# Patient Record
Sex: MALE | Race: ASIAN | NOT HISPANIC OR LATINO | Employment: OTHER | URBAN - METROPOLITAN AREA
[De-identification: names, ages, dates, MRNs, and addresses within clinical notes are randomized per-mention and may not be internally consistent; named-entity substitution may affect disease eponyms.]

---

## 2017-04-24 ENCOUNTER — HOSPITAL ENCOUNTER (EMERGENCY)
Facility: MEDICAL CENTER | Age: 82
End: 2017-04-24
Attending: EMERGENCY MEDICINE
Payer: MEDICARE

## 2017-04-24 ENCOUNTER — APPOINTMENT (OUTPATIENT)
Dept: RADIOLOGY | Facility: MEDICAL CENTER | Age: 82
End: 2017-04-24
Attending: EMERGENCY MEDICINE
Payer: MEDICARE

## 2017-04-24 VITALS
TEMPERATURE: 96.8 F | HEART RATE: 74 BPM | OXYGEN SATURATION: 91 % | WEIGHT: 155.65 LBS | DIASTOLIC BLOOD PRESSURE: 57 MMHG | RESPIRATION RATE: 19 BRPM | HEIGHT: 68 IN | SYSTOLIC BLOOD PRESSURE: 171 MMHG | BODY MASS INDEX: 23.59 KG/M2

## 2017-04-24 DIAGNOSIS — J40 BRONCHITIS: ICD-10-CM

## 2017-04-24 LAB
ALBUMIN SERPL BCP-MCNC: 4.3 G/DL (ref 3.2–4.9)
ALBUMIN/GLOB SERPL: 1.1 G/DL
ALP SERPL-CCNC: 67 U/L (ref 30–99)
ALT SERPL-CCNC: 10 U/L (ref 2–50)
ANION GAP SERPL CALC-SCNC: 10 MMOL/L (ref 0–11.9)
APPEARANCE UR: CLEAR
AST SERPL-CCNC: 20 U/L (ref 12–45)
BASOPHILS # BLD AUTO: 0.7 % (ref 0–1.8)
BASOPHILS # BLD: 0.07 K/UL (ref 0–0.12)
BILIRUB SERPL-MCNC: 1 MG/DL (ref 0.1–1.5)
BILIRUB UR QL STRIP.AUTO: NEGATIVE
BNP SERPL-MCNC: 39 PG/ML (ref 0–100)
BUN SERPL-MCNC: 10 MG/DL (ref 8–22)
CALCIUM SERPL-MCNC: 9.3 MG/DL (ref 8.5–10.5)
CHLORIDE SERPL-SCNC: 99 MMOL/L (ref 96–112)
CO2 SERPL-SCNC: 25 MMOL/L (ref 20–33)
COLOR UR: YELLOW
CREAT SERPL-MCNC: 0.76 MG/DL (ref 0.5–1.4)
EOSINOPHIL # BLD AUTO: 0.48 K/UL (ref 0–0.51)
EOSINOPHIL NFR BLD: 4.8 % (ref 0–6.9)
ERYTHROCYTE [DISTWIDTH] IN BLOOD BY AUTOMATED COUNT: 40.2 FL (ref 35.9–50)
GFR SERPL CREATININE-BSD FRML MDRD: >60 ML/MIN/1.73 M 2
GLOBULIN SER CALC-MCNC: 3.9 G/DL (ref 1.9–3.5)
GLUCOSE SERPL-MCNC: 180 MG/DL (ref 65–99)
GLUCOSE UR STRIP.AUTO-MCNC: 500 MG/DL
HCT VFR BLD AUTO: 42.8 % (ref 42–52)
HGB BLD-MCNC: 14.7 G/DL (ref 14–18)
IMM GRANULOCYTES # BLD AUTO: 0.05 K/UL (ref 0–0.11)
IMM GRANULOCYTES NFR BLD AUTO: 0.5 % (ref 0–0.9)
KETONES UR STRIP.AUTO-MCNC: 60 MG/DL
LACTATE BLD-SCNC: 1.7 MMOL/L (ref 0.5–2)
LACTATE BLD-SCNC: 2 MMOL/L (ref 0.5–2)
LEUKOCYTE ESTERASE UR QL STRIP.AUTO: NEGATIVE
LYMPHOCYTES # BLD AUTO: 1.74 K/UL (ref 1–4.8)
LYMPHOCYTES NFR BLD: 17.5 % (ref 22–41)
MCH RBC QN AUTO: 30.6 PG (ref 27–33)
MCHC RBC AUTO-ENTMCNC: 34.3 G/DL (ref 33.7–35.3)
MCV RBC AUTO: 89.2 FL (ref 81.4–97.8)
MICRO URNS: ABNORMAL
MONOCYTES # BLD AUTO: 1.38 K/UL (ref 0–0.85)
MONOCYTES NFR BLD AUTO: 13.9 % (ref 0–13.4)
NEUTROPHILS # BLD AUTO: 6.21 K/UL (ref 1.82–7.42)
NEUTROPHILS NFR BLD: 62.6 % (ref 44–72)
NITRITE UR QL STRIP.AUTO: NEGATIVE
NRBC # BLD AUTO: 0 K/UL
NRBC BLD AUTO-RTO: 0 /100 WBC
PH UR STRIP.AUTO: 6 [PH]
PLATELET # BLD AUTO: 245 K/UL (ref 164–446)
PMV BLD AUTO: 9.5 FL (ref 9–12.9)
POTASSIUM SERPL-SCNC: 3.7 MMOL/L (ref 3.6–5.5)
PROT SERPL-MCNC: 8.2 G/DL (ref 6–8.2)
PROT UR QL STRIP: NEGATIVE MG/DL
RBC # BLD AUTO: 4.8 M/UL (ref 4.7–6.1)
RBC UR QL AUTO: NEGATIVE
SODIUM SERPL-SCNC: 134 MMOL/L (ref 135–145)
SP GR UR STRIP.AUTO: 1.02
TROPONIN I SERPL-MCNC: <0.01 NG/ML (ref 0–0.04)
WBC # BLD AUTO: 9.9 K/UL (ref 4.8–10.8)

## 2017-04-24 PROCEDURE — 84484 ASSAY OF TROPONIN QUANT: CPT

## 2017-04-24 PROCEDURE — 71010 DX-CHEST-PORTABLE (1 VIEW): CPT

## 2017-04-24 PROCEDURE — 83880 ASSAY OF NATRIURETIC PEPTIDE: CPT

## 2017-04-24 PROCEDURE — 36415 COLL VENOUS BLD VENIPUNCTURE: CPT

## 2017-04-24 PROCEDURE — 87040 BLOOD CULTURE FOR BACTERIA: CPT

## 2017-04-24 PROCEDURE — 700102 HCHG RX REV CODE 250 W/ 637 OVERRIDE(OP): Performed by: EMERGENCY MEDICINE

## 2017-04-24 PROCEDURE — 93005 ELECTROCARDIOGRAM TRACING: CPT | Performed by: EMERGENCY MEDICINE

## 2017-04-24 PROCEDURE — 94640 AIRWAY INHALATION TREATMENT: CPT

## 2017-04-24 PROCEDURE — 99285 EMERGENCY DEPT VISIT HI MDM: CPT

## 2017-04-24 PROCEDURE — 81003 URINALYSIS AUTO W/O SCOPE: CPT

## 2017-04-24 PROCEDURE — A9270 NON-COVERED ITEM OR SERVICE: HCPCS | Performed by: EMERGENCY MEDICINE

## 2017-04-24 PROCEDURE — 700101 HCHG RX REV CODE 250: Performed by: EMERGENCY MEDICINE

## 2017-04-24 PROCEDURE — 83605 ASSAY OF LACTIC ACID: CPT

## 2017-04-24 PROCEDURE — 85025 COMPLETE CBC W/AUTO DIFF WBC: CPT

## 2017-04-24 PROCEDURE — 80053 COMPREHEN METABOLIC PANEL: CPT

## 2017-04-24 PROCEDURE — 87086 URINE CULTURE/COLONY COUNT: CPT

## 2017-04-24 RX ORDER — AZITHROMYCIN 250 MG/1
500 TABLET, FILM COATED ORAL ONCE
Status: COMPLETED | OUTPATIENT
Start: 2017-04-24 | End: 2017-04-24

## 2017-04-24 RX ORDER — AZITHROMYCIN 250 MG/1
TABLET, FILM COATED ORAL
Qty: 6 TAB | Refills: 0 | Status: SHIPPED | OUTPATIENT
Start: 2017-04-24 | End: 2017-10-21

## 2017-04-24 RX ORDER — ALBUTEROL SULFATE 90 UG/1
2 AEROSOL, METERED RESPIRATORY (INHALATION) EVERY 6 HOURS PRN
Qty: 8.5 G | Refills: 0 | Status: SHIPPED | OUTPATIENT
Start: 2017-04-24 | End: 2017-12-15 | Stop reason: SDUPTHER

## 2017-04-24 RX ADMIN — AZITHROMYCIN 500 MG: 250 TABLET, FILM COATED ORAL at 15:26

## 2017-04-24 RX ADMIN — ALBUTEROL SULFATE 2.5 MG: 2.5 SOLUTION RESPIRATORY (INHALATION) at 15:14

## 2017-04-24 ASSESSMENT — LIFESTYLE VARIABLES: EVER_SMOKED: YES

## 2017-04-24 NOTE — ED NOTES
PIV removed.  Patient and family given discharge instructions and follow up information, prescriptions x 2 electronically sent to pharmacy, verbalized understanding, ambulatory out of ED w/steady gait, VSS on RA, NAD

## 2017-04-24 NOTE — DISCHARGE INSTRUCTIONS

## 2017-04-24 NOTE — ED NOTES
The Medication Reconciliation process has been completed by interviewing the patient    Allergies have been reviewed  Antibiotic use in 30 days - NONE    Home Pharmacy:  [facility] [address]

## 2017-04-24 NOTE — ED PROVIDER NOTES
"ED Provider Note    CHIEF COMPLAINT  Chief Complaint   Patient presents with   • Cough       HPI  Ovi Bejarano is a 81 y.o. male who presents with coughing, coughing for the last week, coughing up some yellow sputum, slight sore throat. No fever no chills no nausea no vomiting no diarrhea. Has felt some generalized weakness in the last 4 days. No abdominal pain no chest pain no headache or neck pain. Cough is productive of yellow sputum.    REVIEW OF SYSTEMS  See HPI for further details. Denies other G.I., G.U.. endrocine, cardiovascular, respriatory or neurological problems. All other systems are negative.     PAST MEDICAL HISTORY  No past medical history on file.    FAMILY HISTORY  No family history on file.    SOCIAL HISTORY he is a nonsmoker  Social History     Social History   • Marital Status:      Spouse Name: N/A   • Number of Children: N/A   • Years of Education: N/A     Social History Main Topics   • Smoking status: Not on file   • Smokeless tobacco: Not on file   • Alcohol Use: Not on file   • Drug Use: Not on file   • Sexual Activity: Not on file     Other Topics Concern   • Not on file     Social History Narrative   • No narrative on file       SURGICAL HISTORY  No past surgical history on file.    CURRENT MEDICATIONS  Home Medications     **Home medications have not yet been reviewed for this encounter**          ALLERGIES  No Known Allergies    PHYSICAL EXAM  VITAL SIGNS: /57 mmHg  Pulse 90  Temp(Src) 36 °C (96.8 °F) (Temporal)  Resp 21  Ht 1.727 m (5' 8\")  Wt 70.6 kg (155 lb 10.3 oz)  BMI 23.67 kg/m2  SpO2 92%  Constitutional: Well developed, Well nourished, No acute distress, Non-toxic appearance.   HENT: Normocephalic, Atraumatic, Bilateral external ears normal, Oropharynx moist, No oral exudates, Nose normal.   Eyes: PERRL, EOMI, Conjunctiva normal, No discharge.   Neck: Normal range of motion, No tenderness, Supple, No stridor.   Lymphatic: No lymphadenopathy noted. "   Cardiovascular: Normal heart rate, Normal rhythm, No murmurs, No rubs, No gallops.   Thorax & Lungs: Normal breath sounds, No respiratory distress, No wheezing, No chest tenderness.   Abdomen:  No tenderness, no guarding no rigidity and the abdomen is soft.  No masses, No pulsatile masses.  Skin: Warm, Dry, No erythema, No rash.   Back: No tenderness, No CVA tenderness.   Extremities: Intact distal pulses, No edema, No tenderness, No cyanosis, No clubbing.   Musculoskeletal: Good range of motion in all major joints. No tenderness to palpation or major deformities noted.   Neurologic: Alert & oriented x 3, Normal motor function, Normal sensory function, No focal deficits noted.   Psychiatric: Affect normal, Judgment normal, Mood normal.       RADIOLOGY/PROCEDURES  DX-CHEST-PORTABLE (1 VIEW)   Final Result         1.  No acute cardiac or pulmonary abnormality is identified.      2.  Emphysema is present and probable scarring versus discoid atelectasis mainly in the left lung base.            COURSE & MEDICAL DECISION MAKING  Pertinent Labs & Imaging studies reviewed. (See chart for details) lactic acid 2.0 white count 9.9 hematocrit 43 platelet count is normal there is no shift, electrolytes normal renal function normal liver functions normal    This patient will be further evaluated by Dr. Lambert  FINAL IMPRESSION  1.   2.   3.     Disposition    Electronically signed by: Justo Rogers, 4/24/2017 12:54 PM

## 2017-04-24 NOTE — ED PROVIDER NOTES
ED Provider Note    CHIEF COMPLAINT  Chief Complaint   Patient presents with   • Cough       HPI  Ovi Bejarano is a 81 y.o. male who presents here for evaluation of cough and sore throat. The patient states that his symptoms began this morning. He describes a dull sore throat, located in the posterior pharynx, worse with swallowing, and without any alleviation. The patient states that he's had a associated cough which is productive of yellow sputum through the night last night, and the day today. The patient's daughter states that the patient has been kept up at night secondary to the cough, and that both the daughter and the patient's wife who live with him, have both been diagnosed with pneumonia and bronchitis in the past week. Secondary to this constellation of symptoms, the patient decided to present here for evaluation. Overall, the patient states that he feels fairly well, but at the urging of the family members, and his persistent cough through the evening, prompted the patient present here.    REVIEW OF SYSTEMS   Patient denies fever, vision change, sore throat, chest pain, shortness of breath, nausea, dysuria, focal muscle pain, rashes, or neurologic deficits     PAST MEDICAL HISTORY       SOCIAL HISTORY  Social History     Social History Main Topics   • Smoking status: Not on file   • Smokeless tobacco: Not on file   • Alcohol Use: Not on file   • Drug Use: Not on file   • Sexual Activity: Not on file       SURGICAL HISTORY  patient denies any surgical history    CURRENT MEDICATIONS  Home Medications     Reviewed by Jerrica Scales (Pharmacy Tech) on 04/24/17 at 1309  Med List Status: Complete    Medication Last Dose Status    aspirin EC (ECOTRIN) 81 MG Tablet Delayed Response 4/23/2017 Active    cetirizine (ZYRTEC) 10 MG Tab 4/23/2017 Active    fluocinonide (LIDEX) 0.05 % Cream 4/23/2017 Active    hydrOXYzine (ATARAX) 25 MG Tab <week Active                ALLERGIES  No Known  "Allergies    PHYSICAL EXAM  VITAL SIGNS: /57 mmHg  Pulse 71  Temp(Src) 36 °C (96.8 °F) (Temporal)  Resp 22  Ht 1.727 m (5' 8\")  Wt 70.6 kg (155 lb 10.3 oz)  BMI 23.67 kg/m2  SpO2 91%   Pulse ox interpretation: I interpret this pulse ox as low.  Constitutional: Alert in no apparent distress.  HENT: Head normocephalic, atraumatic, Bilateral external ears normal, Nose normal.  Eyes: Pupils are equal, extraocular movements intact, Conjunctiva normal, Non-icteric.   Neck: Normal range of motion, Supple, No stridor.   Lymphatic: No lymphadenopathy noted.   Cardiovascular: Regular rate and rhythm, no rubs murmurs or gallops   Thorax & Lungs: Lungs clear to auscultation bilaterally, No acute respiratory distress, No wheezing, No increased work of breathing.   Abdomen: Non-distended   Skin: Warm, Dry, No erythema, No rash.   Back: No bony tenderness, No CVA tenderness.   Extremities: Intact distal pulses, No edema, No tenderness, No cyanosis   Musculoskeletal: Good range of motion in all major joints. No tenderness to palpation or major deformities noted.   Neurologic: Alert , Normal motor function, Normal sensory function, No focal deficits noted.   Psychiatric: Affect normal, Judgment normal, Mood normal.       DIAGNOSTIC STUDIES / PROCEDURES    LABS  Labs Reviewed   CBC WITH DIFFERENTIAL - Abnormal; Notable for the following:     Lymphocytes 17.50 (*)     Monocytes 13.90 (*)     Monos (Absolute) 1.38 (*)     All other components within normal limits   COMP METABOLIC PANEL - Abnormal; Notable for the following:     Sodium 134 (*)     Glucose 180 (*)     Globulin 3.9 (*)     All other components within normal limits   LACTIC ACID   LACTIC ACID   BLOOD CULTURE    Narrative:     Per Hospital Policy: Only change Specimen Src: to \"Line\" if  specified by physician order.   BLOOD CULTURE    Narrative:     Per Hospital Policy: Only change Specimen Src: to \"Line\" if  specified by physician order.   ESTIMATED GFR "   TROPONIN   URINALYSIS   URINE CULTURE(NEW)   B TYPE NATRIURETIC       RADIOLOGY  DX-CHEST-PORTABLE (1 VIEW)   Final Result         1.  No acute cardiac or pulmonary abnormality is identified.      2.  Emphysema is present and probable scarring versus discoid atelectasis mainly in the left lung base.          COURSE & MEDICAL DECISION MAKING  Pertinent Labs & Imaging studies reviewed. (See chart for details)    Patient presented here for evaluation of cough and sore throat over the past 24 hours. Here the patient appears well and physical examination, and has no pulmonary abnormalities. The patient had initial considerations including acute bronchitis, pneumonia, and viral syndrome. The patient has sick contacts including all the members of his family with whom he lives, and otherwise states that he feels well here today. He does have some borderline hypoxia with oxygen saturations ranging from 88-92%. The patient however is not symptomatic from this, does not get lightheaded with ambulation, and otherwise states that he feels well. At this time and think it likely secondary to a small amount of atelectasis secondary to an early bronchitis or viral syndrome. I had a long discussion with the patient, his wife, and daughter at bedside regarding the possibility of discharge versus admission. They felt fairly strongly that the patient would like to go home, and wanted to an attempt at outpatient treatment with the antibiotics. The patient was given a 1st dose of azithromycin here, and the patient and the patient's daughter were informed that should the patient begin having increased shortness of breath, increased fatigue, or any other new or worsening symptoms the patient return to the emergency Department immediately. They express understanding of the need to return for these indications should they arise, and will do so. The patient's daughter lives with him, and states that she'll keep an eye on him in the interim.  The patient's chest x-ray shows no evidence of significant bronchitis or pneumonia, though it does show changes consistent with emphysema, which are likely the cause of the patient's underlying borderline hypoxia. Given this plan to discharge with the aforementioned plan.    The patient will return for worsening symptoms and is stable at the time of discharge. The patient verbalizes understanding.    The patient is referred to a primary physician for blood pressure management, diabetic screening, and for all other preventative health concerns should they be present.     FINAL IMPRESSION  1. Acute bronchitis  2. Emphysema  3.          Electronically signed by: Wesley Lambert, 4/24/2017 3:17 PM    This record was made with a voice recognition software. I have tried to correct any grammar, spelling or context errors to the best of my ability, but errors may still remain. Interpretation of this chart should be taken in this context.

## 2017-04-24 NOTE — ED AVS SNAPSHOT
4/24/2017    Ovi Bejarano  [address]  [facility] [address]    Dear Ovi:    [facility] wants to ensure your discharge home is safe and you or your loved ones have had all of your questions answered regarding your care after you leave the hospital.    Below is a list of resources and contact information should you have any questions regarding your hospital stay, follow-up instructions, or active medical symptoms.    Questions or Concerns Regarding… Contact   Medical Questions Related to Your Discharge  (7 days a week, 8am-5pm) Contact a Nurse Care Coordinator   [phone]   Medical Questions Not Related to Your Discharge  (24 hours a day / 7 days a week)  Contact the Nurse Health Line   [phone]    Medications or Discharge Instructions Refer to your discharge packet   or contact your [facility] Primary Care Provider   [phone]   Follow-up Appointment(s) Schedule your appointment via [employer]   or contact Scheduling [phone]   Billing Review your statement via [employer]  or contact Billing [phone]   Medical Records Review your records via [employer]   or contact Medical Records [phone]     You may receive a telephone call within two days of discharge. This call is to make certain you understand your discharge instructions and have the opportunity to have any questions answered. You can also easily access your medical information, test results and upcoming appointments via the [employer] free online health management tool. You can learn more and sign up at [employer]. For assistance setting up your [employer] account, please call [phone].    Once again, we want to ensure your discharge home is safe and that you have a clear understanding of any next steps in your care. If you have any questions or concerns, please do not hesitate to contact us, we are here for you. Thank you for choosing [facility] for your healthcare needs.    Sincerely,    Your [facility] Healthcare Team

## 2017-04-24 NOTE — ED AVS SNAPSHOT
Home Care Instructions                                                                                                                Ovi Bejarano   MRN: [id number]    Department:  [facility], Emergency Dept   Date of Visit:  4/24/2017            [facility], Emergency Dept    [address] [place] [id number]    Phone:  [phone]      You were seen by     1. Justo Rogers M.D.    2. Wesley Lambert M.D.      Your Diagnosis Was     Bronchitis     J40       These are the medications you received during your hospitalization from 04/24/2017 1140 to 04/24/2017 1644     Date/Time Order Dose Route Action    04/24/2017 1514 albuterol (PROVENTIL) 2.5mg/0.5ml nebulizer solution 2.5 mg 2.5 mg Nebulization Given    04/24/2017 1526 azithromycin (ZITHROMAX) tablet 500 mg 500 mg Oral Given      Follow-up Information     1. Follow up with [facility], Emergency Dept.    Specialty:  Emergency Medicine    Why:  If symptoms worsen    Contact information    [address] [place] [id number] [phone]      Medication Information     Review all of your home medications and newly ordered medications with your primary doctor and/or pharmacist as soon as possible. Follow medication instructions as directed by your doctor and/or pharmacist.     Please keep your complete medication list with you and share with your physician. Update the information when medications are discontinued, doses are changed, or new medications (including over-the-counter products) are added; and carry medication information at all times in the event of emergency situations.               Medication List      START taking these medications        Instructions    Morning Afternoon Evening Bedtime    albuterol 108 (90 BASE) MCG/ACT Aers inhalation aerosol        Inhale 2 Puffs by mouth every 6 hours as needed for Shortness of Breath.   Dose:  2 Puff                        azithromycin 250 MG Tabs   Last time this was given:  500 mg on 4/24/2017  3:26 PM   Commonly known as:  ZITHROMAX        Take two tabs by mouth on day one, then one tab by mouth daily on days 2-5.                          ASK your doctor about these medications        Instructions    Morning Afternoon Evening Bedtime    aspirin EC 81 MG Tbec   Commonly known as:  ECOTRIN        Take 81 mg by mouth every day.   Dose:  81 mg                        cetirizine 10 MG Tabs   Commonly known as:  ZYRTEC        Take 10 mg by mouth every day.   Dose:  10 mg                        fluocinonide 0.05 % Crea   Commonly known as:  LIDEX        Apply  to affected area(s) 2 times a day.                        hydrOXYzine 25 MG Tabs   Commonly known as:  ATARAX        Take 1 Tab by mouth 3 times a day as needed for Itching.   Dose:  25 mg                             Where to Get Your Medications      These medications were sent to [id number]  [place], [place] - [address] [facility]  [address], [place] [address]     Phone:  [phone]    - albuterol 108 (90 BASE) MCG/ACT Aers inhalation aerosol  - azithromycin 250 MG Tabs            Procedures and tests performed during your visit     Procedure/Test Number of Times Performed    BLOOD CULTURE 2    BTYPE NATRIURETIC PEPTIDE 1    CARDIAC MONITORING 1    CBC WITH DIFFERENTIAL 1    COMP METABOLIC PANEL 1    DX-CHEST-PORTABLE (1 VIEW) 1    EKG (ER) 1    ESTIMATED GFR 1    IV Saline Lock 1    Lactic acid (lactate) 2    OXYGEN THERAPY PER PROTOCOL 1    TROPONIN 1    URINALYSIS 1    URINE CULTURE(NEW) 1        Discharge Instructions       Bronchitis  Bronchitis is the body's way of reacting to injury and/or infection (inflammation) of the bronchi. Bronchi are the air tubes that extend from the windpipe into the lungs. If the inflammation becomes severe, it may cause shortness of breath.  CAUSES   Inflammation may be caused by:  · A virus.  · Germs  (bacteria).  · Dust.  · Allergens.  · Pollutants and many other irritants.  The cells lining the bronchial tree are covered with tiny hairs (cilia). These constantly beat upward, away from the lungs, toward the mouth. This keeps the lungs free of pollutants. When these cells become too irritated and are unable to do their job, mucus begins to develop. This causes the characteristic cough of bronchitis. The cough clears the lungs when the cilia are unable to do their job. Without either of these protective mechanisms, the mucus would settle in the lungs. Then you would develop pneumonia.  Smoking is a common cause of bronchitis and can contribute to pneumonia. Stopping this habit is the single most important thing you can do to help yourself.  TREATMENT   · Your caregiver may prescribe an antibiotic if the cough is caused by bacteria. Also, medicines that open up your airways make it easier to breathe. Your caregiver may also recommend or prescribe an expectorant. It will loosen the mucus to be coughed up. Only take over-the-counter or prescription medicines for pain, discomfort, or fever as directed by your caregiver.  · Removing whatever causes the problem (smoking, for example) is critical to preventing the problem from getting worse.  · Cough suppressants may be prescribed for relief of cough symptoms.  · Inhaled medicines may be prescribed to help with symptoms now and to help prevent problems from returning.  · For those with recurrent (chronic) bronchitis, there may be a need for steroid medicines.  SEEK IMMEDIATE MEDICAL CARE IF:   · During treatment, you develop more pus-like mucus (purulent sputum).  · You have a fever.  · Your baby is older than 3 months with a rectal temperature of 102° F (38.9° C) or higher.  · Your baby is 3 months old or younger with a rectal temperature of 100.4° F (38° C) or higher.  · You become progressively more ill.  · You have increased difficulty breathing, wheezing, or  shortness of breath.  It is necessary to seek immediate medical care if you are elderly or sick from any other disease.  MAKE SURE YOU:   · Understand these instructions.  · Will watch your condition.  · Will get help right away if you are not doing well or get worse.  Document Released: 12/18/2006 Document Revised: 03/11/2013 Document Reviewed: 10/27/2009  [employer]® Patient Information ©2014 [employer].            Patient Information     Patient Information    Following emergency treatment: all patient requiring follow-up care must return either to a private physician or a clinic if your condition worsens before you are able to obtain further medical attention, please return to the emergency room.     Billing Information    At [facility], we work to make the billing process streamlined for our patients.  Our Representatives are here to answer any questions you may have regarding your hospital bill.  If you have insurance coverage and have supplied your insurance information to us, we will submit a claim to your insurer on your behalf.  Should you have any questions regarding your bill, we can be reached online or by phone as follows:  Online: You are able pay your bills online or live chat with our representatives about any billing questions you may have. We are here to help Monday - Friday from 8:00am to 7:30pm and 9:00am - 12:00pm on Saturdays.  Please visit [email or web address]  for more information.   Phone:  [phone] or [phone]    Please note that your emergency physician, surgeon, pathologist, radiologist, anesthesiologist, and other specialists are not employed by [facility] and will therefore bill separately for their services.  Please contact them directly for any questions concerning their bills at the numbers below:     Emergency Physician Services:  [phone]  [facility]:  [phone]  [facility]:  [phone]  [facility]:  [phone]    1. Your final bill may vary from the amount quoted upon discharge if all procedures are not complete at that time, or if your doctor has additional procedures of which we are not aware. You will receive an additional bill if you return to the Emergency Department at [facility] for suture removal regardless of the facility of which the sutures were placed.     2. Please arrange for settlement of this account at the emergency registration.    3. All self-pay accounts are due in full at the time of treatment.  If you are unable to meet this obligation then payment is expected within 4-5 days.     4. If you have had radiology studies (CT, X-ray, Ultrasound, MRI), you have received a preliminary result during your emergency department visit. Please contact the radiology department [phone] to receive a copy of your final result. Please discuss the Final result with your primary physician or with the follow up physician provided.     Crisis Hotline:  [place] Crisis Hotline:  9-541-ALWJFXZ or 1-494.613.3379  [place] Crisis Hotline:    1-679.747.1184 or 800-837-4362         ED Discharge Follow Up Questions    1. In order to provide you with very good care, we would like to follow up with a phone call in the next few days.  May we have your permission to contact you?     YES /  NO    2. What is the best phone number to call you? (       )_____-__________    3. What is the best time to call you?      Morning  /  Afternoon  /  Evening                   Patient Signature:  ____________________________________________________________    Date:  ____________________________________________________________

## 2017-04-24 NOTE — ED AVS SNAPSHOT
GoCoin Access Code: [email or web address]  Expires: 5/24/2017  4:44 PM    Your email address is not on file at [facility].  Email Addresses are required for you to sign up for GoCoin, please contact [phone] to verify your personal information and to provide your email address prior to attempting to register for GoCoin.    Ovi Mendez Bejarano  [address]  [place] [address]    GoCoin  A secure, online tool to manage your health information     [facility]’s GoCoin® is a secure, online tool that connects you to your personalized health information from the privacy of your home -- day or night - making it very easy for you to manage your healthcare. Once the activation process is completed, you can even access your medical information using the GoCoin hoda, which is available for free in the Apple Hoda store or Google Play store.     To learn more about GoCoin, visit [email or web address]    There are two levels of access available (as shown below):   My Chart Features  [facility] Primary Care Doctor [facility]  Specialists [facility]  Urgent  Care Non-[facility] Primary Care Doctor   Email your healthcare team securely and privately 24/7 X X X    Manage appointments: schedule your next appointment; view details of past/upcoming appointments X      Request prescription refills. X      View recent personal medical records, including lab and immunizations X X X X   View health record, including health history, allergies, medications X X X X   Read reports about your outpatient visits, procedures, consult and ER notes X X X X   See your discharge summary, which is a recap of your hospital and/or ER visit that includes your diagnosis, lab results, and care plan X X  X     How to register for Applicot:  Once your e-mail address has been verified, follow the following steps to sign up for Applicot.     1. Go to  [email or web address]  2. Click on the Sign Up Now box, which takes you to the New Member Sign Up  page. You will need to provide the following information:  a. Enter your NanoViricides Access Code exactly as it appears at the top of this page. (You will not need to use this code after you’ve completed the sign-up process. If you do not sign up before the expiration date, you must request a new code.)   b. Enter your date of birth.   c. Enter your home email address.   d. Click Submit, and follow the next screen’s instructions.  3. Create a NanoViricides ID. This will be your NanoViricides login ID and cannot be changed, so think of one that is secure and easy to remember.  4. Create a NanoViricides password. You can change your password at any time.  5. Enter your Password Reset Question and Answer. This can be used at a later time if you forget your password.   6. Enter your e-mail address. This allows you to receive e-mail notifications when new information is available in NanoViricides.  7. Click Sign Up. You can now view your health information.    For assistance activating your NanoViricides account, call [phone]

## 2017-04-24 NOTE — ED NOTES
Pt to triage c/o productive cough x  4 days with weakness. Pt found to be in the 80's on RA upon arrival. Pt able to speak in full sentences. Sepsis score of 4.   Pt advised to return to triage nurse for any changes or concerns.

## 2017-04-25 ENCOUNTER — PATIENT OUTREACH (OUTPATIENT)
Dept: HEALTH INFORMATION MANAGEMENT | Facility: OTHER | Age: 82
End: 2017-04-25

## 2017-04-25 LAB — EKG IMPRESSION: NORMAL

## 2017-04-25 NOTE — PROGRESS NOTES
04/25/2017 1052 - Discharge Outreach attempt -   04/26/2017 1530 - Discharge Outreach attempt - No answer, No VMB

## 2017-04-26 LAB
BACTERIA UR CULT: NORMAL
SIGNIFICANT IND 70042: NORMAL
SITE SITE: NORMAL
SOURCE SOURCE: NORMAL

## 2017-04-29 LAB
BACTERIA BLD CULT: NORMAL
BACTERIA BLD CULT: NORMAL
SIGNIFICANT IND 70042: NORMAL
SIGNIFICANT IND 70042: NORMAL
SITE SITE: NORMAL
SITE SITE: NORMAL
SOURCE SOURCE: NORMAL
SOURCE SOURCE: NORMAL

## 2017-10-21 ENCOUNTER — APPOINTMENT (OUTPATIENT)
Dept: RADIOLOGY | Facility: MEDICAL CENTER | Age: 82
DRG: 189 | End: 2017-10-21
Attending: EMERGENCY MEDICINE
Payer: MEDICARE

## 2017-10-21 ENCOUNTER — RESOLUTE PROFESSIONAL BILLING HOSPITAL PROF FEE (OUTPATIENT)
Dept: HOSPITALIST | Facility: MEDICAL CENTER | Age: 82
End: 2017-10-21
Payer: MEDICARE

## 2017-10-21 ENCOUNTER — HOSPITAL ENCOUNTER (INPATIENT)
Facility: MEDICAL CENTER | Age: 82
LOS: 3 days | DRG: 189 | End: 2017-10-24
Attending: EMERGENCY MEDICINE | Admitting: HOSPITALIST
Payer: MEDICARE

## 2017-10-21 DIAGNOSIS — J43.9 PULMONARY EMPHYSEMA, UNSPECIFIED EMPHYSEMA TYPE (HCC): ICD-10-CM

## 2017-10-21 DIAGNOSIS — J44.89 COPD WITH CHRONIC BRONCHITIS AND EMPHYSEMA (HCC): ICD-10-CM

## 2017-10-21 DIAGNOSIS — J43.9 COPD WITH CHRONIC BRONCHITIS AND EMPHYSEMA (HCC): ICD-10-CM

## 2017-10-21 DIAGNOSIS — J44.1 CHRONIC OBSTRUCTIVE PULMONARY DISEASE WITH ACUTE EXACERBATION (HCC): ICD-10-CM

## 2017-10-21 PROBLEM — J96.01 ACUTE RESPIRATORY FAILURE WITH HYPOXIA (HCC): Status: ACTIVE | Noted: 2017-10-21

## 2017-10-21 PROBLEM — E11.9 TYPE 2 DIABETES MELLITUS (HCC): Status: ACTIVE | Noted: 2017-10-21

## 2017-10-21 LAB
ANION GAP SERPL CALC-SCNC: 11 MMOL/L (ref 0–11.9)
BASOPHILS # BLD AUTO: 0.5 % (ref 0–1.8)
BASOPHILS # BLD: 0.04 K/UL (ref 0–0.12)
BNP SERPL-MCNC: 22 PG/ML (ref 0–100)
BUN SERPL-MCNC: 11 MG/DL (ref 8–22)
CALCIUM SERPL-MCNC: 9.4 MG/DL (ref 8.5–10.5)
CHLORIDE SERPL-SCNC: 102 MMOL/L (ref 96–112)
CO2 SERPL-SCNC: 23 MMOL/L (ref 20–33)
CREAT SERPL-MCNC: 0.69 MG/DL (ref 0.5–1.4)
EOSINOPHIL # BLD AUTO: 0.93 K/UL (ref 0–0.51)
EOSINOPHIL NFR BLD: 12.2 % (ref 0–6.9)
ERYTHROCYTE [DISTWIDTH] IN BLOOD BY AUTOMATED COUNT: 41.8 FL (ref 35.9–50)
EST. AVERAGE GLUCOSE BLD GHB EST-MCNC: 174 MG/DL
GFR SERPL CREATININE-BSD FRML MDRD: >60 ML/MIN/1.73 M 2
GLUCOSE BLD-MCNC: 183 MG/DL (ref 65–99)
GLUCOSE BLD-MCNC: 274 MG/DL (ref 65–99)
GLUCOSE SERPL-MCNC: 149 MG/DL (ref 65–99)
HBA1C MFR BLD: 7.7 % (ref 0–5.6)
HCT VFR BLD AUTO: 41.5 % (ref 42–52)
HGB BLD-MCNC: 14.3 G/DL (ref 14–18)
IMM GRANULOCYTES # BLD AUTO: 0.03 K/UL (ref 0–0.11)
IMM GRANULOCYTES NFR BLD AUTO: 0.4 % (ref 0–0.9)
LYMPHOCYTES # BLD AUTO: 2.07 K/UL (ref 1–4.8)
LYMPHOCYTES NFR BLD: 27.2 % (ref 22–41)
MCH RBC QN AUTO: 30.8 PG (ref 27–33)
MCHC RBC AUTO-ENTMCNC: 34.5 G/DL (ref 33.7–35.3)
MCV RBC AUTO: 89.4 FL (ref 81.4–97.8)
MONOCYTES # BLD AUTO: 0.69 K/UL (ref 0–0.85)
MONOCYTES NFR BLD AUTO: 9.1 % (ref 0–13.4)
NEUTROPHILS # BLD AUTO: 3.84 K/UL (ref 1.82–7.42)
NEUTROPHILS NFR BLD: 50.6 % (ref 44–72)
NRBC # BLD AUTO: 0.04 K/UL
NRBC BLD AUTO-RTO: 0.5 /100 WBC
PLATELET # BLD AUTO: 230 K/UL (ref 164–446)
PMV BLD AUTO: 9.3 FL (ref 9–12.9)
POTASSIUM SERPL-SCNC: 4 MMOL/L (ref 3.6–5.5)
RBC # BLD AUTO: 4.64 M/UL (ref 4.7–6.1)
SODIUM SERPL-SCNC: 136 MMOL/L (ref 135–145)
TROPONIN I SERPL-MCNC: <0.01 NG/ML (ref 0–0.04)
WBC # BLD AUTO: 7.6 K/UL (ref 4.8–10.8)

## 2017-10-21 PROCEDURE — 770006 HCHG ROOM/CARE - MED/SURG/GYN SEMI*

## 2017-10-21 PROCEDURE — 99285 EMERGENCY DEPT VISIT HI MDM: CPT

## 2017-10-21 PROCEDURE — 71020 DX-CHEST-2 VIEWS: CPT

## 2017-10-21 PROCEDURE — 700111 HCHG RX REV CODE 636 W/ 250 OVERRIDE (IP): Performed by: EMERGENCY MEDICINE

## 2017-10-21 PROCEDURE — 80048 BASIC METABOLIC PNL TOTAL CA: CPT

## 2017-10-21 PROCEDURE — 99223 1ST HOSP IP/OBS HIGH 75: CPT | Mod: AI | Performed by: HOSPITALIST

## 2017-10-21 PROCEDURE — 82962 GLUCOSE BLOOD TEST: CPT

## 2017-10-21 PROCEDURE — 36415 COLL VENOUS BLD VENIPUNCTURE: CPT

## 2017-10-21 PROCEDURE — A9270 NON-COVERED ITEM OR SERVICE: HCPCS | Performed by: HOSPITALIST

## 2017-10-21 PROCEDURE — 700111 HCHG RX REV CODE 636 W/ 250 OVERRIDE (IP): Performed by: HOSPITALIST

## 2017-10-21 PROCEDURE — 700101 HCHG RX REV CODE 250: Performed by: EMERGENCY MEDICINE

## 2017-10-21 PROCEDURE — 84484 ASSAY OF TROPONIN QUANT: CPT

## 2017-10-21 PROCEDURE — 700102 HCHG RX REV CODE 250 W/ 637 OVERRIDE(OP): Performed by: HOSPITALIST

## 2017-10-21 PROCEDURE — 83036 HEMOGLOBIN GLYCOSYLATED A1C: CPT

## 2017-10-21 PROCEDURE — 96374 THER/PROPH/DIAG INJ IV PUSH: CPT

## 2017-10-21 PROCEDURE — 83880 ASSAY OF NATRIURETIC PEPTIDE: CPT

## 2017-10-21 PROCEDURE — 94640 AIRWAY INHALATION TREATMENT: CPT

## 2017-10-21 PROCEDURE — 85025 COMPLETE CBC W/AUTO DIFF WBC: CPT

## 2017-10-21 RX ORDER — AMOXICILLIN 250 MG
2 CAPSULE ORAL 2 TIMES DAILY
Status: DISCONTINUED | OUTPATIENT
Start: 2017-10-21 | End: 2017-10-24 | Stop reason: HOSPADM

## 2017-10-21 RX ORDER — GUAIFENESIN/DEXTROMETHORPHAN 100-10MG/5
10 SYRUP ORAL EVERY 6 HOURS PRN
Status: DISCONTINUED | OUTPATIENT
Start: 2017-10-21 | End: 2017-10-24 | Stop reason: HOSPADM

## 2017-10-21 RX ORDER — ALBUTEROL SULFATE 90 UG/1
2 AEROSOL, METERED RESPIRATORY (INHALATION) EVERY 6 HOURS PRN
Qty: 8.5 G | Refills: 0 | Status: SHIPPED | OUTPATIENT
Start: 2017-10-21 | End: 2017-12-15

## 2017-10-21 RX ORDER — TIOTROPIUM BROMIDE 18 UG/1
1 CAPSULE ORAL; RESPIRATORY (INHALATION)
Status: DISCONTINUED | OUTPATIENT
Start: 2017-10-21 | End: 2017-10-24 | Stop reason: HOSPADM

## 2017-10-21 RX ORDER — METHYLPREDNISOLONE SODIUM SUCCINATE 40 MG/ML
40 INJECTION, POWDER, LYOPHILIZED, FOR SOLUTION INTRAMUSCULAR; INTRAVENOUS EVERY 6 HOURS
Status: DISCONTINUED | OUTPATIENT
Start: 2017-10-21 | End: 2017-10-22

## 2017-10-21 RX ORDER — CETIRIZINE HYDROCHLORIDE 10 MG/1
10 TABLET ORAL DAILY
Status: DISCONTINUED | OUTPATIENT
Start: 2017-10-21 | End: 2017-10-24 | Stop reason: HOSPADM

## 2017-10-21 RX ORDER — LISINOPRIL 10 MG/1
10 TABLET ORAL
Status: DISCONTINUED | OUTPATIENT
Start: 2017-10-21 | End: 2017-10-24 | Stop reason: HOSPADM

## 2017-10-21 RX ORDER — DOXYCYCLINE 100 MG/1
100 TABLET ORAL EVERY 12 HOURS
Status: DISCONTINUED | OUTPATIENT
Start: 2017-10-21 | End: 2017-10-24 | Stop reason: HOSPADM

## 2017-10-21 RX ORDER — PREDNISONE 20 MG/1
40 TABLET ORAL DAILY
Qty: 12 TAB | Refills: 0 | Status: SHIPPED | OUTPATIENT
Start: 2017-10-21 | End: 2017-10-23

## 2017-10-21 RX ORDER — METHYLPREDNISOLONE SODIUM SUCCINATE 125 MG/2ML
125 INJECTION, POWDER, LYOPHILIZED, FOR SOLUTION INTRAMUSCULAR; INTRAVENOUS ONCE
Status: COMPLETED | OUTPATIENT
Start: 2017-10-21 | End: 2017-10-21

## 2017-10-21 RX ORDER — ONDANSETRON 4 MG/1
4 TABLET, ORALLY DISINTEGRATING ORAL EVERY 4 HOURS PRN
Status: DISCONTINUED | OUTPATIENT
Start: 2017-10-21 | End: 2017-10-24 | Stop reason: HOSPADM

## 2017-10-21 RX ORDER — METFORMIN HYDROCHLORIDE 500 MG/1
1500 TABLET, EXTENDED RELEASE ORAL DAILY
COMMUNITY
End: 2017-12-15

## 2017-10-21 RX ORDER — BISACODYL 10 MG
10 SUPPOSITORY, RECTAL RECTAL
Status: DISCONTINUED | OUTPATIENT
Start: 2017-10-21 | End: 2017-10-24 | Stop reason: HOSPADM

## 2017-10-21 RX ORDER — ONDANSETRON 2 MG/ML
4 INJECTION INTRAMUSCULAR; INTRAVENOUS EVERY 4 HOURS PRN
Status: DISCONTINUED | OUTPATIENT
Start: 2017-10-21 | End: 2017-10-24 | Stop reason: HOSPADM

## 2017-10-21 RX ORDER — POLYETHYLENE GLYCOL 3350 17 G/17G
1 POWDER, FOR SOLUTION ORAL
Status: DISCONTINUED | OUTPATIENT
Start: 2017-10-21 | End: 2017-10-24 | Stop reason: HOSPADM

## 2017-10-21 RX ADMIN — IPRATROPIUM BROMIDE 0.5 MG: 0.5 SOLUTION RESPIRATORY (INHALATION) at 14:44

## 2017-10-21 RX ADMIN — METHYLPREDNISOLONE SODIUM SUCCINATE 40 MG: 40 INJECTION, POWDER, FOR SOLUTION INTRAMUSCULAR; INTRAVENOUS at 19:42

## 2017-10-21 RX ADMIN — INSULIN LISPRO 5 UNITS: 100 INJECTION, SOLUTION INTRAVENOUS; SUBCUTANEOUS at 21:17

## 2017-10-21 RX ADMIN — DOXYCYCLINE 100 MG: 100 TABLET ORAL at 19:43

## 2017-10-21 RX ADMIN — METHYLPREDNISOLONE SODIUM SUCCINATE 125 MG: 125 INJECTION, POWDER, FOR SOLUTION INTRAMUSCULAR; INTRAVENOUS at 14:42

## 2017-10-21 RX ADMIN — STANDARDIZED SENNA CONCENTRATE AND DOCUSATE SODIUM 2 TABLET: 8.6; 5 TABLET, FILM COATED ORAL at 19:42

## 2017-10-21 RX ADMIN — ALBUTEROL SULFATE 2.5 MG: 2.5 SOLUTION RESPIRATORY (INHALATION) at 14:44

## 2017-10-21 ASSESSMENT — COPD QUESTIONNAIRES
HAVE YOU SMOKED AT LEAST 100 CIGARETTES IN YOUR ENTIRE LIFE: YES
DURING THE PAST 4 WEEKS HOW MUCH DID YOU FEEL SHORT OF BREATH: NONE/LITTLE OF THE TIME
DO YOU EVER COUGH UP ANY MUCUS OR PHLEGM?: NO/ONLY WITH OCCASIONAL COLDS OR INFECTIONS
COPD SCREENING SCORE: 4

## 2017-10-21 ASSESSMENT — PATIENT HEALTH QUESTIONNAIRE - PHQ9
SUM OF ALL RESPONSES TO PHQ9 QUESTIONS 1 AND 2: 0
SUM OF ALL RESPONSES TO PHQ QUESTIONS 1-9: 0
2. FEELING DOWN, DEPRESSED, IRRITABLE, OR HOPELESS: NOT AT ALL
1. LITTLE INTEREST OR PLEASURE IN DOING THINGS: NOT AT ALL

## 2017-10-21 ASSESSMENT — PAIN SCALES - GENERAL
PAINLEVEL_OUTOF10: 0
PAINLEVEL_OUTOF10: 0

## 2017-10-21 ASSESSMENT — LIFESTYLE VARIABLES
EVER_SMOKED: YES
EVER_SMOKED: YES
DO YOU DRINK ALCOHOL: NO
ALCOHOL_USE: NO

## 2017-10-21 NOTE — ED NOTES
Pt arrived to ER check-in stating he has been short of breath for 2 days. Pulse ox was 95% while seated and focused on breathing. 88% while talking    2lpm given via nasal canula at check-in

## 2017-10-21 NOTE — ED PROVIDER NOTES
ED Provider Note    Scribed for Manfred Ponce M.D. by Bartolome Chaudhari. 10/21/2017  1:41 PM    Primary care provider: Pcp Unknown  Means of arrival: walk in  History obtained from: patient  History limited by: clinical condition    CHIEF COMPLAINT  Chief Complaint   Patient presents with   • Shortness of Breath   • Wheezing       HPI  Ovi Bejarano is a 81 y.o. male who presents to the Emergency Department complaining of gradually worsening shortness of breath for the last 3 days. Patient reports associated occasional productive cough. He has a history of cigarette use, but has not smoked for 10 years. Patient has never been diagnosed with COPD. He denies chest pain, leg swelling, fever.     No further details of history of present illness can be obtained within the constraints of urgency of the patient's clinical condition    REVIEW OF SYSTEMS  Pertinent positives include shortness of breath, productive cough. Pertinent negatives include no chest pain, leg swelling, fever.     No further details of ROS can be obtained within the constraints of urgency of the patient's clinical condition    C.    PAST MEDICAL HISTORY   has a past medical history of Hypercholesteremia; Hyperglycemia; and Rash.    SURGICAL HISTORY  patient denies any surgical history    SOCIAL HISTORY  Social History   Substance Use Topics   • Smoking status: Former Smoker     Quit date: 10/21/2007   • Smokeless tobacco: Never Used   • Alcohol use Yes      Comment: rare      History   Drug Use No       FAMILY HISTORY  History reviewed. No pertinent family history.    CURRENT MEDICATIONS  Home Medications     Reviewed by Gina Andino R.N. (Registered Nurse) on 10/21/17 at 1322  Med List Status: Complete   Medication Last Dose Status   albuterol 108 (90 BASE) MCG/ACT Aero Soln inhalation aerosol  Active   aspirin EC (ECOTRIN) 81 MG Tablet Delayed Response 4/23/2017 Active   cetirizine (ZYRTEC) 10 MG Tab 4/23/2017 Active   metformin  "(GLUCOPHAGE) 500 MG Tab  Active                ALLERGIES  No Known Allergies    PHYSICAL EXAM  VITAL SIGNS: /119   Pulse 76   Temp 36.6 °C (97.9 °F)   Resp 18   Ht 1.651 m (5' 5\")   Wt 69.7 kg (153 lb 10.6 oz)   SpO2 95%   BMI 25.57 kg/m²     Vital signs reviewed.  Constitutional:  Elderly male. Conversing, smiling.  Head: Normocephalic.   Mouth/Throat: Oropharynx is clear and moist.   Neck: Normal range of motion.   Cardiovascular: Normal rate, regular rhythm and normal heart sounds.    Pulmonary/Chest: Lungs sounds decreased. Expiratory wheezing bilaterally.   Abdominal: Soft. There is no tenderness. There is no rebound and no guarding.   Musculoskeletal: Exhibits no edema.   Neurological: Patient is alert and oriented to person, place, and time.   Skin: Skin is warm and dry.   Psychiatric: Patient has a normal mood and affect. Behavior is normal.     LABS   Results for orders placed or performed during the hospital encounter of 10/21/17   CBC w/ Differential   Result Value Ref Range    WBC 7.6 4.8 - 10.8 K/uL    RBC 4.64 (L) 4.70 - 6.10 M/uL    Hemoglobin 14.3 14.0 - 18.0 g/dL    Hematocrit 41.5 (L) 42.0 - 52.0 %    MCV 89.4 81.4 - 97.8 fL    MCH 30.8 27.0 - 33.0 pg    MCHC 34.5 33.7 - 35.3 g/dL    RDW 41.8 35.9 - 50.0 fL    Platelet Count 230 164 - 446 K/uL    MPV 9.3 9.0 - 12.9 fL    Neutrophils-Polys 50.60 44.00 - 72.00 %    Lymphocytes 27.20 22.00 - 41.00 %    Monocytes 9.10 0.00 - 13.40 %    Eosinophils 12.20 (H) 0.00 - 6.90 %    Basophils 0.50 0.00 - 1.80 %    Immature Granulocytes 0.40 0.00 - 0.90 %    Nucleated RBC 0.50 /100 WBC    Neutrophils (Absolute) 3.84 1.82 - 7.42 K/uL    Lymphs (Absolute) 2.07 1.00 - 4.80 K/uL    Monos (Absolute) 0.69 0.00 - 0.85 K/uL    Eos (Absolute) 0.93 (H) 0.00 - 0.51 K/uL    Baso (Absolute) 0.04 0.00 - 0.12 K/uL    Immature Granulocytes (abs) 0.03 0.00 - 0.11 K/uL    NRBC (Absolute) 0.04 K/uL   All labs reviewed by me.    RADIOLOGY  DX-CHEST-2 VIEWS   Final " Result      1.  No consolidations identified.      2.  Emphysema and scattered areas of fibrosis are again noted.      The radiologist's interpretation of all radiological studies have been reviewed by me.    COURSE & MEDICAL DECISION MAKING  Pertinent Labs & Imaging studies reviewed. (See chart for details) The patient's [facility] and past medical  records were reviewed    1:41 PM - Patient seen and examined at bedside. Patient will be treated with Solumedrol 125 mg, Proventil 2.5 mg, Atrovent 0.5 mg. Ordered DX chest, CBC with differential, BMP, BNP, Troponin to evaluate his symptoms. The differential diagnoses include but are not limited to: pneumonia, pneumothorax, COPD. Chest x-ray is unremarkable    3:26 PM- Recheck: Patient re-evaluated at beside. Patient reports feeling improved with interventions. Patient's lab and radiology results discussed. Discussed patient's condition and treatment plan. We ambulated the patient in the department. He very quickly desaturated with minimal exertion after only walking 60 feet. Advised to follow up with his primary. Instructed to return to Emergency Department immediately if any new or worsening symptoms.    The patient will return for new or worsening symptoms and is stable at the time of discharge.    The patient is referred to a primary physician for blood pressure management, diabetic screening, and for all other preventative health concerns.    DISPOSITION:  Patient wanted to be discharged home however his post-ambulation sats were in the mid to low 80s. Patient will be admitted.    FOLLOW UP:  No follow-up provider specified.    OUTPATIENT MEDICATIONS:  New Prescriptions    No medications on file         FINAL IMPRESSION  1. Chronic obstructive pulmonary disease with acute exacerbation (CMS-HCC)          I, Bartolome Chaudhari (Mahendra), am scribing for, and in the presence of, Manfred Ponce M.D..    Electronically signed by: Bartolome Chaudhari (Mahendra),  10/21/2017    IManfred M.D. personally performed the services described in this documentation, as scribed by Bartolome Chaudhari in my presence, and it is both accurate and complete.    The note accurately reflects work and decisions made by me.  Manfred Ponce  10/21/2017  3:26 PM

## 2017-10-21 NOTE — ED NOTES
Patient arrived to ED with family for complaints of SOB and wheezing x3 days. Denies CP or pressure. Denies fever or chills. +Productive cough. He does have hx of smoking but denies COPD. He did have SPO2 readings <90% after ambulation and was placed on 2 lpm via NC in ED triage.

## 2017-10-22 LAB
ANION GAP SERPL CALC-SCNC: 10 MMOL/L (ref 0–11.9)
BASOPHILS # BLD AUTO: 0.3 % (ref 0–1.8)
BASOPHILS # BLD: 0.02 K/UL (ref 0–0.12)
BUN SERPL-MCNC: 15 MG/DL (ref 8–22)
CALCIUM SERPL-MCNC: 9.5 MG/DL (ref 8.5–10.5)
CHLORIDE SERPL-SCNC: 103 MMOL/L (ref 96–112)
CO2 SERPL-SCNC: 25 MMOL/L (ref 20–33)
CREAT SERPL-MCNC: 0.7 MG/DL (ref 0.5–1.4)
EOSINOPHIL # BLD AUTO: 0.01 K/UL (ref 0–0.51)
EOSINOPHIL NFR BLD: 0.1 % (ref 0–6.9)
ERYTHROCYTE [DISTWIDTH] IN BLOOD BY AUTOMATED COUNT: 41.6 FL (ref 35.9–50)
GFR SERPL CREATININE-BSD FRML MDRD: >60 ML/MIN/1.73 M 2
GLUCOSE BLD-MCNC: 173 MG/DL (ref 65–99)
GLUCOSE BLD-MCNC: 199 MG/DL (ref 65–99)
GLUCOSE BLD-MCNC: 202 MG/DL (ref 65–99)
GLUCOSE BLD-MCNC: 207 MG/DL (ref 65–99)
GLUCOSE SERPL-MCNC: 184 MG/DL (ref 65–99)
HCT VFR BLD AUTO: 40.4 % (ref 42–52)
HGB BLD-MCNC: 14 G/DL (ref 14–18)
IMM GRANULOCYTES # BLD AUTO: 0.05 K/UL (ref 0–0.11)
IMM GRANULOCYTES NFR BLD AUTO: 0.7 % (ref 0–0.9)
LYMPHOCYTES # BLD AUTO: 1.19 K/UL (ref 1–4.8)
LYMPHOCYTES NFR BLD: 16.2 % (ref 22–41)
MCH RBC QN AUTO: 31 PG (ref 27–33)
MCHC RBC AUTO-ENTMCNC: 34.7 G/DL (ref 33.7–35.3)
MCV RBC AUTO: 89.4 FL (ref 81.4–97.8)
MONOCYTES # BLD AUTO: 0.06 K/UL (ref 0–0.85)
MONOCYTES NFR BLD AUTO: 0.8 % (ref 0–13.4)
NEUTROPHILS # BLD AUTO: 6.02 K/UL (ref 1.82–7.42)
NEUTROPHILS NFR BLD: 81.9 % (ref 44–72)
NRBC # BLD AUTO: 0 K/UL
NRBC BLD AUTO-RTO: 0 /100 WBC
PLATELET # BLD AUTO: 236 K/UL (ref 164–446)
PMV BLD AUTO: 9.6 FL (ref 9–12.9)
POTASSIUM SERPL-SCNC: 3.9 MMOL/L (ref 3.6–5.5)
RBC # BLD AUTO: 4.52 M/UL (ref 4.7–6.1)
SODIUM SERPL-SCNC: 138 MMOL/L (ref 135–145)
WBC # BLD AUTO: 7.4 K/UL (ref 4.8–10.8)

## 2017-10-22 PROCEDURE — A9270 NON-COVERED ITEM OR SERVICE: HCPCS | Performed by: INTERNAL MEDICINE

## 2017-10-22 PROCEDURE — 700111 HCHG RX REV CODE 636 W/ 250 OVERRIDE (IP): Performed by: INTERNAL MEDICINE

## 2017-10-22 PROCEDURE — 700102 HCHG RX REV CODE 250 W/ 637 OVERRIDE(OP): Performed by: INTERNAL MEDICINE

## 2017-10-22 PROCEDURE — 80048 BASIC METABOLIC PNL TOTAL CA: CPT

## 2017-10-22 PROCEDURE — 700102 HCHG RX REV CODE 250 W/ 637 OVERRIDE(OP): Performed by: HOSPITALIST

## 2017-10-22 PROCEDURE — 99233 SBSQ HOSP IP/OBS HIGH 50: CPT | Performed by: INTERNAL MEDICINE

## 2017-10-22 PROCEDURE — 770006 HCHG ROOM/CARE - MED/SURG/GYN SEMI*

## 2017-10-22 PROCEDURE — 85025 COMPLETE CBC W/AUTO DIFF WBC: CPT

## 2017-10-22 PROCEDURE — 700111 HCHG RX REV CODE 636 W/ 250 OVERRIDE (IP): Performed by: HOSPITALIST

## 2017-10-22 PROCEDURE — A9270 NON-COVERED ITEM OR SERVICE: HCPCS | Performed by: HOSPITALIST

## 2017-10-22 PROCEDURE — 82962 GLUCOSE BLOOD TEST: CPT | Mod: 91

## 2017-10-22 PROCEDURE — 36415 COLL VENOUS BLD VENIPUNCTURE: CPT

## 2017-10-22 RX ORDER — AMLODIPINE BESYLATE 10 MG/1
10 TABLET ORAL
Status: COMPLETED | OUTPATIENT
Start: 2017-10-22 | End: 2017-10-22

## 2017-10-22 RX ORDER — PREDNISONE 20 MG/1
20 TABLET ORAL DAILY
Status: DISCONTINUED | OUTPATIENT
Start: 2017-10-22 | End: 2017-10-24 | Stop reason: HOSPADM

## 2017-10-22 RX ADMIN — INSULIN LISPRO 3 UNITS: 100 INJECTION, SOLUTION INTRAVENOUS; SUBCUTANEOUS at 11:49

## 2017-10-22 RX ADMIN — AMLODIPINE BESYLATE 10 MG: 10 TABLET ORAL at 08:28

## 2017-10-22 RX ADMIN — LISINOPRIL 10 MG: 10 TABLET ORAL at 07:40

## 2017-10-22 RX ADMIN — METHYLPREDNISOLONE SODIUM SUCCINATE 40 MG: 40 INJECTION, POWDER, FOR SOLUTION INTRAMUSCULAR; INTRAVENOUS at 06:18

## 2017-10-22 RX ADMIN — DOXYCYCLINE 100 MG: 100 TABLET ORAL at 20:26

## 2017-10-22 RX ADMIN — CETIRIZINE HYDROCHLORIDE 10 MG: 10 TABLET, FILM COATED ORAL at 07:40

## 2017-10-22 RX ADMIN — INSULIN LISPRO 2 UNITS: 100 INJECTION, SOLUTION INTRAVENOUS; SUBCUTANEOUS at 06:22

## 2017-10-22 RX ADMIN — INSULIN LISPRO 2 UNITS: 100 INJECTION, SOLUTION INTRAVENOUS; SUBCUTANEOUS at 17:58

## 2017-10-22 RX ADMIN — ENOXAPARIN SODIUM 40 MG: 100 INJECTION SUBCUTANEOUS at 07:40

## 2017-10-22 RX ADMIN — ASPIRIN 81 MG: 81 TABLET, COATED ORAL at 07:40

## 2017-10-22 RX ADMIN — METHYLPREDNISOLONE SODIUM SUCCINATE 40 MG: 40 INJECTION, POWDER, FOR SOLUTION INTRAMUSCULAR; INTRAVENOUS at 00:30

## 2017-10-22 RX ADMIN — DOXYCYCLINE 100 MG: 100 TABLET ORAL at 07:40

## 2017-10-22 RX ADMIN — PREDNISONE 20 MG: 20 TABLET ORAL at 11:49

## 2017-10-22 RX ADMIN — INSULIN LISPRO 3 UNITS: 100 INJECTION, SOLUTION INTRAVENOUS; SUBCUTANEOUS at 20:29

## 2017-10-22 ASSESSMENT — ENCOUNTER SYMPTOMS
ABDOMINAL PAIN: 0
CHILLS: 0
COUGH: 1
FEVER: 0
STRIDOR: 0
SEIZURES: 0
NECK PAIN: 0
DIZZINESS: 0
BACK PAIN: 0
MYALGIAS: 0
INSOMNIA: 0
ORTHOPNEA: 0
HEADACHES: 0
NAUSEA: 0
WEIGHT LOSS: 0
BLURRED VISION: 0
PALPITATIONS: 0
FOCAL WEAKNESS: 0
SPUTUM PRODUCTION: 1
VOMITING: 0
DIARRHEA: 0
HEARTBURN: 0
EYE REDNESS: 0
NERVOUS/ANXIOUS: 0
DEPRESSION: 0
EYE DISCHARGE: 0
SHORTNESS OF BREATH: 1
EYE PAIN: 0

## 2017-10-22 ASSESSMENT — PAIN SCALES - GENERAL
PAINLEVEL_OUTOF10: 0
PAINLEVEL_OUTOF10: 0

## 2017-10-22 NOTE — CARE PLAN
Problem: Venous Thromboembolism (VTW)/Deep Vein Thrombosis (DVT) Prevention:  Goal: Patient will participate in Venous Thrombosis (VTE)/Deep Vein Thrombosis (DVT)Prevention Measures  Outcome: PROGRESSING AS EXPECTED  Lovenox ordered     Problem: Respiratory:  Goal: Respiratory status will improve  Outcome: PROGRESSING AS EXPECTED  Pt on 2L oxygen via nasal cannula

## 2017-10-22 NOTE — PROGRESS NOTES
[facility] Hospitalist Progress Note    Date of Service: 10/22/2017    Chief Complaint  81 y.o. male with PMH of COPD, NIDDM, HTN admitted 10/21/2017 with copd exacerbation    Interval Problem Update  Breathing is improving. Denies chest pain.    Consultants/Specialty  none    Disposition  Remain on the floor.        Review of Systems   Constitutional: Negative for chills, fever and weight loss.   HENT: Negative for congestion and nosebleeds.    Eyes: Negative for blurred vision, pain, discharge and redness.   Respiratory: Positive for cough, sputum production and shortness of breath. Negative for stridor.    Cardiovascular: Negative for chest pain, palpitations and orthopnea.   Gastrointestinal: Negative for abdominal pain, diarrhea, heartburn, nausea and vomiting.   Genitourinary: Negative for dysuria, frequency and urgency.   Musculoskeletal: Negative for back pain, myalgias and neck pain.   Skin: Negative for itching and rash.   Neurological: Negative for dizziness, focal weakness, seizures and headaches.   Psychiatric/Behavioral: Negative for depression. The patient is not nervous/anxious and does not have insomnia.       Physical Exam  Laboratory/Imaging   Hemodynamics  Temp (24hrs), Av.6 °C (97.8 °F), Min:36.4 °C (97.6 °F), Max:36.6 °C (97.9 °F)   Temperature: 36.6 °C (97.8 °F)  Pulse  Av.2  Min: 63  Max: 80 Heart Rate (Monitored): 69  Blood Pressure : (!) 182/82, NIBP: (!) 169/76      Respiratory      Respiration: 16, Pulse Oximetry: 96 %, O2 Daily Delivery Respiratory : Silicone Nasal Cannula     Given By:: Mouthpiece  RUL Breath Sounds: Clear;Diminished, RML Breath Sounds: Clear;Diminished, RLL Breath Sounds: Clear;Diminished, MARQUIS Breath Sounds: Clear;Diminished, LLL Breath Sounds: Clear;Diminished    Fluids  No intake or output data in the 24 hours ending 10/22/17 0746    Nutrition  Orders Placed This Encounter   Procedures   • Diet Order     Standing Status:   Standing     Number of Occurrences:   1      Order Specific Question:   Diet:     Answer:   Regular [1]     Order Specific Question:   Diet:     Answer:   Diabetic [3]     Physical Exam   Constitutional: He is oriented to person, place, and time. No distress.   HENT:   Head: Normocephalic and atraumatic.   Mouth/Throat: Oropharynx is clear and moist.   Eyes: Conjunctivae and EOM are normal. Pupils are equal, round, and reactive to light.   Neck: Normal range of motion. Neck supple. No tracheal deviation present. No thyromegaly present.   Cardiovascular: Normal rate and regular rhythm.    No murmur heard.  Pulmonary/Chest: Effort normal. No respiratory distress. He has wheezes.   Abdominal: Soft. Bowel sounds are normal. He exhibits no distension. There is no tenderness.   Musculoskeletal: He exhibits no edema or tenderness.   Neurological: He is alert and oriented to person, place, and time. No cranial nerve deficit.   Skin: Skin is warm and dry. He is not diaphoretic. No erythema.   Psychiatric: He has a normal mood and affect. His behavior is normal. Thought content normal.       Recent Labs      10/21/17   1404  10/22/17   0241   WBC  7.6  7.4   RBC  4.64*  4.52*   HEMOGLOBIN  14.3  14.0   HEMATOCRIT  41.5*  40.4*   MCV  89.4  89.4   MCH  30.8  31.0   MCHC  34.5  34.7   RDW  41.8  41.6   PLATELETCT  230  236   MPV  9.3  9.6     Recent Labs      10/21/17   1404  10/22/17   0241   SODIUM  136  138   POTASSIUM  4.0  3.9   CHLORIDE  102  103   CO2  23  25   GLUCOSE  149*  184*   BUN  11  15   CREATININE  0.69  0.70   CALCIUM  9.4  9.5         Recent Labs      10/21/17   1404   BNPBTYPENAT  22              Assessment/Plan     * Acute respiratory failure with hypoxia (CMS-HCC)- (present on admission)   Assessment & Plan    On 3 L of nasal cannula  Plan as above  Titrate oxygen as needed        COPD exacerbation (CMS-HCC)- (present on admission)   Assessment & Plan    Precipitating treatment  Steroid  RT protocol  On doxycycline        Type 2 diabetes mellitus  (CMS-[facility])- (present on admission)   Assessment & Plan    On insulin management  Hypoglycemic protocol            Reviewed items::  Labs reviewed, Medications reviewed and Radiology images reviewed  DVT prophylaxis pharmacological::  Enoxaparin (Lovenox)

## 2017-10-22 NOTE — PROGRESS NOTES
Dr. Choi paged regarding patient's high blood pressure. New med order given. This RN will administer and recheck BP.

## 2017-10-22 NOTE — H&P
DATE OF ADMISSION:  10/21/2017.    PRIMARY CARE PROVIDER:  The patient does not have a local primary care   physician.    HISTORY OF PRESENT ILLNESS:  An 81-year-old male.  He has recently relocated   to the [facility] from [place].  The patient has a history of COPD, but is   not on home oxygen, presents to the emergency room with 3 days of progressive   shortness of breath, this has been associated with a cough and some upper   respiratory congestion.  Sputum is clear to yellow.  He has had no fevers, no   chills, no night sweats.  The patient used his albuterol inhaler 6 times this   morning without any improvement.  He has been treated here in the emergency   room and remains hypoxic despite RT intervention.    REVIEW OF SYSTEMS:  No chest pain, no palpitations, no orthopnea.  No lower   extremity swelling.  No headache, no numbness or tingling in extremities.  No   nausea or vomiting.  No dysuria.  All other systems reviewed and are negative.    PAST MEDICAL HISTORY:  1.  COPD, not on home oxygen.  2.  Noninsulin dependent diabetes.  3.  Hypertension.    SOCIAL HISTORY:  He quit smoking 10 years ago, does not drink alcohol.    FAMILY HISTORY:  The patient does not know.    ALLERGIES:  No known drug allergies.    MEDICATIONS:  1.  Albuterol 2 puffs every 6 hours as needed for shortness of breath.  2.  Aspirin 81 mg daily.  3.  Metformin ER 1500 mg by mouth daily.    PHYSICAL EXAMINATION:  VITAL SIGNS:  Temperature 36.6; blood pressure 147/118, pulse 64, respirations   17, and saturating 97% on 2 L by nasal cannula.  GENERAL:  The patient is well developed, well-nourished, no apparent distress.  HEENT:  Pupils are equal, round and reactive.  Extraocular movements are   intact.  Anicteric sclerae.  NECK:  Neck is supple.  No lymphadenopathy, no thyromegaly.  CARDIOVASCULAR:  Regular rate and rhythm.  No murmurs, rubs or gallops.  PMI   is nondisplaced.  RESPIRATORY:  No crackles.  No active wheezing.  Prolonged  expiratory phase   bilaterally, no respiratory distress.  ABDOMINAL:  Soft, nontender, nondistended.  No rebound or guarding.  EXTREMITIES:  No clubbing, no cyanosis, no edema.  NEUROLOGIC:  Cranial nerves II through XII were intact.  He has 5/5 strength   in upper and lower extremities bilaterally.    LABORATORY DATA:  White blood cell count 7.6, hemoglobin 14.3, platelets 230.    Sodium is 136, potassium 4, BUN 11, and creatinine 0.69.    IMAGING:  Chest x-ray, no consolidations identified; emphysema and scattered   areas of fibrosis are again noted.    ASSESSMENT AND PLAN:  An 81-year-old male with history of chronic obstructive   pulmonary disease, presents with acute respiratory failure secondary to   chronic obstructive pulmonary disease exacerbation.  1.  Acute respiratory failure.  This is secondary to acute chronic obstructive   pulmonary disease exacerbation.  He will be treated, provide O2 support,   evaluate for need for home O2.  2.  Acute chronic obstructive pulmonary disease exacerbation.  Chronic   obstructive pulmonary disease order set has been initiated.  The patient will   be treated with IV Solu-Medrol and doxycycline.  RT protocol.  3.  History of diabetes.  The patient does not know much about the status of   his diabetes.  We will check a hemoglobin A1c.  He will need outpatient   followup.  I am concerned that his diabetes will be exacerbated by   Solu-Medrol.  Sliding scale has been ordered.  4.  Prophylaxis:  Lovenox and a bowel regimen.  5.  Full code.    Case discussed with emergency room physician, Dr. Manfred Ponce.  I have   reviewed the above studies, including the chest x-ray showing no evidence of   infiltrate myself.  I expect the patient to remain in the hospital for greater   than 2 midnights.       ____________________________________     MD CARSON GOODMAN / LIDA    DD:  10/21/2017 16:33:24  DT:  10/21/2017 19:07:14    D#:  [id number]  Job#:  [id number]

## 2017-10-22 NOTE — CARE PLAN
Problem: Safety  Goal: Will remain free from injury  Outcome: PROGRESSING AS EXPECTED  Bed position low, treaded footwear, fall risk education, call light within reach    Problem: Infection  Goal: Will remain free from infection  Outcome: PROGRESSING AS EXPECTED  Hand hygiene and infection prevention education

## 2017-10-22 NOTE — RESPIRATORY CARE
COPD EDUCATION by COPD CLINICAL EDUCATOR  10/22/2017 at 6:46 AM by Nayeli Cedillo     Patient reviewed by COPD education team. Patient does not qualify for COPD program.

## 2017-10-23 LAB
GLUCOSE BLD-MCNC: 133 MG/DL (ref 65–99)
GLUCOSE BLD-MCNC: 159 MG/DL (ref 65–99)
GLUCOSE BLD-MCNC: 162 MG/DL (ref 65–99)
GLUCOSE BLD-MCNC: 172 MG/DL (ref 65–99)

## 2017-10-23 PROCEDURE — 770006 HCHG ROOM/CARE - MED/SURG/GYN SEMI*

## 2017-10-23 PROCEDURE — 82962 GLUCOSE BLOOD TEST: CPT | Mod: 91

## 2017-10-23 PROCEDURE — 700102 HCHG RX REV CODE 250 W/ 637 OVERRIDE(OP): Performed by: HOSPITALIST

## 2017-10-23 PROCEDURE — A9270 NON-COVERED ITEM OR SERVICE: HCPCS | Performed by: HOSPITALIST

## 2017-10-23 PROCEDURE — 700111 HCHG RX REV CODE 636 W/ 250 OVERRIDE (IP): Performed by: INTERNAL MEDICINE

## 2017-10-23 PROCEDURE — 99232 SBSQ HOSP IP/OBS MODERATE 35: CPT | Performed by: INTERNAL MEDICINE

## 2017-10-23 PROCEDURE — 90471 IMMUNIZATION ADMIN: CPT

## 2017-10-23 PROCEDURE — 700111 HCHG RX REV CODE 636 W/ 250 OVERRIDE (IP): Performed by: HOSPITALIST

## 2017-10-23 PROCEDURE — 90662 IIV NO PRSV INCREASED AG IM: CPT | Performed by: HOSPITALIST

## 2017-10-23 RX ORDER — DOXYCYCLINE 100 MG/1
100 TABLET ORAL EVERY 12 HOURS
Qty: 6 TAB | Refills: 0 | Status: SHIPPED | OUTPATIENT
Start: 2017-10-23 | End: 2017-10-26

## 2017-10-23 RX ORDER — TIOTROPIUM BROMIDE 18 UG/1
18 CAPSULE ORAL; RESPIRATORY (INHALATION) DAILY
Qty: 30 CAP | Refills: 0 | Status: SHIPPED | OUTPATIENT
Start: 2017-10-23 | End: 2017-12-15 | Stop reason: SDUPTHER

## 2017-10-23 RX ADMIN — INSULIN LISPRO 2 UNITS: 100 INJECTION, SOLUTION INTRAVENOUS; SUBCUTANEOUS at 17:38

## 2017-10-23 RX ADMIN — DOXYCYCLINE 100 MG: 100 TABLET ORAL at 08:59

## 2017-10-23 RX ADMIN — STANDARDIZED SENNA CONCENTRATE AND DOCUSATE SODIUM 2 TABLET: 8.6; 5 TABLET, FILM COATED ORAL at 08:59

## 2017-10-23 RX ADMIN — INFLUENZA A VIRUSA/MICHIGAN/45/2015 X-275 (H1N1) ANTIGEN (FORMALDEHYDE INACTIVATED), INFLUENZA A VIRUS A/HONG KONG/4801/2014 X-263B (H3N2) ANTIGEN (FORMALDEHYDE INACTIVATED), AND INFLUENZA B VIRUS B/BRISBANE/60/2008 ANTIGEN (FORMALDEHYDE INACTIVATED) 0.5 ML: 60; 60; 60 INJECTION, SUSPENSION INTRAMUSCULAR at 08:57

## 2017-10-23 RX ADMIN — TIOTROPIUM BROMIDE 1 CAPSULE: 18 CAPSULE ORAL; RESPIRATORY (INHALATION) at 09:03

## 2017-10-23 RX ADMIN — ASPIRIN 81 MG: 81 TABLET, COATED ORAL at 08:59

## 2017-10-23 RX ADMIN — DOXYCYCLINE 100 MG: 100 TABLET ORAL at 21:18

## 2017-10-23 RX ADMIN — STANDARDIZED SENNA CONCENTRATE AND DOCUSATE SODIUM 2 TABLET: 8.6; 5 TABLET, FILM COATED ORAL at 21:18

## 2017-10-23 RX ADMIN — ENOXAPARIN SODIUM 40 MG: 100 INJECTION SUBCUTANEOUS at 08:59

## 2017-10-23 RX ADMIN — LISINOPRIL 10 MG: 10 TABLET ORAL at 08:59

## 2017-10-23 RX ADMIN — PREDNISONE 20 MG: 20 TABLET ORAL at 08:59

## 2017-10-23 RX ADMIN — INSULIN LISPRO 2 UNITS: 100 INJECTION, SOLUTION INTRAVENOUS; SUBCUTANEOUS at 13:21

## 2017-10-23 RX ADMIN — CETIRIZINE HYDROCHLORIDE 10 MG: 10 TABLET, FILM COATED ORAL at 08:59

## 2017-10-23 RX ADMIN — INSULIN LISPRO 2 UNITS: 100 INJECTION, SOLUTION INTRAVENOUS; SUBCUTANEOUS at 21:19

## 2017-10-23 ASSESSMENT — ENCOUNTER SYMPTOMS
SPUTUM PRODUCTION: 1
FEVER: 0
HEADACHES: 0
FOCAL WEAKNESS: 0
CHILLS: 0
NAUSEA: 0
EYE PAIN: 0
DIARRHEA: 0
EYE REDNESS: 0
PALPITATIONS: 0
COUGH: 1
NERVOUS/ANXIOUS: 0
SEIZURES: 0
ABDOMINAL PAIN: 0
BACK PAIN: 0
BLURRED VISION: 0
VOMITING: 0
STRIDOR: 0
EYE DISCHARGE: 0
DEPRESSION: 0
DIZZINESS: 0
MYALGIAS: 0
NECK PAIN: 0
INSOMNIA: 0
ORTHOPNEA: 0
HEARTBURN: 0
SHORTNESS OF BREATH: 1
WEIGHT LOSS: 0

## 2017-10-23 ASSESSMENT — PAIN SCALES - GENERAL: PAINLEVEL_OUTOF10: 0

## 2017-10-23 NOTE — CARE PLAN
Problem: Metabolic:  Goal: Ability to maintain appropriate glucose levels will improve    Intervention: Manage blood glucose measurement  FSBS checks ACHS, insulin will be given per MAR and sliding scale. CTM s/s of complication      Problem: Respiratory:  Goal: Ability to maintain adequate ventilation will improve  Outcome: PROGRESSING AS EXPECTED  Lungs assessment, medication given per MAR, O2 NC provided, Pt resting, no signs of SOB, NAD

## 2017-10-23 NOTE — PROGRESS NOTES
[facility] Hospitalist Progress Note    Date of Service: 10/23/2017    Chief Complaint  81 y.o. male with PMH of COPD, NIDDM, HTN admitted 10/21/2017 with copd exacerbation    Interval Problem Update  Breathing is improving. Denies chest pain.    Consultants/Specialty  none    Disposition  Remain on the floor.        Review of Systems   Constitutional: Negative for chills, fever and weight loss.   HENT: Negative for congestion and nosebleeds.    Eyes: Negative for blurred vision, pain, discharge and redness.   Respiratory: Positive for cough, sputum production and shortness of breath. Negative for stridor.    Cardiovascular: Negative for chest pain, palpitations and orthopnea.   Gastrointestinal: Negative for abdominal pain, diarrhea, heartburn, nausea and vomiting.   Genitourinary: Negative for dysuria, frequency and urgency.   Musculoskeletal: Negative for back pain, myalgias and neck pain.   Skin: Negative for itching and rash.   Neurological: Negative for dizziness, focal weakness, seizures and headaches.   Psychiatric/Behavioral: Negative for depression. The patient is not nervous/anxious and does not have insomnia.       Physical Exam  Laboratory/Imaging   Hemodynamics  Temp (24hrs), Av.8 °C (98.2 °F), Min:36.4 °C (97.6 °F), Max:37 °C (98.6 °F)   Temperature: 37 °C (98.6 °F)  Pulse  Av.1  Min: 63  Max: 82    Blood Pressure : 148/71 (RN notified)      Respiratory      Respiration: 18, Pulse Oximetry: 98 %        RUL Breath Sounds: Clear;Diminished, RML Breath Sounds: Clear;Diminished, RLL Breath Sounds: Clear;Diminished, MARQUIS Breath Sounds: Clear;Diminished, LLL Breath Sounds: Clear;Diminished    Fluids  No intake or output data in the 24 hours ending 10/23/17 0742    Nutrition  Orders Placed This Encounter   Procedures   • Diet Order     Standing Status:   Standing     Number of Occurrences:   1     Order Specific Question:   Diet:     Answer:   Regular [1]     Order Specific Question:   Diet:     Answer:    Diabetic [3]     Physical Exam   Constitutional: He is oriented to person, place, and time. No distress.   HENT:   Head: Normocephalic and atraumatic.   Mouth/Throat: Oropharynx is clear and moist.   Eyes: Conjunctivae and EOM are normal. Pupils are equal, round, and reactive to light.   Neck: Normal range of motion. Neck supple. No tracheal deviation present. No thyromegaly present.   Cardiovascular: Normal rate and regular rhythm.    No murmur heard.  Pulmonary/Chest: Effort normal. No respiratory distress. He has wheezes.   Abdominal: Soft. Bowel sounds are normal. He exhibits no distension. There is no tenderness.   Musculoskeletal: He exhibits no edema or tenderness.   Neurological: He is alert and oriented to person, place, and time. No cranial nerve deficit.   Skin: Skin is warm and dry. He is not diaphoretic. No erythema.   Psychiatric: He has a normal mood and affect. His behavior is normal. Thought content normal.       Recent Labs      10/21/17   1404  10/22/17   0241   WBC  7.6  7.4   RBC  4.64*  4.52*   HEMOGLOBIN  14.3  14.0   HEMATOCRIT  41.5*  40.4*   MCV  89.4  89.4   MCH  30.8  31.0   MCHC  34.5  34.7   RDW  41.8  41.6   PLATELETCT  230  236   MPV  9.3  9.6     Recent Labs      10/21/17   1404  10/22/17   0241   SODIUM  136  138   POTASSIUM  4.0  3.9   CHLORIDE  102  103   CO2  23  25   GLUCOSE  149*  184*   BUN  11  15   CREATININE  0.69  0.70   CALCIUM  9.4  9.5         Recent Labs      10/21/17   1404   BNPBTYPENAT  22              Assessment/Plan     * Acute respiratory failure with hypoxia (CMS-[facility])- (present on admission)   Assessment & Plan    On 3 L of nasal cannula  Plan as above  Titrate oxygen as needed        COPD exacerbation (CMS-HCC)- (present on admission)   Assessment & Plan    Precipitating treatment  Steroid  RT protocol  On doxycycline        Type 2 diabetes mellitus (CMS-HCC)- (present on admission)   Assessment & Plan    On insulin management  Hypoglycemic protocol             Reviewed items::  Labs reviewed, Medications reviewed and Radiology images reviewed  DVT prophylaxis pharmacological::  Enoxaparin (Lovenox)

## 2017-10-23 NOTE — DISCHARGE SUMMARY
CHIEF COMPLAINT ON ADMISSION  Chief Complaint   Patient presents with   • Shortness of Breath   • Wheezing       CODE STATUS  Full Code    HPI & HOSPITAL COURSE  This is a 81 y.o. male here with SOB and wheezing. He was treated with IV abx for CAP and breathing treatment. He also had hypoxic respiratory failure required additional o2. He has been feeling better each day and he responded to medical management amazingly. Home o2 evaluation was done and he required 1-2 L and it was arranged for him. He will be discharged home today and follow up with PCP in 1 week. He was instructed to come back to ER if his symptoms get worse.      Therefore, he is discharged in fair and stable condition with close outpatient follow-up.    SPECIFIC OUTPATIENT FOLLOW-UP      DISCHARGE PROBLEM LIST  Principal Problem:    Acute respiratory failure with hypoxia (CMS-HCC) POA: Yes  Active Problems:    COPD exacerbation (CMS-[facility]) POA: Yes    Type 2 diabetes mellitus (CMS-[facility]) POA: Yes  Resolved Problems:    * No resolved hospital problems. *      FOLLOW UP  No future appointments.  Pcp Unknown    In 1 week        MEDICATIONS ON DISCHARGE   Ovi Bejarano   Home Medication Instructions ANA:[id number]    Printed on:10/23/17 1004   Medication Information                      albuterol 108 (90 BASE) MCG/ACT Aero Soln inhalation aerosol  Inhale 2 Puffs by mouth every 6 hours as needed for Shortness of Breath.             albuterol 108 (90 Base) MCG/ACT Aero Soln inhalation aerosol  Inhale 2 Puffs by mouth every 6 hours as needed for Shortness of Breath.             aspirin EC (ECOTRIN) 81 MG Tablet Delayed Response  Take 81 mg by mouth every day.             doxycycline monohydrate (ADOXA) 100 MG tablet  Take 1 Tab by mouth every 12 hours for 3 days.             metformin ER (GLUCOPHAGE XR) 500 MG TABLET SR 24 HR  Take 1,500 mg by mouth every day.             tiotropium (SPIRIVA) 18 MCG Cap  Inhale 1 Cap by mouth every day.                  DIET  Orders Placed This Encounter   Procedures   • Diet Order     Standing Status:   Standing     Number of Occurrences:   1     Order Specific Question:   Diet:     Answer:   Regular [1]     Order Specific Question:   Diet:     Answer:   Diabetic [3]       ACTIVITY  As tolerated.  Weight bearing as tolerated      CONSULTATIONS      PROCEDURES      LABORATORY  Lab Results   Component Value Date/Time    SODIUM 138 10/22/2017 02:41 AM    POTASSIUM 3.9 10/22/2017 02:41 AM    CHLORIDE 103 10/22/2017 02:41 AM    CO2 25 10/22/2017 02:41 AM    GLUCOSE 184 (H) 10/22/2017 02:41 AM    BUN 15 10/22/2017 02:41 AM    CREATININE 0.70 10/22/2017 02:41 AM        Lab Results   Component Value Date/Time    WBC 7.4 10/22/2017 02:41 AM    HEMOGLOBIN 14.0 10/22/2017 02:41 AM    HEMATOCRIT 40.4 (L) 10/22/2017 02:41 AM    PLATELETCT 236 10/22/2017 02:41 AM        Total time of the discharge process exceeds 38 minutes

## 2017-10-23 NOTE — FACE TO FACE
Face to Face Note  -  Durable Medical Equipment    Bret Choi M.D. - NPI: [id number]  I certify that this patient is under my care and that they had a durable medical equipment(DME)face to face encounter by myself that meets the physician DME face-to-face encounter requirements with this patient on:    Date of encounter:   Patient:                    MRN:                       YOB: 2017  Ovi Bejarano  [id number]  1935     The encounter with the patient was in whole, or in part, for the following medical condition, which is the primary reason for durable medical equipment:  COPD    I certify that, based on my findings, the following durable medical equipment is medically necessary:  Oxygen.    HOME O2 Saturation Measurements:(Values must be present for Home Oxygen orders)  Room air sat at rest: 85  Room air sat with amb: 83  With liters of O2: 1.5, O2 sat at rest with O2: 93  With Liters of O2: 2, O2 sat with amb with O2 : 94  Is the patient mobile?: Yes    My Clinical findings support the need for the above equipment due to:  Hypoxia    Supporting Symptoms: hypoxia

## 2017-10-23 NOTE — DISCHARGE PLANNING
Updated order and face to face have been faxed to [facility].  Per Jerrod they order must read not acute.

## 2017-10-23 NOTE — FACE TO FACE
Face to Face Note  -  Durable Medical Equipment    Bret Choi M.D. - NPI: [id number]  I certify that this patient is under my care and that they had a durable medical equipment(DME)face to face encounter by myself that meets the physician DME face-to-face encounter requirements with this patient on:    Date of encounter:   Patient:                    MRN:                       YOB: 2017  Ovi Bejarano  [id number]  1935     The encounter with the patient was in whole, or in part, for the following medical condition, which is the primary reason for durable medical equipment:  COPD    I certify that, based on my findings, the following durable medical equipment is medically necessary:  Oxygen.    HOME O2 Saturation Measurements:(Values must be present for Home Oxygen orders)         ,     ,         My Clinical findings support the need for the above equipment due to:  Hypoxia    Supporting Symptoms: hypoxia

## 2017-10-23 NOTE — DISCHARGE PLANNING
Received order from [facility].  Referral sent to [facility] at 4600 on 10-23-17.

## 2017-10-23 NOTE — DISCHARGE INSTRUCTIONS
Discharge Instructions    Discharged to home by car with relative. Discharged via wheelchair, hospital escort: Yes.  Special equipment needed: Not Applicable    Be sure to schedule a follow-up appointment with your primary care doctor or any specialists as instructed.     Discharge Plan:   Influenza Vaccine Indication: Patient Refuses  Influenza Vaccine Given - only chart on this line when given: Influenza Vaccine Given (See MAR)    I understand that a diet low in cholesterol, fat, and sodium is recommended for good health. Unless I have been given specific instructions below for another diet, I accept this instruction as my diet prescription.   Other diet: regular    Special Instructions: None    · Is patient discharged on Warfarin / Coumadin?   No     · Is patient Post Blood Transfusion?  No    Depression / Suicide Risk    As you are discharged from this [facility] facility, it is important to learn how to keep safe from harming yourself.    Recognize the warning signs:  · Abrupt changes in personality, positive or negative- including increase in energy   · Giving away possessions  · Change in eating patterns- significant weight changes-  positive or negative  · Change in sleeping patterns- unable to sleep or sleeping all the time   · Unwillingness or inability to communicate  · Depression  · Unusual sadness, discouragement and loneliness  · Talk of wanting to die  · Neglect of personal appearance   · Rebelliousness- reckless behavior  · Withdrawal from people/activities they love  · Confusion- inability to concentrate     If you or a loved one observes any of these behaviors or has concerns about self-harm, here's what you can do:  · Talk about it- your feelings and reasons for harming yourself  · Remove any means that you might use to hurt yourself (examples: pills, rope, extension cords, firearm)  · Get professional help from the community (Mental Health, Substance Abuse, psychological counseling)  · Do not  be alone:Call your Safe Contact- someone whom you trust who will be there for you.  · Call your local CRISIS HOTLINE 493-1612 or 756-209-7923  · Call your local Children's Mobile Crisis Response Team [place] (602) 351-2677 or www.[employer]  · Call the toll free National Suicide Prevention Hotlines   · National Suicide Prevention Lifeline 760-406-UWDU (1740)  · ClickHome Hope Line Network 800-SUICIDE (527-6238)    Chronic Obstructive Pulmonary Disease Exacerbation  Chronic obstructive pulmonary disease (COPD) is a common lung problem. In COPD, the flow of air from the lungs is limited. COPD exacerbations are times that breathing gets worse and you need extra treatment. Without treatment they can be life threatening. If they happen often, your lungs can become more damaged. If your COPD gets worse, your doctor may treat you with:  · Medicines.  · Oxygen.  · Different ways to clear your airway, such as using a mask.  HOME CARE  · Do not smoke.  · Avoid tobacco smoke and other things that bother your lungs.  · If given, take your antibiotic medicine as told. Finish the medicine even if you start to feel better.  · Only take medicines as told by your doctor.  · Drink enough fluids to keep your pee (urine) clear or pale yellow (unless your doctor has told you not to).  · Use a cool mist machine (vaporizer).  · If you use oxygen or a machine that turns liquid medicine into a mist (nebulizer), continue to use them as told.  · Keep up with shots (vaccinations) as told by your doctor.  · Exercise regularly.  · Eat healthy foods.  · Keep all doctor visits as told.  GET HELP RIGHT AWAY IF:  · You are very short of breath and it gets worse.  · You have trouble talking.  · You have bad chest pain.  · You have blood in your spit (sputum).  · You have a fever.  · You keep throwing up (vomiting).  · You feel weak, or you pass out (faint).  · You feel confused.  · You keep getting worse.  MAKE SURE YOU:  · Understand  these instructions.  · Will watch your condition.  · Will get help right away if you are not doing well or get worse.     This information is not intended to replace advice given to you by your health care provider. Make sure you discuss any questions you have with your health care provider.     Document Released: 12/06/2012 Document Revised: 01/08/2016 Document Reviewed: 08/22/2014  [employer] Interactive Patient Education ©2016 [employer]

## 2017-10-23 NOTE — DISCHARGE PLANNING
Medical Social Work    This  received a call from [facility] who states that there are no oxygen stats on face to face and [facility] is requesting that be updated.  Paged Dr. Choi for new face to face.

## 2017-10-23 NOTE — PROGRESS NOTES
Assumed care of pt at shift change, discussed POC. Pt A&Ox4. IV in place. Pt pleasant and cooperative. Refused bed alarm despite fall risk education, up-self, treaded socks on, call light within reach, bed in low position.

## 2017-10-23 NOTE — DISCHARGE PLANNING
Received call from Jerrod at [facility].  They are requesting patients home address.  Per NENA Sweet patient home address is [address] [place] [address]. [address] [facility].  [facility] has been advised and will have equipment delivered in about 30 minutes.

## 2017-10-23 NOTE — PROGRESS NOTES
Pt ambulated without oxygen, saturation of 83% while ambulating on room air pt requires 2 liters 02 to remain at 90% or above

## 2017-10-23 NOTE — CARE PLAN
Problem: Safety  Goal: Will remain free from injury  Outcome: PROGRESSING AS EXPECTED  Pt safely ambulates, wears non skid socks

## 2017-10-23 NOTE — CARE PLAN
Problem: Communication  Goal: The ability to communicate needs accurately and effectively will improve  Outcome: PROGRESSING AS EXPECTED  Pt communicates with staff his needs effectively

## 2017-10-24 VITALS
TEMPERATURE: 98 F | DIASTOLIC BLOOD PRESSURE: 60 MMHG | BODY MASS INDEX: 23.29 KG/M2 | OXYGEN SATURATION: 93 % | WEIGHT: 153.66 LBS | HEART RATE: 73 BPM | RESPIRATION RATE: 16 BRPM | SYSTOLIC BLOOD PRESSURE: 125 MMHG | HEIGHT: 68 IN

## 2017-10-24 LAB — GLUCOSE BLD-MCNC: 109 MG/DL (ref 65–99)

## 2017-10-24 PROCEDURE — 99238 HOSP IP/OBS DSCHRG MGMT 30/<: CPT | Performed by: INTERNAL MEDICINE

## 2017-10-24 PROCEDURE — A9270 NON-COVERED ITEM OR SERVICE: HCPCS | Performed by: HOSPITALIST

## 2017-10-24 PROCEDURE — 94760 N-INVAS EAR/PLS OXIMETRY 1: CPT

## 2017-10-24 PROCEDURE — 700111 HCHG RX REV CODE 636 W/ 250 OVERRIDE (IP): Performed by: INTERNAL MEDICINE

## 2017-10-24 PROCEDURE — 700102 HCHG RX REV CODE 250 W/ 637 OVERRIDE(OP): Performed by: HOSPITALIST

## 2017-10-24 PROCEDURE — 700111 HCHG RX REV CODE 636 W/ 250 OVERRIDE (IP): Performed by: HOSPITALIST

## 2017-10-24 PROCEDURE — 82962 GLUCOSE BLOOD TEST: CPT

## 2017-10-24 RX ADMIN — ENOXAPARIN SODIUM 40 MG: 100 INJECTION SUBCUTANEOUS at 08:53

## 2017-10-24 RX ADMIN — ASPIRIN 81 MG: 81 TABLET, COATED ORAL at 08:53

## 2017-10-24 RX ADMIN — STANDARDIZED SENNA CONCENTRATE AND DOCUSATE SODIUM 2 TABLET: 8.6; 5 TABLET, FILM COATED ORAL at 08:53

## 2017-10-24 RX ADMIN — TIOTROPIUM BROMIDE 1 CAPSULE: 18 CAPSULE ORAL; RESPIRATORY (INHALATION) at 12:05

## 2017-10-24 RX ADMIN — DOXYCYCLINE 100 MG: 100 TABLET ORAL at 08:52

## 2017-10-24 RX ADMIN — CETIRIZINE HYDROCHLORIDE 10 MG: 10 TABLET, FILM COATED ORAL at 08:53

## 2017-10-24 RX ADMIN — LISINOPRIL 10 MG: 10 TABLET ORAL at 08:52

## 2017-10-24 RX ADMIN — PREDNISONE 20 MG: 20 TABLET ORAL at 08:52

## 2017-10-24 ASSESSMENT — PAIN SCALES - GENERAL: PAINLEVEL_OUTOF10: 0

## 2017-10-24 NOTE — DISCHARGE SUMMARY
Addendum:    Please refer to discharge summary completed by Dr. Choi. Patient was admitted for acute hypoxemic restaurant failure secondary to community acquired pneumonia treated with IV antibiotics. Patient's oxygen requirements improved during his hospital stay. He was discharged home with supplemental oxygen. Patient is monitored until his supplemental oxygen was delivered. He remained stable and is discharged home with his wife.

## 2017-10-24 NOTE — PROGRESS NOTES
A/o x4, no c/o pain  02 via nc  Up independently  Planning d/c to home this am with home o2   Call light within reach and pt using wendy

## 2017-10-24 NOTE — PROGRESS NOTES
Patient AAOx4, no complaints of pain.  Tolerated all oral medications.  Patient found to be on room air saturating at 90%.  O2 placed.  Awaiting home O2 setup for discharge.  Up self, no bed alarm, charge RN aware.  Bed locked in lowest position, treaded socks in place, POC discussed, all needs met at this time.

## 2017-10-24 NOTE — CARE PLAN
Problem: Safety  Goal: Will remain free from falls  Outcome: PROGRESSING AS EXPECTED  Bed locked in lowest position, treaded socks in place, call light within reach.  Patient up self, no bed alarm.  Instructed to call for assistance.    Problem: Respiratory:  Goal: Respiratory status will improve  Outcome: PROGRESSING AS EXPECTED  On 1.5L O2, no SOB or respiratory distress.

## 2017-10-24 NOTE — DISCHARGE PLANNING
Called and spoke with [facility] to follow up on oxygen referral.  Oxygen tank in route to [facility].  Approximate time of delivery will be [id number].  Abiola).

## 2017-10-24 NOTE — PROGRESS NOTES
"Pt dressed and ready to d/c when I came on shift   Home o2 company brought pt a tank and told him they would deliver concentrator to his home tonight and hed have to carry it in (they told pt it was 35 lbs) or wait until tmrw and someone would help him   Charge nurse called the company and they said they would help him get it set up, etc and that the pt would just have to call them when he got home   Pt then came out and said his wife reminded him that they did not have a phone  Pt was not sure if he could use the one at the hotel   At this point pt is crying and wife is frustrated/upset as \"this has been going on since 11 am\"  Page returned from night time  who said pt should just stay at this point so we know he will be safe tonight (with o2), as tank that the company brought for pt to use was already empty  Charge rn called [facility] and informed of situation   Home 02 company was supposed to call me but never did   Pt and wife are comfortable with staying here overnight     "

## 2017-10-24 NOTE — DISCHARGE PLANNING
Followed up with [facility]. Per staff, they have already delivered concentrator to patient's home and will deliver a portable tank for patient's transport home. Kee(CORINA) notified via voicemail.

## 2017-10-25 ENCOUNTER — PATIENT OUTREACH (OUTPATIENT)
Dept: HEALTH INFORMATION MANAGEMENT | Facility: OTHER | Age: 82
End: 2017-10-25

## 2017-10-25 NOTE — PROGRESS NOTES
10/25/17 1:30pm  Discharge outreach call.  Patient didn't have a telephone listed in [facility].  Roommate telephone number not in service.  CM unable to reach patient or leave .

## 2017-10-25 NOTE — PROGRESS NOTES
Discharging Patient home per physician order.  Discharged with wife.  Demonstrated understanding of discharge instructions, follow up appointments, home medications and prescriptions.  Ambulating without assistance, voiding without difficulty, pain well controlled, tolerating oral medications, oxygen saturation greater than 90% , tolerating diet.   Verbalized understanding of discharge instructions.  All questions answered.  Belongings with patient at time of discharge.    Rep from [facility] verbalized they will deliver and set up home oxygen by 5pm.

## 2017-11-14 ENCOUNTER — APPOINTMENT (OUTPATIENT)
Dept: RADIOLOGY | Facility: MEDICAL CENTER | Age: 82
DRG: 190 | End: 2017-11-14
Attending: FAMILY MEDICINE
Payer: MEDICARE

## 2017-11-14 ENCOUNTER — HOSPITAL ENCOUNTER (INPATIENT)
Facility: MEDICAL CENTER | Age: 82
LOS: 1 days | DRG: 190 | End: 2017-11-16
Attending: EMERGENCY MEDICINE | Admitting: HOSPITALIST
Payer: MEDICARE

## 2017-11-14 DIAGNOSIS — J44.1 ACUTE EXACERBATION OF CHRONIC OBSTRUCTIVE PULMONARY DISEASE (COPD) (HCC): ICD-10-CM

## 2017-11-14 LAB — EKG IMPRESSION: NORMAL

## 2017-11-14 PROCEDURE — 93005 ELECTROCARDIOGRAM TRACING: CPT | Performed by: EMERGENCY MEDICINE

## 2017-11-14 PROCEDURE — 99285 EMERGENCY DEPT VISIT HI MDM: CPT

## 2017-11-14 PROCEDURE — 700102 HCHG RX REV CODE 250 W/ 637 OVERRIDE(OP): Performed by: EMERGENCY MEDICINE

## 2017-11-14 PROCEDURE — 700111 HCHG RX REV CODE 636 W/ 250 OVERRIDE (IP): Performed by: FAMILY MEDICINE

## 2017-11-14 PROCEDURE — 700101 HCHG RX REV CODE 250: Performed by: FAMILY MEDICINE

## 2017-11-14 PROCEDURE — 36415 COLL VENOUS BLD VENIPUNCTURE: CPT

## 2017-11-14 PROCEDURE — 93005 ELECTROCARDIOGRAM TRACING: CPT

## 2017-11-14 PROCEDURE — 304562 HCHG STAT O2 MASK/CANNULA

## 2017-11-14 PROCEDURE — A9270 NON-COVERED ITEM OR SERVICE: HCPCS | Performed by: EMERGENCY MEDICINE

## 2017-11-14 PROCEDURE — 94640 AIRWAY INHALATION TREATMENT: CPT

## 2017-11-14 RX ORDER — AZITHROMYCIN 250 MG/1
250 TABLET, FILM COATED ORAL ONCE
Status: COMPLETED | OUTPATIENT
Start: 2017-11-14 | End: 2017-11-14

## 2017-11-14 RX ORDER — IPRATROPIUM BROMIDE AND ALBUTEROL SULFATE 2.5; .5 MG/3ML; MG/3ML
3 SOLUTION RESPIRATORY (INHALATION)
Status: DISCONTINUED | OUTPATIENT
Start: 2017-11-14 | End: 2017-11-15

## 2017-11-14 RX ORDER — AZITHROMYCIN 250 MG/1
250 TABLET, FILM COATED ORAL DAILY
Status: DISCONTINUED | OUTPATIENT
Start: 2017-11-15 | End: 2017-11-14

## 2017-11-14 RX ORDER — PREDNISONE 20 MG/1
40 TABLET ORAL DAILY
Status: DISCONTINUED | OUTPATIENT
Start: 2017-11-14 | End: 2017-11-16 | Stop reason: HOSPADM

## 2017-11-14 RX ADMIN — AZITHROMYCIN 250 MG: 250 TABLET, FILM COATED ORAL at 23:18

## 2017-11-14 RX ADMIN — ALBUTEROL SULFATE 2.5 MG: 2.5 SOLUTION RESPIRATORY (INHALATION) at 23:32

## 2017-11-14 RX ADMIN — PREDNISONE 40 MG: 20 TABLET ORAL at 23:18

## 2017-11-14 RX ADMIN — IPRATROPIUM BROMIDE AND ALBUTEROL SULFATE 3 ML: .5; 3 SOLUTION RESPIRATORY (INHALATION) at 23:32

## 2017-11-14 ASSESSMENT — LIFESTYLE VARIABLES
DO YOU DRINK ALCOHOL: NO
EVER_SMOKED: YES

## 2017-11-14 ASSESSMENT — COPD QUESTIONNAIRES
COPD SCREENING SCORE: 6
DO YOU EVER COUGH UP ANY MUCUS OR PHLEGM?: YES, A FEW DAYS A WEEK OR MONTH
HAVE YOU SMOKED AT LEAST 100 CIGARETTES IN YOUR ENTIRE LIFE: YES
DURING THE PAST 4 WEEKS HOW MUCH DID YOU FEEL SHORT OF BREATH: SOME OF THE TIME

## 2017-11-14 ASSESSMENT — PAIN SCALES - GENERAL: PAINLEVEL_OUTOF10: 0

## 2017-11-15 ENCOUNTER — RESOLUTE PROFESSIONAL BILLING HOSPITAL PROF FEE (OUTPATIENT)
Dept: HOSPITALIST | Facility: MEDICAL CENTER | Age: 82
End: 2017-11-15
Payer: MEDICARE

## 2017-11-15 ENCOUNTER — APPOINTMENT (OUTPATIENT)
Dept: RADIOLOGY | Facility: MEDICAL CENTER | Age: 82
DRG: 190 | End: 2017-11-15
Attending: HOSPITALIST
Payer: MEDICARE

## 2017-11-15 PROBLEM — N28.89 RIGHT KIDNEY MASS: Status: ACTIVE | Noted: 2017-11-15

## 2017-11-15 LAB
BNP SERPL-MCNC: 62 PG/ML (ref 0–100)
EKG IMPRESSION: NORMAL
GLUCOSE BLD-MCNC: 156 MG/DL (ref 65–99)
GLUCOSE BLD-MCNC: 173 MG/DL (ref 65–99)
GLUCOSE BLD-MCNC: 179 MG/DL (ref 65–99)
GLUCOSE BLD-MCNC: 201 MG/DL (ref 65–99)
PROCALCITONIN SERPL-MCNC: <0.05 NG/ML

## 2017-11-15 PROCEDURE — G8996 SWALLOW CURRENT STATUS: HCPCS | Mod: CH

## 2017-11-15 PROCEDURE — 82962 GLUCOSE BLOOD TEST: CPT | Mod: 91

## 2017-11-15 PROCEDURE — 71010 DX-CHEST-PORTABLE (1 VIEW): CPT

## 2017-11-15 PROCEDURE — G8997 SWALLOW GOAL STATUS: HCPCS | Mod: CH

## 2017-11-15 PROCEDURE — 87040 BLOOD CULTURE FOR BACTERIA: CPT

## 2017-11-15 PROCEDURE — 700105 HCHG RX REV CODE 258: Performed by: HOSPITALIST

## 2017-11-15 PROCEDURE — 96374 THER/PROPH/DIAG INJ IV PUSH: CPT

## 2017-11-15 PROCEDURE — 94640 AIRWAY INHALATION TREATMENT: CPT

## 2017-11-15 PROCEDURE — 71250 CT THORAX DX C-: CPT

## 2017-11-15 PROCEDURE — 770006 HCHG ROOM/CARE - MED/SURG/GYN SEMI*

## 2017-11-15 PROCEDURE — 700111 HCHG RX REV CODE 636 W/ 250 OVERRIDE (IP): Performed by: HOSPITALIST

## 2017-11-15 PROCEDURE — 71020 DX-CHEST-2 VIEWS: CPT

## 2017-11-15 PROCEDURE — 96375 TX/PRO/DX INJ NEW DRUG ADDON: CPT

## 2017-11-15 PROCEDURE — 700111 HCHG RX REV CODE 636 W/ 250 OVERRIDE (IP)

## 2017-11-15 PROCEDURE — 93010 ELECTROCARDIOGRAM REPORT: CPT | Performed by: INTERNAL MEDICINE

## 2017-11-15 PROCEDURE — 700111 HCHG RX REV CODE 636 W/ 250 OVERRIDE (IP): Performed by: FAMILY MEDICINE

## 2017-11-15 PROCEDURE — 700102 HCHG RX REV CODE 250 W/ 637 OVERRIDE(OP): Performed by: HOSPITALIST

## 2017-11-15 PROCEDURE — 700101 HCHG RX REV CODE 250: Performed by: HOSPITALIST

## 2017-11-15 PROCEDURE — 93005 ELECTROCARDIOGRAM TRACING: CPT | Performed by: HOSPITALIST

## 2017-11-15 PROCEDURE — 94760 N-INVAS EAR/PLS OXIMETRY 1: CPT

## 2017-11-15 PROCEDURE — 36415 COLL VENOUS BLD VENIPUNCTURE: CPT

## 2017-11-15 PROCEDURE — A9270 NON-COVERED ITEM OR SERVICE: HCPCS | Performed by: HOSPITALIST

## 2017-11-15 PROCEDURE — G8998 SWALLOW D/C STATUS: HCPCS | Mod: CH

## 2017-11-15 PROCEDURE — 99223 1ST HOSP IP/OBS HIGH 75: CPT | Performed by: HOSPITALIST

## 2017-11-15 PROCEDURE — 92610 EVALUATE SWALLOWING FUNCTION: CPT

## 2017-11-15 PROCEDURE — 83880 ASSAY OF NATRIURETIC PEPTIDE: CPT

## 2017-11-15 PROCEDURE — 84145 PROCALCITONIN (PCT): CPT

## 2017-11-15 RX ORDER — ONDANSETRON 4 MG/1
4 TABLET, ORALLY DISINTEGRATING ORAL EVERY 4 HOURS PRN
Status: DISCONTINUED | OUTPATIENT
Start: 2017-11-15 | End: 2017-11-16 | Stop reason: HOSPADM

## 2017-11-15 RX ORDER — BUDESONIDE AND FORMOTEROL FUMARATE DIHYDRATE 160; 4.5 UG/1; UG/1
2 AEROSOL RESPIRATORY (INHALATION)
Status: DISCONTINUED | OUTPATIENT
Start: 2017-11-15 | End: 2017-11-16 | Stop reason: HOSPADM

## 2017-11-15 RX ORDER — ALBUTEROL SULFATE 90 UG/1
2 AEROSOL, METERED RESPIRATORY (INHALATION) EVERY 6 HOURS PRN
Status: DISCONTINUED | OUTPATIENT
Start: 2017-11-15 | End: 2017-11-16 | Stop reason: HOSPADM

## 2017-11-15 RX ORDER — AZITHROMYCIN 500 MG/1
500 INJECTION, POWDER, LYOPHILIZED, FOR SOLUTION INTRAVENOUS ONCE
Status: COMPLETED | OUTPATIENT
Start: 2017-11-15 | End: 2017-11-15

## 2017-11-15 RX ORDER — ONDANSETRON 2 MG/ML
4 INJECTION INTRAMUSCULAR; INTRAVENOUS EVERY 4 HOURS PRN
Status: DISCONTINUED | OUTPATIENT
Start: 2017-11-15 | End: 2017-11-16 | Stop reason: HOSPADM

## 2017-11-15 RX ORDER — HEPARIN SODIUM 5000 [USP'U]/ML
5000 INJECTION, SOLUTION INTRAVENOUS; SUBCUTANEOUS EVERY 8 HOURS
Status: DISCONTINUED | OUTPATIENT
Start: 2017-11-15 | End: 2017-11-16 | Stop reason: HOSPADM

## 2017-11-15 RX ORDER — TIOTROPIUM BROMIDE 18 UG/1
1 CAPSULE ORAL; RESPIRATORY (INHALATION) DAILY
Status: DISCONTINUED | OUTPATIENT
Start: 2017-11-15 | End: 2017-11-16 | Stop reason: HOSPADM

## 2017-11-15 RX ORDER — AZITHROMYCIN 500 MG/1
INJECTION, POWDER, LYOPHILIZED, FOR SOLUTION INTRAVENOUS
Status: COMPLETED
Start: 2017-11-15 | End: 2017-11-15

## 2017-11-15 RX ORDER — ACETAMINOPHEN 325 MG/1
650 TABLET ORAL EVERY 6 HOURS PRN
Status: DISCONTINUED | OUTPATIENT
Start: 2017-11-15 | End: 2017-11-16 | Stop reason: HOSPADM

## 2017-11-15 RX ORDER — DEXTROSE MONOHYDRATE 25 G/50ML
25 INJECTION, SOLUTION INTRAVENOUS
Status: DISCONTINUED | OUTPATIENT
Start: 2017-11-15 | End: 2017-11-16 | Stop reason: HOSPADM

## 2017-11-15 RX ORDER — AZITHROMYCIN 250 MG/1
250 TABLET, FILM COATED ORAL DAILY
Status: DISCONTINUED | OUTPATIENT
Start: 2017-11-15 | End: 2017-11-16 | Stop reason: HOSPADM

## 2017-11-15 RX ORDER — IPRATROPIUM BROMIDE AND ALBUTEROL SULFATE 2.5; .5 MG/3ML; MG/3ML
3 SOLUTION RESPIRATORY (INHALATION)
Status: DISCONTINUED | OUTPATIENT
Start: 2017-11-15 | End: 2017-11-16

## 2017-11-15 RX ORDER — METFORMIN HYDROCHLORIDE 750 MG/1
1500 TABLET, EXTENDED RELEASE ORAL DAILY
Status: DISCONTINUED | OUTPATIENT
Start: 2017-11-15 | End: 2017-11-16 | Stop reason: HOSPADM

## 2017-11-15 RX ORDER — POLYETHYLENE GLYCOL 3350 17 G/17G
1 POWDER, FOR SOLUTION ORAL
Status: DISCONTINUED | OUTPATIENT
Start: 2017-11-15 | End: 2017-11-16 | Stop reason: HOSPADM

## 2017-11-15 RX ORDER — BISACODYL 10 MG
10 SUPPOSITORY, RECTAL RECTAL
Status: DISCONTINUED | OUTPATIENT
Start: 2017-11-15 | End: 2017-11-16 | Stop reason: HOSPADM

## 2017-11-15 RX ORDER — AMOXICILLIN 250 MG
2 CAPSULE ORAL 2 TIMES DAILY
Status: DISCONTINUED | OUTPATIENT
Start: 2017-11-15 | End: 2017-11-16 | Stop reason: HOSPADM

## 2017-11-15 RX ADMIN — AZITHROMYCIN FOR INJECTION INJECTION, POWDER, LYOPHILIZED, FOR SOLUTION 500 MG: 500 INJECTION INTRAVENOUS at 01:45

## 2017-11-15 RX ADMIN — AMPICILLIN SODIUM AND SULBACTAM SODIUM 3 G: 2; 1 INJECTION, POWDER, FOR SOLUTION INTRAMUSCULAR; INTRAVENOUS at 19:31

## 2017-11-15 RX ADMIN — INSULIN HUMAN 1 UNITS: 100 INJECTION, SOLUTION PARENTERAL at 12:27

## 2017-11-15 RX ADMIN — INSULIN HUMAN 1 UNITS: 100 INJECTION, SOLUTION PARENTERAL at 21:01

## 2017-11-15 RX ADMIN — ASPIRIN 81 MG: 81 TABLET, COATED ORAL at 09:38

## 2017-11-15 RX ADMIN — STANDARDIZED SENNA CONCENTRATE AND DOCUSATE SODIUM 2 TABLET: 8.6; 5 TABLET, FILM COATED ORAL at 21:01

## 2017-11-15 RX ADMIN — AZITHROMYCIN 500 MG: 500 INJECTION, POWDER, LYOPHILIZED, FOR SOLUTION INTRAVENOUS at 01:45

## 2017-11-15 RX ADMIN — IPRATROPIUM BROMIDE AND ALBUTEROL SULFATE 3 ML: .5; 3 SOLUTION RESPIRATORY (INHALATION) at 20:16

## 2017-11-15 RX ADMIN — BUDESONIDE AND FORMOTEROL FUMARATE DIHYDRATE 2 PUFF: 160; 4.5 AEROSOL RESPIRATORY (INHALATION) at 10:33

## 2017-11-15 RX ADMIN — IPRATROPIUM BROMIDE AND ALBUTEROL SULFATE 3 ML: .5; 3 SOLUTION RESPIRATORY (INHALATION) at 10:35

## 2017-11-15 RX ADMIN — STANDARDIZED SENNA CONCENTRATE AND DOCUSATE SODIUM 2 TABLET: 8.6; 5 TABLET, FILM COATED ORAL at 09:38

## 2017-11-15 RX ADMIN — AMPICILLIN SODIUM AND SULBACTAM SODIUM 3 G: 2; 1 INJECTION, POWDER, FOR SOLUTION INTRAMUSCULAR; INTRAVENOUS at 05:53

## 2017-11-15 RX ADMIN — BUDESONIDE AND FORMOTEROL FUMARATE DIHYDRATE 2 PUFF: 160; 4.5 AEROSOL RESPIRATORY (INHALATION) at 20:16

## 2017-11-15 RX ADMIN — HEPARIN SODIUM 5000 UNITS: 5000 INJECTION, SOLUTION INTRAVENOUS; SUBCUTANEOUS at 13:54

## 2017-11-15 RX ADMIN — METFORMIN HYDROCHLORIDE 1500 MG: 750 TABLET, EXTENDED RELEASE ORAL at 09:42

## 2017-11-15 RX ADMIN — HEPARIN SODIUM 5000 UNITS: 5000 INJECTION, SOLUTION INTRAVENOUS; SUBCUTANEOUS at 21:01

## 2017-11-15 RX ADMIN — HEPARIN SODIUM 5000 UNITS: 5000 INJECTION, SOLUTION INTRAVENOUS; SUBCUTANEOUS at 05:53

## 2017-11-15 RX ADMIN — IPRATROPIUM BROMIDE AND ALBUTEROL SULFATE 3 ML: .5; 3 SOLUTION RESPIRATORY (INHALATION) at 14:29

## 2017-11-15 RX ADMIN — TIOTROPIUM BROMIDE 1 CAPSULE: 18 CAPSULE ORAL; RESPIRATORY (INHALATION) at 09:43

## 2017-11-15 RX ADMIN — INSULIN HUMAN 2 UNITS: 100 INJECTION, SOLUTION PARENTERAL at 09:52

## 2017-11-15 RX ADMIN — AMPICILLIN SODIUM AND SULBACTAM SODIUM 3 G: 2; 1 INJECTION, POWDER, FOR SOLUTION INTRAMUSCULAR; INTRAVENOUS at 13:54

## 2017-11-15 RX ADMIN — INSULIN HUMAN 1 UNITS: 100 INJECTION, SOLUTION PARENTERAL at 18:31

## 2017-11-15 RX ADMIN — CEFTRIAXONE 2 G: 2 INJECTION, POWDER, FOR SOLUTION INTRAMUSCULAR; INTRAVENOUS at 21:49

## 2017-11-15 RX ADMIN — AZITHROMYCIN 250 MG: 250 TABLET, FILM COATED ORAL at 09:38

## 2017-11-15 RX ADMIN — CEFTRIAXONE 2 G: 2 INJECTION, POWDER, FOR SOLUTION INTRAMUSCULAR; INTRAVENOUS at 00:30

## 2017-11-15 ASSESSMENT — ENCOUNTER SYMPTOMS
BACK PAIN: 0
WEAKNESS: 0
VOMITING: 0
FEVER: 0
WHEEZING: 0
CLAUDICATION: 0
HEADACHES: 0
MYALGIAS: 0
PALPITATIONS: 0
ABDOMINAL PAIN: 0
BLURRED VISION: 0
FOCAL WEAKNESS: 0
HEARTBURN: 0
LOSS OF CONSCIOUSNESS: 0
EYE DISCHARGE: 0
DIARRHEA: 0
CONSTIPATION: 0
HEMOPTYSIS: 0
CHILLS: 0
SPEECH CHANGE: 0
SHORTNESS OF BREATH: 1
COUGH: 1
DIZZINESS: 0
DIAPHORESIS: 0
SORE THROAT: 0
DEPRESSION: 0
SENSORY CHANGE: 0
NECK PAIN: 0
SPUTUM PRODUCTION: 0
NAUSEA: 0
EYE PAIN: 0
BRUISES/BLEEDS EASILY: 0
COUGH: 0
SHORTNESS OF BREATH: 0

## 2017-11-15 ASSESSMENT — LIFESTYLE VARIABLES
SUBSTANCE_ABUSE: 0
EVER_SMOKED: YES
ALCOHOL_USE: NO

## 2017-11-15 ASSESSMENT — PATIENT HEALTH QUESTIONNAIRE - PHQ9
4. FEELING TIRED OR HAVING LITTLE ENERGY: NOT AT ALL
SUM OF ALL RESPONSES TO PHQ9 QUESTIONS 1 AND 2: 1
5. POOR APPETITE OR OVEREATING: NOT AT ALL
SUM OF ALL RESPONSES TO PHQ QUESTIONS 1-9: 1
9. THOUGHTS THAT YOU WOULD BE BETTER OFF DEAD, OR OF HURTING YOURSELF: NOT AT ALL
7. TROUBLE CONCENTRATING ON THINGS, SUCH AS READING THE NEWSPAPER OR WATCHING TELEVISION: NOT AT ALL
3. TROUBLE FALLING OR STAYING ASLEEP OR SLEEPING TOO MUCH: NOT AT ALL
1. LITTLE INTEREST OR PLEASURE IN DOING THINGS: SEVERAL DAYS
6. FEELING BAD ABOUT YOURSELF - OR THAT YOU ARE A FAILURE OR HAVE LET YOURSELF OR YOUR FAMILY DOWN: NOT AL ALL
8. MOVING OR SPEAKING SO SLOWLY THAT OTHER PEOPLE COULD HAVE NOTICED. OR THE OPPOSITE, BEING SO FIGETY OR RESTLESS THAT YOU HAVE BEEN MOVING AROUND A LOT MORE THAN USUAL: NOT AT ALL

## 2017-11-15 ASSESSMENT — PAIN SCALES - GENERAL
PAINLEVEL_OUTOF10: 0
PAINLEVEL_OUTOF10: 0

## 2017-11-15 NOTE — ED NOTES
"Chief Complaint   Patient presents with   • Shortness of Breath     x30 min. Seen for same 10/21 and admitted for COPD exacerbation and hypoxia.    • Wheezing     expiratory wheezes at all bases     Pt amb to triage on home PRN home O2. EKG completed.  Hx of COPD. Denies fevers. Pt tachypnic, work of breathing moderate and increased (even at rest).   Charge RN notified.  Charge RN notified. [facility]. Educated on triage process and to inform staff of any changes.   BP and WOB noted. Other VSS.    BP (!) 202/82 Comment: RIGHT /93  Pulse 98   Temp 36.4 °C (97.6 °F) (Temporal)   Resp 20   Ht 1.727 m (5' 8\")   Wt 70.1 kg (154 lb 8.7 oz)   SpO2 93%   BMI 23.50 kg/m²       "

## 2017-11-15 NOTE — ED NOTES
Called Report to Jamir HUI, Pt is aware of POC, all pt's belongings are on the bed. Pt is ready for transport.

## 2017-11-15 NOTE — ASSESSMENT & PLAN NOTE
Feels better with breathing back to baseline; cont current rx and dc home tomorrow if stable.  Chest ct showed COPD changes, R kidney mass, no active infiltrates.

## 2017-11-15 NOTE — ED PROVIDER NOTES
CHIEF COMPLAINT  Chief Complaint   Patient presents with   • Shortness of Breath     x30 min. Seen for same 10/21 and admitted for COPD exacerbation and hypoxia.    • Wheezing     expiratory wheezes at all bases       HPI (1,4)  Ovi Bejarano is a 82 y.o. male who presents with increasing SOB starting about 3 hours ago. He has home oxygen which he uses when he needs it; he put it on but this did not help. He has an inhaler, not sure what the name of it is, which he also used today with no improvement. He becomes very short of breath with any exertion. He is able to speak in 3-4 word sentences and has moderate WOB with audible expiratory wheeze. He was recently discharged from the hospital on 10/24 after a three day stay for COPD exacerbation and CAP, for which he was treated with doxycycline.  He was also noted to have type 2 diabetes and was put on metformin, but he is not currently taking that medication. He states the only medication he is taking is his inhaler.    He denies fever/chills, nausea, vomiting, constipation, diarrhea, chest pain, or palpitations.       REVIEW OF SYSTEMS(2/10)  Pertinent positives include: SOB, cough with increasing sputum, white.  Pertinent negatives include: see above.   All other systems are negative.     PAST MEDICAL HISTORY(PFS1,2)  Past Medical History:   Diagnosis Date   • Diabetes (CMS-[facility])     type 2   • Hypercholesteremia    • Hyperglycemia    • Rash        FAMILY HISTORY  Family History   Problem Relation Age of Onset   • Diabetes Father        SOCIAL HISTORY  Social History   Substance Use Topics   • Smoking status: Former Smoker     Quit date: 10/21/2007   • Smokeless tobacco: Never Used   • Alcohol use Yes      Comment: rare     History   Drug Use No     Quit smoking >10 years ago.    SURGICAL HISTORY  No past surgical history on file.    CURRENT MEDICATIONS  Home Medications    **Home medications have not yet been reviewed for this encounter**    "      ALLERGIES  No Known Allergies    PHYSICAL EXAM (2,8)  VITAL SIGNS: BP (!) 202/82 Comment: RIGHT /93  Pulse 98   Temp 36.4 °C (97.6 °F) (Temporal)   Resp 20   Ht 1.727 m (5' 8\")   Wt 70.1 kg (154 lb 8.7 oz)   SpO2 93%   BMI 23.50 kg/m²  Reviewed and notable for systolic hypertension and tachypnea.  Constitutional:Elderly  male in moderate respiratory distress.  HENT:NC/AT.  Eyes:Anicteric, EOMI.  Cardiovascular: RRR no m/r/g.  Respiratory: Prolonged expiratory time and wheeze throughout with moderate increased WOB and use of accessory muscles.  Gastrointestinal: Abdomen soft/flat/NT.  Skin:Intact    Genitourinary:  N/a.  Neurologic: A+O x3, non focal.  Psychiatric: Normal mood and affect.    DIFFERENTIAL DIAGNOSIS:  COPD exacerbation, pneumonia    EKG  NSR  Normal intervals   No ST changes  Normal axis    RADIOLOGY/PROCEDURES  No orders to display       LABORATORY: Reviewed as below.  Results for orders placed or performed during the hospital encounter of 17   EKG (ER)   Result Value Ref Range    Report       [facility] Emergency Dept.    Test Date:  2017  Pt Name:    DIEUDONNE MONTELONGO                Department: ER  MRN:        [id number]                      Room:  Gender:     M                            Technician: [id number]  :        [id number]                   Requested By:ER TRIAGE PROTOCOL  Order #:    [id number]                    Reading MD:    Measurements  Intervals                                Axis  Rate:       91                           P:          83  WA:         152                          QRS:        69  QRSD:       94                           T:          70  QT:         364  QTc:        448    Interpretive Statements  SINUS RHYTHM  CONSIDER LEFT ATRIAL ABNORMALITY  RSR' IN V1 OR V2, PROBABLY NORMAL VARIANT  Compared to ECG 2017 14:33:48  RSR' in V1 or V2 now present         INTERVENTIONS:  Medications   Respiratory Care per Protocol (not " administered)   albuterol (PROVENTIL) 2.5mg/0.5ml nebulizer solution 2.5 mg (2.5 mg Nebulization Given 11/14/17 2332)   ipratropium-albuterol (DUONEB) nebulizer solution 3 mL (3 mL Nebulization Given 11/14/17 2332)   predniSONE (DELTASONE) tablet 40 mg (40 mg Oral Given 11/14/17 2318)   azithromycin (ZITHROMAX) tablet 250 mg (250 mg Oral Given 11/14/17 2318)   .  Response:fair.    COURSE & MEDICAL DECISION MAKING  1022--pt seen and examined. History and physical exam consistent with COPD exacerbation, possible recurrent CAP. Will treat with albuterol, steroids, atrovent, O2, rocephin and azithromycin.  1219--CXR with LLL infiltrate somewhat improved from three weeks ago. Will admit to hospital for COPD exacerbation, continue antibiotics. .    PLAN:  Admit to hospital    CONDITION:Stable.    FINAL IMPRESSION  COPD exacerbation  Pneumonia, LLL  Respiratory insufficiency  Hypoxia    Electronically signed by: Randee Garrett, 11/14/2017 10:23 PM  I personally examined the patient and completed a history and physical exam. I agree with the resident's note. I suspect this is from a COPD exacerbation. The patient may have an underlying pneumonia and therefore he will receive Rocephin and azithromycin. The patient will be admitted to the hospital is currently resting in stable condition.

## 2017-11-15 NOTE — ED NOTES
Pt sleeping comfortably in bed. Monitors in place, VSS. No s/s of distress noted. Call bell within reach, will continue to monitor.

## 2017-11-15 NOTE — THERAPY
"Speech Language Therapy Clinical Swallow Evaluation completed.  Functional Status: The patient was seen for clinical swallow evaluation this date. The patient was awake, alert and oriented x4. The patient reported only \"occasional\" coughing during meals. The patient was able to follow oral motor directives with no gross deficits appreciated. The patient was given PO trials of ice chips, thins, purees, and solids (soft, mixed and dry). The patient consumed PO trials with no overt s/s of aspiration or difficulty. The patient was provided education regarding decreasing rate of eating as well as s/s of aspiration. At this time, patient appears at a level to continue on MD ordered diet. No further SLP services are indicated in acute care unless change in status occurs. Thank you.     Recommendations - Diet: Regular                          Strategies: Head of Bed at 90 Degrees                          Medication Administration: Whole with Liquid Wash    Plan of Care: Patient with no further skilled SLP needs in the acute care setting at this time  Post-Acute Therapy: Currently anticipate no further skilled therapy needs once patient is discharged from the inpatient setting.    See \"Rehab Therapy-Acute\" Patient Summary Report for complete documentation.   "

## 2017-11-15 NOTE — CARE PLAN
Problem: Infection  Goal: Will remain free from infection  Outcome: PROGRESSING AS EXPECTED  Hourly rounding in place. Pt with call light within reach. Pt demonstrates ability to use call light.    Problem: Respiratory:  Goal: Respiratory status will improve  Outcome: PROGRESSING AS EXPECTED  Pt educated to cough and deep breath. Pt verbalizes understanding.

## 2017-11-15 NOTE — ED NOTES
Pt sleeping comfortably in bed, no s/s of distress noted. Monitors in place, VSS, will continue to monitor.

## 2017-11-15 NOTE — ED NOTES
After 02 and resting, Pt now speaking in full sentences and laughing. Pt given blankets. Pt reports no further needs at this time, will continue to monitor.

## 2017-11-15 NOTE — CARE PLAN
Problem: Bowel/Gastric:  Goal: Will not experience complications related to bowel motility    Intervention: Assess baseline bowel pattern  Admission assessment yielded a hypoactive baseline bowel pattern in all quadrants

## 2017-11-15 NOTE — H&P
Hospital Medicine History and Physical    Date of Service  11/15/2017    Chief Complaint  Chief Complaint   Patient presents with   • Shortness of Breath     x30 min. Seen for same 10/21 and admitted for COPD exacerbation and hypoxia.    • Wheezing     expiratory wheezes at all bases       History of Presenting Illness  82 y.o. male who presented 11/14/2017 with SOB x 3 hours.  He was recently admitted for COPD exacerbation and LLL pneumonia on 10/21/17 with dc on 10/24/17.  He was sent home with O2 at 1-2 LPM NC PRN.   His LLL infiltrate, treated with doxycycline.  He lives with his wife.  Quit smoking 10 years ago.  Primary Care Physician  Pcp Unknown    Consultants  none    Code Status  Full code    Review of Systems  Review of Systems   Constitutional: Negative for chills, diaphoresis, fever and malaise/fatigue.   HENT: Negative for congestion and sore throat.    Eyes: Negative for pain and discharge.   Respiratory: Positive for shortness of breath. Negative for cough, hemoptysis, sputum production and wheezing.    Cardiovascular: Negative for chest pain, palpitations, claudication and leg swelling.   Gastrointestinal: Negative for abdominal pain, constipation, diarrhea, melena, nausea and vomiting.   Genitourinary: Negative for dysuria, frequency and urgency.   Musculoskeletal: Negative for back pain, joint pain, myalgias and neck pain.   Skin: Negative for itching and rash.   Neurological: Negative for dizziness, sensory change, speech change, focal weakness, loss of consciousness, weakness and headaches.   Endo/Heme/Allergies: Does not bruise/bleed easily.   Psychiatric/Behavioral: Negative for depression, substance abuse and suicidal ideas.        Past Medical History  Past Medical History:   Diagnosis Date   • Diabetes (CMS-[facility])     type 2   • Hypercholesteremia    • Hyperglycemia    • Rash        Surgical History  No past surgical history on file.    Medications  No current facility-administered  medications on file prior to encounter.      Current Outpatient Prescriptions on File Prior to Encounter   Medication Sig Dispense Refill   • tiotropium (SPIRIVA) 18 MCG Cap Inhale 1 Cap by mouth every day. 30 Cap 0   • albuterol 108 (90 Base) MCG/ACT Aero Soln inhalation aerosol Inhale 2 Puffs by mouth every 6 hours as needed for Shortness of Breath. 8.5 g 0   • metformin ER (GLUCOPHAGE XR) 500 MG TABLET SR 24 HR Take 1,500 mg by mouth every day.     • aspirin EC (ECOTRIN) 81 MG Tablet Delayed Response Take 81 mg by mouth every day.     • albuterol 108 (90 BASE) MCG/ACT Aero Soln inhalation aerosol Inhale 2 Puffs by mouth every 6 hours as needed for Shortness of Breath. 8.5 g 0       Family History  Family History   Problem Relation Age of Onset   • Diabetes Father        Social History  Social History   Substance Use Topics   • Smoking status: Former Smoker     Quit date: 10/21/2007   • Smokeless tobacco: Never Used   • Alcohol use Yes      Comment: rare   quit smoking 10 years, lives with wife.    Allergies  No Known Allergies     Physical Exam  Laboratory   Hemodynamics  Temp (24hrs), Av.2 °C (98.9 °F), Min:36.4 °C (97.6 °F), Max:37.9 °C (100.2 °F)   Temperature: 37.9 °C (100.2 °F)  Pulse  Av.9  Min: 69  Max: 98 Heart Rate (Monitored): 70  Blood Pressure : (!) 202/82 (RIGHT /93), NIBP: 156/70      Respiratory      Respiration: 20, Pulse Oximetry: 96 %, O2 Daily Delivery Respiratory : Nasal Cannula     Given By:: Mouthpiece, Work Of Breathing / Effort: Accessory Muscle Use;Increased Work of Breathing;Shallow  RUL Breath Sounds: Diminished, RML Breath Sounds: Clear, RLL Breath Sounds: Clear, MARQUIS Breath Sounds: Clear, LLL Breath Sounds: Clear    Physical Exam   Constitutional: He is oriented to person, place, and time. He appears well-developed and well-nourished. No distress.   HENT:   Head: Normocephalic and atraumatic.   Mouth/Throat: Oropharynx is clear and moist. No oropharyngeal exudate.    Eyes: Conjunctivae and EOM are normal. Pupils are equal, round, and reactive to light. Right eye exhibits no discharge. Left eye exhibits no discharge. No scleral icterus.   Neck: Normal range of motion. Neck supple. No JVD present. No tracheal deviation present. No thyromegaly present.   Cardiovascular: Normal rate, regular rhythm and normal heart sounds.  Exam reveals no gallop and no friction rub.    No murmur heard.  Pulmonary/Chest: Effort normal and breath sounds normal. No respiratory distress. He has no wheezes. He has no rales. He exhibits no tenderness.   No appreciable wheeze on exam, talking full sentences.   Abdominal: Soft. Bowel sounds are normal. He exhibits no distension and no mass. There is no tenderness. There is no rebound and no guarding.   Musculoskeletal: Normal range of motion. He exhibits no edema or tenderness.   Lymphadenopathy:     He has no cervical adenopathy.   Neurological: He is alert and oriented to person, place, and time. No cranial nerve deficit. He exhibits normal muscle tone.   Skin: Skin is warm and dry. No rash noted. He is not diaphoretic. No erythema.   Psychiatric: He has a normal mood and affect. His behavior is normal. Judgment and thought content normal.   Nursing note and vitals reviewed.              No results for input(s): ALTSGPT, ASTSGOT, ALKPHOSPHAT, TBILIRUBIN, DBILIRUBIN, GAMMAGT, AMYLASE, LIPASE, ALB, PREALBUMIN, GLUCOSE in the last 72 hours.              Lab Results   Component Value Date    TROPONINI <0.01 10/21/2017     Urinalysis:    Lab Results  Component Value Date/Time   SPECGRAVITY 1.019 04/24/2017 1542   GLUCOSEUR 500 (A) 04/24/2017 1542   KETONES 60 (A) 04/24/2017 1542   NITRITE Negative 04/24/2017 1542        Imaging  CXR persistent hazy LLL infiltrate seen   Assessment/Plan     I anticipate this patient will require at least two midnights for appropriate medical management, necessitating inpatient admission.    Acute respiratory failure with  hypoxia (CMS-HCC)- (present on admission)   Assessment & Plan    Persistent hazy LLL infiltrate on CXR, same as last admission  Ordered CT chest w/o  unasyn IV  zithrom po  Sputum culture, blood cultures x2  SLP eval  Added symbicort and spiriva        COPD exacerbation (CMS-HCC)- (present on admission)   Assessment & Plan    RT protocol, albuterol, ipatroprium neb prn wheeze  Continue prednisone 40mg po daily started by ERP x 5 days total.  Has home O2 at 1-2 LPM NC prn from last admission  COPD protocol initiated.        Type 2 diabetes mellitus (CMS-HCC)- (present on admission)   Assessment & Plan    Diet controlled,  Diabetic diet, SSI, accuchecks.            VTE prophylaxis: heparin.

## 2017-11-15 NOTE — DISCHARGE PLANNING
"CORINA met with pt at bedside. Pt's \"girlfriend\" was present at time of assessment. Pt has O2 services currently with [facility] and there is a tank in the room. Pt also reports occasionally using a FWW.   Pt reported that his inhalers are about $100 and this provides a financial strain. Pt's income is about $1,000/ month. CORINA explained that pt may qualify for Medicaid to help pay for prescriptions and provide coverage for what Medicare doesn't pay. Pt was initially interested and then suddenly stated \"No, not at this time\". Pt refused to be screened for Medicaid.   CORINA provided resource of [facility] and educated that if pt becomes established with a PCP there, pt can use their low-cost pharmacy. SW will request PCP appt however pt stated he may cancel the appt.  CORINA delivered IMM. CORINA explained purpose of appeal through Medicare. Pt adamantly refused to sign IMM document. Copies were left at bedside.     Care Transition Team Assessment    Information Source  Orientation : Oriented x 4  Information Given By: Patient  Informant's Name: Ovi  Who is responsible for making decisions for patient? : Patient    Readmission Evaluation  Is this a readmission?: Yes - unplanned readmission  Why do you think you were readmitted?: COPD  Was an appointment arranged for you prior to discharge?: No  Were there new prescriptions you were supposed to fill after you were discharged?: Yes, prescriptions filled  Did you understand your discharge instructions?: Yes  Did you have enough support after your last discharge?: Yes    Elopement Risk  Legal Hold: No  Ambulatory or Self Mobile in Wheelchair: No-Not an Elopement Risk  Elopement Risk: Not at Risk for Elopement    Interdisciplinary Discharge Planning  Does Admitting Nurse Feel This Could be a Complex Discharge?: No  Lives with - Patient's Self Care Capacity: Spouse  Patient or legal guardian wants to designate a caregiver (see row info): No  Support Systems: Spouse / Significant " Other  Housing / Facility: 1 Story Apartment / Condo  Do You Take your Prescribed Medications Regularly: Yes  Able to Return to Previous ADL's: Yes  Mobility Issues: No  Prior Services: Home-Independent  Patient Expects to be Discharged to::  (home-independent)  Assistance Needed: Yes  Durable Medical Equipment: Home Oxygen (home O2 prn)    Discharge Preparedness  What is your plan after discharge?: Home with help, Uncertain - pending medical team collaboration  What are your discharge supports?: Other (comment) (Significant Other)  Prior Functional Level: Ambulatory, Drives Self, Uses Walker  Difficulity with ADLs: Walking  Difficulity with IADLs: None    Functional Assesment  Prior Functional Level: Ambulatory, Drives Self, Uses Walker    Finances  Financial Barriers to Discharge: No  Average Monthly Income: 1000 $  Source of Income: Social Security  Prescription Coverage: Yes    Vision / Hearing Impairment  Vision Impairment : No  Hearing Impairment : No    Values / Beliefs / Concerns  Values / Beliefs Concerns : No         Domestic Abuse  Have you ever been the victim of abuse or violence?: No  Physical Abuse or Sexual Abuse: No  Verbal Abuse or Emotional Abuse: No  Possible Abuse Reported to:: Not Applicable    Psychological Assessment  Newly Diagnosed Illness: No    Discharge Risks or Barriers  Discharge risks or barriers?: Uninsured / underinsured, No PCP  Patient risk factors: Vulnerable adult, Readmission, Lack of outside supports    Anticipated Discharge Information  Anticipated discharge disposition: Home

## 2017-11-15 NOTE — ED NOTES
Per Charge Rn, room not available. Pt returned to ER. Monitors in place, VSS, will continue to monitor.

## 2017-11-15 NOTE — ED NOTES
Pt ambulated to room without assistance. Pt on 1.5lpm of 02 via NC from home. Pt appeard SOB when he arrived in the room only able to speak in 3-4 word sentences.

## 2017-11-15 NOTE — RESPIRATORY CARE
COPD EDUCATION by COPD CLINICAL EDUCATOR  11/15/2017  at  2:05 PM by Candi Garcia     Patient interviewed by COPD education team.  Patient unable to participate in full program.  Short intervention has been conducted.  A comprehensive packet including information about COPD, treatments, and smoking cessation given.

## 2017-11-15 NOTE — PROGRESS NOTES
Alicia Anderson Fall Risk Assessment:     Last Known Fall: No falls  Mobility: No limitations  Medications: No meds  Mental Status/LOC/Awareness: Awake, alert, and oriented to date, place, and person  Toileting Needs: No needs  Volume/Electrolyte Status: No problems  Communication/Sensory: No deficits  Behavior: Appropriate behavior  Alicia Anderson Fall Risk Total: 3  Fall Risk Level: NO RISK    Universal Fall Precautions:       Fall Risk Level Interventions:          Patient Specific Interventions:     Medication: review medications with patient and family and assess for medications that can be discontinued or dosage decreased  Mental Status/LOC/Awareness: reinforce falls education, check on patient hourly and reinforce the use of call light  Toileting: monitor intake and output/use of appropriate interventions and instruct male patients prone to dizziness to void while sitting  Volume/Electrolyte Status: ensure patient remains hydrated and advance diet as tolerated  Communication/Sensory: update plan of care on whiteboard  Behavioral: engage patient in daily activities and administer medication as ordered  Mobility: schedule physical activity throughout the day, dangle prior to standing and ensure bed is locked and in lowest position

## 2017-11-16 ENCOUNTER — APPOINTMENT (OUTPATIENT)
Dept: RADIOLOGY | Facility: MEDICAL CENTER | Age: 82
DRG: 190 | End: 2017-11-16
Attending: INTERNAL MEDICINE
Payer: MEDICARE

## 2017-11-16 VITALS
WEIGHT: 154.54 LBS | BODY MASS INDEX: 23.42 KG/M2 | RESPIRATION RATE: 18 BRPM | SYSTOLIC BLOOD PRESSURE: 164 MMHG | HEART RATE: 70 BPM | DIASTOLIC BLOOD PRESSURE: 68 MMHG | OXYGEN SATURATION: 98 % | TEMPERATURE: 97.9 F | HEIGHT: 68 IN

## 2017-11-16 PROBLEM — I10 HTN (HYPERTENSION): Status: ACTIVE | Noted: 2017-11-16

## 2017-11-16 LAB
ANION GAP SERPL CALC-SCNC: 8 MMOL/L (ref 0–11.9)
BASOPHILS # BLD AUTO: 0.2 % (ref 0–1.8)
BASOPHILS # BLD AUTO: 0.4 % (ref 0–1.8)
BASOPHILS # BLD: 0.02 K/UL (ref 0–0.12)
BASOPHILS # BLD: 0.03 K/UL (ref 0–0.12)
BUN SERPL-MCNC: 16 MG/DL (ref 8–22)
CALCIUM SERPL-MCNC: 8.5 MG/DL (ref 8.5–10.5)
CHLORIDE SERPL-SCNC: 104 MMOL/L (ref 96–112)
CO2 SERPL-SCNC: 27 MMOL/L (ref 20–33)
CREAT SERPL-MCNC: 0.87 MG/DL (ref 0.5–1.4)
EOSINOPHIL # BLD AUTO: 0.39 K/UL (ref 0–0.51)
EOSINOPHIL # BLD AUTO: 0.72 K/UL (ref 0–0.51)
EOSINOPHIL NFR BLD: 10.5 % (ref 0–6.9)
EOSINOPHIL NFR BLD: 4.7 % (ref 0–6.9)
ERYTHROCYTE [DISTWIDTH] IN BLOOD BY AUTOMATED COUNT: 42.8 FL (ref 35.9–50)
ERYTHROCYTE [DISTWIDTH] IN BLOOD BY AUTOMATED COUNT: 42.9 FL (ref 35.9–50)
EST. AVERAGE GLUCOSE BLD GHB EST-MCNC: 200 MG/DL
GFR SERPL CREATININE-BSD FRML MDRD: >60 ML/MIN/1.73 M 2
GLUCOSE BLD-MCNC: 133 MG/DL (ref 65–99)
GLUCOSE BLD-MCNC: 179 MG/DL (ref 65–99)
GLUCOSE SERPL-MCNC: 144 MG/DL (ref 65–99)
HBA1C MFR BLD: 8.6 % (ref 0–5.6)
HCT VFR BLD AUTO: 33.7 % (ref 42–52)
HCT VFR BLD AUTO: 36.8 % (ref 42–52)
HGB BLD-MCNC: 11.3 G/DL (ref 14–18)
HGB BLD-MCNC: 12.4 G/DL (ref 14–18)
IMM GRANULOCYTES # BLD AUTO: 0.03 K/UL (ref 0–0.11)
IMM GRANULOCYTES # BLD AUTO: 0.04 K/UL (ref 0–0.11)
IMM GRANULOCYTES NFR BLD AUTO: 0.4 % (ref 0–0.9)
IMM GRANULOCYTES NFR BLD AUTO: 0.5 % (ref 0–0.9)
LYMPHOCYTES # BLD AUTO: 1.43 K/UL (ref 1–4.8)
LYMPHOCYTES # BLD AUTO: 2.17 K/UL (ref 1–4.8)
LYMPHOCYTES NFR BLD: 20.8 % (ref 22–41)
LYMPHOCYTES NFR BLD: 26.4 % (ref 22–41)
MCH RBC QN AUTO: 31 PG (ref 27–33)
MCH RBC QN AUTO: 31 PG (ref 27–33)
MCHC RBC AUTO-ENTMCNC: 33.5 G/DL (ref 33.7–35.3)
MCHC RBC AUTO-ENTMCNC: 33.7 G/DL (ref 33.7–35.3)
MCV RBC AUTO: 92 FL (ref 81.4–97.8)
MCV RBC AUTO: 92.3 FL (ref 81.4–97.8)
MONOCYTES # BLD AUTO: 0.49 K/UL (ref 0–0.85)
MONOCYTES # BLD AUTO: 0.78 K/UL (ref 0–0.85)
MONOCYTES NFR BLD AUTO: 7.1 % (ref 0–13.4)
MONOCYTES NFR BLD AUTO: 9.5 % (ref 0–13.4)
NEUTROPHILS # BLD AUTO: 4.18 K/UL (ref 1.82–7.42)
NEUTROPHILS # BLD AUTO: 4.83 K/UL (ref 1.82–7.42)
NEUTROPHILS NFR BLD: 58.7 % (ref 44–72)
NEUTROPHILS NFR BLD: 60.8 % (ref 44–72)
NRBC # BLD AUTO: 0 K/UL
NRBC # BLD AUTO: 0 K/UL
NRBC BLD AUTO-RTO: 0 /100 WBC
NRBC BLD AUTO-RTO: 0 /100 WBC
PLATELET # BLD AUTO: 195 K/UL (ref 164–446)
PLATELET # BLD AUTO: 198 K/UL (ref 164–446)
PMV BLD AUTO: 9.4 FL (ref 9–12.9)
PMV BLD AUTO: 9.9 FL (ref 9–12.9)
POTASSIUM SERPL-SCNC: 3.2 MMOL/L (ref 3.6–5.5)
RBC # BLD AUTO: 3.65 M/UL (ref 4.7–6.1)
RBC # BLD AUTO: 4 M/UL (ref 4.7–6.1)
SODIUM SERPL-SCNC: 139 MMOL/L (ref 135–145)
WBC # BLD AUTO: 6.9 K/UL (ref 4.8–10.8)
WBC # BLD AUTO: 8.2 K/UL (ref 4.8–10.8)

## 2017-11-16 PROCEDURE — 94760 N-INVAS EAR/PLS OXIMETRY 1: CPT

## 2017-11-16 PROCEDURE — 94640 AIRWAY INHALATION TREATMENT: CPT

## 2017-11-16 PROCEDURE — 83036 HEMOGLOBIN GLYCOSYLATED A1C: CPT

## 2017-11-16 PROCEDURE — 99239 HOSP IP/OBS DSCHRG MGMT >30: CPT | Performed by: INTERNAL MEDICINE

## 2017-11-16 PROCEDURE — 700111 HCHG RX REV CODE 636 W/ 250 OVERRIDE (IP): Performed by: FAMILY MEDICINE

## 2017-11-16 PROCEDURE — 80048 BASIC METABOLIC PNL TOTAL CA: CPT

## 2017-11-16 PROCEDURE — A9270 NON-COVERED ITEM OR SERVICE: HCPCS | Performed by: INTERNAL MEDICINE

## 2017-11-16 PROCEDURE — 700102 HCHG RX REV CODE 250 W/ 637 OVERRIDE(OP): Performed by: INTERNAL MEDICINE

## 2017-11-16 PROCEDURE — 700105 HCHG RX REV CODE 258: Performed by: HOSPITALIST

## 2017-11-16 PROCEDURE — A9270 NON-COVERED ITEM OR SERVICE: HCPCS | Performed by: HOSPITALIST

## 2017-11-16 PROCEDURE — 700102 HCHG RX REV CODE 250 W/ 637 OVERRIDE(OP): Performed by: HOSPITALIST

## 2017-11-16 PROCEDURE — 36415 COLL VENOUS BLD VENIPUNCTURE: CPT

## 2017-11-16 PROCEDURE — 76775 US EXAM ABDO BACK WALL LIM: CPT

## 2017-11-16 PROCEDURE — 85025 COMPLETE CBC W/AUTO DIFF WBC: CPT

## 2017-11-16 PROCEDURE — 82962 GLUCOSE BLOOD TEST: CPT

## 2017-11-16 PROCEDURE — 700101 HCHG RX REV CODE 250: Performed by: HOSPITALIST

## 2017-11-16 PROCEDURE — 700111 HCHG RX REV CODE 636 W/ 250 OVERRIDE (IP): Performed by: HOSPITALIST

## 2017-11-16 RX ORDER — LISINOPRIL 10 MG/1
10 TABLET ORAL DAILY
Qty: 30 TAB | Refills: 0 | Status: SHIPPED | OUTPATIENT
Start: 2017-11-17 | End: 2017-12-15 | Stop reason: SDUPTHER

## 2017-11-16 RX ORDER — PREDNISONE 20 MG/1
TABLET ORAL
Qty: 8 TAB | Refills: 0 | Status: SHIPPED | OUTPATIENT
Start: 2017-11-17 | End: 2017-12-15

## 2017-11-16 RX ORDER — LISINOPRIL 10 MG/1
10 TABLET ORAL
Status: DISCONTINUED | OUTPATIENT
Start: 2017-11-16 | End: 2017-11-16 | Stop reason: HOSPADM

## 2017-11-16 RX ORDER — BUDESONIDE AND FORMOTEROL FUMARATE DIHYDRATE 160; 4.5 UG/1; UG/1
2 AEROSOL RESPIRATORY (INHALATION) 2 TIMES DAILY
Qty: 1 INHALER | Refills: 0 | Status: SHIPPED | OUTPATIENT
Start: 2017-11-16 | End: 2017-12-15 | Stop reason: SDUPTHER

## 2017-11-16 RX ORDER — POTASSIUM CHLORIDE 20 MEQ/1
40 TABLET, EXTENDED RELEASE ORAL ONCE
Status: COMPLETED | OUTPATIENT
Start: 2017-11-16 | End: 2017-11-16

## 2017-11-16 RX ORDER — IPRATROPIUM BROMIDE AND ALBUTEROL SULFATE 2.5; .5 MG/3ML; MG/3ML
3 SOLUTION RESPIRATORY (INHALATION)
Status: DISCONTINUED | OUTPATIENT
Start: 2017-11-16 | End: 2017-11-16 | Stop reason: HOSPADM

## 2017-11-16 RX ORDER — AZITHROMYCIN 250 MG/1
TABLET, FILM COATED ORAL
Qty: 3 TAB | Refills: 0 | Status: SHIPPED | OUTPATIENT
Start: 2017-11-17 | End: 2017-12-15

## 2017-11-16 RX ADMIN — AMPICILLIN SODIUM AND SULBACTAM SODIUM 3 G: 2; 1 INJECTION, POWDER, FOR SOLUTION INTRAMUSCULAR; INTRAVENOUS at 06:31

## 2017-11-16 RX ADMIN — AZITHROMYCIN 250 MG: 250 TABLET, FILM COATED ORAL at 08:39

## 2017-11-16 RX ADMIN — ASPIRIN 81 MG: 81 TABLET, COATED ORAL at 08:42

## 2017-11-16 RX ADMIN — POTASSIUM CHLORIDE 40 MEQ: 1500 TABLET, EXTENDED RELEASE ORAL at 08:39

## 2017-11-16 RX ADMIN — AMPICILLIN SODIUM AND SULBACTAM SODIUM 3 G: 2; 1 INJECTION, POWDER, FOR SOLUTION INTRAMUSCULAR; INTRAVENOUS at 00:42

## 2017-11-16 RX ADMIN — BUDESONIDE AND FORMOTEROL FUMARATE DIHYDRATE 2 PUFF: 160; 4.5 AEROSOL RESPIRATORY (INHALATION) at 07:47

## 2017-11-16 RX ADMIN — IPRATROPIUM BROMIDE AND ALBUTEROL SULFATE 3 ML: .5; 3 SOLUTION RESPIRATORY (INHALATION) at 07:45

## 2017-11-16 RX ADMIN — INSULIN HUMAN 1 UNITS: 100 INJECTION, SOLUTION PARENTERAL at 12:39

## 2017-11-16 RX ADMIN — IPRATROPIUM BROMIDE AND ALBUTEROL SULFATE 3 ML: .5; 3 SOLUTION RESPIRATORY (INHALATION) at 11:53

## 2017-11-16 RX ADMIN — AMPICILLIN SODIUM AND SULBACTAM SODIUM 3 G: 2; 1 INJECTION, POWDER, FOR SOLUTION INTRAMUSCULAR; INTRAVENOUS at 12:46

## 2017-11-16 RX ADMIN — METFORMIN HYDROCHLORIDE 1500 MG: 750 TABLET, EXTENDED RELEASE ORAL at 09:00

## 2017-11-16 RX ADMIN — LISINOPRIL 10 MG: 10 TABLET ORAL at 09:02

## 2017-11-16 RX ADMIN — HEPARIN SODIUM 5000 UNITS: 5000 INJECTION, SOLUTION INTRAVENOUS; SUBCUTANEOUS at 05:48

## 2017-11-16 RX ADMIN — PREDNISONE 40 MG: 20 TABLET ORAL at 08:39

## 2017-11-16 RX ADMIN — TIOTROPIUM BROMIDE 1 CAPSULE: 18 CAPSULE ORAL; RESPIRATORY (INHALATION) at 07:47

## 2017-11-16 RX ADMIN — STANDARDIZED SENNA CONCENTRATE AND DOCUSATE SODIUM 2 TABLET: 8.6; 5 TABLET, FILM COATED ORAL at 08:38

## 2017-11-16 ASSESSMENT — PAIN SCALES - GENERAL
PAINLEVEL_OUTOF10: 0

## 2017-11-16 ASSESSMENT — LIFESTYLE VARIABLES: DO YOU DRINK ALCOHOL: NO

## 2017-11-16 NOTE — PROGRESS NOTES
Alicia Anderson Fall Risk Assessment:     Last Known Fall: No falls  Mobility: No limitations  Medications: No meds  Mental Status/LOC/Awareness: Awake, alert, and oriented to date, place, and person  Toileting Needs: No needs  Volume/Electrolyte Status: Use of IV fluids/tube feeds  Communication/Sensory: No deficits  Behavior: Appropriate behavior  Alicia Anderson Fall Risk Total: 5  Fall Risk Level: NO RISK    Universal Fall Precautions:  call light/belongings in reach, bed in low position and locked, wheelchairs and assistive devices out of sight, siderails up x 2, use non-slip footwear, adequate lighting, clutter free and spill free environment, educate on level of risk, educate to call for assistance    Fall Risk Level Interventions:          Patient Specific Interventions:     Medication: review medications with patient and family, assess for medications that can be discontinued or dosage decreased and limit combination of prn medications  Mental Status/LOC/Awareness: encourage family to stay with patient, check on patient hourly and utilize bed/chair fall alarm  Toileting: provide frquent toileting  Volume/Electrolyte Status: monitor blood sugars and maintain appropriate blood sugar levels if diabetic and monitor abnormal lab values  Communication/Sensory: update plan of care on whiteboard  Behavioral: encourage patient to voice feelings, engage patient in daily activities and administer medication as ordered  Mobility: utilize bed/chair fall alarm, ensure bed is locked and in lowest position and provide appropriate assistive device

## 2017-11-16 NOTE — PROGRESS NOTES
Assumed care of patient @ 0700, report received at bedside,  assessment done, labs and orders noted.  Pt A & Ox4, PIV saline locked and patent.  Pt is resting comfortably in bed, no signs or symptoms of distress. Lungs sound diminished.  Pt takes very shallow breaths.   Pt reports pain is a 0/10.  Pt has been updated on the plan of care.   Family is at bedside. Bed is in lowest position, fall risk socks in place, call light within reach. Pt verbalized all needs are met at this time.

## 2017-11-16 NOTE — CARE PLAN
Problem: Communication  Goal: The ability to communicate needs accurately and effectively will improve  Outcome: PROGRESSING AS EXPECTED  Pt capable of verbalizing all needs and utilizes call light system approprietly.    Problem: Safety  Goal: Will remain free from falls  Outcome: PROGRESSING AS EXPECTED  Pt not a fall risk, pt wearing treaded socks, bed locked and in lowest position, bed alarm not indicated.  Pt call light and phone within reach.

## 2017-11-16 NOTE — DISCHARGE SUMMARY
CHIEF COMPLAINT ON ADMISSION  Chief Complaint   Patient presents with   • Shortness of Breath     x30 min. Seen for same 10/21 and admitted for COPD exacerbation and hypoxia.    • Wheezing     expiratory wheezes at all bases       CODE STATUS  Full Code    HPI & HOSPITAL COURSE  This is a 82 y.o. male who presented 11/14/2017 with SOB x 3 hours.  He was recently admitted for COPD exacerbation and LLL pneumonia on 10/21/17 with dc on 10/24/17.  He was sent home with O2 at 1-2 LPM NC PRN.   His CXR showed LLL infiltrate, treated with doxycycline.  He lives with his wife.  Quit smoking 10 years ago   Patient had chest CT which showed:  1.  No focal pulmonary consolidation.    2.  Emphysematous changes of the chest.    3.  6.8 cm in diameter soft tissue mass in the right kidney partially visualized. This may represent renal cell carcinoma. Correlation with renal ultrasound or CT abdomen is recommended    Renal US showed:  6.6 x 5.6 x 5 cm subtle hypoechoic mass in the midpole of the right kidney. This could represent renal cell carcinoma or transitional cell carcinoma.    Patient was treated with abx's, oral steroid, BD, O2 for his copd exacerbation and quickly improved.  He had no more sob/wheezing at time of discharge. The R kidney mass is an incidental finding and patient denies known kidney problems nor symptoms such as pain/hematuria.  Patient also was found to have HTN and started on Lisinopril.  He stated that he does not have a PCP currently.  Referral to a local PCP will be made by our hospitalist RN.  Patient remains asymptomatic from the kidney mass finding and will be referred to urologist as outpatient for further work up/treatment.  Findings have been discussed with patient at length.    Therefore, he is discharged in fair and stable condition with close outpatient follow-up.    SPECIFIC OUTPATIENT FOLLOW-UP  Pcp/urology I 1 week    DISCHARGE PROBLEM LIST  Principal Problem:    COPD exacerbation (CMS-[facility])  POA: Yes  Active Problems:    Acute respiratory failure with hypoxia (CMS-HCC) POA: Yes    Type 2 diabetes mellitus (CMS-HCC) POA: Yes    Right kidney mass POA: Yes    HTN (hypertension) POA: Yes  Resolved Problems:    * No resolved hospital problems. *      FOLLOW UP  Future Appointments  Date Time Provider Department Center   11/20/2017 8:20 AM CARE MANAGER [facility]   11/21/2017 10:00 AM MASON Mccartney [facility]   12/1/2017 8:30 AM Daphnie Grijalva M.D. [facility] None     No follow-up provider specified.    MEDICATIONS ON DISCHARGE   Ovi Bejarano Mendez   Home Medication Instructions ANA:[id number]    Printed on:11/16/17 6558   Medication Information                      albuterol 108 (90 BASE) MCG/ACT Aero Soln inhalation aerosol  Inhale 2 Puffs by mouth every 6 hours as needed for Shortness of Breath.             albuterol 108 (90 Base) MCG/ACT Aero Soln inhalation aerosol  Inhale 2 Puffs by mouth every 6 hours as needed for Shortness of Breath.             aspirin EC (ECOTRIN) 81 MG Tablet Delayed Response  Take 81 mg by mouth every day.             azithromycin (ZITHROMAX) 250 MG Tab  Take 1 tab orally daily for 3 days             budesonide-formoterol (SYMBICORT) 160-4.5 MCG/ACT Aerosol  Inhale 2 Puffs by mouth 2 Times a Day.             lisinopril (PRINIVIL) 10 MG Tab  Take 1 Tab by mouth every day.             metformin ER (GLUCOPHAGE XR) 500 MG TABLET SR 24 HR  Take 1,500 mg by mouth every day.             predniSONE (DELTASONE) 20 MG Tab  Take 2 tabs daily for 4 more days, then stop.             tiotropium (SPIRIVA) 18 MCG Cap  Inhale 1 Cap by mouth every day.                 DIET  Orders Placed This Encounter   Procedures   • Diet Order     Standing Status:   Standing     Number of Occurrences:   1     Order Specific Question:   Diet:     Answer:   Diabetic [3]       ACTIVITY  As tolerated.  Weight bearing as tolerated      CONSULTATIONS  none    PROCEDURES  CT chest / renal US    LABORATORY  Lab  Results   Component Value Date/Time    SODIUM 139 11/16/2017 02:48 AM    POTASSIUM 3.2 (L) 11/16/2017 02:48 AM    CHLORIDE 104 11/16/2017 02:48 AM    CO2 27 11/16/2017 02:48 AM    GLUCOSE 144 (H) 11/16/2017 02:48 AM    BUN 16 11/16/2017 02:48 AM    CREATININE 0.87 11/16/2017 02:48 AM        Lab Results   Component Value Date/Time    WBC 6.9 11/16/2017 10:50 AM    HEMOGLOBIN 12.4 (L) 11/16/2017 10:50 AM    HEMATOCRIT 36.8 (L) 11/16/2017 10:50 AM    PLATELETCT 198 11/16/2017 10:50 AM        Total time of the discharge process exceeds 40 minutes

## 2017-11-16 NOTE — PROGRESS NOTES
Assumed care of pt, received report from day shift RN, pt assessed.  Pt has no complaints of pain.  Pt is A&O x4.  Pt not a fall risk, wearing treaded socks, bed locked and in lowest position, bed alarm not indicated.  Pt instructed to call for assistance as needed, pt verbalized understanding.  Call light, phone, and personal belongings within reach.

## 2017-11-16 NOTE — PROGRESS NOTES
[facility] Hospitalist Progress Note    Date of Service: 11/15/2017    Chief Complaint  82 y.o. male admitted 2017 with acute sob/wheezing for 3 hours    Interval Problem Update  Feels much better with breathing back to baseline now; occasional cough but cannot bring up sputum;  Denies known kidney problem/urinary sx's    Consultants/Specialty  none    Disposition  home        Review of Systems   Constitutional: Negative for fever.   HENT: Negative for hearing loss.    Eyes: Negative for blurred vision.   Respiratory: Positive for cough. Negative for shortness of breath.    Cardiovascular: Negative for chest pain.   Gastrointestinal: Negative for heartburn, nausea and vomiting.   Genitourinary: Negative for dysuria.   Musculoskeletal: Negative for myalgias.   Skin: Negative for rash.   Neurological: Negative for dizziness.   Psychiatric/Behavioral: Negative for depression.      Physical Exam  Laboratory/Imaging   Hemodynamics  Temp (24hrs), Av.1 °C (98.7 °F), Min:36.4 °C (97.6 °F), Max:37.9 °C (100.2 °F)   Temperature: 36.9 °C (98.4 °F)  Pulse  Av.8  Min: 69  Max: 98 Heart Rate (Monitored): 70  Blood Pressure : 145/50, NIBP: 156/70      Respiratory      Respiration: 18, Pulse Oximetry: 94 %, O2 Daily Delivery Respiratory : Nasal Cannula     Given By:: Mouthpiece, #MDI/DPI Given: MDI/DPI x 1, Work Of Breathing / Effort: Shallow  RUL Breath Sounds: Diminished, RML Breath Sounds: Diminished, RLL Breath Sounds: Diminished, MARQUIS Breath Sounds: Diminished, LLL Breath Sounds: Diminished    Fluids    Intake/Output Summary (Last 24 hours) at 11/15/17 1625  Last data filed at 11/15/17 0922   Gross per 24 hour   Intake              300 ml   Output                0 ml   Net              300 ml       Nutrition  Orders Placed This Encounter   Procedures   • Diet Order     Standing Status:   Standing     Number of Occurrences:   1     Order Specific Question:   Diet:     Answer:   Diabetic [3]     Physical Exam    Constitutional: He is oriented to person, place, and time. No distress.   HENT:   Head: Normocephalic.   Eyes: EOM are normal.   Neck: Neck supple.   Cardiovascular: Normal rate and regular rhythm.    Pulmonary/Chest: Effort normal and breath sounds normal. No respiratory distress.   Abdominal: Soft. Bowel sounds are normal. He exhibits no distension. There is no tenderness.   Musculoskeletal: He exhibits no edema.   Neurological: He is alert and oriented to person, place, and time.   Skin: Skin is warm.   Psychiatric: His behavior is normal.                   Recent Labs      11/15/17   0529   BNPBTYPENAT  62              Assessment/Plan     * COPD exacerbation (CMS-HCC)- (present on admission)   Assessment & Plan    Feels better with breathing back to baseline; cont current rx and dc home tomorrow if stable.  Chest ct showed COPD changes, R kidney mass, no active infiltrates.        Acute respiratory failure with hypoxia (CMS-HCC)- (present on admission)   Assessment & Plan    Improved with copd rx.  See above        Right kidney mass   Assessment & Plan    Incidental finding on chest CT; will check renal US; patient is asymptomatic and denies abdom pain/urinary problems.        Type 2 diabetes mellitus (CMS-HCC)- (present on admission)   Assessment & Plan    Cont oral med.  Stable  Diabetic diet, SSI, accuchecks.          Quality-Core Measures

## 2017-11-16 NOTE — PROGRESS NOTES
Alicia Anderson Fall Risk Assessment:     Last Known Fall: No falls  Mobility: No limitations  Medications: No meds  Mental Status/LOC/Awareness: Awake, alert, and oriented to date, place, and person  Toileting Needs: No needs  Volume/Electrolyte Status: Use of IV fluids/tube feeds  Communication/Sensory: No deficits  Behavior: Appropriate behavior  Alicia Anderson Fall Risk Total: 5  Fall Risk Level: NO RISK    Universal Fall Precautions:  call light/belongings in reach, bed in low position and locked, wheelchairs and assistive devices out of sight, siderails up x 2, use non-slip footwear, adequate lighting, clutter free and spill free environment, educate on level of risk, educate to call for assistance    Fall Risk Level Interventions:          Patient Specific Interventions:     Medication: review medications with patient and family and assess for medications that can be discontinued or dosage decreased  Mental Status/LOC/Awareness: reinforce the use of call light  Toileting: not applicable  Volume/Electrolyte Status: ensure patient remains hydrated and monitor abnormal lab values  Communication/Sensory: update plan of care on whiteboard  Behavioral: encourage patient to voice feelings, administer medication as ordered and instruct/reinforce fall program rationale  Mobility: ensure bed is locked and in lowest position

## 2017-11-16 NOTE — ASSESSMENT & PLAN NOTE
Incidental finding on chest CT; will check renal US; patient is asymptomatic and denies abdom pain/urinary problems.

## 2017-11-16 NOTE — DISCHARGE INSTRUCTIONS
Discharge Instructions    Discharged to home by car with relative. Discharged via walking, hospital escort: Yes.  Special equipment needed: Not Applicable    Be sure to schedule a follow-up appointment with your primary care doctor or any specialists as instructed.     Discharge Plan:   Diet Plan: Discussed  Confirmed Follow up Appointment: Appointment Scheduled  Confirmed Symptoms Management: Discussed  Medication Reconciliation Updated: Yes  Influenza Vaccine Indication: Not indicated: Previously immunized this influenza season and > 8 years of age    I understand that a diet low in cholesterol, fat, and sodium is recommended for good health. Unless I have been given specific instructions below for another diet, I accept this instruction as my diet prescription.   Other diet: DIABETIC    Special Instructions: None    · Is patient discharged on Warfarin / Coumadin?   No     · Is patient Post Blood Transfusion?  No    Depression / Suicide Risk    As you are discharged from this [facility] facility, it is important to learn how to keep safe from harming yourself.    Recognize the warning signs:  · Abrupt changes in personality, positive or negative- including increase in energy   · Giving away possessions  · Change in eating patterns- significant weight changes-  positive or negative  · Change in sleeping patterns- unable to sleep or sleeping all the time   · Unwillingness or inability to communicate  · Depression  · Unusual sadness, discouragement and loneliness  · Talk of wanting to die  · Neglect of personal appearance   · Rebelliousness- reckless behavior  · Withdrawal from people/activities they love  · Confusion- inability to concentrate     If you or a loved one observes any of these behaviors or has concerns about self-harm, here's what you can do:  · Talk about it- your feelings and reasons for harming yourself  · Remove any means that you might use to hurt yourself (examples: pills, rope, extension  cords, firearm)  · Get professional help from the community (Mental Health, Substance Abuse, psychological counseling)  · Do not be alone:Call your Safe Contact- someone whom you trust who will be there for you.  · Call your local CRISIS HOTLINE 943-8198 or 120-706-0827  · Call your local Children's Mobile Crisis Response Team [place] (773) 610-4375 or www.ChangeYourFlight  · Call the toll free National Suicide Prevention Hotlines   · National Suicide Prevention Lifeline 473-147-UCBD (3089)  · National Hope Line Network 800-SUICIDE (854-4124)

## 2017-11-17 NOTE — PROGRESS NOTES
Pt has been given discharge paperwork and prescriptions.  Pt verbalized understanding of paperwork and of additions/changes to medication regimen.  Pt PIV d/c'd, tip intact.  Pt leaving via private vehicle with wife to home  .

## 2017-11-17 NOTE — DOCUMENTATION QUERY
DOCUMENTATION QUERY    PROVIDERS: Please select “Cosign w/ note”to reply to query    To better represent the severity of illness of your patient, please review the following information and exercise your independent professional judgment in responding to this query.     Pneumonia, LLL infiltrate is noted in the ED provider note.  Persistent hazy LLL infiltrate on CXR is documented in the H & P.   Azithromycin, Unasyn, & Rocephin have been given. Based upon the clinical findings, risk factors, and treatment, can a diagnosis be provided to support this finding and treatment?    • The patient has a diagnosis of __________  • Findings of no clinical significance  • Unable to determine        The medical record reflects the following:   Clinical Findings  persistent LLL infiltrate noted on x-ray   Treatment  x-rays, rocephin & azithromycin, supplemental 02, nebulizers, blood    cultures   Risk Factors  age, COPD, hx of recent PNA infection    Location within medical record  History and Physical, Progress Notes, Lab Results  and Radiology     Results     Thank you,   Caryn Covington RN, BSN  Clinical   [phone]

## 2017-11-20 ENCOUNTER — PATIENT OUTREACH (OUTPATIENT)
Dept: HEALTH INFORMATION MANAGEMENT | Facility: OTHER | Age: 82
End: 2017-11-20

## 2017-11-20 NOTE — PROGRESS NOTES
Discharge clinic outreach attempt for follow-up.  Patient's telephone number listed in Epic is out of service.   Patient is scheduled for discharge clinic appointment on 11/21/17. CM unable to follow-up with patient at this time.     11/20/17 3:00pm:  CM second attempt at outreach.  Telephone number is disconnected. CM unable to leave .

## 2017-12-15 ENCOUNTER — OFFICE VISIT (OUTPATIENT)
Dept: INTERNAL MEDICINE | Facility: MEDICAL CENTER | Age: 82
End: 2017-12-15
Payer: MEDICARE

## 2017-12-15 ENCOUNTER — HOSPITAL ENCOUNTER (OUTPATIENT)
Dept: LAB | Facility: MEDICAL CENTER | Age: 82
End: 2017-12-15
Attending: INTERNAL MEDICINE
Payer: MEDICARE

## 2017-12-15 VITALS
SYSTOLIC BLOOD PRESSURE: 130 MMHG | TEMPERATURE: 98.2 F | BODY MASS INDEX: 22.58 KG/M2 | HEART RATE: 80 BPM | DIASTOLIC BLOOD PRESSURE: 70 MMHG | WEIGHT: 149 LBS | OXYGEN SATURATION: 95 % | HEIGHT: 68 IN

## 2017-12-15 DIAGNOSIS — I10 HYPERTENSION, UNSPECIFIED TYPE: ICD-10-CM

## 2017-12-15 DIAGNOSIS — J44.9 CHRONIC OBSTRUCTIVE PULMONARY DISEASE, UNSPECIFIED COPD TYPE (HCC): ICD-10-CM

## 2017-12-15 DIAGNOSIS — Z29.9 PREVENTIVE MEASURE: ICD-10-CM

## 2017-12-15 DIAGNOSIS — E87.6 HYPOKALEMIA: ICD-10-CM

## 2017-12-15 DIAGNOSIS — F33.1 MODERATE EPISODE OF RECURRENT MAJOR DEPRESSIVE DISORDER (HCC): ICD-10-CM

## 2017-12-15 LAB
ANION GAP SERPL CALC-SCNC: 10 MMOL/L (ref 0–11.9)
BASOPHILS # BLD AUTO: 1.1 % (ref 0–1.8)
BASOPHILS # BLD: 0.07 K/UL (ref 0–0.12)
BUN SERPL-MCNC: 12 MG/DL (ref 8–22)
CALCIUM SERPL-MCNC: 9.5 MG/DL (ref 8.5–10.5)
CHLORIDE SERPL-SCNC: 99 MMOL/L (ref 96–112)
CO2 SERPL-SCNC: 29 MMOL/L (ref 20–33)
CREAT SERPL-MCNC: 0.89 MG/DL (ref 0.5–1.4)
CREAT UR-MCNC: 256.5 MG/DL
EOSINOPHIL # BLD AUTO: 0.56 K/UL (ref 0–0.51)
EOSINOPHIL NFR BLD: 8.6 % (ref 0–6.9)
ERYTHROCYTE [DISTWIDTH] IN BLOOD BY AUTOMATED COUNT: 43.5 FL (ref 35.9–50)
EST. AVERAGE GLUCOSE BLD GHB EST-MCNC: 186 MG/DL
GFR SERPL CREATININE-BSD FRML MDRD: >60 ML/MIN/1.73 M 2
GLUCOSE SERPL-MCNC: 139 MG/DL (ref 65–99)
HBA1C MFR BLD: 8.1 % (ref 0–5.6)
HCT VFR BLD AUTO: 44.8 % (ref 42–52)
HGB BLD-MCNC: 14.9 G/DL (ref 14–18)
IMM GRANULOCYTES # BLD AUTO: 0.02 K/UL (ref 0–0.11)
IMM GRANULOCYTES NFR BLD AUTO: 0.3 % (ref 0–0.9)
LYMPHOCYTES # BLD AUTO: 2.14 K/UL (ref 1–4.8)
LYMPHOCYTES NFR BLD: 33 % (ref 22–41)
MCH RBC QN AUTO: 30.8 PG (ref 27–33)
MCHC RBC AUTO-ENTMCNC: 33.3 G/DL (ref 33.7–35.3)
MCV RBC AUTO: 92.8 FL (ref 81.4–97.8)
MICROALBUMIN UR-MCNC: 2.6 MG/DL
MICROALBUMIN/CREAT UR: 10 MG/G (ref 0–30)
MONOCYTES # BLD AUTO: 0.69 K/UL (ref 0–0.85)
MONOCYTES NFR BLD AUTO: 10.6 % (ref 0–13.4)
NEUTROPHILS # BLD AUTO: 3.01 K/UL (ref 1.82–7.42)
NEUTROPHILS NFR BLD: 46.4 % (ref 44–72)
NRBC # BLD AUTO: 0 K/UL
NRBC BLD AUTO-RTO: 0 /100 WBC
PLATELET # BLD AUTO: 253 K/UL (ref 164–446)
PMV BLD AUTO: 10.2 FL (ref 9–12.9)
POTASSIUM SERPL-SCNC: 3.6 MMOL/L (ref 3.6–5.5)
RBC # BLD AUTO: 4.83 M/UL (ref 4.7–6.1)
SODIUM SERPL-SCNC: 138 MMOL/L (ref 135–145)
WBC # BLD AUTO: 6.5 K/UL (ref 4.8–10.8)

## 2017-12-15 PROCEDURE — 82043 UR ALBUMIN QUANTITATIVE: CPT

## 2017-12-15 PROCEDURE — 36415 COLL VENOUS BLD VENIPUNCTURE: CPT

## 2017-12-15 PROCEDURE — 83036 HEMOGLOBIN GLYCOSYLATED A1C: CPT | Mod: GA

## 2017-12-15 PROCEDURE — 80048 BASIC METABOLIC PNL TOTAL CA: CPT

## 2017-12-15 PROCEDURE — 82570 ASSAY OF URINE CREATININE: CPT

## 2017-12-15 PROCEDURE — 85025 COMPLETE CBC W/AUTO DIFF WBC: CPT

## 2017-12-15 PROCEDURE — 99204 OFFICE O/P NEW MOD 45 MIN: CPT | Mod: GC | Performed by: INTERNAL MEDICINE

## 2017-12-15 RX ORDER — ALBUTEROL SULFATE 90 UG/1
2 AEROSOL, METERED RESPIRATORY (INHALATION) EVERY 6 HOURS PRN
Qty: 8.5 G | Refills: 6 | Status: SHIPPED | OUTPATIENT
Start: 2017-12-15 | End: 2020-07-09

## 2017-12-15 RX ORDER — BUDESONIDE AND FORMOTEROL FUMARATE DIHYDRATE 160; 4.5 UG/1; UG/1
2 AEROSOL RESPIRATORY (INHALATION) 2 TIMES DAILY
Qty: 3 INHALER | Refills: 3 | Status: SHIPPED | OUTPATIENT
Start: 2017-12-15 | End: 2018-01-30 | Stop reason: SDUPTHER

## 2017-12-15 RX ORDER — LISINOPRIL 10 MG/1
10 TABLET ORAL DAILY
Qty: 90 TAB | Refills: 2 | Status: SHIPPED | OUTPATIENT
Start: 2017-12-15 | End: 2018-03-08 | Stop reason: SDUPTHER

## 2017-12-15 RX ORDER — TIOTROPIUM BROMIDE 18 UG/1
18 CAPSULE ORAL; RESPIRATORY (INHALATION) DAILY
Qty: 90 CAP | Refills: 3 | Status: SHIPPED | OUTPATIENT
Start: 2017-12-15 | End: 2018-01-30 | Stop reason: SDUPTHER

## 2017-12-15 ASSESSMENT — PATIENT HEALTH QUESTIONNAIRE - PHQ9
SUM OF ALL RESPONSES TO PHQ QUESTIONS 1-9: 19
5. POOR APPETITE OR OVEREATING: 0 - NOT AT ALL
CLINICAL INTERPRETATION OF PHQ2 SCORE: 3

## 2017-12-15 NOTE — PATIENT INSTRUCTIONS
Chronic Obstructive Pulmonary Disease  Chronic obstructive pulmonary disease (COPD) is a common lung problem. In COPD, the flow of air from the lungs is limited. The way your lungs work will probably never return to normal, but there are things you can do to improve your lungs and make yourself feel better. Your doctor may treat your condition with:  · Medicines.  · Oxygen.  · Lung surgery.  · Changes to your diet.  · Rehabilitation. This may involve a team of specialists.  HOME CARE  · Take all medicines as told by your doctor.  · Avoid medicines or cough syrups that dry up your airway (such as antihistamines) and do not allow you to get rid of thick spit. You do not need to avoid them if told differently by your doctor.  · If you smoke, stop. Smoking makes the problem worse.  · Avoid being around things that make your breathing worse (like smoke, chemicals, and fumes).  · Use oxygen therapy and therapy to help improve your lungs (pulmonary rehabilitation) if told by your doctor. If you need home oxygen therapy, ask your doctor if you should buy a tool to measure your oxygen level (oximeter).  · Avoid people who have a sickness you can catch (contagious).  · Avoid going outside when it is very hot, cold, or humid.  · Eat healthy foods. Eat smaller meals more often. Rest before meals.  · Stay active, but remember to also rest.  · Make sure to get all the shots (vaccines) your doctor recommends. Ask your doctor if you need a pneumonia shot.  · Learn and use tips on how to relax.  · Learn and use tips on how to control your breathing as told by your doctor. Try:  ¨ Breathing in (inhaling) through your nose for 1 second. Then, pucker your lips and breath out (exhale) through your lips for 2 seconds.  ¨ Putting one hand on your belly (abdomen). Breathe in slowly through your nose for 1 second. Your hand on your belly should move out. Pucker your lips and breathe out slowly through your lips. Your hand on your belly  should move in as you breathe out.  · Learn and use controlled coughing to clear thick spit from your lungs. The steps are:  1. Lean your head a little forward.  2. Breathe in deeply.  3. Try to hold your breath for 3 seconds.  4. Keep your mouth slightly open while coughing 2 times.  5. Spit any thick spit out into a tissue.  6. Rest and do the steps again 1 or 2 times as needed.  GET HELP IF:  · You cough up more thick spit than usual.  · There is a change in the color or thickness of the spit.  · It is harder to breathe than usual.  · Your breathing is faster than usual.  GET HELP RIGHT AWAY IF:  · You have shortness of breath while resting.  · You have shortness of breath that stops you from:  ¨ Being able to talk.  ¨ Doing normal activities.  · You chest hurts for longer than 5 minutes.  · Your skin color is more blue than usual.  · Your pulse oximeter shows that you have low oxygen for longer than 5 minutes.  MAKE SURE YOU:  · Understand these instructions.  · Will watch your condition.  · Will get help right away if you are not doing well or get worse.     This information is not intended to replace advice given to you by your health care provider. Make sure you discuss any questions you have with your health care provider.     Document Released: 06/05/2009 Document Revised: 01/08/2016 Document Reviewed: 08/14/2014  [employer] Interactive Patient Education ©2016 [employer]  Diabetes and Exercise  Exercising regularly is important. It is not just about losing weight. It has many health benefits, such as:  · Improving your overall fitness, flexibility, and endurance.  · Increasing your bone density.  · Helping with weight control.  · Decreasing your body fat.  · Increasing your muscle strength.  · Reducing stress and tension.  · Improving your overall health.  People with diabetes who exercise gain additional benefits because exercise:  · Reduces appetite.  · Improves the body's use of blood sugar  (glucose).  · Helps lower or control blood glucose.  · Decreases blood pressure.  · Helps control blood lipids (such as cholesterol and triglycerides).  · Improves the body's use of the hormone insulin by:  ¨ Increasing the body's insulin sensitivity.  ¨ Reducing the body's insulin needs.  · Decreases the risk for heart disease because exercising:  ¨ Lowers cholesterol and triglycerides levels.  ¨ Increases the levels of good cholesterol (such as high-density lipoproteins [HDL]) in the body.  ¨ Lowers blood glucose levels.  YOUR ACTIVITY PLAN   Choose an activity that you enjoy, and set realistic goals. To exercise safely, you should begin practicing any new physical activity slowly, and gradually increase the intensity of the exercise over time. Your health care provider or diabetes educator can help create an activity plan that works for you. General recommendations include:  · Encouraging children to engage in at least 60 minutes of physical activity each day.  · Stretching and performing strength training exercises, such as yoga or weight lifting, at least 2 times per week.  · Performing a total of at least 150 minutes of moderate-intensity exercise each week, such as brisk walking or water aerobics.  · Exercising at least 3 days per week, making sure you allow no more than 2 consecutive days to pass without exercising.  · Avoiding long periods of inactivity (90 minutes or more). When you have to spend an extended period of time sitting down, take frequent breaks to walk or stretch.  RECOMMENDATIONS FOR EXERCISING WITH TYPE 1 OR TYPE 2 DIABETES   · Check your blood glucose before exercising. If blood glucose levels are greater than 240 mg/dL, check for urine ketones. Do not exercise if ketones are present.  · Avoid injecting insulin into areas of the body that are going to be exercised. For example, avoid injecting insulin into:  ¨ The arms when playing tennis.  ¨ The legs when jogging.  · Keep a record  of:  ¨ Food intake before and after you exercise.  ¨ Expected peak times of insulin action.  ¨ Blood glucose levels before and after you exercise.  ¨ The type and amount of exercise you have done.  · Review your records with your health care provider. Your health care provider will help you to develop guidelines for adjusting food intake and insulin amounts before and after exercising.  · If you take insulin or oral hypoglycemic agents, watch for signs and symptoms of hypoglycemia. They include:  ¨ Dizziness.  ¨ Shaking.  ¨ Sweating.  ¨ Chills.  ¨ Confusion.  · Drink plenty of water while you exercise to prevent dehydration or heat stroke. Body water is lost during exercise and must be replaced.  · Talk to your health care provider before starting an exercise program to make sure it is safe for you. Remember, almost any type of activity is better than none.     This information is not intended to replace advice given to you by your health care provider. Make sure you discuss any questions you have with your health care provider.     Document Released: 03/09/2005 Document Revised: 05/03/2016 Document Reviewed: 05/27/2014  [employer] Interactive Patient Education ©2016 [employer]

## 2017-12-15 NOTE — LETTER
December 15, 2017         Ovi Bejarano   [address] [id number]  [place] [address]        Dear Ovi:      Below are the results from your recent visit:    Resulted Orders   CBC WITH DIFFERENTIAL   Result Value Ref Range    WBC 6.5 4.8 - 10.8 K/uL    RBC 4.83 4.70 - 6.10 M/uL    Hemoglobin 14.9 14.0 - 18.0 g/dL    Hematocrit 44.8 42.0 - 52.0 %    MCV 92.8 81.4 - 97.8 fL    MCH 30.8 27.0 - 33.0 pg    MCHC 33.3 (L) 33.7 - 35.3 g/dL    RDW 43.5 35.9 - 50.0 fL    Platelet Count 253 164 - 446 K/uL    MPV 10.2 9.0 - 12.9 fL    Neutrophils-Polys 46.40 44.00 - 72.00 %    Lymphocytes 33.00 22.00 - 41.00 %    Monocytes 10.60 0.00 - 13.40 %    Eosinophils 8.60 (H) 0.00 - 6.90 %    Basophils 1.10 0.00 - 1.80 %    Immature Granulocytes 0.30 0.00 - 0.90 %    Nucleated RBC 0.00 /100 WBC    Neutrophils (Absolute) 3.01 1.82 - 7.42 K/uL      Comment:      Includes immature neutrophils, if present.    Lymphs (Absolute) 2.14 1.00 - 4.80 K/uL    Monos (Absolute) 0.69 0.00 - 0.85 K/uL    Eos (Absolute) 0.56 (H) 0.00 - 0.51 K/uL    Baso (Absolute) 0.07 0.00 - 0.12 K/uL    Immature Granulocytes (abs) 0.02 0.00 - 0.11 K/uL    NRBC (Absolute) 0.00 K/uL     The test results show that blood counts are stable.    If you have any questions or concerns, please don't hesitate to call.        Sincerely,      Shonda Mcclure M.D.    Electronically Signed

## 2017-12-15 NOTE — PROGRESS NOTES
New Patient to Establish    Reason to establish: evaluation of chronic conditions    CC: follow up post hospitalization    HPI: 82 yr old male patient with PMHx of COPD comes in post recent hospitalization for evaluation of his chronic conditions.    1. COPD : Patient was diagnosed with COPD long back and was on inhalers when he was in [place]. Patient was a chronic smoker , smoked 0.5 pack every day for 50 yrs and quit smoking 10 yrs back. Patient does not follow up with doctors often. Patient moved to [place] about 2 yrs ago and states his symptoms of baseline SOB became worse and recently was hospitalized for exacerbation and also had pneumonia. Was discharged on azithromycin, prednisone and on symbicort, albuterol and tiotropium. Patient states today his SOB is improved since hospital discharge but still has dry cough. Currently on oxygen but does not know how many liters he is using.    2. Diabetes : Patient states he was never diagnosed with diabetes in the past. His HbA1c last one is 8.6 in november and previous one is 7.7 in October 2017. Patient was discharged on metformin. Denies polyuria,polydypsia, numbness, tingling and chest pain.    3. HTN : patient denies symptoms of headache, chest pain,palpitations. Patient is currently on lisinopril 10 mg PO 1 tab once daily.    4. Depression : Patient's daughter says that patient has had depression recurrent epsides for long time now and never followed up with physician or therapy in the past. Not on any medication. His PHQ9 score is 19 today. Denies suicidal ideation.      Patient Active Problem List    Diagnosis Date Noted   • COPD exacerbation (CMS-HCC) 10/21/2017     Priority: High   • Acute respiratory failure with hypoxia (CMS-[facility]) 10/21/2017     Priority: High   • HTN (hypertension) 11/16/2017   • Right kidney mass 11/15/2017   • Type 2 diabetes mellitus (CMS-HCC) 10/21/2017       Past Medical History:   Diagnosis Date   • Diabetes (CMS-HCC)     type 2   •  Hypercholesteremia    • Hyperglycemia    • Rash        Current Outpatient Prescriptions   Medication Sig Dispense Refill   • metformin (GLUCOPHAGE) 1000 MG tablet Take 1 Tab by mouth 2 times a day, with meals. 180 Tab 3   • lisinopril (PRINIVIL) 10 MG Tab Take 1 Tab by mouth every day. 90 Tab 2   • budesonide-formoterol (SYMBICORT) 160-4.5 MCG/ACT Aerosol Inhale 2 Puffs by mouth 2 Times a Day. 3 Inhaler 3   • albuterol 108 (90 Base) MCG/ACT Aero Soln inhalation aerosol Inhale 2 Puffs by mouth every 6 hours as needed for Shortness of Breath. 8.5 g 6   • tiotropium (SPIRIVA) 18 MCG Cap Inhale 1 Cap by mouth every day. 90 Cap 3     No current facility-administered medications for this visit.        Allergies as of 12/15/2017   • (No Known Allergies)       Social History     Social History   • Marital status:      Spouse name: N/A   • Number of children: N/A   • Years of education: N/A     Occupational History   • Not on file.     Social History Main Topics   • Smoking status: Former Smoker     Quit date: 10/21/2007   • Smokeless tobacco: Never Used   • Alcohol use Yes      Comment: rare   • Drug use: No   • Sexual activity: Not on file     Other Topics Concern   • Not on file     Social History Narrative   • No narrative on file       Family History   Problem Relation Age of Onset   • Stroke Sister        History reviewed. No pertinent surgical history.    ROS: As per HPI. Additional pertinent symptoms as noted below.    Constitutional: Denies fever/chills/weight changes.   Eyes: Denies changes/pain in vision  ENT: Denies sore throat/congestion/ear ache.   Cardiovascular: Denies chest pain /palpitations/edema.   Respiratory: Denies SOB/PND/orthopnea. Positive for dry cough  Abdomen: Denies difficulty swallowing/ diarrhea/constipation/abdominal pain/nausea/vomiting  Genitourinary: Normal urinary habits.   Musculo-skeletal: normal ambulation.Denies muscle or joint pains.  Skin: Denies rash/lesions.  Neurological:  "Denies weakness/tingling/numbness/headache  Psychological: good mood and cooperative. Positive for depression       /70   Pulse 80   Temp 36.8 °C (98.2 °F)   Ht 1.727 m (5' 8\")   Wt 67.6 kg (149 lb)   SpO2 95%   BMI 22.66 kg/m²     Physical Exam  General:  Alert and oriented, No apparent distress.    Eyes: Pupils equal and reactive. No scleral icterus.    Throat: Clear no erythema or exudates noted.    Neck: Supple. No lymphadenopathy noted. Thyroid not enlarged.    Lungs: bilaterally decreased breath sounds at the bases. No wheezing or rales or rhonchi    Cardiovascular: Regular rate and rhythm. No murmurs, rubs or gallops.    Abdomen:  Benign. No rebound or guarding noted.    Extremities: No clubbing, cyanosis, edema.    Skin: Clear. No rash or suspicious skin lesions noted.    Neurological: Oriented to time, place, and person .Cranial nerves intact. No motor/sensory deficits.Reflexes were normal and symmetrical in both upper and lower extremities     Musculoskeletal : NROM of all extremities. No tenderness or deformity noted.     Note: I have reviewed all pertinent labs and diagnostic tests associated with this visit with specific comments listed under the assessment and plan below    Assessment and Plan    1. Chronic obstructive pulmonary disease, unspecified COPD type (CMS-HCC)  - patient is currently on symbicort,albuterol and tiotropium inhalers and advised to continue to use  - patient complains of dry cough which is improving compared to productive cough he had in the past when he had pneumonia.  - will continue to follow up and will see if his symptoms does not improve and continues to have dry cough -could be lisinopril induced and will consider discontinuing lisinopril in the future in his upcoming appointments.  - patient is currently on oxygen and he does not know how many liters of O2 he is using  - ordered DME for portable oxygen -to use 2 liters when on exertion and nocturnal  - patient " never had PFT's done and never followed up with pulmonologist in the past  - ordered referral to pulmonologist  - ordered labs for next visit CBC    2. Uncontrolled type 2 diabetes mellitus without complication, without long-term current use of insulin (CMS-HCC)  - patient denies any related symptoms  - his last HbA1c is 8.6 on 11/16/17 and was never on medications in the past  - patient was discharged on metformin when he was in hospital  - refilled metformin 1000mg PO 1 tab BID.  - ordered HbA1c and micro albumin creatinine ratio for his next appointment  - referral to ophthalmology and diabetic education ordered today    3. Hypertension, unspecified type  - patient denies any related symptoms today  - patient has dry cough which could be COPD/respiratory infection given his recent pneumonia which is clearing up  - currently on lisinopril 10 mg PO 1 tab once daily.  - today BP is 130/70  - will consider to stop lisinopril if cough does not improve by next appointment    4. Moderate episode of recurrent major depressive disorder (CMS-HCC)  - patient had long standing history and never followed up with psychiatrist or therapist in the past and not any medications  - today PHQ 9 score is 19 and referral to psychology ordered  - will continue to follow up    5. Hypokalemia  - patient had potassium level of 3.2 at the time of discharge on 11/16/17  - ordered BMP for today and will follow up     6. Preventive measure  - patient never had colonoscopy done in the past  - patient never had ultrasound done in the past at age 65 given his chronic smoking history and he quit 10 yrs back  - patient never had DEXA done in the past  And will consider ordering vitamin D level in the next visit  - patient never had falls  - patient is independent regarding ADL's and IADL's  - mammogram and pap smear not applicable.  - patient had flu vaccination this year in September  - patient is due for Tdap and pneumococcal vaccination which  we will consider ordering in the next visit      Risk Assessment (discuss potential complications a function of chronic problems): spent 35 min's discussing plans of management and educating patient regarding his current condition and related complications    Complexity (discuss number of co-morbidities): discussed diabetes,HTN,COPD    Signed by: Shonda Mcclure M.D.

## 2018-01-24 ENCOUNTER — HOSPITAL ENCOUNTER (EMERGENCY)
Facility: MEDICAL CENTER | Age: 83
End: 2018-01-24
Attending: EMERGENCY MEDICINE
Payer: MEDICARE

## 2018-01-24 VITALS
TEMPERATURE: 97.8 F | SYSTOLIC BLOOD PRESSURE: 132 MMHG | DIASTOLIC BLOOD PRESSURE: 67 MMHG | OXYGEN SATURATION: 93 % | BODY MASS INDEX: 21.72 KG/M2 | RESPIRATION RATE: 17 BRPM | HEIGHT: 68 IN | HEART RATE: 91 BPM | WEIGHT: 143.3 LBS

## 2018-01-24 DIAGNOSIS — B02.9 HERPES ZOSTER WITHOUT COMPLICATION: ICD-10-CM

## 2018-01-24 PROCEDURE — 99284 EMERGENCY DEPT VISIT MOD MDM: CPT

## 2018-01-24 PROCEDURE — 700101 HCHG RX REV CODE 250: Performed by: EMERGENCY MEDICINE

## 2018-01-24 RX ORDER — PROPARACAINE HYDROCHLORIDE 5 MG/ML
1 SOLUTION/ DROPS OPHTHALMIC ONCE
Status: COMPLETED | OUTPATIENT
Start: 2018-01-24 | End: 2018-01-24

## 2018-01-24 RX ORDER — VALACYCLOVIR HYDROCHLORIDE 1 G/1
1000 TABLET, FILM COATED ORAL 2 TIMES DAILY
Qty: 20 TAB | Refills: 0 | Status: SHIPPED | OUTPATIENT
Start: 2018-01-24 | End: 2018-03-08

## 2018-01-24 RX ADMIN — PROPARACAINE HYDROCHLORIDE 1 DROP: 5 SOLUTION/ DROPS OPHTHALMIC at 12:35

## 2018-01-24 RX ADMIN — FLUORESCEIN SODIUM 1 MG: 1 STRIP OPHTHALMIC at 12:35

## 2018-01-24 NOTE — ED NOTES
Chief Complaint   Patient presents with   • Other     left facial swelling and redness with blisters x3days   • Difficulty Swallowing     pt able to eat soft    • Itching     scabs arms and legs for years /itching     Pt ambulated to triage,speaking full sentences,nad.  Left facial redness with blisters . Noted arms and legs old scabs/wounds , he said he had it for years.   No rash noted on his back.

## 2018-01-24 NOTE — DISCHARGE INSTRUCTIONS
Shingles  Take valacyclovir starting today.  Take tylenol for pain.  Followup with opthomology for eye pain, redness, blurred vision or tearing.  You are contagious while you have blisters.  After this you should ask your doctor for the shingles vaccine.    Shingles, which is also known as herpes zoster, is an infection that causes a painful skin rash and fluid-filled blisters. Shingles is not related to genital herpes, which is a sexually transmitted infection.     Shingles only develops in people who:  · Have had chickenpox.  · Have received the chickenpox vaccine. (This is rare.)  CAUSES  Shingles is caused by varicella-zoster virus (VZV). This is the same virus that causes chickenpox. After exposure to VZV, the virus stays in the body in an inactive (dormant) state. Shingles develops if the virus reactivates. This can happen many years after the initial exposure to VZV. It is not known what causes this virus to reactivate.  RISK FACTORS  People who have had chickenpox or received the chickenpox vaccine are at risk for shingles. Infection is more common in people who:  · Are older than age 50.  · Have a weakened defense (immune) system, such as those with HIV, AIDS, or cancer.  · Are taking medicines that weaken the immune system, such as transplant medicines.  · Are under great stress.  SYMPTOMS  Early symptoms of this condition include itching, tingling, and pain in an area on your skin. Pain may be described as burning, stabbing, or throbbing.  A few days or weeks after symptoms start, a painful red rash appears, usually on one side of the body in a bandlike or beltlike pattern. The rash eventually turns into fluid-filled blisters that break open, scab over, and dry up in about 2-3 weeks.  At any time during the infection, you may also develop:  · A fever.  · Chills.  · A headache.  · An upset stomach.  DIAGNOSIS  This condition is diagnosed with a skin exam. Sometimes, skin or fluid samples are taken from the  blisters before a diagnosis is made. These samples are examined under a microscope or sent to a lab for testing.  TREATMENT  There is no specific cure for this condition. Your health care provider will probably prescribe medicines to help you manage pain, recover more quickly, and avoid long-term problems. Medicines may include:  · Antiviral drugs.  · Anti-inflammatory drugs.  · Pain medicines.  If the area involved is on your face, you may be referred to a specialist, such as an eye doctor (ophthalmologist) or an ear, nose, and throat (ENT) doctor to help you avoid eye problems, chronic pain, or disability.  HOME CARE INSTRUCTIONS  Medicines  · Take medicines only as directed by your health care provider.  · Apply an anti-itch or numbing cream to the affected area as directed by your health care provider.  Blister and Rash Care  · Take a cool bath or apply cool compresses to the area of the rash or blisters as directed by your health care provider. This may help with pain and itching.  · Keep your rash covered with a loose bandage (dressing). Wear loose-fitting clothing to help ease the pain of material rubbing against the rash.  · Keep your rash and blisters clean with mild soap and cool water or as directed by your health care provider.  · Check your rash every day for signs of infection. These include redness, swelling, and pain that lasts or increases.  · Do not pick your blisters.  · Do not scratch your rash.  General Instructions  · Rest as directed by your health care provider.  · Keep all follow-up visits as directed by your health care provider. This is important.  · Until your blisters scab over, your infection can cause chickenpox in people who have never had it or been vaccinated against it. To prevent this from happening, avoid contact with other people, especially:  ¨ Babies.  ¨ Pregnant women.  ¨ Children who have eczema.  ¨ Elderly people who have transplants.  ¨ People who have chronic illnesses,  such as leukemia or AIDS.  SEEK MEDICAL CARE IF:  · Your pain is not relieved with prescribed medicines.  · Your pain does not get better after the rash heals.  · Your rash looks infected. Signs of infection include redness, swelling, and pain that lasts or increases.  SEEK IMMEDIATE MEDICAL CARE IF:  · The rash is on your face or nose.  · You have facial pain, pain around your eye area, or loss of feeling on one side of your face.  · You have ear pain or you have ringing in your ear.  · You have loss of taste.  · Your condition gets worse.     This information is not intended to replace advice given to you by your health care provider. Make sure you discuss any questions you have with your health care provider.     Document Released: 12/18/2006 Document Revised: 01/08/2016 Document Reviewed: 10/29/2015  [employer] Interactive Patient Education ©2016 [employer]

## 2018-01-24 NOTE — ED PROVIDER NOTES
ED Provider Note    Scribed for David Clements M.D. by Bartolome Chaudhari. 1/24/2018  12:26 PM    Primary care provider: Shonda Mcclure M.D.  Means of arrival: walk in  History obtained from: patient  History limited by: none    CHIEF COMPLAINT  Chief Complaint   Patient presents with   • Other     left facial swelling and redness with blisters x3days   • Difficulty Swallowing     pt able to eat soft    • Itching     scabs arms and legs for years /itching       HPI  Ovi Bejarano is a 82 y.o. male who presents to the Emergency Department complaining of facial rash starting 3 days ago. Patient reports associated sore throat, dysphagia, oral pain. He states that his symptoms came on suddenly and worsened over the last 3 days prompting him to come to the ED. He is unsure if he has a history of shingles. Patient denies eye pain, eye discharge, blurred vision or eye redness, and shortness of breath.  Facial rash is painful not pruritic.    REVIEW OF SYSTEMS  Pertinent positives include: rash, sore throat, itching, dysphagia, oral pain.  Pertinent negatives include: eye pain, shortness of breath.  E    PAST MEDICAL HISTORY  Past Medical History:   Diagnosis Date   • Diabetes (CMS-[facility])     type 2   • Hypercholesteremia    • Hyperglycemia    • Rash        FAMILY HISTORY  Family History   Problem Relation Age of Onset   • Stroke Sister        SOCIAL HISTORY  Social History   Substance Use Topics   • Smoking status: Former Smoker     Quit date: 10/21/2007   • Smokeless tobacco: Never Used   • Alcohol use Yes      Comment: rare     History   Drug Use No         CURRENT MEDICATIONS  No current facility-administered medications for this encounter.     Current Outpatient Prescriptions:   •  metformin (GLUCOPHAGE) 1000 MG tablet, Take 1 Tab by mouth 2 times a day, with meals., Disp: 180 Tab, Rfl: 3  •  lisinopril (PRINIVIL) 10 MG Tab, Take 1 Tab by mouth every day., Disp: 90 Tab, Rfl: 2  •  budesonide-formoterol (SYMBICORT)  "160-4.5 MCG/ACT Aerosol, Inhale 2 Puffs by mouth 2 Times a Day., Disp: 3 Inhaler, Rfl: 3  •  albuterol 108 (90 Base) MCG/ACT Aero Soln inhalation aerosol, Inhale 2 Puffs by mouth every 6 hours as needed for Shortness of Breath., Disp: 8.5 g, Rfl: 6  •  tiotropium (SPIRIVA) 18 MCG Cap, Inhale 1 Cap by mouth every day., Disp: 90 Cap, Rfl: 3    ALLERGIES  No Known Allergies    PHYSICAL EXAM  VITAL SIGNS: /67   Pulse 91   Temp 36.6 °C (97.8 °F)   Resp 17   Ht 1.727 m (5' 8\")   Wt 65 kg (143 lb 4.8 oz)   SpO2 93%   BMI 21.79 kg/m²   Reviewed and borderline blood pressure elevation  Constitutional: Well developed, Well nourished, No distress.  HENT: Normocephalic, atraumatic, bilateral external ears normal, oropharynx moist, No exudates or erythema. No lymph nodes.   Eyes: PERRLA, conjunctiva pink, no scleral icterus. Upon fluorsceine exam no dendritic lesions or corneal lesions  Cardiovascular: Regular rate and rhythm. No murmurs, rubs or gallops.   Respiratory: Lungs clear to auscultation bilaterally. No wheezes, rales, or rhonchi.   Abdominal:  Abdomen soft, non-tender, non distended. No rebound, or guarding.    Skin: Dark patches and papules on arms and leg that appear to be chronic. Erythema to left cheek and tip of nose just left of midline.   Genitourinary: No costovertebral angle tenderness.   Musculoskeletal: No edema.   Neurologic: Alert & oriented x 3, cranial nerves 2-12 intact by passive exam.  No focal deficit noted.  Psychiatric: Affect normal, Judgment normal, Mood normal.     DIFFERENTIAL DIAGNOSIS:  Herpes zoster, eczema, ocular herpes, Jazmín Adames.      ED COURSE:    12:26 PM - Patient seen and examined at bedside. I will perform eye exam.     12:44 PM - Performed eye exam with handheld lamp. See above for details. Patient will be discharged with care and dosing instructions. He will also be provided with ophthalmologist information to follow up if his symptoms spread to his eye. Patient " verbalizes understanding and agreement to this plan of care and agrees to discharge.     MEDICAL DECISION MAKING:  Well-appearing patient presents with facial shingles in the maxillary distribution without evidence of ocular involvement. He also has underlying eczema.  Patient will follow-up promptly with ophthalmology or the ER for eye involvement.    PLAN:  New Prescriptions    VALACYCLOVIR (VALTREX) 1 GM TAB    Take 1 Tab by mouth 2 times a day.     Administer Valtrex as instructed, Tylenol as needed for pain control.   Varicella handout given  Return for eye pain, blurry vision, eye redness.  Shingles vaccine after well    Shonda Mcclure M.D.  [address]  [address]  [place] [id number] [phone]      for shingles vaccine, fever    Reji Adkins M.D.  [address] [facility] [address] [phone]    Schedule an appointment as soon as possible for a visit  if you develop any eye symptoms      CONDITION: Good.     FINAL IMPRESSION  1. Herpes zoster without complication          IBartolome (Scribe), am scribing for, and in the presence of, David Clements M.D..    Electronically signed by: Bartolome Chaudhari (Scribe), 1/24/2018    IDaivd M.D. personally performed the services described in this documentation, as scribed by Bartolome Chaudhari in my presence, and it is both accurate and complete.    The note accurately reflects work and decisions made by me.  David Clements  1/24/2018  3:41 PM

## 2018-01-25 ENCOUNTER — PATIENT OUTREACH (OUTPATIENT)
Dept: HEALTH INFORMATION MANAGEMENT | Facility: OTHER | Age: 83
End: 2018-01-25

## 2018-01-25 NOTE — LETTER
Ovi Bejarano  [address] [address]  [place] [address]    January 25, 2018      Dear Ovi Bejarano,    [facility] wants to ensure your discharge home is safe and you or your loved ones have had all of your questions answered regarding your care after you leave the hospital.    Our discharge team was unsuccessful in our attempts to contact you telephonically and we wanted to be sure that you had a list of resources and contact information should you have any questions regarding your hospital stay, follow-up instructions, or active medical symptoms.    Questions or Concerns Regarding… Contact   Medical Questions Related to Your Discharge  (7 days a week, 8am-5pm) Contact a Nurse Care Coordinator   [phone]   Medical Questions Not Related to Your Discharge  (24 hours a day / 7 days a week)  Contact the Nurse Health Line   [phone]    Medications or Discharge Instructions Refer to your discharge packet   or contact your -[phone]   Follow-up Appointment(s) Schedule your appointment via [employer]   or contact Scheduling [phone]   Billing Review your statement via [employer]  or contact Billing [phone]   Medical Records Review your records via [employer]   or contact Medical Records [phone]     You can also easily access your medical information, test results and upcoming appointments via the [employer] free online health management tool. You can learn more and sign up at [email or web address]. For assistance setting up your [employer] account, please call [phone].    Once again, we want to ensure your discharge home is safe and that you have a clear understanding of any next steps in your care. If you have any questions or concerns, please do not hesitate to contact us, we are here for you. Thank you for choosing [facility] for your healthcare needs.    Sincerely,      Your [facility] Healthcare Team

## 2018-01-25 NOTE — PROGRESS NOTES
Placed discharge outreach phone call to patient s/p ER discharge 1/24/18.  Received recording stating that pt's phone number has been disconnected or is no longer in service.  Discharge outreach letter mailed to patient.

## 2018-01-30 ENCOUNTER — OFFICE VISIT (OUTPATIENT)
Dept: INTERNAL MEDICINE | Facility: MEDICAL CENTER | Age: 83
End: 2018-01-30
Payer: MEDICARE

## 2018-01-30 VITALS
DIASTOLIC BLOOD PRESSURE: 78 MMHG | TEMPERATURE: 97.7 F | BODY MASS INDEX: 21.22 KG/M2 | WEIGHT: 140 LBS | HEIGHT: 68 IN | OXYGEN SATURATION: 92 % | HEART RATE: 99 BPM | SYSTOLIC BLOOD PRESSURE: 126 MMHG

## 2018-01-30 DIAGNOSIS — I10 HYPERTENSION, UNSPECIFIED TYPE: ICD-10-CM

## 2018-01-30 DIAGNOSIS — B02.8 HERPES ZOSTER WITH COMPLICATION: ICD-10-CM

## 2018-01-30 DIAGNOSIS — E11.8 TYPE 2 DIABETES MELLITUS WITH COMPLICATION, WITHOUT LONG-TERM CURRENT USE OF INSULIN (HCC): ICD-10-CM

## 2018-01-30 DIAGNOSIS — H57.89 REDNESS OF EYE, LEFT: ICD-10-CM

## 2018-01-30 DIAGNOSIS — N28.89 RIGHT KIDNEY MASS: ICD-10-CM

## 2018-01-30 PROCEDURE — 99214 OFFICE O/P EST MOD 30 MIN: CPT | Mod: GC | Performed by: INTERNAL MEDICINE

## 2018-01-30 RX ORDER — BUDESONIDE AND FORMOTEROL FUMARATE DIHYDRATE 160; 4.5 UG/1; UG/1
2 AEROSOL RESPIRATORY (INHALATION) 2 TIMES DAILY
Qty: 3 INHALER | Refills: 3 | Status: SHIPPED | OUTPATIENT
Start: 2018-01-30 | End: 2020-07-09

## 2018-01-30 RX ORDER — TIOTROPIUM BROMIDE 18 UG/1
18 CAPSULE ORAL; RESPIRATORY (INHALATION) DAILY
Qty: 90 CAP | Refills: 3 | Status: SHIPPED | OUTPATIENT
Start: 2018-01-30 | End: 2020-07-09

## 2018-01-30 ASSESSMENT — PATIENT HEALTH QUESTIONNAIRE - PHQ9
5. POOR APPETITE OR OVEREATING: 1 - SEVERAL DAYS
CLINICAL INTERPRETATION OF PHQ2 SCORE: 6
SUM OF ALL RESPONSES TO PHQ QUESTIONS 1-9: 18

## 2018-01-30 NOTE — PROGRESS NOTES
Established Patient    Ovi presents today with the following:    CC: Post ER follow-up    HPI:       82-year-old male with past medical history of COPD, hypertension(currently not taking his lisinopril), right kidney mass, type 2 diabetes mellitus (currently not taking his metformin) came in after an ER visit. Patient is established with Dr. Sigala.    She was seen in the ER on 01/24 for a facial rash which started 3 days prior to ER visit. He had associated symptoms of sore throat, and dysphagia and oral pain and went to ER because of worsening symptoms. He did not have prior known history of shingles. She did not have eye pain or discharge eye redness or shortness of breath in the ER. He was noticed to have facial rash which was painful but not pruritic. She was discharged on valacyclovir 1 g 3 times daily and Tylenol and a virus and a handout was given and was recommended to follow-up with PCP. Today he denies dysphagia and his oral intake is adequate. He complains of some redness of the left lower eyelid. He denies fever, shortness of breath, diplopia, eye pain, eye discharge, itching, photophobia or any other complaints. He denies any ear pain or hearing problems. He has persistent left facial rash but it's improved since ER visit. He has ongoing, intermittent, bilateral upper and lower extremity itching which is chronic for him    Patient Active Problem List    Diagnosis Date Noted   • COPD exacerbation (CMS-HCC) 10/21/2017     Priority: High   • Acute respiratory failure with hypoxia (CMS-HCC) 10/21/2017     Priority: High   • Herpes zoster with complication 01/30/2018   • Redness of eye, left 01/30/2018   • HTN (hypertension) 11/16/2017   • Right kidney mass 11/15/2017   • Type 2 diabetes mellitus (CMS-HCC) 10/21/2017       Current Outpatient Prescriptions   Medication Sig Dispense Refill   • fexofenadine (ALLEGRA) 30 MG tablet Take 1 Tab by mouth 2 times a day. 30 Tab 0   • budesonide-formoterol  "(SYMBICORT) 160-4.5 MCG/ACT Aerosol Inhale 2 Puffs by mouth 2 Times a Day. 3 Inhaler 3   • tiotropium (SPIRIVA) 18 MCG Cap Inhale 1 Cap by mouth every day. 90 Cap 3   • valacyclovir (VALTREX) 1 GM Tab Take 1 Tab by mouth 2 times a day. 20 Tab 0   • albuterol 108 (90 Base) MCG/ACT Aero Soln inhalation aerosol Inhale 2 Puffs by mouth every 6 hours as needed for Shortness of Breath. 8.5 g 6   • metformin (GLUCOPHAGE) 1000 MG tablet Take 1 Tab by mouth 2 times a day, with meals. 180 Tab 3   • lisinopril (PRINIVIL) 10 MG Tab Take 1 Tab by mouth every day. 90 Tab 2     No current facility-administered medications for this visit.        ROS: As per HPI. Additional pertinent symptoms as noted below.    Gen.: No acute distress, no weight changes  HENT; no lymphadenopathy, no sinus pain or nasal discharge  GI: No nausea,  vomiting or diarrhea or abdominal pain  Pulmonary: No cough chest pain or hemoptysis  Cardiovascular: No chest pain, orthopnea, PND, palpitation  Neuro: No numbness tingling or focal weakness  Musculoskeletal: No joint swelling pain or back pain  Skin: Skin rash  Renal: No dysuria, hematuria, flank and suprapubic pain  Psychiatric: No suicidal or homicidal ideation, no delusions normal mood, normal energy, no problems with concentration; no evidence of depression at time of my evaluation      /78   Pulse 99   Temp 36.5 °C (97.7 °F)   Ht 1.727 m (5' 8\")   Wt 63.5 kg (140 lb)   SpO2 92%   BMI 21.29 kg/m²     Physical Exam   Constitutional:  oriented to person, place, and time. No distress.   Eyes: Pupils are equal, round, and reactive to light. No scleral icterus.  Mild conjunctival erythema on the left lower eyelid of the left eye: No visible abrasion, erythema, pus, discharge, watering noticed  No photophobia, no diplopia, no change in vision  Extraocular movement intact  Right eye examination within normal limits  Neck/nose: Erythematous vesicular rash left side of nose, no visible pus, no " lymphadenopathy, involving left of midline   Neck supple. No thyromegaly present.   Cardiovascular: Normal rate, regular rhythm and normal heart sounds.  Exam reveals no gallop and no friction rub.  No murmur heard.  Pulmonary/Chest: Breath sounds normal. Chest wall is not dull to percussion.   Musculoskeletal:   no edema.   Lymphadenopathy: no cervical adenopathy  Neurological: alert and oriented to person, place, and time.   Skin: Dark patches and papules on arms and legs that appear to be chronic;: Itchy  Erythema to the left cheek and tip of nose just left of midline.    Psychiatric: No suicidal or homicidal ideation, no delusions normal mood, normal energy, no problems with concentration; no evidence of depression at time of my evaluation    Note: I have reviewed all pertinent labs and diagnostic tests associated with this visit with specific comments listed under the assessment and plan below    Assessment and Plan    Redness of the left eye lower lid conjunctiva  Herpes zoster with complication: Rule out herpes zoster ophthalmicus  Hypertension, unspecified type  Right kidney mass  Type 2 diabetes mellitus with complication, without long-term current use of insulin (CMS-HCC)    -Patient was instructed to continue valacyclovir daily: He had a total of 10 days supply  -Patient was told the importance of URGENT eye examination to rule out herpes keratitis and then treatment with topical steroids to help with the inflammation  -He was educated about the severel complications of zoster ophthalmicus including retinal necrosis and blindness: He understood that he needs urgent ophthalmology examination and after exclusion of herpes keratitis, he will require dexamethasone eyedrops:  -Urgent referral for ophthalmologist was ordered and appointment was made for ophthalmologist today at 2:30 PM at [address] [place],:   -Patient was provided with the ophthalmologist office phone #[phone] as well as driving  instructions were provided in print for guidance  -Provided refill on Symbicort and Spiriva  -Provided fexofenadine for eczema and itching of all extremities    Follow-up in one week with PCP      Followup: Follow-up in one week    Signed by: Jignesh Cherry M.D.

## 2018-01-30 NOTE — PATIENT INSTRUCTIONS
F/u in 1 week with PCP  Follow-up with ophthalmologist today as provided instruction at   [address]  Phone number for ophthalmologist office [phone]

## 2018-03-08 ENCOUNTER — OFFICE VISIT (OUTPATIENT)
Dept: INTERNAL MEDICINE | Facility: MEDICAL CENTER | Age: 83
End: 2018-03-08
Payer: MEDICARE

## 2018-03-08 ENCOUNTER — HOSPITAL ENCOUNTER (OUTPATIENT)
Dept: LAB | Facility: MEDICAL CENTER | Age: 83
End: 2018-03-08
Attending: STUDENT IN AN ORGANIZED HEALTH CARE EDUCATION/TRAINING PROGRAM
Payer: MEDICARE

## 2018-03-08 VITALS
BODY MASS INDEX: 20.61 KG/M2 | OXYGEN SATURATION: 93 % | SYSTOLIC BLOOD PRESSURE: 121 MMHG | WEIGHT: 136 LBS | HEIGHT: 68 IN | DIASTOLIC BLOOD PRESSURE: 72 MMHG | HEART RATE: 85 BPM | TEMPERATURE: 97.4 F

## 2018-03-08 DIAGNOSIS — L40.9 PSORIASIS: ICD-10-CM

## 2018-03-08 DIAGNOSIS — E11.8 TYPE 2 DIABETES MELLITUS WITH COMPLICATION, WITHOUT LONG-TERM CURRENT USE OF INSULIN (HCC): ICD-10-CM

## 2018-03-08 DIAGNOSIS — I10 HYPERTENSION, UNSPECIFIED TYPE: ICD-10-CM

## 2018-03-08 DIAGNOSIS — B99.9 INFECTION: ICD-10-CM

## 2018-03-08 DIAGNOSIS — L30.8 OTHER ECZEMA: ICD-10-CM

## 2018-03-08 PROBLEM — J96.01 ACUTE RESPIRATORY FAILURE WITH HYPOXIA (HCC): Status: RESOLVED | Noted: 2017-10-21 | Resolved: 2018-03-08

## 2018-03-08 PROBLEM — H57.89 REDNESS OF EYE, LEFT: Status: RESOLVED | Noted: 2018-01-30 | Resolved: 2018-03-08

## 2018-03-08 PROBLEM — J44.9 COPD (CHRONIC OBSTRUCTIVE PULMONARY DISEASE) (HCC): Status: ACTIVE | Noted: 2017-10-21

## 2018-03-08 LAB
ALBUMIN SERPL BCP-MCNC: 4.1 G/DL (ref 3.2–4.9)
ALBUMIN/GLOB SERPL: 1.3 G/DL
ALP SERPL-CCNC: 48 U/L (ref 30–99)
ALT SERPL-CCNC: 8 U/L (ref 2–50)
ANION GAP SERPL CALC-SCNC: 9 MMOL/L (ref 0–11.9)
AST SERPL-CCNC: 23 U/L (ref 12–45)
BASOPHILS # BLD AUTO: 0.5 % (ref 0–1.8)
BASOPHILS # BLD: 0.04 K/UL (ref 0–0.12)
BILIRUB SERPL-MCNC: 1 MG/DL (ref 0.1–1.5)
BUN SERPL-MCNC: 9 MG/DL (ref 8–22)
CALCIUM SERPL-MCNC: 9.3 MG/DL (ref 8.5–10.5)
CHLORIDE SERPL-SCNC: 102 MMOL/L (ref 96–112)
CO2 SERPL-SCNC: 28 MMOL/L (ref 20–33)
CREAT SERPL-MCNC: 0.96 MG/DL (ref 0.5–1.4)
EOSINOPHIL # BLD AUTO: 0.93 K/UL (ref 0–0.51)
EOSINOPHIL NFR BLD: 11.7 % (ref 0–6.9)
ERYTHROCYTE [DISTWIDTH] IN BLOOD BY AUTOMATED COUNT: 47.1 FL (ref 35.9–50)
EST. AVERAGE GLUCOSE BLD GHB EST-MCNC: 151 MG/DL
GLOBULIN SER CALC-MCNC: 3.2 G/DL (ref 1.9–3.5)
GLUCOSE SERPL-MCNC: 140 MG/DL (ref 65–99)
HBA1C MFR BLD: 6.9 % (ref 0–5.6)
HCT VFR BLD AUTO: 44.7 % (ref 42–52)
HGB BLD-MCNC: 14.5 G/DL (ref 14–18)
IMM GRANULOCYTES # BLD AUTO: 0.03 K/UL (ref 0–0.11)
IMM GRANULOCYTES NFR BLD AUTO: 0.4 % (ref 0–0.9)
LYMPHOCYTES # BLD AUTO: 1.71 K/UL (ref 1–4.8)
LYMPHOCYTES NFR BLD: 21.6 % (ref 22–41)
MCH RBC QN AUTO: 30.8 PG (ref 27–33)
MCHC RBC AUTO-ENTMCNC: 32.4 G/DL (ref 33.7–35.3)
MCV RBC AUTO: 94.9 FL (ref 81.4–97.8)
MONOCYTES # BLD AUTO: 0.78 K/UL (ref 0–0.85)
MONOCYTES NFR BLD AUTO: 9.8 % (ref 0–13.4)
NEUTROPHILS # BLD AUTO: 4.44 K/UL (ref 1.82–7.42)
NEUTROPHILS NFR BLD: 56 % (ref 44–72)
NRBC # BLD AUTO: 0 K/UL
NRBC BLD-RTO: 0 /100 WBC
PLATELET # BLD AUTO: 256 K/UL (ref 164–446)
PMV BLD AUTO: 9.9 FL (ref 9–12.9)
POTASSIUM SERPL-SCNC: 3.9 MMOL/L (ref 3.6–5.5)
PROT SERPL-MCNC: 7.3 G/DL (ref 6–8.2)
RBC # BLD AUTO: 4.71 M/UL (ref 4.7–6.1)
SODIUM SERPL-SCNC: 139 MMOL/L (ref 135–145)
WBC # BLD AUTO: 7.9 K/UL (ref 4.8–10.8)

## 2018-03-08 PROCEDURE — 99214 OFFICE O/P EST MOD 30 MIN: CPT | Mod: GC | Performed by: INTERNAL MEDICINE

## 2018-03-08 PROCEDURE — 83036 HEMOGLOBIN GLYCOSYLATED A1C: CPT | Mod: GA

## 2018-03-08 PROCEDURE — 36415 COLL VENOUS BLD VENIPUNCTURE: CPT

## 2018-03-08 PROCEDURE — 85025 COMPLETE CBC W/AUTO DIFF WBC: CPT

## 2018-03-08 PROCEDURE — 80053 COMPREHEN METABOLIC PANEL: CPT

## 2018-03-08 RX ORDER — LORATADINE 10 MG/1
10 TABLET ORAL DAILY
Qty: 30 TAB | Refills: 3 | Status: SHIPPED | OUTPATIENT
Start: 2018-03-08 | End: 2020-07-09

## 2018-03-08 RX ORDER — HYDROXYZINE HYDROCHLORIDE 25 MG/1
25 TABLET, FILM COATED ORAL 3 TIMES DAILY PRN
Qty: 30 TAB | Refills: 0 | Status: SHIPPED | OUTPATIENT
Start: 2018-03-08 | End: 2018-03-27 | Stop reason: SDUPTHER

## 2018-03-08 RX ORDER — SULFAMETHOXAZOLE AND TRIMETHOPRIM 800; 160 MG/1; MG/1
1 TABLET ORAL DAILY
Qty: 7 TAB | Refills: 0 | Status: SHIPPED | OUTPATIENT
Start: 2018-03-08 | End: 2018-03-15

## 2018-03-08 RX ORDER — METHYLPREDNISOLONE 4 MG/1
TABLET ORAL
Qty: 21 TAB | Refills: 0 | Status: SHIPPED | OUTPATIENT
Start: 2018-03-08 | End: 2020-07-09

## 2018-03-08 RX ORDER — LISINOPRIL 10 MG/1
10 TABLET ORAL DAILY
Qty: 30 TAB | Refills: 3 | Status: SHIPPED | OUTPATIENT
Start: 2018-03-08 | End: 2020-07-09

## 2018-03-08 ASSESSMENT — PATIENT HEALTH QUESTIONNAIRE - PHQ9
SUM OF ALL RESPONSES TO PHQ QUESTIONS 1-9: 18
CLINICAL INTERPRETATION OF PHQ2 SCORE: 5
5. POOR APPETITE OR OVEREATING: 0 - NOT AT ALL

## 2018-03-08 NOTE — PROGRESS NOTES
Established Patient    Ovi presents today with the following:    CC: Skin rash. Follow-up for shingles. Diabetes.    HPI: Patient is an 82-year-old male is here to follow-up for a shingles outbreak that he had in the month of January. She also presents with acute complaint of very extensive skin rash that isn't getting better.    Skin rash/shingles    In January, patient had an outbreak of shingles on his face. He was treated with valacyclovir with resolution of symptoms. Patient also followed up with ophthalmology at that time to make sure that he does not have herpes keratitis and he reports that he saw the ophthalmologist and was compliant with treatment there. Currently the patient has no symptoms, denies loss of vision, denies changes in vision, denies pain.    The patient now presents with an extensive itchy nodular skin rash on his chest, back, arms, thighs as well as a circular patchy rash with scabs on his bilateral lower extremities up to the shins. Patient reports that he's had this rash on and off his entire life and that he was told in the past that he had eczema. Patient tried treating the rash with clobetasol cream, Vaseline, and another cream he doesn't recall the name of without success. Patient denies systemic symptoms and has not had any fever, headaches, fatigue, or malaise. Patient stated the rash is excessively itchy and wakes him up from sleep at night.    Diabetes  Patient with poorly controlled diabetes. Last glycohemoglobin 8.1. Patient reports that he is currently not taking any medications because they're too expensive. States that at the pharmacy one medication was $1000, and another was $400 and he can't afford that. Patient is currently asymptomatic and denies polyuria, polydipsia, weight loss or weight gain, no urinary symptoms.    Patient Active Problem List    Diagnosis Date Noted   • COPD exacerbation (CMS-[facility]) 10/21/2017     Priority: High   • Acute respiratory failure  "with hypoxia (CMS-HCC) 10/21/2017     Priority: High   • Herpes zoster with complication 01/30/2018   • Redness of eye, left 01/30/2018   • HTN (hypertension) 11/16/2017   • Right kidney mass 11/15/2017   • Type 2 diabetes mellitus (CMS-HCC) 10/21/2017       Current Outpatient Prescriptions   Medication Sig Dispense Refill   • metformin (GLUCOPHAGE) 1000 MG tablet Take 1 Tab by mouth 2 times a day, with meals. 60 Tab 3   • lisinopril (PRINIVIL) 10 MG Tab Take 1 Tab by mouth every day. 30 Tab 3   • loratadine (CLARITIN) 10 MG Tab Take 1 Tab by mouth every day. 30 Tab 3   • sulfamethoxazole-trimethoprim (BACTRIM DS) 800-160 MG tablet Take 1 Tab by mouth every day for 7 days. 7 Tab 0   • MethylPREDNISolone (MEDROL DOSEPAK) 4 MG Tablet Therapy Pack As directed on the packaging label. 21 Tab 0   • hydrOXYzine HCl (ATARAX) 25 MG Tab Take 1 Tab by mouth 3 times a day as needed for Itching. 30 Tab 0   • budesonide-formoterol (SYMBICORT) 160-4.5 MCG/ACT Aerosol Inhale 2 Puffs by mouth 2 Times a Day. 3 Inhaler 3   • albuterol 108 (90 Base) MCG/ACT Aero Soln inhalation aerosol Inhale 2 Puffs by mouth every 6 hours as needed for Shortness of Breath. 8.5 g 6   • tiotropium (SPIRIVA) 18 MCG Cap Inhale 1 Cap by mouth every day. 90 Cap 3     No current facility-administered medications for this visit.        ROS: As per HPI. Additional pertinent symptoms as noted below.    All others negative    /72   Pulse 85   Temp 36.3 °C (97.4 °F)   Ht 1.727 m (5' 8\")   Wt 61.7 kg (136 lb)   SpO2 93%   BMI 20.68 kg/m²     Physical Exam   Constitutional:  oriented to person, place, and time. No distress.   Eyes: Pupils are equal, round, and reactive to light. No scleral icterus.  Neck: Neck supple. No thyromegaly present.   Cardiovascular: Normal rate, regular rhythm and normal heart sounds.  Exam reveals no gallop and no friction rub.  No murmur heard.  Pulmonary/Chest: Breath sounds normal. Chest wall is not dull to percussion.   "   Musculoskeletal:   no edema.   Lymphadenopathy: no cervical adenopathy  Neurological: alert and oriented to person, place, and time.   Skin: extensive erythematous nodular skin rash on back, chest, arms, and thighs. Lower extremities with linear erythematous rash surrounded by a circular crusted plaques that are about 0.5 inches to 2 inches in diameter. Left lower extremity warm to touch, mildly tender around the medial malleolus, visibly more swollen than the right foot.    Note: I have reviewed all pertinent labs and diagnostic tests associated with this visit with specific comments listed under the assessment and plan below    Assessment and Plan    1. Other eczema  4. Infection  Patient with an extensive skin rash all over his body-very itchy. Reports that he's been using clobetasol cream without relief in symptoms. Left leg has a more extensive rash than the right, warm to touch, erythematous which may mean a possible infection.  - Counseled patient on proper care of his skin and the importance of keeping his skin moisturized.  - Start Claritin 10 mg daily for itching.  - Start Atarax 25 mg when necessary for itching.  - Start Medrol Dosepak to decrease inflammation in the skin  - Start Bactrim DS daily for 7 days for possible skin infection.  - CBC  - Urgent referral placed to dermatology to Dr. Panda    2. Type 2 diabetes mellitus with complication, without long-term current use of insulin (CMS-HCC)  Patient with poorly controlled diabetes, currently not taking any medications. Last glycohemoglobin 8.1. Explained to patient the importance of taking his medication and the sequela of poorly controlled diabetes. Patient expressed understanding and stated that he will take his medications. Sent the patient's prescription to [facility] and explained that if his insurance doesn't cover them, they are on the [facility] $4 list and he will pay for dollars per month to get a month's supply of each.  - Resume it for him in  1000 mg twice a day  - Resume lisinopril 10 mg daily  - CMP, glycohemoglobin for further evaluation    3. Hypertension, unspecified type  Patient with prior diagnoses of hypertension, but despite not being on any medication his blood pressure today is good. Patient is on lisinopril and tolerating it well and should continue to take a short stay for protective effect.  - Continue lisinopril 10 mg daily          Followup: Return in about 1 month (around 4/8/2018).      Signed by: Nani Woo M.D.

## 2018-03-08 NOTE — PATIENT INSTRUCTIONS
For diabetes:    Take METFORMIN 2 times per day with food.    Take LISINOPRIL once daily with food.    For Rash:    Take CLARITIN ONCE in the morning to help with itching.    For ITCHING take HYDROXIZINE as need, but no more than 3 TIMES PER DAY.    Take MEDROL dose pack as prescribed.    Take BACTRIM

## 2018-03-26 DIAGNOSIS — L30.8 OTHER ECZEMA: ICD-10-CM

## 2018-03-26 RX ORDER — HYDROXYZINE HYDROCHLORIDE 25 MG/1
25 TABLET, FILM COATED ORAL 3 TIMES DAILY PRN
Qty: 30 TAB | Refills: 0 | OUTPATIENT
Start: 2018-03-26

## 2018-03-26 RX ORDER — METHYLPREDNISOLONE 4 MG/1
TABLET ORAL
Qty: 21 TAB | Refills: 0 | OUTPATIENT
Start: 2018-03-26

## 2018-03-26 NOTE — TELEPHONE ENCOUNTER
Last seen: 3/8/2018 by Dr. Woo   Next appt: 3/30/2018 with Dr. Mcclure    Was the patient seen in the last year in this department? Yes   Does patient have an active prescription for medications requested? No   Received Request Via: Patient    Patient is completely put of the medicine and he would like a refill.  He is stating he is still itchy and he really needs it.  Patient walked into the office this morning.  He does not want to wait he would like his refill today.  He told our  that he will die with out them.

## 2018-03-27 ENCOUNTER — OFFICE VISIT (OUTPATIENT)
Dept: INTERNAL MEDICINE | Facility: MEDICAL CENTER | Age: 83
End: 2018-03-27
Payer: MEDICARE

## 2018-03-27 VITALS
WEIGHT: 132 LBS | RESPIRATION RATE: 16 BRPM | TEMPERATURE: 97.6 F | SYSTOLIC BLOOD PRESSURE: 120 MMHG | BODY MASS INDEX: 20 KG/M2 | HEART RATE: 80 BPM | OXYGEN SATURATION: 90 % | HEIGHT: 68 IN | DIASTOLIC BLOOD PRESSURE: 60 MMHG

## 2018-03-27 DIAGNOSIS — N28.89 RIGHT KIDNEY MASS: ICD-10-CM

## 2018-03-27 DIAGNOSIS — L40.9 PSORIASIS: ICD-10-CM

## 2018-03-27 PROCEDURE — 99214 OFFICE O/P EST MOD 30 MIN: CPT | Mod: GC | Performed by: INTERNAL MEDICINE

## 2018-03-27 RX ORDER — TAZAROTENE 0.5 MG/G
1 GEL TOPICAL NIGHTLY PRN
Qty: 30 G | Refills: 1 | Status: SHIPPED | OUTPATIENT
Start: 2018-03-27 | End: 2018-03-27 | Stop reason: SDUPTHER

## 2018-03-27 RX ORDER — TAZAROTENE 0.5 MG/G
1 GEL TOPICAL NIGHTLY PRN
Qty: 30 G | Refills: 1 | Status: SHIPPED | OUTPATIENT
Start: 2018-04-03 | End: 2020-07-09

## 2018-03-27 RX ORDER — CLOBETASOL PROPIONATE 0.5 MG/G
1 OINTMENT TOPICAL 2 TIMES DAILY
Qty: 60 G | Refills: 1 | Status: SHIPPED | OUTPATIENT
Start: 2018-03-27 | End: 2020-07-09

## 2018-03-27 RX ORDER — HYDROXYZINE HYDROCHLORIDE 25 MG/1
25 TABLET, FILM COATED ORAL 3 TIMES DAILY PRN
Qty: 30 TAB | Refills: 3 | Status: SHIPPED | OUTPATIENT
Start: 2018-03-27 | End: 2020-07-09

## 2018-03-27 ASSESSMENT — PATIENT HEALTH QUESTIONNAIRE - PHQ9: CLINICAL INTERPRETATION OF PHQ2 SCORE: 0

## 2018-03-27 NOTE — PROGRESS NOTES
Established Patient    Ovi presents today with the following:    CC: follow up skin itching        HPI:   Patient is an 82-year-old male w/ hx of DM2, HTN, Shingles, COPD,  is here to follow-up psoriasis and skin itching.  Patient was seen and given hydroxyzine and medro dose pack for itching and also referred to dermatology. He reports he has the appointment coming up in July 2018, and has ran out of the medro dose pack, and for the past few days there has been intense itching.   There is excoriation, but no hx suggestive of skin infection.  Patient states he cannot sleep well because of the itching.    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 Denies hematuria at the moment or abdominal pain.  Admits to some weight loss    ROS:  Negative except above    Patient Active Problem List    Diagnosis Date Noted   • COPD (chronic obstructive pulmonary disease) (CMS-HCC) 10/21/2017     Priority: High   • Kidney mass 03/27/2018   • Psoriasis 03/08/2018   • Herpes zoster with complication 01/30/2018   • HTN (hypertension) 11/16/2017   • Right kidney mass 11/15/2017   • Type 2 diabetes mellitus (CMS-HCC) 10/21/2017       Current Outpatient Prescriptions   Medication Sig Dispense Refill   • hydrOXYzine HCl (ATARAX) 25 MG Tab Take 1 Tab by mouth 3 times a day as needed for Itching. 30 Tab 3   • clobetasol (TEMOVATE) 0.05 % Ointment Apply 1 Each to affected area(s) 2 times a day. 60 g 1   • [START ON 4/3/2018] tazorotene (TAZORAC) 0.05 % gel Apply 1 Each to affected area(s) at bedtime as needed. 30 g 1   • metformin (GLUCOPHAGE) 1000 MG tablet Take 1 Tab by mouth 2 times a day, with meals. 60 Tab 3   • lisinopril (PRINIVIL) 10 MG Tab Take 1 Tab by mouth every day. 30 Tab 3   • loratadine (CLARITIN) 10 MG Tab Take 1 Tab by mouth every day. 30 Tab 3   • budesonide-formoterol (SYMBICORT)  "160-4.5 MCG/ACT Aerosol Inhale 2 Puffs by mouth 2 Times a Day. 3 Inhaler 3   • tiotropium (SPIRIVA) 18 MCG Cap Inhale 1 Cap by mouth every day. 90 Cap 3   • albuterol 108 (90 Base) MCG/ACT Aero Soln inhalation aerosol Inhale 2 Puffs by mouth every 6 hours as needed for Shortness of Breath. 8.5 g 6   • MethylPREDNISolone (MEDROL DOSEPAK) 4 MG Tablet Therapy Pack As directed on the packaging label. 21 Tab 0     No current facility-administered medications for this visit.          /60   Pulse 80   Temp 36.4 °C (97.6 °F)   Resp 16   Ht 1.727 m (5' 8\")   Wt 59.9 kg (132 lb)   SpO2 90%   BMI 20.07 kg/m²     Physical Exam   General:Elderly male,   not in distress,  Seen scraching himself during exams,   HEENT: Normocephalic, atraumatic, no jaundice or scleral icterus, no pallor, No cervical lymphadenopathy, no thyromegaly,  No tonsillar exudate, no throat erythyema,  PERLL, EOMI,   Respiratory:lungs clear to auscultation b/l, breath sounds vesicular, air entry adequate b/l,no wheezing, rales or crackles  Cardiac:Regular, rate and rhythm, , S1/S2 present, no M/R/G  GI: abdomen non distended, soft, non tender, no organomegaly, bowel sounds normoactive  Neuro: AAOX4,   Msk/Extremities: radial, dorsalis pedis pulses 2+b/l, no joint swelling or redness, ROM intact, no lower  extremity edema  Skin: generalized scaly plagues, papular rash, erythematous, on lower extremity, chest and back. Evidence of dry skin, rash not distributed  in flexural areas.   No evidence of infection      Note: I have reviewed all pertinent labs and diagnostic tests associated with this visit with specific comments listed under the assessment and plan below    Assessment and Plan  82-year-old male w/ hx of DM2, HTN, Shingles, COPD,  is here to follow-up psoriasis and skin itching.    1. Psoriasis  Generalized itching, scaly plaques in lower extremities, arms and back, some lesions papular and erythematous  Recently ran out of oral steroid " and this might have flared up the itching  -will hold of oral steroids, as this can flare symptoms  -will start patient on clobetasol ointment 0.05% bid  -will add tazarotene 0.05% daily, to start 1 week after starting steroids. Has normal liver function test  -Awaiting dermatology appointment in July 2018  -refilled hydroxyzine       2. Right kidney mass, r/o renal cell carcinoma  Patient had US renal in Nov 2017  Shows 6.6 x 5.6 x 5 cm subtle hypoechoic mass in the midpole of the right kidney.   Denies hematuria. Admits to weight loss  -referral has been given to see Urologist, and patient instructed to call in few days if he does not hear from [facility].      Followup: No Follow-up on file. Routine PCP schedule      Signed by: David Castaneda M.D.

## 2018-03-27 NOTE — PATIENT INSTRUCTIONS
Clobetasol topical-start application today    Tazorac -start application in 1 week    Will refer you to Urology for kidney mass

## 2018-07-27 ENCOUNTER — PATIENT OUTREACH (OUTPATIENT)
Dept: HEALTH INFORMATION MANAGEMENT | Facility: OTHER | Age: 83
End: 2018-07-27

## 2018-07-27 NOTE — PROGRESS NOTES
Outcome: Left Message    Please transfer to Patient Outreach Team at [phone] when patient returns call.    WebIZ Checked & Epic Updated:  yes    [facility] Verified: no    Attempt # 1

## 2018-08-30 NOTE — PROGRESS NOTES
Outcome: answered and hung up    Please transfer to Patient Outreach Team at [phone] when patient returns call.    Attempt # 2

## 2018-09-13 NOTE — PROGRESS NOTES
Outcome: no answer no VM phone kept ringing  Please transfer to Patient Outreach Team at [phone] when patient returns call.    Attempt # 3

## 2018-09-20 NOTE — PROGRESS NOTES
Outcome: no answer no VM    Please transfer to Patient Outreach Team at [phone] when patient returns call.    Attempt # 4

## 2018-09-25 NOTE — PROGRESS NOTES
Outcome: no answer no VM    Please transfer to Patient Outreach Team at [phone] when patient returns call.    Attempt # Final

## 2020-03-20 NOTE — ED NOTES
Abx given per MAR.  Patient unable to provide urine specimen at this time.     Laura Jean MD, FACG,NATTY, AGA

## 2020-06-03 ENCOUNTER — HOSPITAL ENCOUNTER (EMERGENCY)
Facility: MEDICAL CENTER | Age: 85
End: 2020-06-03
Attending: EMERGENCY MEDICINE
Payer: MEDICARE

## 2020-06-03 VITALS
HEART RATE: 75 BPM | OXYGEN SATURATION: 91 % | BODY MASS INDEX: 21.52 KG/M2 | WEIGHT: 141.98 LBS | DIASTOLIC BLOOD PRESSURE: 84 MMHG | RESPIRATION RATE: 18 BRPM | HEIGHT: 68 IN | SYSTOLIC BLOOD PRESSURE: 135 MMHG | TEMPERATURE: 98.4 F
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
## 2020-06-03 PROCEDURE — 99284 EMERGENCY DEPT VISIT MOD MDM: CPT | Mod: EDC
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
## 2020-07-09 ENCOUNTER — APPOINTMENT (OUTPATIENT)
Dept: RADIOLOGY | Facility: MEDICAL CENTER | Age: 85
DRG: 177 | End: 2020-07-09
Attending: EMERGENCY MEDICINE
Payer: MEDICARE

## 2020-07-09 ENCOUNTER — HOSPITAL ENCOUNTER (INPATIENT)
Facility: MEDICAL CENTER | Age: 85
LOS: 173 days | DRG: 177 | End: 2020-12-29
Attending: EMERGENCY MEDICINE | Admitting: HOSPITALIST
Payer: MEDICARE

## 2020-07-09 DIAGNOSIS — N17.9 ACUTE KIDNEY INJURY (HCC): ICD-10-CM

## 2020-07-09 DIAGNOSIS — N28.89 RIGHT KIDNEY MASS: ICD-10-CM

## 2020-07-09 DIAGNOSIS — J06.9 UPPER RESPIRATORY TRACT INFECTION, UNSPECIFIED TYPE: ICD-10-CM

## 2020-07-09 DIAGNOSIS — R33.9 URINARY RETENTION: ICD-10-CM

## 2020-07-09 DIAGNOSIS — U07.1 COVID-19 VIRUS INFECTION: ICD-10-CM

## 2020-07-09 DIAGNOSIS — E86.0 DEHYDRATION: ICD-10-CM

## 2020-07-09 DIAGNOSIS — R11.2 NAUSEA AND VOMITING, INTRACTABILITY OF VOMITING NOT SPECIFIED, UNSPECIFIED VOMITING TYPE: ICD-10-CM

## 2020-07-09 DIAGNOSIS — R31.0 GROSS HEMATURIA: ICD-10-CM

## 2020-07-09 DIAGNOSIS — N28.89 KIDNEY MASS: ICD-10-CM

## 2020-07-09 LAB
ALBUMIN SERPL BCP-MCNC: 3.7 G/DL (ref 3.2–4.9)
ALBUMIN/GLOB SERPL: 1.1 G/DL
ALP SERPL-CCNC: 148 U/L (ref 30–99)
ALT SERPL-CCNC: 36 U/L (ref 2–50)
ANION GAP SERPL CALC-SCNC: 24 MMOL/L (ref 7–16)
AST SERPL-CCNC: 473 U/L (ref 12–45)
BASOPHILS # BLD AUTO: 0.1 % (ref 0–1.8)
BASOPHILS # BLD: 0.01 K/UL (ref 0–0.12)
BILIRUB SERPL-MCNC: 0.5 MG/DL (ref 0.1–1.5)
BUN SERPL-MCNC: 43 MG/DL (ref 8–22)
CALCIUM SERPL-MCNC: 8.5 MG/DL (ref 8.5–10.5)
CHLORIDE SERPL-SCNC: 92 MMOL/L (ref 96–112)
CO2 SERPL-SCNC: 18 MMOL/L (ref 20–33)
COVID ORDER STATUS COVID19: NORMAL
CREAT SERPL-MCNC: 2.65 MG/DL (ref 0.5–1.4)
EKG IMPRESSION: NORMAL
EOSINOPHIL # BLD AUTO: 0 K/UL (ref 0–0.51)
EOSINOPHIL NFR BLD: 0 % (ref 0–6.9)
ERYTHROCYTE [DISTWIDTH] IN BLOOD BY AUTOMATED COUNT: 40.2 FL (ref 35.9–50)
FERRITIN SERPL-MCNC: 2586 NG/ML (ref 22–322)
FIBRINOGEN PPP-MCNC: 378 MG/DL (ref 215–460)
GLOBULIN SER CALC-MCNC: 3.5 G/DL (ref 1.9–3.5)
GLUCOSE SERPL-MCNC: 199 MG/DL (ref 65–99)
HCT VFR BLD AUTO: 47 % (ref 42–52)
HGB BLD-MCNC: 15.9 G/DL (ref 14–18)
IMM GRANULOCYTES # BLD AUTO: 0.07 K/UL (ref 0–0.11)
IMM GRANULOCYTES NFR BLD AUTO: 0.9 % (ref 0–0.9)
INR PPP: 1.02 (ref 0.87–1.13)
LACTATE BLD-SCNC: 2.6 MMOL/L (ref 0.5–2)
LACTATE BLD-SCNC: 2.9 MMOL/L (ref 0.5–2)
LACTATE BLD-SCNC: 6.1 MMOL/L (ref 0.5–2)
LDH SERPL L TO P-CCNC: 2290 U/L (ref 107–266)
LYMPHOCYTES # BLD AUTO: 0.62 K/UL (ref 1–4.8)
LYMPHOCYTES NFR BLD: 8.3 % (ref 22–41)
MCH RBC QN AUTO: 30.7 PG (ref 27–33)
MCHC RBC AUTO-ENTMCNC: 33.8 G/DL (ref 33.7–35.3)
MCV RBC AUTO: 90.7 FL (ref 81.4–97.8)
MONOCYTES # BLD AUTO: 0.81 K/UL (ref 0–0.85)
MONOCYTES NFR BLD AUTO: 10.9 % (ref 0–13.4)
NEUTROPHILS # BLD AUTO: 5.94 K/UL (ref 1.82–7.42)
NEUTROPHILS NFR BLD: 79.8 % (ref 44–72)
NRBC # BLD AUTO: 0 K/UL
NRBC BLD-RTO: 0 /100 WBC
PLATELET # BLD AUTO: 177 K/UL (ref 164–446)
PMV BLD AUTO: 9.9 FL (ref 9–12.9)
POTASSIUM SERPL-SCNC: 3.8 MMOL/L (ref 3.6–5.5)
PROCALCITONIN SERPL-MCNC: 0.16 NG/ML
PROCALCITONIN SERPL-MCNC: 1.07 NG/ML
PROT SERPL-MCNC: 7.2 G/DL (ref 6–8.2)
PROTHROMBIN TIME: 13.7 SEC (ref 12–14.6)
RBC # BLD AUTO: 5.18 M/UL (ref 4.7–6.1)
SARS-COV-2 RNA RESP QL NAA+PROBE: DETECTED
SODIUM SERPL-SCNC: 134 MMOL/L (ref 135–145)
SPECIMEN SOURCE: ABNORMAL
TROPONIN T SERPL-MCNC: 36 NG/L (ref 6–19)
WBC # BLD AUTO: 7.5 K/UL (ref 4.8–10.8)

## 2020-07-09 PROCEDURE — 87086 URINE CULTURE/COLONY COUNT: CPT

## 2020-07-09 PROCEDURE — 85384 FIBRINOGEN ACTIVITY: CPT

## 2020-07-09 PROCEDURE — 83615 LACTATE (LD) (LDH) ENZYME: CPT

## 2020-07-09 PROCEDURE — 99291 CRITICAL CARE FIRST HOUR: CPT

## 2020-07-09 PROCEDURE — 700105 HCHG RX REV CODE 258: Performed by: EMERGENCY MEDICINE

## 2020-07-09 PROCEDURE — U0004 COV-19 TEST NON-CDC HGH THRU: HCPCS

## 2020-07-09 PROCEDURE — 84484 ASSAY OF TROPONIN QUANT: CPT

## 2020-07-09 PROCEDURE — C9803 HOPD COVID-19 SPEC COLLECT: HCPCS | Performed by: EMERGENCY MEDICINE

## 2020-07-09 PROCEDURE — 96367 TX/PROPH/DG ADDL SEQ IV INF: CPT

## 2020-07-09 PROCEDURE — 770022 HCHG ROOM/CARE - ICU (200)

## 2020-07-09 PROCEDURE — 87493 C DIFF AMPLIFIED PROBE: CPT

## 2020-07-09 PROCEDURE — 85610 PROTHROMBIN TIME: CPT

## 2020-07-09 PROCEDURE — 84145 PROCALCITONIN (PCT): CPT

## 2020-07-09 PROCEDURE — 96365 THER/PROPH/DIAG IV INF INIT: CPT

## 2020-07-09 PROCEDURE — 82728 ASSAY OF FERRITIN: CPT

## 2020-07-09 PROCEDURE — 700105 HCHG RX REV CODE 258: Performed by: INTERNAL MEDICINE

## 2020-07-09 PROCEDURE — 87040 BLOOD CULTURE FOR BACTERIA: CPT

## 2020-07-09 PROCEDURE — 83605 ASSAY OF LACTIC ACID: CPT | Mod: 91

## 2020-07-09 PROCEDURE — 700111 HCHG RX REV CODE 636 W/ 250 OVERRIDE (IP): Performed by: EMERGENCY MEDICINE

## 2020-07-09 PROCEDURE — 80053 COMPREHEN METABOLIC PANEL: CPT

## 2020-07-09 PROCEDURE — 93005 ELECTROCARDIOGRAM TRACING: CPT | Performed by: EMERGENCY MEDICINE

## 2020-07-09 PROCEDURE — 99291 CRITICAL CARE FIRST HOUR: CPT | Mod: CS | Performed by: INTERNAL MEDICINE

## 2020-07-09 PROCEDURE — 71045 X-RAY EXAM CHEST 1 VIEW: CPT

## 2020-07-09 PROCEDURE — 85025 COMPLETE CBC W/AUTO DIFF WBC: CPT

## 2020-07-09 RX ORDER — AZITHROMYCIN 500 MG/1
500 INJECTION, POWDER, LYOPHILIZED, FOR SOLUTION INTRAVENOUS ONCE
Status: COMPLETED | OUTPATIENT
Start: 2020-07-09 | End: 2020-07-09

## 2020-07-09 RX ORDER — SODIUM CHLORIDE, SODIUM LACTATE, POTASSIUM CHLORIDE, AND CALCIUM CHLORIDE .6; .31; .03; .02 G/100ML; G/100ML; G/100ML; G/100ML
30 INJECTION, SOLUTION INTRAVENOUS
Status: COMPLETED | OUTPATIENT
Start: 2020-07-09 | End: 2020-07-09

## 2020-07-09 RX ORDER — DEXTROSE MONOHYDRATE 25 G/50ML
50 INJECTION, SOLUTION INTRAVENOUS
Status: DISCONTINUED | OUTPATIENT
Start: 2020-07-09 | End: 2020-07-24

## 2020-07-09 RX ORDER — ACETAMINOPHEN 325 MG/1
650 TABLET ORAL EVERY 6 HOURS PRN
Status: DISCONTINUED | OUTPATIENT
Start: 2020-07-09 | End: 2020-12-29 | Stop reason: HOSPADM

## 2020-07-09 RX ORDER — ONDANSETRON 2 MG/ML
4 INJECTION INTRAMUSCULAR; INTRAVENOUS EVERY 6 HOURS PRN
Status: DISCONTINUED | OUTPATIENT
Start: 2020-07-09 | End: 2020-11-09

## 2020-07-09 RX ORDER — SODIUM CHLORIDE, SODIUM LACTATE, POTASSIUM CHLORIDE, CALCIUM CHLORIDE 600; 310; 30; 20 MG/100ML; MG/100ML; MG/100ML; MG/100ML
1000 INJECTION, SOLUTION INTRAVENOUS CONTINUOUS
Status: DISCONTINUED | OUTPATIENT
Start: 2020-07-09 | End: 2020-07-10

## 2020-07-09 RX ORDER — ONDANSETRON 4 MG/1
4 TABLET, ORALLY DISINTEGRATING ORAL EVERY 6 HOURS PRN
Status: DISCONTINUED | OUTPATIENT
Start: 2020-07-09 | End: 2020-11-12

## 2020-07-09 RX ORDER — DEXAMETHASONE SODIUM PHOSPHATE 4 MG/ML
6 INJECTION, SOLUTION INTRA-ARTICULAR; INTRALESIONAL; INTRAMUSCULAR; INTRAVENOUS; SOFT TISSUE DAILY
Status: DISCONTINUED | OUTPATIENT
Start: 2020-07-10 | End: 2020-07-12

## 2020-07-09 RX ADMIN — SODIUM CHLORIDE, POTASSIUM CHLORIDE, SODIUM LACTATE AND CALCIUM CHLORIDE 1000 ML: 600; 310; 30; 20 INJECTION, SOLUTION INTRAVENOUS at 22:30

## 2020-07-09 RX ADMIN — SODIUM CHLORIDE, POTASSIUM CHLORIDE, SODIUM LACTATE AND CALCIUM CHLORIDE 1836 ML: 600; 310; 30; 20 INJECTION, SOLUTION INTRAVENOUS at 16:22

## 2020-07-09 RX ADMIN — CEFTRIAXONE SODIUM 2 G: 2 INJECTION, POWDER, FOR SOLUTION INTRAMUSCULAR; INTRAVENOUS at 16:22

## 2020-07-09 RX ADMIN — AZITHROMYCIN MONOHYDRATE 500 MG: 500 INJECTION, POWDER, LYOPHILIZED, FOR SOLUTION INTRAVENOUS at 16:55

## 2020-07-09 ASSESSMENT — LIFESTYLE VARIABLES
TOTAL SCORE: 0
EVER HAD A DRINK FIRST THING IN THE MORNING TO STEADY YOUR NERVES TO GET RID OF A HANGOVER: NO
TOTAL SCORE: 0
DO YOU DRINK ALCOHOL: NO
HAVE PEOPLE ANNOYED YOU BY CRITICIZING YOUR DRINKING: NO
CONSUMPTION TOTAL: INCOMPLETE
TOTAL SCORE: 0
EVER FELT BAD OR GUILTY ABOUT YOUR DRINKING: NO
HAVE YOU EVER FELT YOU SHOULD CUT DOWN ON YOUR DRINKING: NO

## 2020-07-09 NOTE — ED PROVIDER NOTES
"ED Provider Note    ER PROVIDER NOTE        CHIEF COMPLAINT  Chief Complaint   Patient presents with   • Cough   • Tired   • Loss of Appetite   • N/V   • Diarrhea       HPI  Ovi Bejarano is a 84 y.o. male who presents to the emergency department complaining of generalized weakness cough, vomiting and diarrhea.  Patient reports he has had some intermittent vomiting and diarrhea over the last 2 weeks, no blood in his stool or emesis.  And he denies any abdominal pain.  The last several days he has had increased weakness to the point today where his legs seem to give out while he was at the store.  He denies any focal weakness or numbness.  He denies any fever or chills.  He has had some productive cough, although denies any shortness of breath or chest pain.  He states he is not currently taking any of his medications.  He states he has been more weak appearing than usual and drinking \"a lot of water\"    REVIEW OF SYSTEMS  Pertinent positives include weakness, diarrhea. Pertinent negatives include no recent antibiotic use. See HPI for details. All other systems reviewed and are negative.    PAST MEDICAL HISTORY   has a past medical history of Diabetes (HCC), Hypercholesteremia, Hyperglycemia, and Rash.    SURGICAL HISTORY  patient denies any surgical history    FAMILY HISTORY  Family History   Problem Relation Age of Onset   • Stroke Sister        SOCIAL HISTORY  Social History     Socioeconomic History   • Marital status:      Spouse name: Not on file   • Number of children: Not on file   • Years of education: Not on file   • Highest education level: Not on file   Occupational History   • Not on file   Social Needs   • Financial resource strain: Not on file   • Food insecurity     Worry: Not on file     Inability: Not on file   • Transportation needs     Medical: Not on file     Non-medical: Not on file   Tobacco Use   • Smoking status: Former Smoker     Last attempt to quit: 10/21/2007     Years " "since quittin.7   • Smokeless tobacco: Never Used   Substance and Sexual Activity   • Alcohol use: Yes     Comment: rare   • Drug use: No   • Sexual activity: Not on file   Lifestyle   • Physical activity     Days per week: Not on file     Minutes per session: Not on file   • Stress: Not on file   Relationships   • Social connections     Talks on phone: Not on file     Gets together: Not on file     Attends Tenriism service: Not on file     Active member of club or organization: Not on file     Attends meetings of clubs or organizations: Not on file     Relationship status: Not on file   • Intimate partner violence     Fear of current or ex partner: Not on file     Emotionally abused: Not on file     Physically abused: Not on file     Forced sexual activity: Not on file   Other Topics Concern   • Not on file   Social History Narrative   • Not on file      Social History     Substance and Sexual Activity   Drug Use No       CURRENT MEDICATIONS  Home Medications     Reviewed by Jerrica Mancera (Pharmacy Tech) on 20 at 1816  Med List Status: Unable to Obtain   Medication Last Dose Status        Patient Bg Taking any Medications                       ALLERGIES  No Known Allergies    PHYSICAL EXAM  VITAL SIGNS: /51   Pulse 85   Temp 37 °C (98.6 °F) (Temporal)   Resp (!) 27   Ht 1.727 m (5' 8\")   Wt 61.2 kg (135 lb)   SpO2 96%   BMI 20.53 kg/m²   Pulse ox interpretation: I interpret this pulse ox as normal.    Constitutional: Alert thin, ill-appearing  HENT: No signs of trauma, Bilateral external ears normal, Nose normal.   Eyes: Pupils are equal and reactive, Conjunctiva normal, Non-icteric.   Neck: Normal range of motion, No tenderness, Supple, No stridor.   Lymphatic: No lymphadenopathy noted.   Cardiovascular: Regular rate and rhythm, no murmurs.   Thorax & Lungs: Rhonchorous bilaterally no respiratory distress, No wheezing, No chest tenderness.   Abdomen: Bowel sounds normal, Soft, " No tenderness, No masses, No pulsatile masses. No peritoneal signs.  Skin: Warm, Dry, No erythema, No rash.   Back: No bony tenderness, No CVA tenderness.   Extremities: Dried stool on legs, intact distal pulses, No edema, No tenderness, No cyanosis,.  Musculoskeletal: Good range of motion in all major joints. No tenderness to palpation or major deformities noted.   Neurologic: Alert , Normal motor function, Normal sensory function, No focal deficits noted.   Psychiatric: Affect normal, Judgment normal, Mood normal.     DIAGNOSTIC STUDIES / PROCEDURES    Results for orders placed or performed during the hospital encounter of 07/09/20   Lactic acid (lactate)   Result Value Ref Range    Lactic Acid 6.1 (HH) 0.5 - 2.0 mmol/L   CBC WITH DIFFERENTIAL   Result Value Ref Range    WBC 7.5 4.8 - 10.8 K/uL    RBC 5.18 4.70 - 6.10 M/uL    Hemoglobin 15.9 14.0 - 18.0 g/dL    Hematocrit 47.0 42.0 - 52.0 %    MCV 90.7 81.4 - 97.8 fL    MCH 30.7 27.0 - 33.0 pg    MCHC 33.8 33.7 - 35.3 g/dL    RDW 40.2 35.9 - 50.0 fL    Platelet Count 177 164 - 446 K/uL    MPV 9.9 9.0 - 12.9 fL    Neutrophils-Polys 79.80 (H) 44.00 - 72.00 %    Lymphocytes 8.30 (L) 22.00 - 41.00 %    Monocytes 10.90 0.00 - 13.40 %    Eosinophils 0.00 0.00 - 6.90 %    Basophils 0.10 0.00 - 1.80 %    Immature Granulocytes 0.90 0.00 - 0.90 %    Nucleated RBC 0.00 /100 WBC    Neutrophils (Absolute) 5.94 1.82 - 7.42 K/uL    Lymphs (Absolute) 0.62 (L) 1.00 - 4.80 K/uL    Monos (Absolute) 0.81 0.00 - 0.85 K/uL    Eos (Absolute) 0.00 0.00 - 0.51 K/uL    Baso (Absolute) 0.01 0.00 - 0.12 K/uL    Immature Granulocytes (abs) 0.07 0.00 - 0.11 K/uL    NRBC (Absolute) 0.00 K/uL   COMP METABOLIC PANEL   Result Value Ref Range    Sodium 134 (L) 135 - 145 mmol/L    Potassium 3.8 3.6 - 5.5 mmol/L    Chloride 92 (L) 96 - 112 mmol/L    Co2 18 (L) 20 - 33 mmol/L    Anion Gap 24.0 (H) 7.0 - 16.0    Glucose 199 (H) 65 - 99 mg/dL    Bun 43 (H) 8 - 22 mg/dL    Creatinine 2.65 (H) 0.50 - 1.40  mg/dL    Calcium 8.5 8.5 - 10.5 mg/dL    AST(SGOT) 473 (H) 12 - 45 U/L    ALT(SGPT) 36 2 - 50 U/L    Alkaline Phosphatase 148 (H) 30 - 99 U/L    Total Bilirubin 0.5 0.1 - 1.5 mg/dL    Albumin 3.7 3.2 - 4.9 g/dL    Total Protein 7.2 6.0 - 8.2 g/dL    Globulin 3.5 1.9 - 3.5 g/dL    A-G Ratio 1.1 g/dL   COVID/SARS CoV-2 PCR    Specimen: Nasopharyngeal; Respirate   Result Value Ref Range    COVID Order Status Received    FERRITIN   Result Value Ref Range    Ferritin 2586.0 (H) 22.0 - 322.0 ng/mL   PROCALCITONIN   Result Value Ref Range    Procalcitonin 0.16 <0.25 ng/mL   LDH   Result Value Ref Range    LDH Total 2290 (H) 107 - 266 U/L   ESTIMATED GFR   Result Value Ref Range    GFR If  28 (A) >60 mL/min/1.73 m 2    GFR If Non African American 23 (A) >60 mL/min/1.73 m 2   SARS-CoV-2, PCR (In-House)   Result Value Ref Range    SARS-CoV-2 Source NP Swab     SARS-CoV-2 by PCR DETECTED (AA)    EKG   Result Value Ref Range    Report       [facility] Emergency Dept.    Test Date:  2020  Pt Name:    DIEUDONNE MONTELONGO                Department: ER  MRN:        [id number]                      Room:       [facility]  Gender:     Male                         Technician: [id number]  :        [id number]                   Requested By:JOE CROUCH  Order #:    [id number]                    Reading MD: JOE CROUCH MD    Measurements  Intervals                                Axis  Rate:       82                           P:          78  HI:         128                          QRS:        76  QRSD:       98                           T:          70  QT:         380  QTc:        444    Interpretive Statements  SINUS RHYTHM  RIGHT ATRIAL ABNORMALITY  RSR' IN V1 OR V2, PROBABLY NORMAL VARIANT  Compared to ECG 11/15/2017 07:14:30  RSR' in V1 or V2 now present  Electronically Signed On 2020 17:24:50 PDT by JOE CROUCH MD         RADIOLOGY  DX-CHEST-PORTABLE (1 VIEW)   Final Result      1.  The lungs  are hyperinflated suggesting emphysema/COPD.   2.  There is minimal predominantly lower lobe interstitial opacity which is most likely due to chronic scarring.        The radiologist's interpretation of all radiological studies have been reviewed by me.    COURSE & MEDICAL DECISION MAKING  Nursing notes, VS, PMSFHx reviewed in chart.    3:53 PM Patient seen and examined at bedside. Patient will be treated with IV fluid and antibiotics. Ordered for this bundle, COVID to evaluate his symptoms.     4:33 PM patient reevaluated, he is comfortable at this time, no complaints systolics 105    5:23 PM reevaluated again, still comfortable, systolics 107    5:50 PM  Patient's COVID test has returned    ICU paged for admission  Discussed case with Dr. Tavarez  This patient was cared for during the COVID-19 pandemic.  History and physical exam may be limited/truncated by the inherent challenges of PPE and the need to decrease staff exposure to novel coronavirus.  Some aspects of disease management may be different to protect staff and help slow the spread of disease. I verified that, if possible, the patient was wearing a mask and I was wearing appropriate PPE every time I encountered the patient.     Current [facility] COVID19 protocol followed'        The total critical care time spent on this patient was 40 minutes, resuscitating patient, speaking with admitting physician, and interpreting test results. This 40 minutes is exclusive of separately billable procedures.        HYDRATION: Based on the patient's presentation of Acute Diarrhea, Acute Vomiting and Sepsis the patient was given IV fluids. IV Hydration was used because oral hydration was not as rapid as required. Upon recheck following hydration, the patient was improved.    Decision Making:  This is a 84 y.o. male with generalized weakness, cough, vomiting and diarrhea.  Patient is found to be COVID positive, and quite volume depleted as well.  With his respiratory  symptoms, and significant lactate he is started on antibiotics, Rocephin and azithromycin.  Additionally started on initial fluid resuscitation as even though he is COVID positive he appears profoundly volume depleted.  He has no abdominal tenderness or findings to suggest surgical intra-abdominal pathology.  Currently has respiratory requirement of 2 L of oxygen and in no significant respiratory distress    Is admitted in critical condition    FINAL IMPRESSION  1. COVID-19 virus infection    2. Nausea and vomiting, intractability of vomiting not specified, unspecified vomiting type    3. Upper respiratory tract infection, unspecified type    4. Dehydration    5. Acute kidney injury (HCC)          The note accurately reflects work and decisions made by me.  Manfred Parikh M.D.  7/9/2020  6:36 PM

## 2020-07-09 NOTE — DISCHARGE PLANNING
SW asked to assist Pt wife in getting home. She arrived to the ED with the Pt via EMS.   CORINA gave Pt wife a cab voucher-[id number]

## 2020-07-09 NOTE — ED NOTES
Lab called with critical result of lactic acid 6.1 at 1558. Critical lab result read back to Lab.   Dr. Parikh notified of critical lab result at 1558.  Critical lab result read back by Dr. Parikh.

## 2020-07-09 NOTE — ED TRIAGE NOTES
Chief Complaint   Patient presents with   • Cough   • Tired   • Loss of Appetite   • N/V   • Diarrhea   Pt bib [facility].  Pt had a mild GLF/lowered self to the ground in the [facility] parking lot.  Pt reports symptoms have been going on for approximately 1 month.  Pt has not been eating and has only been drinking water for the past few days.  Pt noted to have dried vomitus around mouth and dried diarrhea on leg.  Pt is supposed to be taking diabetic medications but has not been on them for some time.

## 2020-07-10 PROBLEM — R19.7 DIARRHEA OF PRESUMED INFECTIOUS ORIGIN: Status: ACTIVE | Noted: 2020-07-10

## 2020-07-10 PROBLEM — U07.1 COVID-19 VIRUS INFECTION: Status: ACTIVE | Noted: 2020-07-10

## 2020-07-10 PROBLEM — R33.9 URINARY RETENTION: Status: ACTIVE | Noted: 2020-07-10

## 2020-07-10 PROBLEM — N17.9 ACUTE KIDNEY INJURY (HCC): Status: ACTIVE | Noted: 2020-07-10

## 2020-07-10 PROBLEM — E87.6 HYPOKALEMIA: Status: ACTIVE | Noted: 2020-07-10

## 2020-07-10 PROBLEM — E86.0 DEHYDRATION: Status: ACTIVE | Noted: 2020-07-10

## 2020-07-10 LAB
ANION GAP SERPL CALC-SCNC: 15 MMOL/L (ref 7–16)
BASOPHILS # BLD AUTO: 0 % (ref 0–1.8)
BASOPHILS # BLD: 0 K/UL (ref 0–0.12)
BUN SERPL-MCNC: 32 MG/DL (ref 8–22)
C DIFF DNA SPEC QL NAA+PROBE: NEGATIVE
C DIFF TOX GENS STL QL NAA+PROBE: NEGATIVE
CALCIUM SERPL-MCNC: 7.9 MG/DL (ref 8.5–10.5)
CHLORIDE SERPL-SCNC: 99 MMOL/L (ref 96–112)
CO2 SERPL-SCNC: 21 MMOL/L (ref 20–33)
CREAT SERPL-MCNC: 1.41 MG/DL (ref 0.5–1.4)
D DIMER PPP IA.FEU-MCNC: 2.62 UG/ML (FEU) (ref 0–0.5)
EOSINOPHIL # BLD AUTO: 0 K/UL (ref 0–0.51)
EOSINOPHIL NFR BLD: 0 % (ref 0–6.9)
ERYTHROCYTE [DISTWIDTH] IN BLOOD BY AUTOMATED COUNT: 41.1 FL (ref 35.9–50)
EST. AVERAGE GLUCOSE BLD GHB EST-MCNC: 148 MG/DL
GLUCOSE BLD-MCNC: 114 MG/DL (ref 65–99)
GLUCOSE BLD-MCNC: 152 MG/DL (ref 65–99)
GLUCOSE SERPL-MCNC: 93 MG/DL (ref 65–99)
HBA1C MFR BLD: 6.8 % (ref 0–5.6)
HCT VFR BLD AUTO: 49 % (ref 42–52)
HGB BLD-MCNC: 16.2 G/DL (ref 14–18)
HYPOCHROMIA BLD QL SMEAR: ABNORMAL
LACTATE BLD-SCNC: 2 MMOL/L (ref 0.5–2)
LYMPHOCYTES # BLD AUTO: 1.07 K/UL (ref 1–4.8)
LYMPHOCYTES NFR BLD: 16.7 % (ref 22–41)
MAGNESIUM SERPL-MCNC: 2.1 MG/DL (ref 1.5–2.5)
MANUAL DIFF BLD: NORMAL
MCH RBC QN AUTO: 30.4 PG (ref 27–33)
MCHC RBC AUTO-ENTMCNC: 33.1 G/DL (ref 33.7–35.3)
MCV RBC AUTO: 91.9 FL (ref 81.4–97.8)
MONOCYTES # BLD AUTO: 0.34 K/UL (ref 0–0.85)
MONOCYTES NFR BLD AUTO: 5.3 % (ref 0–13.4)
MORPHOLOGY BLD-IMP: NORMAL
NEUTROPHILS # BLD AUTO: 5 K/UL (ref 1.82–7.42)
NEUTROPHILS NFR BLD: 73.7 % (ref 44–72)
NEUTS BAND NFR BLD MANUAL: 4.4 % (ref 0–10)
NRBC # BLD AUTO: 0 K/UL
NRBC BLD-RTO: 0 /100 WBC
OVALOCYTES BLD QL SMEAR: NORMAL
PHOSPHATE SERPL-MCNC: 2.9 MG/DL (ref 2.5–4.5)
PLATELET # BLD AUTO: 155 K/UL (ref 164–446)
PLATELET BLD QL SMEAR: NORMAL
PMV BLD AUTO: 9.6 FL (ref 9–12.9)
POIKILOCYTOSIS BLD QL SMEAR: NORMAL
POTASSIUM SERPL-SCNC: 3.2 MMOL/L (ref 3.6–5.5)
RBC # BLD AUTO: 5.33 M/UL (ref 4.7–6.1)
RBC BLD AUTO: PRESENT
SODIUM SERPL-SCNC: 135 MMOL/L (ref 135–145)
WBC # BLD AUTO: 6.4 K/UL (ref 4.8–10.8)

## 2020-07-10 PROCEDURE — 99233 SBSQ HOSP IP/OBS HIGH 50: CPT | Mod: CS | Performed by: INTERNAL MEDICINE

## 2020-07-10 PROCEDURE — 83605 ASSAY OF LACTIC ACID: CPT

## 2020-07-10 PROCEDURE — 87209 SMEAR COMPLEX STAIN: CPT

## 2020-07-10 PROCEDURE — A9270 NON-COVERED ITEM OR SERVICE: HCPCS | Performed by: INTERNAL MEDICINE

## 2020-07-10 PROCEDURE — 99232 SBSQ HOSP IP/OBS MODERATE 35: CPT | Mod: CS | Performed by: HOSPITALIST

## 2020-07-10 PROCEDURE — 700105 HCHG RX REV CODE 258: Performed by: INTERNAL MEDICINE

## 2020-07-10 PROCEDURE — A9270 NON-COVERED ITEM OR SERVICE: HCPCS | Performed by: HOSPITALIST

## 2020-07-10 PROCEDURE — 700102 HCHG RX REV CODE 250 W/ 637 OVERRIDE(OP): Performed by: HOSPITALIST

## 2020-07-10 PROCEDURE — 87177 OVA AND PARASITES SMEARS: CPT

## 2020-07-10 PROCEDURE — 51798 US URINE CAPACITY MEASURE: CPT

## 2020-07-10 PROCEDURE — 700102 HCHG RX REV CODE 250 W/ 637 OVERRIDE(OP): Performed by: INTERNAL MEDICINE

## 2020-07-10 PROCEDURE — 700111 HCHG RX REV CODE 636 W/ 250 OVERRIDE (IP): Performed by: HOSPITALIST

## 2020-07-10 PROCEDURE — 770022 HCHG ROOM/CARE - ICU (200)

## 2020-07-10 PROCEDURE — 85007 BL SMEAR W/DIFF WBC COUNT: CPT

## 2020-07-10 PROCEDURE — 85027 COMPLETE CBC AUTOMATED: CPT

## 2020-07-10 PROCEDURE — 700111 HCHG RX REV CODE 636 W/ 250 OVERRIDE (IP): Performed by: INTERNAL MEDICINE

## 2020-07-10 PROCEDURE — 82962 GLUCOSE BLOOD TEST: CPT | Mod: 91

## 2020-07-10 PROCEDURE — 83036 HEMOGLOBIN GLYCOSYLATED A1C: CPT

## 2020-07-10 PROCEDURE — 83735 ASSAY OF MAGNESIUM: CPT

## 2020-07-10 PROCEDURE — 84100 ASSAY OF PHOSPHORUS: CPT

## 2020-07-10 PROCEDURE — 80048 BASIC METABOLIC PNL TOTAL CA: CPT

## 2020-07-10 PROCEDURE — 85379 FIBRIN DEGRADATION QUANT: CPT

## 2020-07-10 RX ORDER — FUROSEMIDE 10 MG/ML
20 INJECTION INTRAMUSCULAR; INTRAVENOUS ONCE
Status: DISCONTINUED | OUTPATIENT
Start: 2020-07-10 | End: 2020-07-10

## 2020-07-10 RX ORDER — TAMSULOSIN HYDROCHLORIDE 0.4 MG/1
0.8 CAPSULE ORAL
Status: DISCONTINUED | OUTPATIENT
Start: 2020-07-10 | End: 2020-12-29 | Stop reason: HOSPADM

## 2020-07-10 RX ORDER — POTASSIUM CHLORIDE 20 MEQ/1
40 TABLET, EXTENDED RELEASE ORAL ONCE
Status: COMPLETED | OUTPATIENT
Start: 2020-07-10 | End: 2020-07-10

## 2020-07-10 RX ORDER — HYDRALAZINE HYDROCHLORIDE 20 MG/ML
10-20 INJECTION INTRAMUSCULAR; INTRAVENOUS EVERY 4 HOURS PRN
Status: DISCONTINUED | OUTPATIENT
Start: 2020-07-10 | End: 2020-09-09

## 2020-07-10 RX ORDER — TAMSULOSIN HYDROCHLORIDE 0.4 MG/1
0.4 CAPSULE ORAL
Status: DISCONTINUED | OUTPATIENT
Start: 2020-07-10 | End: 2020-07-10

## 2020-07-10 RX ORDER — AMLODIPINE BESYLATE 5 MG/1
10 TABLET ORAL
Status: DISCONTINUED | OUTPATIENT
Start: 2020-07-10 | End: 2020-07-30

## 2020-07-10 RX ORDER — ZINC SULFATE 50(220)MG
220 CAPSULE ORAL DAILY
Status: DISCONTINUED | OUTPATIENT
Start: 2020-07-10 | End: 2020-08-02

## 2020-07-10 RX ORDER — LABETALOL HYDROCHLORIDE 5 MG/ML
10 INJECTION, SOLUTION INTRAVENOUS EVERY 4 HOURS PRN
Status: DISCONTINUED | OUTPATIENT
Start: 2020-07-10 | End: 2020-11-09

## 2020-07-10 RX ADMIN — INSULIN HUMAN 2 UNITS: 100 INJECTION, SOLUTION PARENTERAL at 18:11

## 2020-07-10 RX ADMIN — AMLODIPINE BESYLATE 10 MG: 10 TABLET ORAL at 13:36

## 2020-07-10 RX ADMIN — TAMSULOSIN HYDROCHLORIDE 0.8 MG: 0.4 CAPSULE ORAL at 13:36

## 2020-07-10 RX ADMIN — ENOXAPARIN SODIUM 60 MG: 60 INJECTION SUBCUTANEOUS at 18:09

## 2020-07-10 RX ADMIN — POTASSIUM CHLORIDE 40 MEQ: 1500 TABLET, EXTENDED RELEASE ORAL at 08:48

## 2020-07-10 RX ADMIN — SODIUM CHLORIDE, POTASSIUM CHLORIDE, SODIUM LACTATE AND CALCIUM CHLORIDE 1000 ML: 600; 310; 30; 20 INJECTION, SOLUTION INTRAVENOUS at 11:05

## 2020-07-10 RX ADMIN — HYDRALAZINE HYDROCHLORIDE 10 MG: 20 INJECTION INTRAMUSCULAR; INTRAVENOUS at 13:31

## 2020-07-10 RX ADMIN — CEFTRIAXONE SODIUM 1000 MG: 1 INJECTION, POWDER, FOR SOLUTION INTRAMUSCULAR; INTRAVENOUS at 05:25

## 2020-07-10 RX ADMIN — ENOXAPARIN SODIUM 30 MG: 40 INJECTION SUBCUTANEOUS at 06:22

## 2020-07-10 RX ADMIN — DEXAMETHASONE SODIUM PHOSPHATE 6 MG: 4 INJECTION, SOLUTION INTRA-ARTICULAR; INTRALESIONAL; INTRAMUSCULAR; INTRAVENOUS; SOFT TISSUE at 05:18

## 2020-07-10 RX ADMIN — ZINC SULFATE 220 MG (50 MG) CAPSULE 220 MG: CAPSULE at 18:09

## 2020-07-10 ASSESSMENT — ENCOUNTER SYMPTOMS
NAUSEA: 0
BRUISES/BLEEDS EASILY: 0
SORE THROAT: 0
DIZZINESS: 0
DOUBLE VISION: 0
MYALGIAS: 0
PHOTOPHOBIA: 0
NEUROLOGICAL NEGATIVE: 1
GASTROINTESTINAL NEGATIVE: 1
SHORTNESS OF BREATH: 0
PSYCHIATRIC NEGATIVE: 1
WEAKNESS: 0
SPEECH CHANGE: 0
ABDOMINAL PAIN: 0
NAUSEA: 1
DEPRESSION: 0
SPUTUM PRODUCTION: 1
COUGH: 0
RESPIRATORY NEGATIVE: 1
NECK PAIN: 0
CARDIOVASCULAR NEGATIVE: 1
VOMITING: 1
DIZZINESS: 1
ORTHOPNEA: 0
SINUS PAIN: 0
FOCAL WEAKNESS: 0
FEVER: 0
VOMITING: 0
HEMOPTYSIS: 0
EYES NEGATIVE: 1
SHORTNESS OF BREATH: 1
CHILLS: 0
DIAPHORESIS: 0
DIARRHEA: 1
WEIGHT LOSS: 0
WHEEZING: 0
SENSORY CHANGE: 0
STRIDOR: 0
MUSCULOSKELETAL NEGATIVE: 1
POLYDIPSIA: 0
NERVOUS/ANXIOUS: 0
BLOOD IN STOOL: 0
TINGLING: 0
HEADACHES: 0
HEARTBURN: 0
COUGH: 1
SPUTUM PRODUCTION: 0
CONSTITUTIONAL NEGATIVE: 1
BACK PAIN: 0
PALPITATIONS: 0
BLURRED VISION: 0

## 2020-07-10 ASSESSMENT — LIFESTYLE VARIABLES
ALCOHOL_USE: NO
TOTAL SCORE: 0
AVERAGE NUMBER OF DAYS PER WEEK YOU HAVE A DRINK CONTAINING ALCOHOL: 0
TOTAL SCORE: 0
CONSUMPTION TOTAL: NEGATIVE
HAVE YOU EVER FELT YOU SHOULD CUT DOWN ON YOUR DRINKING: NO
EVER HAD A DRINK FIRST THING IN THE MORNING TO STEADY YOUR NERVES TO GET RID OF A HANGOVER: NO
TOTAL SCORE: 0
ON A TYPICAL DAY WHEN YOU DRINK ALCOHOL HOW MANY DRINKS DO YOU HAVE: 0
HOW MANY TIMES IN THE PAST YEAR HAVE YOU HAD 5 OR MORE DRINKS IN A DAY: 0
SUBSTANCE_ABUSE: 0
HAVE PEOPLE ANNOYED YOU BY CRITICIZING YOUR DRINKING: NO
EVER FELT BAD OR GUILTY ABOUT YOUR DRINKING: NO

## 2020-07-10 ASSESSMENT — COGNITIVE AND FUNCTIONAL STATUS - GENERAL
PERSONAL GROOMING: A LITTLE
WALKING IN HOSPITAL ROOM: A LITTLE
EATING MEALS: A LITTLE
DRESSING REGULAR LOWER BODY CLOTHING: A LITTLE
SUGGESTED CMS G CODE MODIFIER MOBILITY: CK
TOILETING: A LITTLE
STANDING UP FROM CHAIR USING ARMS: A LITTLE
CLIMB 3 TO 5 STEPS WITH RAILING: A LITTLE
DRESSING REGULAR UPPER BODY CLOTHING: A LITTLE
SUGGESTED CMS G CODE MODIFIER DAILY ACTIVITY: CK
TURNING FROM BACK TO SIDE WHILE IN FLAT BAD: A LITTLE
DAILY ACTIVITIY SCORE: 18
MOVING TO AND FROM BED TO CHAIR: A LITTLE
HELP NEEDED FOR BATHING: A LITTLE
MOBILITY SCORE: 18
MOVING FROM LYING ON BACK TO SITTING ON SIDE OF FLAT BED: A LITTLE

## 2020-07-10 ASSESSMENT — PATIENT HEALTH QUESTIONNAIRE - PHQ9
1. LITTLE INTEREST OR PLEASURE IN DOING THINGS: NOT AT ALL
SUM OF ALL RESPONSES TO PHQ9 QUESTIONS 1 AND 2: 0
2. FEELING DOWN, DEPRESSED, IRRITABLE, OR HOPELESS: NOT AT ALL

## 2020-07-10 ASSESSMENT — FIBROSIS 4 INDEX: FIB4 SCORE: 37.41

## 2020-07-10 NOTE — PROGRESS NOTES
Critical Care Progress Note    Date of admission  7/9/2020    Chief Complaint  84 y.o. male who presented 7/9/2020 with the above complaints.  He has a history of diabetes mellitus, hypercholesterolemia and tells me he lives at home with a female roommate in [place].  He has been sheltering in place for the most part but today he went out with his roommate to do some errands.  He apparently had a mild fall or lowered himself to the ground in the [facility] parking lot with incontinent of stool/diarrhea.  According to his roommate he has been complaining of generalized weakness, intermittent vomiting and diarrhea over the past 2 weeks.  He has not had blood in his stool or emesis.  He is not having any abdominal pain fevers or chills but has had increasing weakness.  He has had some mild cough but denies any loss of taste or smell.  He has had a poor appetite and has not been eating much but just drinking water.  He is incontinent of loose liquidy stool in the ED.  COVID screening came back as positive today and he has been continued on isolation.  He was found to have acute kidney injury and elevated lactic acid level of 6.1.  LDH is elevated at 2290 and ferritin is elevated to 586, d-dimer is pending.  He is being admitted to ICU with dehydration lactic acidosis and COVID positive status.  He is received crystalloid volume resuscitation and he has not been hypotensive.  Chest x-ray shows hyperinflated lungs suggesting COPD there is some mild bilateral lower lobe interstitial prominence.  He is a former smoker with cessation about 10 years ago.    Hospital Course          Interval Problem Update  Reviewed last 24 hour events:  Lactic acidosis improved to normal  He is ok to go to medicine  On 2 L nc  Ct not significant for bacterial pna  He does have copd/emphysema  No leukocytosis  Mg 2.1  Phos 2.9  k 3.2, supplement  D dimer pending    Review of Systems  Review of Systems   Constitutional: Positive for  malaise/fatigue. Negative for chills, diaphoresis, fever and weight loss.   HENT: Negative for congestion and sinus pain.    Eyes: Negative for blurred vision, double vision and photophobia.   Respiratory: Positive for shortness of breath. Negative for cough, hemoptysis, sputum production, wheezing and stridor.    Cardiovascular: Negative for chest pain, palpitations and leg swelling.   Gastrointestinal: Positive for diarrhea. Negative for blood in stool, heartburn, melena and vomiting.   Genitourinary: Negative for dysuria and urgency.   Musculoskeletal: Negative for back pain, myalgias and neck pain.   Skin: Negative for itching and rash.   Neurological: Negative for dizziness, tingling, sensory change, speech change, focal weakness and headaches.   Endo/Heme/Allergies: Negative for polydipsia. Does not bruise/bleed easily.   Psychiatric/Behavioral: Negative for depression. The patient is not nervous/anxious.         Vital Signs for last 24 hours   Temp:  [36.6 °C (97.9 °F)-37 °C (98.6 °F)] 36.8 °C (98.2 °F)  Pulse:  [60-96] 68  Resp:  [16-44] 19  BP: (105-167)/(46-70) 167/59  SpO2:  [86 %-99 %] 97 %    Hemodynamic parameters for last 24 hours       Respiratory Information for the last 24 hours       Physical Exam   Physical Exam  Vitals signs and nursing note reviewed.   Constitutional:       General: He is not in acute distress.     Appearance: He is ill-appearing. He is not toxic-appearing or diaphoretic.   HENT:      Head: Normocephalic and atraumatic.      Right Ear: Tympanic membrane and external ear normal.      Left Ear: Tympanic membrane and external ear normal.      Nose: No congestion or rhinorrhea.      Mouth/Throat:      Mouth: Mucous membranes are moist.      Pharynx: Oropharynx is clear. No oropharyngeal exudate or posterior oropharyngeal erythema.   Eyes:      General: No scleral icterus.        Right eye: No discharge.         Left eye: No discharge.      Extraocular Movements: Extraocular  movements intact.      Conjunctiva/sclera: Conjunctivae normal.      Pupils: Pupils are equal, round, and reactive to light.   Neck:      Musculoskeletal: Normal range of motion. No neck rigidity or muscular tenderness.   Cardiovascular:      Rate and Rhythm: Normal rate and regular rhythm.      Pulses: Normal pulses.      Heart sounds: Normal heart sounds. No murmur.   Pulmonary:      Effort: Respiratory distress present.      Breath sounds: No stridor. No wheezing, rhonchi or rales.   Chest:      Chest wall: No tenderness.   Abdominal:      General: There is no distension.      Palpations: There is no mass.      Tenderness: There is abdominal tenderness. There is no guarding.   Musculoskeletal:         General: No swelling, tenderness or deformity.      Right lower leg: No edema.      Left lower leg: No edema.   Lymphadenopathy:      Cervical: No cervical adenopathy.   Skin:     Coloration: Skin is not jaundiced or pale.      Findings: No bruising, erythema, lesion or rash.   Neurological:      General: No focal deficit present.      Mental Status: He is alert and oriented to person, place, and time. Mental status is at baseline.      Cranial Nerves: No cranial nerve deficit.      Sensory: No sensory deficit.      Motor: No weakness.      Coordination: Coordination normal.      Gait: Gait normal.   Psychiatric:         Mood and Affect: Mood normal.         Behavior: Behavior normal.         Medications  Current Facility-Administered Medications   Medication Dose Route Frequency Provider Last Rate Last Dose   • ondansetron (ZOFRAN) syringe/vial injection 4 mg  4 mg Intravenous Q6HRS PRN Darin Valencia M.D.       • ondansetron (ZOFRAN ODT) dispertab 4 mg  4 mg Oral Q6HRS PRN Darin Valencia M.D.       • Pharmacy Consult Request  1 Each Other PHARMACY TO DOSE Darin Valencia M.D.       • lactated ringers infusion  1,000 mL Intravenous Continuous Darin Valencia M.D. 100 mL/hr at 07/09/20 2230 1,000 mL at 07/09/20  2230   • enoxaparin (LOVENOX) inj 30 mg  30 mg Subcutaneous DAILY Darin Valencia M.D.   30 mg at 07/10/20 0622   • acetaminophen (TYLENOL) tablet 650 mg  650 mg Oral Q6HRS PRN Darin Valencia M.D.       • cefTRIAXone (ROCEPHIN) 1,000 mg in  mL IVPB  1,000 mg Intravenous Q24HRS Darin Valencia M.D.   Stopped at 07/10/20 0555   • insulin regular (HUMULIN R) injection 2-9 Units  2-9 Units Subcutaneous BID AC Darin Valencia M.D.   Stopped at 07/10/20 0700    And   • glucose 4 g chewable tablet 16 g  16 g Oral Q15 MIN PRN Darin Valencia M.D.        And   • dextrose 50% (D50W) injection 50 mL  50 mL Intravenous Q15 MIN PRN Darin Valencia M.D.       • dexamethasone (DECADRON) injection 6 mg  6 mg Intravenous DAILY Darin Valencia M.D.   6 mg at 07/10/20 0518       Fluids    Intake/Output Summary (Last 24 hours) at 7/10/2020 0701  Last data filed at 7/10/2020 0300  Gross per 24 hour   Intake 2550 ml   Output --   Net 2550 ml       Laboratory          Recent Labs     07/09/20  1510 07/10/20  0335   SODIUM 134* 135   POTASSIUM 3.8 3.2*   CHLORIDE 92* 99   CO2 18* 21   BUN 43* 32*   CREATININE 2.65* 1.41*   MAGNESIUM  --  2.1   PHOSPHORUS  --  2.9   CALCIUM 8.5 7.9*     Recent Labs     07/09/20  1510 07/10/20  0335   ALTSGPT 36  --    ASTSGOT 473*  --    ALKPHOSPHAT 148*  --    TBILIRUBIN 0.5  --    GLUCOSE 199* 93     Recent Labs     07/09/20  1510 07/10/20  0335   WBC 7.5 6.4   NEUTSPOLYS 79.80*  --    LYMPHOCYTES 8.30*  --    MONOCYTES 10.90  --    EOSINOPHILS 0.00  --    BASOPHILS 0.10  --    ASTSGOT 473*  --    ALTSGPT 36  --    ALKPHOSPHAT 148*  --    TBILIRUBIN 0.5  --      Recent Labs     07/09/20  1510 07/09/20  1938 07/10/20  0335   RBC 5.18  --  5.33   HEMOGLOBIN 15.9  --  16.2   HEMATOCRIT 47.0  --  49.0   PLATELETCT 177  --  155*   PROTHROMBTM  --  13.7  --    INR  --  1.02  --    FERRITIN 2586.0*  --   --        Imaging  X-Ray:  I have personally reviewed the images and compared with prior images. and  My impression is: no significant consolidation, changes for copd  EKG:  I have personally reviewed the images and compared with prior images. and My impression is: sinus, qtc wnl, no ischemic changes  CT:    Reviewed    Assessment/Plan  * Dehydration  Assessment & Plan  Secondary to nausea vomiting diarrhea  Crystalloid volume resuscitation, monitor and correct electrolytes  Follow-up lactic acid normalized  Continue ivf as necessary  Not currently overloaded    Diarrhea of presumed infectious origin  Assessment & Plan  Denies recent antibiotic therapy  Regardless will rule out C. difficile, if not resolved will send stool for ova and parasites and further work-up  Continue volume resuscitation  Abdominal exam relatively benign at this point.  If he does not clinically improve as expected with hydration will follow-up CT abdomen/pelvis  Secondary to covid  Continue to resuscitate with IVF as necessary    COVID-19 virus infection  Assessment & Plan  Patient presented with dehydration, lactic acidosis, nausea vomiting and diarrhea  He did have some dyspnea and productive cough but is afebrile  Chest x-ray has some mild basilar interstitial infiltrates  He will be maintained on appropriate isolation  We will treat for active COVID-19 infection with possible pneumonia given oxygen requirement and dyspnea  Dexamethasone 6 mg daily started  We will follow inflammatory markers  Lactic acid improved  Pending blood cx and d dimer  Ok to be transferred to medical floor, pulmonary to follow    Urinary retention  Assessment & Plan  Should have méndez, refusing, alert and oriented  Several attempts but unable to call urology as patient does not want méndez, adamant  Understands risk of bladder injury along with unable to diurese with lasix if not adequately urinating  Diuresis would more adequately treat his covid for euvolemia, negative balance, patient expressed understanding of risk  Abdomen tender above  bladder    Hypokalemia  Assessment & Plan  Supplement, keep k > 4  Keep mg > 2  Daily bmp and mg    Acute kidney injury (HCC)  Assessment & Plan  Secondary to dehydration, follow with fluid resuscitation  Hypoxia contributory  Likely some component of covid induced renal injury    Type 2 diabetes mellitus (HCC)- (present on admission)  Assessment & Plan  Glycemic control  Follow-up glycohemoglobin  Keep 120-180       VTE:  Lovenox  Ulcer: Not Indicated  Lines: None    I have performed a physical exam and reviewed and updated ROS and Plan today (7/10/2020). In review of yesterday's note (7/9/2020), there are no changes except as documented above.     Discussed patient condition and risk of morbidity and/or mortality with Hospitalist, RN, RT, Pharmacy, Code status disscussed, Charge nurse / hot rounds and Patient

## 2020-07-10 NOTE — CARE PLAN
Problem: Nutritional:  Goal: Achieve adequate nutritional intake  Description: Patient will consume 50% or > of meals/supplements vs initiation of nutrition support.  Outcome: NOT MET

## 2020-07-10 NOTE — ASSESSMENT & PLAN NOTE
Patient presented with dehydration, lactic acidosis, nausea vomiting and diarrhea  He did have some dyspnea and productive cough but is afebrile  Chest x-ray has some mild basilar interstitial infiltrates  He will be maintained on appropriate isolation  We will treat for active COVID-19 infection with possible pneumonia given oxygen requirement and dyspnea  Dexamethasone 6 mg daily started  We will follow inflammatory markers  Lactic acid improved  Pending blood cx and d dimer  Ok to be transferred to medical floor, pulmonary to follow

## 2020-07-10 NOTE — PROGRESS NOTES
Transfer from ICU    83 yo man with DM found COVID positive with POLY and diarrhea. Stable overnight for floor.    Admit to [facility]

## 2020-07-10 NOTE — ASSESSMENT & PLAN NOTE
-Hemoglobin A1c 6.8%.   -Continue diabetic diet.  -Accu-Cheks only as needed for signs and symptoms of hypoglycemia or during acute illness.  -No need for insulin at this time

## 2020-07-10 NOTE — H&P
Pulmonary History & Physical Note    Date of Service  7/9/2020    Primary Care Physician  Shonda Mcclure M.D.    Code Status  Prior    Chief Complaint  Chief Complaint   Patient presents with   • Cough   • Tired   • Loss of Appetite   • N/V   • Diarrhea       History of Presenting Illness  84 y.o. male who presented 7/9/2020 with the above complaints.  He has a history of diabetes mellitus, hypercholesterolemia and tells me he lives at home with a female roommate in [place].  He has been sheltering in place for the most part but today he went out with his roommate to do some errands.  He apparently had a mild fall or lowered himself to the ground in the [facility] parking lot with incontinent of stool/diarrhea.  According to his roommate he has been complaining of generalized weakness, intermittent vomiting and diarrhea over the past 2 weeks.  He has not had blood in his stool or emesis.  He is not having any abdominal pain fevers or chills but has had increasing weakness.  He has had some mild cough but denies any loss of taste or smell.  He has had a poor appetite and has not been eating much but just drinking water.  He is incontinent of loose liquidy stool in the ED.  COVID screening came back as positive today and he has been continued on isolation.  He was found to have acute kidney injury and elevated lactic acid level of 6.1.  LDH is elevated at 2290 and ferritin is elevated to 586, d-dimer is pending.  He is being admitted to ICU with dehydration lactic acidosis and COVID positive status.  He is received crystalloid volume resuscitation and he has not been hypotensive.  Chest x-ray shows hyperinflated lungs suggesting COPD there is some mild bilateral lower lobe interstitial prominence.  He is a former smoker with cessation about 10 years ago.    Review of Systems  Review of Systems   Constitutional: Positive for malaise/fatigue. Negative for chills and fever.   HENT: Negative for sore throat.    Eyes:  Negative for blurred vision.   Respiratory: Positive for cough, sputum production and shortness of breath. Negative for hemoptysis, wheezing and stridor.    Cardiovascular: Negative for chest pain, palpitations and orthopnea.   Gastrointestinal: Positive for diarrhea, nausea and vomiting. Negative for abdominal pain, blood in stool and melena.   Genitourinary: Negative for dysuria, frequency and urgency.   Musculoskeletal: Negative for myalgias.   Skin: Negative for rash.   Neurological: Positive for dizziness.       Past Medical History   has a past medical history of Diabetes (HCC), Hypercholesteremia, Hyperglycemia, and Rash.    Surgical History   has no past surgical history on file.     Family History  family history includes Stroke in his sister.     Social History   reports that he quit smoking about 12 years ago. He has never used smokeless tobacco. He reports current alcohol use. He reports that he does not use drugs.    Allergies  No Known Allergies    Medications  None       Physical Exam  Temp:  [37 °C (98.6 °F)] 37 °C (98.6 °F)  Pulse:  [85-96] 85  Resp:  [16-27] 27  BP: (105-120)/(51-56) 105/51  SpO2:  [86 %-97 %] 96 %    Physical Exam  Vitals signs and nursing note reviewed.   Constitutional:       Appearance: He is ill-appearing.      Comments: Elderly thin male, ill-appearing   HENT:      Head: Normocephalic and atraumatic.      Mouth/Throat:      Mouth: Mucous membranes are dry.   Eyes:      Pupils: Pupils are equal, round, and reactive to light.   Neck:      Musculoskeletal: Neck supple.   Cardiovascular:      Rate and Rhythm: Normal rate and regular rhythm.      Pulses: Normal pulses.   Pulmonary:      Effort: Pulmonary effort is normal. No respiratory distress.      Breath sounds: Normal breath sounds. No wheezing.   Abdominal:      General: There is no distension.      Palpations: Abdomen is soft.      Tenderness: There is no abdominal tenderness. There is no guarding.   Musculoskeletal:          General: No swelling or deformity.   Skin:     General: Skin is warm and dry.      Capillary Refill: Capillary refill takes less than 2 seconds.      Comments: Chronic hyperpigmentation of bilateral lower extremities   Neurological:      General: No focal deficit present.      Mental Status: He is oriented to person, place, and time.      Cranial Nerves: No cranial nerve deficit.         Laboratory:  Recent Labs     07/09/20  1510   WBC 7.5   RBC 5.18   HEMOGLOBIN 15.9   HEMATOCRIT 47.0   MCV 90.7   MCH 30.7   MCHC 33.8   RDW 40.2   PLATELETCT 177   MPV 9.9     Recent Labs     07/09/20  1510   SODIUM 134*   POTASSIUM 3.8   CHLORIDE 92*   CO2 18*   GLUCOSE 199*   BUN 43*   CREATININE 2.65*   CALCIUM 8.5     Recent Labs     07/09/20  1510   ALTSGPT 36   ASTSGOT 473*   ALKPHOSPHAT 148*   TBILIRUBIN 0.5   GLUCOSE 199*         No results for input(s): NTPROBNP in the last 72 hours.      No results for input(s): TROPONINT in the last 72 hours.    Imaging:  DX-CHEST-PORTABLE (1 VIEW)   Final Result      1.  The lungs are hyperinflated suggesting emphysema/COPD.   2.  There is minimal predominantly lower lobe interstitial opacity which is most likely due to chronic scarring.            Assessment/Plan:      * Dehydration  Assessment & Plan  Secondary to nausea vomiting diarrhea  Crystalloid volume resuscitation, monitor and correct electrolytes  Follow-up lactic acid level    Diarrhea of presumed infectious origin  Assessment & Plan  Denies recent antibiotic therapy  Regardless will rule out C. difficile, if not resolved will send stool for ova and parasites and further work-up  Continue volume resuscitation  Abdominal exam relatively benign at this point.  If he does not clinically improve as expected with hydration will follow-up CT abdomen/pelvis    COVID-19 virus infection  Assessment & Plan  Patient presented with dehydration, lactic acidosis, nausea vomiting and diarrhea  He did have some dyspnea and productive cough  but is afebrile  Chest x-ray has some mild basilar interstitial infiltrates  He will be maintained on appropriate isolation  We will treat for active COVID-19 infection with possible pneumonia given oxygen requirement and dyspnea  Dexamethasone 6 mg daily started  We will follow inflammatory markers  As he was dehydrated significantly he is receiving crystalloid volume resuscitation.  Will monitor respiratory status closely in ICU.    Acute kidney injury (HCC)  Assessment & Plan  Secondary to dehydration, follow with fluid resuscitation    Type 2 diabetes mellitus (HCC)- (present on admission)  Assessment & Plan  Glycemic control  Follow-up glycohemoglobin    The patient remains critically ill.  Critical care time = 45 minutes in directly providing and coordinating critical care and extensive data review.  No time overlap and excludes procedures.

## 2020-07-10 NOTE — DIETARY
Nutrition Services: Update   Day 1 of admit.  Ovi Bejarano is a 84 y.o. male with admitting DX of COVID-19 virus infection, dehydration, and sepsis.    Per H&P, pt presented with generalized weakness and intermittent vomiting and diarrhea over the past 2 weeks.  Noted not to be eating much and just drinking water.  Borderline low BMI noted - per chart review, pt weighed 64.4 kg via stand up scale on 6/3/2020 ED visit.  This is an 8.1% wt loss over the past month, which is severe.  Pt is currently on a diabetic diet, where PO intake this AM was 0%.  Will add oral nutrition supplements (Boost Glucose Control) and closely monitor - discussed potential need/implications for nutrition support with MD and addition of Thiamine to reduce the risk of refeeding.     -+Nasal cannula.  -Labs: Potassium: 3.2, BUN: 32, Creat.: 1.41, GFR: 48, HgbA1C: 6.8%.  LDH from 7/9: 2290.    -Meds: Rocephin, Decadron, Lasix, SSI, Flomax.  -LBM: 7/10 - small, loose.    Malnutrition risk: Pt with severe malnutrition in the context of acute illness related malnutrition related to ongoing n/v and diarrhea affecting appetite/intake, ?suspect r/t COVID19, as evidenced by poor PO intake for >5 days (ongoing for 2 weeks) of <50% of estimated needs and a severe 8.1% wt loss over the past month.      Recommendations/Plan:  1. Boost Glucose Control TID on meal trays.    2. Encourage intake of meals/supplements.  Continue to document % consumed under ADLs.   3. If PO intake remains poor over the next 24 - 48 hours, consider nutrition support if within POC.   4. Monitor for refeeding: Order BMP w/ Mg and Phos x 7 days. Replete K, Phos and Mg prn. Supplement 100 mg Thiamine x 7 days to reduce risk of refeeding.  5. Monitor weight.    RD follows

## 2020-07-10 NOTE — ED NOTES
Lab called with critical result of COVID-19.  Critical lab result read back to lab.  Dr. Parikh notified of critical lab result.  Critical lab result read back by Dr. Parikh.

## 2020-07-10 NOTE — ED NOTES
Unable to obtain med rec at this time.   Patient unreachable by phone.  Pharmacy on record stated no current or active medication on file.

## 2020-07-10 NOTE — ASSESSMENT & PLAN NOTE
Secondary to dehydration, follow with fluid resuscitation  Hypoxia contributory  Likely some component of covid induced renal injury

## 2020-07-10 NOTE — ASSESSMENT & PLAN NOTE
Secondary to nausea vomiting diarrhea  Crystalloid volume resuscitation, monitor and correct electrolytes  Follow-up lactic acid normalized  Continue ivf as necessary  Not currently overloaded

## 2020-07-10 NOTE — ASSESSMENT & PLAN NOTE
Denies recent antibiotic therapy  Regardless will rule out C. difficile, if not resolved will send stool for ova and parasites and further work-up  Continue volume resuscitation  Abdominal exam relatively benign at this point.  If he does not clinically improve as expected with hydration will follow-up CT abdomen/pelvis  Secondary to covid  Continue to resuscitate with IVF as necessary

## 2020-07-10 NOTE — PROGRESS NOTES
[facility] Medicine Daily Progress Note    Date of Service  7/10/2020    Chief Complaint  84 y.o. male admitted 7/9/2020 with cough n/v    Hospital Course    Patient is an 84 y.o. male with history of diabetes mellitus, hypercholesterolemia and bph for which he has reportedly declined treatment in the past.  He is also a previous smoker. He presented to the er due to progressive weakness following 1-2 weeks of intermittent vomiting and diarrhea.  He denies fevers, chills, melena or hematochezia.  He also reported a mild cough.  He was found to be positive for COVID with acute renal failure, dehydration, and hypotension and was admitted to the ICU.      Interval Problem Update  I was informed that he would be transferring out of the icu this afternoon. His respiratory rate however had been increasing up to 34 and he was significantly hypertensive, so he will remain there until more stable  He also has urinary retention which is chronic and he is declining a méndez  He denies further diarrhea. He denies abdominal pain. He has a mild cough, denies sob  No cp, no dizziness  He is a poor historian  Ros otherwise negative    Consultants/Specialty  Critical care    Code Status  Full    Disposition  tbd    Review of Systems  Review of Systems   Constitutional: Negative.  Negative for chills, diaphoresis, fever, malaise/fatigue and weight loss.   HENT: Negative.  Negative for sore throat.    Eyes: Negative.  Negative for blurred vision.   Respiratory: Negative.  Negative for cough and shortness of breath.    Cardiovascular: Negative.  Negative for chest pain, palpitations and leg swelling.   Gastrointestinal: Negative.  Negative for abdominal pain, nausea and vomiting.   Genitourinary: Negative.  Negative for dysuria.   Musculoskeletal: Negative.  Negative for myalgias.   Skin: Negative.  Negative for itching and rash.   Neurological: Negative.  Negative for dizziness, focal weakness, weakness and headaches.    Endo/Heme/Allergies: Negative.  Does not bruise/bleed easily.   Psychiatric/Behavioral: Negative.  Negative for depression, substance abuse and suicidal ideas.   All other systems reviewed and are negative.       Physical Exam  Temp:  [36.3 °C (97.4 °F)-36.9 °C (98.4 °F)] 36.3 °C (97.4 °F)  Pulse:  [58-93] 74  Resp:  [18-44] 25  BP: (105-205)/(46-82) 182/74  SpO2:  [86 %-99 %] 98 %    Physical Exam  Vitals signs and nursing note reviewed. Exam conducted with a chaperone present.   Constitutional:       General: He is not in acute distress.     Appearance: Normal appearance. He is not diaphoretic.   HENT:      Head: Normocephalic.      Nose: Nose normal.      Mouth/Throat:      Mouth: Mucous membranes are moist.   Eyes:      Pupils: Pupils are equal, round, and reactive to light.   Cardiovascular:      Rate and Rhythm: Normal rate and regular rhythm.      Pulses: Normal pulses.      Heart sounds: Normal heart sounds.   Pulmonary:      Effort: Pulmonary effort is normal.      Comments: Decreased bs  Abdominal:      General: Abdomen is flat. Bowel sounds are normal.      Palpations: Abdomen is soft.   Musculoskeletal: Normal range of motion.         General: No swelling or deformity.   Skin:     General: Skin is warm and dry.      Capillary Refill: Capillary refill takes less than 2 seconds.   Neurological:      General: No focal deficit present.      Mental Status: He is alert and oriented to person, place, and time.      Cranial Nerves: No cranial nerve deficit.   Psychiatric:         Mood and Affect: Mood normal.         Behavior: Behavior normal.         Fluids    Intake/Output Summary (Last 24 hours) at 7/10/2020 1605  Last data filed at 7/10/2020 1400  Gross per 24 hour   Intake 3478.33 ml   Output 25 ml   Net 3453.33 ml       Laboratory  Recent Labs     07/09/20  1510 07/10/20  0335   WBC 7.5 6.4   RBC 5.18 5.33   HEMOGLOBIN 15.9 16.2   HEMATOCRIT 47.0 49.0   MCV 90.7 91.9   MCH 30.7 30.4   MCHC 33.8 33.1*    RDW 40.2 41.1   PLATELETCT 177 155*   MPV 9.9 9.6     Recent Labs     07/09/20  1510 07/10/20  0335   SODIUM 134* 135   POTASSIUM 3.8 3.2*   CHLORIDE 92* 99   CO2 18* 21   GLUCOSE 199* 93   BUN 43* 32*   CREATININE 2.65* 1.41*   CALCIUM 8.5 7.9*     Recent Labs     07/09/20  1938   INR 1.02               Imaging  DX-CHEST-PORTABLE (1 VIEW)   Final Result      1.  The lungs are hyperinflated suggesting emphysema/COPD.   2.  There is minimal predominantly lower lobe interstitial opacity which is most likely due to chronic scarring.           Assessment/Plan  * Dehydration  Assessment & Plan  resolving    Diarrhea of presumed infectious origin  Assessment & Plan  Resolving  C dif negative  Likely related to covid    COVID-19 virus infection  Assessment & Plan  D dimer elevated  Continue lovenox  Will check Vit d   Continue dexamethasone  Continue supportive care    COPD (chronic obstructive pulmonary disease) (HCC)- (present on admission)  Assessment & Plan  No s/o exacerbation at this time  Dexamethasone for covid  following    Acute kidney injury (HCC)  Assessment & Plan  Improving with hydration  Bph could also be contributing  Flomax started  Pt refusing cath  Following  H/o kidney mass    Type 2 diabetes mellitus (HCC)- (present on admission)  Assessment & Plan  Continue ssi  Hypoglycemic protocol       VTE prophylaxis:   lovenox

## 2020-07-11 LAB
ALBUMIN SERPL BCP-MCNC: 3.1 G/DL (ref 3.2–4.9)
ALP SERPL-CCNC: 291 U/L (ref 30–99)
ALT SERPL-CCNC: 49 U/L (ref 2–50)
ANION GAP SERPL CALC-SCNC: 17 MMOL/L (ref 7–16)
ANION GAP SERPL CALC-SCNC: 22 MMOL/L (ref 7–16)
APTT PPP: 43.6 SEC (ref 24.7–36)
AST SERPL-CCNC: 537 U/L (ref 12–45)
BACTERIA UR CULT: NORMAL
BASOPHILS # BLD AUTO: 0 % (ref 0–1.8)
BASOPHILS # BLD AUTO: 0 % (ref 0–1.8)
BASOPHILS # BLD: 0 K/UL (ref 0–0.12)
BASOPHILS # BLD: 0 K/UL (ref 0–0.12)
BILIRUB CONJ SERPL-MCNC: <0.2 MG/DL (ref 0.1–0.5)
BILIRUB INDIRECT SERPL-MCNC: ABNORMAL MG/DL (ref 0–1)
BILIRUB SERPL-MCNC: 0.3 MG/DL (ref 0.1–1.5)
BUN SERPL-MCNC: 33 MG/DL (ref 8–22)
BUN SERPL-MCNC: 33 MG/DL (ref 8–22)
BURR CELLS BLD QL SMEAR: NORMAL
CALCIUM SERPL-MCNC: 8.2 MG/DL (ref 8.5–10.5)
CALCIUM SERPL-MCNC: 8.2 MG/DL (ref 8.5–10.5)
CHLORIDE SERPL-SCNC: 96 MMOL/L (ref 96–112)
CHLORIDE SERPL-SCNC: 98 MMOL/L (ref 96–112)
CK SERPL-CCNC: 473 U/L (ref 0–154)
CO2 SERPL-SCNC: 17 MMOL/L (ref 20–33)
CO2 SERPL-SCNC: 20 MMOL/L (ref 20–33)
CREAT SERPL-MCNC: 1.12 MG/DL (ref 0.5–1.4)
CREAT SERPL-MCNC: 1.17 MG/DL (ref 0.5–1.4)
CRP SERPL HS-MCNC: 3.56 MG/DL (ref 0–0.75)
D DIMER PPP IA.FEU-MCNC: 1.81 UG/ML (FEU) (ref 0–0.5)
EOSINOPHIL # BLD AUTO: 0 K/UL (ref 0–0.51)
EOSINOPHIL # BLD AUTO: 0 K/UL (ref 0–0.51)
EOSINOPHIL NFR BLD: 0 % (ref 0–6.9)
EOSINOPHIL NFR BLD: 0 % (ref 0–6.9)
ERYTHROCYTE [DISTWIDTH] IN BLOOD BY AUTOMATED COUNT: 40.2 FL (ref 35.9–50)
ERYTHROCYTE [DISTWIDTH] IN BLOOD BY AUTOMATED COUNT: 40.9 FL (ref 35.9–50)
FERRITIN SERPL-MCNC: 3015 NG/ML (ref 22–322)
FIBRINOGEN PPP-MCNC: 448 MG/DL (ref 215–460)
GLUCOSE BLD-MCNC: 151 MG/DL (ref 65–99)
GLUCOSE BLD-MCNC: 180 MG/DL (ref 65–99)
GLUCOSE SERPL-MCNC: 124 MG/DL (ref 65–99)
GLUCOSE SERPL-MCNC: 154 MG/DL (ref 65–99)
HCT VFR BLD AUTO: 43.6 % (ref 42–52)
HCT VFR BLD AUTO: 43.8 % (ref 42–52)
HGB BLD-MCNC: 14.5 G/DL (ref 14–18)
HGB BLD-MCNC: 15 G/DL (ref 14–18)
INR PPP: 0.99 (ref 0.87–1.13)
LDH SERPL L TO P-CCNC: >2500 U/L (ref 107–266)
LYMPHOCYTES # BLD AUTO: 0.13 K/UL (ref 1–4.8)
LYMPHOCYTES # BLD AUTO: 0.42 K/UL (ref 1–4.8)
LYMPHOCYTES NFR BLD: 1.7 % (ref 22–41)
LYMPHOCYTES NFR BLD: 5.2 % (ref 22–41)
MAGNESIUM SERPL-MCNC: 2.2 MG/DL (ref 1.5–2.5)
MANUAL DIFF BLD: NORMAL
MANUAL DIFF BLD: NORMAL
MCH RBC QN AUTO: 30.3 PG (ref 27–33)
MCH RBC QN AUTO: 31.1 PG (ref 27–33)
MCHC RBC AUTO-ENTMCNC: 33.1 G/DL (ref 33.7–35.3)
MCHC RBC AUTO-ENTMCNC: 34.4 G/DL (ref 33.7–35.3)
MCV RBC AUTO: 90.5 FL (ref 81.4–97.8)
MCV RBC AUTO: 91.4 FL (ref 81.4–97.8)
MONOCYTES # BLD AUTO: 0.41 K/UL (ref 0–0.85)
MONOCYTES # BLD AUTO: 0.49 K/UL (ref 0–0.85)
MONOCYTES NFR BLD AUTO: 5.2 % (ref 0–13.4)
MONOCYTES NFR BLD AUTO: 6.1 % (ref 0–13.4)
MORPHOLOGY BLD-IMP: NORMAL
MORPHOLOGY BLD-IMP: NORMAL
NEUTROPHILS # BLD AUTO: 7.18 K/UL (ref 1.82–7.42)
NEUTROPHILS # BLD AUTO: 7.35 K/UL (ref 1.82–7.42)
NEUTROPHILS NFR BLD: 87 % (ref 44–72)
NEUTROPHILS NFR BLD: 88.7 % (ref 44–72)
NEUTS BAND NFR BLD MANUAL: 1.7 % (ref 0–10)
NEUTS BAND NFR BLD MANUAL: 4.3 % (ref 0–10)
NRBC # BLD AUTO: 0 K/UL
NRBC # BLD AUTO: 0 K/UL
NRBC BLD-RTO: 0 /100 WBC
NRBC BLD-RTO: 0 /100 WBC
NT-PROBNP SERPL IA-MCNC: 1046 PG/ML (ref 0–125)
PLATELET # BLD AUTO: 165 K/UL (ref 164–446)
PLATELET # BLD AUTO: 175 K/UL (ref 164–446)
PLATELET BLD QL SMEAR: NORMAL
PLATELET BLD QL SMEAR: NORMAL
PMV BLD AUTO: 10.1 FL (ref 9–12.9)
PMV BLD AUTO: 9.9 FL (ref 9–12.9)
POIKILOCYTOSIS BLD QL SMEAR: NORMAL
POTASSIUM SERPL-SCNC: 3.9 MMOL/L (ref 3.6–5.5)
POTASSIUM SERPL-SCNC: 3.9 MMOL/L (ref 3.6–5.5)
PROT SERPL-MCNC: 6.5 G/DL (ref 6–8.2)
PROTHROMBIN TIME: 13.4 SEC (ref 12–14.6)
RBC # BLD AUTO: 4.79 M/UL (ref 4.7–6.1)
RBC # BLD AUTO: 4.82 M/UL (ref 4.7–6.1)
RBC BLD AUTO: NORMAL
RBC BLD AUTO: PRESENT
SIGNIFICANT IND 70042: NORMAL
SITE SITE: NORMAL
SODIUM SERPL-SCNC: 133 MMOL/L (ref 135–145)
SODIUM SERPL-SCNC: 137 MMOL/L (ref 135–145)
SOURCE SOURCE: NORMAL
TROPONIN T SERPL-MCNC: 19 NG/L (ref 6–19)
WBC # BLD AUTO: 7.9 K/UL (ref 4.8–10.8)
WBC # BLD AUTO: 8.1 K/UL (ref 4.8–10.8)

## 2020-07-11 PROCEDURE — 700105 HCHG RX REV CODE 258: Performed by: INTERNAL MEDICINE

## 2020-07-11 PROCEDURE — 85610 PROTHROMBIN TIME: CPT

## 2020-07-11 PROCEDURE — 700111 HCHG RX REV CODE 636 W/ 250 OVERRIDE (IP): Performed by: HOSPITALIST

## 2020-07-11 PROCEDURE — 85027 COMPLETE CBC AUTOMATED: CPT

## 2020-07-11 PROCEDURE — 700102 HCHG RX REV CODE 250 W/ 637 OVERRIDE(OP): Performed by: INTERNAL MEDICINE

## 2020-07-11 PROCEDURE — 82550 ASSAY OF CK (CPK): CPT

## 2020-07-11 PROCEDURE — 87040 BLOOD CULTURE FOR BACTERIA: CPT

## 2020-07-11 PROCEDURE — 83615 LACTATE (LD) (LDH) ENZYME: CPT

## 2020-07-11 PROCEDURE — 770001 HCHG ROOM/CARE - MED/SURG/GYN PRIV*

## 2020-07-11 PROCEDURE — 80076 HEPATIC FUNCTION PANEL: CPT

## 2020-07-11 PROCEDURE — 85730 THROMBOPLASTIN TIME PARTIAL: CPT

## 2020-07-11 PROCEDURE — 85384 FIBRINOGEN ACTIVITY: CPT

## 2020-07-11 PROCEDURE — 99232 SBSQ HOSP IP/OBS MODERATE 35: CPT | Mod: CS | Performed by: HOSPITALIST

## 2020-07-11 PROCEDURE — 86140 C-REACTIVE PROTEIN: CPT

## 2020-07-11 PROCEDURE — 82728 ASSAY OF FERRITIN: CPT

## 2020-07-11 PROCEDURE — A9270 NON-COVERED ITEM OR SERVICE: HCPCS | Performed by: INTERNAL MEDICINE

## 2020-07-11 PROCEDURE — 36415 COLL VENOUS BLD VENIPUNCTURE: CPT

## 2020-07-11 PROCEDURE — 700102 HCHG RX REV CODE 250 W/ 637 OVERRIDE(OP): Performed by: HOSPITALIST

## 2020-07-11 PROCEDURE — 80048 BASIC METABOLIC PNL TOTAL CA: CPT

## 2020-07-11 PROCEDURE — 83880 ASSAY OF NATRIURETIC PEPTIDE: CPT

## 2020-07-11 PROCEDURE — 83520 IMMUNOASSAY QUANT NOS NONAB: CPT

## 2020-07-11 PROCEDURE — 83735 ASSAY OF MAGNESIUM: CPT

## 2020-07-11 PROCEDURE — 82962 GLUCOSE BLOOD TEST: CPT

## 2020-07-11 PROCEDURE — A9270 NON-COVERED ITEM OR SERVICE: HCPCS | Performed by: HOSPITALIST

## 2020-07-11 PROCEDURE — 700111 HCHG RX REV CODE 636 W/ 250 OVERRIDE (IP): Performed by: INTERNAL MEDICINE

## 2020-07-11 PROCEDURE — 85007 BL SMEAR W/DIFF WBC COUNT: CPT

## 2020-07-11 PROCEDURE — 84484 ASSAY OF TROPONIN QUANT: CPT

## 2020-07-11 PROCEDURE — 85379 FIBRIN DEGRADATION QUANT: CPT

## 2020-07-11 RX ADMIN — ENOXAPARIN SODIUM 60 MG: 60 INJECTION SUBCUTANEOUS at 17:48

## 2020-07-11 RX ADMIN — ZINC SULFATE 220 MG (50 MG) CAPSULE 220 MG: CAPSULE at 06:05

## 2020-07-11 RX ADMIN — ENOXAPARIN SODIUM 60 MG: 60 INJECTION SUBCUTANEOUS at 06:06

## 2020-07-11 RX ADMIN — AMLODIPINE BESYLATE 10 MG: 10 TABLET ORAL at 06:05

## 2020-07-11 RX ADMIN — DEXAMETHASONE SODIUM PHOSPHATE 6 MG: 4 INJECTION, SOLUTION INTRA-ARTICULAR; INTRALESIONAL; INTRAMUSCULAR; INTRAVENOUS; SOFT TISSUE at 06:05

## 2020-07-11 RX ADMIN — TAMSULOSIN HYDROCHLORIDE 0.8 MG: 0.4 CAPSULE ORAL at 08:03

## 2020-07-11 RX ADMIN — INSULIN HUMAN 2 UNITS: 100 INJECTION, SOLUTION PARENTERAL at 17:51

## 2020-07-11 RX ADMIN — INSULIN HUMAN 2 UNITS: 100 INJECTION, SOLUTION PARENTERAL at 06:27

## 2020-07-11 RX ADMIN — CEFTRIAXONE SODIUM 1000 MG: 1 INJECTION, POWDER, FOR SOLUTION INTRAMUSCULAR; INTRAVENOUS at 06:06

## 2020-07-11 ASSESSMENT — ENCOUNTER SYMPTOMS
ABDOMINAL PAIN: 0
PALPITATIONS: 0
DIAPHORESIS: 0
CARDIOVASCULAR NEGATIVE: 1
MUSCULOSKELETAL NEGATIVE: 1
SORE THROAT: 0
MYALGIAS: 0
FOCAL WEAKNESS: 0
VOMITING: 0
FEVER: 0
DEPRESSION: 0
BRUISES/BLEEDS EASILY: 0
WEAKNESS: 0
CHILLS: 0
CONSTITUTIONAL NEGATIVE: 1
COUGH: 0
BLURRED VISION: 0
DIZZINESS: 0
RESPIRATORY NEGATIVE: 1
EYES NEGATIVE: 1
PSYCHIATRIC NEGATIVE: 1
GASTROINTESTINAL NEGATIVE: 1
HEADACHES: 0
NAUSEA: 0
NEUROLOGICAL NEGATIVE: 1
WEIGHT LOSS: 0
SHORTNESS OF BREATH: 0

## 2020-07-11 ASSESSMENT — LIFESTYLE VARIABLES: SUBSTANCE_ABUSE: 0

## 2020-07-11 NOTE — ASSESSMENT & PLAN NOTE
-Continue flomax and proscar  -Méndez catheter in place for urinary retention.  Failed previous attempts at removal.  -Follow up outpatient.  Will likely have to discharge with méndez.

## 2020-07-11 NOTE — PROGRESS NOTES
"[facility] Medicine Daily Progress Note    Date of Service  7/11/2020    Chief Complaint  84 y.o. male admitted 7/9/2020 with cough n/v    Hospital Course    Patient is an 84 y.o. male with history of diabetes mellitus, hypercholesterolemia and bph for which he has reportedly declined treatment in the past.  He is also a previous smoker. He presented to the er due to progressive weakness following 1-2 weeks of intermittent vomiting and diarrhea.  He denies fevers, chills, melena or hematochezia.  He also reported a mild cough.  He was found to be positive for COVID with acute renal failure, dehydration, and hypotension and was admitted to the ICU.      Interval Problem Update  PATTI rodriguez is very concerned about his roommate, his nurse has already contacted  to facilitate a wellfare check  Patient says \"I feel fine, I'm fine, I don't need to be in the hospital, I don't need oxygen\"  He continues to de sat without oxygen, currently requiring 5 L to maintain sat greater than 90.  He denies sob  No cough  Denies abdominal pain  Continues to refuse méndez or urology consult  He is voiding small amounts frequently  He is axox3 but a difficult historian  He states he has 2 children but has not seen them in 30 years \"I don't know where they are, last I knew they were in [place]\"  Ros otherwise negative  BP improved    Consultants/Specialty  Critical care    Code Status  Full    Disposition  tbd    Review of Systems  Review of Systems   Constitutional: Negative.  Negative for chills, diaphoresis, fever, malaise/fatigue and weight loss.   HENT: Negative.  Negative for sore throat.    Eyes: Negative.  Negative for blurred vision.   Respiratory: Negative.  Negative for cough and shortness of breath.    Cardiovascular: Negative.  Negative for chest pain, palpitations and leg swelling.   Gastrointestinal: Negative.  Negative for abdominal pain, nausea and vomiting.   Genitourinary: Negative.  Negative for dysuria.   Musculoskeletal: " Negative.  Negative for myalgias.   Skin: Negative.  Negative for itching and rash.   Neurological: Negative.  Negative for dizziness, focal weakness, weakness and headaches.   Endo/Heme/Allergies: Negative.  Does not bruise/bleed easily.   Psychiatric/Behavioral: Negative.  Negative for depression, substance abuse and suicidal ideas.   All other systems reviewed and are negative.       Physical Exam  Temp:  [36.2 °C (97.2 °F)-36.6 °C (97.9 °F)] 36.2 °C (97.2 °F)  Pulse:  [68-94] 88  Resp:  [17-40] 39  BP: ()/(42-88) 143/61  SpO2:  [85 %-99 %] 97 %    Physical Exam  Vitals signs and nursing note reviewed. Exam conducted with a chaperone present.   Constitutional:       General: He is not in acute distress.     Appearance: Normal appearance. He is not diaphoretic.   HENT:      Head: Normocephalic.      Nose: Nose normal.      Mouth/Throat:      Mouth: Mucous membranes are moist.   Eyes:      Pupils: Pupils are equal, round, and reactive to light.   Cardiovascular:      Rate and Rhythm: Normal rate and regular rhythm.      Pulses: Normal pulses.      Heart sounds: Normal heart sounds.   Pulmonary:      Effort: Pulmonary effort is normal.      Comments: Decreased bs  Abdominal:      General: Abdomen is flat. Bowel sounds are normal.      Palpations: Abdomen is soft.   Musculoskeletal: Normal range of motion.         General: No swelling or deformity.   Skin:     General: Skin is warm and dry.      Capillary Refill: Capillary refill takes less than 2 seconds.   Neurological:      General: No focal deficit present.      Mental Status: He is alert and oriented to person, place, and time.      Cranial Nerves: No cranial nerve deficit.   Psychiatric:         Mood and Affect: Mood normal.         Behavior: Behavior normal.         Fluids    Intake/Output Summary (Last 24 hours) at 7/11/2020 1343  Last data filed at 7/11/2020 0600  Gross per 24 hour   Intake 948.33 ml   Output 250 ml   Net 698.33 ml        Laboratory  Recent Labs     07/10/20  0335 07/11/20  0240 07/11/20  0620   WBC 6.4 8.1 7.9   RBC 5.33 4.79 4.82   HEMOGLOBIN 16.2 14.5 15.0   HEMATOCRIT 49.0 43.8 43.6   MCV 91.9 91.4 90.5   MCH 30.4 30.3 31.1   MCHC 33.1* 33.1* 34.4   RDW 41.1 40.9 40.2   PLATELETCT 155* 175 165   MPV 9.6 10.1 9.9     Recent Labs     07/10/20  0335 07/11/20  0240 07/11/20  0620   SODIUM 135 137 133*   POTASSIUM 3.2* 3.9 3.9   CHLORIDE 99 98 96   CO2 21 17* 20   GLUCOSE 93 124* 154*   BUN 32* 33* 33*   CREATININE 1.41* 1.17 1.12   CALCIUM 7.9* 8.2* 8.2*     Recent Labs     07/09/20  1938 07/11/20  0620   APTT  --  43.6*   INR 1.02 0.99               Imaging  DX-CHEST-PORTABLE (1 VIEW)   Final Result      1.  The lungs are hyperinflated suggesting emphysema/COPD.   2.  There is minimal predominantly lower lobe interstitial opacity which is most likely due to chronic scarring.           Assessment/Plan  * Dehydration  Assessment & Plan  resolving    Diarrhea of presumed infectious origin  Assessment & Plan  Resolving  C dif negative  Likely related to covid    COVID-19 virus infection  Assessment & Plan  D dimer elevated  Continue lovenox  Vitamin D levels pending  Continue dexamethasone  Continue supportive care    COPD (chronic obstructive pulmonary disease) (HCC)- (present on admission)  Assessment & Plan  No s/o exacerbation at this time  Continue dexamethasone for covid  following    Urinary retention  Assessment & Plan  Reportedly chronic  Refusing méndez or urology  Accepting flomax which will be continued    Acute kidney injury (HCC)  Assessment & Plan  Improving with hydration  Bph could also be contributing  Flomax started  Pt continues to refuse cath  Following  H/o kidney mass    Type 2 diabetes mellitus (HCC)- (present on admission)  Assessment & Plan  Continue ssi  Hypoglycemic protocol       VTE prophylaxis:   lovenox

## 2020-07-11 NOTE — ASSESSMENT & PLAN NOTE
Should have méndez, refusing, alert and oriented  Several attempts but unable to call urology as patient does not want méndez, adamant  Understands risk of bladder injury along with unable to diurese with lasix if not adequately urinating  Diuresis would more adequately treat his covid for euvolemia, negative balance, patient expressed understanding of risk  Abdomen tender above bladder

## 2020-07-11 NOTE — DISCHARGE PLANNING
:    Received a call from Daniele stating that patient is asking for someone to do a welfare check on his roommate: Nani Shaw.  Notified that the phone number listed for Nani ([phone]) is not working.  Also attempted to call but number is not in service.  Patient lives at the [facility], room [address].  SW called the [facility] ([phone]) and spoke with the  who stated she would check and asked that I call back in 10 minutes.  CORINA called back 10 minutes later and she stated that she went to the room, knocked-no answer, opened the door and that nobody was in the room.  CORINA updated Daniele who will let the patient know.

## 2020-07-11 NOTE — CARE PLAN
Problem: Safety  Goal: Will remain free from injury  Outcome: PROGRESSING AS EXPECTED  Goal: Will remain free from falls  Outcome: PROGRESSING AS EXPECTED     Problem: Skin Integrity  Goal: Risk for impaired skin integrity will decrease  Outcome: PROGRESSING AS EXPECTED     Problem: Communication  Goal: The ability to communicate needs accurately and effectively will improve  Outcome: PROGRESSING SLOWER THAN EXPECTED     Problem: Bowel/Gastric:  Goal: Normal bowel function is maintained or improved  Outcome: PROGRESSING SLOWER THAN EXPECTED  Goal: Will not experience complications related to bowel motility  Outcome: PROGRESSING SLOWER THAN EXPECTED     Problem: Knowledge Deficit  Goal: Knowledge of disease process/condition, treatment plan, diagnostic tests, and medications will improve  Outcome: PROGRESSING SLOWER THAN EXPECTED  Goal: Knowledge of the prescribed therapeutic regimen will improve  Outcome: PROGRESSING SLOWER THAN EXPECTED     Problem: Respiratory:  Goal: Respiratory status will improve  Outcome: PROGRESSING SLOWER THAN EXPECTED

## 2020-07-12 LAB
ANION GAP SERPL CALC-SCNC: 18 MMOL/L (ref 7–16)
BASOPHILS # BLD AUTO: 0.2 % (ref 0–1.8)
BASOPHILS # BLD: 0.02 K/UL (ref 0–0.12)
BUN SERPL-MCNC: 32 MG/DL (ref 8–22)
CALCIUM SERPL-MCNC: 8.4 MG/DL (ref 8.5–10.5)
CHLORIDE SERPL-SCNC: 99 MMOL/L (ref 96–112)
CO2 SERPL-SCNC: 19 MMOL/L (ref 20–33)
COVID ORDER STATUS COVID19: NORMAL
CREAT SERPL-MCNC: 1.13 MG/DL (ref 0.5–1.4)
EOSINOPHIL # BLD AUTO: 0 K/UL (ref 0–0.51)
EOSINOPHIL NFR BLD: 0 % (ref 0–6.9)
ERYTHROCYTE [DISTWIDTH] IN BLOOD BY AUTOMATED COUNT: 39.8 FL (ref 35.9–50)
GLUCOSE BLD-MCNC: 164 MG/DL (ref 65–99)
GLUCOSE BLD-MCNC: 167 MG/DL (ref 65–99)
GLUCOSE SERPL-MCNC: 162 MG/DL (ref 65–99)
HCT VFR BLD AUTO: 43 % (ref 42–52)
HGB BLD-MCNC: 14.6 G/DL (ref 14–18)
IMM GRANULOCYTES # BLD AUTO: 0.07 K/UL (ref 0–0.11)
IMM GRANULOCYTES NFR BLD AUTO: 0.7 % (ref 0–0.9)
LYMPHOCYTES # BLD AUTO: 0.55 K/UL (ref 1–4.8)
LYMPHOCYTES NFR BLD: 5.7 % (ref 22–41)
MCH RBC QN AUTO: 30.6 PG (ref 27–33)
MCHC RBC AUTO-ENTMCNC: 34 G/DL (ref 33.7–35.3)
MCV RBC AUTO: 90.1 FL (ref 81.4–97.8)
MONOCYTES # BLD AUTO: 0.89 K/UL (ref 0–0.85)
MONOCYTES NFR BLD AUTO: 9.3 % (ref 0–13.4)
NEUTROPHILS # BLD AUTO: 8.04 K/UL (ref 1.82–7.42)
NEUTROPHILS NFR BLD: 84.1 % (ref 44–72)
NRBC # BLD AUTO: 0 K/UL
NRBC BLD-RTO: 0 /100 WBC
PLATELET # BLD AUTO: 161 K/UL (ref 164–446)
PMV BLD AUTO: 9.8 FL (ref 9–12.9)
POTASSIUM SERPL-SCNC: 3.6 MMOL/L (ref 3.6–5.5)
RBC # BLD AUTO: 4.77 M/UL (ref 4.7–6.1)
SODIUM SERPL-SCNC: 136 MMOL/L (ref 135–145)
WBC # BLD AUTO: 9.6 K/UL (ref 4.8–10.8)

## 2020-07-12 PROCEDURE — 700111 HCHG RX REV CODE 636 W/ 250 OVERRIDE (IP): Performed by: INTERNAL MEDICINE

## 2020-07-12 PROCEDURE — U0003 INFECTIOUS AGENT DETECTION BY NUCLEIC ACID (DNA OR RNA); SEVERE ACUTE RESPIRATORY SYNDROME CORONAVIRUS 2 (SARS-COV-2) (CORONAVIRUS DISEASE [COVID-19]), AMPLIFIED PROBE TECHNIQUE, MAKING USE OF HIGH THROUGHPUT TECHNOLOGIES AS DESCRIBED BY CMS-2020-01-R: HCPCS

## 2020-07-12 PROCEDURE — 700105 HCHG RX REV CODE 258

## 2020-07-12 PROCEDURE — 87040 BLOOD CULTURE FOR BACTERIA: CPT

## 2020-07-12 PROCEDURE — 700111 HCHG RX REV CODE 636 W/ 250 OVERRIDE (IP): Performed by: HOSPITALIST

## 2020-07-12 PROCEDURE — 85025 COMPLETE CBC W/AUTO DIFF WBC: CPT

## 2020-07-12 PROCEDURE — 36415 COLL VENOUS BLD VENIPUNCTURE: CPT

## 2020-07-12 PROCEDURE — 99232 SBSQ HOSP IP/OBS MODERATE 35: CPT | Mod: CS | Performed by: HOSPITALIST

## 2020-07-12 PROCEDURE — C9803 HOPD COVID-19 SPEC COLLECT: HCPCS | Performed by: HOSPITALIST

## 2020-07-12 PROCEDURE — 80048 BASIC METABOLIC PNL TOTAL CA: CPT

## 2020-07-12 PROCEDURE — A9270 NON-COVERED ITEM OR SERVICE: HCPCS | Performed by: HOSPITALIST

## 2020-07-12 PROCEDURE — 82962 GLUCOSE BLOOD TEST: CPT

## 2020-07-12 PROCEDURE — 700102 HCHG RX REV CODE 250 W/ 637 OVERRIDE(OP): Performed by: INTERNAL MEDICINE

## 2020-07-12 PROCEDURE — 700102 HCHG RX REV CODE 250 W/ 637 OVERRIDE(OP): Performed by: HOSPITALIST

## 2020-07-12 PROCEDURE — A9270 NON-COVERED ITEM OR SERVICE: HCPCS | Performed by: INTERNAL MEDICINE

## 2020-07-12 PROCEDURE — 700105 HCHG RX REV CODE 258: Performed by: INTERNAL MEDICINE

## 2020-07-12 PROCEDURE — 770021 HCHG ROOM/CARE - ISO PRIVATE

## 2020-07-12 RX ORDER — SODIUM CHLORIDE 9 MG/ML
INJECTION, SOLUTION INTRAVENOUS
Status: COMPLETED
Start: 2020-07-12 | End: 2020-07-12

## 2020-07-12 RX ORDER — DEXAMETHASONE SODIUM PHOSPHATE 4 MG/ML
6 INJECTION, SOLUTION INTRA-ARTICULAR; INTRALESIONAL; INTRAMUSCULAR; INTRAVENOUS; SOFT TISSUE DAILY
Status: DISCONTINUED | OUTPATIENT
Start: 2020-07-13 | End: 2020-07-16

## 2020-07-12 RX ADMIN — AMLODIPINE BESYLATE 10 MG: 10 TABLET ORAL at 07:48

## 2020-07-12 RX ADMIN — TAMSULOSIN HYDROCHLORIDE 0.8 MG: 0.4 CAPSULE ORAL at 07:48

## 2020-07-12 RX ADMIN — DEXAMETHASONE SODIUM PHOSPHATE 6 MG: 4 INJECTION, SOLUTION INTRA-ARTICULAR; INTRALESIONAL; INTRAMUSCULAR; INTRAVENOUS; SOFT TISSUE at 05:29

## 2020-07-12 RX ADMIN — CEFTRIAXONE SODIUM 1000 MG: 1 INJECTION, POWDER, FOR SOLUTION INTRAMUSCULAR; INTRAVENOUS at 05:33

## 2020-07-12 RX ADMIN — ENOXAPARIN SODIUM 60 MG: 60 INJECTION SUBCUTANEOUS at 17:09

## 2020-07-12 RX ADMIN — INSULIN HUMAN 2 UNITS: 100 INJECTION, SOLUTION PARENTERAL at 07:48

## 2020-07-12 RX ADMIN — INSULIN HUMAN 2 UNITS: 100 INJECTION, SOLUTION PARENTERAL at 17:10

## 2020-07-12 RX ADMIN — SODIUM CHLORIDE: 9 INJECTION, SOLUTION INTRAVENOUS at 05:45

## 2020-07-12 ASSESSMENT — ENCOUNTER SYMPTOMS
SHORTNESS OF BREATH: 0
CONSTITUTIONAL NEGATIVE: 1
HEADACHES: 0
NEUROLOGICAL NEGATIVE: 1
DIAPHORESIS: 0
PSYCHIATRIC NEGATIVE: 1
GASTROINTESTINAL NEGATIVE: 1
MYALGIAS: 0
PALPITATIONS: 0
SORE THROAT: 0
DIZZINESS: 0
COUGH: 0
RESPIRATORY NEGATIVE: 1
MUSCULOSKELETAL NEGATIVE: 1
FEVER: 0
BRUISES/BLEEDS EASILY: 0
NAUSEA: 0
FOCAL WEAKNESS: 0
CHILLS: 0
BLURRED VISION: 0
ABDOMINAL PAIN: 0
WEAKNESS: 0
EYES NEGATIVE: 1
DEPRESSION: 0
WEIGHT LOSS: 0
VOMITING: 0
CARDIOVASCULAR NEGATIVE: 1

## 2020-07-12 ASSESSMENT — LIFESTYLE VARIABLES: SUBSTANCE_ABUSE: 0

## 2020-07-12 NOTE — PROGRESS NOTES
[facility] Medicine Daily Progress Note    Date of Service  7/12/2020    Chief Complaint  84 y.o. male admitted 7/9/2020 with cough n/v    Hospital Course    Patient is an 84 y.o. male with history of diabetes mellitus, hypercholesterolemia and bph for which he has reportedly declined treatment in the past.  He is also a previous smoker. He presented to the er due to progressive weakness following 1-2 weeks of intermittent vomiting and diarrhea.  He denies fevers, chills, melena or hematochezia.  He also reported a mild cough.  He was found to be positive for COVID with acute renal failure, dehydration, and hypotension and was admitted to the ICU.      Interval Problem Update  Oxygen requirements decreasing  He continues to insist that he is fine, denies all symptoms  Continues to decline bladder scans, urology, méndez  He is voiding  He remains axox3 but a poor historian  Ros otherwise negative  Discussed with nursing    Consultants/Specialty  Critical care    Code Status  Full    Disposition  tbd    Review of Systems  Review of Systems   Constitutional: Negative.  Negative for chills, diaphoresis, fever, malaise/fatigue and weight loss.   HENT: Negative.  Negative for sore throat.    Eyes: Negative.  Negative for blurred vision.   Respiratory: Negative.  Negative for cough and shortness of breath.    Cardiovascular: Negative.  Negative for chest pain, palpitations and leg swelling.   Gastrointestinal: Negative.  Negative for abdominal pain, nausea and vomiting.   Genitourinary: Negative.  Negative for dysuria.   Musculoskeletal: Negative.  Negative for myalgias.   Skin: Negative.  Negative for itching and rash.   Neurological: Negative.  Negative for dizziness, focal weakness, weakness and headaches.   Endo/Heme/Allergies: Negative.  Does not bruise/bleed easily.   Psychiatric/Behavioral: Negative.  Negative for depression, substance abuse and suicidal ideas.   All other systems reviewed and are negative.        Physical Exam  Temp:  [36.2 °C (97.2 °F)-36.6 °C (97.9 °F)] 36.6 °C (97.9 °F)  Pulse:  [83-98] 98  Resp:  [16-40] 16  BP: (108-125)/(16-58) 108/16  SpO2:  [90 %-97 %] 90 %    Physical Exam  Vitals signs and nursing note reviewed. Exam conducted with a chaperone present.   Constitutional:       General: He is not in acute distress.     Appearance: Normal appearance. He is not diaphoretic.   HENT:      Head: Normocephalic.      Nose: Nose normal.      Mouth/Throat:      Mouth: Mucous membranes are moist.   Eyes:      Pupils: Pupils are equal, round, and reactive to light.   Cardiovascular:      Rate and Rhythm: Normal rate and regular rhythm.      Pulses: Normal pulses.      Heart sounds: Normal heart sounds.   Pulmonary:      Effort: Pulmonary effort is normal.      Comments: Decreased bs  Abdominal:      General: Abdomen is flat. Bowel sounds are normal.      Palpations: Abdomen is soft.   Musculoskeletal: Normal range of motion.         General: No swelling or deformity.   Skin:     General: Skin is warm and dry.      Capillary Refill: Capillary refill takes less than 2 seconds.   Neurological:      General: No focal deficit present.      Mental Status: He is alert and oriented to person, place, and time.      Cranial Nerves: No cranial nerve deficit.   Psychiatric:         Mood and Affect: Mood normal.         Behavior: Behavior normal.         Fluids  No intake or output data in the 24 hours ending 07/12/20 1330    Laboratory  Recent Labs     07/11/20 0240 07/11/20 0620 07/12/20  0402   WBC 8.1 7.9 9.6   RBC 4.79 4.82 4.77   HEMOGLOBIN 14.5 15.0 14.6   HEMATOCRIT 43.8 43.6 43.0   MCV 91.4 90.5 90.1   MCH 30.3 31.1 30.6   MCHC 33.1* 34.4 34.0   RDW 40.9 40.2 39.8   PLATELETCT 175 165 161*   MPV 10.1 9.9 9.8     Recent Labs     07/11/20  0240 07/11/20 0620 07/12/20  0402   SODIUM 137 133* 136   POTASSIUM 3.9 3.9 3.6   CHLORIDE 98 96 99   CO2 17* 20 19*   GLUCOSE 124* 154* 162*   BUN 33* 33* 32*   CREATININE  1.17 1.12 1.13   CALCIUM 8.2* 8.2* 8.4*     Recent Labs     07/09/20  1938 07/11/20  0620   APTT  --  43.6*   INR 1.02 0.99               Imaging  DX-CHEST-PORTABLE (1 VIEW)   Final Result      1.  The lungs are hyperinflated suggesting emphysema/COPD.   2.  There is minimal predominantly lower lobe interstitial opacity which is most likely due to chronic scarring.           Assessment/Plan  * Dehydration  Assessment & Plan  resolving    Diarrhea of presumed infectious origin  Assessment & Plan  Resolving  C dif negative  Likely related to covid    COVID-19 virus infection  Assessment & Plan  D dimer elevated  Continue lovenox  Vitamin D levels still pending  Continue dexamethasone  Continue supportive care    COPD (chronic obstructive pulmonary disease) (HCC)- (present on admission)  Assessment & Plan  No s/o exacerbation at this time  Continue dexamethasone for covid  following    Urinary retention  Assessment & Plan  Reportedly chronic  Refusing méndez or urology  Accepting flomax which will be continued    Acute kidney injury (HCC)  Assessment & Plan  Improving with hydration  Bph could also be contributing  Flomax started  Pt continues to refuse cath  Following  H/o kidney mass    Type 2 diabetes mellitus (HCC)- (present on admission)  Assessment & Plan  Continue ssi  Hypoglycemic protocol       VTE prophylaxis:   lovenox

## 2020-07-12 NOTE — CARE PLAN
Problem: Communication  Goal: The ability to communicate needs accurately and effectively will improve  Outcome: PROGRESSING AS EXPECTED     Problem: Safety  Goal: Will remain free from injury  Outcome: PROGRESSING AS EXPECTED  Goal: Will remain free from falls  Outcome: PROGRESSING AS EXPECTED     Problem: Knowledge Deficit  Goal: Knowledge of disease process/condition, treatment plan, diagnostic tests, and medications will improve  Outcome: PROGRESSING AS EXPECTED     Problem: Respiratory:  Goal: Respiratory status will improve  Outcome: PROGRESSING AS EXPECTED     Problem: Skin Integrity  Goal: Risk for impaired skin integrity will decrease  Outcome: PROGRESSING AS EXPECTED     Problem: Pain Management  Goal: Pain level will decrease to patient's comfort goal  Outcome: PROGRESSING AS EXPECTED

## 2020-07-12 NOTE — DIETARY
Nutrition Services: PO intake follow up. Pt continues with poor PO intake since admit (day #2) with significant poor PO/wt loss PTA. Diet= Diabetic with Boost Glucose Control at all meals. Per ADL, pt eating 0% of six out of seven past meals. Confirmed with RN, pt only taking bites/sips of foods and does not appear to be drinking Boost. Reviewed pt's poor nutrition status with MD and need for nutrition support if appropriate with pt's plan of care. MD reports pt very resistant to most interventions to include oxygen, etc and would not likely accept feeding tube at this time. MD explains plan is to have palliative care assess pt as there is some suspected deficits in his ability to make his own decisions.       Evaluation:  1. Pertinent Labs: am GLu 162, likely related to decadron. FSBS: 151-180 (7/11-7/12). Pt on SSI.  2. No new wts to assess.     Malnutrition risk (per RD note on 7/10): Pt with severe malnutrition in the context of acute illness related malnutrition related to ongoing n/v and diarrhea affecting appetite/intake, ?suspect r/t COVID19, as evidenced by poor PO intake for >5 days (ongoing for 2 weeks) of <50% of estimated needs and a severe 8.1% wt loss over the past month.      Plan/Rec:    1.  If appropriate with pt wishes/plan of care, pt would benefit from nutrition support until able to take adequate intake.  2. Will continue Boost Glucose Control TID on meal trays.    3. Encourage intake of meals/supplements (reviewed with RN).  Continue to document % consumed under ADLs.   4. Monitor for refeeding: Order BMP w/ Mg and Phos x 7 days. Replete K, Phos and Mg prn. Supplement 100 mg Thiamine x 7 days to reduce risk of refeeding. Pt will be at high risk for refeeding if nutrition support begins or if PO intake starts to improve.   5. Monitor weight.  6. Adjust insulin PRN for tight glucose control.      RD following.

## 2020-07-12 NOTE — PROGRESS NOTES
Assumed care of patient at 1900. A/O x4, toileted and declines pain or other needs. Resting bed. Discussed plan of care and call light use.

## 2020-07-12 NOTE — PROGRESS NOTES
After noting pink tint to patient's urine I discussed again with him if he would be open to bladder scanning or a catheter or urology consult etc. He continues to not be open to any interventions at this time.

## 2020-07-13 LAB
25(OH)D3 SERPL-MCNC: 14 NG/ML (ref 30–100)
ALBUMIN SERPL BCP-MCNC: 3 G/DL (ref 3.2–4.9)
ALBUMIN/GLOB SERPL: 0.9 G/DL
ALP SERPL-CCNC: 224 U/L (ref 30–99)
ALT SERPL-CCNC: 36 U/L (ref 2–50)
ANION GAP SERPL CALC-SCNC: 19 MMOL/L (ref 7–16)
APTT PPP: 28.6 SEC (ref 24.7–36)
AST SERPL-CCNC: 137 U/L (ref 12–45)
BASOPHILS # BLD AUTO: 0 % (ref 0–1.8)
BASOPHILS # BLD: 0 K/UL (ref 0–0.12)
BILIRUB CONJ SERPL-MCNC: <0.2 MG/DL (ref 0.1–0.5)
BILIRUB INDIRECT SERPL-MCNC: ABNORMAL MG/DL (ref 0–1)
BILIRUB SERPL-MCNC: 0.2 MG/DL (ref 0.1–1.5)
BUN SERPL-MCNC: 33 MG/DL (ref 8–22)
CALCIUM SERPL-MCNC: 8.3 MG/DL (ref 8.5–10.5)
CHLORIDE SERPL-SCNC: 97 MMOL/L (ref 96–112)
CK SERPL-CCNC: 194 U/L (ref 0–154)
CO2 SERPL-SCNC: 18 MMOL/L (ref 20–33)
CREAT SERPL-MCNC: 1.01 MG/DL (ref 0.5–1.4)
CRP SERPL HS-MCNC: 2.48 MG/DL (ref 0–0.75)
D DIMER PPP IA.FEU-MCNC: 2.03 UG/ML (FEU) (ref 0–0.5)
EOSINOPHIL # BLD AUTO: 0 K/UL (ref 0–0.51)
EOSINOPHIL NFR BLD: 0 % (ref 0–6.9)
ERYTHROCYTE [DISTWIDTH] IN BLOOD BY AUTOMATED COUNT: 43.5 FL (ref 35.9–50)
FERRITIN SERPL-MCNC: 1608 NG/ML (ref 22–322)
FIBRINOGEN PPP-MCNC: 443 MG/DL (ref 215–460)
GLOBULIN SER CALC-MCNC: 3.5 G/DL (ref 1.9–3.5)
GLUCOSE BLD-MCNC: 156 MG/DL (ref 65–99)
GLUCOSE BLD-MCNC: 194 MG/DL (ref 65–99)
GLUCOSE SERPL-MCNC: 160 MG/DL (ref 65–99)
HCT VFR BLD AUTO: 42.2 % (ref 42–52)
HGB BLD-MCNC: 13.7 G/DL (ref 14–18)
INR PPP: 0.98 (ref 0.87–1.13)
LDH SERPL L TO P-CCNC: >2500 U/L (ref 107–266)
LYMPHOCYTES # BLD AUTO: 0.42 K/UL (ref 1–4.8)
LYMPHOCYTES NFR BLD: 4 % (ref 22–41)
MAGNESIUM SERPL-MCNC: 2.1 MG/DL (ref 1.5–2.5)
MANUAL DIFF BLD: NORMAL
MCH RBC QN AUTO: 30.9 PG (ref 27–33)
MCHC RBC AUTO-ENTMCNC: 32.5 G/DL (ref 33.7–35.3)
MCV RBC AUTO: 95.3 FL (ref 81.4–97.8)
METAMYELOCYTES NFR BLD MANUAL: 1 %
MONOCYTES # BLD AUTO: 0.31 K/UL (ref 0–0.85)
MONOCYTES NFR BLD AUTO: 3 % (ref 0–13.4)
MORPHOLOGY BLD-IMP: NORMAL
NEUTROPHILS # BLD AUTO: 9.57 K/UL (ref 1.82–7.42)
NEUTROPHILS NFR BLD: 91 % (ref 44–72)
NEUTS BAND NFR BLD MANUAL: 1 % (ref 0–10)
NRBC # BLD AUTO: 0.03 K/UL
NRBC BLD-RTO: 0.3 /100 WBC
NT-PROBNP SERPL IA-MCNC: 311 PG/ML (ref 0–125)
OVA AND PARASITE, FECAL INTERPRETATION Q0595: NEGATIVE
PLATELET # BLD AUTO: 158 K/UL (ref 164–446)
PLATELET BLD QL SMEAR: NORMAL
PMV BLD AUTO: 10.1 FL (ref 9–12.9)
POTASSIUM SERPL-SCNC: 3.7 MMOL/L (ref 3.6–5.5)
PROT SERPL-MCNC: 6.5 G/DL (ref 6–8.2)
PROTHROMBIN TIME: 13.3 SEC (ref 12–14.6)
RBC # BLD AUTO: 4.43 M/UL (ref 4.7–6.1)
RBC BLD AUTO: NORMAL
SARS-COV-2 RNA RESP QL NAA+PROBE: DETECTED
SODIUM SERPL-SCNC: 134 MMOL/L (ref 135–145)
SPECIMEN SOURCE: ABNORMAL
TROPONIN T SERPL-MCNC: 15 NG/L (ref 6–19)
WBC # BLD AUTO: 10.4 K/UL (ref 4.8–10.8)

## 2020-07-13 PROCEDURE — 85610 PROTHROMBIN TIME: CPT

## 2020-07-13 PROCEDURE — 82962 GLUCOSE BLOOD TEST: CPT | Mod: 91

## 2020-07-13 PROCEDURE — 84484 ASSAY OF TROPONIN QUANT: CPT

## 2020-07-13 PROCEDURE — 82550 ASSAY OF CK (CPK): CPT

## 2020-07-13 PROCEDURE — 82248 BILIRUBIN DIRECT: CPT

## 2020-07-13 PROCEDURE — 85384 FIBRINOGEN ACTIVITY: CPT

## 2020-07-13 PROCEDURE — 85379 FIBRIN DEGRADATION QUANT: CPT

## 2020-07-13 PROCEDURE — 82728 ASSAY OF FERRITIN: CPT

## 2020-07-13 PROCEDURE — 97162 PT EVAL MOD COMPLEX 30 MIN: CPT

## 2020-07-13 PROCEDURE — 770020 HCHG ROOM/CARE - TELE (206)

## 2020-07-13 PROCEDURE — 83615 LACTATE (LD) (LDH) ENZYME: CPT

## 2020-07-13 PROCEDURE — 700111 HCHG RX REV CODE 636 W/ 250 OVERRIDE (IP): Performed by: INTERNAL MEDICINE

## 2020-07-13 PROCEDURE — A9270 NON-COVERED ITEM OR SERVICE: HCPCS | Performed by: HOSPITALIST

## 2020-07-13 PROCEDURE — 99232 SBSQ HOSP IP/OBS MODERATE 35: CPT | Mod: CS | Performed by: HOSPITALIST

## 2020-07-13 PROCEDURE — 83735 ASSAY OF MAGNESIUM: CPT

## 2020-07-13 PROCEDURE — 80053 COMPREHEN METABOLIC PANEL: CPT

## 2020-07-13 PROCEDURE — 700105 HCHG RX REV CODE 258: Performed by: INTERNAL MEDICINE

## 2020-07-13 PROCEDURE — 85730 THROMBOPLASTIN TIME PARTIAL: CPT

## 2020-07-13 PROCEDURE — 700111 HCHG RX REV CODE 636 W/ 250 OVERRIDE (IP): Performed by: HOSPITALIST

## 2020-07-13 PROCEDURE — 83520 IMMUNOASSAY QUANT NOS NONAB: CPT

## 2020-07-13 PROCEDURE — 85007 BL SMEAR W/DIFF WBC COUNT: CPT

## 2020-07-13 PROCEDURE — 86140 C-REACTIVE PROTEIN: CPT

## 2020-07-13 PROCEDURE — 83880 ASSAY OF NATRIURETIC PEPTIDE: CPT

## 2020-07-13 PROCEDURE — 85027 COMPLETE CBC AUTOMATED: CPT

## 2020-07-13 PROCEDURE — 82306 VITAMIN D 25 HYDROXY: CPT

## 2020-07-13 PROCEDURE — 700102 HCHG RX REV CODE 250 W/ 637 OVERRIDE(OP): Performed by: HOSPITALIST

## 2020-07-13 PROCEDURE — 36415 COLL VENOUS BLD VENIPUNCTURE: CPT

## 2020-07-13 RX ORDER — ERGOCALCIFEROL 1.25 MG/1
50000 CAPSULE ORAL
Status: DISCONTINUED | OUTPATIENT
Start: 2020-07-13 | End: 2020-12-29 | Stop reason: HOSPADM

## 2020-07-13 RX ADMIN — DEXAMETHASONE SODIUM PHOSPHATE 6 MG: 4 INJECTION, SOLUTION INTRA-ARTICULAR; INTRALESIONAL; INTRAMUSCULAR; INTRAVENOUS; SOFT TISSUE at 04:20

## 2020-07-13 RX ADMIN — ENOXAPARIN SODIUM 60 MG: 60 INJECTION SUBCUTANEOUS at 17:20

## 2020-07-13 RX ADMIN — CEFTRIAXONE SODIUM 1000 MG: 1 INJECTION, POWDER, FOR SOLUTION INTRAMUSCULAR; INTRAVENOUS at 04:20

## 2020-07-13 RX ADMIN — ERGOCALCIFEROL 50000 UNITS: 1.25 CAPSULE ORAL at 17:20

## 2020-07-13 RX ADMIN — INSULIN HUMAN 2 UNITS: 100 INJECTION, SOLUTION PARENTERAL at 17:20

## 2020-07-13 ASSESSMENT — ENCOUNTER SYMPTOMS
COUGH: 0
CHILLS: 0
FOCAL WEAKNESS: 0
DIZZINESS: 0
NEUROLOGICAL NEGATIVE: 1
SORE THROAT: 0
ABDOMINAL PAIN: 0
BLURRED VISION: 0
CARDIOVASCULAR NEGATIVE: 1
VOMITING: 0
CONSTITUTIONAL NEGATIVE: 1
SHORTNESS OF BREATH: 0
WEAKNESS: 0
MUSCULOSKELETAL NEGATIVE: 1
PALPITATIONS: 0
PSYCHIATRIC NEGATIVE: 1
NAUSEA: 0
RESPIRATORY NEGATIVE: 1
EYES NEGATIVE: 1
GASTROINTESTINAL NEGATIVE: 1
WEIGHT LOSS: 0
FEVER: 0
DIAPHORESIS: 0
DEPRESSION: 0
HEADACHES: 0
BRUISES/BLEEDS EASILY: 0
MYALGIAS: 0

## 2020-07-13 ASSESSMENT — COGNITIVE AND FUNCTIONAL STATUS - GENERAL
CLIMB 3 TO 5 STEPS WITH RAILING: A LITTLE
SUGGESTED CMS G CODE MODIFIER MOBILITY: CK
WALKING IN HOSPITAL ROOM: A LITTLE
MOVING FROM LYING ON BACK TO SITTING ON SIDE OF FLAT BED: UNABLE
TURNING FROM BACK TO SIDE WHILE IN FLAT BAD: A LITTLE
MOVING TO AND FROM BED TO CHAIR: A LOT
MOBILITY SCORE: 15
STANDING UP FROM CHAIR USING ARMS: A LITTLE

## 2020-07-13 ASSESSMENT — LIFESTYLE VARIABLES: SUBSTANCE_ABUSE: 0

## 2020-07-13 ASSESSMENT — GAIT ASSESSMENTS
DEVIATION: SHUFFLED GAIT;OTHER (COMMENT)
GAIT LEVEL OF ASSIST: MINIMAL ASSIST
ASSISTIVE DEVICE: SINGLE POINT CANE
DISTANCE (FEET): 20

## 2020-07-13 NOTE — DISCHARGE PLANNING
RN CM discussed pt in IDT rounds. RN CM sent email to Gladis HARRIS CM , requesting a NOK search for the pt.

## 2020-07-13 NOTE — THERAPY
"Physical Therapy   Initial Evaluation     Patient Name: Ovi Bejarano  Age:  84 y.o., Sex:  male  Medical Record #: [id number]  Today's Date: 7/13/2020     Precautions: Fall Risk    Assessment  Patient is an 84 y.o. male admitted with COVID-19. Pt seen for PT evaluation at this time. Pt is emotional throughout and demonstrates poor safety awareness and impulsivity. Pt repeats \"I don't know why they won't believe me, there is nothing wrong with me.\" Pt also reports \"I can't see anything\" and required guidance to navigate around room; however, reports total independence with mobility, ADLs, and IADLs at home. Likely able to return home; however, appears that pt would need 24/7 supervision. Will continue to work with pt in acute setting.     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 does better with the lights on.    Gait Analysis   Gait Level Of Assist Minimal Assist   Assistive Device Single Point Cane   Distance (Feet) 20   Deviation Shuffled " Gait;Other (Comment)  (short steps, needs guiding d/t vision impairments)   Weight Bearing Status no restrictions   Comments does well when told how many steps fwds/sideways, etc.    Bed Mobility    Supine to Sit NT, received on toilet    Sit to Supine NT, up in chair post   Functional Mobility   Sit to Stand Minimal Assist (from toilet)   Short Term Goals    Short Term Goal # 1 pt will perform supine <> sit without bed features with SPV in 6 visits to be able to get in/out of bed at home   Short Term Goal # 2 pt will perform all functional xfrs with SPV in 6 visits for improved independence   Short Term Goal # 3 pt will ambulate in room with SPC and SPV in 6 visits for independence with return home (distance limited d/t contact/droplet precautions)   Anticipated Discharge Equipment   DC Equipment Unable To Determine At This Time

## 2020-07-13 NOTE — PALLIATIVE CARE
Palliative Care follow-up  PC RN discussed with Dr. Corea via [facility]. MD ordered cog eval to determine ability to make medical decisions. PC RN will follow up after cog eval is complete.       Thank you for allowing Palliative Care to support this patient and family. Contact [phone] for additional assistance, change in patient status, or with any questions/concerns.

## 2020-07-13 NOTE — CARE PLAN
Problem: Communication  Goal: The ability to communicate needs accurately and effectively will improve  Outcome: PROGRESSING AS EXPECTED     Problem: Safety  Goal: Will remain free from injury  Outcome: PROGRESSING AS EXPECTED  Goal: Will remain free from falls  Outcome: PROGRESSING AS EXPECTED     Problem: Bowel/Gastric:  Goal: Normal bowel function is maintained or improved  Outcome: PROGRESSING AS EXPECTED     Problem: Knowledge Deficit  Goal: Knowledge of disease process/condition, treatment plan, diagnostic tests, and medications will improve  Outcome: PROGRESSING AS EXPECTED     Problem: Respiratory:  Goal: Respiratory status will improve  Outcome: PROGRESSING AS EXPECTED     Problem: Skin Integrity  Goal: Risk for impaired skin integrity will decrease  Outcome: PROGRESSING AS EXPECTED

## 2020-07-13 NOTE — PROGRESS NOTES
Assumed care of patient at 1900. A/O x4, denies pain. Discussed plan of care and call light use. Needs addressed. Bed locked and low, alarm on, telesitter in place.

## 2020-07-13 NOTE — PROGRESS NOTES
"[facility] Medicine Daily Progress Note    Date of Service  7/13/2020    Chief Complaint  84 y.o. male admitted 7/9/2020 with cough n/v    Hospital Course    Patient is an 84 y.o. male with history of diabetes mellitus, hypercholesterolemia and bph for which he has reportedly declined treatment in the past.  He is also a previous smoker. He presented to the er due to progressive weakness following 1-2 weeks of intermittent vomiting and diarrhea.  He denies fevers, chills, melena or hematochezia.  He also reported a mild cough.  He was found to be positive for COVID with acute renal failure, dehydration, and hypotension and was admitted to the ICU.      Interval Problem Update  He continues to require oxygen at 4L  He continues to decline bladder scans, urology, méndez   He denies pain, denies sob  He continues to void  He remains axox3 but a poor historian and either in denial of his medical issues or unable to fully comprehend - a cognitive evaluation is pending  I spoke with nutrition yesterday, they are concerned about his poor intake - I discussed this with patient and he strongly refuses supplements and does not want any type of feeding tube - he did tell me \"I want hot chocolate and toast with butter\" so this was ordered  Ros otherwise negative    Consultants/Specialty  Critical care    Code Status  Full    Disposition  tbd    Review of Systems  Review of Systems   Constitutional: Negative.  Negative for chills, diaphoresis, fever, malaise/fatigue and weight loss.   HENT: Negative.  Negative for sore throat.    Eyes: Negative.  Negative for blurred vision.   Respiratory: Negative.  Negative for cough and shortness of breath.    Cardiovascular: Negative.  Negative for chest pain, palpitations and leg swelling.   Gastrointestinal: Negative.  Negative for abdominal pain, nausea and vomiting.   Genitourinary: Negative.  Negative for dysuria.   Musculoskeletal: Negative.  Negative for myalgias.   Skin: Negative.  " Negative for itching and rash.   Neurological: Negative.  Negative for dizziness, focal weakness, weakness and headaches.   Endo/Heme/Allergies: Negative.  Does not bruise/bleed easily.   Psychiatric/Behavioral: Negative.  Negative for depression, substance abuse and suicidal ideas.   All other systems reviewed and are negative.       Physical Exam  Temp:  [35.9 °C (96.6 °F)-36.6 °C (97.9 °F)] 35.9 °C (96.6 °F)  Pulse:  [92-94] 93  Resp:  [16-20] 20  BP: (100-123)/(41-57) 100/43  SpO2:  [90 %-92 %] 91 %    Physical Exam  Vitals signs and nursing note reviewed. Exam conducted with a chaperone present.   Constitutional:       General: He is not in acute distress.     Appearance: Normal appearance. He is not diaphoretic.   HENT:      Head: Normocephalic.      Nose: Nose normal.      Mouth/Throat:      Mouth: Mucous membranes are moist.   Eyes:      Pupils: Pupils are equal, round, and reactive to light.   Cardiovascular:      Rate and Rhythm: Normal rate and regular rhythm.      Pulses: Normal pulses.      Heart sounds: Normal heart sounds.   Pulmonary:      Effort: Pulmonary effort is normal.      Comments: Decreased bs  Abdominal:      General: Abdomen is flat. Bowel sounds are normal.      Palpations: Abdomen is soft.   Musculoskeletal: Normal range of motion.         General: No swelling or deformity.   Skin:     General: Skin is warm and dry.      Capillary Refill: Capillary refill takes less than 2 seconds.   Neurological:      General: No focal deficit present.      Mental Status: He is alert and oriented to person, place, and time.      Cranial Nerves: No cranial nerve deficit.   Psychiatric:         Mood and Affect: Mood normal.         Behavior: Behavior normal.         Fluids    Intake/Output Summary (Last 24 hours) at 7/13/2020 1339  Last data filed at 7/13/2020 0800  Gross per 24 hour   Intake 440 ml   Output --   Net 440 ml       Laboratory  Recent Labs     07/11/20  0620 07/12/20  0402 07/13/20  0644    WBC 7.9 9.6 10.4   RBC 4.82 4.77 4.43*   HEMOGLOBIN 15.0 14.6 13.7*   HEMATOCRIT 43.6 43.0 42.2   MCV 90.5 90.1 95.3   MCH 31.1 30.6 30.9   MCHC 34.4 34.0 32.5*   RDW 40.2 39.8 43.5   PLATELETCT 165 161* 158*   MPV 9.9 9.8 10.1     Recent Labs     07/11/20  0620 07/12/20  0402 07/13/20  0644   SODIUM 133* 136 134*   POTASSIUM 3.9 3.6 3.7   CHLORIDE 96 99 97   CO2 20 19* 18*   GLUCOSE 154* 162* 160*   BUN 33* 32* 33*   CREATININE 1.12 1.13 1.01   CALCIUM 8.2* 8.4* 8.3*     Recent Labs     07/11/20  0620 07/13/20  0644   APTT 43.6* 28.6   INR 0.99 0.98               Imaging  DX-CHEST-PORTABLE (1 VIEW)   Final Result      1.  The lungs are hyperinflated suggesting emphysema/COPD.   2.  There is minimal predominantly lower lobe interstitial opacity which is most likely due to chronic scarring.           Assessment/Plan  * Dehydration  Assessment & Plan  resolved    Diarrhea of presumed infectious origin  Assessment & Plan  Resolved  C dif negative  Likely related to covid    COVID-19 virus infection  Assessment & Plan  D dimer elevated  Continue lovenox  Vitamin D level 14 - high dose replacement weekly started  Continue dexamethasone  Continue supportive care    COPD (chronic obstructive pulmonary disease) (HCC)- (present on admission)  Assessment & Plan  No s/o exacerbation at this time  Continue dexamethasone for covid  Continue to follow    Urinary retention  Assessment & Plan  Reportedly chronic  Refusing méndez or urology  Accepting flomax which will be continued    Acute kidney injury (HCC)  Assessment & Plan  improved  Bph could also be contributing but urology eval declined  Continue flomax  Pt continues to refuse cath  Following  H/o kidney mass    Type 2 diabetes mellitus (HCC)- (present on admission)  Assessment & Plan  Continue ssi  Hypoglycemic protocol       VTE prophylaxis:   lovenox

## 2020-07-13 NOTE — PROGRESS NOTES
Pt very upset about being here still and doesn't understand why - does not believe he has COVID and that he needs the oxygen. Have educated about this multiple times. He is not confused he just doesn't believe the diagnosis. Would love an attempt to get ahold of his daughter today and find out if his partner has been located. He is scared he will die here and never see her again.

## 2020-07-14 LAB
ANION GAP SERPL CALC-SCNC: 14 MMOL/L (ref 7–16)
BACTERIA BLD CULT: NORMAL
BACTERIA BLD CULT: NORMAL
BASOPHILS # BLD AUTO: 0 % (ref 0–1.8)
BASOPHILS # BLD: 0 K/UL (ref 0–0.12)
BUN SERPL-MCNC: 28 MG/DL (ref 8–22)
CALCIUM SERPL-MCNC: 8.1 MG/DL (ref 8.5–10.5)
CHLORIDE SERPL-SCNC: 100 MMOL/L (ref 96–112)
CO2 SERPL-SCNC: 24 MMOL/L (ref 20–33)
CREAT SERPL-MCNC: 0.99 MG/DL (ref 0.5–1.4)
DACRYOCYTES BLD QL SMEAR: NORMAL
EOSINOPHIL # BLD AUTO: 0 K/UL (ref 0–0.51)
EOSINOPHIL NFR BLD: 0 % (ref 0–6.9)
ERYTHROCYTE [DISTWIDTH] IN BLOOD BY AUTOMATED COUNT: 39 FL (ref 35.9–50)
GLUCOSE BLD-MCNC: 187 MG/DL (ref 65–99)
GLUCOSE BLD-MCNC: 216 MG/DL (ref 65–99)
GLUCOSE SERPL-MCNC: 181 MG/DL (ref 65–99)
HCT VFR BLD AUTO: 39.4 % (ref 42–52)
HGB BLD-MCNC: 13.8 G/DL (ref 14–18)
LYMPHOCYTES # BLD AUTO: 0.24 K/UL (ref 1–4.8)
LYMPHOCYTES NFR BLD: 2.6 % (ref 22–41)
MANUAL DIFF BLD: NORMAL
MCH RBC QN AUTO: 30.7 PG (ref 27–33)
MCHC RBC AUTO-ENTMCNC: 35 G/DL (ref 33.7–35.3)
MCV RBC AUTO: 87.8 FL (ref 81.4–97.8)
MONOCYTES # BLD AUTO: 0.64 K/UL (ref 0–0.85)
MONOCYTES NFR BLD AUTO: 7 % (ref 0–13.4)
MORPHOLOGY BLD-IMP: NORMAL
NEUTROPHILS # BLD AUTO: 8.23 K/UL (ref 1.82–7.42)
NEUTROPHILS NFR BLD: 90.4 % (ref 44–72)
NRBC # BLD AUTO: 0 K/UL
NRBC BLD-RTO: 0 /100 WBC
PLATELET # BLD AUTO: 217 K/UL (ref 164–446)
PLATELET BLD QL SMEAR: NORMAL
PMV BLD AUTO: 9.6 FL (ref 9–12.9)
POIKILOCYTOSIS BLD QL SMEAR: NORMAL
POTASSIUM SERPL-SCNC: 3.4 MMOL/L (ref 3.6–5.5)
RBC # BLD AUTO: 4.49 M/UL (ref 4.7–6.1)
RBC BLD AUTO: PRESENT
SIGNIFICANT IND 70042: NORMAL
SIGNIFICANT IND 70042: NORMAL
SITE SITE: NORMAL
SITE SITE: NORMAL
SODIUM SERPL-SCNC: 138 MMOL/L (ref 135–145)
SOURCE SOURCE: NORMAL
SOURCE SOURCE: NORMAL
WBC # BLD AUTO: 9.1 K/UL (ref 4.8–10.8)

## 2020-07-14 PROCEDURE — 85027 COMPLETE CBC AUTOMATED: CPT

## 2020-07-14 PROCEDURE — 770020 HCHG ROOM/CARE - TELE (206)

## 2020-07-14 PROCEDURE — 700102 HCHG RX REV CODE 250 W/ 637 OVERRIDE(OP): Performed by: HOSPITALIST

## 2020-07-14 PROCEDURE — 36415 COLL VENOUS BLD VENIPUNCTURE: CPT

## 2020-07-14 PROCEDURE — A9270 NON-COVERED ITEM OR SERVICE: HCPCS | Performed by: HOSPITALIST

## 2020-07-14 PROCEDURE — 700111 HCHG RX REV CODE 636 W/ 250 OVERRIDE (IP): Mod: JW | Performed by: HOSPITALIST

## 2020-07-14 PROCEDURE — 92523 SPEECH SOUND LANG COMPREHEN: CPT

## 2020-07-14 PROCEDURE — 700102 HCHG RX REV CODE 250 W/ 637 OVERRIDE(OP): Performed by: INTERNAL MEDICINE

## 2020-07-14 PROCEDURE — 82962 GLUCOSE BLOOD TEST: CPT

## 2020-07-14 PROCEDURE — A9270 NON-COVERED ITEM OR SERVICE: HCPCS | Performed by: INTERNAL MEDICINE

## 2020-07-14 PROCEDURE — 99232 SBSQ HOSP IP/OBS MODERATE 35: CPT | Performed by: HOSPITALIST

## 2020-07-14 PROCEDURE — 80048 BASIC METABOLIC PNL TOTAL CA: CPT

## 2020-07-14 PROCEDURE — 85007 BL SMEAR W/DIFF WBC COUNT: CPT

## 2020-07-14 RX ORDER — DIPHENOXYLATE HYDROCHLORIDE AND ATROPINE SULFATE 2.5; .025 MG/1; MG/1
1 TABLET ORAL 4 TIMES DAILY PRN
Status: DISCONTINUED | OUTPATIENT
Start: 2020-07-14 | End: 2020-08-06

## 2020-07-14 RX ADMIN — DEXAMETHASONE SODIUM PHOSPHATE 6 MG: 4 INJECTION, SOLUTION INTRA-ARTICULAR; INTRALESIONAL; INTRAMUSCULAR; INTRAVENOUS; SOFT TISSUE at 06:08

## 2020-07-14 RX ADMIN — ZINC SULFATE 220 MG (50 MG) CAPSULE 220 MG: CAPSULE at 06:08

## 2020-07-14 RX ADMIN — AMLODIPINE BESYLATE 10 MG: 10 TABLET ORAL at 06:08

## 2020-07-14 RX ADMIN — DIPHENOXYLATE HYDROCHLORIDE AND ATROPINE SULFATE 1 TABLET: 2.5; .025 TABLET ORAL at 17:27

## 2020-07-14 RX ADMIN — INSULIN HUMAN 2 UNITS: 100 INJECTION, SOLUTION PARENTERAL at 08:27

## 2020-07-14 RX ADMIN — INSULIN HUMAN 3 UNITS: 100 INJECTION, SOLUTION PARENTERAL at 17:25

## 2020-07-14 RX ADMIN — TAMSULOSIN HYDROCHLORIDE 0.8 MG: 0.4 CAPSULE ORAL at 08:23

## 2020-07-14 RX ADMIN — ENOXAPARIN SODIUM 60 MG: 60 INJECTION SUBCUTANEOUS at 06:07

## 2020-07-14 RX ADMIN — ENOXAPARIN SODIUM 60 MG: 60 INJECTION SUBCUTANEOUS at 17:27

## 2020-07-14 ASSESSMENT — ENCOUNTER SYMPTOMS
HEADACHES: 0
BRUISES/BLEEDS EASILY: 0
COUGH: 0
NAUSEA: 0
DIZZINESS: 0
ABDOMINAL PAIN: 0
DIAPHORESIS: 0
FOCAL WEAKNESS: 0
VOMITING: 0
EYES NEGATIVE: 1
MYALGIAS: 0
FEVER: 0
BLURRED VISION: 0
SHORTNESS OF BREATH: 0
CARDIOVASCULAR NEGATIVE: 1
WEAKNESS: 0
CONSTITUTIONAL NEGATIVE: 1
PALPITATIONS: 0
CHILLS: 0
SORE THROAT: 0
NEUROLOGICAL NEGATIVE: 1
GASTROINTESTINAL NEGATIVE: 1
WEIGHT LOSS: 0
DEPRESSION: 0
PSYCHIATRIC NEGATIVE: 1
RESPIRATORY NEGATIVE: 1
MUSCULOSKELETAL NEGATIVE: 1

## 2020-07-14 ASSESSMENT — LIFESTYLE VARIABLES: SUBSTANCE_ABUSE: 0

## 2020-07-14 NOTE — PROGRESS NOTES
[facility] Medicine Daily Progress Note    Date of Service  7/14/2020    Chief Complaint  84 y.o. male admitted 7/9/2020 with cough n/v    Hospital Course    Patient is an 84 y.o. male with history of diabetes mellitus, hypercholesterolemia and bph for which he has reportedly declined treatment in the past.  He is also a previous smoker. He presented to the er due to progressive weakness following 1-2 weeks of intermittent vomiting and diarrhea.  He denies fevers, chills, melena or hematochezia.  He also reported a mild cough.  He was found to be positive for COVID with acute renal failure, dehydration, and hypotension and was admitted to the ICU.      Interval Problem Update  He continues to require oxygen at 5L  He continues to decline bladder scans, urology, méndez   He denies pain, denies sob  He continues to void  He remains axox3 but a poor historian and either in denial of his medical issues or unable to fully comprehend - a cognitive evaluation is reviewed.      Ros otherwise negative    Consultants/Specialty  Critical care    Code Status  Full    Disposition  tbd    Review of Systems  Review of Systems   Constitutional: Negative.  Negative for chills, diaphoresis, fever, malaise/fatigue and weight loss.   HENT: Negative.  Negative for sore throat.    Eyes: Negative.  Negative for blurred vision.   Respiratory: Negative.  Negative for cough and shortness of breath.    Cardiovascular: Negative.  Negative for chest pain, palpitations and leg swelling.   Gastrointestinal: Negative.  Negative for abdominal pain, nausea and vomiting.   Genitourinary: Negative.  Negative for dysuria.   Musculoskeletal: Negative.  Negative for myalgias.   Skin: Negative.  Negative for itching and rash.   Neurological: Negative.  Negative for dizziness, focal weakness, weakness and headaches.   Endo/Heme/Allergies: Negative.  Does not bruise/bleed easily.   Psychiatric/Behavioral: Negative.  Negative for depression, substance abuse and  suicidal ideas.   All other systems reviewed and are negative.       Physical Exam  Temp:  [36.4 °C (97.6 °F)-36.9 °C (98.5 °F)] 36.7 °C (98 °F)  Pulse:  [77-87] 87  Resp:  [20] 20  BP: (112-134)/(45-54) 112/54  SpO2:  [90 %-95 %] 90 %    Physical Exam  Vitals signs and nursing note reviewed. Exam conducted with a chaperone present.   Constitutional:       General: He is not in acute distress.     Appearance: Normal appearance. He is not diaphoretic.   HENT:      Head: Normocephalic.      Nose: Nose normal.      Mouth/Throat:      Mouth: Mucous membranes are moist.   Eyes:      Pupils: Pupils are equal, round, and reactive to light.   Cardiovascular:      Rate and Rhythm: Normal rate and regular rhythm.      Pulses: Normal pulses.      Heart sounds: Normal heart sounds.   Pulmonary:      Effort: Pulmonary effort is normal.      Comments: Decreased bs  Abdominal:      General: Abdomen is flat. Bowel sounds are normal.      Palpations: Abdomen is soft.   Musculoskeletal: Normal range of motion.         General: No swelling or deformity.   Skin:     General: Skin is warm and dry.      Capillary Refill: Capillary refill takes less than 2 seconds.   Neurological:      General: No focal deficit present.      Mental Status: He is alert and oriented to person, place, and time.      Cranial Nerves: No cranial nerve deficit.   Psychiatric:         Mood and Affect: Mood normal.         Behavior: Behavior normal.         Fluids    Intake/Output Summary (Last 24 hours) at 7/14/2020 1634  Last data filed at 7/14/2020 1300  Gross per 24 hour   Intake 710 ml   Output --   Net 710 ml       Laboratory  Recent Labs     07/12/20  0402 07/13/20  0644 07/14/20  0413   WBC 9.6 10.4 9.1   RBC 4.77 4.43* 4.49*   HEMOGLOBIN 14.6 13.7* 13.8*   HEMATOCRIT 43.0 42.2 39.4*   MCV 90.1 95.3 87.8   MCH 30.6 30.9 30.7   MCHC 34.0 32.5* 35.0   RDW 39.8 43.5 39.0   PLATELETCT 161* 158* 217   MPV 9.8 10.1 9.6     Recent Labs     07/12/20  0402  07/13/20  0644 07/14/20  0413   SODIUM 136 134* 138   POTASSIUM 3.6 3.7 3.4*   CHLORIDE 99 97 100   CO2 19* 18* 24   GLUCOSE 162* 160* 181*   BUN 32* 33* 28*   CREATININE 1.13 1.01 0.99   CALCIUM 8.4* 8.3* 8.1*     Recent Labs     07/13/20  0644   APTT 28.6   INR 0.98               Imaging  DX-CHEST-PORTABLE (1 VIEW)   Final Result      1.  The lungs are hyperinflated suggesting emphysema/COPD.   2.  There is minimal predominantly lower lobe interstitial opacity which is most likely due to chronic scarring.           Assessment/Plan  * Dehydration  Assessment & Plan  resolved    Diarrhea of presumed infectious origin  Assessment & Plan  Resolved  C dif negative  Likely related to covid    COVID-19 virus infection  Assessment & Plan  D dimer elevated  Continue lovenox  Vitamin D level 14 - high dose replacement weekly started  Continue dexamethasone  Continue supportive care    COPD (chronic obstructive pulmonary disease) (HCC)- (present on admission)  Assessment & Plan  No s/o exacerbation at this time  Continue dexamethasone for covid  Continue to follow    Urinary retention  Assessment & Plan  Reportedly chronic  Refusing méndez or urology  Accepting flomax which will be continued    Acute kidney injury (HCC)  Assessment & Plan  improved  Bph could also be contributing but urology eval declined  Continue flomax  Pt continues to refuse cath  Following  H/o kidney mass    Type 2 diabetes mellitus (HCC)- (present on admission)  Assessment & Plan  Continue ssi  Hypoglycemic protocol       VTE prophylaxis:   lovenox

## 2020-07-14 NOTE — THERAPY
"Speech Language Pathology   Initial Assessment     Patient Name: Ovi Bejarano  AGE:  84 y.o., SEX:  male  Medical Record #: [id number]  Today's Date: 7/14/2020     Precautions  Precautions: (P) Fall Risk  Comments: (P) Pt is blind.    Assessment    Patient is 84 y.o. male with a diagnosis of Covid-19. Pt admitted with progressive weakness.  PMH DM,BPH, and hypercholesterol. Pt is also blind. Additional factors influencing patient status/progress: pt's blindness and current denial of medical dx and declining some medical procedures such as bladder scans. Per EMR notes, cognitive eval to assess pt's ability to make medical decisions. Prior to the eval, SLP collaborated with nurs regarding SLP can complete cognitive eval with psych needed to assess for competency. RN spoke with MD and cognitive eval is also requested. Related to pt's respiratory isolation status, standardized assessment was not able to be utilized. Parts of the NCSE and non-standardized cognitive completed. On assessed items, pt demonstrating mild deficits. Pt demonstrating severe difficulty related to insight, which is a concern for his medical team. When asked why he was admitted, pt stating \"I fell.\" SLP provided support and educ regarding current Covid dx with pt responding, \"If I had Covid I would be dead.\" SLP provided educ that pt's can improve and do not die from Covid. Per EMR notes, pt with limited PO intake and has been declining bladder scans. Per P.T. notes, pt demonstrating impulsiveness. CNA, with SLP present, ambulating pt to the BR with pt requiring verbal cues, slow ambulation, and with hand held assist.   Assessed cognitive/linguistic findings: pt orientated to month, year, and place without cues. The date was the \"12.\" Pt accurate with current time of \"12\" when asked with actual time 11:45. Pt able to repeat sequences of 3-4 numbers accurately, but not 5 numbers, for immediate recall. Pt required one category cue to recall 3 " "words following approx 10 minutes. Pt naming simple categories, given 2 members, accurately for simple reasoning. Pt with 2/3 accurate when providing verbal solution to simple situational problem. After approx 20 min, and near the end of the eval, pt requesting x2 to stop and stating \"no more questions. Pt with mild irritability and with  decreased sustained attention to task during the assessment.    Plan    Recommend Speech Therapy 1 time per week until therapy goals are met for the following treatments:  Cognitive-Linguistic Training.    Discharge recommendations:  Assist and superv at d/c as approp.          07/14/20 1149   Verbal Expression   Verbal Expression / Aphasia Eval (WDL) WDL   Auditory Comprehension   Auditory Comprehension (WDL) WDL   Reading Comprehension   Comments Pt is blind   Written Expression   Comments Pt is blind   Cognitive-Linguistic   Cognitive-Linguistic (WDL) X   Level of Consciousness Alert   Orientation Level Other (Comment)  (date the \"12th\")   Sustained Attention Minimal (4)   Short Term Memory Supervision (5)   Immediate Memory Minimal (4)   Simple Reasoning / Problem Solving Minimal (4)   [place] Reasoning Within Functional Limits (6-7)   Insight into Deficits Severe (2)   Skilled Intervention Verbal Cueing   Patient / Family Goals   Patient / Family Goal #1 \"to go home.\"   Goal #1 Outcome Goal not met   Short Term Goals   Short Term Goal # 1 Pt will sustain attention for greater than 30 minutes with min cues.    Goal Outcome # 1 Progressing as expected   Short Term Goal # 2 Pt will state solution to min to moderate level situational problems with 75% accuracy and min cues.    Goal Outcome # 2  Goal not met   Short Term Goal # 3 Pt will state how his current medical and functional status may present challenges and strengths related to his self care with mod cues and 70% accuracy.    Goal Outcome  # 3 Goal not met       "

## 2020-07-14 NOTE — RESPIRATORY CARE
COPD EDUCATION by COPD CLINICAL EDUCATOR  7/14/2020 at 4:52 PM by Sherlyn Sahu, RRT     Patient reviewed by COPD education team. Patient does not have a history or diagnosis of COPD and is a non-smoker, therefore does not qualify for the COPD program.

## 2020-07-14 NOTE — PROGRESS NOTES
Pt alert, oriented to self, place. Forgetful to situation. He is blind but can see some shadows on left eye when theres bright light. Gets impulsive at times. telesitter on. On 4.5L o2 nc. Respirations equal and unlabored. Denies pain.  Ambulatory with one person assist and single point cane. Repositions self in bed.  Good po intake without any s/sx of aspiration noted. WELLS.  Plan of care reviewed including: fall precautions, vitals frequency and isolation. Verbalized understanding and agrees. White board updated. Instructed to use call light prior to getting oob to prevent falls. Able to make needs known.  Call light within reach.

## 2020-07-14 NOTE — CARE PLAN
Problem: Nutritional:  Goal: Achieve adequate nutritional intake  Description: Patient will consume 50% or > of meals/supplements vs initiation of nutrition support.  Outcome: PROGRESSING SLOWER THAN EXPECTED

## 2020-07-14 NOTE — CARE PLAN
Problem: Safety  Goal: Will remain free from injury  Outcome: PROGRESSING AS EXPECTED  Note: Pt is blind. Impulsive at times. Telesitter and fall precautions in place.      Problem: Bowel/Gastric:  Goal: Normal bowel function is maintained or improved  Outcome: PROGRESSING AS EXPECTED  Note: Last BM 7/13. Denies n/v. Abdomen soft.

## 2020-07-14 NOTE — DIETARY
"Nutrition Services: Update   Day 5 of admit.  Ovi Bejarano is a 84 y.o. male with admitting DX of COVID-19 virus infection, Dehydration, Sepsis     Pt is currently on diabetic diet. Pt is receiving Boost Glucose Control TID with meals. Per chart pt PO 0-50% of meals and % 1 snack documented. Wt 7/10: 59.2 kg via bed scale - no updated wt    Per MD note 7/13 \"I discussed this with patient and he strongly refuses supplements and does not want any type of feeding tube\"    Malnutrition risk from RD note 7/10: Pt with severe malnutrition in the context of acute illness related malnutrition related to ongoing n/v and diarrhea affecting appetite/intake, ?suspect r/t COVID19, as evidenced by poor PO intake for >5 days (ongoing for 2 weeks) of <50% of estimated needs and a severe 8.1% wt loss over the past month.      Recommendations/Plan:  1. Consider appetite stimulant if within patient's plan of care.   2. Encourage intake of meals  3. Document intake of all meals as % taken in ADL's to provide interdisciplinary communication across all shifts.   4. Monitor weight.  5. Nutrition rep will continue to see patient for ongoing meal and snack preferences.    RD following    "

## 2020-07-15 LAB
ALBUMIN SERPL BCP-MCNC: 3.3 G/DL (ref 3.2–4.9)
ALP SERPL-CCNC: 187 U/L (ref 30–99)
ALT SERPL-CCNC: 32 U/L (ref 2–50)
ANION GAP SERPL CALC-SCNC: 15 MMOL/L (ref 7–16)
APTT PPP: 30 SEC (ref 24.7–36)
AST SERPL-CCNC: 67 U/L (ref 12–45)
BASOPHILS # BLD AUTO: 0 % (ref 0–1.8)
BASOPHILS # BLD: 0 K/UL (ref 0–0.12)
BILIRUB CONJ SERPL-MCNC: <0.2 MG/DL (ref 0.1–0.5)
BILIRUB INDIRECT SERPL-MCNC: ABNORMAL MG/DL (ref 0–1)
BILIRUB SERPL-MCNC: 0.5 MG/DL (ref 0.1–1.5)
BUN SERPL-MCNC: 33 MG/DL (ref 8–22)
CALCIUM SERPL-MCNC: 8.6 MG/DL (ref 8.5–10.5)
CHLORIDE SERPL-SCNC: 98 MMOL/L (ref 96–112)
CK SERPL-CCNC: 89 U/L (ref 0–154)
CO2 SERPL-SCNC: 27 MMOL/L (ref 20–33)
CREAT SERPL-MCNC: 1.01 MG/DL (ref 0.5–1.4)
CRP SERPL HS-MCNC: 0.99 MG/DL (ref 0–0.75)
D DIMER PPP IA.FEU-MCNC: 1.3 UG/ML (FEU) (ref 0–0.5)
EOSINOPHIL # BLD AUTO: 0 K/UL (ref 0–0.51)
EOSINOPHIL NFR BLD: 0 % (ref 0–6.9)
ERYTHROCYTE [DISTWIDTH] IN BLOOD BY AUTOMATED COUNT: 40.1 FL (ref 35.9–50)
FERRITIN SERPL-MCNC: 1319 NG/ML (ref 22–322)
FIBRINOGEN PPP-MCNC: 436 MG/DL (ref 215–460)
GLUCOSE BLD-MCNC: 150 MG/DL (ref 65–99)
GLUCOSE BLD-MCNC: 188 MG/DL (ref 65–99)
GLUCOSE SERPL-MCNC: 150 MG/DL (ref 65–99)
HCT VFR BLD AUTO: 42.1 % (ref 42–52)
HGB BLD-MCNC: 14.2 G/DL (ref 14–18)
IL6 SERPL-MCNC: 4.8 PG/ML
IL6 SERPL-MCNC: 8.1 PG/ML
INR PPP: 0.97 (ref 0.87–1.13)
LDH SERPL L TO P-CCNC: 1879 U/L (ref 107–266)
LYMPHOCYTES # BLD AUTO: 0.6 K/UL (ref 1–4.8)
LYMPHOCYTES NFR BLD: 7 % (ref 22–41)
MAGNESIUM SERPL-MCNC: 2 MG/DL (ref 1.5–2.5)
MANUAL DIFF BLD: NORMAL
MCH RBC QN AUTO: 30.5 PG (ref 27–33)
MCHC RBC AUTO-ENTMCNC: 33.7 G/DL (ref 33.7–35.3)
MCV RBC AUTO: 90.3 FL (ref 81.4–97.8)
MONOCYTES # BLD AUTO: 0.26 K/UL (ref 0–0.85)
MONOCYTES NFR BLD AUTO: 3 % (ref 0–13.4)
MORPHOLOGY BLD-IMP: NORMAL
NEUTROPHILS # BLD AUTO: 7.74 K/UL (ref 1.82–7.42)
NEUTROPHILS NFR BLD: 90 % (ref 44–72)
NRBC # BLD AUTO: 0 K/UL
NRBC BLD-RTO: 0 /100 WBC
NT-PROBNP SERPL IA-MCNC: 364 PG/ML (ref 0–125)
PLATELET # BLD AUTO: 243 K/UL (ref 164–446)
PLATELET BLD QL SMEAR: NORMAL
PMV BLD AUTO: 9.7 FL (ref 9–12.9)
POTASSIUM SERPL-SCNC: 3.4 MMOL/L (ref 3.6–5.5)
PROT SERPL-MCNC: 6.8 G/DL (ref 6–8.2)
PROTHROMBIN TIME: 13.2 SEC (ref 12–14.6)
RBC # BLD AUTO: 4.66 M/UL (ref 4.7–6.1)
RBC BLD AUTO: NORMAL
SODIUM SERPL-SCNC: 140 MMOL/L (ref 135–145)
TROPONIN T SERPL-MCNC: 17 NG/L (ref 6–19)
WBC # BLD AUTO: 8.6 K/UL (ref 4.8–10.8)

## 2020-07-15 PROCEDURE — A9270 NON-COVERED ITEM OR SERVICE: HCPCS | Performed by: INTERNAL MEDICINE

## 2020-07-15 PROCEDURE — 85730 THROMBOPLASTIN TIME PARTIAL: CPT

## 2020-07-15 PROCEDURE — 80048 BASIC METABOLIC PNL TOTAL CA: CPT

## 2020-07-15 PROCEDURE — 80076 HEPATIC FUNCTION PANEL: CPT

## 2020-07-15 PROCEDURE — 83880 ASSAY OF NATRIURETIC PEPTIDE: CPT

## 2020-07-15 PROCEDURE — 700102 HCHG RX REV CODE 250 W/ 637 OVERRIDE(OP): Performed by: INTERNAL MEDICINE

## 2020-07-15 PROCEDURE — A9270 NON-COVERED ITEM OR SERVICE: HCPCS | Performed by: HOSPITALIST

## 2020-07-15 PROCEDURE — 84484 ASSAY OF TROPONIN QUANT: CPT

## 2020-07-15 PROCEDURE — 770021 HCHG ROOM/CARE - ISO PRIVATE

## 2020-07-15 PROCEDURE — 83735 ASSAY OF MAGNESIUM: CPT

## 2020-07-15 PROCEDURE — 85384 FIBRINOGEN ACTIVITY: CPT

## 2020-07-15 PROCEDURE — 99232 SBSQ HOSP IP/OBS MODERATE 35: CPT | Performed by: HOSPITALIST

## 2020-07-15 PROCEDURE — 82550 ASSAY OF CK (CPK): CPT

## 2020-07-15 PROCEDURE — 85027 COMPLETE CBC AUTOMATED: CPT

## 2020-07-15 PROCEDURE — 82728 ASSAY OF FERRITIN: CPT

## 2020-07-15 PROCEDURE — 700111 HCHG RX REV CODE 636 W/ 250 OVERRIDE (IP): Performed by: HOSPITALIST

## 2020-07-15 PROCEDURE — 83615 LACTATE (LD) (LDH) ENZYME: CPT

## 2020-07-15 PROCEDURE — 85610 PROTHROMBIN TIME: CPT

## 2020-07-15 PROCEDURE — 85379 FIBRIN DEGRADATION QUANT: CPT

## 2020-07-15 PROCEDURE — 83520 IMMUNOASSAY QUANT NOS NONAB: CPT

## 2020-07-15 PROCEDURE — 82962 GLUCOSE BLOOD TEST: CPT | Mod: 91

## 2020-07-15 PROCEDURE — 85007 BL SMEAR W/DIFF WBC COUNT: CPT

## 2020-07-15 PROCEDURE — 700102 HCHG RX REV CODE 250 W/ 637 OVERRIDE(OP): Performed by: HOSPITALIST

## 2020-07-15 PROCEDURE — 36415 COLL VENOUS BLD VENIPUNCTURE: CPT

## 2020-07-15 PROCEDURE — 86140 C-REACTIVE PROTEIN: CPT

## 2020-07-15 RX ADMIN — ENOXAPARIN SODIUM 60 MG: 60 INJECTION SUBCUTANEOUS at 16:58

## 2020-07-15 RX ADMIN — AMLODIPINE BESYLATE 10 MG: 10 TABLET ORAL at 04:44

## 2020-07-15 RX ADMIN — ENOXAPARIN SODIUM 60 MG: 60 INJECTION SUBCUTANEOUS at 04:57

## 2020-07-15 RX ADMIN — TAMSULOSIN HYDROCHLORIDE 0.8 MG: 0.4 CAPSULE ORAL at 10:26

## 2020-07-15 RX ADMIN — ZINC SULFATE 220 MG (50 MG) CAPSULE 220 MG: CAPSULE at 04:45

## 2020-07-15 RX ADMIN — DEXAMETHASONE SODIUM PHOSPHATE 6 MG: 4 INJECTION, SOLUTION INTRA-ARTICULAR; INTRALESIONAL; INTRAMUSCULAR; INTRAVENOUS; SOFT TISSUE at 04:45

## 2020-07-15 RX ADMIN — INSULIN HUMAN 2 UNITS: 100 INJECTION, SOLUTION PARENTERAL at 17:02

## 2020-07-15 ASSESSMENT — ENCOUNTER SYMPTOMS
DIZZINESS: 0
DIAPHORESIS: 0
CONSTITUTIONAL NEGATIVE: 1
BLURRED VISION: 0
FOCAL WEAKNESS: 0
BRUISES/BLEEDS EASILY: 0
MYALGIAS: 0
HEADACHES: 0
SORE THROAT: 0
CARDIOVASCULAR NEGATIVE: 1
RESPIRATORY NEGATIVE: 1
ABDOMINAL PAIN: 0
COUGH: 0
WEAKNESS: 0
VOMITING: 0
WEIGHT LOSS: 0
EYES NEGATIVE: 1
NEUROLOGICAL NEGATIVE: 1
MUSCULOSKELETAL NEGATIVE: 1
PSYCHIATRIC NEGATIVE: 1
CHILLS: 0
PALPITATIONS: 0
DEPRESSION: 0
NAUSEA: 0
FEVER: 0
SHORTNESS OF BREATH: 0
GASTROINTESTINAL NEGATIVE: 1

## 2020-07-15 ASSESSMENT — LIFESTYLE VARIABLES: SUBSTANCE_ABUSE: 0

## 2020-07-15 NOTE — PALLIATIVE CARE
Palliative Care follow-up  PC RN attempted to call pt's listed contacts, Corrie and Nani. Neither number is in service or accepting calls. Discussed with Angeline from case management who will follow up with pt's LAINEYK eliezer.       Plan: PC RN will follow and support in finding representative for patient.     Thank you for allowing Palliative Care to support this patient and family. Contact [phone] for additional assistance, change in patient status, or with any questions/concerns.

## 2020-07-15 NOTE — CARE PLAN
Problem: Bowel/Gastric:  Goal: Normal bowel function is maintained or improved  Outcome: PROGRESSING AS EXPECTED  Note: Last bm today. Denies n/v. Abdomen soft.      Problem: Pain Management  Goal: Pain level will decrease to patient's comfort goal  Outcome: PROGRESSING AS EXPECTED  Note: Denies pain med needs. Resting and calm.

## 2020-07-15 NOTE — CONSULTS
"Reason for PC Consult: Advance Care Planning    Consulted by: Dr. Corea    Assessment:  General:   Patient is an 84 y.o. male with history of diabetes mellitus, hypercholesterolemia and BPH. Previous smoker. He presented to the ER due to progressive weakness following 1-2 weeks of intermittent vomiting and diarrhea. He was found to be positive for COVID with acute renal failure, dehydration, and hypotension and was admitted to the ICU. Pt now on med tele floor.     Dyspnea: No  Last BM: 07/15/20  Pain: No  Depression: Unable to determine  Dementia: Unable to Determine    Spiritual:  Is Pentec[employer]alism or spirituality important for coping with this illness? Unable to determine  Has a  or spiritual provider visit been requested?      Palliative Performance Scale: 60%    Advance Directive: Not on file  DPOA: No  POLST: No    Code Status: Full-unable to discuss at this encounter    Social: Pt reports that he lives with his significant other named Nani. He states that he has 6 children, however he does not know any of their locations or contact information. PC RN asks for his children's names he states Bryn [place] Jr., then stops and says it \"doesn't matter.\" PC RN encourages pt to name his other children but he declines.     Outcome:  Introduced self and role of Palliative Care to Ovi at bedside.  Assessed pt's understanding of current medical status, overall health picture, and options for future care. Pt states, \"I fell.\" PC RN asks for further detail and pt states, \"what my legs gave out, that's it.\" PC RN tries to discuss pt's COVID, pt states that he is hungry and unless PC RN can feed pt he is not interested in talking. PC RN explains that the intention of the visit is to determine pt's goals and help him. Pt wants to go home and becomes frustrated when PC RN attempts to assess pt's home/baseline functional level. Pt asks PC RN to leave several times then asks for PC RN to take him to the " "restroom. PC RN guides pt to restroom and assists pt in washing his hands. He states he is going to \"lay down now and thank you.\" Pt's eyes are closed and he no longer engages at this point in time.    Active listening, reflection, reminiscing, validation & normalization, and empathic support utilized throughout this encounter.  All questions answered.  PC contact information given.       Updated: WM HUI and Dr. Cox    Plan: PC RN will follow up with [facility]K search and contact information to try and gather further detail.       Thank you for allowing Palliative Care to participate in this patient's care. Please feel free to call [phone] with any questions or concerns.  "

## 2020-07-15 NOTE — PALLIATIVE CARE
Palliative Care follow-up  PC RN discussed with Dr. Cox, unable to determine capacity at this time due to pt's unwillingness and/or inability to participate. PC RN will follow up tomorrow and complete assessment.         Thank you for allowing Palliative Care to support this patient and family. Contact [phone] for additional assistance, change in patient status, or with any questions/concerns.

## 2020-07-15 NOTE — PROGRESS NOTES
Dr. Cox notified that patient has been voiding small amounts throughout day and has history of BPH. Patient takes Flomax for BPH. Bladder scan completed and 540 ml in bladder; pt abd semi firm but patient asymptomatic and denies pain. Patient had been refusing straight cath and bladder scan in past. No new orders but continue to monitor. If patient becomes symptomatic then straight cath to be done.

## 2020-07-15 NOTE — PROGRESS NOTES
Up to the bathroom with one person and cane assist. Drinking water without any s/sx of aspiration noted. Denies pain. Telesitter in place due to impulsiveness. Pt is blind and slight [place]. On 4L o2 nc. Bed alarm active.

## 2020-07-15 NOTE — CARE PLAN
Problem: Safety  Goal: Will remain free from falls  Outcome: PROGRESSING AS EXPECTED  Pt is instructed to call when in need of assistance   Bed alarm, treaded socks, and hourly rounding in place    Telesitter in place.    Problem: Pain Management  Goal: Pain level will decrease to patient's comfort goal  Outcome: PROGRESSING AS EXPECTED  Pt denies pain at this time. Will continue to monitor & implement appropriate interventions PRN.

## 2020-07-15 NOTE — CARE PLAN
Problem: Safety  Goal: Will remain free from injury  Outcome: PROGRESSING AS EXPECTED  Intervention: Provide assistance with mobility  Note: Hand held assist and guidance to bathroom since patient has visual deficits. Cane when OOB. Pt sat up in chair a couple times during shift. Tele sitter monitoring and bed alarm on.     Problem: Infection  Goal: Will remain free from infection  Outcome: PROGRESSING AS EXPECTED  Intervention: Assess signs and symptoms of infection  Note: Pt afebrile. Pt on 5 L NC. Droplet and contact precautions maintained.

## 2020-07-16 PROBLEM — E46 MALNUTRITION (HCC): Status: ACTIVE | Noted: 2020-07-16

## 2020-07-16 LAB
ANION GAP SERPL CALC-SCNC: 13 MMOL/L (ref 7–16)
BACTERIA BLD CULT: NORMAL
BASOPHILS # BLD AUTO: 0.2 % (ref 0–1.8)
BASOPHILS # BLD: 0.02 K/UL (ref 0–0.12)
BUN SERPL-MCNC: 30 MG/DL (ref 8–22)
CALCIUM SERPL-MCNC: 8.4 MG/DL (ref 8.5–10.5)
CHLORIDE SERPL-SCNC: 97 MMOL/L (ref 96–112)
CO2 SERPL-SCNC: 28 MMOL/L (ref 20–33)
CREAT SERPL-MCNC: 0.91 MG/DL (ref 0.5–1.4)
EOSINOPHIL # BLD AUTO: 0.01 K/UL (ref 0–0.51)
EOSINOPHIL NFR BLD: 0.1 % (ref 0–6.9)
ERYTHROCYTE [DISTWIDTH] IN BLOOD BY AUTOMATED COUNT: 39.8 FL (ref 35.9–50)
GLUCOSE BLD-MCNC: 146 MG/DL (ref 65–99)
GLUCOSE BLD-MCNC: 178 MG/DL (ref 65–99)
GLUCOSE SERPL-MCNC: 129 MG/DL (ref 65–99)
HCT VFR BLD AUTO: 41 % (ref 42–52)
HGB BLD-MCNC: 14 G/DL (ref 14–18)
IMM GRANULOCYTES # BLD AUTO: 0.12 K/UL (ref 0–0.11)
IMM GRANULOCYTES NFR BLD AUTO: 1.3 % (ref 0–0.9)
LYMPHOCYTES # BLD AUTO: 0.76 K/UL (ref 1–4.8)
LYMPHOCYTES NFR BLD: 8.3 % (ref 22–41)
MCH RBC QN AUTO: 30.8 PG (ref 27–33)
MCHC RBC AUTO-ENTMCNC: 34.1 G/DL (ref 33.7–35.3)
MCV RBC AUTO: 90.1 FL (ref 81.4–97.8)
MONOCYTES # BLD AUTO: 0.76 K/UL (ref 0–0.85)
MONOCYTES NFR BLD AUTO: 8.3 % (ref 0–13.4)
NEUTROPHILS # BLD AUTO: 7.47 K/UL (ref 1.82–7.42)
NEUTROPHILS NFR BLD: 81.8 % (ref 44–72)
NRBC # BLD AUTO: 0 K/UL
NRBC BLD-RTO: 0 /100 WBC
PLATELET # BLD AUTO: 253 K/UL (ref 164–446)
PMV BLD AUTO: 9.9 FL (ref 9–12.9)
POTASSIUM SERPL-SCNC: 3.3 MMOL/L (ref 3.6–5.5)
RBC # BLD AUTO: 4.55 M/UL (ref 4.7–6.1)
SIGNIFICANT IND 70042: NORMAL
SITE SITE: NORMAL
SODIUM SERPL-SCNC: 138 MMOL/L (ref 135–145)
SOURCE SOURCE: NORMAL
WBC # BLD AUTO: 9.1 K/UL (ref 4.8–10.8)

## 2020-07-16 PROCEDURE — A9270 NON-COVERED ITEM OR SERVICE: HCPCS | Performed by: INTERNAL MEDICINE

## 2020-07-16 PROCEDURE — 700102 HCHG RX REV CODE 250 W/ 637 OVERRIDE(OP): Performed by: HOSPITALIST

## 2020-07-16 PROCEDURE — 700111 HCHG RX REV CODE 636 W/ 250 OVERRIDE (IP): Performed by: HOSPITALIST

## 2020-07-16 PROCEDURE — A9270 NON-COVERED ITEM OR SERVICE: HCPCS | Performed by: HOSPITALIST

## 2020-07-16 PROCEDURE — 80048 BASIC METABOLIC PNL TOTAL CA: CPT

## 2020-07-16 PROCEDURE — 36415 COLL VENOUS BLD VENIPUNCTURE: CPT

## 2020-07-16 PROCEDURE — 99232 SBSQ HOSP IP/OBS MODERATE 35: CPT | Performed by: HOSPITALIST

## 2020-07-16 PROCEDURE — 700102 HCHG RX REV CODE 250 W/ 637 OVERRIDE(OP): Performed by: INTERNAL MEDICINE

## 2020-07-16 PROCEDURE — 82962 GLUCOSE BLOOD TEST: CPT | Mod: 91

## 2020-07-16 PROCEDURE — 85025 COMPLETE CBC W/AUTO DIFF WBC: CPT

## 2020-07-16 PROCEDURE — 770021 HCHG ROOM/CARE - ISO PRIVATE

## 2020-07-16 RX ORDER — DEXAMETHASONE 6 MG/1
6 TABLET ORAL DAILY
Status: COMPLETED | OUTPATIENT
Start: 2020-07-17 | End: 2020-07-20

## 2020-07-16 RX ADMIN — INSULIN HUMAN 2 UNITS: 100 INJECTION, SOLUTION PARENTERAL at 17:42

## 2020-07-16 RX ADMIN — DEXAMETHASONE SODIUM PHOSPHATE 6 MG: 4 INJECTION, SOLUTION INTRA-ARTICULAR; INTRALESIONAL; INTRAMUSCULAR; INTRAVENOUS; SOFT TISSUE at 04:17

## 2020-07-16 RX ADMIN — ENOXAPARIN SODIUM 60 MG: 60 INJECTION SUBCUTANEOUS at 17:42

## 2020-07-16 RX ADMIN — ENOXAPARIN SODIUM 60 MG: 60 INJECTION SUBCUTANEOUS at 04:16

## 2020-07-16 RX ADMIN — THERA TABS 1 TABLET: TAB at 17:42

## 2020-07-16 RX ADMIN — ZINC SULFATE 220 MG (50 MG) CAPSULE 220 MG: CAPSULE at 04:14

## 2020-07-16 RX ADMIN — AMLODIPINE BESYLATE 10 MG: 10 TABLET ORAL at 04:14

## 2020-07-16 ASSESSMENT — ENCOUNTER SYMPTOMS
EYES NEGATIVE: 1
RESPIRATORY NEGATIVE: 1
BLURRED VISION: 0
DEPRESSION: 0
MUSCULOSKELETAL NEGATIVE: 1
DIZZINESS: 0
DIAPHORESIS: 0
NAUSEA: 0
WEIGHT LOSS: 0
FOCAL WEAKNESS: 0
PALPITATIONS: 0
HEADACHES: 0
NEUROLOGICAL NEGATIVE: 1
CONSTITUTIONAL NEGATIVE: 1
GASTROINTESTINAL NEGATIVE: 1
ABDOMINAL PAIN: 0
CARDIOVASCULAR NEGATIVE: 1
VOMITING: 0
CHILLS: 0
PSYCHIATRIC NEGATIVE: 1
MYALGIAS: 0
SHORTNESS OF BREATH: 0
SORE THROAT: 0
COUGH: 0
WEAKNESS: 0
FEVER: 0
BRUISES/BLEEDS EASILY: 0

## 2020-07-16 ASSESSMENT — LIFESTYLE VARIABLES: SUBSTANCE_ABUSE: 0

## 2020-07-16 NOTE — THERAPY
Missed Therapy     Patient Name: Ovi Bejarano  Age:  84 y.o., Sex:  male  Medical Record #: [id number]  Today's Date: 7/16/2020    Patient refused. States he has been getting up frequently with nursing, nursing confirms.        07/16/20 1100   Treatment Variance   Reason For Missed Therapy Non-Medical - Patient Refused   Total Time Spent   Total Time Spent (Mins) 5

## 2020-07-16 NOTE — ASSESSMENT & PLAN NOTE
-Body mass index is 20.79 kg/m². Significantly improved from 17 on admit.  -Encourage PO intake.  -Food preferences.  -Continue TID supplements

## 2020-07-16 NOTE — CARE PLAN
Problem: Safety  Goal: Will remain free from injury  Outcome: PROGRESSING AS EXPECTED     Problem: Safety  Goal: Will remain free from falls  Outcome: PROGRESSING AS EXPECTED     Problem: Respiratory:  Goal: Respiratory status will improve  Outcome: PROGRESSING AS EXPECTED

## 2020-07-16 NOTE — CARE PLAN
Problem: Safety  Goal: Will remain free from injury  Outcome: PROGRESSING AS EXPECTED  Goal: Will remain free from falls  Outcome: PROGRESSING AS EXPECTED     Problem: Bowel/Gastric:  Goal: Normal bowel function is maintained or improved  Outcome: PROGRESSING AS EXPECTED     Problem: Discharge Barriers/Planning  Goal: Patient's continuum of care needs will be met  Outcome: PROGRESSING SLOWER THAN EXPECTED

## 2020-07-16 NOTE — PROGRESS NOTES
[facility] Medicine Daily Progress Note    Date of Service  7/16/2020    Chief Complaint  84 y.o. male admitted 7/9/2020 with cough n/v    Hospital Course    Patient is an 84 y.o. male with history of diabetes mellitus, hypercholesterolemia and bph for which he has reportedly declined treatment in the past.  He is also a previous smoker. He presented to the er due to progressive weakness following 1-2 weeks of intermittent vomiting and diarrhea.  He denies fevers, chills, melena or hematochezia.  He also reported a mild cough.  He was found to be positive for COVID with acute renal failure, dehydration, and hypotension and was admitted to the ICU.      Interval Problem Update  No acute overnight events.      Consultants/Specialty  Critical care    Code Status  Full    Disposition  tbd    Review of Systems  Review of Systems   Constitutional: Negative.  Negative for chills, diaphoresis, fever, malaise/fatigue and weight loss.   HENT: Negative.  Negative for sore throat.    Eyes: Negative.  Negative for blurred vision.   Respiratory: Negative.  Negative for cough and shortness of breath.    Cardiovascular: Negative.  Negative for chest pain, palpitations and leg swelling.   Gastrointestinal: Negative.  Negative for abdominal pain, nausea and vomiting.   Genitourinary: Negative.  Negative for dysuria.   Musculoskeletal: Negative.  Negative for myalgias.   Skin: Negative.  Negative for itching and rash.   Neurological: Negative.  Negative for dizziness, focal weakness, weakness and headaches.   Endo/Heme/Allergies: Negative.  Does not bruise/bleed easily.   Psychiatric/Behavioral: Negative.  Negative for depression, substance abuse and suicidal ideas.   All other systems reviewed and are negative.       Physical Exam  Temp:  [36.2 °C (97.1 °F)-36.4 °C (97.5 °F)] 36.2 °C (97.1 °F)  Pulse:  [74-82] 82  Resp:  [16-18] 18  BP: (104-132)/(42-58) 123/58  SpO2:  [90 %-96 %] 90 %    Physical Exam  Vitals signs and nursing note  reviewed. Exam conducted with a chaperone present.   Constitutional:       General: He is not in acute distress.     Appearance: Normal appearance. He is not diaphoretic.   HENT:      Head: Normocephalic.      Nose: Nose normal.      Mouth/Throat:      Mouth: Mucous membranes are moist.   Eyes:      Pupils: Pupils are equal, round, and reactive to light.   Cardiovascular:      Rate and Rhythm: Normal rate and regular rhythm.      Pulses: Normal pulses.      Heart sounds: Normal heart sounds.   Pulmonary:      Effort: Pulmonary effort is normal.      Comments: Decreased bs  Abdominal:      General: Abdomen is flat. Bowel sounds are normal.      Palpations: Abdomen is soft.   Musculoskeletal: Normal range of motion.         General: No swelling or deformity.   Skin:     General: Skin is warm and dry.      Capillary Refill: Capillary refill takes less than 2 seconds.   Neurological:      General: No focal deficit present.      Mental Status: He is alert and oriented to person, place, and time.      Cranial Nerves: No cranial nerve deficit.   Psychiatric:         Mood and Affect: Mood normal.         Behavior: Behavior normal.         Fluids    Intake/Output Summary (Last 24 hours) at 7/16/2020 1612  Last data filed at 7/16/2020 0332  Gross per 24 hour   Intake 170 ml   Output --   Net 170 ml       Laboratory  Recent Labs     07/14/20  0413 07/15/20  0606 07/16/20  0539   WBC 9.1 8.6 9.1   RBC 4.49* 4.66* 4.55*   HEMOGLOBIN 13.8* 14.2 14.0   HEMATOCRIT 39.4* 42.1 41.0*   MCV 87.8 90.3 90.1   MCH 30.7 30.5 30.8   MCHC 35.0 33.7 34.1   RDW 39.0 40.1 39.8   PLATELETCT 217 243 253   MPV 9.6 9.7 9.9     Recent Labs     07/14/20  0413 07/15/20  0606 07/16/20  0539   SODIUM 138 140 138   POTASSIUM 3.4* 3.4* 3.3*   CHLORIDE 100 98 97   CO2 24 27 28   GLUCOSE 181* 150* 129*   BUN 28* 33* 30*   CREATININE 0.99 1.01 0.91   CALCIUM 8.1* 8.6 8.4*     Recent Labs     07/15/20  0606   APTT 30.0   INR 0.97                Imaging  DX-CHEST-PORTABLE (1 VIEW)   Final Result      1.  The lungs are hyperinflated suggesting emphysema/COPD.   2.  There is minimal predominantly lower lobe interstitial opacity which is most likely due to chronic scarring.           Assessment/Plan  * Dehydration  Assessment & Plan  resolved    Diarrhea of presumed infectious origin  Assessment & Plan  Resolved  C dif negative  Likely related to covid    COVID-19 virus infection  Assessment & Plan  D dimer elevated  Continue lovenox  Vitamin D level 14 - high dose replacement weekly started  Continue dexamethasone  Continue supportive care    COPD (chronic obstructive pulmonary disease) (HCC)- (present on admission)  Assessment & Plan  No s/o exacerbation at this time  Continue dexamethasone for covid  Continue to follow    Malnutrition (HCC)  Assessment & Plan  Routine monitoring, periodic labs.  RD eval.  MVI      Urinary retention  Assessment & Plan  Reportedly chronic  Refusing méndez or urology  Accepting flomax which will be continued    Acute kidney injury (HCC)  Assessment & Plan  improved  Bph could also be contributing but urology eval declined  Continue flomax  Pt continues to refuse cath  Following  H/o kidney mass    Type 2 diabetes mellitus (HCC)- (present on admission)  Assessment & Plan  Continue ssi  Hypoglycemic protocol     Acute hypoxic respiratory failure  -Secondary to COVID, treatment per above.    VTE prophylaxis:   lovenox

## 2020-07-16 NOTE — CARE PLAN
Problem: Nutritional:  Goal: Achieve adequate nutritional intake  Description: Patient will consume 50% or > of meals/supplements vs initiation of nutrition support.  Outcome: PROGRESSING AS EXPECTED  Per chart pt PO < 25-75%, however 3 meals yesterday 50-75%. Pt continues to receive Boost Glucose Control TID. RD following.

## 2020-07-16 NOTE — PROGRESS NOTES
[facility] Medicine Daily Progress Note    Date of Service  7/15/2020    Chief Complaint  84 y.o. male admitted 7/9/2020 with cough n/v    Hospital Course    Patient is an 84 y.o. male with history of diabetes mellitus, hypercholesterolemia and bph for which he has reportedly declined treatment in the past.  He is also a previous smoker. He presented to the er due to progressive weakness following 1-2 weeks of intermittent vomiting and diarrhea.  He denies fevers, chills, melena or hematochezia.  He also reported a mild cough.  He was found to be positive for COVID with acute renal failure, dehydration, and hypotension and was admitted to the ICU.      Interval Problem Update  No acute overnight events.      Consultants/Specialty  Critical care    Code Status  Full    Disposition  tbd    Review of Systems  Review of Systems   Constitutional: Negative.  Negative for chills, diaphoresis, fever, malaise/fatigue and weight loss.   HENT: Negative.  Negative for sore throat.    Eyes: Negative.  Negative for blurred vision.   Respiratory: Negative.  Negative for cough and shortness of breath.    Cardiovascular: Negative.  Negative for chest pain, palpitations and leg swelling.   Gastrointestinal: Negative.  Negative for abdominal pain, nausea and vomiting.   Genitourinary: Negative.  Negative for dysuria.   Musculoskeletal: Negative.  Negative for myalgias.   Skin: Negative.  Negative for itching and rash.   Neurological: Negative.  Negative for dizziness, focal weakness, weakness and headaches.   Endo/Heme/Allergies: Negative.  Does not bruise/bleed easily.   Psychiatric/Behavioral: Negative.  Negative for depression, substance abuse and suicidal ideas.   All other systems reviewed and are negative.       Physical Exam  Temp:  [36.3 °C (97.4 °F)-36.8 °C (98.2 °F)] 36.4 °C (97.5 °F)  Pulse:  [75-96] 76  Resp:  [16-18] 17  BP: (116-134)/(42-48) 128/47  SpO2:  [96 %-100 %] 98 %    Physical Exam  Vitals signs and nursing note  reviewed. Exam conducted with a chaperone present.   Constitutional:       General: He is not in acute distress.     Appearance: Normal appearance. He is not diaphoretic.   HENT:      Head: Normocephalic.      Nose: Nose normal.      Mouth/Throat:      Mouth: Mucous membranes are moist.   Eyes:      Pupils: Pupils are equal, round, and reactive to light.   Cardiovascular:      Rate and Rhythm: Normal rate and regular rhythm.      Pulses: Normal pulses.      Heart sounds: Normal heart sounds.   Pulmonary:      Effort: Pulmonary effort is normal.      Comments: Decreased bs  Abdominal:      General: Abdomen is flat. Bowel sounds are normal.      Palpations: Abdomen is soft.   Musculoskeletal: Normal range of motion.         General: No swelling or deformity.   Skin:     General: Skin is warm and dry.      Capillary Refill: Capillary refill takes less than 2 seconds.   Neurological:      General: No focal deficit present.      Mental Status: He is alert and oriented to person, place, and time.      Cranial Nerves: No cranial nerve deficit.   Psychiatric:         Mood and Affect: Mood normal.         Behavior: Behavior normal.         Fluids    Intake/Output Summary (Last 24 hours) at 7/15/2020 1752  Last data filed at 7/15/2020 0400  Gross per 24 hour   Intake 520 ml   Output --   Net 520 ml       Laboratory  Recent Labs     07/13/20  0644 07/14/20  0413 07/15/20  0606   WBC 10.4 9.1 8.6   RBC 4.43* 4.49* 4.66*   HEMOGLOBIN 13.7* 13.8* 14.2   HEMATOCRIT 42.2 39.4* 42.1   MCV 95.3 87.8 90.3   MCH 30.9 30.7 30.5   MCHC 32.5* 35.0 33.7   RDW 43.5 39.0 40.1   PLATELETCT 158* 217 243   MPV 10.1 9.6 9.7     Recent Labs     07/13/20  0644 07/14/20  0413 07/15/20  0606   SODIUM 134* 138 140   POTASSIUM 3.7 3.4* 3.4*   CHLORIDE 97 100 98   CO2 18* 24 27   GLUCOSE 160* 181* 150*   BUN 33* 28* 33*   CREATININE 1.01 0.99 1.01   CALCIUM 8.3* 8.1* 8.6     Recent Labs     07/13/20  0644 07/15/20  0606   APTT 28.6 30.0   INR 0.98 0.97                Imaging  DX-CHEST-PORTABLE (1 VIEW)   Final Result      1.  The lungs are hyperinflated suggesting emphysema/COPD.   2.  There is minimal predominantly lower lobe interstitial opacity which is most likely due to chronic scarring.           Assessment/Plan  * Dehydration  Assessment & Plan  resolved    Diarrhea of presumed infectious origin  Assessment & Plan  Resolved  C dif negative  Likely related to covid    COVID-19 virus infection  Assessment & Plan  D dimer elevated  Continue lovenox  Vitamin D level 14 - high dose replacement weekly started  Continue dexamethasone  Continue supportive care    COPD (chronic obstructive pulmonary disease) (HCC)- (present on admission)  Assessment & Plan  No s/o exacerbation at this time  Continue dexamethasone for covid  Continue to follow    Urinary retention  Assessment & Plan  Reportedly chronic  Refusing méndez or urology  Accepting flomax which will be continued    Acute kidney injury (HCC)  Assessment & Plan  improved  Bph could also be contributing but urology eval declined  Continue flomax  Pt continues to refuse cath  Following  H/o kidney mass    Type 2 diabetes mellitus (HCC)- (present on admission)  Assessment & Plan  Continue ssi  Hypoglycemic protocol       VTE prophylaxis:   lovenox

## 2020-07-16 NOTE — DOCUMENTATION QUERY
"                                                                         [facility]                                                                       Query Response Note      PATIENT:               DIEUDONNE MONTELONGO  ACCT #:                  [id number]  MRN:                     [id number]  :                      1935  ADMIT DATE:       2020 3:11 PM  DISCH DATE:          RESPONDING  PROVIDER #:        [id number]           QUERY TEXT:    Severe malnutrition is documented in the Registered Dietician eval.      Please specify if you:    NOTE:  If an appropriate response is not listed below, please respond with a new note.      The patient's Clinical Indicators include:  7/10 Dietary Note: severe malnutrition in the context of acute illness related to ongoing n/v and diarrhea affecting appetite/ intake, as evidenced by poor PO intake for >50 days (ongoing for 2 weeks) of <50% of estimated needs and a severe 8.1% wt loss over the past month   Risk Factors: decreased PO intake, severe weight loss, n/v, diarrhea  Treatment: dietary eval, Boost TID, encourage/ document PO intake, monitor weight  Options provided:   -- Agree with dietician  assessment of severe malnutrition   -- Disagree with dietician assessment of severe malnutrition   -- Unable to determine      Query created by: Caryn Covington on 2020 12:24 PM    RESPONSE TEXT:    Agree with dietician assessment of severe malnutrition       QUERY TEXT:    \"He continues to require oxygen at 4L\" is documented in the - Progress Notes.  Can a diagnosis be provided to support this finding/ treatment?     NOTE:  If an appropriate response is not listed below, please respond with a new note.    The patient's Clinical Indicators include:  Per - Progress Notes: continues to require oxygen at 4L  Per Vitals Flowsheet:  86% RA, 97% 2L,  95% 4.5L NC  Risk Factors: COVID 19, COPD, history of tobacco use  Treatment: supplemental 02  Options " provided:   -- Acute respiratory failure with hypoxia   -- Acute on chronic respiratory failure with hypoxia   -- Respiratory Distress   -- Hypoxia   -- Findings of no clinical significance   -- Unable to determine      Query created by: Caryn Covington on 7/15/2020 7:12 AM    RESPONSE TEXT:    Acute on chronic respiratory failure with hypoxia          Electronically signed by:  FAINA SALGADO MD 7/16/2020 4:09 PM

## 2020-07-16 NOTE — PROGRESS NOTES
Assumed care of patient at 1900. A/O x4 at this time. Discussed plan of care and call light use. Bed locked and low, soft touch call light within reach. Toileted and other needs addressed.

## 2020-07-17 LAB
ALBUMIN SERPL BCP-MCNC: 3.2 G/DL (ref 3.2–4.9)
ALP SERPL-CCNC: 140 U/L (ref 30–99)
ALT SERPL-CCNC: 39 U/L (ref 2–50)
ANION GAP SERPL CALC-SCNC: 15 MMOL/L (ref 7–16)
APTT PPP: 35.4 SEC (ref 24.7–36)
AST SERPL-CCNC: 56 U/L (ref 12–45)
BACTERIA BLD CULT: NORMAL
BASOPHILS # BLD AUTO: 0.2 % (ref 0–1.8)
BASOPHILS # BLD: 0.02 K/UL (ref 0–0.12)
BILIRUB CONJ SERPL-MCNC: <0.2 MG/DL (ref 0.1–0.5)
BILIRUB INDIRECT SERPL-MCNC: ABNORMAL MG/DL (ref 0–1)
BILIRUB SERPL-MCNC: 0.5 MG/DL (ref 0.1–1.5)
BUN SERPL-MCNC: 28 MG/DL (ref 8–22)
CALCIUM SERPL-MCNC: 8.6 MG/DL (ref 8.5–10.5)
CHLORIDE SERPL-SCNC: 97 MMOL/L (ref 96–112)
CK SERPL-CCNC: 136 U/L (ref 0–154)
CO2 SERPL-SCNC: 26 MMOL/L (ref 20–33)
CREAT SERPL-MCNC: 0.8 MG/DL (ref 0.5–1.4)
CRP SERPL HS-MCNC: 0.42 MG/DL (ref 0–0.75)
D DIMER PPP IA.FEU-MCNC: 1.05 UG/ML (FEU) (ref 0–0.5)
EOSINOPHIL # BLD AUTO: 0.02 K/UL (ref 0–0.51)
EOSINOPHIL NFR BLD: 0.2 % (ref 0–6.9)
ERYTHROCYTE [DISTWIDTH] IN BLOOD BY AUTOMATED COUNT: 38.3 FL (ref 35.9–50)
FERRITIN SERPL-MCNC: 1151 NG/ML (ref 22–322)
FIBRINOGEN PPP-MCNC: 396 MG/DL (ref 215–460)
GLUCOSE BLD-MCNC: 120 MG/DL (ref 65–99)
GLUCOSE BLD-MCNC: 271 MG/DL (ref 65–99)
GLUCOSE SERPL-MCNC: 122 MG/DL (ref 65–99)
HCT VFR BLD AUTO: 40.5 % (ref 42–52)
HGB BLD-MCNC: 14.1 G/DL (ref 14–18)
IL6 SERPL-MCNC: 2.2 PG/ML
IMM GRANULOCYTES # BLD AUTO: 0.11 K/UL (ref 0–0.11)
IMM GRANULOCYTES NFR BLD AUTO: 1.3 % (ref 0–0.9)
INR PPP: 1 (ref 0.87–1.13)
LDH SERPL L TO P-CCNC: 1249 U/L (ref 107–266)
LYMPHOCYTES # BLD AUTO: 1.01 K/UL (ref 1–4.8)
LYMPHOCYTES NFR BLD: 12.1 % (ref 22–41)
MAGNESIUM SERPL-MCNC: 2 MG/DL (ref 1.5–2.5)
MCH RBC QN AUTO: 30.7 PG (ref 27–33)
MCHC RBC AUTO-ENTMCNC: 34.8 G/DL (ref 33.7–35.3)
MCV RBC AUTO: 88.2 FL (ref 81.4–97.8)
MONOCYTES # BLD AUTO: 0.91 K/UL (ref 0–0.85)
MONOCYTES NFR BLD AUTO: 10.9 % (ref 0–13.4)
NEUTROPHILS # BLD AUTO: 6.26 K/UL (ref 1.82–7.42)
NEUTROPHILS NFR BLD: 75.3 % (ref 44–72)
NRBC # BLD AUTO: 0 K/UL
NRBC BLD-RTO: 0 /100 WBC
NT-PROBNP SERPL IA-MCNC: 206 PG/ML (ref 0–125)
PLATELET # BLD AUTO: 231 K/UL (ref 164–446)
PMV BLD AUTO: 9.4 FL (ref 9–12.9)
POTASSIUM SERPL-SCNC: 3.3 MMOL/L (ref 3.6–5.5)
PROT SERPL-MCNC: 6.3 G/DL (ref 6–8.2)
PROTHROMBIN TIME: 13.5 SEC (ref 12–14.6)
RBC # BLD AUTO: 4.59 M/UL (ref 4.7–6.1)
SIGNIFICANT IND 70042: NORMAL
SITE SITE: NORMAL
SODIUM SERPL-SCNC: 138 MMOL/L (ref 135–145)
SOURCE SOURCE: NORMAL
TROPONIN T SERPL-MCNC: 17 NG/L (ref 6–19)
WBC # BLD AUTO: 8.3 K/UL (ref 4.8–10.8)

## 2020-07-17 PROCEDURE — 86140 C-REACTIVE PROTEIN: CPT

## 2020-07-17 PROCEDURE — 36415 COLL VENOUS BLD VENIPUNCTURE: CPT

## 2020-07-17 PROCEDURE — 83615 LACTATE (LD) (LDH) ENZYME: CPT

## 2020-07-17 PROCEDURE — 82962 GLUCOSE BLOOD TEST: CPT

## 2020-07-17 PROCEDURE — 85379 FIBRIN DEGRADATION QUANT: CPT

## 2020-07-17 PROCEDURE — 85730 THROMBOPLASTIN TIME PARTIAL: CPT

## 2020-07-17 PROCEDURE — 80076 HEPATIC FUNCTION PANEL: CPT

## 2020-07-17 PROCEDURE — 700102 HCHG RX REV CODE 250 W/ 637 OVERRIDE(OP): Performed by: INTERNAL MEDICINE

## 2020-07-17 PROCEDURE — 700111 HCHG RX REV CODE 636 W/ 250 OVERRIDE (IP): Performed by: HOSPITALIST

## 2020-07-17 PROCEDURE — 82550 ASSAY OF CK (CPK): CPT

## 2020-07-17 PROCEDURE — 97116 GAIT TRAINING THERAPY: CPT

## 2020-07-17 PROCEDURE — 83735 ASSAY OF MAGNESIUM: CPT

## 2020-07-17 PROCEDURE — 700102 HCHG RX REV CODE 250 W/ 637 OVERRIDE(OP): Performed by: HOSPITALIST

## 2020-07-17 PROCEDURE — 80048 BASIC METABOLIC PNL TOTAL CA: CPT

## 2020-07-17 PROCEDURE — 99231 SBSQ HOSP IP/OBS SF/LOW 25: CPT | Performed by: HOSPITALIST

## 2020-07-17 PROCEDURE — 84484 ASSAY OF TROPONIN QUANT: CPT

## 2020-07-17 PROCEDURE — 83880 ASSAY OF NATRIURETIC PEPTIDE: CPT

## 2020-07-17 PROCEDURE — 770021 HCHG ROOM/CARE - ISO PRIVATE

## 2020-07-17 PROCEDURE — 83520 IMMUNOASSAY QUANT NOS NONAB: CPT

## 2020-07-17 PROCEDURE — 85384 FIBRINOGEN ACTIVITY: CPT

## 2020-07-17 PROCEDURE — 85025 COMPLETE CBC W/AUTO DIFF WBC: CPT

## 2020-07-17 PROCEDURE — 700102 HCHG RX REV CODE 250 W/ 637 OVERRIDE(OP): Performed by: NURSE PRACTITIONER

## 2020-07-17 PROCEDURE — A9270 NON-COVERED ITEM OR SERVICE: HCPCS | Performed by: NURSE PRACTITIONER

## 2020-07-17 PROCEDURE — A9270 NON-COVERED ITEM OR SERVICE: HCPCS | Performed by: HOSPITALIST

## 2020-07-17 PROCEDURE — 85610 PROTHROMBIN TIME: CPT

## 2020-07-17 PROCEDURE — 82728 ASSAY OF FERRITIN: CPT

## 2020-07-17 PROCEDURE — A9270 NON-COVERED ITEM OR SERVICE: HCPCS | Performed by: INTERNAL MEDICINE

## 2020-07-17 RX ORDER — POTASSIUM CHLORIDE 20 MEQ/1
40 TABLET, EXTENDED RELEASE ORAL ONCE
Status: COMPLETED | OUTPATIENT
Start: 2020-07-17 | End: 2020-07-17

## 2020-07-17 RX ADMIN — ZINC SULFATE 220 MG (50 MG) CAPSULE 220 MG: CAPSULE at 06:37

## 2020-07-17 RX ADMIN — THERA TABS 1 TABLET: TAB at 06:37

## 2020-07-17 RX ADMIN — TAMSULOSIN HYDROCHLORIDE 0.8 MG: 0.4 CAPSULE ORAL at 07:54

## 2020-07-17 RX ADMIN — ENOXAPARIN SODIUM 60 MG: 60 INJECTION SUBCUTANEOUS at 05:03

## 2020-07-17 RX ADMIN — ENOXAPARIN SODIUM 60 MG: 60 INJECTION SUBCUTANEOUS at 16:41

## 2020-07-17 RX ADMIN — DIPHENOXYLATE HYDROCHLORIDE AND ATROPINE SULFATE 1 TABLET: 2.5; .025 TABLET ORAL at 14:14

## 2020-07-17 RX ADMIN — INSULIN HUMAN 5 UNITS: 100 INJECTION, SOLUTION PARENTERAL at 16:38

## 2020-07-17 RX ADMIN — AMLODIPINE BESYLATE 10 MG: 10 TABLET ORAL at 06:37

## 2020-07-17 RX ADMIN — DEXAMETHASONE 6 MG: 6 TABLET ORAL at 06:37

## 2020-07-17 RX ADMIN — POTASSIUM CHLORIDE 40 MEQ: 1500 TABLET, EXTENDED RELEASE ORAL at 14:15

## 2020-07-17 ASSESSMENT — ENCOUNTER SYMPTOMS
GASTROINTESTINAL NEGATIVE: 1
MUSCULOSKELETAL NEGATIVE: 1
WEIGHT LOSS: 0
BLURRED VISION: 0
NAUSEA: 0
RESPIRATORY NEGATIVE: 1
CONSTITUTIONAL NEGATIVE: 1
COUGH: 0
VOMITING: 0
SHORTNESS OF BREATH: 0
MYALGIAS: 0
CARDIOVASCULAR NEGATIVE: 1
DEPRESSION: 0
HEADACHES: 0
PALPITATIONS: 0
ABDOMINAL PAIN: 0
NEUROLOGICAL NEGATIVE: 1
PSYCHIATRIC NEGATIVE: 1
SORE THROAT: 0
DIAPHORESIS: 0
FOCAL WEAKNESS: 0
EYES NEGATIVE: 1
DIZZINESS: 0
CHILLS: 0
BRUISES/BLEEDS EASILY: 0
WEAKNESS: 0
FEVER: 0

## 2020-07-17 ASSESSMENT — COGNITIVE AND FUNCTIONAL STATUS - GENERAL
SUGGESTED CMS G CODE MODIFIER MOBILITY: CJ
MOVING FROM LYING ON BACK TO SITTING ON SIDE OF FLAT BED: A LITTLE
EATING MEALS: A LITTLE
PERSONAL GROOMING: A LITTLE
MOBILITY SCORE: 20
SUGGESTED CMS G CODE MODIFIER DAILY ACTIVITY: CK
DAILY ACTIVITIY SCORE: 18
CLIMB 3 TO 5 STEPS WITH RAILING: A LITTLE
WALKING IN HOSPITAL ROOM: A LITTLE
STANDING UP FROM CHAIR USING ARMS: A LITTLE
HELP NEEDED FOR BATHING: A LITTLE
DRESSING REGULAR LOWER BODY CLOTHING: A LITTLE
TOILETING: A LITTLE
DRESSING REGULAR UPPER BODY CLOTHING: A LITTLE

## 2020-07-17 ASSESSMENT — GAIT ASSESSMENTS
DISTANCE (FEET): 30
ASSISTIVE DEVICE: SINGLE POINT CANE
GAIT LEVEL OF ASSIST: MINIMAL ASSIST
DEVIATION: SHUFFLED GAIT;OTHER (COMMENT)

## 2020-07-17 ASSESSMENT — LIFESTYLE VARIABLES: SUBSTANCE_ABUSE: 0

## 2020-07-17 NOTE — PROGRESS NOTES
"Bedside report received.  Assessment complete.  A&O x 4. Patient calls appropriately.  Patient ambulates with one  Assist w/single pt cane. Bed alarm on.   Patient has 0/10 pain. Pain managed with prescribed medications.  Denies N&V. Tolerating ADA diet.   + void, + flatus, + BM.  Patient denies SOB.  SCD's refused.  Patient is calm and cooperative.  Review plan with of care with patient. Call light and personal belongings with in reach. Hourly rounding in place. All needs met at this time.  /42   Pulse 79   Temp 36.7 °C (98 °F) (Temporal)   Resp 16   Ht 1.727 m (5' 8\")   Wt 59.2 kg (130 lb 8.2 oz)   SpO2 96%   BMI 19.84 kg/m²     "

## 2020-07-17 NOTE — CARE PLAN
Problem: Safety  Goal: Will remain free from injury  Outcome: PROGRESSING AS EXPECTED  Goal: Will remain free from falls  Outcome: PROGRESSING AS EXPECTED     Problem: Bowel/Gastric:  Goal: Normal bowel function is maintained or improved  Outcome: PROGRESSING AS EXPECTED  Goal: Will not experience complications related to bowel motility  Outcome: PROGRESSING AS EXPECTED     Problem: Knowledge Deficit  Goal: Knowledge of disease process/condition, treatment plan, diagnostic tests, and medications will improve  Outcome: PROGRESSING AS EXPECTED  Goal: Knowledge of the prescribed therapeutic regimen will improve  Outcome: PROGRESSING AS EXPECTED     Problem: Discharge Barriers/Planning  Goal: Patient's continuum of care needs will be met  Outcome: PROGRESSING AS EXPECTED     Problem: Fluid Volume:  Goal: Will maintain balanced intake and output  Outcome: PROGRESSING AS EXPECTED     Problem: Respiratory:  Goal: Respiratory status will improve  Outcome: PROGRESSING AS EXPECTED     Problem: Skin Integrity  Goal: Risk for impaired skin integrity will decrease  Outcome: PROGRESSING AS EXPECTED     Problem: Pain Management  Goal: Pain level will decrease to patient's comfort goal  Outcome: PROGRESSING AS EXPECTED     Problem: Psychosocial Needs:  Goal: Level of anxiety will decrease  Outcome: PROGRESSING AS EXPECTED

## 2020-07-17 NOTE — PROGRESS NOTES
Per Dr. Chow, okay to switch patient to PO dexamethasone. Messaged pharmacy, will update order on my end if needed.

## 2020-07-17 NOTE — PROGRESS NOTES
Telesitter update with contact info and report.     Assumed care of patient at 1900. A/O x4. Discussed plan of care and call light use. Bed locked and low, bed alarm on and telesitter in place. Soft touch call light with in reach.

## 2020-07-17 NOTE — CARE PLAN
Problem: Safety  Goal: Will remain free from injury  Outcome: PROGRESSING AS EXPECTED  Goal: Will remain free from falls  Outcome: PROGRESSING AS EXPECTED     Problem: Skin Integrity  Goal: Risk for impaired skin integrity will decrease  Outcome: PROGRESSING AS EXPECTED     Problem: Bowel/Gastric:  Goal: Normal bowel function is maintained or improved  Outcome: PROGRESSING SLOWER THAN EXPECTED     Problem: Respiratory:  Goal: Respiratory status will improve  Outcome: PROGRESSING SLOWER THAN EXPECTED  Intervention: Administer and titrate oxygen therapy  Note: Able to titrate down in bed but still requiring 4-6 liters when up to bathroom

## 2020-07-18 LAB
GLUCOSE BLD-MCNC: 141 MG/DL (ref 65–99)
GLUCOSE BLD-MCNC: 256 MG/DL (ref 65–99)

## 2020-07-18 PROCEDURE — 700102 HCHG RX REV CODE 250 W/ 637 OVERRIDE(OP): Performed by: HOSPITALIST

## 2020-07-18 PROCEDURE — A9270 NON-COVERED ITEM OR SERVICE: HCPCS | Performed by: NURSE PRACTITIONER

## 2020-07-18 PROCEDURE — 700111 HCHG RX REV CODE 636 W/ 250 OVERRIDE (IP): Performed by: HOSPITALIST

## 2020-07-18 PROCEDURE — 770021 HCHG ROOM/CARE - ISO PRIVATE

## 2020-07-18 PROCEDURE — A9270 NON-COVERED ITEM OR SERVICE: HCPCS | Performed by: INTERNAL MEDICINE

## 2020-07-18 PROCEDURE — 700102 HCHG RX REV CODE 250 W/ 637 OVERRIDE(OP): Performed by: INTERNAL MEDICINE

## 2020-07-18 PROCEDURE — 700102 HCHG RX REV CODE 250 W/ 637 OVERRIDE(OP): Performed by: NURSE PRACTITIONER

## 2020-07-18 PROCEDURE — A9270 NON-COVERED ITEM OR SERVICE: HCPCS | Performed by: HOSPITALIST

## 2020-07-18 PROCEDURE — 82962 GLUCOSE BLOOD TEST: CPT

## 2020-07-18 PROCEDURE — 99231 SBSQ HOSP IP/OBS SF/LOW 25: CPT | Performed by: HOSPITALIST

## 2020-07-18 RX ADMIN — ENOXAPARIN SODIUM 60 MG: 60 INJECTION SUBCUTANEOUS at 17:01

## 2020-07-18 RX ADMIN — AMLODIPINE BESYLATE 10 MG: 10 TABLET ORAL at 04:27

## 2020-07-18 RX ADMIN — THERA TABS 1 TABLET: TAB at 04:27

## 2020-07-18 RX ADMIN — ENOXAPARIN SODIUM 60 MG: 60 INJECTION SUBCUTANEOUS at 04:31

## 2020-07-18 RX ADMIN — TAMSULOSIN HYDROCHLORIDE 0.8 MG: 0.4 CAPSULE ORAL at 07:39

## 2020-07-18 RX ADMIN — INSULIN HUMAN 5 UNITS: 100 INJECTION, SOLUTION PARENTERAL at 17:03

## 2020-07-18 RX ADMIN — DEXAMETHASONE 6 MG: 6 TABLET ORAL at 04:27

## 2020-07-18 RX ADMIN — ZINC SULFATE 220 MG (50 MG) CAPSULE 220 MG: CAPSULE at 04:27

## 2020-07-18 ASSESSMENT — ENCOUNTER SYMPTOMS
GASTROINTESTINAL NEGATIVE: 1
VOMITING: 0
CONSTITUTIONAL NEGATIVE: 1
MUSCULOSKELETAL NEGATIVE: 1
HEADACHES: 0
FOCAL WEAKNESS: 0
DIZZINESS: 0
CARDIOVASCULAR NEGATIVE: 1
WEAKNESS: 0
ABDOMINAL PAIN: 0
CHILLS: 0
BRUISES/BLEEDS EASILY: 0
WEIGHT LOSS: 0
PALPITATIONS: 0
BLURRED VISION: 0
NAUSEA: 0
NEUROLOGICAL NEGATIVE: 1
RESPIRATORY NEGATIVE: 1
MYALGIAS: 0
DIAPHORESIS: 0
FEVER: 0
PSYCHIATRIC NEGATIVE: 1
DEPRESSION: 0
SORE THROAT: 0
COUGH: 0
EYES NEGATIVE: 1
SHORTNESS OF BREATH: 0

## 2020-07-18 ASSESSMENT — PATIENT HEALTH QUESTIONNAIRE - PHQ9
2. FEELING DOWN, DEPRESSED, IRRITABLE, OR HOPELESS: NOT AT ALL
SUM OF ALL RESPONSES TO PHQ9 QUESTIONS 1 AND 2: 0
1. LITTLE INTEREST OR PLEASURE IN DOING THINGS: NOT AT ALL

## 2020-07-18 ASSESSMENT — FIBROSIS 4 INDEX: FIB4 SCORE: 3.26

## 2020-07-18 ASSESSMENT — LIFESTYLE VARIABLES: SUBSTANCE_ABUSE: 0

## 2020-07-18 NOTE — PROGRESS NOTES
[facility] Medicine Daily Progress Note    Date of Service  7/18/2020    Chief Complaint  84 y.o. male admitted 7/9/2020 with cough n/v    Hospital Course    Patient is an 84 y.o. male with history of diabetes mellitus, hypercholesterolemia and bph for which he has reportedly declined treatment in the past.  He is also a previous smoker. He presented to the er due to progressive weakness following 1-2 weeks of intermittent vomiting and diarrhea.  He denies fevers, chills, melena or hematochezia.  He also reported a mild cough.  He was found to be positive for COVID with acute renal failure, dehydration, and hypotension and was admitted to the ICU.      Interval Problem Update  No acute overnight events.  Oxygen needs down to 3 L nasal cannula.    Consultants/Specialty  Critical care    Code Status  Full    Disposition  tbd    Review of Systems  Review of Systems   Constitutional: Negative.  Negative for chills, diaphoresis, fever, malaise/fatigue and weight loss.   HENT: Negative.  Negative for sore throat.    Eyes: Negative.  Negative for blurred vision.   Respiratory: Negative.  Negative for cough and shortness of breath.    Cardiovascular: Negative.  Negative for chest pain, palpitations and leg swelling.   Gastrointestinal: Negative.  Negative for abdominal pain, nausea and vomiting.   Genitourinary: Negative.  Negative for dysuria.   Musculoskeletal: Negative.  Negative for myalgias.   Skin: Negative.  Negative for itching and rash.   Neurological: Negative.  Negative for dizziness, focal weakness, weakness and headaches.   Endo/Heme/Allergies: Negative.  Does not bruise/bleed easily.   Psychiatric/Behavioral: Negative.  Negative for depression, substance abuse and suicidal ideas.   All other systems reviewed and are negative.       Physical Exam  Temp:  [36.2 °C (97.1 °F)-36.7 °C (98.1 °F)] 36.7 °C (98.1 °F)  Pulse:  [70-98] 96  Resp:  [16-18] 16  BP: (112-121)/(48-61) 121/48  SpO2:  [90 %-100 %] 90  %    Physical Exam  Vitals signs and nursing note reviewed. Exam conducted with a chaperone present.   Constitutional:       General: He is not in acute distress.     Appearance: Normal appearance. He is not diaphoretic.   HENT:      Head: Normocephalic.      Nose: Nose normal.      Mouth/Throat:      Mouth: Mucous membranes are moist.   Eyes:      Pupils: Pupils are equal, round, and reactive to light.   Cardiovascular:      Rate and Rhythm: Normal rate and regular rhythm.      Pulses: Normal pulses.      Heart sounds: Normal heart sounds.   Pulmonary:      Effort: Pulmonary effort is normal.      Comments: Decreased bs  Abdominal:      General: Abdomen is flat. Bowel sounds are normal.      Palpations: Abdomen is soft.   Musculoskeletal: Normal range of motion.         General: No swelling or deformity.   Skin:     General: Skin is warm and dry.      Capillary Refill: Capillary refill takes less than 2 seconds.   Neurological:      General: No focal deficit present.      Mental Status: He is alert and oriented to person, place, and time.      Cranial Nerves: No cranial nerve deficit.   Psychiatric:         Mood and Affect: Mood normal.         Behavior: Behavior normal.         Fluids    Intake/Output Summary (Last 24 hours) at 7/18/2020 1648  Last data filed at 7/18/2020 1300  Gross per 24 hour   Intake 960 ml   Output --   Net 960 ml       Laboratory  Recent Labs     07/16/20  0539 07/17/20  0454   WBC 9.1 8.3   RBC 4.55* 4.59*   HEMOGLOBIN 14.0 14.1   HEMATOCRIT 41.0* 40.5*   MCV 90.1 88.2   MCH 30.8 30.7   MCHC 34.1 34.8   RDW 39.8 38.3   PLATELETCT 253 231   MPV 9.9 9.4     Recent Labs     07/16/20  0539 07/17/20  0454   SODIUM 138 138   POTASSIUM 3.3* 3.3*   CHLORIDE 97 97   CO2 28 26   GLUCOSE 129* 122*   BUN 30* 28*   CREATININE 0.91 0.80   CALCIUM 8.4* 8.6     Recent Labs     07/17/20  0454   APTT 35.4   INR 1.00               Imaging  DX-CHEST-PORTABLE (1 VIEW)   Final Result      1.  The lungs are  hyperinflated suggesting emphysema/COPD.   2.  There is minimal predominantly lower lobe interstitial opacity which is most likely due to chronic scarring.           Assessment/Plan  * Dehydration  Assessment & Plan  resolved    Diarrhea of presumed infectious origin  Assessment & Plan  Resolved  C dif negative  Likely related to covid    COVID-19 virus infection  Assessment & Plan  D dimer elevated  Continue lovenox  Vitamin D level 14 - high dose replacement weekly started  Continue dexamethasone  Continue supportive care    COPD (chronic obstructive pulmonary disease) (HCC)- (present on admission)  Assessment & Plan  No s/o exacerbation at this time  Continue dexamethasone for covid  Continue to follow    Malnutrition (HCC)  Assessment & Plan  Routine monitoring, periodic labs.  RD eval.  MVI      Urinary retention  Assessment & Plan  Reportedly chronic  Refusing méndez or urology  Accepting flomax which will be continued    Acute kidney injury (HCC)  Assessment & Plan  improved  Bph could also be contributing but urology eval declined  Continue flomax  Pt continues to refuse cath  Following  H/o kidney mass    Type 2 diabetes mellitus (HCC)- (present on admission)  Assessment & Plan  Continue ssi  Hypoglycemic protocol     Acute hypoxic respiratory failure  -Secondary to COVID, treatment per above.    VTE prophylaxis:   lovenox

## 2020-07-18 NOTE — CARE PLAN
Problem: Safety  Goal: Will remain free from injury  Outcome: PROGRESSING AS EXPECTED  Goal: Will remain free from falls  Outcome: PROGRESSING AS EXPECTED  Note: Tele sitter & desk bed     Problem: Knowledge Deficit  Goal: Knowledge of disease process/condition, treatment plan, diagnostic tests, and medications will improve  Outcome: PROGRESSING AS EXPECTED  Goal: Knowledge of the prescribed therapeutic regimen will improve  Outcome: PROGRESSING AS EXPECTED

## 2020-07-18 NOTE — DISCHARGE PLANNING
This RN CM was asked to call the contacts listed for NOK search that was done 7/13.     Kalyn Bejarano, 1st degree, [phone]- Rings skip Pearl, 1st degree,  [phone]- Has a nonspecific voicemail, RN NENA left VM requesting return call.    Janny Bejarano, 1st degree, [phone]- The person that answered claimed not to be Janny.

## 2020-07-18 NOTE — THERAPY
"Physical Therapy   Daily Treatment     Patient Name: Ovi Bejarano  Age:  84 y.o., Sex:  male  Medical Record #: [id number]  Today's Date: 7/17/2020     Precautions: Fall Risk    Assessment    Patient demonstrates baseline level function.  Patient states he does better in a familiar environment secondary to blindness.  Overall patient is at supervision level, but does require CTG with ambulation as patient not familiar in the room.  Cognitive evaluation would benefit patient as patient is labile, and focused on friends potentially being dead as they have not visited him.  When informed patient that he has Covid-19 and that is why he doesn't have visitors, he states \"I'm fine, I don't have anything.\"         07/17/20 1235   Precautions   Precautions Fall Risk   Comments pt is blind   Cognition    Cognition / Consciousness X   Level of Consciousness Alert   Comments labile, very focused on returning home, believing his friends may be dead   Passive ROM Lower Body   Passive ROM Lower Body WDL   Active ROM Lower Body    Active ROM Lower Body  WDL   Strength Lower Body   Lower Body Strength  WDL   Comments functional   Sensation Lower Body   Lower Extremity Sensation   WDL   Balance   Sitting Balance (Static) Fair   Sitting Balance (Dynamic) Fair   Standing Balance (Static) Fair   Standing Balance (Dynamic) Fair   Weight Shift Sitting Fair   Weight Shift Standing Fair   Skilled Intervention Sequencing   Comments SPC   Gait Analysis   Gait Level Of Assist Minimal Assist   Assistive Device Single Point Cane   Distance (Feet) 30   # of Times Distance was Traveled 2   Deviation Shuffled Gait;Other (Comment)   Skilled Intervention Tactile Cuing;Sequencing;Verbal Cuing   Comments results of PT   Bed Mobility    Supine to Sit Supervised   Sit to Supine Supervised   Scooting Supervised   Functional Mobility   Sit to Stand Supervised   Short Term Goals    Short Term Goal # 1 pt will perform supine <> sit without bed features " with SPV in 6 visits to be able to get in/out of bed at home   Goal Outcome # 1 Goal met   Short Term Goal # 2 pt will perform all functional xfrs with SPV in 6 visits for improved independence   Goal Outcome # 2 Goal met   Short Term Goal # 3 pt will ambulate in room with SPC min A in 6 visits for independence with return home (distance limited d/t contact/droplet precautions)   Goal Outcome # 3 Goal met   Education Group   Education Provided Role of Physical Therapist   Role of Physical Therapist Patient Response Patient;Acceptance;Explanation;Verbal Demonstration   Anticipated Discharge Equipment   DC Equipment None   Interdisciplinary Plan of Care Collaboration   IDT Collaboration with  Nursing   Patient Position at End of Therapy Seated;Chair Alarm On;Call Light within Reach;Tray Table within Reach;Phone within Reach   Collaboration Comments results of PT, cognitive eval       Plan    Discharge secondary to goals met.    Discharge recommendations:  Patient would benefit from 24/7 support if possible, if not CTG when OOB w/ caregiver.

## 2020-07-18 NOTE — PROGRESS NOTES
[facility] Medicine Daily Progress Note    Date of Service  7/17/2020    Chief Complaint  84 y.o. male admitted 7/9/2020 with cough n/v    Hospital Course    Patient is an 84 y.o. male with history of diabetes mellitus, hypercholesterolemia and bph for which he has reportedly declined treatment in the past.  He is also a previous smoker. He presented to the er due to progressive weakness following 1-2 weeks of intermittent vomiting and diarrhea.  He denies fevers, chills, melena or hematochezia.  He also reported a mild cough.  He was found to be positive for COVID with acute renal failure, dehydration, and hypotension and was admitted to the ICU.      Interval Problem Update  No acute overnight events.  Oxygen needs down to 3 L nasal cannula.    Consultants/Specialty  Critical care    Code Status  Full    Disposition  tbd    Review of Systems  Review of Systems   Constitutional: Negative.  Negative for chills, diaphoresis, fever, malaise/fatigue and weight loss.   HENT: Negative.  Negative for sore throat.    Eyes: Negative.  Negative for blurred vision.   Respiratory: Negative.  Negative for cough and shortness of breath.    Cardiovascular: Negative.  Negative for chest pain, palpitations and leg swelling.   Gastrointestinal: Negative.  Negative for abdominal pain, nausea and vomiting.   Genitourinary: Negative.  Negative for dysuria.   Musculoskeletal: Negative.  Negative for myalgias.   Skin: Negative.  Negative for itching and rash.   Neurological: Negative.  Negative for dizziness, focal weakness, weakness and headaches.   Endo/Heme/Allergies: Negative.  Does not bruise/bleed easily.   Psychiatric/Behavioral: Negative.  Negative for depression, substance abuse and suicidal ideas.   All other systems reviewed and are negative.       Physical Exam  Temp:  [36.4 °C (97.6 °F)-37.1 °C (98.7 °F)] 36.4 °C (97.6 °F)  Pulse:  [67-93] 86  Resp:  [16-20] 20  BP: (114-126)/(42-75) 114/47  SpO2:  [93 %-96 %] 94 %    Physical  Exam  Vitals signs and nursing note reviewed. Exam conducted with a chaperone present.   Constitutional:       General: He is not in acute distress.     Appearance: Normal appearance. He is not diaphoretic.   HENT:      Head: Normocephalic.      Nose: Nose normal.      Mouth/Throat:      Mouth: Mucous membranes are moist.   Eyes:      Pupils: Pupils are equal, round, and reactive to light.   Cardiovascular:      Rate and Rhythm: Normal rate and regular rhythm.      Pulses: Normal pulses.      Heart sounds: Normal heart sounds.   Pulmonary:      Effort: Pulmonary effort is normal.      Comments: Decreased bs  Abdominal:      General: Abdomen is flat. Bowel sounds are normal.      Palpations: Abdomen is soft.   Musculoskeletal: Normal range of motion.         General: No swelling or deformity.   Skin:     General: Skin is warm and dry.      Capillary Refill: Capillary refill takes less than 2 seconds.   Neurological:      General: No focal deficit present.      Mental Status: He is alert and oriented to person, place, and time.      Cranial Nerves: No cranial nerve deficit.   Psychiatric:         Mood and Affect: Mood normal.         Behavior: Behavior normal.         Fluids    Intake/Output Summary (Last 24 hours) at 7/17/2020 1741  Last data filed at 7/17/2020 1300  Gross per 24 hour   Intake 720 ml   Output --   Net 720 ml       Laboratory  Recent Labs     07/15/20  0606 07/16/20  0539 07/17/20  0454   WBC 8.6 9.1 8.3   RBC 4.66* 4.55* 4.59*   HEMOGLOBIN 14.2 14.0 14.1   HEMATOCRIT 42.1 41.0* 40.5*   MCV 90.3 90.1 88.2   MCH 30.5 30.8 30.7   MCHC 33.7 34.1 34.8   RDW 40.1 39.8 38.3   PLATELETCT 243 253 231   MPV 9.7 9.9 9.4     Recent Labs     07/15/20  0606 07/16/20  0539 07/17/20  0454   SODIUM 140 138 138   POTASSIUM 3.4* 3.3* 3.3*   CHLORIDE 98 97 97   CO2 27 28 26   GLUCOSE 150* 129* 122*   BUN 33* 30* 28*   CREATININE 1.01 0.91 0.80   CALCIUM 8.6 8.4* 8.6     Recent Labs     07/15/20  0606 07/17/20  0454    APTT 30.0 35.4   INR 0.97 1.00               Imaging  DX-CHEST-PORTABLE (1 VIEW)   Final Result      1.  The lungs are hyperinflated suggesting emphysema/COPD.   2.  There is minimal predominantly lower lobe interstitial opacity which is most likely due to chronic scarring.           Assessment/Plan  * Dehydration  Assessment & Plan  resolved    Diarrhea of presumed infectious origin  Assessment & Plan  Resolved  C dif negative  Likely related to covid    COVID-19 virus infection  Assessment & Plan  D dimer elevated  Continue lovenox  Vitamin D level 14 - high dose replacement weekly started  Continue dexamethasone  Continue supportive care    COPD (chronic obstructive pulmonary disease) (HCC)- (present on admission)  Assessment & Plan  No s/o exacerbation at this time  Continue dexamethasone for covid  Continue to follow    Malnutrition (HCC)  Assessment & Plan  Routine monitoring, periodic labs.  RD eval.  MVI      Urinary retention  Assessment & Plan  Reportedly chronic  Refusing méndez or urology  Accepting flomax which will be continued    Acute kidney injury (HCC)  Assessment & Plan  improved  Bph could also be contributing but urology eval declined  Continue flomax  Pt continues to refuse cath  Following  H/o kidney mass    Type 2 diabetes mellitus (HCC)- (present on admission)  Assessment & Plan  Continue ssi  Hypoglycemic protocol     Acute hypoxic respiratory failure  -Secondary to COVID, treatment per above.    VTE prophylaxis:   lovenox

## 2020-07-19 LAB
GLUCOSE BLD-MCNC: 149 MG/DL (ref 65–99)
GLUCOSE BLD-MCNC: 238 MG/DL (ref 65–99)

## 2020-07-19 PROCEDURE — A9270 NON-COVERED ITEM OR SERVICE: HCPCS | Performed by: HOSPITALIST

## 2020-07-19 PROCEDURE — 99231 SBSQ HOSP IP/OBS SF/LOW 25: CPT | Performed by: HOSPITALIST

## 2020-07-19 PROCEDURE — 700102 HCHG RX REV CODE 250 W/ 637 OVERRIDE(OP): Performed by: HOSPITALIST

## 2020-07-19 PROCEDURE — 700102 HCHG RX REV CODE 250 W/ 637 OVERRIDE(OP): Performed by: INTERNAL MEDICINE

## 2020-07-19 PROCEDURE — 770021 HCHG ROOM/CARE - ISO PRIVATE

## 2020-07-19 PROCEDURE — 700102 HCHG RX REV CODE 250 W/ 637 OVERRIDE(OP): Performed by: NURSE PRACTITIONER

## 2020-07-19 PROCEDURE — A9270 NON-COVERED ITEM OR SERVICE: HCPCS | Performed by: INTERNAL MEDICINE

## 2020-07-19 PROCEDURE — 700111 HCHG RX REV CODE 636 W/ 250 OVERRIDE (IP): Performed by: HOSPITALIST

## 2020-07-19 PROCEDURE — A9270 NON-COVERED ITEM OR SERVICE: HCPCS | Performed by: NURSE PRACTITIONER

## 2020-07-19 PROCEDURE — 82962 GLUCOSE BLOOD TEST: CPT

## 2020-07-19 RX ADMIN — DEXAMETHASONE 6 MG: 6 TABLET ORAL at 05:49

## 2020-07-19 RX ADMIN — ENOXAPARIN SODIUM 60 MG: 60 INJECTION SUBCUTANEOUS at 05:49

## 2020-07-19 RX ADMIN — AMLODIPINE BESYLATE 10 MG: 10 TABLET ORAL at 05:49

## 2020-07-19 RX ADMIN — INSULIN HUMAN 3 UNITS: 100 INJECTION, SOLUTION PARENTERAL at 17:22

## 2020-07-19 RX ADMIN — ENOXAPARIN SODIUM 60 MG: 60 INJECTION SUBCUTANEOUS at 17:17

## 2020-07-19 RX ADMIN — ZINC SULFATE 220 MG (50 MG) CAPSULE 220 MG: CAPSULE at 05:49

## 2020-07-19 RX ADMIN — THERA TABS 1 TABLET: TAB at 05:49

## 2020-07-19 ASSESSMENT — ENCOUNTER SYMPTOMS
EYES NEGATIVE: 1
CONSTITUTIONAL NEGATIVE: 1
RESPIRATORY NEGATIVE: 1
WEAKNESS: 0
SORE THROAT: 0
NAUSEA: 0
MYALGIAS: 0
PSYCHIATRIC NEGATIVE: 1
DIAPHORESIS: 0
FOCAL WEAKNESS: 0
VOMITING: 0
MUSCULOSKELETAL NEGATIVE: 1
GASTROINTESTINAL NEGATIVE: 1
CARDIOVASCULAR NEGATIVE: 1
WEIGHT LOSS: 0
BLURRED VISION: 0
HEADACHES: 0
PALPITATIONS: 0
DIZZINESS: 0
ABDOMINAL PAIN: 0
COUGH: 0
FEVER: 0
DEPRESSION: 0
NEUROLOGICAL NEGATIVE: 1
BRUISES/BLEEDS EASILY: 0
SHORTNESS OF BREATH: 0
CHILLS: 0

## 2020-07-19 ASSESSMENT — PATIENT HEALTH QUESTIONNAIRE - PHQ9
SUM OF ALL RESPONSES TO PHQ9 QUESTIONS 1 AND 2: 0
1. LITTLE INTEREST OR PLEASURE IN DOING THINGS: NOT AT ALL
2. FEELING DOWN, DEPRESSED, IRRITABLE, OR HOPELESS: NOT AT ALL

## 2020-07-19 ASSESSMENT — LIFESTYLE VARIABLES: SUBSTANCE_ABUSE: 0

## 2020-07-19 NOTE — DISCHARGE PLANNING
Notified by bedside RN that the patient has a relative Kat and she can be reached at [phone].

## 2020-07-19 NOTE — PROGRESS NOTES
Assessment completed. Pt A&Ox3. Blind, uses cane and 1 person assist to ambulate to rr. Respirations are even and unlabored on 3L n/c. Pt denies pain at this time. Monitors applied, VS stable, call light and belongings within reach. POC updated. Pt educated on room and call light, pt verbalized understanding. Communication board updated. Needs met. Bed alarm and telesitter in place.

## 2020-07-19 NOTE — PROGRESS NOTES
Patient appears to be sleeping, rise and fall of chest noted. Easy to arouse. Call light and belongings within reach.

## 2020-07-19 NOTE — PROGRESS NOTES
[facility] Medicine Daily Progress Note    Date of Service  7/19/2020    Chief Complaint  84 y.o. male admitted 7/9/2020 with cough n/v    Hospital Course    Patient is an 84 y.o. male with history of diabetes mellitus, hypercholesterolemia and bph for which he has reportedly declined treatment in the past.  He is also a previous smoker. He presented to the er due to progressive weakness following 1-2 weeks of intermittent vomiting and diarrhea.  He denies fevers, chills, melena or hematochezia.  He also reported a mild cough.  He was found to be positive for COVID with acute renal failure, dehydration, and hypotension and was admitted to the ICU.      Interval Problem Update  No acute overnight events.  Oxygen needs down to 3 L nasal cannula.    Consultants/Specialty  Critical care    Code Status  Full    Disposition  tbd    Review of Systems  Review of Systems   Constitutional: Negative.  Negative for chills, diaphoresis, fever, malaise/fatigue and weight loss.   HENT: Negative.  Negative for sore throat.    Eyes: Negative.  Negative for blurred vision.   Respiratory: Negative.  Negative for cough and shortness of breath.    Cardiovascular: Negative.  Negative for chest pain, palpitations and leg swelling.   Gastrointestinal: Negative.  Negative for abdominal pain, nausea and vomiting.   Genitourinary: Negative.  Negative for dysuria.   Musculoskeletal: Negative.  Negative for myalgias.   Skin: Negative.  Negative for itching and rash.   Neurological: Negative.  Negative for dizziness, focal weakness, weakness and headaches.   Endo/Heme/Allergies: Negative.  Does not bruise/bleed easily.   Psychiatric/Behavioral: Negative.  Negative for depression, substance abuse and suicidal ideas.   All other systems reviewed and are negative.       Physical Exam  Temp:  [36.3 °C (97.3 °F)-36.7 °C (98.1 °F)] 36.6 °C (97.9 °F)  Pulse:  [75-96] 76  Resp:  [16-18] 16  BP: (105-121)/(48-59) 105/53  SpO2:  [90 %-100 %] 95  %    Physical Exam  Vitals signs and nursing note reviewed. Exam conducted with a chaperone present.   Constitutional:       General: He is not in acute distress.     Appearance: Normal appearance. He is not diaphoretic.   HENT:      Head: Normocephalic.      Nose: Nose normal.      Mouth/Throat:      Mouth: Mucous membranes are moist.   Eyes:      Pupils: Pupils are equal, round, and reactive to light.   Cardiovascular:      Rate and Rhythm: Normal rate and regular rhythm.      Pulses: Normal pulses.      Heart sounds: Normal heart sounds.   Pulmonary:      Effort: Pulmonary effort is normal.      Comments: Decreased bs  Abdominal:      General: Abdomen is flat. Bowel sounds are normal.      Palpations: Abdomen is soft.   Musculoskeletal: Normal range of motion.         General: No swelling or deformity.   Skin:     General: Skin is warm and dry.      Capillary Refill: Capillary refill takes less than 2 seconds.   Neurological:      General: No focal deficit present.      Mental Status: He is alert and oriented to person, place, and time.      Cranial Nerves: No cranial nerve deficit.   Psychiatric:         Mood and Affect: Mood normal.         Behavior: Behavior normal.         Fluids    Intake/Output Summary (Last 24 hours) at 7/19/2020 1137  Last data filed at 7/18/2020 1300  Gross per 24 hour   Intake 480 ml   Output --   Net 480 ml       Laboratory  Recent Labs     07/17/20  0454   WBC 8.3   RBC 4.59*   HEMOGLOBIN 14.1   HEMATOCRIT 40.5*   MCV 88.2   MCH 30.7   MCHC 34.8   RDW 38.3   PLATELETCT 231   MPV 9.4     Recent Labs     07/17/20  0454   SODIUM 138   POTASSIUM 3.3*   CHLORIDE 97   CO2 26   GLUCOSE 122*   BUN 28*   CREATININE 0.80   CALCIUM 8.6     Recent Labs     07/17/20  0454   APTT 35.4   INR 1.00               Imaging  DX-CHEST-PORTABLE (1 VIEW)   Final Result      1.  The lungs are hyperinflated suggesting emphysema/COPD.   2.  There is minimal predominantly lower lobe interstitial opacity which is  most likely due to chronic scarring.           Assessment/Plan  * Dehydration  Assessment & Plan  resolved    Diarrhea of presumed infectious origin  Assessment & Plan  Resolved  C dif negative  Likely related to covid    COVID-19 virus infection  Assessment & Plan  D dimer elevated  Continue lovenox  Vitamin D level 14 - high dose replacement weekly started  Continue dexamethasone  Continue supportive care    COPD (chronic obstructive pulmonary disease) (HCC)- (present on admission)  Assessment & Plan  No s/o exacerbation at this time  Continue dexamethasone for covid  Continue to follow    Malnutrition (HCC)  Assessment & Plan  Routine monitoring, periodic labs.  RD eval.  MVI      Urinary retention  Assessment & Plan  Reportedly chronic  Refusing méndez or urology  Accepting flomax which will be continued    Acute kidney injury (HCC)  Assessment & Plan  Resolved.  CTM, avoid nephrotoxins.      Type 2 diabetes mellitus (HCC)- (present on admission)  Assessment & Plan  Continue ssi  Hypoglycemic protocol     Acute hypoxic respiratory failure  -Secondary to COVID, treatment per above.    VTE prophylaxis:   lovenox

## 2020-07-19 NOTE — PALLIATIVE CARE
Palliative Care follow-up  PC RN discussed with BS RN who states she has not spoken to any family nor has anyone called that she knows of. PC RN discussed with Miriam HUI CM who will complete NOK search this afternoon.       Plan: PC RN will follow and support in search of NOK/representation for patient.     Thank you for allowing Palliative Care to support this patient and family. Contact [phone] for additional assistance, change in patient status, or with any questions/concerns.

## 2020-07-19 NOTE — CARE PLAN
Problem: Safety  Goal: Will remain free from injury  Outcome: PROGRESSING AS EXPECTED  Goal: Will remain free from falls  Outcome: PROGRESSING AS EXPECTED     Problem: Infection  Goal: Will remain free from infection  Outcome: PROGRESSING AS EXPECTED     Problem: Venous Thromboembolism (VTW)/Deep Vein Thrombosis (DVT) Prevention:  Goal: Patient will participate in Venous Thrombosis (VTE)/Deep Vein Thrombosis (DVT)Prevention Measures  Outcome: PROGRESSING AS EXPECTED     Problem: Bowel/Gastric:  Goal: Normal bowel function is maintained or improved  Outcome: PROGRESSING AS EXPECTED  Goal: Will not experience complications related to bowel motility  Outcome: PROGRESSING AS EXPECTED     Problem: Knowledge Deficit  Goal: Knowledge of disease process/condition, treatment plan, diagnostic tests, and medications will improve  Outcome: PROGRESSING AS EXPECTED  Goal: Knowledge of the prescribed therapeutic regimen will improve  Outcome: PROGRESSING AS EXPECTED     Problem: Discharge Barriers/Planning  Goal: Patient's continuum of care needs will be met  Outcome: PROGRESSING AS EXPECTED     Problem: Fluid Volume:  Goal: Will maintain balanced intake and output  Outcome: PROGRESSING AS EXPECTED     Problem: Respiratory:  Goal: Respiratory status will improve  Outcome: PROGRESSING AS EXPECTED     Problem: Skin Integrity  Goal: Risk for impaired skin integrity will decrease  Outcome: PROGRESSING AS EXPECTED     Problem: Pain Management  Goal: Pain level will decrease to patient's comfort goal  Outcome: PROGRESSING AS EXPECTED     Problem: Psychosocial Needs:  Goal: Level of anxiety will decrease  Outcome: PROGRESSING AS EXPECTED     Problem: Urinary Elimination:  Goal: Ability to reestablish a normal urinary elimination pattern will improve  Outcome: PROGRESSING AS EXPECTED

## 2020-07-20 LAB
GLUCOSE BLD-MCNC: 152 MG/DL (ref 65–99)
GLUCOSE BLD-MCNC: 339 MG/DL (ref 65–99)

## 2020-07-20 PROCEDURE — A9270 NON-COVERED ITEM OR SERVICE: HCPCS | Performed by: HOSPITALIST

## 2020-07-20 PROCEDURE — A9270 NON-COVERED ITEM OR SERVICE: HCPCS | Performed by: INTERNAL MEDICINE

## 2020-07-20 PROCEDURE — 700111 HCHG RX REV CODE 636 W/ 250 OVERRIDE (IP): Performed by: HOSPITALIST

## 2020-07-20 PROCEDURE — 700102 HCHG RX REV CODE 250 W/ 637 OVERRIDE(OP): Performed by: HOSPITALIST

## 2020-07-20 PROCEDURE — A9270 NON-COVERED ITEM OR SERVICE: HCPCS | Performed by: NURSE PRACTITIONER

## 2020-07-20 PROCEDURE — 770021 HCHG ROOM/CARE - ISO PRIVATE

## 2020-07-20 PROCEDURE — 700102 HCHG RX REV CODE 250 W/ 637 OVERRIDE(OP): Performed by: INTERNAL MEDICINE

## 2020-07-20 PROCEDURE — 82962 GLUCOSE BLOOD TEST: CPT

## 2020-07-20 PROCEDURE — 700102 HCHG RX REV CODE 250 W/ 637 OVERRIDE(OP): Performed by: NURSE PRACTITIONER

## 2020-07-20 PROCEDURE — 99231 SBSQ HOSP IP/OBS SF/LOW 25: CPT | Performed by: HOSPITALIST

## 2020-07-20 RX ADMIN — ENOXAPARIN SODIUM 60 MG: 60 INJECTION SUBCUTANEOUS at 17:14

## 2020-07-20 RX ADMIN — ZINC SULFATE 220 MG (50 MG) CAPSULE 220 MG: CAPSULE at 04:46

## 2020-07-20 RX ADMIN — AMLODIPINE BESYLATE 10 MG: 10 TABLET ORAL at 04:46

## 2020-07-20 RX ADMIN — TAMSULOSIN HYDROCHLORIDE 0.8 MG: 0.4 CAPSULE ORAL at 08:45

## 2020-07-20 RX ADMIN — INSULIN HUMAN 6 UNITS: 100 INJECTION, SOLUTION PARENTERAL at 17:14

## 2020-07-20 RX ADMIN — DEXAMETHASONE 6 MG: 6 TABLET ORAL at 04:46

## 2020-07-20 RX ADMIN — ENOXAPARIN SODIUM 60 MG: 60 INJECTION SUBCUTANEOUS at 04:47

## 2020-07-20 RX ADMIN — ERGOCALCIFEROL 50000 UNITS: 1.25 CAPSULE ORAL at 15:17

## 2020-07-20 RX ADMIN — INSULIN HUMAN 2 UNITS: 100 INJECTION, SOLUTION PARENTERAL at 08:44

## 2020-07-20 RX ADMIN — THERA TABS 1 TABLET: TAB at 04:46

## 2020-07-20 ASSESSMENT — ENCOUNTER SYMPTOMS
DEPRESSION: 0
PSYCHIATRIC NEGATIVE: 1
BLURRED VISION: 0
RESPIRATORY NEGATIVE: 1
SHORTNESS OF BREATH: 0
EYES NEGATIVE: 1
FEVER: 0
PALPITATIONS: 0
SORE THROAT: 0
VOMITING: 0
NAUSEA: 0
MYALGIAS: 0
COUGH: 0
GASTROINTESTINAL NEGATIVE: 1
CONSTITUTIONAL NEGATIVE: 1
HEADACHES: 0
WEAKNESS: 0
CHILLS: 0
DIZZINESS: 0
ABDOMINAL PAIN: 0
MUSCULOSKELETAL NEGATIVE: 1
CARDIOVASCULAR NEGATIVE: 1
DIAPHORESIS: 0
NEUROLOGICAL NEGATIVE: 1
BRUISES/BLEEDS EASILY: 0
FOCAL WEAKNESS: 0
WEIGHT LOSS: 0

## 2020-07-20 ASSESSMENT — LIFESTYLE VARIABLES: SUBSTANCE_ABUSE: 0

## 2020-07-20 NOTE — PROGRESS NOTES
[facility] Medicine Daily Progress Note    Date of Service  7/20/2020    Chief Complaint  84 y.o. male admitted 7/9/2020 with cough n/v    Hospital Course    Patient is an 84 y.o. male with history of diabetes mellitus, hypercholesterolemia and bph for which he has reportedly declined treatment in the past.  He is also a previous smoker. He presented to the er due to progressive weakness following 1-2 weeks of intermittent vomiting and diarrhea.  He denies fevers, chills, melena or hematochezia.  He also reported a mild cough.  He was found to be positive for COVID with acute renal failure, dehydration, and hypotension and was admitted to the ICU.      Interval Problem Update  No acute overnight events.  Oxygen needs down to 3 L nasal cannula.    Consultants/Specialty  Critical care    Code Status  Full    Disposition  tbd    Review of Systems  Review of Systems   Constitutional: Negative.  Negative for chills, diaphoresis, fever, malaise/fatigue and weight loss.   HENT: Negative.  Negative for sore throat.    Eyes: Negative.  Negative for blurred vision.   Respiratory: Negative.  Negative for cough and shortness of breath.    Cardiovascular: Negative.  Negative for chest pain, palpitations and leg swelling.   Gastrointestinal: Negative.  Negative for abdominal pain, nausea and vomiting.   Genitourinary: Negative.  Negative for dysuria.   Musculoskeletal: Negative.  Negative for myalgias.   Skin: Negative.  Negative for itching and rash.   Neurological: Negative.  Negative for dizziness, focal weakness, weakness and headaches.   Endo/Heme/Allergies: Negative.  Does not bruise/bleed easily.   Psychiatric/Behavioral: Negative.  Negative for depression, substance abuse and suicidal ideas.   All other systems reviewed and are negative.       Physical Exam  Temp:  [35.9 °C (96.7 °F)-36.4 °C (97.6 °F)] 35.9 °C (96.7 °F)  Pulse:  [75-96] 75  Resp:  [18-19] 19  BP: ()/(46-80) 106/51  SpO2:  [90 %-100 %] 90 %    Physical  Exam  Vitals signs and nursing note reviewed. Exam conducted with a chaperone present.   Constitutional:       General: He is not in acute distress.     Appearance: Normal appearance. He is not diaphoretic.   HENT:      Head: Normocephalic.      Nose: Nose normal.      Mouth/Throat:      Mouth: Mucous membranes are moist.   Eyes:      Pupils: Pupils are equal, round, and reactive to light.   Cardiovascular:      Rate and Rhythm: Normal rate and regular rhythm.      Pulses: Normal pulses.      Heart sounds: Normal heart sounds.   Pulmonary:      Effort: Pulmonary effort is normal.      Comments: Decreased bs  Abdominal:      General: Abdomen is flat. Bowel sounds are normal.      Palpations: Abdomen is soft.   Musculoskeletal: Normal range of motion.         General: No swelling or deformity.   Skin:     General: Skin is warm and dry.      Capillary Refill: Capillary refill takes less than 2 seconds.   Neurological:      General: No focal deficit present.      Mental Status: He is alert and oriented to person, place, and time.      Cranial Nerves: No cranial nerve deficit.   Psychiatric:         Mood and Affect: Mood normal.         Behavior: Behavior normal.         Fluids    Intake/Output Summary (Last 24 hours) at 7/20/2020 1618  Last data filed at 7/20/2020 1000  Gross per 24 hour   Intake 360 ml   Output --   Net 360 ml       Laboratory                        Imaging  DX-CHEST-PORTABLE (1 VIEW)   Final Result      1.  The lungs are hyperinflated suggesting emphysema/COPD.   2.  There is minimal predominantly lower lobe interstitial opacity which is most likely due to chronic scarring.           Assessment/Plan  * Dehydration  Assessment & Plan  resolved    Diarrhea of presumed infectious origin  Assessment & Plan  Resolved  C dif negative  Likely related to covid    COVID-19 virus infection  Assessment & Plan  D dimer elevated  Continue lovenox  Vitamin D level 14 - high dose replacement weekly started  Continue  dexamethasone  Continue supportive care    COPD (chronic obstructive pulmonary disease) (HCC)- (present on admission)  Assessment & Plan  No s/o exacerbation at this time  Continue dexamethasone for covid  Continue to follow    Malnutrition (HCC)  Assessment & Plan  Routine monitoring, periodic labs.  RD eval.  MVI      Urinary retention  Assessment & Plan  Reportedly chronic  Refusing méndez or urology  Accepting flomax which will be continued    Acute kidney injury (HCC)  Assessment & Plan  Resolved.  CTM, avoid nephrotoxins.      Type 2 diabetes mellitus (HCC)- (present on admission)  Assessment & Plan  Continue ssi  Hypoglycemic protocol     Acute hypoxic respiratory failure  -Secondary to COVID, treatment per above.    VTE prophylaxis:   lovenox

## 2020-07-20 NOTE — DIETARY
Nutrition Services: Update   Day 11 of admit.  Ovi Bejarano is a 84 y.o. male with admitting DX of COVID-19 virus infection, Dehydration, Sepsis    Pt is currently on diabetic diet. Pt is receiving Boost Glucose Control TID with meals. Per chart pt PO refused to < 25% of meals and % 2 supplements documented.  Wt 7/18: 60.6 kg via bed scale - wt relatively stable with wt on 7/10.     Malnutrition risk from RD note 7/10: Pt with severe malnutrition in the context of acute illness related malnutrition related to ongoing n/v and diarrhea affecting appetite/intake, ?suspect r/t COVID19, as evidenced by poor PO intake for >5 days (ongoing for 2 weeks) of <50% of estimated needs and a severe 8.1% wt loss over the past month.      Recommendations/Plan:  1. Recommend appetite stimulant if within patient's plan of care - discussed with MD  2. Encourage intake of meals  3. Document intake of all meals as % taken in ADL's to provide interdisciplinary communication across all shifts.   4. Monitor weight.  5. Nutrition rep will continue to see patient for ongoing meal and snack preferences.    RD following

## 2020-07-20 NOTE — CARE PLAN
Problem: Safety  Goal: Will remain free from injury  Outcome: PROGRESSING AS EXPECTED  Goal: Will remain free from falls  Outcome: PROGRESSING AS EXPECTED     Problem: Infection  Goal: Will remain free from infection  Outcome: PROGRESSING AS EXPECTED     Problem: Bowel/Gastric:  Goal: Normal bowel function is maintained or improved  Outcome: PROGRESSING AS EXPECTED     Problem: Pain Management  Goal: Pain level will decrease to patient's comfort goal  Outcome: PROGRESSING AS EXPECTED     Problem: Psychosocial Needs:  Goal: Level of anxiety will decrease  Outcome: PROGRESSING AS EXPECTED     Problem: Urinary Elimination:  Goal: Ability to reestablish a normal urinary elimination pattern will improve  Outcome: PROGRESSING AS EXPECTED     Problem: Discharge Barriers/Planning  Goal: Patient's continuum of care needs will be met  Outcome: PROGRESSING SLOWER THAN EXPECTED

## 2020-07-20 NOTE — DISCHARGE PLANNING
Hospital Care Management Discharge Planning       Anticipated Discharge Disposition:   · TBD     Action:   · NOK search unsuccessful.  · CM attempted again to call Emergency contact Nani Shaw but number is still not in service.  · CM call to [facility] to inquire on pt's room. Attendant states that room is still registered to pt and that Nani Shaw usually lives there with him. However attendant has not seen Nani for quite awhile now.  · Chart reviewed and CM note from 7/19/20 shows contact number for a relative Kat ([phone]). CM call to number but received message that VM is not set up.     Barriers to Discharge:   · Unable to contact NOK     Plan:   · Continue to provide support services and assistance with discharge planning as needed.

## 2020-07-20 NOTE — PROGRESS NOTES
Assumed care of patient 1900. A/O x3, resting in bed. Report called to telesitter. Discussed plan of care and call light use with patient. Denies needs.

## 2020-07-21 LAB
COVID ORDER STATUS COVID19: NORMAL
GLUCOSE BLD-MCNC: 154 MG/DL (ref 65–99)
GLUCOSE BLD-MCNC: 189 MG/DL (ref 65–99)
GLUCOSE BLD-MCNC: 262 MG/DL (ref 65–99)
GLUCOSE BLD-MCNC: 268 MG/DL (ref 65–99)
GLUCOSE BLD-MCNC: 65 MG/DL (ref 65–99)
IL6 SERPL-MCNC: 4 PG/ML
SARS-COV-2 RNA RESP QL NAA+PROBE: DETECTED
SPECIMEN SOURCE: ABNORMAL

## 2020-07-21 PROCEDURE — 99232 SBSQ HOSP IP/OBS MODERATE 35: CPT | Performed by: INTERNAL MEDICINE

## 2020-07-21 PROCEDURE — 700102 HCHG RX REV CODE 250 W/ 637 OVERRIDE(OP): Performed by: INTERNAL MEDICINE

## 2020-07-21 PROCEDURE — A9270 NON-COVERED ITEM OR SERVICE: HCPCS | Performed by: HOSPITALIST

## 2020-07-21 PROCEDURE — C9803 HOPD COVID-19 SPEC COLLECT: HCPCS | Performed by: INTERNAL MEDICINE

## 2020-07-21 PROCEDURE — 700101 HCHG RX REV CODE 250: Performed by: INTERNAL MEDICINE

## 2020-07-21 PROCEDURE — U0003 INFECTIOUS AGENT DETECTION BY NUCLEIC ACID (DNA OR RNA); SEVERE ACUTE RESPIRATORY SYNDROME CORONAVIRUS 2 (SARS-COV-2) (CORONAVIRUS DISEASE [COVID-19]), AMPLIFIED PROBE TECHNIQUE, MAKING USE OF HIGH THROUGHPUT TECHNOLOGIES AS DESCRIBED BY CMS-2020-01-R: HCPCS

## 2020-07-21 PROCEDURE — 770021 HCHG ROOM/CARE - ISO PRIVATE

## 2020-07-21 PROCEDURE — A9270 NON-COVERED ITEM OR SERVICE: HCPCS | Performed by: INTERNAL MEDICINE

## 2020-07-21 PROCEDURE — 700102 HCHG RX REV CODE 250 W/ 637 OVERRIDE(OP): Performed by: HOSPITALIST

## 2020-07-21 PROCEDURE — 82962 GLUCOSE BLOOD TEST: CPT | Mod: 91

## 2020-07-21 PROCEDURE — 700111 HCHG RX REV CODE 636 W/ 250 OVERRIDE (IP): Performed by: HOSPITALIST

## 2020-07-21 RX ORDER — INSULIN GLARGINE 100 [IU]/ML
10 INJECTION, SOLUTION SUBCUTANEOUS EVERY EVENING
Status: DISCONTINUED | OUTPATIENT
Start: 2020-07-21 | End: 2020-07-22

## 2020-07-21 RX ADMIN — ENOXAPARIN SODIUM 60 MG: 60 INJECTION SUBCUTANEOUS at 16:45

## 2020-07-21 RX ADMIN — AMLODIPINE BESYLATE 10 MG: 10 TABLET ORAL at 06:19

## 2020-07-21 RX ADMIN — THERA TABS 1 TABLET: TAB at 06:19

## 2020-07-21 RX ADMIN — TAMSULOSIN HYDROCHLORIDE 0.8 MG: 0.4 CAPSULE ORAL at 09:20

## 2020-07-21 RX ADMIN — DEXTROSE MONOHYDRATE 50 ML: 25 INJECTION, SOLUTION INTRAVENOUS at 21:45

## 2020-07-21 RX ADMIN — ENOXAPARIN SODIUM 60 MG: 60 INJECTION SUBCUTANEOUS at 06:19

## 2020-07-21 RX ADMIN — ZINC SULFATE 220 MG (50 MG) CAPSULE 220 MG: CAPSULE at 06:19

## 2020-07-21 RX ADMIN — INSULIN HUMAN 7 UNITS: 100 INJECTION, SOLUTION PARENTERAL at 16:48

## 2020-07-21 NOTE — PROGRESS NOTES
2 RN skin check complete with EZ Yan.   Devices in place Nasal cannula.  Skin assessed under devices Yes.  Confirmed pressure ulcers found on NA.  New potential pressure ulcers noted on NA. Wound consult placed NA.  The following interventions in place patient sits up in bed often, waffle in place and RN notified to place grey ear foams.   Bilateral lower legs, discolored, dry. Back is is red from patient reported scratching.

## 2020-07-21 NOTE — PROGRESS NOTES
[facility] Medicine Daily Progress Note    Date of Service  7/21/2020    Chief Complaint  84 y.o. male admitted 7/9/2020 with cough, nausea, vomiting, diarrhea, weakness    Hospital Course    Mr. Ovi Bejarano is a 84 y.o. male with history of diabetes, hyperlipidemia, BPH who presented on 7/9/2020 with cough, nausea, vomiting, diarrhea, progressive weakness x1-2 weeks.  On presentation he was dehydrated with hypotension and found to have acute renal failure.  Found to be COVID positive.  Initially admitted to the ICU.  Started on therapeutic dose enoxaparin for elevated d-dimer.        Interval Problem Update  Patient was seen and examined at bedside.  I have personally reviewed vitals, labs, and imaging.    7/21.  Afebrile.  Stable vitals.  On 3 L nasal cannula.  Patient is oriented to person and place.  Mentation and respiration/cooperation waxes and wanes.  Currently he denies any pain, fevers, chills, shortness of breath.  Reports no complaints.      Consultants/Specialty  Palliative    Code Status  Full    Disposition  Cannot find next of kin.  Patient is confused.  Will likely need guardianship.    Review of Systems  Review of Systems   Unable to perform ROS: Mental acuity        Physical Exam  Temp:  [35.9 °C (96.7 °F)-36.3 °C (97.4 °F)] 36.2 °C (97.1 °F)  Pulse:  [75-98] 76  Resp:  [17-19] 17  BP: (106-123)/(47-51) 123/47  SpO2:  [90 %-97 %] 96 %    Physical Exam  Vitals signs and nursing note reviewed.   Constitutional:       General: He is not in acute distress.     Appearance: Normal appearance.      Comments: Appears frail.   HENT:      Head: Normocephalic and atraumatic.      Nose: Nose normal.      Mouth/Throat:      Mouth: Mucous membranes are moist.      Pharynx: Oropharynx is clear.   Eyes:      Extraocular Movements: Extraocular movements intact.      Conjunctiva/sclera: Conjunctivae normal.   Neck:      Musculoskeletal: Normal range of motion and neck supple.   Cardiovascular:      Rate and  Rhythm: Normal rate and regular rhythm.      Pulses: Normal pulses.      Heart sounds: Normal heart sounds. No murmur. No friction rub. No gallop.    Pulmonary:      Effort: Pulmonary effort is normal. No respiratory distress.      Breath sounds: Normal breath sounds. No wheezing or rales.   Chest:      Chest wall: No tenderness.   Abdominal:      General: Abdomen is flat. Bowel sounds are normal. There is no distension.      Palpations: Abdomen is soft. There is no mass.      Tenderness: There is no abdominal tenderness. There is no guarding.   Musculoskeletal: Normal range of motion.   Skin:     General: Skin is warm.      Capillary Refill: Capillary refill takes less than 2 seconds.   Neurological:      General: No focal deficit present.      Mental Status: He is alert. Mental status is at baseline. He is disoriented.      Cranial Nerves: No cranial nerve deficit.      Motor: No weakness.   Psychiatric:         Mood and Affect: Mood normal.      Comments: Limited insight into disease processes.         Fluids    Intake/Output Summary (Last 24 hours) at 7/21/2020 0855  Last data filed at 7/20/2020 1000  Gross per 24 hour   Intake 240 ml   Output --   Net 240 ml       Laboratory                        Imaging  DX-CHEST-PORTABLE (1 VIEW)   Final Result      1.  The lungs are hyperinflated suggesting emphysema/COPD.   2.  There is minimal predominantly lower lobe interstitial opacity which is most likely due to chronic scarring.           Assessment/Plan  * Dehydration  Assessment & Plan  resolved    Diarrhea of presumed infectious origin  Assessment & Plan  Resolved  C dif negative  Likely related to covid    COVID-19 virus infection  Assessment & Plan  D dimer elevated  Continue therapeutic Lovenox  Vitamin D level 14 - high dose replacement weekly started  Completed 10-day course of dexamethasone  Continue supportive care    COPD (chronic obstructive pulmonary disease) (HCC)- (present on admission)  Assessment &  Plan  No s/o exacerbation at this time  RT protocol  Completed 10-day course of dexamethasone for COVID    Malnutrition (HCC)  Assessment & Plan  Routine monitoring, periodic labs.    Urinary retention  Assessment & Plan  Reportedly chronic  Refusing méndez or urology  Accepting flomax which will be continued    Hypokalemia  Assessment & Plan  Continue to monitor    Acute kidney injury (HCC)  Assessment & Plan  Resolved.    Continue to monitor kidney function and urine output    Type 2 diabetes mellitus (HCC)- (present on admission)  Assessment & Plan  Lab Results   Component Value Date/Time    HBA1C 6.8 (H) 07/10/2020 0335    HBA1C 6.9 (H) 03/08/2018 1124    HBA1C 8.1 (H) 12/15/2017 1108     Results from last 7 days   Lab Units 07/21/20  1645 07/21/20  1207 07/21/20  0919 07/20/20  1712 07/20/20  0832 07/19/20  1720 07/19/20  0833 07/18/20  1702   ACCU CHECK GLUCOSE 788 mg/dL 268* 262* 154* 339* 152* 238* 149* 256*     I have ordered insulin sliding scale with D50 and glucagon for hypoglycemia per protocol.  Diabetic diet  Diabetic education         VTE prophylaxis: Lovenox

## 2020-07-21 NOTE — PROGRESS NOTES
Assumed care of pt this am. Pt is A&O 3-4 occasionally requiring re-orientation. Pt denies pain this am. telesitter at bedside. Pt's vision is impairted, assessed room for trip hazards, bed alarm in use. Pt responsive to telesitter re-orientation. Hourly rounding in place. Pt is in room near nursing station. Pt encouraged to eat his meal.

## 2020-07-21 NOTE — CARE PLAN
Problem: Safety  Goal: Will remain free from injury  Outcome: PROGRESSING AS EXPECTED  Intervention: Provide assistance with mobility  Note: Tele sitter at bedside. Room assessed for trip hazards. Bed alarm in use. Pt is in room near nursing station.      Problem: Discharge Barriers/Planning  Goal: Patient's continuum of care needs will be met  Outcome: PROGRESSING AS EXPECTED  Intervention: Assess potential discharge barriers on admission and throughout hospital stay  Note: [employer] is working on finding NOK.

## 2020-07-21 NOTE — PROGRESS NOTES
Assumed care of patient at 1900. A/O x3 but does not like participating in assessment. Discussed plan of care and call light use. Denies pain. Bed locked and low, alarm on, telesitter in place and report given. Soft touch call light within reach.

## 2020-07-21 NOTE — PALLIATIVE CARE
Palliative Care follow-up  PC RN discussed with Dr. Mei. Difficult to determine if pt is confused or just unwilling to participate in conversation with PC RN and other staff. Case management has been unable to reach any family or NOK. MD will place psych consult to determine if pt has capacity.       Plan: assist in GOC planning.     Thank you for allowing Palliative Care to support this patient and family. Contact [phone] for additional assistance, change in patient status, or with any questions/concerns.

## 2020-07-22 ENCOUNTER — APPOINTMENT (OUTPATIENT)
Dept: RADIOLOGY | Facility: MEDICAL CENTER | Age: 85
DRG: 177 | End: 2020-07-22
Attending: INTERNAL MEDICINE
Payer: MEDICARE

## 2020-07-22 LAB
AMMONIA PLAS-SCNC: 13 UMOL/L (ref 11–45)
ANION GAP SERPL CALC-SCNC: 13 MMOL/L (ref 7–16)
BASOPHILS # BLD AUTO: 0.2 % (ref 0–1.8)
BASOPHILS # BLD: 0.02 K/UL (ref 0–0.12)
BUN SERPL-MCNC: 33 MG/DL (ref 8–22)
CALCIUM SERPL-MCNC: 9.3 MG/DL (ref 8.5–10.5)
CHLORIDE SERPL-SCNC: 98 MMOL/L (ref 96–112)
CO2 SERPL-SCNC: 28 MMOL/L (ref 20–33)
CREAT SERPL-MCNC: 1.09 MG/DL (ref 0.5–1.4)
EOSINOPHIL # BLD AUTO: 0.03 K/UL (ref 0–0.51)
EOSINOPHIL NFR BLD: 0.3 % (ref 0–6.9)
ERYTHROCYTE [DISTWIDTH] IN BLOOD BY AUTOMATED COUNT: 39.8 FL (ref 35.9–50)
GLUCOSE BLD-MCNC: 120 MG/DL (ref 65–99)
GLUCOSE BLD-MCNC: 178 MG/DL (ref 65–99)
GLUCOSE BLD-MCNC: 187 MG/DL (ref 65–99)
GLUCOSE BLD-MCNC: 206 MG/DL (ref 65–99)
GLUCOSE BLD-MCNC: 98 MG/DL (ref 65–99)
GLUCOSE SERPL-MCNC: 156 MG/DL (ref 65–99)
HCT VFR BLD AUTO: 37.1 % (ref 42–52)
HGB BLD-MCNC: 12.3 G/DL (ref 14–18)
IMM GRANULOCYTES # BLD AUTO: 0.13 K/UL (ref 0–0.11)
IMM GRANULOCYTES NFR BLD AUTO: 1.1 % (ref 0–0.9)
LYMPHOCYTES # BLD AUTO: 1.52 K/UL (ref 1–4.8)
LYMPHOCYTES NFR BLD: 13.1 % (ref 22–41)
MAGNESIUM SERPL-MCNC: 1.9 MG/DL (ref 1.5–2.5)
MCH RBC QN AUTO: 30.5 PG (ref 27–33)
MCHC RBC AUTO-ENTMCNC: 33.2 G/DL (ref 33.7–35.3)
MCV RBC AUTO: 92.1 FL (ref 81.4–97.8)
MONOCYTES # BLD AUTO: 0.88 K/UL (ref 0–0.85)
MONOCYTES NFR BLD AUTO: 7.6 % (ref 0–13.4)
NEUTROPHILS # BLD AUTO: 9.02 K/UL (ref 1.82–7.42)
NEUTROPHILS NFR BLD: 77.7 % (ref 44–72)
NRBC # BLD AUTO: 0 K/UL
NRBC BLD-RTO: 0 /100 WBC
PHOSPHATE SERPL-MCNC: 3.9 MG/DL (ref 2.5–4.5)
PLATELET # BLD AUTO: 233 K/UL (ref 164–446)
PMV BLD AUTO: 10.1 FL (ref 9–12.9)
POTASSIUM SERPL-SCNC: 3.7 MMOL/L (ref 3.6–5.5)
PROCALCITONIN SERPL-MCNC: <0.05 NG/ML
RBC # BLD AUTO: 4.03 M/UL (ref 4.7–6.1)
SODIUM SERPL-SCNC: 139 MMOL/L (ref 135–145)
WBC # BLD AUTO: 11.6 K/UL (ref 4.8–10.8)

## 2020-07-22 PROCEDURE — 82962 GLUCOSE BLOOD TEST: CPT

## 2020-07-22 PROCEDURE — A9270 NON-COVERED ITEM OR SERVICE: HCPCS | Performed by: HOSPITALIST

## 2020-07-22 PROCEDURE — 84145 PROCALCITONIN (PCT): CPT

## 2020-07-22 PROCEDURE — 82140 ASSAY OF AMMONIA: CPT

## 2020-07-22 PROCEDURE — A9270 NON-COVERED ITEM OR SERVICE: HCPCS | Performed by: INTERNAL MEDICINE

## 2020-07-22 PROCEDURE — 99222 1ST HOSP IP/OBS MODERATE 55: CPT | Performed by: PSYCHIATRY & NEUROLOGY

## 2020-07-22 PROCEDURE — 80048 BASIC METABOLIC PNL TOTAL CA: CPT

## 2020-07-22 PROCEDURE — 700102 HCHG RX REV CODE 250 W/ 637 OVERRIDE(OP): Performed by: HOSPITALIST

## 2020-07-22 PROCEDURE — 99232 SBSQ HOSP IP/OBS MODERATE 35: CPT | Performed by: INTERNAL MEDICINE

## 2020-07-22 PROCEDURE — 770021 HCHG ROOM/CARE - ISO PRIVATE

## 2020-07-22 PROCEDURE — 84100 ASSAY OF PHOSPHORUS: CPT

## 2020-07-22 PROCEDURE — 700102 HCHG RX REV CODE 250 W/ 637 OVERRIDE(OP): Performed by: INTERNAL MEDICINE

## 2020-07-22 PROCEDURE — 83735 ASSAY OF MAGNESIUM: CPT

## 2020-07-22 PROCEDURE — 85025 COMPLETE CBC W/AUTO DIFF WBC: CPT

## 2020-07-22 PROCEDURE — 97129 THER IVNTJ 1ST 15 MIN: CPT

## 2020-07-22 PROCEDURE — 97130 THER IVNTJ EA ADDL 15 MIN: CPT

## 2020-07-22 PROCEDURE — 36415 COLL VENOUS BLD VENIPUNCTURE: CPT

## 2020-07-22 PROCEDURE — 76705 ECHO EXAM OF ABDOMEN: CPT

## 2020-07-22 PROCEDURE — 700111 HCHG RX REV CODE 636 W/ 250 OVERRIDE (IP): Performed by: HOSPITALIST

## 2020-07-22 RX ORDER — QUETIAPINE FUMARATE 25 MG/1
25 TABLET, FILM COATED ORAL EVERY EVENING
Status: DISCONTINUED | OUTPATIENT
Start: 2020-07-22 | End: 2020-12-29 | Stop reason: HOSPADM

## 2020-07-22 RX ORDER — POTASSIUM CHLORIDE 20 MEQ/1
40 TABLET, EXTENDED RELEASE ORAL ONCE
Status: COMPLETED | OUTPATIENT
Start: 2020-07-22 | End: 2020-07-22

## 2020-07-22 RX ADMIN — THERA TABS 1 TABLET: TAB at 05:07

## 2020-07-22 RX ADMIN — INSULIN HUMAN 4 UNITS: 100 INJECTION, SOLUTION PARENTERAL at 08:20

## 2020-07-22 RX ADMIN — POTASSIUM CHLORIDE 40 MEQ: 1500 TABLET, EXTENDED RELEASE ORAL at 09:37

## 2020-07-22 RX ADMIN — ENOXAPARIN SODIUM 60 MG: 60 INJECTION SUBCUTANEOUS at 17:11

## 2020-07-22 RX ADMIN — QUETIAPINE FUMARATE 25 MG: 25 TABLET ORAL at 19:39

## 2020-07-22 RX ADMIN — ZINC SULFATE 220 MG (50 MG) CAPSULE 220 MG: CAPSULE at 05:06

## 2020-07-22 RX ADMIN — TAMSULOSIN HYDROCHLORIDE 0.8 MG: 0.4 CAPSULE ORAL at 09:40

## 2020-07-22 RX ADMIN — AMLODIPINE BESYLATE 10 MG: 10 TABLET ORAL at 05:07

## 2020-07-22 RX ADMIN — INSULIN HUMAN 3 UNITS: 100 INJECTION, SOLUTION PARENTERAL at 19:53

## 2020-07-22 RX ADMIN — ENOXAPARIN SODIUM 60 MG: 60 INJECTION SUBCUTANEOUS at 05:06

## 2020-07-22 NOTE — CARE PLAN
Problem: Safety  Goal: Will remain free from injury  7/21/2020 2348 by Tasha Kapoor R.N.  Outcome: PROGRESSING AS EXPECTED  7/21/2020 2348 by Tasha Kapoor R.N.  Outcome: PROGRESSING AS EXPECTED  Goal: Will remain free from falls  7/21/2020 2348 by Tasha Kapoor R.N.  Outcome: PROGRESSING AS EXPECTED  7/21/2020 2348 by Tasha Kapoor R.N.  Outcome: PROGRESSING AS EXPECTED     Problem: Infection  Goal: Will remain free from infection  7/21/2020 2348 by Tasha Kapoor R.N.  Outcome: PROGRESSING AS EXPECTED  7/21/2020 2348 by Tasha Kapoor R.N.  Outcome: PROGRESSING AS EXPECTED     Problem: Venous Thromboembolism (VTW)/Deep Vein Thrombosis (DVT) Prevention:  Goal: Patient will participate in Venous Thrombosis (VTE)/Deep Vein Thrombosis (DVT)Prevention Measures  7/21/2020 2348 by Tasha Kapoor R.N.  Outcome: PROGRESSING AS EXPECTED  7/21/2020 2348 by Tasha Kapoor R.N.  Outcome: PROGRESSING AS EXPECTED     Problem: Bowel/Gastric:  Goal: Normal bowel function is maintained or improved  7/21/2020 2348 by Tasha Kapoor R.N.  Outcome: PROGRESSING AS EXPECTED  7/21/2020 2348 by Tasha Kapoor R.N.  Outcome: PROGRESSING AS EXPECTED  Goal: Will not experience complications related to bowel motility  7/21/2020 2348 by IGLESIA MetzgerN.  Outcome: PROGRESSING AS EXPECTED  7/21/2020 2348 by IGLESIA MetzgerN.  Outcome: PROGRESSING AS EXPECTED     Problem: Knowledge Deficit  Goal: Knowledge of disease process/condition, treatment plan, diagnostic tests, and medications will improve  7/21/2020 2348 by Tasha Kapoor R.N.  Outcome: PROGRESSING AS EXPECTED  7/21/2020 2348 by Tasha Kapoor R.N.  Outcome: PROGRESSING AS EXPECTED  Goal: Knowledge of the prescribed therapeutic regimen will improve  7/21/2020 2348 by Tasha Kapoor R.N.  Outcome: PROGRESSING AS EXPECTED  7/21/2020 2348 by Tasha Kapoor R.N.  Outcome: PROGRESSING AS EXPECTED     Problem: Discharge Barriers/Planning  Goal:  Patient's continuum of care needs will be met  7/21/2020 2348 by Tasha Kapoor R.NMali  Outcome: PROGRESSING AS EXPECTED  7/21/2020 2348 by Tasha Kapoor R.N.  Outcome: PROGRESSING AS EXPECTED     Problem: Fluid Volume:  Goal: Will maintain balanced intake and output  7/21/2020 2348 by Tasha Kapoor R.NMali  Outcome: PROGRESSING AS EXPECTED  7/21/2020 2348 by IGLESIA MetzgerN.  Outcome: PROGRESSING AS EXPECTED     Problem: Respiratory:  Goal: Respiratory status will improve  Outcome: PROGRESSING AS EXPECTED     Problem: Skin Integrity  Goal: Risk for impaired skin integrity will decrease  Outcome: PROGRESSING AS EXPECTED     Problem: Pain Management  Goal: Pain level will decrease to patient's comfort goal  Outcome: PROGRESSING AS EXPECTED     Problem: Psychosocial Needs:  Goal: Level of anxiety will decrease  Outcome: PROGRESSING AS EXPECTED     Problem: Urinary Elimination:  Goal: Ability to reestablish a normal urinary elimination pattern will improve  Outcome: PROGRESSING AS EXPECTED

## 2020-07-22 NOTE — PSYCHIATRY
BRIEF PSYCHIATRIC CONSULT NOTE: not   Seen. Capacity for what? Please text dr Melchor with specifics.

## 2020-07-22 NOTE — DISCHARGE PLANNING
"Hospital Care Management Discharge Planning       Anticipated Discharge Disposition:   · TBD     Action:   · CM again tried to reach \"relative\" Kat  ([phone]) but again VM box not set up to receive messages.   · Discharge barriers(NOK & Emergency contacts unsuccessful)  discussed in IDT 7/21. CM recommend Psych evaluation for capacity  which is now pending.     Barriers to Discharge:   · Discharge disposition. Pt is blind, disoriented and therapy is recommending 24/7 supervision cut CM has been unable to find NOK or friends for support at this time.      Plan:   · Anticipate potential guardianship?    · Continue to provide support services and assistance with discharge planning as needed.   "
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
## 2020-07-22 NOTE — THERAPY
"Speech Language Pathology  Daily Treatment     Patient Name: Ovi Bejarano  Age:  84 y.o., Sex:  male  Medical Record #: [id number]  Today's Date: 7/22/2020     Precautions  Precautions: (P) Fall Risk  Comments: (P) Pt is blind and uses a cane. Poor insight    Assessment    Per nurs, pt declining his bfast meal. Pt seen early this afternoon. He awakens easily. Dry oral cavity with pt declining sips of liquids when offered stating \"I am not hungry.\" Pt inconsistent with orientation information. Pt initially stating the month \"July\" and the year \"2020.\" Pt unable to recall date of admit or LOS. He indep stated his bday without cues. Following approx 5 minutes, pt asked to state the month with pt stating \"November.\" Pt re-cued that it was July with pt able recall the correct date and the year without cues. Pt stating that his goal is \"to get my money\" and that he did not need to be in the hospital. When asked of any difficulties, medical conditions related to him being in the hospital, pt stated \"I fell and you are keeping me here for my money.\" SLP provided educ and support regarding pt's current medical condition with pt denying that he has any respiratory difficulty or weakness, \"I am fine. I just fell.\" Pt began sitting up in bed and asking to go to the BR. RN stating hand held assist. Pt ambulated to the BR with hand held assist, verbal cues, and using his cane. Once pt sitting EOB, pt in agreement for sips of thin liquids using his straw. Each time pt took a sip, pt stating \"now give me a pill.\" SLP educ pt that she was not the nurs and did not have a pill. Pt then would ask for another sip and state \"give me my pill.\" Overall, pt demonstrating moderate to severe deficits in insight, orientation, and today with memory (initial eval rating STM and immed memory deficits as \"mild.\"). SLP collaborated with the nurs regarding pt's difficulties and with continued recommendation for assist and superv recommended " for d/c 24/7.    Plan    Continue current treatment plan.    Discharge recommendations:  Assist and superv for care 24/7 07/22/20 1215   Cognitive-Linguistic   Cognitive-Linguistic (WDL) X   Level of Consciousness Alert   Sustained Attention Minimal (4)   Short Term Memory Moderate (3)   Immediate Memory Moderate (3)   Insight into Deficits Severe (2)   Skilled Intervention Verbal Cueing

## 2020-07-22 NOTE — PROGRESS NOTES
"RN and other RN's ran into patients room at about 18:30 when Telesitter alarm rang. Pt was already on the floor, as RN was entering room I received a call from the telesitter stating, \"your patient is on the ground.\" Patient helped to stand with two RN assistance. Assessment completed and vitals completed. Pt denies injury. Pt assisted to restroom and back to bed. Tele sitter announced over head \"I cant tell who is the nurse and who is the CNA but I called the cna several times to let her know he wanted to go to the bathroom.\" post fall huddle and paperwork completed. Pharmacy notified of fall.   "

## 2020-07-22 NOTE — PROGRESS NOTES
"[facility] Medicine Daily Progress Note    Date of Service  7/22/2020    Chief Complaint  84 y.o. male admitted 7/9/2020 with cough, nausea, vomiting, diarrhea, weakness    Hospital Course    Mr. Ovi Bejarano is a 84 y.o. male with history of diabetes, hyperlipidemia, BPH who presented on 7/9/2020 with cough, nausea, vomiting, diarrhea, progressive weakness x1-2 weeks.  On presentation he was dehydrated with hypotension and found to have acute renal failure.  Found to be COVID positive.  Initially admitted to the ICU.  Started on therapeutic dose enoxaparin for elevated d-dimer.        Interval Problem Update  Patient was seen and examined at bedside.  I have personally reviewed vitals, labs, and imaging.    7/21.  Afebrile.  Stable vitals.  On 3 L nasal cannula.  Patient is oriented to person and place.  Mentation and respiration/cooperation waxes and wanes.  Currently he denies any pain, fevers, chills, shortness of breath.  Reports no complaints.  7/22.  Afebrile.  1 episode of tachycardia which is resolved.  On 3 L nasal cannula.  Patient had a fall yesterday evening.  No injuries.  Patient was trying to get up to bathroom.  Mild leukocytosis.  Negative procalcitonin.  Blood sugars are better controlled.  Oriented to person and place.  Reports abdominal pain.  Otherwise no complaints.  States \"I am okay\".  Poor p.o. intake.  Took his medications with pudding this morning.  Appreciate psychiatric input    Consultants/Specialty  Palliative  Psychiatry    Code Status  Full    Disposition  Cannot find next of kin.  Patient is confused.  Will likely need guardianship.    Review of Systems  Review of Systems   Unable to perform ROS: Mental acuity        Physical Exam  Temp:  [36.6 °C (97.9 °F)-36.8 °C (98.3 °F)] 36.6 °C (97.9 °F)  Pulse:  [] 74  Resp:  [16-19] 16  BP: (106-125)/(49-88) 116/53  SpO2:  [92 %-100 %] 100 %    Physical Exam  Vitals signs and nursing note reviewed.   Constitutional:       " General: He is not in acute distress.     Appearance: Normal appearance.      Comments: Appears frail.   HENT:      Head: Normocephalic and atraumatic.      Nose: Nose normal.      Mouth/Throat:      Mouth: Mucous membranes are moist.      Pharynx: Oropharynx is clear.   Eyes:      Extraocular Movements: Extraocular movements intact.      Conjunctiva/sclera: Conjunctivae normal.   Neck:      Musculoskeletal: Normal range of motion and neck supple.   Cardiovascular:      Rate and Rhythm: Normal rate and regular rhythm.      Pulses: Normal pulses.      Heart sounds: Normal heart sounds. No murmur. No friction rub. No gallop.    Pulmonary:      Effort: Pulmonary effort is normal. No respiratory distress.      Breath sounds: Normal breath sounds. No wheezing or rales.   Chest:      Chest wall: No tenderness.   Abdominal:      General: Abdomen is flat. Bowel sounds are normal. There is no distension.      Palpations: Abdomen is soft. There is no mass.      Tenderness: There is no abdominal tenderness. There is no guarding.   Musculoskeletal: Normal range of motion.   Skin:     General: Skin is warm.      Capillary Refill: Capillary refill takes less than 2 seconds.   Neurological:      General: No focal deficit present.      Mental Status: He is alert. Mental status is at baseline. He is disoriented.      Cranial Nerves: No cranial nerve deficit.      Motor: No weakness.   Psychiatric:         Mood and Affect: Mood normal.      Comments: Limited insight into disease processes.         Fluids  No intake or output data in the 24 hours ending 07/22/20 0757    Laboratory  Recent Labs     07/22/20  0514   WBC 11.6*   RBC 4.03*   HEMOGLOBIN 12.3*   HEMATOCRIT 37.1*   MCV 92.1   MCH 30.5   MCHC 33.2*   RDW 39.8   PLATELETCT 233   MPV 10.1     Recent Labs     07/22/20  0514   SODIUM 139   POTASSIUM 3.7   CHLORIDE 98   CO2 28   GLUCOSE 156*   BUN 33*   CREATININE 1.09   CALCIUM 9.3                   Imaging  DX-CHEST-PORTABLE (1  VIEW)   Final Result      1.  The lungs are hyperinflated suggesting emphysema/COPD.   2.  There is minimal predominantly lower lobe interstitial opacity which is most likely due to chronic scarring.           Assessment/Plan  * Dehydration  Assessment & Plan  resolved    Diarrhea of presumed infectious origin  Assessment & Plan  Resolved  C dif negative  Likely related to covid    COVID-19 virus infection  Assessment & Plan  D dimer elevated  Continue therapeutic Lovenox  Vitamin D level 14 - high dose replacement weekly started  Completed 10-day course of dexamethasone  Continue supportive care    COPD (chronic obstructive pulmonary disease) (HCC)- (present on admission)  Assessment & Plan  No s/o exacerbation at this time  RT protocol  Completed 10-day course of dexamethasone for COVID    Malnutrition (HCC)  Assessment & Plan  Routine monitoring, periodic labs.    Urinary retention  Assessment & Plan  Reportedly chronic  Refusing méndez or urology  Accepting flomax which will be continued    Hypokalemia  Assessment & Plan  Continue to monitor    Acute kidney injury (HCC)  Assessment & Plan  Resolved.    Continue to monitor kidney function and urine output    Type 2 diabetes mellitus (HCC)- (present on admission)  Assessment & Plan  Lab Results   Component Value Date/Time    HBA1C 6.8 (H) 07/10/2020 0335    HBA1C 6.9 (H) 03/08/2018 1124    HBA1C 8.1 (H) 12/15/2017 1108     Results from last 7 days   Lab Units 07/22/20  1143 07/22/20  0749 07/22/20  0005 07/21/20  2217 07/21/20  2129 07/21/20  1645 07/21/20  1207 07/21/20  0919   ACCU CHECK GLUCOSE 788 mg/dL 120* 206* 98 189* 65 268* 262* 154*     I have ordered insulin sliding scale with D50 and glucagon for hypoglycemia per protocol.  Diabetic diet  Diabetic education         VTE prophylaxis: Lovenox

## 2020-07-22 NOTE — CARE PLAN
Problem: Respiratory:  Goal: Respiratory status will improve  Outcome: PROGRESSING AS EXPECTED  Note: Patient at baseline, 3 L.      Problem: Pain Management  Goal: Pain level will decrease to patient's comfort goal  Outcome: PROGRESSING AS EXPECTED  Note: Patient understands pain scale and goal, has PRN medications available if needed. Will continue to monitor.

## 2020-07-23 ENCOUNTER — APPOINTMENT (OUTPATIENT)
Dept: RADIOLOGY | Facility: MEDICAL CENTER | Age: 85
DRG: 177 | End: 2020-07-23
Attending: INTERNAL MEDICINE
Payer: MEDICARE

## 2020-07-23 PROBLEM — Z71.89 ADVANCED CARE PLANNING/COUNSELING DISCUSSION: Status: ACTIVE | Noted: 2020-07-23

## 2020-07-23 LAB
GLUCOSE BLD-MCNC: 108 MG/DL (ref 65–99)
GLUCOSE BLD-MCNC: 124 MG/DL (ref 65–99)
GLUCOSE BLD-MCNC: 138 MG/DL (ref 65–99)
GLUCOSE BLD-MCNC: 174 MG/DL (ref 65–99)

## 2020-07-23 PROCEDURE — 770021 HCHG ROOM/CARE - ISO PRIVATE

## 2020-07-23 PROCEDURE — A9270 NON-COVERED ITEM OR SERVICE: HCPCS | Performed by: HOSPITALIST

## 2020-07-23 PROCEDURE — 700102 HCHG RX REV CODE 250 W/ 637 OVERRIDE(OP): Performed by: HOSPITALIST

## 2020-07-23 PROCEDURE — 700102 HCHG RX REV CODE 250 W/ 637 OVERRIDE(OP): Performed by: INTERNAL MEDICINE

## 2020-07-23 PROCEDURE — 74170 CT ABD WO CNTRST FLWD CNTRST: CPT

## 2020-07-23 PROCEDURE — 99233 SBSQ HOSP IP/OBS HIGH 50: CPT | Mod: 25 | Performed by: INTERNAL MEDICINE

## 2020-07-23 PROCEDURE — A9270 NON-COVERED ITEM OR SERVICE: HCPCS | Performed by: INTERNAL MEDICINE

## 2020-07-23 PROCEDURE — 82962 GLUCOSE BLOOD TEST: CPT | Mod: 91

## 2020-07-23 PROCEDURE — 700117 HCHG RX CONTRAST REV CODE 255: Performed by: INTERNAL MEDICINE

## 2020-07-23 PROCEDURE — 700111 HCHG RX REV CODE 636 W/ 250 OVERRIDE (IP): Performed by: HOSPITALIST

## 2020-07-23 PROCEDURE — 99497 ADVNCD CARE PLAN 30 MIN: CPT | Performed by: INTERNAL MEDICINE

## 2020-07-23 RX ADMIN — THERA TABS 1 TABLET: TAB at 04:40

## 2020-07-23 RX ADMIN — ENOXAPARIN SODIUM 60 MG: 60 INJECTION SUBCUTANEOUS at 04:42

## 2020-07-23 RX ADMIN — INSULIN HUMAN 3 UNITS: 100 INJECTION, SOLUTION PARENTERAL at 17:25

## 2020-07-23 RX ADMIN — QUETIAPINE FUMARATE 25 MG: 25 TABLET ORAL at 17:25

## 2020-07-23 RX ADMIN — TAMSULOSIN HYDROCHLORIDE 0.8 MG: 0.4 CAPSULE ORAL at 07:56

## 2020-07-23 RX ADMIN — DIPHENOXYLATE HYDROCHLORIDE AND ATROPINE SULFATE 1 TABLET: 2.5; .025 TABLET ORAL at 17:25

## 2020-07-23 RX ADMIN — ENOXAPARIN SODIUM 60 MG: 60 INJECTION SUBCUTANEOUS at 17:24

## 2020-07-23 RX ADMIN — ZINC SULFATE 220 MG (50 MG) CAPSULE 220 MG: CAPSULE at 04:40

## 2020-07-23 RX ADMIN — AMLODIPINE BESYLATE 10 MG: 10 TABLET ORAL at 04:40

## 2020-07-23 RX ADMIN — IOHEXOL 100 ML: 350 INJECTION, SOLUTION INTRAVENOUS at 14:10

## 2020-07-23 NOTE — CARE PLAN
Problem: Safety  Goal: Will remain free from injury  Outcome: PROGRESSING AS EXPECTED  Note: Bed alarm on, wheels locked, in lowest position. Non skid socks applied. Call light within reach. Room near nurses station. Tele sitter in place.      Problem: Pain Management  Goal: Pain level will decrease to patient's comfort goal  Outcome: PROGRESSING AS EXPECTED  Note: Pt has no complaints of pain or discomfort at this time.

## 2020-07-23 NOTE — PROGRESS NOTES
"[facility] Daily Progress Note    Date of Service  7/23/2020    Chief Complaint  84 y.o. male admitted 7/9/2020 with cough, nausea, vomiting, diarrhea, weakness    Hospital Course    Mr. Ovi Bejarano is a 84 y.o. male with history of diabetes, hyperlipidemia, BPH who presented on 7/9/2020 with cough, nausea, vomiting, diarrhea, progressive weakness x1-2 weeks.  On presentation he was dehydrated with hypotension and found to have acute renal failure.  Found to be COVID positive.  Initially admitted to the ICU.  Started on therapeutic dose enoxaparin for elevated d-dimer.  Patient did report abdominal pain.  A right renal mass was demonstrated on ultrasound and confirmed with CT.  Patient is confused and a poor candidate for surgery.  Unable to reach family or next of kin.  He was made DNR/DNI 7/23.        Interval Problem Update  Patient was seen and examined at bedside.  I have personally reviewed vitals, labs, and imaging.    7/21.  Afebrile.  Stable vitals.  On 3 L nasal cannula.  Patient is oriented to person and place.  Mentation and respiration/cooperation waxes and wanes.  Currently he denies any pain, fevers, chills, shortness of breath.  Reports no complaints.  7/22.  Afebrile.  1 episode of tachycardia which is resolved.  On 3 L nasal cannula.  Patient had a fall yesterday evening.  No injuries.  Patient was trying to get up to bathroom.  Mild leukocytosis.  Negative procalcitonin.  Blood sugars are better controlled.  Oriented to person and place.  Reports abdominal pain.  Otherwise no complaints.  States \"I am okay\".  Poor p.o. intake.  Took his medications with pudding this morning.  Appreciate psychiatric input  7/23.  Afebrile.  Episodes of tachycardia last night.  On 3 to 6 L nasal cannula.  Abdominal ultrasound demonstrated right kidney mass.  Patient denies any pain, fevers, chills, shortness of breath.  Counseled the patient about lung mass and need for CT.  Patient was agreeable " for CT but did not want anything invasive.  Oriented to person and place.  After further discussion with patient he was made DNR/DNI.    Consultants/Specialty  Palliative  Psychiatry    Code Status  Full    Disposition  Cannot find next of kin.  Patient is confused.  Will likely need guardianship.    Review of Systems  Review of Systems   Unable to perform ROS: Mental acuity        Physical Exam  Temp:  [36.1 °C (97 °F)-36.7 °C (98 °F)] 36.1 °C (97 °F)  Pulse:  [] 73  Resp:  [16-20] 16  BP: (107-151)/() 107/65  SpO2:  [89 %-100 %] 100 %    Physical Exam  Vitals signs and nursing note reviewed.   Constitutional:       General: He is not in acute distress.     Appearance: Normal appearance.      Comments: Appears frail.   HENT:      Head: Normocephalic and atraumatic.      Nose: Nose normal.      Mouth/Throat:      Mouth: Mucous membranes are moist.      Pharynx: Oropharynx is clear.   Eyes:      Extraocular Movements: Extraocular movements intact.      Conjunctiva/sclera: Conjunctivae normal.   Neck:      Musculoskeletal: Normal range of motion and neck supple.   Cardiovascular:      Rate and Rhythm: Normal rate and regular rhythm.      Pulses: Normal pulses.      Heart sounds: Normal heart sounds. No murmur. No friction rub. No gallop.    Pulmonary:      Effort: Pulmonary effort is normal. No respiratory distress.      Breath sounds: Normal breath sounds. No wheezing or rales.   Chest:      Chest wall: No tenderness.   Abdominal:      General: Abdomen is flat. Bowel sounds are normal. There is no distension.      Palpations: Abdomen is soft. There is mass.      Tenderness: There is no abdominal tenderness. There is no guarding.   Musculoskeletal: Normal range of motion.   Skin:     General: Skin is warm.      Capillary Refill: Capillary refill takes less than 2 seconds.   Neurological:      General: No focal deficit present.      Mental Status: He is alert and oriented to person, place, and time. Mental  status is at baseline.      Cranial Nerves: No cranial nerve deficit.      Motor: No weakness.      Comments: Oriented to person and place.   Psychiatric:         Mood and Affect: Mood normal.      Comments: Limited insight into disease processes.         Fluids  No intake or output data in the 24 hours ending 07/23/20 0749    Laboratory  Recent Labs     07/22/20  0514   WBC 11.6*   RBC 4.03*   HEMOGLOBIN 12.3*   HEMATOCRIT 37.1*   MCV 92.1   MCH 30.5   MCHC 33.2*   RDW 39.8   PLATELETCT 233   MPV 10.1     Recent Labs     07/22/20  0514   SODIUM 139   POTASSIUM 3.7   CHLORIDE 98   CO2 28   GLUCOSE 156*   BUN 33*   CREATININE 1.09   CALCIUM 9.3                   Imaging  US-RUQ   Final Result         1.  Echogenic liver compatible with fatty change versus fibrosis.   2.  Masslike structure in the right kidney, should be considered neoplastic unless proven otherwise, recommend follow-up 3 phase CT of the kidneys for further characterization   3.  Mild dilatation of the common bile duct, within expected limits given patient age.      These findings were discussed with the patient's clinician, Vin Mei, on 7/23/2020 7:34 AM.      DX-CHEST-PORTABLE (1 VIEW)   Final Result      1.  The lungs are hyperinflated suggesting emphysema/COPD.   2.  There is minimal predominantly lower lobe interstitial opacity which is most likely due to chronic scarring.           Assessment/Plan  * Dehydration  Assessment & Plan  resolved    Diarrhea of presumed infectious origin  Assessment & Plan  Resolved  C dif negative  Likely related to covid    COVID-19 virus infection  Assessment & Plan  D dimer elevated  Continue therapeutic Lovenox  Vitamin D level 14 - high dose replacement weekly started  Completed 10-day course of dexamethasone  Continue supportive care    COPD (chronic obstructive pulmonary disease) (HCC)- (present on admission)  Assessment & Plan  No s/o exacerbation at this time  RT protocol  Completed 10-day course of  dexamethasone for COVID    Advanced care planning/counseling discussion  Assessment & Plan  Patient voluntarily participated in the conversation.  Patient was present for the conversation.  I discussed advance care planning face to face with the patient for at least 27 minutes, including diagnosis, prognosis, plan of care, risks and benefits of any therapies that could be offered, as well as alternatives including palliation and hospice, as appropriate.  Forms were completed and placed in the chart.  Patient is now DNR/DNI    Malnutrition (HCC)  Assessment & Plan  Routine monitoring, periodic labs.    Urinary retention  Assessment & Plan  Reportedly chronic  Refusing méndez or urology  Accepting flomax which will be continued    Hypokalemia  Assessment & Plan  Continue to monitor    Acute kidney injury (HCC)  Assessment & Plan  Resolved.    Continue to monitor kidney function and urine output    Type 2 diabetes mellitus (HCC)- (present on admission)  Assessment & Plan  Lab Results   Component Value Date/Time    HBA1C 6.8 (H) 07/10/2020 0335    HBA1C 6.9 (H) 03/08/2018 1124    HBA1C 8.1 (H) 12/15/2017 1108     Results from last 7 days   Lab Units 07/23/20  1132 07/23/20  0744 07/22/20  1938 07/22/20  1650 07/22/20  1143 07/22/20  0749 07/22/20  0005 07/21/20  2217   ACCU CHECK GLUCOSE 788 mg/dL 138* 108* 178* 187* 120* 206* 98 189*     I have ordered insulin sliding scale with D50 and glucagon for hypoglycemia per protocol.  Diabetic diet  Diabetic education         VTE prophylaxis: Lovenox

## 2020-07-23 NOTE — DIETARY
Nutrition Services: Update   Day 14 of admit.  Ovi Bejarano is a 84 y.o. male with admitting DX of COVID-19 virus infection, Dehydration, Sepsis     Pt is currently on diabetic diet. Pt is receiving Boost Glucose Control TID with meals. Per chart pt PO 0-50%. Pt continues to eating poorly. Spoke to MD regarding poor PO intake and pt refusal TF. Discussed possibility of adding appetite stimulant. Interventions are limited. Wt 7/18: 60.6 kg via bed scale - wt appears stable.     Malnutrition risk from RD note 7/10: Pt with severe malnutrition in the context of acute illness related malnutrition related to ongoing n/v and diarrhea affecting appetite/intake, ?suspect r/t COVID19, as evidenced by poor PO intake for >5 days (ongoing for 2 weeks) of <50% of estimated needs and a severe 8.1% wt loss over the past month.       Recommendations/Plan:  1. Recommend appetite stimulant - discussed with MD.   2. Encourage intake of meals  3. Document intake of all meals as % taken in ADL's to provide interdisciplinary communication across all shifts.   4. Monitor weight.  5. Nutrition rep will continue to see patient for ongoing meal and snack preferences.    RD following

## 2020-07-23 NOTE — CARE PLAN
Assumed day shift care at start of shift  Patient a+o x 4  Denies pain  LS - diminished, 96% on 6lpm this am, hx of copd, oxygen titrated down to 0 - now on RA with oxygen saturation = 90%  No s&s of respiratory distress, no cough noted, afebrile, vss  Standby to 1 assist to bathroom, blind, uses cane at baseline - steady gait  Telesitter in room, bed alarm active   No needs at this time, [facility]    GOAL: renal cat scan, eat 50% at each meal    Fall precautions/hourly rounding maintained, call light within reach and functioning, all items within reach.  Patient encouraged to call for assistance, poc reviewed with patient, ?'s/concerns answered.       Problem: Safety  Goal: Will remain free from injury  Outcome: PROGRESSING AS EXPECTED  Goal: Will remain free from falls  Outcome: PROGRESSING AS EXPECTED

## 2020-07-24 LAB
GLUCOSE BLD-MCNC: 113 MG/DL (ref 65–99)
GLUCOSE BLD-MCNC: 116 MG/DL (ref 65–99)

## 2020-07-24 PROCEDURE — A9270 NON-COVERED ITEM OR SERVICE: HCPCS | Performed by: INTERNAL MEDICINE

## 2020-07-24 PROCEDURE — 700111 HCHG RX REV CODE 636 W/ 250 OVERRIDE (IP): Performed by: HOSPITALIST

## 2020-07-24 PROCEDURE — A9270 NON-COVERED ITEM OR SERVICE: HCPCS | Performed by: HOSPITALIST

## 2020-07-24 PROCEDURE — 700102 HCHG RX REV CODE 250 W/ 637 OVERRIDE(OP): Performed by: INTERNAL MEDICINE

## 2020-07-24 PROCEDURE — 770021 HCHG ROOM/CARE - ISO PRIVATE

## 2020-07-24 PROCEDURE — 82962 GLUCOSE BLOOD TEST: CPT

## 2020-07-24 PROCEDURE — 700102 HCHG RX REV CODE 250 W/ 637 OVERRIDE(OP): Performed by: HOSPITALIST

## 2020-07-24 PROCEDURE — 99232 SBSQ HOSP IP/OBS MODERATE 35: CPT | Performed by: INTERNAL MEDICINE

## 2020-07-24 RX ADMIN — ENOXAPARIN SODIUM 60 MG: 60 INJECTION SUBCUTANEOUS at 16:58

## 2020-07-24 RX ADMIN — ZINC SULFATE 220 MG (50 MG) CAPSULE 220 MG: CAPSULE at 05:35

## 2020-07-24 RX ADMIN — ENOXAPARIN SODIUM 60 MG: 60 INJECTION SUBCUTANEOUS at 05:35

## 2020-07-24 RX ADMIN — QUETIAPINE FUMARATE 25 MG: 25 TABLET ORAL at 16:58

## 2020-07-24 RX ADMIN — AMLODIPINE BESYLATE 10 MG: 10 TABLET ORAL at 05:35

## 2020-07-24 RX ADMIN — THERA TABS 1 TABLET: TAB at 05:35

## 2020-07-24 ASSESSMENT — PATIENT HEALTH QUESTIONNAIRE - PHQ9
1. LITTLE INTEREST OR PLEASURE IN DOING THINGS: NOT AT ALL
2. FEELING DOWN, DEPRESSED, IRRITABLE, OR HOPELESS: NOT AT ALL
SUM OF ALL RESPONSES TO PHQ9 QUESTIONS 1 AND 2: 0

## 2020-07-24 NOTE — PROGRESS NOTES
"[facility] Medicine Daily Progress Note    Date of Service  7/24/2020    Chief Complaint  84 y.o. male admitted 7/9/2020 with cough, nausea, vomiting, diarrhea, weakness    Hospital Course    Mr. Ovi Bejarano is a 84 y.o. male with history of diabetes, hyperlipidemia, BPH who presented on 7/9/2020 with cough, nausea, vomiting, diarrhea, progressive weakness x1-2 weeks.  On presentation he was dehydrated with hypotension and found to have acute renal failure.  Found to be COVID positive.  Initially admitted to the ICU.  Started on therapeutic dose enoxaparin for elevated d-dimer.  Patient did report abdominal pain.  A right renal mass was demonstrated on ultrasound and confirmed with CT.  Patient is confused and a poor candidate for surgery.  Unable to reach family or next of kin.  He was made DNR/DNI 7/23.        Interval Problem Update  Patient was seen and examined at bedside.  I have personally reviewed vitals, labs, and imaging.    7/21.  Afebrile.  Stable vitals.  On 3 L nasal cannula.  Patient is oriented to person and place.  Mentation and respiration/cooperation waxes and wanes.  Currently he denies any pain, fevers, chills, shortness of breath.  Reports no complaints.  7/22.  Afebrile.  1 episode of tachycardia which is resolved.  On 3 L nasal cannula.  Patient had a fall yesterday evening.  No injuries.  Patient was trying to get up to bathroom.  Mild leukocytosis.  Negative procalcitonin.  Blood sugars are better controlled.  Oriented to person and place.  Reports abdominal pain.  Otherwise no complaints.  States \"I am okay\".  Poor p.o. intake.  Took his medications with pudding this morning.  Appreciate psychiatric input  7/23.  Afebrile.  Episodes of tachycardia last night.  On 3 to 6 L nasal cannula.  Abdominal ultrasound demonstrated right kidney mass.  Patient denies any pain, fevers, chills, shortness of breath.  Counseled the patient about lung mass and need for CT.  Patient was agreeable " "for CT but did not want anything invasive.  Oriented to person and place.  After further discussion with patient he was made DNR/DNI.  7/24.  Afebrile.  Stable vitals.  On 0.5-1 L nasal cannula.  Patient denies pain.  Oriented to person and place.  Denies complaints.  Reports \"I am good\".  Repeats over and over even when asked about why he is not eating.    Consultants/Specialty  Palliative  Psychiatry    Code Status  DNR/DNI    Disposition  Cannot find next of kin.  Patient is confused.  Will likely need guardianship.    Review of Systems  Review of Systems   Unable to perform ROS: Mental acuity        Physical Exam  Temp:  [36.4 °C (97.6 °F)-36.6 °C (97.8 °F)] 36.6 °C (97.8 °F)  Pulse:  [68-84] 68  Resp:  [16-18] 18  BP: (117-124)/(39-65) 124/39  SpO2:  [85 %-96 %] 94 %    Physical Exam  Vitals signs and nursing note reviewed.   Constitutional:       General: He is not in acute distress.     Appearance: Normal appearance.      Comments: Appears frail.   HENT:      Head: Normocephalic and atraumatic.      Nose: Nose normal.      Mouth/Throat:      Mouth: Mucous membranes are moist.      Pharynx: Oropharynx is clear.   Eyes:      Extraocular Movements: Extraocular movements intact.      Conjunctiva/sclera: Conjunctivae normal.   Neck:      Musculoskeletal: Normal range of motion and neck supple.   Cardiovascular:      Rate and Rhythm: Normal rate and regular rhythm.      Pulses: Normal pulses.      Heart sounds: Normal heart sounds. No murmur. No friction rub. No gallop.    Pulmonary:      Effort: Pulmonary effort is normal. No respiratory distress.      Breath sounds: Normal breath sounds. No wheezing or rales.   Chest:      Chest wall: No tenderness.   Abdominal:      General: Abdomen is flat. Bowel sounds are normal. There is no distension.      Palpations: Abdomen is soft. There is mass.      Tenderness: There is no abdominal tenderness. There is no guarding.   Musculoskeletal: Normal range of motion.   Skin:    "  General: Skin is warm.      Capillary Refill: Capillary refill takes less than 2 seconds.   Neurological:      General: No focal deficit present.      Mental Status: He is alert and oriented to person, place, and time. Mental status is at baseline.      Cranial Nerves: No cranial nerve deficit.      Motor: No weakness.      Comments: Oriented to person and place.   Psychiatric:         Mood and Affect: Mood normal.      Comments: Limited insight into disease processes.         Fluids    Intake/Output Summary (Last 24 hours) at 7/24/2020 0739  Last data filed at 7/23/2020 1800  Gross per 24 hour   Intake 480 ml   Output --   Net 480 ml       Laboratory  Recent Labs     07/22/20  0514   WBC 11.6*   RBC 4.03*   HEMOGLOBIN 12.3*   HEMATOCRIT 37.1*   MCV 92.1   MCH 30.5   MCHC 33.2*   RDW 39.8   PLATELETCT 233   MPV 10.1     Recent Labs     07/22/20  0514   SODIUM 139   POTASSIUM 3.7   CHLORIDE 98   CO2 28   GLUCOSE 156*   BUN 33*   CREATININE 1.09   CALCIUM 9.3                   Imaging  CT-RENAL WITH & W/O   Final Result      1.  Large complex right renal mass as detailed above measuring approximately 7.8 x 7.1x  8.5 cm. Primary differential considerations include urothelial malignancy or renal cell carcinoma.   2.  No evidence of adenopathy or metastatic disease is seen.   3.  Distended urinary bladder. Right greater left hydroureteronephrosis.   4.  Severe atherosclerosis. 3.5 cm infrarenal abdominal aortic aneurysm.            US-RUQ   Final Result         1.  Echogenic liver compatible with fatty change versus fibrosis.   2.  Masslike structure in the right kidney, should be considered neoplastic unless proven otherwise, recommend follow-up 3 phase CT of the kidneys for further characterization   3.  Mild dilatation of the common bile duct, within expected limits given patient age.      These findings were discussed with the patient's clinician, Vin Mei, on 7/23/2020 7:34 AM.      DX-CHEST-PORTABLE (1  VIEW)   Final Result      1.  The lungs are hyperinflated suggesting emphysema/COPD.   2.  There is minimal predominantly lower lobe interstitial opacity which is most likely due to chronic scarring.           Assessment/Plan  * Dehydration  Assessment & Plan  resolved    Diarrhea of presumed infectious origin  Assessment & Plan  Resolved  C dif negative  Likely related to covid    COVID-19 virus infection  Assessment & Plan  D dimer elevated  Continue therapeutic Lovenox  Vitamin D level 14 - high dose replacement weekly started  Completed 10-day course of dexamethasone  Continue supportive care    COPD (chronic obstructive pulmonary disease) (HCC)- (present on admission)  Assessment & Plan  No s/o exacerbation at this time  RT protocol  Completed 10-day course of dexamethasone for COVID    Advanced care planning/counseling discussion  Assessment & Plan  Patient voluntarily participated in the conversation.  Patient was present for the conversation.  I discussed advance care planning face to face with the patient for at least 27 minutes, including diagnosis, prognosis, plan of care, risks and benefits of any therapies that could be offered, as well as alternatives including palliation and hospice, as appropriate.  Forms were completed and placed in the chart.  Patient is now DNR/DNI    Malnutrition (HCC)  Assessment & Plan  Routine monitoring, periodic labs.    Urinary retention  Assessment & Plan  Reportedly chronic  Refusing méndez or urology  Accepting flomax which will be continued    Hypokalemia  Assessment & Plan  Continue to monitor    Acute kidney injury (HCC)  Assessment & Plan  Resolved.    Continue to monitor kidney function and urine output    Type 2 diabetes mellitus (HCC)- (present on admission)  Assessment & Plan  Lab Results   Component Value Date/Time    HBA1C 6.8 (H) 07/10/2020 0335    HBA1C 6.9 (H) 03/08/2018 1124    HBA1C 8.1 (H) 12/15/2017 1108     Results from last 7 days   Lab Units  07/23/20  1132 07/23/20  0744 07/22/20  1938 07/22/20  1650 07/22/20  1143 07/22/20  0749 07/22/20  0005 07/21/20  2217   ACCU CHECK GLUCOSE 788 mg/dL 138* 108* 178* 187* 120* 206* 98 189*     Blood sugars well controlled.  Will discontinue Accu-Cheks.         VTE prophylaxis: Lovenox

## 2020-07-25 PROCEDURE — 700111 HCHG RX REV CODE 636 W/ 250 OVERRIDE (IP): Performed by: HOSPITALIST

## 2020-07-25 PROCEDURE — 700102 HCHG RX REV CODE 250 W/ 637 OVERRIDE(OP): Performed by: HOSPITALIST

## 2020-07-25 PROCEDURE — 700102 HCHG RX REV CODE 250 W/ 637 OVERRIDE(OP): Performed by: INTERNAL MEDICINE

## 2020-07-25 PROCEDURE — A9270 NON-COVERED ITEM OR SERVICE: HCPCS | Performed by: INTERNAL MEDICINE

## 2020-07-25 PROCEDURE — 770021 HCHG ROOM/CARE - ISO PRIVATE

## 2020-07-25 PROCEDURE — A9270 NON-COVERED ITEM OR SERVICE: HCPCS | Performed by: HOSPITALIST

## 2020-07-25 PROCEDURE — 99232 SBSQ HOSP IP/OBS MODERATE 35: CPT | Performed by: INTERNAL MEDICINE

## 2020-07-25 RX ADMIN — QUETIAPINE FUMARATE 25 MG: 25 TABLET ORAL at 16:54

## 2020-07-25 RX ADMIN — ZINC SULFATE 220 MG (50 MG) CAPSULE 220 MG: CAPSULE at 05:13

## 2020-07-25 RX ADMIN — TAMSULOSIN HYDROCHLORIDE 0.8 MG: 0.4 CAPSULE ORAL at 09:03

## 2020-07-25 RX ADMIN — ENOXAPARIN SODIUM 60 MG: 60 INJECTION SUBCUTANEOUS at 05:13

## 2020-07-25 RX ADMIN — AMLODIPINE BESYLATE 10 MG: 10 TABLET ORAL at 05:13

## 2020-07-25 RX ADMIN — THERA TABS 1 TABLET: TAB at 05:13

## 2020-07-25 RX ADMIN — ENOXAPARIN SODIUM 60 MG: 60 INJECTION SUBCUTANEOUS at 16:53

## 2020-07-25 ASSESSMENT — PATIENT HEALTH QUESTIONNAIRE - PHQ9
SUM OF ALL RESPONSES TO PHQ9 QUESTIONS 1 AND 2: 0
2. FEELING DOWN, DEPRESSED, IRRITABLE, OR HOPELESS: NOT AT ALL
1. LITTLE INTEREST OR PLEASURE IN DOING THINGS: NOT AT ALL

## 2020-07-25 ASSESSMENT — FIBROSIS 4 INDEX: FIB4 SCORE: 3.23

## 2020-07-25 NOTE — CARE PLAN
Problem: Safety  Goal: Will remain free from injury  Outcome: PROGRESSING AS EXPECTED    Bed alarm on, wheels locked. Call light within reach.     Problem: Infection  Goal: Will remain free from infection  Outcome: PROGRESSING AS EXPECTED    Patient educated on appropriate hand hygiene.

## 2020-07-26 PROCEDURE — A9270 NON-COVERED ITEM OR SERVICE: HCPCS | Performed by: HOSPITALIST

## 2020-07-26 PROCEDURE — A9270 NON-COVERED ITEM OR SERVICE: HCPCS | Performed by: INTERNAL MEDICINE

## 2020-07-26 PROCEDURE — 700102 HCHG RX REV CODE 250 W/ 637 OVERRIDE(OP): Performed by: INTERNAL MEDICINE

## 2020-07-26 PROCEDURE — 700102 HCHG RX REV CODE 250 W/ 637 OVERRIDE(OP): Performed by: HOSPITALIST

## 2020-07-26 PROCEDURE — 770021 HCHG ROOM/CARE - ISO PRIVATE

## 2020-07-26 PROCEDURE — 99232 SBSQ HOSP IP/OBS MODERATE 35: CPT | Performed by: INTERNAL MEDICINE

## 2020-07-26 PROCEDURE — 700111 HCHG RX REV CODE 636 W/ 250 OVERRIDE (IP): Performed by: HOSPITALIST

## 2020-07-26 RX ADMIN — ZINC SULFATE 220 MG (50 MG) CAPSULE 220 MG: CAPSULE at 05:19

## 2020-07-26 RX ADMIN — ENOXAPARIN SODIUM 60 MG: 60 INJECTION SUBCUTANEOUS at 05:19

## 2020-07-26 RX ADMIN — QUETIAPINE FUMARATE 25 MG: 25 TABLET ORAL at 17:14

## 2020-07-26 RX ADMIN — TAMSULOSIN HYDROCHLORIDE 0.8 MG: 0.4 CAPSULE ORAL at 08:56

## 2020-07-26 RX ADMIN — ENOXAPARIN SODIUM 60 MG: 60 INJECTION SUBCUTANEOUS at 17:14

## 2020-07-26 RX ADMIN — AMLODIPINE BESYLATE 10 MG: 10 TABLET ORAL at 05:19

## 2020-07-26 RX ADMIN — THERA TABS 1 TABLET: TAB at 05:19

## 2020-07-26 NOTE — CARE PLAN
Problem: Safety  Goal: Will remain free from injury  Outcome: PROGRESSING AS EXPECTED   Patient has lights on, near nurses station, tele-sitter is watching, pt has yellow socks and bed alarm is on. Hourly rounding is in place.   Problem: Bowel/Gastric:  Goal: Normal bowel function is maintained or improved  Outcome: PROGRESSING AS EXPECTED   Pt is educated on importance of stool softeners and ambulating between meals.

## 2020-07-26 NOTE — PROGRESS NOTES
"[facility] Medicine Daily Progress Note    Date of Service  7/26/2020    Chief Complaint  84 y.o. male admitted 7/9/2020 with cough, nausea, vomiting, diarrhea, weakness    Hospital Course    Mr. Ovi Bejarano is a 84 y.o. male with history of diabetes, hyperlipidemia, BPH who presented on 7/9/2020 with cough, nausea, vomiting, diarrhea, progressive weakness x1-2 weeks.  On presentation he was dehydrated with hypotension and found to have acute renal failure.  Found to be COVID positive.  Initially admitted to the ICU.  Started on therapeutic dose enoxaparin for elevated d-dimer.  Patient did report abdominal pain.  A right renal mass was demonstrated on ultrasound and confirmed with CT.  Patient is confused and a poor candidate for surgery.  Unable to reach family or next of kin.  He was made DNR/DNI 7/23.        Interval Problem Update  Patient was seen and examined at bedside.  I have personally reviewed vitals, labs, and imaging.    7/21.  Afebrile.  Stable vitals.  On 3 L nasal cannula.  Patient is oriented to person and place.  Mentation and respiration/cooperation waxes and wanes.  Currently he denies any pain, fevers, chills, shortness of breath.  Reports no complaints.  7/22.  Afebrile.  1 episode of tachycardia which is resolved.  On 3 L nasal cannula.  Patient had a fall yesterday evening.  No injuries.  Patient was trying to get up to bathroom.  Mild leukocytosis.  Negative procalcitonin.  Blood sugars are better controlled.  Oriented to person and place.  Reports abdominal pain.  Otherwise no complaints.  States \"I am okay\".  Poor p.o. intake.  Took his medications with pudding this morning.  Appreciate psychiatric input  7/23.  Afebrile.  Episodes of tachycardia last night.  On 3 to 6 L nasal cannula.  Abdominal ultrasound demonstrated right kidney mass.  Patient denies any pain, fevers, chills, shortness of breath.  Counseled the patient about lung mass and need for CT.  Patient was agreeable " "for CT but did not want anything invasive.  Oriented to person and place.  After further discussion with patient he was made DNR/DNI.  7/24.  Afebrile.  Stable vitals.  On 0.5-1 L nasal cannula.  Patient denies pain.  Oriented to person and place.  Denies complaints.  Reports \"I am good\".  Repeats over and over even when asked about why he is not eating.  7/25.  Afebrile.  Stable vitals.  On 2.5 L nasal cannula.  Denies pain.  Reports no needs.  Oriented only to person.  7/26.  Afebrile.  Stable vitals.  On 2.5 L nasal cannula.  Oriented only to person.  Denies pain, fever, chills, sweats, shortness of breath.  Reports no needs.  No complaints.    Consultants/Specialty  Palliative  Psychiatry    Code Status  DNR/DNI    Disposition  Cannot find next of kin.  Patient is confused.  Will likely need guardianship.    Review of Systems  Review of Systems   Unable to perform ROS: Mental acuity        Physical Exam  Temp:  [36.1 °C (97 °F)-36.6 °C (97.9 °F)] 36.1 °C (97 °F)  Pulse:  [78-89] 78  Resp:  [16-18] 18  BP: (101-125)/(46-55) 101/55  SpO2:  [94 %-99 %] 97 %    Physical Exam  Vitals signs and nursing note reviewed.   Constitutional:       General: He is not in acute distress.     Appearance: Normal appearance.      Comments: Appears frail.   HENT:      Head: Normocephalic and atraumatic.      Nose: Nose normal.      Mouth/Throat:      Mouth: Mucous membranes are moist.      Pharynx: Oropharynx is clear.   Eyes:      Extraocular Movements: Extraocular movements intact.      Conjunctiva/sclera: Conjunctivae normal.   Neck:      Musculoskeletal: Normal range of motion and neck supple.   Cardiovascular:      Rate and Rhythm: Normal rate and regular rhythm.      Pulses: Normal pulses.      Heart sounds: Normal heart sounds. No murmur. No friction rub. No gallop.    Pulmonary:      Effort: Pulmonary effort is normal. No respiratory distress.      Breath sounds: Normal breath sounds. No wheezing or rales.   Chest:      " Chest wall: No tenderness.   Abdominal:      General: Abdomen is flat. Bowel sounds are normal. There is no distension.      Palpations: Abdomen is soft. There is mass.      Tenderness: There is no abdominal tenderness. There is no guarding.   Musculoskeletal: Normal range of motion.   Skin:     General: Skin is warm.      Capillary Refill: Capillary refill takes less than 2 seconds.   Neurological:      General: No focal deficit present.      Mental Status: He is alert. Mental status is at baseline. He is disoriented.      Cranial Nerves: No cranial nerve deficit.      Motor: No weakness.   Psychiatric:         Mood and Affect: Mood normal.      Comments: Limited insight into disease processes.         Fluids    Intake/Output Summary (Last 24 hours) at 7/26/2020 1214  Last data filed at 7/26/2020 0500  Gross per 24 hour   Intake 100 ml   Output --   Net 100 ml       Laboratory                        Imaging  CT-RENAL WITH & W/O   Final Result      1.  Large complex right renal mass as detailed above measuring approximately 7.8 x 7.1x  8.5 cm. Primary differential considerations include urothelial malignancy or renal cell carcinoma.   2.  No evidence of adenopathy or metastatic disease is seen.   3.  Distended urinary bladder. Right greater left hydroureteronephrosis.   4.  Severe atherosclerosis. 3.5 cm infrarenal abdominal aortic aneurysm.            US-RUQ   Final Result         1.  Echogenic liver compatible with fatty change versus fibrosis.   2.  Masslike structure in the right kidney, should be considered neoplastic unless proven otherwise, recommend follow-up 3 phase CT of the kidneys for further characterization   3.  Mild dilatation of the common bile duct, within expected limits given patient age.      These findings were discussed with the patient's clinician, Vin Mei, on 7/23/2020 7:34 AM.      DX-CHEST-PORTABLE (1 VIEW)   Final Result      1.  The lungs are hyperinflated suggesting  emphysema/COPD.   2.  There is minimal predominantly lower lobe interstitial opacity which is most likely due to chronic scarring.           Assessment/Plan  * Dehydration  Assessment & Plan  resolved    Diarrhea of presumed infectious origin  Assessment & Plan  Resolved  C dif negative  Likely related to covid    COVID-19 virus infection  Assessment & Plan  D dimer elevated  Continue therapeutic Lovenox  Vitamin D level 14 - high dose replacement weekly started  Completed 10-day course of dexamethasone  Continue supportive care    COPD (chronic obstructive pulmonary disease) (HCC)- (present on admission)  Assessment & Plan  No s/o exacerbation at this time  RT protocol  Completed 10-day course of dexamethasone for COVID    Advanced care planning/counseling discussion  Assessment & Plan  Patient voluntarily participated in the conversation.  Patient was present for the conversation.  I discussed advance care planning face to face with the patient for at least 27 minutes, including diagnosis, prognosis, plan of care, risks and benefits of any therapies that could be offered, as well as alternatives including palliation and hospice, as appropriate.  Forms were completed and placed in the chart.  Patient is now DNR/DNI    Malnutrition (HCC)  Assessment & Plan  Routine monitoring, periodic labs.    Urinary retention  Assessment & Plan  Reportedly chronic  Refusing méndez or urology  Accepting flomax which will be continued    Hypokalemia  Assessment & Plan  Continue to monitor    Acute kidney injury (HCC)  Assessment & Plan  Resolved.    Continue to monitor kidney function and urine output    Type 2 diabetes mellitus (HCC)- (present on admission)  Assessment & Plan  Lab Results   Component Value Date/Time    HBA1C 6.8 (H) 07/10/2020 0335    HBA1C 6.9 (H) 03/08/2018 1124    HBA1C 8.1 (H) 12/15/2017 1108     Results from last 7 days   Lab Units 07/24/20  1129 07/24/20  0746 07/23/20  2229 07/23/20  1630 07/23/20  1132  07/23/20  0744 07/22/20  1938 07/22/20  1650   ACCU CHECK GLUCOSE 788 mg/dL 116* 113* 124* 174* 138* 108* 178* 187*     Blood sugars well controlled.  Will discontinue Accu-Cheks.         VTE prophylaxis: Lovenox

## 2020-07-27 PROCEDURE — 770021 HCHG ROOM/CARE - ISO PRIVATE

## 2020-07-27 PROCEDURE — 700111 HCHG RX REV CODE 636 W/ 250 OVERRIDE (IP): Performed by: HOSPITALIST

## 2020-07-27 PROCEDURE — 700102 HCHG RX REV CODE 250 W/ 637 OVERRIDE(OP): Performed by: INTERNAL MEDICINE

## 2020-07-27 PROCEDURE — 99232 SBSQ HOSP IP/OBS MODERATE 35: CPT | Performed by: INTERNAL MEDICINE

## 2020-07-27 PROCEDURE — A9270 NON-COVERED ITEM OR SERVICE: HCPCS | Performed by: INTERNAL MEDICINE

## 2020-07-27 PROCEDURE — A9270 NON-COVERED ITEM OR SERVICE: HCPCS | Performed by: HOSPITALIST

## 2020-07-27 PROCEDURE — 700102 HCHG RX REV CODE 250 W/ 637 OVERRIDE(OP): Performed by: HOSPITALIST

## 2020-07-27 RX ADMIN — AMLODIPINE BESYLATE 10 MG: 10 TABLET ORAL at 05:11

## 2020-07-27 RX ADMIN — ENOXAPARIN SODIUM 60 MG: 60 INJECTION SUBCUTANEOUS at 05:11

## 2020-07-27 RX ADMIN — THERA TABS 1 TABLET: TAB at 05:11

## 2020-07-27 RX ADMIN — ENOXAPARIN SODIUM 60 MG: 60 INJECTION SUBCUTANEOUS at 18:09

## 2020-07-27 RX ADMIN — ERGOCALCIFEROL 50000 UNITS: 1.25 CAPSULE ORAL at 12:49

## 2020-07-27 RX ADMIN — QUETIAPINE FUMARATE 25 MG: 25 TABLET ORAL at 18:09

## 2020-07-27 RX ADMIN — ZINC SULFATE 220 MG (50 MG) CAPSULE 220 MG: CAPSULE at 05:11

## 2020-07-27 RX ADMIN — TAMSULOSIN HYDROCHLORIDE 0.8 MG: 0.4 CAPSULE ORAL at 09:48

## 2020-07-27 NOTE — DISCHARGE PLANNING
"Hospital Care Management Discharge Planning       Anticipated Discharge Disposition:   · Pending potential initiation of guardianship.     Action:   · CM call to emergency contacts Nani Colin and Corrie Shaw.both contact numbers are no longer in service.   · CM call to relative \"Kat\" but VM mailbox still not set up.  · Guardianship packet provided to MD.     Barriers to Discharge:   · Guardianship     Plan:   · Continue to provide support services and assistance with discharge planning as needed.   "

## 2020-07-27 NOTE — PROGRESS NOTES
"[facility] Medicine Daily Progress Note    Date of Service  7/27/2020    Chief Complaint  84 y.o. male admitted 7/9/2020 with cough, nausea, vomiting, diarrhea, weakness    Hospital Course    Mr. Ovi Bejarano is a 84 y.o. male with history of diabetes, hyperlipidemia, BPH who presented on 7/9/2020 with cough, nausea, vomiting, diarrhea, progressive weakness x1-2 weeks.  On presentation he was dehydrated with hypotension and found to have acute renal failure.  Found to be COVID positive.  Initially admitted to the ICU.  Started on therapeutic dose enoxaparin for elevated d-dimer.  Patient did report abdominal pain.  A right renal mass was demonstrated on ultrasound and confirmed with CT.  Patient is confused and a poor candidate for surgery.  Unable to reach family or next of kin.  He was made DNR/DNI 7/23.        Interval Problem Update  Patient was seen and examined at bedside.  I have personally reviewed vitals, labs, and imaging.    7/21.  Afebrile.  Stable vitals.  On 3 L nasal cannula.  Patient is oriented to person and place.  Mentation and respiration/cooperation waxes and wanes.  Currently he denies any pain, fevers, chills, shortness of breath.  Reports no complaints.  7/22.  Afebrile.  1 episode of tachycardia which is resolved.  On 3 L nasal cannula.  Patient had a fall yesterday evening.  No injuries.  Patient was trying to get up to bathroom.  Mild leukocytosis.  Negative procalcitonin.  Blood sugars are better controlled.  Oriented to person and place.  Reports abdominal pain.  Otherwise no complaints.  States \"I am okay\".  Poor p.o. intake.  Took his medications with pudding this morning.  Appreciate psychiatric input  7/23.  Afebrile.  Episodes of tachycardia last night.  On 3 to 6 L nasal cannula.  Abdominal ultrasound demonstrated right kidney mass.  Patient denies any pain, fevers, chills, shortness of breath.  Counseled the patient about lung mass and need for CT.  Patient was agreeable " "for CT but did not want anything invasive.  Oriented to person and place.  After further discussion with patient he was made DNR/DNI.  7/24.  Afebrile.  Stable vitals.  On 0.5-1 L nasal cannula.  Patient denies pain.  Oriented to person and place.  Denies complaints.  Reports \"I am good\".  Repeats over and over even when asked about why he is not eating.  7/25.  Afebrile.  Stable vitals.  On 2.5 L nasal cannula.  Denies pain.  Reports no needs.  Oriented only to person.  7/26.  Afebrile.  Stable vitals.  On 2.5 L nasal cannula.  Oriented only to person.  Denies pain, fever, chills, sweats, shortness of breath.  Reports no needs.  No complaints.  7/27.  Afebrile.  Episode of tachycardia yesterday.  On 2.5 L nasal cannula.  Oriented only to person.  Denies fever, chills, chest pains, shortness of breath.  Do have new contact information for TY Stephens.  If unable to contact will pursue guardianship.    Consultants/Specialty  Palliative  Psychiatry    Code Status  DNR/DNI    Disposition  Cannot find next of kin.  Patient is confused.  Will likely need guardianship.    Review of Systems  Review of Systems   Unable to perform ROS: Mental acuity        Physical Exam  Temp:  [35.8 °C (96.5 °F)-36.4 °C (97.6 °F)] 35.8 °C (96.5 °F)  Pulse:  [] 78  Resp:  [16-19] 16  BP: (100-120)/(45-51) 100/45  SpO2:  [93 %-100 %] 98 %    Physical Exam  Vitals signs and nursing note reviewed.   Constitutional:       General: He is not in acute distress.     Appearance: Normal appearance.      Comments: Appears frail.   HENT:      Head: Normocephalic and atraumatic.      Nose: Nose normal.      Mouth/Throat:      Mouth: Mucous membranes are moist.      Pharynx: Oropharynx is clear.   Eyes:      Extraocular Movements: Extraocular movements intact.      Conjunctiva/sclera: Conjunctivae normal.   Neck:      Musculoskeletal: Normal range of motion and neck supple.   Cardiovascular:      Rate and Rhythm: Normal rate and regular rhythm.      " Pulses: Normal pulses.      Heart sounds: Normal heart sounds. No murmur. No friction rub. No gallop.    Pulmonary:      Effort: Pulmonary effort is normal. No respiratory distress.      Breath sounds: Normal breath sounds. No wheezing or rales.   Chest:      Chest wall: No tenderness.   Abdominal:      General: Abdomen is flat. Bowel sounds are normal. There is no distension.      Palpations: Abdomen is soft. There is mass.      Tenderness: There is no abdominal tenderness. There is no guarding.   Musculoskeletal: Normal range of motion.   Skin:     General: Skin is warm.      Capillary Refill: Capillary refill takes less than 2 seconds.   Neurological:      General: No focal deficit present.      Mental Status: He is alert. Mental status is at baseline. He is disoriented.      Cranial Nerves: No cranial nerve deficit.      Motor: No weakness.   Psychiatric:         Mood and Affect: Mood normal.      Comments: Limited insight into disease processes.         Fluids    Intake/Output Summary (Last 24 hours) at 7/27/2020 1523  Last data filed at 7/27/2020 1130  Gross per 24 hour   Intake 400 ml   Output --   Net 400 ml       Laboratory                        Imaging  CT-RENAL WITH & W/O   Final Result      1.  Large complex right renal mass as detailed above measuring approximately 7.8 x 7.1x  8.5 cm. Primary differential considerations include urothelial malignancy or renal cell carcinoma.   2.  No evidence of adenopathy or metastatic disease is seen.   3.  Distended urinary bladder. Right greater left hydroureteronephrosis.   4.  Severe atherosclerosis. 3.5 cm infrarenal abdominal aortic aneurysm.            US-RUQ   Final Result         1.  Echogenic liver compatible with fatty change versus fibrosis.   2.  Masslike structure in the right kidney, should be considered neoplastic unless proven otherwise, recommend follow-up 3 phase CT of the kidneys for further characterization   3.  Mild dilatation of the common bile  duct, within expected limits given patient age.      These findings were discussed with the patient's clinician, Vin Mei, on 7/23/2020 7:34 AM.      DX-CHEST-PORTABLE (1 VIEW)   Final Result      1.  The lungs are hyperinflated suggesting emphysema/COPD.   2.  There is minimal predominantly lower lobe interstitial opacity which is most likely due to chronic scarring.           Assessment/Plan  * Dehydration  Assessment & Plan  resolved    Diarrhea of presumed infectious origin  Assessment & Plan  Resolved  C dif negative  Likely related to covid    COVID-19 virus infection  Assessment & Plan  D dimer elevated  Continue therapeutic Lovenox  Vitamin D level 14 - high dose replacement weekly started  Completed 10-day course of dexamethasone  Continue supportive care    COPD (chronic obstructive pulmonary disease) (HCC)- (present on admission)  Assessment & Plan  No s/o exacerbation at this time  RT protocol  Completed 10-day course of dexamethasone for COVID    Advanced care planning/counseling discussion  Assessment & Plan  Patient voluntarily participated in the conversation.  Patient was present for the conversation.  I discussed advance care planning face to face with the patient for at least 27 minutes, including diagnosis, prognosis, plan of care, risks and benefits of any therapies that could be offered, as well as alternatives including palliation and hospice, as appropriate.  Forms were completed and placed in the chart.  Patient is now DNR/DNI    Malnutrition (HCC)  Assessment & Plan  Routine monitoring, periodic labs.    Urinary retention  Assessment & Plan  Reportedly chronic  Refusing méndez or urology  Accepting flomax which will be continued    Hypokalemia  Assessment & Plan  Continue to monitor    Acute kidney injury (HCC)  Assessment & Plan  Resolved.    Continue to monitor kidney function and urine output    Type 2 diabetes mellitus (HCC)- (present on admission)  Assessment & Plan  Lab Results    Component Value Date/Time    HBA1C 6.8 (H) 07/10/2020 0335    HBA1C 6.9 (H) 03/08/2018 1124    HBA1C 8.1 (H) 12/15/2017 1108     Results from last 7 days   Lab Units 07/24/20  1129 07/24/20  0746 07/23/20  2229 07/23/20  1630 07/23/20  1132 07/23/20  0744 07/22/20  1938 07/22/20  1650   ACCU CHECK GLUCOSE 788 mg/dL 116* 113* 124* 174* 138* 108* 178* 187*     Blood sugars well controlled.  Will discontinue Accu-Cheks.         VTE prophylaxis: Lovenox

## 2020-07-28 PROCEDURE — 99232 SBSQ HOSP IP/OBS MODERATE 35: CPT | Performed by: HOSPITALIST

## 2020-07-28 PROCEDURE — A9270 NON-COVERED ITEM OR SERVICE: HCPCS | Performed by: INTERNAL MEDICINE

## 2020-07-28 PROCEDURE — A9270 NON-COVERED ITEM OR SERVICE: HCPCS | Performed by: HOSPITALIST

## 2020-07-28 PROCEDURE — 700111 HCHG RX REV CODE 636 W/ 250 OVERRIDE (IP): Performed by: HOSPITALIST

## 2020-07-28 PROCEDURE — 770021 HCHG ROOM/CARE - ISO PRIVATE

## 2020-07-28 PROCEDURE — 700102 HCHG RX REV CODE 250 W/ 637 OVERRIDE(OP): Performed by: INTERNAL MEDICINE

## 2020-07-28 PROCEDURE — 700102 HCHG RX REV CODE 250 W/ 637 OVERRIDE(OP): Performed by: HOSPITALIST

## 2020-07-28 RX ADMIN — ENOXAPARIN SODIUM 60 MG: 60 INJECTION SUBCUTANEOUS at 17:25

## 2020-07-28 RX ADMIN — QUETIAPINE FUMARATE 25 MG: 25 TABLET ORAL at 17:25

## 2020-07-28 RX ADMIN — THERA TABS 1 TABLET: TAB at 06:13

## 2020-07-28 RX ADMIN — TAMSULOSIN HYDROCHLORIDE 0.8 MG: 0.4 CAPSULE ORAL at 06:13

## 2020-07-28 RX ADMIN — AMLODIPINE BESYLATE 10 MG: 10 TABLET ORAL at 06:13

## 2020-07-28 RX ADMIN — ENOXAPARIN SODIUM 60 MG: 60 INJECTION SUBCUTANEOUS at 06:34

## 2020-07-28 RX ADMIN — ZINC SULFATE 220 MG (50 MG) CAPSULE 220 MG: CAPSULE at 06:13

## 2020-07-28 NOTE — CARE PLAN
Problem: Mobility  Goal: Risk for activity intolerance will decrease  Outcome: PROGRESSING SLOWER THAN EXPECTED  Intervention: Assess and monitor signs of activity intolerance  Note: No evidence of activity intolerance, per se, but pt resistant to ambulating beyond back-and-forth to bathroom.  Pt spends most of day curled up in bed without any stimulation.     Problem: Pain Management  Goal: Pain level will decrease to patient's comfort goal  Outcome: MET  Intervention: Follow pain managment plan developed in collaboration with patient and Interdisciplinary Team  Note: Pt denying pain.

## 2020-07-28 NOTE — CARE PLAN
Problem: Nutritional:  Goal: Achieve adequate nutritional intake  Description: Patient will consume ~50% of meals/supplements   Outcome: PROGRESSING SLOWER THAN EXPECTED  Per chart pt PO 0-75% of meals and 0-50% of supplements. Pt continues to receive Boost Glucose Control TID. RD following.

## 2020-07-29 LAB
COVID ORDER STATUS COVID19: NORMAL
SARS-COV-2 RNA RESP QL NAA+PROBE: NOTDETECTED
SPECIMEN SOURCE: NORMAL

## 2020-07-29 PROCEDURE — 770021 HCHG ROOM/CARE - ISO PRIVATE

## 2020-07-29 PROCEDURE — 700102 HCHG RX REV CODE 250 W/ 637 OVERRIDE(OP): Performed by: HOSPITALIST

## 2020-07-29 PROCEDURE — A9270 NON-COVERED ITEM OR SERVICE: HCPCS | Performed by: HOSPITALIST

## 2020-07-29 PROCEDURE — 700102 HCHG RX REV CODE 250 W/ 637 OVERRIDE(OP): Performed by: INTERNAL MEDICINE

## 2020-07-29 PROCEDURE — U0003 INFECTIOUS AGENT DETECTION BY NUCLEIC ACID (DNA OR RNA); SEVERE ACUTE RESPIRATORY SYNDROME CORONAVIRUS 2 (SARS-COV-2) (CORONAVIRUS DISEASE [COVID-19]), AMPLIFIED PROBE TECHNIQUE, MAKING USE OF HIGH THROUGHPUT TECHNOLOGIES AS DESCRIBED BY CMS-2020-01-R: HCPCS

## 2020-07-29 PROCEDURE — 99232 SBSQ HOSP IP/OBS MODERATE 35: CPT | Performed by: HOSPITALIST

## 2020-07-29 PROCEDURE — 700111 HCHG RX REV CODE 636 W/ 250 OVERRIDE (IP): Performed by: HOSPITALIST

## 2020-07-29 PROCEDURE — A9270 NON-COVERED ITEM OR SERVICE: HCPCS | Performed by: INTERNAL MEDICINE

## 2020-07-29 PROCEDURE — C9803 HOPD COVID-19 SPEC COLLECT: HCPCS | Performed by: HOSPITALIST

## 2020-07-29 RX ADMIN — AMLODIPINE BESYLATE 10 MG: 10 TABLET ORAL at 05:07

## 2020-07-29 RX ADMIN — TAMSULOSIN HYDROCHLORIDE 0.8 MG: 0.4 CAPSULE ORAL at 07:28

## 2020-07-29 RX ADMIN — ENOXAPARIN SODIUM 60 MG: 60 INJECTION SUBCUTANEOUS at 17:00

## 2020-07-29 RX ADMIN — QUETIAPINE FUMARATE 25 MG: 25 TABLET ORAL at 17:00

## 2020-07-29 RX ADMIN — THERA TABS 1 TABLET: TAB at 05:07

## 2020-07-29 RX ADMIN — ENOXAPARIN SODIUM 60 MG: 60 INJECTION SUBCUTANEOUS at 05:07

## 2020-07-29 RX ADMIN — ZINC SULFATE 220 MG (50 MG) CAPSULE 220 MG: CAPSULE at 05:07

## 2020-07-29 NOTE — PROGRESS NOTES
[facility] Medicine Daily Progress Note    Date of Service  7/29/2020    Chief Complaint  84 y.o. male admitted 7/9/2020 with cough, nausea, vomiting, diarrhea, weakness    Hospital Course    Mr. Ovi Bejarano is a 84 y.o. male with history of diabetes, hyperlipidemia, BPH who presented on 7/9/2020 with cough, nausea, vomiting, diarrhea, progressive weakness x1-2 weeks.  On presentation he was dehydrated with hypotension and found to have acute renal failure.  Found to be COVID positive.  Initially admitted to the ICU.  Started on therapeutic dose enoxaparin for elevated d-dimer.  Patient did report abdominal pain.  A right renal mass was demonstrated on ultrasound and confirmed with CT.  Patient is confused and a poor candidate for surgery.  Unable to reach family or next of kin.  He was made DNR/DNI 7/23.        Interval Problem Update    7/27.  Afebrile.  Episode of tachycardia yesterday.  On 2.5 L nasal cannula.  Oriented only to person.  Denies fever, chills, chest pains, shortness of breath.  Do have new contact information for TY Stephens.  If unable to contact will pursue guardianship.  7/28  WE may of found his wife , who is also hospitalized at this time.     Patient has no new complaints and is stable overnight    Afebrile      On 2.5 liters sat of 96    7/29     No new events    o2 sat  97 on 2.5 liters    Repeat covid test today    Consultants/Specialty  Palliative  Psychiatry    Code Status  DNR/DNI    Disposition  Pending    Review of Systems  Review of Systems   Unable to perform ROS: Mental acuity        Physical Exam  Temp:  [35.9 °C (96.6 °F)-36.4 °C (97.5 °F)] 35.9 °C (96.6 °F)  Pulse:  [66-87] 66  Resp:  [16] 16  BP: ()/(39-82) 99/47  SpO2:  [93 %-100 %] 97 %    Physical Exam  Vitals signs and nursing note reviewed.   Constitutional:       General: He is not in acute distress.     Appearance: Normal appearance.      Comments: Appears frail.   HENT:      Head: Normocephalic and  atraumatic.      Nose: Nose normal.      Mouth/Throat:      Mouth: Mucous membranes are moist.      Pharynx: Oropharynx is clear.   Eyes:      Extraocular Movements: Extraocular movements intact.      Conjunctiva/sclera: Conjunctivae normal.   Neck:      Musculoskeletal: Normal range of motion and neck supple.   Cardiovascular:      Rate and Rhythm: Normal rate and regular rhythm.      Pulses: Normal pulses.      Heart sounds: Normal heart sounds. No murmur. No friction rub. No gallop.    Pulmonary:      Effort: Pulmonary effort is normal. No respiratory distress.      Breath sounds: Normal breath sounds. No wheezing or rales.   Chest:      Chest wall: No tenderness.   Abdominal:      General: Abdomen is flat. Bowel sounds are normal. There is no distension.      Palpations: Abdomen is soft. There is no mass.      Tenderness: There is no abdominal tenderness. There is no guarding.   Musculoskeletal: Normal range of motion.   Skin:     General: Skin is warm.      Capillary Refill: Capillary refill takes less than 2 seconds.   Neurological:      General: No focal deficit present.      Mental Status: He is alert. Mental status is at baseline. He is disoriented.      Cranial Nerves: No cranial nerve deficit.      Motor: No weakness.   Psychiatric:         Mood and Affect: Mood normal.         Fluids  No intake or output data in the 24 hours ending 07/29/20 1017    Laboratory                        Imaging  CT-RENAL WITH & W/O   Final Result      1.  Large complex right renal mass as detailed above measuring approximately 7.8 x 7.1x  8.5 cm. Primary differential considerations include urothelial malignancy or renal cell carcinoma.   2.  No evidence of adenopathy or metastatic disease is seen.   3.  Distended urinary bladder. Right greater left hydroureteronephrosis.   4.  Severe atherosclerosis. 3.5 cm infrarenal abdominal aortic aneurysm.            US-RUQ   Final Result         1.  Echogenic liver compatible with fatty  change versus fibrosis.   2.  Masslike structure in the right kidney, should be considered neoplastic unless proven otherwise, recommend follow-up 3 phase CT of the kidneys for further characterization   3.  Mild dilatation of the common bile duct, within expected limits given patient age.      These findings were discussed with the patient's clinician, Vin Mei, on 7/23/2020 7:34 AM.      DX-CHEST-PORTABLE (1 VIEW)   Final Result      1.  The lungs are hyperinflated suggesting emphysema/COPD.   2.  There is minimal predominantly lower lobe interstitial opacity which is most likely due to chronic scarring.           Assessment/Plan  * Dehydration  Assessment & Plan  resolved    Diarrhea of presumed infectious origin  Assessment & Plan  Resolved  C dif negative  Likely related to covid    COVID-19 virus infection  Assessment & Plan  D dimer elevated  Continue therapeutic Lovenox  Vitamin D level 14 - high dose replacement weekly started  Completed 10-day course of dexamethasone  Continue supportive care    COPD (chronic obstructive pulmonary disease) (HCC)- (present on admission)  Assessment & Plan  No s/o exacerbation at this time  RT protocol  Completed 10-day course of dexamethasone for COVID    Advanced care planning/counseling discussion  Assessment & Plan  t.  Patient isDNR/DNI    Malnutrition (HCC)  Assessment & Plan  Routine monitoring, periodic labs.    Urinary retention- (present on admission)  Assessment & Plan  Reportedly chronic  Refusing méndez or urology   flomax     Hypokalemia  Assessment & Plan  Continue to monitor    Acute kidney injury (HCC)  Assessment & Plan  Resolved.    Continue to monitor kidney function and urine output    Type 2 diabetes mellitus (HCC)- (present on admission)  Assessment & Plan  Lab Results   Component Value Date/Time    HBA1C 6.8 (H) 07/10/2020 0335    HBA1C 6.9 (H) 03/08/2018 1124    HBA1C 8.1 (H) 12/15/2017 1108     Results from last 7 days   Lab Units  07/24/20  1129 07/24/20  0746 07/23/20  2229 07/23/20  1630 07/23/20  1132 07/23/20  0744 07/22/20  1938 07/22/20  1650   ACCU CHECK GLUCOSE 788 mg/dL 116* 113* 124* 174* 138* 108* 178* 187*     Blood sugars well controlled.  Will discontinue Accu-Cheks.         VTE prophylaxis: Lovenox

## 2020-07-29 NOTE — CARE PLAN
Problem: Safety  Goal: Will remain free from injury  Outcome: PROGRESSING AS EXPECTED  Note: Telesitter present in room for patient safety. Patient has history of impulsive behavior.      Problem: Knowledge Deficit  Goal: Knowledge of disease process/condition, treatment plan, diagnostic tests, and medications will improve  Outcome: PROGRESSING AS EXPECTED  Note: Plan of care was discussed with patient.

## 2020-07-30 PROCEDURE — 99232 SBSQ HOSP IP/OBS MODERATE 35: CPT | Performed by: HOSPITALIST

## 2020-07-30 PROCEDURE — 97130 THER IVNTJ EA ADDL 15 MIN: CPT

## 2020-07-30 PROCEDURE — 97129 THER IVNTJ 1ST 15 MIN: CPT

## 2020-07-30 PROCEDURE — A9270 NON-COVERED ITEM OR SERVICE: HCPCS | Performed by: INTERNAL MEDICINE

## 2020-07-30 PROCEDURE — 700102 HCHG RX REV CODE 250 W/ 637 OVERRIDE(OP): Performed by: INTERNAL MEDICINE

## 2020-07-30 PROCEDURE — A9270 NON-COVERED ITEM OR SERVICE: HCPCS | Performed by: HOSPITALIST

## 2020-07-30 PROCEDURE — 700102 HCHG RX REV CODE 250 W/ 637 OVERRIDE(OP): Performed by: HOSPITALIST

## 2020-07-30 PROCEDURE — 770021 HCHG ROOM/CARE - ISO PRIVATE

## 2020-07-30 PROCEDURE — 700111 HCHG RX REV CODE 636 W/ 250 OVERRIDE (IP): Performed by: HOSPITALIST

## 2020-07-30 RX ORDER — AMLODIPINE BESYLATE 5 MG/1
5 TABLET ORAL
Status: DISCONTINUED | OUTPATIENT
Start: 2020-07-31 | End: 2020-08-31

## 2020-07-30 RX ADMIN — AMLODIPINE BESYLATE 10 MG: 10 TABLET ORAL at 05:23

## 2020-07-30 RX ADMIN — ENOXAPARIN SODIUM 60 MG: 60 INJECTION SUBCUTANEOUS at 05:22

## 2020-07-30 RX ADMIN — THERA TABS 1 TABLET: TAB at 05:23

## 2020-07-30 RX ADMIN — ENOXAPARIN SODIUM 60 MG: 60 INJECTION SUBCUTANEOUS at 17:00

## 2020-07-30 RX ADMIN — QUETIAPINE FUMARATE 25 MG: 25 TABLET ORAL at 17:00

## 2020-07-30 RX ADMIN — ZINC SULFATE 220 MG (50 MG) CAPSULE 220 MG: CAPSULE at 05:23

## 2020-07-30 RX ADMIN — TAMSULOSIN HYDROCHLORIDE 0.8 MG: 0.4 CAPSULE ORAL at 07:57

## 2020-07-30 NOTE — PROGRESS NOTES
"Patient alert to self, place, and stated patient fell prior to admission. Patient vss, denies pain. Asking \" why am I still here?\" patient ambulating to bathroom with 1 person assist, legally blind. Patient with tele sitter for safety as patient does not use call bell to ask for assistance. Patient discharge pending one more negative covid and possible gaurdianship if patient wife unable to make decision for patient.   "

## 2020-07-30 NOTE — CARE PLAN
Problem: Safety  Goal: Will remain free from injury  Outcome: PROGRESSING AS EXPECTED  Goal: Will remain free from falls  Outcome: PROGRESSING AS EXPECTED  Note: tele sitter in place, patient remains free from falls since previous fall , bed in lowest position and bed alarm in place.      Problem: Infection  Goal: Will remain free from infection  Outcome: PROGRESSING AS EXPECTED  Note: covid positive, no other signs of infection.      Problem: Venous Thromboembolism (VTW)/Deep Vein Thrombosis (DVT) Prevention:  Goal: Patient will participate in Venous Thrombosis (VTE)/Deep Vein Thrombosis (DVT)Prevention Measures  Outcome: PROGRESSING AS EXPECTED  Flowsheets  Taken 7/26/2020 0745 by Kasie Corcoran, R.N.  Risk Assessment Score: 1  VTE RISK: Moderate  Taken 7/30/2020 0748 by Arminda Lewis, R.N.  Mechanical Prophylaxis: SCDs, Sequential Compression Device  SCDs, Sequential Compression Device: Refused  Pharmacologic Prophylaxis Used: LMWH: Enoxaparin(Lovenox)

## 2020-07-30 NOTE — THERAPY
Speech Language Pathology  Daily Treatment     Patient Name: Ovi Bejarano  Age:  84 y.o., Sex:  male  Medical Record #: [id number]  Today's Date: 7/30/2020     Precautions  Precautions: (P) Fall Risk  Comments: (P) Legally blind, uses a cane. Poor insight    Assessment    Pt admitted post GLF, +COVID, and is legally blind.    Pt seen for follow-up cognitive linguistic treatment with focus on improving attention, recall and problem-solving within the immediate environment.  Pt is AAO x self, general reason, month, year, general place.  Pt demonstrates reduced fxnl problem-solving within his immediate environment, poor use of call button and with inaccurate responses regarding safety.  Pt became upset, requesting 'return home to be with my ex-girlfriend,' but is unable to generate solutions to potential problems related to poor vision or ongoing weakness.  Pt is not safe for independent discharge.    Plan    Continue current treatment plan.      Discharge recommendations:  Recommend inpatient transitional care services for continued speech therapy services.         Objective       07/30/20 1030   Cognitive-Linguistic   Cognitive-Linguistic (WDL) X   Level of Consciousness Alert   Orientation Level Not Oriented to Day;Not Oriented to Reason   Sustained Attention Minimal (4)   Short Term Memory Moderate (3)   Immediate Memory Moderate (3)   Simple Reasoning / Problem Solving Moderate (3)  (min-mod)   Insight into Deficits Severe (2)   Skilled Intervention Verbal Cueing   Comments Pt perseverating on going home, seeing 'ex girlfriend' became agitated as session progressed   Short Term Goals   Short Term Goal # 1 Pt will sustain attention for greater than 30 minutes with min cues.    Goal Outcome # 1 Progressing as expected   Short Term Goal # 3 Pt will state how his current medical and functional status may present challenges and strengths related to his self care with mod cues and 70% accuracy.    Goal Outcome  #  3 Progressing slower than expected   Education Group   Additional Comments perseverating on leaving, became upset   Interdisciplinary Plan of Care Collaboration   Collaboration Comments poor insight and functional problem-solving; unsafe indedpendent discharge.   Session Information   Priority 1  (1x/wk.COVID,legally blind,poor insight/prob-solving in rm; consider d/c SLP with f/u at next level of care?)

## 2020-07-30 NOTE — PROGRESS NOTES
[facility] Medicine Daily Progress Note    Date of Service  7/30/2020    Chief Complaint  84 y.o. male admitted 7/9/2020 with cough, nausea, vomiting, diarrhea, weakness    Hospital Course    Mr. Ovi Bejarano is a 84 y.o. male with history of diabetes, hyperlipidemia, BPH who presented on 7/9/2020 with cough, nausea, vomiting, diarrhea, progressive weakness x1-2 weeks.  On presentation he was dehydrated with hypotension and found to have acute renal failure.  Found to be COVID positive.  Initially admitted to the ICU.  Started on therapeutic dose enoxaparin for elevated d-dimer.  Patient did report abdominal pain.  A right renal mass was demonstrated on ultrasound and confirmed with CT.  Patient is confused and a poor candidate for surgery.  Unable to reach family or next of kin.  He was made DNR/DNI 7/23.        Interval Problem Update    7/27.  Afebrile.  Episode of tachycardia yesterday.  On 2.5 L nasal cannula.  Oriented only to person.  Denies fever, chills, chest pains, shortness of breath.  Do have new contact information for TY Stephens.  If unable to contact will pursue guardianship.  7/28  WE may of found his wife , who is also hospitalized at this time.     Patient has no new complaints and is stable overnight    Afebrile      On 2.5 liters sat of 96    7/29     No new events    o2 sat  97 on 2.5 liters    Repeat covid test today    7/30 covid test negative today      Oxygen stable on 2.5 liters    No new complaints    Case d/w case ,manager      Consultants/Specialty  Palliative  Psychiatry    Code Status  DNR/DNI    Disposition  Pending    Review of Systems  Review of Systems   Unable to perform ROS: Mental acuity        Physical Exam  Temp:  [36 °C (96.8 °F)-36.5 °C (97.7 °F)] 36.3 °C (97.3 °F)  Pulse:  [72-80] 75  Resp:  [16] 16  BP: ()/(40-53) 106/40  SpO2:  [99 %-100 %] 99 %    Physical Exam  Vitals signs and nursing note reviewed.   Constitutional:       General: He is not in acute  distress.     Appearance: Normal appearance.      Comments: Appears frail.   HENT:      Head: Normocephalic and atraumatic.      Nose: Nose normal.      Mouth/Throat:      Mouth: Mucous membranes are moist.      Pharynx: Oropharynx is clear.   Eyes:      Extraocular Movements: Extraocular movements intact.      Conjunctiva/sclera: Conjunctivae normal.   Neck:      Musculoskeletal: Normal range of motion and neck supple.   Cardiovascular:      Rate and Rhythm: Normal rate and regular rhythm.      Pulses: Normal pulses.      Heart sounds: Normal heart sounds. No murmur. No friction rub. No gallop.    Pulmonary:      Effort: Pulmonary effort is normal. No respiratory distress.      Breath sounds: Normal breath sounds. No wheezing or rales.   Chest:      Chest wall: No tenderness.   Abdominal:      General: Abdomen is flat. Bowel sounds are normal. There is no distension.      Palpations: Abdomen is soft. There is no mass.      Tenderness: There is no abdominal tenderness. There is no guarding.   Musculoskeletal: Normal range of motion.   Skin:     General: Skin is warm.      Capillary Refill: Capillary refill takes less than 2 seconds.   Neurological:      General: No focal deficit present.      Mental Status: He is alert. Mental status is at baseline. He is disoriented.      Cranial Nerves: No cranial nerve deficit.      Motor: No weakness.   Psychiatric:         Mood and Affect: Mood normal.         Fluids    Intake/Output Summary (Last 24 hours) at 7/30/2020 1218  Last data filed at 7/29/2020 1841  Gross per 24 hour   Intake 240 ml   Output --   Net 240 ml       Laboratory                        Imaging  CT-RENAL WITH & W/O   Final Result      1.  Large complex right renal mass as detailed above measuring approximately 7.8 x 7.1x  8.5 cm. Primary differential considerations include urothelial malignancy or renal cell carcinoma.   2.  No evidence of adenopathy or metastatic disease is seen.   3.  Distended urinary  bladder. Right greater left hydroureteronephrosis.   4.  Severe atherosclerosis. 3.5 cm infrarenal abdominal aortic aneurysm.            US-RUQ   Final Result         1.  Echogenic liver compatible with fatty change versus fibrosis.   2.  Masslike structure in the right kidney, should be considered neoplastic unless proven otherwise, recommend follow-up 3 phase CT of the kidneys for further characterization   3.  Mild dilatation of the common bile duct, within expected limits given patient age.      These findings were discussed with the patient's clinician, Vin Mei, on 7/23/2020 7:34 AM.      DX-CHEST-PORTABLE (1 VIEW)   Final Result      1.  The lungs are hyperinflated suggesting emphysema/COPD.   2.  There is minimal predominantly lower lobe interstitial opacity which is most likely due to chronic scarring.           Assessment/Plan  * Dehydration  Assessment & Plan  resolved    Diarrhea of presumed infectious origin  Assessment & Plan  Resolved  C dif negative  Likely related to covid    COVID-19 virus infection  Assessment & Plan  D dimer elevated  Continue therapeutic Lovenox  Vitamin D level 14 - high dose replacement weekly started  Completed 10-day course of dexamethasone  Continue supportive care    COPD (chronic obstructive pulmonary disease) (HCC)- (present on admission)  Assessment & Plan  No s/o exacerbation at this time  RT protocol  Completed 10-day course of dexamethasone for COVID    Advanced care planning/counseling discussion  Assessment & Plan  t.  Patient isDNR/DNI    Malnutrition (HCC)  Assessment & Plan  Routine monitoring, periodic labs.    Urinary retention- (present on admission)  Assessment & Plan  Reportedly chronic  Refusing méndez or urology   flomax     Hypokalemia  Assessment & Plan  Continue to monitor    Acute kidney injury (HCC)  Assessment & Plan  Resolved.    Continue to monitor kidney function and urine output    Type 2 diabetes mellitus (HCC)- (present on  admission)  Assessment & Plan  Lab Results   Component Value Date/Time    HBA1C 6.8 (H) 07/10/2020 0335    HBA1C 6.9 (H) 03/08/2018 1124    HBA1C 8.1 (H) 12/15/2017 1108     Results from last 7 days   Lab Units 07/24/20  1129 07/24/20  0746 07/23/20  2229 07/23/20  1630 07/23/20  1132 07/23/20  0744 07/22/20  1938 07/22/20  1650   ACCU CHECK GLUCOSE 788 mg/dL 116* 113* 124* 174* 138* 108* 178* 187*     Blood sugars well controlled.  Will discontinue Accu-Cheks.         VTE prophylaxis: Lovenox    Check am cbc, bmp

## 2020-07-30 NOTE — DOCUMENTATION QUERY
[facility]                                                                       Query Response Note      PATIENT:               DIEDUONNE MONTELONGO  ACCT #:                  [id number]  MRN:                     [id number]  :                      1935  ADMIT DATE:       2020 3:11 PM  DISCH DATE:          RESPONDING  PROVIDER #:        [id number]           QUERY TEXT:    Confusion is documented in the Medical Record.  Can a more specific diagnosis be provided?    NOTE:  If the appropriate response is not listed below, please respond with a new note.    The patient's Clinical Indicators include:  Per Progress Notes: confused, oriented only to person.  Mental status at baseline, he is disoriented  Risk Factors: age, hospitalization, COVID-19, acute respiratory failure  Treatment: bed alarm, tele-sitter reorientation, supplemental 02  Options provided:   -- Acute metabolic encephalopathy   -- Acute encephalopathy due to other medical condition, (please specify other medical condition)   -- Delirium due to known physiological condition, (please specify the known physiological condition)   -- Delirium of unknown etiology   -- Dementia   -- Unable to determine      Query created by: Caryn Covington on 2020 9:15 AM    RESPONSE TEXT:    Dementia          Electronically signed by:  FELIPA MARTINO MD 2020 9:20 AM

## 2020-07-31 LAB
ANION GAP SERPL CALC-SCNC: 11 MMOL/L (ref 7–16)
BUN SERPL-MCNC: 21 MG/DL (ref 8–22)
CALCIUM SERPL-MCNC: 9.2 MG/DL (ref 8.5–10.5)
CHLORIDE SERPL-SCNC: 93 MMOL/L (ref 96–112)
CO2 SERPL-SCNC: 31 MMOL/L (ref 20–33)
CREAT SERPL-MCNC: 0.93 MG/DL (ref 0.5–1.4)
ERYTHROCYTE [DISTWIDTH] IN BLOOD BY AUTOMATED COUNT: 40.6 FL (ref 35.9–50)
GLUCOSE SERPL-MCNC: 158 MG/DL (ref 65–99)
HCT VFR BLD AUTO: 33.3 % (ref 42–52)
HGB BLD-MCNC: 10.9 G/DL (ref 14–18)
MCH RBC QN AUTO: 30.1 PG (ref 27–33)
MCHC RBC AUTO-ENTMCNC: 32.7 G/DL (ref 33.7–35.3)
MCV RBC AUTO: 92 FL (ref 81.4–97.8)
PLATELET # BLD AUTO: 262 K/UL (ref 164–446)
PMV BLD AUTO: 9.7 FL (ref 9–12.9)
POTASSIUM SERPL-SCNC: 3.2 MMOL/L (ref 3.6–5.5)
RBC # BLD AUTO: 3.62 M/UL (ref 4.7–6.1)
SODIUM SERPL-SCNC: 135 MMOL/L (ref 135–145)
WBC # BLD AUTO: 6.1 K/UL (ref 4.8–10.8)

## 2020-07-31 PROCEDURE — 700102 HCHG RX REV CODE 250 W/ 637 OVERRIDE(OP): Performed by: HOSPITALIST

## 2020-07-31 PROCEDURE — 700111 HCHG RX REV CODE 636 W/ 250 OVERRIDE (IP): Performed by: HOSPITALIST

## 2020-07-31 PROCEDURE — 80048 BASIC METABOLIC PNL TOTAL CA: CPT

## 2020-07-31 PROCEDURE — 770021 HCHG ROOM/CARE - ISO PRIVATE

## 2020-07-31 PROCEDURE — 99232 SBSQ HOSP IP/OBS MODERATE 35: CPT | Performed by: HOSPITALIST

## 2020-07-31 PROCEDURE — 700102 HCHG RX REV CODE 250 W/ 637 OVERRIDE(OP): Performed by: INTERNAL MEDICINE

## 2020-07-31 PROCEDURE — A9270 NON-COVERED ITEM OR SERVICE: HCPCS | Performed by: HOSPITALIST

## 2020-07-31 PROCEDURE — 36415 COLL VENOUS BLD VENIPUNCTURE: CPT

## 2020-07-31 PROCEDURE — A9270 NON-COVERED ITEM OR SERVICE: HCPCS | Performed by: INTERNAL MEDICINE

## 2020-07-31 PROCEDURE — 85027 COMPLETE CBC AUTOMATED: CPT

## 2020-07-31 RX ORDER — POTASSIUM CHLORIDE 20 MEQ/1
40 TABLET, EXTENDED RELEASE ORAL ONCE
Status: ACTIVE | OUTPATIENT
Start: 2020-07-31 | End: 2020-08-01

## 2020-07-31 RX ADMIN — ENOXAPARIN SODIUM 60 MG: 60 INJECTION SUBCUTANEOUS at 05:04

## 2020-07-31 RX ADMIN — QUETIAPINE FUMARATE 25 MG: 25 TABLET ORAL at 16:56

## 2020-07-31 RX ADMIN — ENOXAPARIN SODIUM 60 MG: 60 INJECTION SUBCUTANEOUS at 16:57

## 2020-07-31 RX ADMIN — THERA TABS 1 TABLET: TAB at 05:04

## 2020-07-31 RX ADMIN — AMLODIPINE BESYLATE 5 MG: 5 TABLET ORAL at 05:04

## 2020-07-31 RX ADMIN — ZINC SULFATE 220 MG (50 MG) CAPSULE 220 MG: CAPSULE at 05:04

## 2020-07-31 NOTE — DIETARY
Nutrition Services: Update   Day 22 of admit.  Ovi Bejarano is a 84 y.o. male with admitting DX of COVID-19 virus infection, Dehydration, Sepsis      Pt is currently on diabetic diet. Pt is receiving Boost Glucose Control TID with meals. Per chart pt PO < 25-75% of meals. Pt has refused TF in the past. Interventions are limited. Wt 7/25: 56.1 kg via bed scale - wt loss percent is 5.2% in 15 days, which is severe. Wt loss could be related to fluids, per I/Os pt +3.3 L..      Malnutrition risk from RD note 7/10: Pt with severe malnutrition in the context of acute illness related malnutrition related to ongoing n/v and diarrhea affecting appetite/intake, ?suspect r/t COVID19, as evidenced by poor PO intake for >5 days (ongoing for 2 weeks) of <50% of estimated needs and a severe 8.1% wt loss over the past month.      Recommendations/Plan:  1. Encourage intake of meals  2. Document intake of all meals as % taken in ADL's to provide interdisciplinary communication across all shifts.   3. Monitor weight.  4. Nutrition rep will continue to see patient for ongoing meal and snack preferences.    RD following

## 2020-07-31 NOTE — CARE PLAN
Problem: Safety  Goal: Will remain free from injury  Note: Call light within reach, bed alarm on      Problem: Discharge Barriers/Planning  Goal: Patient's continuum of care needs will be met  Note: guardianship

## 2020-07-31 NOTE — PROGRESS NOTES
Received in bed, aaox4, denies any discomfort, legally blind, up sba to br, POC discussed, guardianship plans, needs attended.

## 2020-07-31 NOTE — PROGRESS NOTES
Assumed care of patient at 1900. Oriented to self and place, knows it is 2020 and that he fell. Denies pain or SOB. Discussed plan of care, cannot locate his soft touch call light at this time so explained to him to just let the  know when he needs to get up to use the restroom. Verbalizes understanding. Bed locked and low, telesitter and hourly rounding in place. Denies needs at this time.     Telesitter updated with RN and CNA numbers via telephone

## 2020-07-31 NOTE — PROGRESS NOTES
[facility] Medicine Daily Progress Note    Date of Service  7/31/2020    Chief Complaint  84 y.o. male admitted 7/9/2020 with cough, nausea, vomiting, diarrhea, weakness    Hospital Course    Mr. Ovi Bejarano is a 84 y.o. male with history of diabetes, hyperlipidemia, BPH who presented on 7/9/2020 with cough, nausea, vomiting, diarrhea, progressive weakness x1-2 weeks.  On presentation he was dehydrated with hypotension and found to have acute renal failure.  Found to be COVID positive.  Initially admitted to the ICU.  Started on therapeutic dose enoxaparin for elevated d-dimer.  Patient did report abdominal pain.  A right renal mass was demonstrated on ultrasound and confirmed with CT.  Patient is confused and a poor candidate for surgery.  Unable to reach family or next of kin.  He was made DNR/DNI 7/23.        Interval Problem Update    7/27.  Afebrile.  Episode of tachycardia yesterday.  On 2.5 L nasal cannula.  Oriented only to person.  Denies fever, chills, chest pains, shortness of breath.  Do have new contact information for TY Stephens.  If unable to contact will pursue guardianship.  7/28  WE may of found his wife , who is also hospitalized at this time.     Patient has no new complaints and is stable overnight    Afebrile      On 2.5 liters sat of 96    7/29     No new events    o2 sat  97 on 2.5 liters    Repeat covid test today    7/30 covid test negative today      Oxygen stable on 2.5 liters    No new complaints    Case d/w case ,manager    7/31 we will likely have to pursue guardianship    No new overnight issues    Low potassium 3.2      Consultants/Specialty  Palliative  Psychiatry    Code Status  DNR/DNI    Disposition  Pending    Review of Systems  Review of Systems   Unable to perform ROS: Mental acuity        Physical Exam  Temp:  [36 °C (96.8 °F)-36.4 °C (97.5 °F)] 36.4 °C (97.5 °F)  Pulse:  [] 77  Resp:  [16-19] 16  BP: (104-120)/(50-83) 114/69  SpO2:  [91 %-98 %] 98 %    Physical  Exam  Vitals signs and nursing note reviewed.   Constitutional:       General: He is not in acute distress.     Appearance: Normal appearance.      Comments: Appears frail.   HENT:      Head: Normocephalic and atraumatic.      Nose: Nose normal.      Mouth/Throat:      Mouth: Mucous membranes are moist.      Pharynx: Oropharynx is clear.   Eyes:      Extraocular Movements: Extraocular movements intact.      Conjunctiva/sclera: Conjunctivae normal.   Neck:      Musculoskeletal: Normal range of motion and neck supple.   Cardiovascular:      Rate and Rhythm: Normal rate and regular rhythm.      Pulses: Normal pulses.      Heart sounds: Normal heart sounds. No murmur. No friction rub. No gallop.    Pulmonary:      Effort: Pulmonary effort is normal. No respiratory distress.      Breath sounds: Normal breath sounds. No wheezing or rales.   Chest:      Chest wall: No tenderness.   Abdominal:      General: Abdomen is flat. Bowel sounds are normal. There is no distension.      Palpations: Abdomen is soft. There is no mass.      Tenderness: There is no abdominal tenderness. There is no guarding.   Musculoskeletal: Normal range of motion.   Skin:     General: Skin is warm.      Capillary Refill: Capillary refill takes less than 2 seconds.   Neurological:      General: No focal deficit present.      Mental Status: He is alert. Mental status is at baseline. He is disoriented.      Cranial Nerves: No cranial nerve deficit.      Motor: No weakness.   Psychiatric:         Mood and Affect: Mood normal.         Fluids    Intake/Output Summary (Last 24 hours) at 7/31/2020 1016  Last data filed at 7/31/2020 0800  Gross per 24 hour   Intake 100 ml   Output --   Net 100 ml       Laboratory  Recent Labs     07/31/20  0238   WBC 6.1   RBC 3.62*   HEMOGLOBIN 10.9*   HEMATOCRIT 33.3*   MCV 92.0   MCH 30.1   MCHC 32.7*   RDW 40.6   PLATELETCT 262   MPV 9.7     Recent Labs     07/31/20  0238   SODIUM 135   POTASSIUM 3.2*   CHLORIDE 93*   CO2  31   GLUCOSE 158*   BUN 21   CREATININE 0.93   CALCIUM 9.2                   Imaging  CT-RENAL WITH & W/O   Final Result      1.  Large complex right renal mass as detailed above measuring approximately 7.8 x 7.1x  8.5 cm. Primary differential considerations include urothelial malignancy or renal cell carcinoma.   2.  No evidence of adenopathy or metastatic disease is seen.   3.  Distended urinary bladder. Right greater left hydroureteronephrosis.   4.  Severe atherosclerosis. 3.5 cm infrarenal abdominal aortic aneurysm.            US-RUQ   Final Result         1.  Echogenic liver compatible with fatty change versus fibrosis.   2.  Masslike structure in the right kidney, should be considered neoplastic unless proven otherwise, recommend follow-up 3 phase CT of the kidneys for further characterization   3.  Mild dilatation of the common bile duct, within expected limits given patient age.      These findings were discussed with the patient's clinician, Vin Mei, on 7/23/2020 7:34 AM.      DX-CHEST-PORTABLE (1 VIEW)   Final Result      1.  The lungs are hyperinflated suggesting emphysema/COPD.   2.  There is minimal predominantly lower lobe interstitial opacity which is most likely due to chronic scarring.           Assessment/Plan  * Dehydration  Assessment & Plan  resolved    Diarrhea of presumed infectious origin  Assessment & Plan  Resolved  C dif negative  Likely related to covid    COVID-19 virus infection  Assessment & Plan  D dimer elevated  Continue therapeutic Lovenox  Vitamin D level 14 - high dose replacement weekly started  Completed 10-day course of dexamethasone  Continue supportive care    COPD (chronic obstructive pulmonary disease) (HCC)- (present on admission)  Assessment & Plan  No s/o exacerbation at this time  RT protocol  Completed 10-day course of dexamethasone for COVID    Advanced care planning/counseling discussion  Assessment & Plan  t.  Patient isDNR/DNI    Malnutrition  (HCC)  Assessment & Plan  Routine monitoring, periodic labs.    Urinary retention- (present on admission)  Assessment & Plan  Reportedly chronic  Refusing méndez or urology   flomax     Hypokalemia  Assessment & Plan  Continue to monitor    Last replaced on 7/31    Acute kidney injury (HCC)  Assessment & Plan  Resolved.    Continue to monitor kidney function and urine output    Type 2 diabetes mellitus (HCC)- (present on admission)  Assessment & Plan  Lab Results   Component Value Date/Time    HBA1C 6.8 (H) 07/10/2020 0335    HBA1C 6.9 (H) 03/08/2018 1124    HBA1C 8.1 (H) 12/15/2017 1108     Results from last 7 days   Lab Units 07/24/20  1129 07/24/20  0746 07/23/20  2229 07/23/20  1630 07/23/20  1132 07/23/20  0744 07/22/20  1938 07/22/20  1650   ACCU CHECK GLUCOSE 788 mg/dL 116* 113* 124* 174* 138* 108* 178* 187*     Blood sugars well controlled.  Will discontinue Accu-Cheks.         VTE prophylaxis: Lovenox

## 2020-07-31 NOTE — DISCHARGE PLANNING
"Hospital Care Management Discharge Planning       Anticipated Discharge Disposition:   · JIMBO-Pending initiation of guardianship.     Action:   · CM obtained correct contact information for emergency contact Corrie Shaw. She states that her mother Nani Shaw who is currently hospitalized on the same floor at [facility] is pt long time Significant Other of 35 years. Cookie confirms that pt and her mother are NOT legally  and she states that pt has never provided NOK information and she has never noted him to speak of family or make contact.   · CM also received new contact information for \"relative\" Kat. CM contacted Kat who confirms she is sister to above contact Cookie. Again she confirms the 35 year relationship but not legally . Kat also confirms that she has not family contact information for pt.  · CM contacted Hospitalist ZE Jones to initiate Guardianship paperwork which has been completed but still requires MD signature which can be obtained tomorrow as MD currently unavailable.    Barriers to Discharge:   · Initiation of Guardianship.     Plan:    · Continue to provide support services and assistance with discharge planning as needed.   "

## 2020-07-31 NOTE — CARE PLAN
Problem: Safety  Goal: Will remain free from injury  Outcome: PROGRESSING AS EXPECTED     Problem: Skin Integrity  Goal: Risk for impaired skin integrity will decrease  Outcome: PROGRESSING AS EXPECTED     Problem: Mobility  Goal: Risk for activity intolerance will decrease  Outcome: PROGRESSING AS EXPECTED     Problem: Psychosocial Needs:  Goal: Level of anxiety will decrease  Outcome: PROGRESSING SLOWER THAN EXPECTED

## 2020-07-31 NOTE — CARE PLAN
Problem: Nutritional:  Goal: Achieve adequate nutritional intake  Description: Patient will consume ~50% of meals/supplements   Outcome: PROGRESSING AS EXPECTED

## 2020-08-01 PROCEDURE — 700102 HCHG RX REV CODE 250 W/ 637 OVERRIDE(OP): Performed by: INTERNAL MEDICINE

## 2020-08-01 PROCEDURE — 770021 HCHG ROOM/CARE - ISO PRIVATE

## 2020-08-01 PROCEDURE — A9270 NON-COVERED ITEM OR SERVICE: HCPCS | Performed by: HOSPITALIST

## 2020-08-01 PROCEDURE — 99232 SBSQ HOSP IP/OBS MODERATE 35: CPT | Performed by: HOSPITALIST

## 2020-08-01 PROCEDURE — A9270 NON-COVERED ITEM OR SERVICE: HCPCS | Performed by: INTERNAL MEDICINE

## 2020-08-01 PROCEDURE — 700102 HCHG RX REV CODE 250 W/ 637 OVERRIDE(OP): Performed by: HOSPITALIST

## 2020-08-01 PROCEDURE — 700111 HCHG RX REV CODE 636 W/ 250 OVERRIDE (IP): Performed by: HOSPITALIST

## 2020-08-01 RX ADMIN — TAMSULOSIN HYDROCHLORIDE 0.8 MG: 0.4 CAPSULE ORAL at 09:00

## 2020-08-01 RX ADMIN — ENOXAPARIN SODIUM 60 MG: 60 INJECTION SUBCUTANEOUS at 17:29

## 2020-08-01 RX ADMIN — ZINC SULFATE 220 MG (50 MG) CAPSULE 220 MG: CAPSULE at 04:12

## 2020-08-01 RX ADMIN — THERA TABS 1 TABLET: TAB at 04:12

## 2020-08-01 RX ADMIN — ENOXAPARIN SODIUM 60 MG: 60 INJECTION SUBCUTANEOUS at 04:12

## 2020-08-01 RX ADMIN — AMLODIPINE BESYLATE 5 MG: 5 TABLET ORAL at 04:12

## 2020-08-01 RX ADMIN — QUETIAPINE FUMARATE 25 MG: 25 TABLET ORAL at 17:29

## 2020-08-01 ASSESSMENT — FIBROSIS 4 INDEX: FIB4 SCORE: 2.87

## 2020-08-01 NOTE — CARE PLAN
Problem: Safety  Goal: Will remain free from falls  Outcome: PROGRESSING AS EXPECTED  Note: Tele sitter watching patient.      Problem: Discharge Barriers/Planning  Goal: Patient's continuum of care needs will be met  Outcome: PROGRESSING AS EXPECTED  Note: Patient needs guardianship.

## 2020-08-01 NOTE — CARE PLAN
Problem: Safety  Goal: Will remain free from falls  Outcome: PROGRESSING SLOWER THAN EXPECTED   Bed alarm on, bed locked in lowest position, upper side rails up, call light and personal items within reach, non skids socks on. Tele sitter in place.   Problem: Knowledge Deficit  Goal: Knowledge of disease process/condition, treatment plan, diagnostic tests, and medications will improve  Outcome: PROGRESSING SLOWER THAN EXPECTED   POC discussed with patient, all questions answered.

## 2020-08-01 NOTE — PROGRESS NOTES
[facility] Medicine Daily Progress Note    Date of Service  8/1/2020    Chief Complaint  84 y.o. male admitted 7/9/2020 with cough, nausea, vomiting, diarrhea, weakness    Hospital Course    Mr. Ovi Bejarano is a 84 y.o. male with history of diabetes, hyperlipidemia, BPH who presented on 7/9/2020 with cough, nausea, vomiting, diarrhea, progressive weakness x1-2 weeks.  On presentation he was dehydrated with hypotension and found to have acute renal failure.  Found to be COVID positive.  Initially admitted to the ICU.  Started on therapeutic dose enoxaparin for elevated d-dimer.  Patient did report abdominal pain.  A right renal mass was demonstrated on ultrasound and confirmed with CT.  Patient is confused and a poor candidate for surgery.  Unable to reach family or next of kin.  He was made DNR/DNI 7/23.        Interval Problem Update    7/27.  Afebrile.  Episode of tachycardia yesterday.  On 2.5 L nasal cannula.  Oriented only to person.  Denies fever, chills, chest pains, shortness of breath.  Do have new contact information for TY Stephens.  If unable to contact will pursue guardianship.  7/28  WE may of found his wife , who is also hospitalized at this time.     Patient has no new complaints and is stable overnight    Afebrile      On 2.5 liters sat of 96    7/29     No new events    o2 sat  97 on 2.5 liters    Repeat covid test today    7/30 covid test negative today      Oxygen stable on 2.5 liters    No new complaints    Case d/w case ,manager    7/31 we will likely have to pursue guardianship    No new overnight issues    Low potassium 3.2      8/1    Case d/w floor rn. Npo new issues    o2 sat of 98 on 2 liters    Will  Repeat covid test tomorrow    Consultants/Specialty  Palliative  Psychiatry    Code Status  DNR/DNI    Disposition  Pending    Review of Systems  Review of Systems   Unable to perform ROS: Mental acuity        Physical Exam  Temp:  [36.3 °C (97.4 °F)-36.8 °C (98.2 °F)] 36.6 °C (97.9  °F)  Pulse:  [72-83] 82  Resp:  [16-18] 16  BP: (105-120)/(43-70) 105/47  SpO2:  [94 %-99 %] 98 %    Physical Exam  Vitals signs and nursing note reviewed.   Constitutional:       General: He is not in acute distress.     Appearance: Normal appearance.      Comments: Appears frail.   HENT:      Head: Normocephalic and atraumatic.      Nose: Nose normal.      Mouth/Throat:      Mouth: Mucous membranes are moist.      Pharynx: Oropharynx is clear.   Eyes:      Extraocular Movements: Extraocular movements intact.      Conjunctiva/sclera: Conjunctivae normal.   Neck:      Musculoskeletal: Normal range of motion and neck supple.   Cardiovascular:      Rate and Rhythm: Normal rate and regular rhythm.      Pulses: Normal pulses.      Heart sounds: Normal heart sounds. No murmur. No friction rub. No gallop.    Pulmonary:      Effort: Pulmonary effort is normal. No respiratory distress.      Breath sounds: Normal breath sounds. No wheezing or rales.   Chest:      Chest wall: No tenderness.   Abdominal:      General: Abdomen is flat. Bowel sounds are normal. There is no distension.      Palpations: Abdomen is soft. There is no mass.      Tenderness: There is no abdominal tenderness. There is no guarding.   Musculoskeletal: Normal range of motion.   Skin:     General: Skin is warm.      Capillary Refill: Capillary refill takes less than 2 seconds.   Neurological:      General: No focal deficit present.      Mental Status: He is alert. Mental status is at baseline. He is disoriented.      Cranial Nerves: No cranial nerve deficit.      Motor: No weakness.   Psychiatric:         Mood and Affect: Mood normal.         Fluids    Intake/Output Summary (Last 24 hours) at 8/1/2020 1019  Last data filed at 8/1/2020 0914  Gross per 24 hour   Intake 280 ml   Output --   Net 280 ml       Laboratory  Recent Labs     07/31/20  0238   WBC 6.1   RBC 3.62*   HEMOGLOBIN 10.9*   HEMATOCRIT 33.3*   MCV 92.0   MCH 30.1   MCHC 32.7*   RDW 40.6    PLATELETCT 262   MPV 9.7     Recent Labs     07/31/20  0238   SODIUM 135   POTASSIUM 3.2*   CHLORIDE 93*   CO2 31   GLUCOSE 158*   BUN 21   CREATININE 0.93   CALCIUM 9.2                   Imaging  CT-RENAL WITH & W/O   Final Result      1.  Large complex right renal mass as detailed above measuring approximately 7.8 x 7.1x  8.5 cm. Primary differential considerations include urothelial malignancy or renal cell carcinoma.   2.  No evidence of adenopathy or metastatic disease is seen.   3.  Distended urinary bladder. Right greater left hydroureteronephrosis.   4.  Severe atherosclerosis. 3.5 cm infrarenal abdominal aortic aneurysm.            US-RUQ   Final Result         1.  Echogenic liver compatible with fatty change versus fibrosis.   2.  Masslike structure in the right kidney, should be considered neoplastic unless proven otherwise, recommend follow-up 3 phase CT of the kidneys for further characterization   3.  Mild dilatation of the common bile duct, within expected limits given patient age.      These findings were discussed with the patient's clinician, Vin Mei, on 7/23/2020 7:34 AM.      DX-CHEST-PORTABLE (1 VIEW)   Final Result      1.  The lungs are hyperinflated suggesting emphysema/COPD.   2.  There is minimal predominantly lower lobe interstitial opacity which is most likely due to chronic scarring.           Assessment/Plan  * Dehydration  Assessment & Plan  resolved    Diarrhea of presumed infectious origin  Assessment & Plan  Resolved  C dif negative  Likely related to covid    COVID-19 virus infection  Assessment & Plan  D dimer elevated  Continue therapeutic Lovenox  Vitamin D level 14 - high dose replacement weekly started  Completed 10-day course of dexamethasone  Continue supportive care    COPD (chronic obstructive pulmonary disease) (HCC)- (present on admission)  Assessment & Plan  No s/o exacerbation at this time  RT protocol  Completed 10-day course of dexamethasone for  COVID    Advanced care planning/counseling discussion  Assessment & Plan  t.  Patient isDNR/DNI    Malnutrition (HCC)  Assessment & Plan  Routine monitoring, periodic labs.    Urinary retention- (present on admission)  Assessment & Plan  Reportedly chronic  Refusing méndez or urology   flomax     Hypokalemia  Assessment & Plan  Continue to monitor    Last replaced on 7/31    Acute kidney injury (HCC)  Assessment & Plan  Resolved.    Continue to monitor kidney function and urine output    Type 2 diabetes mellitus (HCC)- (present on admission)  Assessment & Plan  Lab Results   Component Value Date/Time    HBA1C 6.8 (H) 07/10/2020 0335    HBA1C 6.9 (H) 03/08/2018 1124    HBA1C 8.1 (H) 12/15/2017 1108     Results from last 7 days   Lab Units 07/24/20  1129 07/24/20  0746 07/23/20  2229 07/23/20  1630 07/23/20  1132 07/23/20  0744 07/22/20  1938 07/22/20  1650   ACCU CHECK GLUCOSE 788 mg/dL 116* 113* 124* 174* 138* 108* 178* 187*     Blood sugars well controlled.  Will discontinue Accu-Cheks.         VTE prophylaxis: Lovenox

## 2020-08-02 PROBLEM — D62 ACUTE BLOOD LOSS ANEMIA: Status: ACTIVE | Noted: 2020-08-02

## 2020-08-02 PROCEDURE — A9270 NON-COVERED ITEM OR SERVICE: HCPCS | Performed by: INTERNAL MEDICINE

## 2020-08-02 PROCEDURE — A9270 NON-COVERED ITEM OR SERVICE: HCPCS | Performed by: HOSPITALIST

## 2020-08-02 PROCEDURE — 700102 HCHG RX REV CODE 250 W/ 637 OVERRIDE(OP): Performed by: HOSPITALIST

## 2020-08-02 PROCEDURE — 700111 HCHG RX REV CODE 636 W/ 250 OVERRIDE (IP): Performed by: HOSPITALIST

## 2020-08-02 PROCEDURE — 700102 HCHG RX REV CODE 250 W/ 637 OVERRIDE(OP): Performed by: INTERNAL MEDICINE

## 2020-08-02 PROCEDURE — 770006 HCHG ROOM/CARE - MED/SURG/GYN SEMI*

## 2020-08-02 PROCEDURE — 99232 SBSQ HOSP IP/OBS MODERATE 35: CPT | Performed by: HOSPITALIST

## 2020-08-02 RX ADMIN — ZINC SULFATE 220 MG (50 MG) CAPSULE 220 MG: CAPSULE at 04:54

## 2020-08-02 RX ADMIN — AMLODIPINE BESYLATE 5 MG: 5 TABLET ORAL at 04:54

## 2020-08-02 RX ADMIN — ENOXAPARIN SODIUM 60 MG: 60 INJECTION SUBCUTANEOUS at 04:54

## 2020-08-02 RX ADMIN — TAMSULOSIN HYDROCHLORIDE 0.8 MG: 0.4 CAPSULE ORAL at 09:35

## 2020-08-02 RX ADMIN — QUETIAPINE FUMARATE 25 MG: 25 TABLET ORAL at 18:45

## 2020-08-02 RX ADMIN — THERA TABS 1 TABLET: TAB at 04:54

## 2020-08-02 NOTE — PROGRESS NOTES
1307 Attempted to call report to [facility]. Nurse will call back.    1315 CNA states pt had some bright red blood when he went to the bathroom. CNA couldn't determine if it was bloody from the rectum or urethra. Examined patient's rectum. No hemorrhoids seen. Dr. Ruelas notified.  ordered. DC Lovenox. Apply condom cath.     1338 report called to nurse on [facility].

## 2020-08-02 NOTE — PROGRESS NOTES
Pt oriented to room and unit. Comfortable and resting with needs met at this time. Tele-sitter in room. Bed alarm on and call light in reach.

## 2020-08-02 NOTE — PROGRESS NOTES
Hospital Medicine Daily Progress Note    Date of Service  8/2/2020    Chief Complaint  84 y.o. male admitted 7/9/2020 with cough, nausea, vomiting, diarrhea, weakness    Hospital Course    Mr. Ovi Bejarano is a 84 y.o. male with history of diabetes, hyperlipidemia, BPH who presented on 7/9/2020 with cough, nausea, vomiting, diarrhea, progressive weakness x1-2 weeks.  On presentation he was dehydrated with hypotension and found to have acute renal failure.  Found to be COVID positive.  Initially admitted to the ICU.  Started on therapeutic dose enoxaparin for elevated d-dimer.  Patient did report abdominal pain.  A right renal mass was demonstrated on ultrasound and confirmed with CT.  Patient is confused and a poor candidate for surgery.  Unable to reach family or next of kin.  He was made DNR/DNI 7/23.        Interval Problem Update      8/1    Case d/w floor rn. Npo new issues    o2 sat of 98 on 2 liters    Will  Repeat covid test tomorrow    8/2 as per infection control, ok to transfer to medical floor    Patient has no new complaints    Floor rn noted blood from below- she is unsure where it is coming from .. penis or rectum    Consultants/Specialty  Palliative  Psychiatry    Code Status  DNR/DNI    Disposition  Pending    Review of Systems  Review of Systems   Unable to perform ROS: Mental acuity        Physical Exam  Temp:  [36.2 °C (97.2 °F)-36.7 °C (98.1 °F)] 36.2 °C (97.2 °F)  Pulse:  [56-81] 72  Resp:  [16-19] 19  BP: (109-133)/(38-59) 121/52  SpO2:  [92 %-98 %] 98 %    Physical Exam  Vitals signs and nursing note reviewed.   Constitutional:       General: He is not in acute distress.     Appearance: Normal appearance.      Comments: Appears frail.   HENT:      Head: Normocephalic and atraumatic.      Nose: Nose normal.      Mouth/Throat:      Mouth: Mucous membranes are moist.      Pharynx: Oropharynx is clear.   Eyes:      Extraocular Movements: Extraocular movements intact.       Conjunctiva/sclera: Conjunctivae normal.   Neck:      Musculoskeletal: Normal range of motion and neck supple.   Cardiovascular:      Rate and Rhythm: Normal rate and regular rhythm.      Pulses: Normal pulses.      Heart sounds: Normal heart sounds. No murmur. No friction rub. No gallop.    Pulmonary:      Effort: Pulmonary effort is normal. No respiratory distress.      Breath sounds: Normal breath sounds. No wheezing or rales.   Chest:      Chest wall: No tenderness.   Abdominal:      General: Abdomen is flat. Bowel sounds are normal. There is no distension.      Palpations: Abdomen is soft. There is no mass.      Tenderness: There is no abdominal tenderness. There is no guarding.   Musculoskeletal: Normal range of motion.   Skin:     General: Skin is warm.      Capillary Refill: Capillary refill takes less than 2 seconds.   Neurological:      General: No focal deficit present.      Mental Status: He is alert. Mental status is at baseline. He is disoriented.      Cranial Nerves: No cranial nerve deficit.      Motor: No weakness.   Psychiatric:         Mood and Affect: Mood normal.         Fluids  No intake or output data in the 24 hours ending 08/02/20 1329    Laboratory  Recent Labs     07/31/20  0238   WBC 6.1   RBC 3.62*   HEMOGLOBIN 10.9*   HEMATOCRIT 33.3*   MCV 92.0   MCH 30.1   MCHC 32.7*   RDW 40.6   PLATELETCT 262   MPV 9.7     Recent Labs     07/31/20  0238   SODIUM 135   POTASSIUM 3.2*   CHLORIDE 93*   CO2 31   GLUCOSE 158*   BUN 21   CREATININE 0.93   CALCIUM 9.2                   Imaging  CT-RENAL WITH & W/O   Final Result      1.  Large complex right renal mass as detailed above measuring approximately 7.8 x 7.1x  8.5 cm. Primary differential considerations include urothelial malignancy or renal cell carcinoma.   2.  No evidence of adenopathy or metastatic disease is seen.   3.  Distended urinary bladder. Right greater left hydroureteronephrosis.   4.  Severe atherosclerosis. 3.5 cm infrarenal  abdominal aortic aneurysm.            US-RUQ   Final Result         1.  Echogenic liver compatible with fatty change versus fibrosis.   2.  Masslike structure in the right kidney, should be considered neoplastic unless proven otherwise, recommend follow-up 3 phase CT of the kidneys for further characterization   3.  Mild dilatation of the common bile duct, within expected limits given patient age.      These findings were discussed with the patient's clinician, Vin Mei, on 7/23/2020 7:34 AM.      DX-CHEST-PORTABLE (1 VIEW)   Final Result      1.  The lungs are hyperinflated suggesting emphysema/COPD.   2.  There is minimal predominantly lower lobe interstitial opacity which is most likely due to chronic scarring.           Assessment/Plan  * Dehydration  Assessment & Plan  resolved    Diarrhea of presumed infectious origin  Assessment & Plan  Resolved  C dif negative  Likely related to covid    COVID-19 virus infection  Assessment & Plan  D dimer elevated  Continue therapeutic Lovenox  Vitamin D level 14 - high dose replacement weekly started  Completed 10-day course of dexamethasone  Continue supportive care    COPD (chronic obstructive pulmonary disease) (HCC)- (present on admission)  Assessment & Plan  No s/o exacerbation at this time  RT protocol  Completed 10-day course of dexamethasone for COVID    Acute blood loss anemia  Assessment & Plan  Unsure - penile or rectum    Stop lovenox     place condom cath    Monitor hb    Check ua    Check stool for occult blood        Advanced care planning/counseling discussion  Assessment & Plan  t.  Patient isDNR/DNI    Malnutrition (HCC)  Assessment & Plan  Routine monitoring, periodic labs.    Urinary retention- (present on admission)  Assessment & Plan  Reportedly chronic  Refusing méndez or urology   flomax     Hypokalemia  Assessment & Plan  Continue to monitor    Last replaced on 7/31    Acute kidney injury (HCC)  Assessment & Plan  Resolved.    Continue to  monitor kidney function and urine output    Type 2 diabetes mellitus (HCC)- (present on admission)  Assessment & Plan  Lab Results   Component Value Date/Time    HBA1C 6.8 (H) 07/10/2020 0335    HBA1C 6.9 (H) 03/08/2018 1124    HBA1C 8.1 (H) 12/15/2017 1108     Results from last 7 days   Lab Units 07/24/20  1129 07/24/20  0746 07/23/20  2229 07/23/20  1630 07/23/20  1132 07/23/20  0744 07/22/20  1938 07/22/20  1650   ACCU CHECK GLUCOSE 788 mg/dL 116* 113* 124* 174* 138* 108* 178* 187*     Blood sugars well controlled.  Will discontinue Accu-Cheks.         VTE prophylaxis: scd    Check am cbc, bmp

## 2020-08-03 PROBLEM — R31.9 HEMATURIA: Status: ACTIVE | Noted: 2020-08-02

## 2020-08-03 PROBLEM — R19.7 DIARRHEA OF PRESUMED INFECTIOUS ORIGIN: Status: RESOLVED | Noted: 2020-07-10 | Resolved: 2020-08-03

## 2020-08-03 PROBLEM — R41.82 ALTERED MENTAL STATUS: Status: ACTIVE | Noted: 2020-08-03

## 2020-08-03 LAB
AMORPH CRY #/AREA URNS HPF: PRESENT /HPF
ANION GAP SERPL CALC-SCNC: 12 MMOL/L (ref 7–16)
APPEARANCE UR: ABNORMAL
BACTERIA #/AREA URNS HPF: ABNORMAL /HPF
BILIRUB UR QL STRIP.AUTO: ABNORMAL
BUN SERPL-MCNC: 21 MG/DL (ref 8–22)
CALCIUM SERPL-MCNC: 9.1 MG/DL (ref 8.5–10.5)
CHLORIDE SERPL-SCNC: 96 MMOL/L (ref 96–112)
CO2 SERPL-SCNC: 29 MMOL/L (ref 20–33)
COLOR UR: ABNORMAL
CREAT SERPL-MCNC: 0.98 MG/DL (ref 0.5–1.4)
EPI CELLS #/AREA URNS HPF: ABNORMAL /HPF
ERYTHROCYTE [DISTWIDTH] IN BLOOD BY AUTOMATED COUNT: 42.9 FL (ref 35.9–50)
GLUCOSE SERPL-MCNC: 122 MG/DL (ref 65–99)
GLUCOSE UR STRIP.AUTO-MCNC: 100 MG/DL
HCT VFR BLD AUTO: 31.8 % (ref 42–52)
HGB BLD-MCNC: 10.2 G/DL (ref 14–18)
HYALINE CASTS #/AREA URNS LPF: ABNORMAL /LPF
KETONES UR STRIP.AUTO-MCNC: ABNORMAL MG/DL
LEUKOCYTE ESTERASE UR QL STRIP.AUTO: ABNORMAL
MCH RBC QN AUTO: 30.5 PG (ref 27–33)
MCHC RBC AUTO-ENTMCNC: 32.1 G/DL (ref 33.7–35.3)
MCV RBC AUTO: 95.2 FL (ref 81.4–97.8)
MICRO URNS: ABNORMAL
NITRITE UR QL STRIP.AUTO: POSITIVE
PH UR STRIP.AUTO: 6.5 [PH] (ref 5–8)
PLATELET # BLD AUTO: 271 K/UL (ref 164–446)
PMV BLD AUTO: 9.4 FL (ref 9–12.9)
POTASSIUM SERPL-SCNC: 3.2 MMOL/L (ref 3.6–5.5)
PROT UR QL STRIP: 100 MG/DL
RBC # BLD AUTO: 3.34 M/UL (ref 4.7–6.1)
RBC # URNS HPF: >150 /HPF
RBC UR QL AUTO: ABNORMAL
SODIUM SERPL-SCNC: 137 MMOL/L (ref 135–145)
SP GR UR STRIP.AUTO: 1.01
UROBILINOGEN UR STRIP.AUTO-MCNC: 1 MG/DL
WBC # BLD AUTO: 6.1 K/UL (ref 4.8–10.8)
WBC #/AREA URNS HPF: ABNORMAL /HPF

## 2020-08-03 PROCEDURE — 700102 HCHG RX REV CODE 250 W/ 637 OVERRIDE(OP): Performed by: HOSPITALIST

## 2020-08-03 PROCEDURE — 81001 URINALYSIS AUTO W/SCOPE: CPT

## 2020-08-03 PROCEDURE — 99232 SBSQ HOSP IP/OBS MODERATE 35: CPT | Performed by: PSYCHIATRY & NEUROLOGY

## 2020-08-03 PROCEDURE — 700102 HCHG RX REV CODE 250 W/ 637 OVERRIDE(OP): Performed by: INTERNAL MEDICINE

## 2020-08-03 PROCEDURE — 94760 N-INVAS EAR/PLS OXIMETRY 1: CPT

## 2020-08-03 PROCEDURE — A9270 NON-COVERED ITEM OR SERVICE: HCPCS | Performed by: HOSPITALIST

## 2020-08-03 PROCEDURE — 80048 BASIC METABOLIC PNL TOTAL CA: CPT

## 2020-08-03 PROCEDURE — 85027 COMPLETE CBC AUTOMATED: CPT

## 2020-08-03 PROCEDURE — A9270 NON-COVERED ITEM OR SERVICE: HCPCS | Performed by: INTERNAL MEDICINE

## 2020-08-03 PROCEDURE — 99232 SBSQ HOSP IP/OBS MODERATE 35: CPT | Performed by: STUDENT IN AN ORGANIZED HEALTH CARE EDUCATION/TRAINING PROGRAM

## 2020-08-03 PROCEDURE — 770006 HCHG ROOM/CARE - MED/SURG/GYN SEMI*

## 2020-08-03 PROCEDURE — 36415 COLL VENOUS BLD VENIPUNCTURE: CPT

## 2020-08-03 RX ADMIN — QUETIAPINE FUMARATE 25 MG: 25 TABLET ORAL at 17:55

## 2020-08-03 RX ADMIN — TAMSULOSIN HYDROCHLORIDE 0.8 MG: 0.4 CAPSULE ORAL at 09:45

## 2020-08-03 RX ADMIN — AMLODIPINE BESYLATE 5 MG: 5 TABLET ORAL at 05:20

## 2020-08-03 RX ADMIN — THERA TABS 1 TABLET: TAB at 05:20

## 2020-08-03 RX ADMIN — ERGOCALCIFEROL 50000 UNITS: 1.25 CAPSULE ORAL at 17:55

## 2020-08-03 ASSESSMENT — ENCOUNTER SYMPTOMS
BRUISES/BLEEDS EASILY: 0
COUGH: 0
ABDOMINAL PAIN: 0
BLOOD IN STOOL: 0
SORE THROAT: 0
NERVOUS/ANXIOUS: 0
EYE PAIN: 0
HEADACHES: 0
CHILLS: 0
SHORTNESS OF BREATH: 0
WEIGHT LOSS: 0
BLURRED VISION: 1
WHEEZING: 0
VOMITING: 0
DIARRHEA: 0
BACK PAIN: 0
FEVER: 0
PALPITATIONS: 0
DIZZINESS: 0
TINGLING: 0
POLYDIPSIA: 0
WEAKNESS: 0
NAUSEA: 0
DEPRESSION: 0
MYALGIAS: 0

## 2020-08-03 ASSESSMENT — COGNITIVE AND FUNCTIONAL STATUS - GENERAL
DAILY ACTIVITIY SCORE: 18
WALKING IN HOSPITAL ROOM: A LITTLE
SUGGESTED CMS G CODE MODIFIER MOBILITY: CJ
MOBILITY SCORE: 20
SUGGESTED CMS G CODE MODIFIER DAILY ACTIVITY: CK
DRESSING REGULAR LOWER BODY CLOTHING: A LITTLE
EATING MEALS: A LITTLE
PERSONAL GROOMING: A LITTLE
MOVING FROM LYING ON BACK TO SITTING ON SIDE OF FLAT BED: A LITTLE
DRESSING REGULAR UPPER BODY CLOTHING: A LITTLE
TOILETING: A LITTLE
STANDING UP FROM CHAIR USING ARMS: A LITTLE
HELP NEEDED FOR BATHING: A LITTLE
CLIMB 3 TO 5 STEPS WITH RAILING: A LITTLE

## 2020-08-03 NOTE — RESPIRATORY CARE
Oxygen Rounds      Patient found on    O2 L/m:  ___2______    Oxygen device:  _____nc___   Spo2: _____98____%      Patient titrated to   O2 L/m: ____1____   Oxygen device: ____nc_____   Spo2: ____95_____%   Respiratory device skin site inspection completed.

## 2020-08-03 NOTE — PROGRESS NOTES
2 RN skin check complete with EZ Hollis.   Devices in place NC.  Skin assessed under devices yes.  Confirmed pressure ulcers found on no.  New potential pressure ulcers noted on n/a.   Wound consult placed no.    Generalized: dry, fragile   Lt cheek: brown skin tag, hard  Posterior Rt knee: skin tag, soft  BLE: dry, discoloration to lower leg  Heels: red, blanching, boggy, calloused, dry, mepilex applied  Top left foot: skin tag, brown, hard    Pt turns self, waffle over lay

## 2020-08-03 NOTE — CARE PLAN
Problem: Safety  Goal: Will remain free from falls  Outcome: PROGRESSING AS EXPECTED  Intervention: Implement fall precautions  Note: Bed alarm, and call light use. Pt oriented to belongings around him. Tele sitter in use. Staff assistance with ambulation     Problem: Respiratory:  Goal: Respiratory status will improve  Outcome: PROGRESSING AS EXPECTED  Intervention: Administer and titrate oxygen therapy  Note: Ween off of oxygen supplementation as tolerated, pt intermittently tolerating RA

## 2020-08-03 NOTE — PROGRESS NOTES
[facility] Medicine Daily Progress Note    Date of Service  8/3/2020    Chief Complaint  84 y.o. male admitted 7/9/2020 with cough, nausea, vomiting, diarrhea, weakness    Hospital Course    Mr. Ovi Bejarano is a 84 y.o. male with history of diabetes, hyperlipidemia, BPH who presented on 7/9/2020 with cough, nausea, vomiting, diarrhea, progressive weakness x1-2 weeks.  On presentation he was dehydrated with hypotension and found to have acute renal failure.  Found to be COVID positive.  Initially admitted to the ICU.  Started on therapeutic dose enoxaparin for elevated d-dimer.  Patient did report abdominal pain.  A right renal mass was demonstrated on ultrasound and confirmed with CT.  Patient is confused and a poor candidate for surgery.  Unable to reach family or next of kin.  He was made DNR/DNI 7/23.        Interval Problem Update    7/27.  Afebrile.  Episode of tachycardia yesterday.  On 2.5 L nasal cannula.  Oriented only to person.  Denies fever, chills, chest pains, shortness of breath.  Do have new contact information for TY Stephens.  If unable to contact will pursue guardianship.  7/28  WE may of found his wife , who is also hospitalized at this time.     Patient has no new complaints and is stable overnight    Afebrile      On 2.5 liters sat of 96    7/29     No new events    o2 sat  97 on 2.5 liters    Repeat covid test today    7/30 covid test negative today      Oxygen stable on 2.5 liters    No new complaints    Case d/w case ,manager    7/31 we will likely have to pursue guardianship    No new overnight issues    Low potassium 3.2       8/1     Case d/w floor rn. Npo new issues     o2 sat of 98 on 2 liters     Repeat COVID was negative     8/2 as per infection control, ok to transfer to medical floor     Patient has no new complaints     Floor rn noted blood in the urine    8/3 patient with no complaints at this time.  Nurse reports that he continues to have ordered Ensure but it is decreased,  since the Lovenox was discontinued yesterday.  Case management continues to work on obtaining guardianship of the patient.  Though his mental status seems to have improved during this admission.  Psychiatry has been reconsulted to reevaluate the patient to determine whether he continues to require guardianship or whether he can make medical decisions on his own and then be placed.    Consultants/Specialty  Palliative  Psychiatry    Code Status  DNR/DNI    Disposition  Pending psychiatry evaluation for patient's ability to make medical decisions.  Currently case management working on obtaining a guardianship if he is unable to make medical decisions if he is capable to work on placement.    Review of Systems  Review of Systems   Constitutional: Negative for chills, fever and weight loss.   HENT: Negative for hearing loss, sore throat and tinnitus.    Eyes: Positive for blurred vision. Negative for pain.   Respiratory: Negative for cough, shortness of breath and wheezing.    Cardiovascular: Negative for chest pain, palpitations and leg swelling.   Gastrointestinal: Negative for abdominal pain, blood in stool, diarrhea, nausea and vomiting.   Genitourinary: Negative for dysuria and frequency.   Musculoskeletal: Negative for back pain, joint pain and myalgias.   Skin: Negative for itching and rash.   Neurological: Negative for dizziness, tingling, weakness and headaches.   Endo/Heme/Allergies: Negative for polydipsia. Does not bruise/bleed easily.   Psychiatric/Behavioral: Negative for depression. The patient is not nervous/anxious.    All other systems reviewed and are negative.       Physical Exam  Temp:  [36.2 °C (97.1 °F)-36.3 °C (97.4 °F)] 36.2 °C (97.2 °F)  Pulse:  [69-86] 69  Resp:  [16-18] 18  BP: (102-115)/(44-48) 109/47  SpO2:  [82 %-99 %] 99 %    Physical Exam  Vitals signs and nursing note reviewed.   Constitutional:       General: He is not in acute distress.     Appearance: Normal appearance. He is  ill-appearing.      Comments: Appears frail.   HENT:      Head: Normocephalic and atraumatic.      Nose: Nose normal.      Mouth/Throat:      Mouth: Mucous membranes are moist.      Pharynx: Oropharynx is clear.   Eyes:      Conjunctiva/sclera: Conjunctivae normal.      Comments: Patient is blind   Neck:      Musculoskeletal: Normal range of motion and neck supple.   Cardiovascular:      Rate and Rhythm: Normal rate and regular rhythm.      Pulses: Normal pulses.      Heart sounds: Normal heart sounds. No murmur. No friction rub. No gallop.    Pulmonary:      Effort: Pulmonary effort is normal. No respiratory distress.      Breath sounds: Normal breath sounds. No wheezing or rales.   Chest:      Chest wall: No tenderness.   Abdominal:      General: Abdomen is flat. Bowel sounds are normal. There is no distension.      Palpations: Abdomen is soft. There is no mass.      Tenderness: There is no abdominal tenderness. There is no guarding.   Musculoskeletal: Normal range of motion.   Skin:     General: Skin is warm.      Capillary Refill: Capillary refill takes less than 2 seconds.   Neurological:      General: No focal deficit present.      Mental Status: He is alert and oriented to person, place, and time.      Cranial Nerves: No cranial nerve deficit.      Motor: No weakness.   Psychiatric:         Mood and Affect: Mood normal.         Speech: Speech normal.         Behavior: Behavior normal. Behavior is cooperative.         Fluids    Intake/Output Summary (Last 24 hours) at 8/3/2020 1157  Last data filed at 8/3/2020 1053  Gross per 24 hour   Intake 420 ml   Output --   Net 420 ml       Laboratory  Recent Labs     08/03/20 0224   WBC 6.1   RBC 3.34*   HEMOGLOBIN 10.2*   HEMATOCRIT 31.8*   MCV 95.2   MCH 30.5   MCHC 32.1*   RDW 42.9   PLATELETCT 271   MPV 9.4     Recent Labs     08/03/20 0224   SODIUM 137   POTASSIUM 3.2*   CHLORIDE 96   CO2 29   GLUCOSE 122*   BUN 21   CREATININE 0.98   CALCIUM 9.1                    Imaging  CT-RENAL WITH & W/O   Final Result      1.  Large complex right renal mass as detailed above measuring approximately 7.8 x 7.1x  8.5 cm. Primary differential considerations include urothelial malignancy or renal cell carcinoma.   2.  No evidence of adenopathy or metastatic disease is seen.   3.  Distended urinary bladder. Right greater left hydroureteronephrosis.   4.  Severe atherosclerosis. 3.5 cm infrarenal abdominal aortic aneurysm.            US-RUQ   Final Result         1.  Echogenic liver compatible with fatty change versus fibrosis.   2.  Masslike structure in the right kidney, should be considered neoplastic unless proven otherwise, recommend follow-up 3 phase CT of the kidneys for further characterization   3.  Mild dilatation of the common bile duct, within expected limits given patient age.      These findings were discussed with the patient's clinician, Vin Mei, on 7/23/2020 7:34 AM.      DX-CHEST-PORTABLE (1 VIEW)   Final Result      1.  The lungs are hyperinflated suggesting emphysema/COPD.   2.  There is minimal predominantly lower lobe interstitial opacity which is most likely due to chronic scarring.           Assessment/Plan  Hematuria  Assessment & Plan  Patient found to have hematuria yesterday UA was sent out which showed significant blood.  Patient reportedly has a history of BPH and had been on anticoagulation with Lovenox which was discontinued yesterday.  Patient will remain off of Lovenox for now.    COVID-19 virus infection  Assessment & Plan  Presented to the ED on 7/9 and was covered positive his had a lengthy stay  Patient had been on therapeutic Lovenox but this was discontinued due to hematuria.  Current infection prevention patient is considered recovered.    COPD (chronic obstructive pulmonary disease) (HCC)- (present on admission)  Assessment & Plan  No s/o exacerbation at this time spO2 99 room air no wheezing  RT protocol  Completed 10-day course of  dexamethasone for COVID    Altered mental status  Assessment & Plan  During the hospitalization stay patient was noted to have altered mental status, his baseline mental status was unknown.  There was concern that this may be acute secondary to COVID, weakness and malnutrition.  He is A and O x3 on examination today.  He knows the month and year, why he is at the hospital, his name and location.   Will reconsult psychiatry to reevaluate the patient's ability to make medical decisions.  If he is cleared by psychiatry we will then work with case management to find placement if he is not cleared we will continue pursuing guardianship of the patient.    Type 2 diabetes mellitus (HCC)- (present on admission)  Assessment & Plan  Lab Results   Component Value Date/Time    HBA1C 6.8 (H) 07/10/2020 0335    HBA1C 6.9 (H) 03/08/2018 1124    HBA1C 8.1 (H) 12/15/2017 1108     Results from last 7 days   Lab Units 07/24/20  1129 07/24/20  0746 07/23/20  2229 07/23/20  1630 07/23/20  1132 07/23/20  0744 07/22/20  1938 07/22/20  1650   ACCU CHECK GLUCOSE 788 mg/dL 116* 113* 124* 174* 138* 108* 178* 187*     Blood sugars well controlled.  Will discontinue Accu-Cheks.      Advanced care planning/counseling discussion  Assessment & Plan  Patient isDNR/DNI  Guardianship being initiated    Malnutrition (HCC)  Assessment & Plan  Currently on a diabetic diet which she is tolerating well  Routine monitoring, periodic labs.    Urinary retention- (present on admission)  Assessment & Plan  Reported history of BPH   flomax        VTE prophylaxis: Lovenox was discontinued due to hematuria, SCDs

## 2020-08-03 NOTE — PROGRESS NOTES
A&Ox4, VSS on 2LNC. Tele-sitter. Blind. Use of cane to ambulate. Bloody/brown urine and stool, UA and stool sample ordered. Call light in reach, bed locked/lowest position/alarm set. WCTM.

## 2020-08-03 NOTE — DISCHARGE PLANNING
Hospital Care Management Discharge Planning       Anticipated Discharge Disposition:   · TBD     Action:   · [facility] obtained completed Physicians Certificate.  · Physicians Certificate emailed to Wandy Blandon  · [facility]NA contact CM of [facility] to update.     Barriers to Discharge:   · Guardianship     Plan:   · Continue to provide support services and assistance with discharge planning as needed.

## 2020-08-03 NOTE — CONSULTS
"PSYCHIATRIC FOLLOW-UP:(established)  *Reason for admission:    presents to the emergency department complaining of generalized weakness cough, vomiting and diarrhea.   The last several days he has had increased weakness to the point today where his legs seem to give out while he was at the store.He states he has been more weak appearing than usual and drinking \"a lot of water\". Per [place]:   had a mild GLF/lowered self to the ground in the [facility] parking lot.  Pt reports symptoms have been going on for approximately 1 month.  Pt has not been eating and has only been drinking water for the past few days.  Pt noted to have dried vomitus around mouth and dried diarrhea on leg.  Pt is supposed to be taking diabetic medications but has not been on them for some time.  Note : 7/9:   there is a roommate, and/or a \"wife\" needed help getting home from the ED after arriving by EMS..   reconsulted for capacity.             *Legal Hold Status:    NA             *HPI:   He is less guarded than when first seen and is eating. However he still tells me his GF is at home, then that she might be dead, then that they do the shopping together and \"what's wrong with that?\". When I explain I am confused and am concerned about whether he lives alone or not, how he is going to get his food if he doesn't drive, who would he call if he fell, I do not get clarifying answers.   When asked about medical problems, he says he doesn't have any.        *Psychiatric Examination:  Vitals:Blood pressure 109/47, pulse 69, temperature 36.2 °C (97.2 °F), temperature source Temporal, resp. rate 18, height 1.727 m (5' 7.99\"), weight 54.1 kg (119 lb 4.3 oz), SpO2 99 %.  General Appearance: eating, looks in my direction at times  Abnormal Movements: none  Gait and Posture: sitting up on the edge of the bed  Speech: accented and wnl  Thought Process: normal rate  Associations: concrete   Abnormal or Psychotic Thoughts: confused     Judgement and " "Insight:impaired  Orientation: grossly intact  Recent and Remote Memory: grossly intact  Attention Span and Concentration:still has some trouble with focus but better than before  Language:not tested  Fund of Knowledge:not tested  Mood and Affect: euthymic even though his \"GF\" may be dead.   SI/HI: denies      *ASSESSMENT/PLAN:  1. Neurocognitive disorder unspc       2. Medical   - HTN       -DMII  -COVID +  -L shift  -LDH and ferritin very high  -COPD  -visually impaired, \"blind\" per notes     *Legal hold: NA. He remains INCAPACITATED to make placement decisions. There might be someone at home who can supervise him. Social work will need to check that out. If there isn't anyone that can help him with food, medical appointments, and make sure he cares for himself, then he needs placement. He cannot care for self on own.          *Signing off      "

## 2020-08-03 NOTE — ASSESSMENT & PLAN NOTE
-Psychiatry was consulted, patient incapacitated to make any medical decisions.  -Guardianship established. Needs placement to

## 2020-08-04 PROCEDURE — 700102 HCHG RX REV CODE 250 W/ 637 OVERRIDE(OP): Performed by: INTERNAL MEDICINE

## 2020-08-04 PROCEDURE — A9270 NON-COVERED ITEM OR SERVICE: HCPCS | Performed by: HOSPITALIST

## 2020-08-04 PROCEDURE — 99232 SBSQ HOSP IP/OBS MODERATE 35: CPT | Performed by: HOSPITALIST

## 2020-08-04 PROCEDURE — A9270 NON-COVERED ITEM OR SERVICE: HCPCS | Performed by: INTERNAL MEDICINE

## 2020-08-04 PROCEDURE — 94760 N-INVAS EAR/PLS OXIMETRY 1: CPT

## 2020-08-04 PROCEDURE — 700102 HCHG RX REV CODE 250 W/ 637 OVERRIDE(OP): Performed by: HOSPITALIST

## 2020-08-04 PROCEDURE — 770006 HCHG ROOM/CARE - MED/SURG/GYN SEMI*

## 2020-08-04 RX ADMIN — THERA TABS 1 TABLET: TAB at 07:35

## 2020-08-04 RX ADMIN — TAMSULOSIN HYDROCHLORIDE 0.8 MG: 0.4 CAPSULE ORAL at 07:34

## 2020-08-04 RX ADMIN — AMLODIPINE BESYLATE 5 MG: 5 TABLET ORAL at 07:35

## 2020-08-04 RX ADMIN — QUETIAPINE FUMARATE 25 MG: 25 TABLET ORAL at 17:55

## 2020-08-04 NOTE — PROGRESS NOTES
Patient is A/Ox3, up with SBA using single point cane, and on 1 LPM via NC. No complaints of pain. Pt is legally blind and has glasses at bedside. Tele-sitter in place, most likely d/c monitoring in AM as pt has desk bed. Covid swab negative on 7/29/20. Pt is safe with needs met. Bed low and locked. Hourly rounding in place. No signs of acute distress.

## 2020-08-04 NOTE — RESPIRATORY CARE
Oxygen Rounds      Patient found on    O2 L/m:  ____1_____    Oxygen device:  ____nc____   Spo2: ____97_____%      Patient titrated to   O2 L/m: ____na____   Oxygen device: _________   Spo2: ______95___%   Respiratory device skin site inspection completed.

## 2020-08-04 NOTE — DISCHARGE PLANNING
Medical Social Work  SW completed a chart review on patient's significant other, patient was admitted to [facility] on 7/27/2020.  According to chart notes PT is recommending she has 24/7 supervision, her family is not able to take care of her.

## 2020-08-04 NOTE — DISCHARGE PLANNING
"Anticipated Discharge Disposition: Group Home    Action: SW introduced himself to patient and his role.  SW asked patient if someone has talk to him about recommendations of the medical team for placement,  Patient stated no. SW went over recommendations to team, guardianship as patient needs 24/7 and can not make his placement/medical decisions.  Patient stated he is okay and can take care of himself.  SW asked who he is living with at the [facility], patient thought for a while and stated he doesn't remember.  SW asked patient if he is on any medications at home. Patient stated he does not take any medications and can go home.  \"I can take care of myself.\"  SW asked if he can see/blind, patient stated he can see. SW asked patient if he remembers if he is receiving Social Security Benefits, SW told yes a little over a $1000 per month.  Patient believes it goes on a debit card, not sure though.  SW asked if anyone has access to his debit card. Patient state no as he has it now, SW asked patient if he would mind looking in his wallet for it.  Patient gave approval and approval for SW to look in his wallet.  Patient stated he has $5.00 in it.  SW was able to [employer] Credit Card and [employer] Debit Card, along with $1800 in one hundred dollar bills.   SW told patient that he has more money in his wallet than $5.00 and the dollar amount.  Patient stated he did not and SW was wrong.  SW explained to patient that having this much money on his possession is not safe.  SW informed patient that his funds will be placed in Safe Keeping.  Patient again stated he can take care of himself and we are all making a big deal of out nothing.      SW asked patient if he was , patient stated he is still  and her name is Laverne.  Patient has not seen her in a very long time, doesn't know where she is or if she is even alive.  They were  in the [facility], SW asked if he has children.  Patient stated he thinks he " "has 5 or 6 children, does not know where they are or when he last saw them.  Told SW that they may be living in [facility], not sure where they live.  SW asked if he has grandchildren, patient stated he doesn't known.  Patient asked SW why so many questions.  SW stated that we need to find his family to see if they are willing to help take care of him.  Patient told SW that he doesn't need anyone to take care of him.  \"I can take care of myself.\"     to [employer], [id number], SW requested patient's funds be placed in Safe along with his credit care and debit card.    #[id number]    Barriers to Discharge: guardianship/medicaid application    Plan: continue to monitor for discharge barriers    "

## 2020-08-04 NOTE — CARE PLAN
Problem: Safety  Goal: Will remain free from falls  Outcome: PROGRESSING AS EXPECTED  Note: Pt has been educated on fall precautions in place to prevent future falls. Bed is in low/locked position, yellow non-skid socks in place, appropriate signage in place on pt door, cane is stored away from bedside.     Problem: Knowledge Deficit  Goal: Knowledge of disease process/condition, treatment plan, diagnostic tests, and medications will improve  Outcome: PROGRESSING AS EXPECTED  Note: Pt was updated on plan of care for the day including placement and guardianship proceedings.

## 2020-08-04 NOTE — CARE PLAN
Problem: Safety  Goal: Will remain free from falls  Outcome: PROGRESSING AS EXPECTED  Note: Patient will have no falls by end of shift. Bed low and locked. Tele monitoring in progress.     Problem: Discharge Barriers/Planning  Goal: Patient's continuum of care needs will be met  Outcome: PROGRESSING AS EXPECTED  Intervention: Involve patient and significant other/support system in setting and prioritizing goals for hospital stay and discharge  Note: Patient has no NOK and will need guardianship and then placement. SW on board.

## 2020-08-04 NOTE — PROGRESS NOTES
Bedside report received at change of shift. Patient was resting in bed, eyes closed, even unlabored breathing. No indication of distress or discomfort.      Pt is A&Ox3 (disoriented to situation) on 0.5L O2 via NC and tolerating well. No indication of pain/discomfort at this time. Pt denies pain at this time.      Needs attended. No additional needs at this time.  Call light and personal belongings within reach. Bed in low locked position. Hourly rounding in place.

## 2020-08-05 LAB
ALBUMIN SERPL BCP-MCNC: 3.1 G/DL (ref 3.2–4.9)
BASOPHILS # BLD AUTO: 0.4 % (ref 0–1.8)
BASOPHILS # BLD: 0.03 K/UL (ref 0–0.12)
BUN SERPL-MCNC: 19 MG/DL (ref 8–22)
CALCIUM SERPL-MCNC: 9 MG/DL (ref 8.5–10.5)
CHLORIDE SERPL-SCNC: 95 MMOL/L (ref 96–112)
CO2 SERPL-SCNC: 29 MMOL/L (ref 20–33)
CREAT SERPL-MCNC: 1.13 MG/DL (ref 0.5–1.4)
EOSINOPHIL # BLD AUTO: 0.25 K/UL (ref 0–0.51)
EOSINOPHIL NFR BLD: 3.3 % (ref 0–6.9)
ERYTHROCYTE [DISTWIDTH] IN BLOOD BY AUTOMATED COUNT: 43.8 FL (ref 35.9–50)
GLUCOSE SERPL-MCNC: 104 MG/DL (ref 65–99)
HCT VFR BLD AUTO: 32.9 % (ref 42–52)
HGB BLD-MCNC: 10.4 G/DL (ref 14–18)
IMM GRANULOCYTES # BLD AUTO: 0.22 K/UL (ref 0–0.11)
IMM GRANULOCYTES NFR BLD AUTO: 2.9 % (ref 0–0.9)
LYMPHOCYTES # BLD AUTO: 1.95 K/UL (ref 1–4.8)
LYMPHOCYTES NFR BLD: 26.1 % (ref 22–41)
MAGNESIUM SERPL-MCNC: 2.1 MG/DL (ref 1.5–2.5)
MCH RBC QN AUTO: 29.9 PG (ref 27–33)
MCHC RBC AUTO-ENTMCNC: 31.6 G/DL (ref 33.7–35.3)
MCV RBC AUTO: 94.5 FL (ref 81.4–97.8)
MONOCYTES # BLD AUTO: 0.81 K/UL (ref 0–0.85)
MONOCYTES NFR BLD AUTO: 10.8 % (ref 0–13.4)
NEUTROPHILS # BLD AUTO: 4.22 K/UL (ref 1.82–7.42)
NEUTROPHILS NFR BLD: 56.5 % (ref 44–72)
NRBC # BLD AUTO: 0 K/UL
NRBC BLD-RTO: 0 /100 WBC
PHOSPHATE SERPL-MCNC: 3.8 MG/DL (ref 2.5–4.5)
PLATELET # BLD AUTO: 361 K/UL (ref 164–446)
PMV BLD AUTO: 9.3 FL (ref 9–12.9)
POTASSIUM SERPL-SCNC: 3.6 MMOL/L (ref 3.6–5.5)
RBC # BLD AUTO: 3.48 M/UL (ref 4.7–6.1)
SODIUM SERPL-SCNC: 138 MMOL/L (ref 135–145)
WBC # BLD AUTO: 7.5 K/UL (ref 4.8–10.8)

## 2020-08-05 PROCEDURE — 36415 COLL VENOUS BLD VENIPUNCTURE: CPT

## 2020-08-05 PROCEDURE — 85025 COMPLETE CBC W/AUTO DIFF WBC: CPT

## 2020-08-05 PROCEDURE — 700102 HCHG RX REV CODE 250 W/ 637 OVERRIDE(OP): Performed by: HOSPITALIST

## 2020-08-05 PROCEDURE — A9270 NON-COVERED ITEM OR SERVICE: HCPCS | Performed by: HOSPITALIST

## 2020-08-05 PROCEDURE — A9270 NON-COVERED ITEM OR SERVICE: HCPCS | Performed by: INTERNAL MEDICINE

## 2020-08-05 PROCEDURE — 80069 RENAL FUNCTION PANEL: CPT

## 2020-08-05 PROCEDURE — 83735 ASSAY OF MAGNESIUM: CPT

## 2020-08-05 PROCEDURE — 700102 HCHG RX REV CODE 250 W/ 637 OVERRIDE(OP): Performed by: INTERNAL MEDICINE

## 2020-08-05 PROCEDURE — 770006 HCHG ROOM/CARE - MED/SURG/GYN SEMI*

## 2020-08-05 PROCEDURE — 99232 SBSQ HOSP IP/OBS MODERATE 35: CPT | Performed by: HOSPITALIST

## 2020-08-05 RX ADMIN — TAMSULOSIN HYDROCHLORIDE 0.8 MG: 0.4 CAPSULE ORAL at 08:06

## 2020-08-05 RX ADMIN — QUETIAPINE FUMARATE 25 MG: 25 TABLET ORAL at 18:04

## 2020-08-05 RX ADMIN — AMLODIPINE BESYLATE 5 MG: 5 TABLET ORAL at 05:23

## 2020-08-05 RX ADMIN — THERA TABS 1 TABLET: TAB at 05:23

## 2020-08-05 NOTE — PROGRESS NOTES
Received report and assumed care at shift change. Patient is A&O 3, no complain of pain or distress. Fall precautions in place. Ongoing monitoring by tele sitter. Needs attended to.

## 2020-08-05 NOTE — PROGRESS NOTES
Bedside report received. Pt is A&Ox4. Pt is resting in bed most of the day. Pt has no complaints of pain at this time. Fall precautions in place. POC discussed with pt; all questions answered at this time.

## 2020-08-05 NOTE — PROGRESS NOTES
[facility] Medicine Daily Progress Note    Date of Service  8/4/2020    Chief Complaint  84 y.o. male admitted 7/9/2020 with   Chief Complaint   Patient presents with   • Cough   • Tired   • Loss of Appetite   • N/V   • Diarrhea         Interval Problem Update  No acute events overnight.  Patient is noted to be pleasantly confused  --- Vitals are stable, currently saturating 91% on 1 L    Consultants/Specialty  psychiatry    Code Status  DNI/DNR    Disposition  tbd    Review of Systems  ROS   Limited secondary to patient mentation    Physical Exam  Temp:  [36.1 °C (97 °F)-37 °C (98.6 °F)] 36.2 °C (97.1 °F)  Pulse:  [74-93] 93  Resp:  [16-18] 17  BP: (109-121)/(40-60) 109/43  SpO2:  [91 %-97 %] 91 %    Physical Exam  Vitals signs and nursing note reviewed.   Constitutional:       Appearance: Normal appearance.   HENT:      Head: Normocephalic and atraumatic.      Comments: blnd  Neck:      Musculoskeletal: Neck supple.   Cardiovascular:      Rate and Rhythm: Normal rate.   Pulmonary:      Effort: Pulmonary effort is normal.   Abdominal:      General: Abdomen is flat.   Neurological:      Cranial Nerves: No cranial nerve deficit.      Comments: Blood and oriented x2-3, does follow commands         Fluids    Intake/Output Summary (Last 24 hours) at 8/4/2020 1831  Last data filed at 8/4/2020 1400  Gross per 24 hour   Intake 1200 ml   Output --   Net 1200 ml       Laboratory  Recent Labs     08/03/20  0224   WBC 6.1   RBC 3.34*   HEMOGLOBIN 10.2*   HEMATOCRIT 31.8*   MCV 95.2   MCH 30.5   MCHC 32.1*   RDW 42.9   PLATELETCT 271   MPV 9.4     Recent Labs     08/03/20  0224   SODIUM 137   POTASSIUM 3.2*   CHLORIDE 96   CO2 29   GLUCOSE 122*   BUN 21   CREATININE 0.98   CALCIUM 9.1                   Imaging  CT-RENAL WITH & W/O   Final Result      1.  Large complex right renal mass as detailed above measuring approximately 7.8 x 7.1x  8.5 cm. Primary differential considerations include urothelial malignancy or renal cell  carcinoma.   2.  No evidence of adenopathy or metastatic disease is seen.   3.  Distended urinary bladder. Right greater left hydroureteronephrosis.   4.  Severe atherosclerosis. 3.5 cm infrarenal abdominal aortic aneurysm.            US-RUQ   Final Result         1.  Echogenic liver compatible with fatty change versus fibrosis.   2.  Masslike structure in the right kidney, should be considered neoplastic unless proven otherwise, recommend follow-up 3 phase CT of the kidneys for further characterization   3.  Mild dilatation of the common bile duct, within expected limits given patient age.      These findings were discussed with the patient's clinician, Vin Mei, on 7/23/2020 7:34 AM.      DX-CHEST-PORTABLE (1 VIEW)   Final Result      1.  The lungs are hyperinflated suggesting emphysema/COPD.   2.  There is minimal predominantly lower lobe interstitial opacity which is most likely due to chronic scarring.           Assessment/Plan  Hematuria  Assessment & Plan  Noted to be on Lovenox, had hematuria.  Lovenox stopped, monitor hemoglobin hematocrit and hematuria   --- If persistent hematuria, will consider starting continuous bladder irrigation    COVID-19 virus infection  Assessment & Plan  Presented to the ED on 7/9 and was covered positive his had a lengthy stay   --Patient had been on therapeutic Lovenox but this was discontinued due to hematuria.  Current infection prevention patient is considered recovered.    COPD (chronic obstructive pulmonary disease) (HCC)- (present on admission)  Assessment & Plan    Continue RT protocol  Completed 10-day course of dexamethasone for COVID    Altered mental status  Assessment & Plan  During the hospitalization stay patient was noted to have altered mental status, his baseline mental status was unknown.  There was concern that this may be acute secondary to COVID, weakness and malnutrition.    Patient is alert and oriented x3.  Psychiatry was consulted, patient  cannot make any medical decision.  [facility] working on [facility]    Type 2 diabetes mellitus (HCC)- (present on admission)  Assessment & Plan  Lab Results   Component Value Date/Time    HBA1C 6.8 (H) 07/10/2020 0335    HBA1C 6.9 (H) 03/08/2018 1124    HBA1C 8.1 (H) 12/15/2017 1108       Hemoglobin A1c at 6.8, continue diabetic diet, monitor    Hypokalemia  Assessment & Plan  Monitor and replete   Obtain mag     Advanced care planning/counseling discussion  Assessment & Plan  Patient isDNR/DNI  Guardianship being initiated    Malnutrition (HCC)  Assessment & Plan  Currently on a diabetic diet which she is tolerating well  Routine monitoring, periodic labs.    Urinary retention- (present on admission)  Assessment & Plan  Reported history of BPH   flomax        VTE prophylaxis:  scd

## 2020-08-05 NOTE — PROGRESS NOTES
[facility] Medicine Daily Progress Note    Date of Service  8/5/2020    Chief Complaint  84 y.o. male admitted 7/9/2020 with   Chief Complaint   Patient presents with   • Cough   • Tired   • Loss of Appetite   • N/V   • Diarrhea         This 84-year-old male with a past medical history significant for diabetes, hyperlipidemia, BPH presented on 7/96/2020 with a complaint of nausea vomiting diarrhea progressive weakness for the 1 to 2 weeks along with cough.  Upon presents that he is noted to be dehydrated, hypotensive along with acute renal failure.    He is found to be covid positive.  He is initially admitted to ICU started on therapeutic dose of Lovenox for elevated d-dimer.  During the stay in the hospital he continued to receive IV fluids, he did renal function improved. C-dif negative           Interval Problem Update  No acute events overnight.  Patient is noted to be pleasantly confused  --- Vitals are stable, currently saturating 91% on 1 L    8/5:  --- No acute events overnight, laying in bed, denied any complaint.  Patient is alert and oriented x4, denied any complaint   - CM working  On dispo    Consultants/Specialty  psychiatry    Code Status  DNI/DNR    Disposition  tbd    Review of Systems  ROS   Limited secondary to patient mentation    Physical Exam  Temp:  [36.1 °C (96.9 °F)-36.7 °C (98 °F)] 36.7 °C (98 °F)  Pulse:  [70-93] 70  Resp:  [14-18] 18  BP: (100-139)/(43-65) 114/56  SpO2:  [91 %-99 %] 99 %    Physical Exam  Vitals signs and nursing note reviewed.   Constitutional:       Appearance: Normal appearance.   HENT:      Head: Normocephalic and atraumatic.      Comments: blnd  Neck:      Musculoskeletal: Neck supple.   Cardiovascular:      Rate and Rhythm: Normal rate.   Pulmonary:      Effort: Pulmonary effort is normal.   Abdominal:      General: Abdomen is flat.      Palpations: Abdomen is soft.   Neurological:      Mental Status: He is alert.      Cranial Nerves: No cranial nerve deficit.       Comments: Alert and oriented x2-3, does follow commands   Psychiatric:         Mood and Affect: Mood normal.         Fluids    Intake/Output Summary (Last 24 hours) at 8/5/2020 1345  Last data filed at 8/5/2020 1100  Gross per 24 hour   Intake 1080 ml   Output --   Net 1080 ml       Laboratory  Recent Labs     08/03/20 0224 08/05/20  0258   WBC 6.1 7.5   RBC 3.34* 3.48*   HEMOGLOBIN 10.2* 10.4*   HEMATOCRIT 31.8* 32.9*   MCV 95.2 94.5   MCH 30.5 29.9   MCHC 32.1* 31.6*   RDW 42.9 43.8   PLATELETCT 271 361   MPV 9.4 9.3     Recent Labs     08/03/20 0224 08/05/20  0258   SODIUM 137 138   POTASSIUM 3.2* 3.6   CHLORIDE 96 95*   CO2 29 29   GLUCOSE 122* 104*   BUN 21 19   CREATININE 0.98 1.13   CALCIUM 9.1 9.0                   Imaging  CT-RENAL WITH & W/O   Final Result      1.  Large complex right renal mass as detailed above measuring approximately 7.8 x 7.1x  8.5 cm. Primary differential considerations include urothelial malignancy or renal cell carcinoma.   2.  No evidence of adenopathy or metastatic disease is seen.   3.  Distended urinary bladder. Right greater left hydroureteronephrosis.   4.  Severe atherosclerosis. 3.5 cm infrarenal abdominal aortic aneurysm.            US-RUQ   Final Result         1.  Echogenic liver compatible with fatty change versus fibrosis.   2.  Masslike structure in the right kidney, should be considered neoplastic unless proven otherwise, recommend follow-up 3 phase CT of the kidneys for further characterization   3.  Mild dilatation of the common bile duct, within expected limits given patient age.      These findings were discussed with the patient's clinician, Vin Mei, on 7/23/2020 7:34 AM.      DX-CHEST-PORTABLE (1 VIEW)   Final Result      1.  The lungs are hyperinflated suggesting emphysema/COPD.   2.  There is minimal predominantly lower lobe interstitial opacity which is most likely due to chronic scarring.           Assessment/Plan  Hematuria  Assessment &  Plan  Noted to be on Lovenox, had hematuria.  Lovenox stopped, monitor hemoglobin hematocrit and hematuria   --- If persistent hematuria, will consider starting continuous bladder irrigation    COVID-19 virus infection  Assessment & Plan  Presented to the ED on 7/9 and was covered positive his had a lengthy stay   --Patient had been on therapeutic Lovenox but this was discontinued due to hematuria.  Current infection prevention patient is considered recovered.    COPD (chronic obstructive pulmonary disease) ([facility])- (present on admission)  Assessment & Plan    Continue RT protocol  Completed 10-day course of dexamethasone for COVID    Altered mental status  Assessment & Plan  During the hospitalization stay patient was noted to have altered mental status, his baseline mental status was unknown.  There was concern that this may be acute secondary to COVID, weakness and malnutrition.    Patient is alert and oriented x3.  Psychiatry was consulted, patient cannot make any medical decision.  CORINA working on [facility]    Type 2 diabetes mellitus ([facility])- (present on admission)  Assessment & Plan  Lab Results   Component Value Date/Time    HBA1C 6.8 (H) 07/10/2020 0335    HBA1C 6.9 (H) 03/08/2018 1124    HBA1C 8.1 (H) 12/15/2017 1108       Hemoglobin A1c at 6.8, continue diabetic diet, monitor    Hypokalemia  Assessment & Plan   K at 3.6 and mag at 2s    Advanced care planning/counseling discussion  Assessment & Plan  Patient isDNR/DNI  Guardianship being initiated    Malnutrition ([facility])  Assessment & Plan  Currently on a diabetic diet which she is tolerating well  Routine monitoring, periodic labs.    Urinary retention- (present on admission)  Assessment & Plan  Reported history of BPH   flomax        VTE prophylaxis:  scd

## 2020-08-05 NOTE — CARE PLAN
Problem: Safety  Goal: Will remain free from injury  Outcome: PROGRESSING AS EXPECTED  Note: Fall precautions in place. Bed in lowest position. Non-skid socks in place. Personal possessions within reach. Mobility sign on door. Bed-alarm on. Call light within reach. Pt educated regarding fall prevention and states understanding.       Problem: Knowledge Deficit  Goal: Knowledge of disease process/condition, treatment plan, diagnostic tests, and medications will improve  Outcome: PROGRESSING AS EXPECTED  Note: Pt educated regarding plan of care and medications. All questions answered.

## 2020-08-06 PROCEDURE — 770006 HCHG ROOM/CARE - MED/SURG/GYN SEMI*

## 2020-08-06 PROCEDURE — 700102 HCHG RX REV CODE 250 W/ 637 OVERRIDE(OP): Performed by: HOSPITALIST

## 2020-08-06 PROCEDURE — A9270 NON-COVERED ITEM OR SERVICE: HCPCS | Performed by: INTERNAL MEDICINE

## 2020-08-06 PROCEDURE — A9270 NON-COVERED ITEM OR SERVICE: HCPCS | Performed by: HOSPITALIST

## 2020-08-06 PROCEDURE — 700102 HCHG RX REV CODE 250 W/ 637 OVERRIDE(OP): Performed by: INTERNAL MEDICINE

## 2020-08-06 PROCEDURE — 99231 SBSQ HOSP IP/OBS SF/LOW 25: CPT | Performed by: HOSPITALIST

## 2020-08-06 PROCEDURE — 51798 US URINE CAPACITY MEASURE: CPT

## 2020-08-06 RX ORDER — FINASTERIDE 5 MG/1
5 TABLET, FILM COATED ORAL DAILY
Status: DISCONTINUED | OUTPATIENT
Start: 2020-08-06 | End: 2020-08-31

## 2020-08-06 RX ADMIN — FINASTERIDE 5 MG: 5 TABLET, FILM COATED ORAL at 15:25

## 2020-08-06 RX ADMIN — THERA TABS 1 TABLET: TAB at 07:30

## 2020-08-06 RX ADMIN — TAMSULOSIN HYDROCHLORIDE 0.8 MG: 0.4 CAPSULE ORAL at 10:02

## 2020-08-06 RX ADMIN — QUETIAPINE FUMARATE 25 MG: 25 TABLET ORAL at 17:42

## 2020-08-06 RX ADMIN — AMLODIPINE BESYLATE 5 MG: 5 TABLET ORAL at 07:30

## 2020-08-06 NOTE — PROGRESS NOTES
"Hospital Medicine Daily Progress Note    Date of Service  8/6/2020    Chief Complaint  84 y.o. male admitted 7/9/2020 with   Chief Complaint   Patient presents with   • Cough   • Tired   • Loss of Appetite   • N/V   • Diarrhea         \"This 84-year-old male with a past medical history significant for diabetes, hyperlipidemia, BPH presented on 7/96/2020 with a complaint of nausea vomiting diarrhea progressive weakness for the 1 to 2 weeks along with cough.  Upon presents that he is noted to be dehydrated, hypotensive along with acute renal failure.    He is found to be covid positive.  He is initially admitted to ICU started on therapeutic dose of Lovenox for elevated d-dimer, he completed the 1 month course of with rest Lovenox.  He was on DVT prophylaxis which is hold as patient had hematuria.  Currently patient hematuria has resolved.  Will obtain bladder scan to make sure that patient is not retaining urine.    He was noted to be in acute renal failure which improved ,Hospital course was complicated with hematuria along with urinary retention. which improved.  Will obtain bladder scan.  Continue RT protocol for his COPD, hemoglobin A1c is noted to be at 6.8, continue diabetic diet.  Patient is noted to have cognitive deficit, this started on quetiapine 25mg in the evening time.  Psych has evaluated the patient and stated that patient does not have capacity to make any medical decision; thus guardianship work up pending     Interval Problem Update  No acute events overnight.  Patient is noted to be pleasantly confused  --- Vitals are stable, currently saturating 91% on 1 L    8/5:  --- No acute events overnight, laying in bed, denied any complaint.  Patient is alert and oriented x4, denied any complaint   -  working  On dispo    8/6: No acute events overnight, laying in a bed ,  Has been answering questions.  He stated that he is tired and wanted to sleep.  He is " blind    Consultants/Specialty  psychiatry    Code Status  DNI/DNR    Disposition  tbd    Review of Systems  ROS   Limited secondary to patient mentation    Physical Exam  Temp:  [36.3 °C (97.4 °F)-36.4 °C (97.6 °F)] 36.3 °C (97.4 °F)  Pulse:  [69-81] 69  Resp:  [16-18] 18  BP: (130-132)/(50-61) 132/61  SpO2:  [94 %-97 %] 97 %    Physical Exam  Vitals signs and nursing note reviewed.   Constitutional:       Appearance: Normal appearance.   HENT:      Head: Normocephalic and atraumatic.      Comments: blind  Eyes:      Extraocular Movements: Extraocular movements intact.   Neck:      Musculoskeletal: Neck supple.   Cardiovascular:      Rate and Rhythm: Normal rate and regular rhythm.   Pulmonary:      Effort: Pulmonary effort is normal.   Abdominal:      General: Abdomen is flat.      Palpations: Abdomen is soft.   Neurological:      Mental Status: He is alert.      Cranial Nerves: No cranial nerve deficit.      Comments: Alert and oriented x2-3, does follow commands   Psychiatric:         Mood and Affect: Mood normal.         Fluids  No intake or output data in the 24 hours ending 08/06/20 1423    Laboratory  Recent Labs     08/05/20  0258   WBC 7.5   RBC 3.48*   HEMOGLOBIN 10.4*   HEMATOCRIT 32.9*   MCV 94.5   MCH 29.9   MCHC 31.6*   RDW 43.8   PLATELETCT 361   MPV 9.3     Recent Labs     08/05/20  0258   SODIUM 138   POTASSIUM 3.6   CHLORIDE 95*   CO2 29   GLUCOSE 104*   BUN 19   CREATININE 1.13   CALCIUM 9.0                   Imaging  CT-RENAL WITH & W/O   Final Result      1.  Large complex right renal mass as detailed above measuring approximately 7.8 x 7.1x  8.5 cm. Primary differential considerations include urothelial malignancy or renal cell carcinoma.   2.  No evidence of adenopathy or metastatic disease is seen.   3.  Distended urinary bladder. Right greater left hydroureteronephrosis.   4.  Severe atherosclerosis. 3.5 cm infrarenal abdominal aortic aneurysm.            US-RUQ   Final Result         1.   Echogenic liver compatible with fatty change versus fibrosis.   2.  Masslike structure in the right kidney, should be considered neoplastic unless proven otherwise, recommend follow-up 3 phase CT of the kidneys for further characterization   3.  Mild dilatation of the common bile duct, within expected limits given patient age.      These findings were discussed with the patient's clinician, Vin Mei, on 7/23/2020 7:34 AM.      DX-CHEST-PORTABLE (1 VIEW)   Final Result      1.  The lungs are hyperinflated suggesting emphysema/COPD.   2.  There is minimal predominantly lower lobe interstitial opacity which is most likely due to chronic scarring.           Assessment/Plan  Hematuria  Assessment & Plan  Noted to be on Lovenox, had hematuria.  Lovenox stopped, monitor hemoglobin hematocrit and hematuria   --- If persistent hematuria, will consider starting continuous bladder irrigation    COVID-19 virus infection  Assessment & Plan  Presented to the ED on 7/9 and was covered positive his had a lengthy stay   --Patient had been on therapeutic Lovenox but this was discontinued due to hematuria.  Current infection prevention patient is considered recovered.    COPD (chronic obstructive pulmonary disease) ([facility])- (present on admission)  Assessment & Plan    Continue RT protocol  Completed 10-day course of dexamethasone for COVID    Altered mental status  Assessment & Plan  During the hospitalization stay patient was noted to have altered mental status, his baseline mental status was unknown.  There was concern that this may be acute secondary to COVID, weakness and malnutrition.    Patient is alert and oriented x3.  Psychiatry was consulted, patient cannot make any medical decision.  CORINA working on [facility]    Type 2 diabetes mellitus ([facility])- (present on admission)  Assessment & Plan  Lab Results   Component Value Date/Time    HBA1C 6.8 (H) 07/10/2020 0335    HBA1C 6.9 (H) 03/08/2018 1124    HBA1C 8.1 (H) 12/15/2017  1108       Hemoglobin A1c at 6.8, continue diabetic diet, monitor    Hypokalemia  Assessment & Plan    Replete and repeat    Advanced care planning/counseling discussion  Assessment & Plan  Patient isDNR/DNI  Guardianship being initiated    Malnutrition ([facility])  Assessment & Plan  Currently on a diabetic diet which she is tolerating well  Routine monitoring, periodic labs.    Urinary retention- (present on admission)  Assessment & Plan  Reported history of BPH   flomax        VTE prophylaxis:  scd

## 2020-08-06 NOTE — PROGRESS NOTES
Received bedside report and assumed care of pt at 0700. He is A&Ox3 disoriented to situation. Pt is blind in both eyes and uses hand held assist and cane for ambulating. Assessment was completed and stated no pain at that time. He is on 1/2L NC. Fall precautions are in place. Oriented him to positioning of call light and bedside table with water. No other needs at this time. Bed is locked and in lowest position.

## 2020-08-06 NOTE — PROGRESS NOTES
Received report and assumed care at shift change. Patient is A&O x 3, blind on both eyes. No complain of pain or distress. Continue to remind patient to call for assistance. Fall precautions in place, bed locked and in lowest position. Needs attended to.

## 2020-08-06 NOTE — PROGRESS NOTES
Bladder scan result >999. Ambulated pt to bathroom to try and urinate. Stated he could not pee. Hat was placed prior and resulted with minimal urine output <100 mL. Dr. Maciel paged regarding bladder scan results.  ordered via phone q6 hr bladder scans and straight cath if bladder scan result is >300. I verbally read back orders via phone to Dr. Maciel to verify correctness.

## 2020-08-06 NOTE — CARE PLAN
Problem: Safety  Goal: Will remain free from falls  Outcome: PROGRESSING AS EXPECTED     Problem: Discharge Barriers/Planning  Goal: Patient's continuum of care needs will be met  Outcome: PROGRESSING AS EXPECTED

## 2020-08-06 NOTE — CARE PLAN
Problem: Safety  Goal: Will remain free from injury  Outcome: PROGRESSING AS EXPECTED   Fall precautions in place. Bed in lowest position. Non-skid socks in place. Personal possessions within reach. Mobility sign on door. Bed-alarm on. Call light within reach. Pt educated regarding fall prevention and states understanding.    Problem: Knowledge Deficit  Goal: Knowledge of the prescribed therapeutic regimen will improve  Outcome: PROGRESSING AS EXPECTED   Pt educated regarding plan of care and medications. All questions answered.

## 2020-08-07 LAB
ALBUMIN SERPL BCP-MCNC: 3 G/DL (ref 3.2–4.9)
BUN SERPL-MCNC: 16 MG/DL (ref 8–22)
CALCIUM SERPL-MCNC: 9.1 MG/DL (ref 8.5–10.5)
CHLORIDE SERPL-SCNC: 95 MMOL/L (ref 96–112)
CO2 SERPL-SCNC: 31 MMOL/L (ref 20–33)
CREAT SERPL-MCNC: 0.98 MG/DL (ref 0.5–1.4)
GLUCOSE SERPL-MCNC: 137 MG/DL (ref 65–99)
PHOSPHATE SERPL-MCNC: 3.6 MG/DL (ref 2.5–4.5)
POTASSIUM SERPL-SCNC: 3.4 MMOL/L (ref 3.6–5.5)
SODIUM SERPL-SCNC: 134 MMOL/L (ref 135–145)

## 2020-08-07 PROCEDURE — 99231 SBSQ HOSP IP/OBS SF/LOW 25: CPT | Performed by: HOSPITALIST

## 2020-08-07 PROCEDURE — 36415 COLL VENOUS BLD VENIPUNCTURE: CPT

## 2020-08-07 PROCEDURE — A9270 NON-COVERED ITEM OR SERVICE: HCPCS | Performed by: HOSPITALIST

## 2020-08-07 PROCEDURE — 700102 HCHG RX REV CODE 250 W/ 637 OVERRIDE(OP): Performed by: HOSPITALIST

## 2020-08-07 PROCEDURE — 770006 HCHG ROOM/CARE - MED/SURG/GYN SEMI*

## 2020-08-07 PROCEDURE — 700102 HCHG RX REV CODE 250 W/ 637 OVERRIDE(OP): Performed by: INTERNAL MEDICINE

## 2020-08-07 PROCEDURE — A9270 NON-COVERED ITEM OR SERVICE: HCPCS | Performed by: INTERNAL MEDICINE

## 2020-08-07 PROCEDURE — 80069 RENAL FUNCTION PANEL: CPT

## 2020-08-07 RX ADMIN — QUETIAPINE FUMARATE 25 MG: 25 TABLET ORAL at 22:26

## 2020-08-07 RX ADMIN — THERA TABS 1 TABLET: TAB at 04:30

## 2020-08-07 RX ADMIN — AMLODIPINE BESYLATE 5 MG: 5 TABLET ORAL at 04:30

## 2020-08-07 RX ADMIN — TAMSULOSIN HYDROCHLORIDE 0.8 MG: 0.4 CAPSULE ORAL at 10:04

## 2020-08-07 RX ADMIN — FINASTERIDE 5 MG: 5 TABLET, FILM COATED ORAL at 04:30

## 2020-08-07 NOTE — CARE PLAN
Problem: Nutritional:  Goal: Achieve adequate nutritional intake  Description: Patient will consume ~50% of meals/supplements   Outcome: MET   Per ADL documentation pt has consumed % of 7 out of the past 8 recorded meals. Will continue Boost Glucose Control TID due to underweight status, however will transition to monitoring per dept policy.     RD available prn - and will monitor per dept policy

## 2020-08-07 NOTE — PROGRESS NOTES
Late entry  Received report from night shift RN and assumed care of patient. Patient is A&O x 4 and is on 0.5 L NC. Bed is in lowest, locked position, call bell and belongings are in reach. No further needs at this time.     1200: méndez catheter inserted, MD aware. Patient had scant amount of blood from méndez insertion. Per night shift RN, patient was also having blood during straight caths. 2,500 mL urine obtained after méndez insertion. Patient resting comfortably at this time.

## 2020-08-07 NOTE — PROGRESS NOTES
"Paged hospitalist. Pt bladder scan =999ml. Spoke to APRN orders received to place méndez.     Pt refusing méndez or to be straight cath, educated on urine retention, kidney injury and risks. Pt stated \"please don't, I don't want to, let's do it tomorrow, I won't do it until I talk to the doctor.\" Pt upset and covered himself back up in the blankets.     ED Freitas notified. Charge RN notified. Staff educated pt. Despite education, pt refused.   "

## 2020-08-07 NOTE — PALLIATIVE CARE
Palliative Care follow-up  Per notes, pt lacks capacity and representation. Pending guardianship. Palliative team will follow intermittently. Please call with any updates or changes.       Plan: follow intermittently.    Thank you for allowing Palliative Care to support this patient and family. Contact [phone] for additional assistance, change in patient status, or with any questions/concerns.

## 2020-08-07 NOTE — PROGRESS NOTES
After several attempts, bladder scan not performed.  Last scan was greater than 999 ml.  Pt has not voided since and refused RN's attempt for straight cath and méndez placement.  Please contact traction when ready for next scan.

## 2020-08-07 NOTE — PROGRESS NOTES
"Hospital Medicine Daily Progress Note    Date of Service  8/7/2020    Chief Complaint  84 y.o. male admitted 7/9/2020 with   Chief Complaint   Patient presents with   • Cough   • Tired   • Loss of Appetite   • N/V   • Diarrhea         \"This 84-year-old male with a past medical history significant for diabetes, hyperlipidemia, BPH presented on 7/96/2020 with a complaint of nausea vomiting diarrhea progressive weakness for the 1 to 2 weeks along with cough.  Upon presents that he is noted to be dehydrated, hypotensive along with acute renal failure.    He is found to be covid positive.  He is initially admitted to ICU started on therapeutic dose of Lovenox for elevated d-dimer, he completed the 1 month course of with rest Lovenox.  He was on DVT prophylaxis which is hold as patient had hematuria.  Currently patient hematuria has resolved.  Will obtain bladder scan to make sure that patient is not retaining urine.    He was noted to be in acute renal failure which improved ,Hospital course was complicated with hematuria along with urinary retention. which improved.  Will obtain bladder scan.  Continue RT protocol for his COPD, hemoglobin A1c is noted to be at 6.8, continue diabetic diet.  Patient is noted to have cognitive deficit, this started on quetiapine 25mg in the evening time.  Psych has evaluated the patient and stated that patient does not have capacity to make any medical decision; thus guardianship work up pending     Interval Problem Update  No acute events overnight.  Patient is noted to be pleasantly confused  --- Vitals are stable, currently saturating 91% on 1 L    8/5:  --- No acute events overnight, laying in bed, denied any complaint.  Patient is alert and oriented x4, denied any complaint   -  working  On dispo    8/6: No acute events overnight, laying in a bed ,  Has been answering questions.  He stated that he is tired and wanted to sleep.  He is blind      8/7: No acute events overnight, " patient is noted to be retaining urine, Rosado placed, continue tamsulosin and finasteride    Consultants/Specialty  psychiatry    Code Status  DNI/DNR    Disposition  tbd    Review of Systems  ROS   Limited secondary to patient mentation    Physical Exam  Temp:  [36.5 °C (97.7 °F)-36.7 °C (98.1 °F)] 36.5 °C (97.7 °F)  Pulse:  [79-91] 91  Resp:  [16-19] 19  BP: (110-128)/(42-58) 112/45  SpO2:  [90 %-97 %] 90 %    Physical Exam  Vitals signs and nursing note reviewed.   Constitutional:       Appearance: Normal appearance.   HENT:      Head: Atraumatic.      Comments: blind  Eyes:      Extraocular Movements: Extraocular movements intact.   Cardiovascular:      Rate and Rhythm: Normal rate and regular rhythm.   Pulmonary:      Effort: Pulmonary effort is normal.   Abdominal:      General: Abdomen is flat.      Palpations: Abdomen is soft.   Neurological:      Mental Status: He is alert.      Cranial Nerves: No cranial nerve deficit.      Comments: Alert and oriented x2-3, does follow commands   Psychiatric:         Mood and Affect: Mood normal.         Fluids    Intake/Output Summary (Last 24 hours) at 8/7/2020 1441  Last data filed at 8/7/2020 1200  Gross per 24 hour   Intake 840 ml   Output 3500 ml   Net -2660 ml       Laboratory  Recent Labs     08/05/20  0258   WBC 7.5   RBC 3.48*   HEMOGLOBIN 10.4*   HEMATOCRIT 32.9*   MCV 94.5   MCH 29.9   MCHC 31.6*   RDW 43.8   PLATELETCT 361   MPV 9.3     Recent Labs     08/05/20  0258 08/07/20  0819   SODIUM 138 134*   POTASSIUM 3.6 3.4*   CHLORIDE 95* 95*   CO2 29 31   GLUCOSE 104* 137*   BUN 19 16   CREATININE 1.13 0.98   CALCIUM 9.0 9.1                   Imaging  CT-RENAL WITH & W/O   Final Result      1.  Large complex right renal mass as detailed above measuring approximately 7.8 x 7.1x  8.5 cm. Primary differential considerations include urothelial malignancy or renal cell carcinoma.   2.  No evidence of adenopathy or metastatic disease is seen.   3.  Distended urinary  bladder. Right greater left hydroureteronephrosis.   4.  Severe atherosclerosis. 3.5 cm infrarenal abdominal aortic aneurysm.            US-RUQ   Final Result         1.  Echogenic liver compatible with fatty change versus fibrosis.   2.  Masslike structure in the right kidney, should be considered neoplastic unless proven otherwise, recommend follow-up 3 phase CT of the kidneys for further characterization   3.  Mild dilatation of the common bile duct, within expected limits given patient age.      These findings were discussed with the patient's clinician, Vin Mei, on 7/23/2020 7:34 AM.      DX-CHEST-PORTABLE (1 VIEW)   Final Result      1.  The lungs are hyperinflated suggesting emphysema/COPD.   2.  There is minimal predominantly lower lobe interstitial opacity which is most likely due to chronic scarring.           Assessment/Plan  Hematuria  Assessment & Plan  Resolved  monitor    COVID-19 virus infection  Assessment & Plan  Presented to the ED on 7/9 and was covered positive his had a lengthy stay   --Patient had been on therapeutic Lovenox but this was discontinued due to hematuria.  Current infection prevention patient is considered recovered.    COPD (chronic obstructive pulmonary disease) ([facility])- (present on admission)  Assessment & Plan    Continue RT protocol  Completed 10-day course of dexamethasone for COVID; now saturating well on ra    Altered mental status  Assessment & Plan  During the hospitalization stay patient was noted to have altered mental status, his baseline mental status was unknown.  There was concern that this may be acute secondary to COVID, weakness and malnutrition.    Patient is alert and oriented x3.  Psychiatry was consulted, patient cannot make any medical decision.  COR[facility] working on [facility]    Type 2 diabetes mellitus ([facility])- (present on admission)  Assessment & Plan  Lab Results   Component Value Date/Time    HBA1C 6.8 (H) 07/10/2020 0335    HBA1C 6.9 (H) 03/08/2018  1124    HBA1C 8.1 (H) 12/15/2017 1108       Hemoglobin A1c at 6.8, continue diabetic diet, monitor    Hypokalemia  Assessment & Plan    Replete and repeat    Advanced care planning/counseling discussion  Assessment & Plan  Patient isDNR/DNI  Guardianship being initiated    Malnutrition (HCC)  Assessment & Plan  Currently on a diabetic diet which she is tolerating well  Routine monitoring, periodic labs.    Urinary retention- (present on admission)  Assessment & Plan  History of BPH, continue Flomax discharge.  Patient is noted to be retaining urine with multiple straight cath, will provide Rosado       VTE prophylaxis:  Lovenox

## 2020-08-07 NOTE — PROGRESS NOTES
Straight cathed pt per orders and got 1000mL out. Urine is dark and unable to see through with large amounts of sediment. Pt tolerated procedure however had some discomfort during procedure. Now resting in bed with no other needs at this time.

## 2020-08-07 NOTE — CARE PLAN
Problem: Safety  Goal: Will remain free from falls  Outcome: PROGRESSING AS EXPECTED     Problem: Urinary Elimination:  Goal: Ability to reestablish a normal urinary elimination pattern will improve  Outcome: PROGRESSING SLOWER THAN EXPECTED  Pt refusing straight cath; bladder scan =999ml. Educated pt.

## 2020-08-07 NOTE — PROGRESS NOTES
"Encouraged pt to try to void. Pt got up out of bed and attempted to urinate but unable to void. Denies pain at this time. Lower abdomen distended. Educated pt again on urinary retention and risks. Pt stated \"okay, okay put the méndez in.\" While setting up kit, pt pulled gown down and kicked kit & stated \"just leave me alone.\"   "

## 2020-08-08 LAB
ALBUMIN SERPL BCP-MCNC: 2.9 G/DL (ref 3.2–4.9)
BUN SERPL-MCNC: 12 MG/DL (ref 8–22)
CALCIUM SERPL-MCNC: 8.8 MG/DL (ref 8.5–10.5)
CHLORIDE SERPL-SCNC: 95 MMOL/L (ref 96–112)
CO2 SERPL-SCNC: 29 MMOL/L (ref 20–33)
CREAT SERPL-MCNC: 0.85 MG/DL (ref 0.5–1.4)
GLUCOSE SERPL-MCNC: 122 MG/DL (ref 65–99)
PHOSPHATE SERPL-MCNC: 3 MG/DL (ref 2.5–4.5)
POTASSIUM SERPL-SCNC: 3.3 MMOL/L (ref 3.6–5.5)
SODIUM SERPL-SCNC: 136 MMOL/L (ref 135–145)

## 2020-08-08 PROCEDURE — 770006 HCHG ROOM/CARE - MED/SURG/GYN SEMI*

## 2020-08-08 PROCEDURE — 700102 HCHG RX REV CODE 250 W/ 637 OVERRIDE(OP): Performed by: HOSPITALIST

## 2020-08-08 PROCEDURE — A9270 NON-COVERED ITEM OR SERVICE: HCPCS | Performed by: HOSPITALIST

## 2020-08-08 PROCEDURE — A9270 NON-COVERED ITEM OR SERVICE: HCPCS | Performed by: INTERNAL MEDICINE

## 2020-08-08 PROCEDURE — 36415 COLL VENOUS BLD VENIPUNCTURE: CPT

## 2020-08-08 PROCEDURE — 51798 US URINE CAPACITY MEASURE: CPT

## 2020-08-08 PROCEDURE — 99231 SBSQ HOSP IP/OBS SF/LOW 25: CPT | Performed by: HOSPITALIST

## 2020-08-08 PROCEDURE — 700102 HCHG RX REV CODE 250 W/ 637 OVERRIDE(OP): Performed by: INTERNAL MEDICINE

## 2020-08-08 PROCEDURE — 80069 RENAL FUNCTION PANEL: CPT

## 2020-08-08 RX ADMIN — TAMSULOSIN HYDROCHLORIDE 0.8 MG: 0.4 CAPSULE ORAL at 10:26

## 2020-08-08 RX ADMIN — THERA TABS 1 TABLET: TAB at 05:06

## 2020-08-08 RX ADMIN — AMLODIPINE BESYLATE 5 MG: 5 TABLET ORAL at 05:06

## 2020-08-08 RX ADMIN — QUETIAPINE FUMARATE 25 MG: 25 TABLET ORAL at 20:31

## 2020-08-08 RX ADMIN — FINASTERIDE 5 MG: 5 TABLET, FILM COATED ORAL at 05:06

## 2020-08-08 NOTE — PROGRESS NOTES
Received report from night shift RN and assumed care of patient. Patient is resting comfortably in bed and is on 0.5 L NC. Bed is in lowest, locked position, call bell and belongings are in reach. No further needs at this time.

## 2020-08-08 NOTE — CARE PLAN
Problem: Safety  Goal: Will remain free from falls  Outcome: PROGRESSING AS EXPECTED  Note: Fall precautions in place. Bed in lowest position. Non-skid socks in place. Eye glasses and cane within reach. Bed-alarm on. Call light within reach. Patient educated regarding fall prevention and states understanding.       Problem: Knowledge Deficit  Goal: Knowledge of disease process/condition, treatment plan, diagnostic tests, and medications will improve  Outcome: PROGRESSING AS EXPECTED  Note: Patient educated regarding plan of care and medications. All questions answered.

## 2020-08-08 NOTE — PROGRESS NOTES
"Hospital Medicine Daily Progress Note    Date of Service  8/8/2020    Chief Complaint  84 y.o. male admitted 7/9/2020 with   Chief Complaint   Patient presents with   • Cough   • Tired   • Loss of Appetite   • N/V   • Diarrhea         \"This 84-year-old male with a past medical history significant for diabetes, hyperlipidemia, BPH presented on 7/96/2020 with a complaint of nausea vomiting diarrhea progressive weakness for the 1 to 2 weeks along with cough.  Upon presents that he is noted to be dehydrated, hypotensive along with acute renal failure.    He is found to be covid positive.  He is initially admitted to ICU started on therapeutic dose of Lovenox for elevated d-dimer, he completed the 1 month course of with rest Lovenox.  He was on DVT prophylaxis which is hold as patient had hematuria.  Currently patient hematuria has resolved.  Will obtain bladder scan to make sure that patient is not retaining urine.    He was noted to be in acute renal failure which improved ,Hospital course was complicated with hematuria along with urinary retention. which improved.  Will obtain bladder scan.  Continue RT protocol for his COPD, hemoglobin A1c is noted to be at 6.8, continue diabetic diet.  Patient is noted to have cognitive deficit, this started on quetiapine 25mg in the evening time.  Psych has evaluated the patient and stated that patient does not have capacity to make any medical decision; thus guardianship work up pending     Interval Problem Update  No acute events overnight.  Patient is noted to be pleasantly confused  --- Vitals are stable, currently saturating 91% on 1 L    8/5:  --- No acute events overnight, laying in bed, denied any complaint.  Patient is alert and oriented x4, denied any complaint   -  working  On dispo    8/6: No acute events overnight, laying in a bed ,  Has been answering questions.  He stated that he is tired and wanted to sleep.  He is blind      8/7: No acute events overnight, " patient is noted to be retaining urine, Méndez placed, continue tamsulosin and finasteride    8/8: No acute events overnight, laying in bed , méndez in place     Consultants/Specialty  psychiatry    Code Status  DNI/DNR    Disposition  tbd    Review of Systems  ROS   Limited secondary to patient mentation    Physical Exam  Temp:  [35.8 °C (96.5 °F)-36.6 °C (97.8 °F)] 36.1 °C (97 °F)  Pulse:  [65-81] 76  Resp:  [16-20] 16  BP: (119-128)/(38-56) 123/52  SpO2:  [91 %-99 %] 97 %    Physical Exam  Vitals signs and nursing note reviewed.   Constitutional:       Appearance: Normal appearance.   HENT:      Head: Atraumatic.      Comments: blind  Eyes:      Extraocular Movements: Extraocular movements intact.   Cardiovascular:      Rate and Rhythm: Normal rate and regular rhythm.   Pulmonary:      Effort: Pulmonary effort is normal.   Abdominal:      General: Abdomen is flat. Bowel sounds are normal. There is no distension.      Palpations: Abdomen is soft.   Neurological:      Mental Status: He is alert.      Cranial Nerves: No cranial nerve deficit.      Comments: Alert and oriented x3-4, does follow commands         Fluids    Intake/Output Summary (Last 24 hours) at 8/8/2020 1338  Last data filed at 8/8/2020 0546  Gross per 24 hour   Intake --   Output 1400 ml   Net -1400 ml       Laboratory      Recent Labs     08/07/20  0819 08/08/20  0851   SODIUM 134* 136   POTASSIUM 3.4* 3.3*   CHLORIDE 95* 95*   CO2 31 29   GLUCOSE 137* 122*   BUN 16 12   CREATININE 0.98 0.85   CALCIUM 9.1 8.8                   Imaging  CT-RENAL WITH & W/O   Final Result      1.  Large complex right renal mass as detailed above measuring approximately 7.8 x 7.1x  8.5 cm. Primary differential considerations include urothelial malignancy or renal cell carcinoma.   2.  No evidence of adenopathy or metastatic disease is seen.   3.  Distended urinary bladder. Right greater left hydroureteronephrosis.   4.  Severe atherosclerosis. 3.5 cm infrarenal  abdominal aortic aneurysm.            US-RUQ   Final Result         1.  Echogenic liver compatible with fatty change versus fibrosis.   2.  Masslike structure in the right kidney, should be considered neoplastic unless proven otherwise, recommend follow-up 3 phase CT of the kidneys for further characterization   3.  Mild dilatation of the common bile duct, within expected limits given patient age.      These findings were discussed with the patient's clinician, Vin Mei, on 7/23/2020 7:34 AM.      DX-CHEST-PORTABLE (1 VIEW)   Final Result      1.  The lungs are hyperinflated suggesting emphysema/COPD.   2.  There is minimal predominantly lower lobe interstitial opacity which is most likely due to chronic scarring.           Assessment/Plan  Hematuria  Assessment & Plan  Resolved  monitor    COVID-19 virus infection  Assessment & Plan  Presented to the ED on 7/9 and was covered positive his had a lengthy stay   --Patient had been on therapeutic Lovenox but this was discontinued due to hematuria.  Current infection prevention patient is considered recovered.    COPD (chronic obstructive pulmonary disease) (HCC)- (present on admission)  Assessment & Plan    Continue RT protocol  Completed 10-day course of dexamethasone for COVID; now saturating well on ra    Altered mental status  Assessment & Plan  During the hospitalization stay patient was noted to have altered mental status, his baseline mental status was unknown.  There was concern that this may be acute secondary to COVID, weakness and malnutrition.    Patient is alert and oriented x3.  Psychiatry was consulted, patient cannot make any medical decision.  CORINA working on [facility]    Type 2 diabetes mellitus (HCC)- (present on admission)  Assessment & Plan  Lab Results   Component Value Date/Time    HBA1C 6.8 (H) 07/10/2020 0335    HBA1C 6.9 (H) 03/08/2018 1124    HBA1C 8.1 (H) 12/15/2017 1108       Hemoglobin A1c at 6.8, continue diabetic diet, monitor  BG  well controlled    Hypokalemia  Assessment & Plan    Replete and repeat    Advanced care planning/counseling discussion  Assessment & Plan  Patient isDNR/DNI  Guardianship being initiated    Malnutrition (HCC)  Assessment & Plan  Currently on a diabetic diet which she is tolerating well  Routine monitoring, periodic labs.    Urinary retention- (present on admission)  Assessment & Plan  History of BPH, continue Flomax discharge.  Patient is noted to be retaining urine with multiple straight cath, now has méndez       VTE prophylaxis:  Lovenox

## 2020-08-09 PROCEDURE — 770006 HCHG ROOM/CARE - MED/SURG/GYN SEMI*

## 2020-08-09 PROCEDURE — A9270 NON-COVERED ITEM OR SERVICE: HCPCS | Performed by: HOSPITALIST

## 2020-08-09 PROCEDURE — 700102 HCHG RX REV CODE 250 W/ 637 OVERRIDE(OP): Performed by: HOSPITALIST

## 2020-08-09 PROCEDURE — 94760 N-INVAS EAR/PLS OXIMETRY 1: CPT

## 2020-08-09 PROCEDURE — 99231 SBSQ HOSP IP/OBS SF/LOW 25: CPT | Performed by: HOSPITALIST

## 2020-08-09 PROCEDURE — 51798 US URINE CAPACITY MEASURE: CPT

## 2020-08-09 RX ORDER — POTASSIUM CHLORIDE 20 MEQ/1
40 TABLET, EXTENDED RELEASE ORAL ONCE
Status: COMPLETED | OUTPATIENT
Start: 2020-08-09 | End: 2020-08-09

## 2020-08-09 RX ADMIN — QUETIAPINE FUMARATE 25 MG: 25 TABLET ORAL at 18:15

## 2020-08-09 RX ADMIN — TAMSULOSIN HYDROCHLORIDE 0.8 MG: 0.4 CAPSULE ORAL at 12:05

## 2020-08-09 RX ADMIN — THERA TABS 1 TABLET: TAB at 04:44

## 2020-08-09 RX ADMIN — AMLODIPINE BESYLATE 5 MG: 5 TABLET ORAL at 04:44

## 2020-08-09 RX ADMIN — POTASSIUM CHLORIDE 40 MEQ: 1500 TABLET, EXTENDED RELEASE ORAL at 08:47

## 2020-08-09 RX ADMIN — POTASSIUM CHLORIDE 40 MEQ: 1500 TABLET, EXTENDED RELEASE ORAL at 14:07

## 2020-08-09 RX ADMIN — FINASTERIDE 5 MG: 5 TABLET, FILM COATED ORAL at 04:44

## 2020-08-09 NOTE — PROGRESS NOTES
"Hospital Medicine Daily Progress Note    Date of Service  8/9/2020    Chief Complaint  84 y.o. male admitted 7/9/2020 with   Chief Complaint   Patient presents with   • Cough   • Tired   • Loss of Appetite   • N/V   • Diarrhea         \"This 84-year-old male with a past medical history significant for diabetes, hyperlipidemia, BPH presented on 7/96/2020 with a complaint of nausea vomiting diarrhea progressive weakness for the 1 to 2 weeks along with cough.  Upon presents that he is noted to be dehydrated, hypotensive along with acute renal failure.    He is found to be covid positive.  He is initially admitted to ICU started on therapeutic dose of Lovenox for elevated d-dimer, he completed the 1 month course of with rest Lovenox.  He was on DVT prophylaxis which is hold as patient had hematuria.  Currently patient hematuria has resolved.  Will obtain bladder scan to make sure that patient is not retaining urine.    He was noted to be in acute renal failure which improved ,Hospital course was complicated with hematuria along with urinary retention. which improved.  Will obtain bladder scan.  Continue RT protocol for his COPD, hemoglobin A1c is noted to be at 6.8, continue diabetic diet.  Patient is noted to have cognitive deficit, this started on quetiapine 25mg in the evening time.  Psych has evaluated the patient and stated that patient does not have capacity to make any medical decision; thus guardianship work up pending     Interval Problem Update  No acute events overnight.  Patient is noted to be pleasantly confused  --- Vitals are stable, currently saturating 91% on 1 L    8/5:  --- No acute events overnight, laying in bed, denied any complaint.  Patient is alert and oriented x4, denied any complaint   -  working  On dispo    8/6: No acute events overnight, laying in a bed ,  Has been answering questions.  He stated that he is tired and wanted to sleep.  He is blind      8/7: No acute events overnight, " patient is noted to be retaining urine, Méndez placed, continue tamsulosin and finasteride    8/8: No acute events overnight, laying in bed , méndez in place   8/9: Laying in a bed, denied any complain. Patient is alert and oriented x 3; vitals stable     Consultants/Specialty  psychiatry    Code Status  DNI/DNR    Disposition  tbd    Review of Systems  ROS   Limited secondary to patient mentation    Physical Exam  Temp:  [36.6 °C (97.9 °F)-37.2 °C (99 °F)] 36.9 °C (98.4 °F)  Pulse:  [] 74  Resp:  [16-20] 18  BP: (102-110)/(45-52) 110/48  SpO2:  [90 %-98 %] 98 %    Physical Exam  Vitals signs and nursing note reviewed.   Constitutional:       Appearance: Normal appearance.   HENT:      Head: Atraumatic.      Comments: blind  Eyes:      Extraocular Movements: Extraocular movements intact.      Pupils: Pupils are equal, round, and reactive to light.   Cardiovascular:      Rate and Rhythm: Normal rate and regular rhythm.   Pulmonary:      Effort: Pulmonary effort is normal. No respiratory distress.   Abdominal:      General: Abdomen is flat. Bowel sounds are normal.      Palpations: Abdomen is soft.   Skin:     General: Skin is warm.   Neurological:      Mental Status: He is alert and oriented to person, place, and time.      Cranial Nerves: No cranial nerve deficit.      Comments: Alert and oriented x3-4, does follow commands         Fluids    Intake/Output Summary (Last 24 hours) at 8/9/2020 1557  Last data filed at 8/9/2020 1538  Gross per 24 hour   Intake 840 ml   Output 1100 ml   Net -260 ml       Laboratory      Recent Labs     08/07/20  0819 08/08/20  0851   SODIUM 134* 136   POTASSIUM 3.4* 3.3*   CHLORIDE 95* 95*   CO2 31 29   GLUCOSE 137* 122*   BUN 16 12   CREATININE 0.98 0.85   CALCIUM 9.1 8.8                   Imaging  CT-RENAL WITH & W/O   Final Result      1.  Large complex right renal mass as detailed above measuring approximately 7.8 x 7.1x  8.5 cm. Primary differential considerations include  urothelial malignancy or renal cell carcinoma.   2.  No evidence of adenopathy or metastatic disease is seen.   3.  Distended urinary bladder. Right greater left hydroureteronephrosis.   4.  Severe atherosclerosis. 3.5 cm infrarenal abdominal aortic aneurysm.            US-RUQ   Final Result         1.  Echogenic liver compatible with fatty change versus fibrosis.   2.  Masslike structure in the right kidney, should be considered neoplastic unless proven otherwise, recommend follow-up 3 phase CT of the kidneys for further characterization   3.  Mild dilatation of the common bile duct, within expected limits given patient age.      These findings were discussed with the patient's clinician, Vin Mei, on 7/23/2020 7:34 AM.      DX-CHEST-PORTABLE (1 VIEW)   Final Result      1.  The lungs are hyperinflated suggesting emphysema/COPD.   2.  There is minimal predominantly lower lobe interstitial opacity which is most likely due to chronic scarring.           Assessment/Plan  Hematuria  Assessment & Plan  Resolved  monitor    COVID-19 virus infection  Assessment & Plan  Presented to the ED on 7/9 and was covered positive his had a lengthy stay   --Patient had been on therapeutic Lovenox but this was discontinued due to hematuria.  Current infection prevention patient is considered recovered.    COPD (chronic obstructive pulmonary disease) (HCC)- (present on admission)  Assessment & Plan  Continue RT protocol   --Completed 10-day course of dexamethasone for COVID; now saturating well on ra    Altered mental status  Assessment & Plan  During the hospitalization stay patient was noted to have altered mental status, his baseline mental status was unknown.  There was concern that this may be acute secondary to COVID, weakness and malnutrition.    Patient is alert and oriented x3.  Psychiatry was consulted, patient cannot make any medical decision.  CORINA working on [employer]    Type 2 diabetes mellitus (HCC)- (present on  admission)  Assessment & Plan  Lab Results   Component Value Date/Time    HBA1C 6.8 (H) 07/10/2020 0335    HBA1C 6.9 (H) 03/08/2018 1124    HBA1C 8.1 (H) 12/15/2017 1108       Hemoglobin A1c at 6.8, continue diabetic diet, monitor  BG well controlled    Hypokalemia  Assessment & Plan    Replete and repeat    Advanced care planning/counseling discussion  Assessment & Plan  Patient isDNR/DNI  Guardianship being initiated    Malnutrition (HCC)  Assessment & Plan  Currently on a diabetic diet which she is tolerating well  Routine monitoring, periodic labs.    Urinary retention- (present on admission)  Assessment & Plan  History of BPH, continue Flomax discharge.  Patient is noted to be retaining urine with multiple straight cath, now has méndez       VTE prophylaxis:  Lovenox

## 2020-08-09 NOTE — RESPIRATORY CARE
Oxygen Rounds      Patient found on    O2 L/m:  __1_______    Oxygen device:  ___nc_____   Spo2: _____91____%      Respiratory device skin site inspection completed.

## 2020-08-09 NOTE — PROGRESS NOTES
Pharmacy Pharmacotherapy Consult for LOS >30 days    Admit Date: 7/9/2020      Medications were reviewed for appropriateness and ongoing need.     Current Facility-Administered Medications   Medication Dose Route Frequency Provider Last Rate Last Dose   • finasteride (PROSCAR) tablet 5 mg  5 mg Oral DAILY [facility] M.D.   5 mg at 08/08/20 0506   • amLODIPine (NORVASC) tablet 5 mg  5 mg Oral Q DAY [facility] M.D.   5 mg at 08/08/20 0506   • QUEtiapine (SEROQUEL) tablet 25 mg  25 mg Oral Q EVENING [facility] M.D.   25 mg at 08/07/20 2226   • multivitamin (THERAGRAN) tablet 1 Tab  1 Tab [facility]ILY [facility]   1 Tab at 08/08/20 0506   • vitamin D (Ergocalciferol) (DRISDOL) capsule 50,000 Units  50,000 Units Oral Q7 DAYS [facility] M.D.   50,000 Units at 08/03/20 1755   • labetalol (NORMODYNE/TRANDATE) injection 10 mg  10 mg Intravenous Q4HRS PRN [facility] YING Maciel.D.       • hydrALAZINE (APRESOLINE) injection 10-20 mg  10-20 mg Intravenous [facility]4HRS [facility]   10 mg at 07/10/20 1331   • tamsulosin (FLOMAX) capsule 0.8 mg  0.8 mg Oral AFTER BREAKFAST [facility] M.D.   0.8 mg at 08/08/20 1026   • ondansetron (ZOFRAN) syringe/vial injection 4 mg  4 mg Intravenous Q6HRS PRN Darin Valencia M.D.       • ondansetron (ZOFRAN ODT) dispertab 4 mg  4 mg Oral Q6HRS PRN Darin Valencia M.D.       • acetaminophen (TYLENOL) tablet 650 mg  650 mg Oral Q6HRS PRN Darin Valencia M.D.           Recommendations:  No changes. Discussed patient with MD. David Jordan PharmD BCPS

## 2020-08-09 NOTE — PROGRESS NOTES
Received report and assumed care at shift change. Patient A&O x 3, no complain of pain or distress. Fall precautions in place, bed locked and in lowest position. Needs attended to.

## 2020-08-09 NOTE — PROGRESS NOTES
Received bedside report and accepted care of patient at 0705. Patient is A&Ox3-4. Disoriented to time sometimes. On 1L NC. Assessment completed and stated no pain at this time. Bed alarm in place, controls on and bed in locked and lowest position. Call light and personal possessions within reach. Patient educated about use of call light and verbalized understanding.

## 2020-08-10 LAB
ALBUMIN SERPL BCP-MCNC: 2.5 G/DL (ref 3.2–4.9)
BASOPHILS # BLD AUTO: 0.9 % (ref 0–1.8)
BASOPHILS # BLD: 0.06 K/UL (ref 0–0.12)
BUN SERPL-MCNC: 16 MG/DL (ref 8–22)
CALCIUM SERPL-MCNC: 8.8 MG/DL (ref 8.5–10.5)
CHLORIDE SERPL-SCNC: 100 MMOL/L (ref 96–112)
CO2 SERPL-SCNC: 28 MMOL/L (ref 20–33)
CREAT SERPL-MCNC: 0.85 MG/DL (ref 0.5–1.4)
EOSINOPHIL # BLD AUTO: 0.3 K/UL (ref 0–0.51)
EOSINOPHIL NFR BLD: 4.5 % (ref 0–6.9)
ERYTHROCYTE [DISTWIDTH] IN BLOOD BY AUTOMATED COUNT: 44.6 FL (ref 35.9–50)
GLUCOSE SERPL-MCNC: 107 MG/DL (ref 65–99)
HCT VFR BLD AUTO: 30 % (ref 42–52)
HGB BLD-MCNC: 9.6 G/DL (ref 14–18)
IMM GRANULOCYTES # BLD AUTO: 0.24 K/UL (ref 0–0.11)
IMM GRANULOCYTES NFR BLD AUTO: 3.6 % (ref 0–0.9)
LYMPHOCYTES # BLD AUTO: 1.38 K/UL (ref 1–4.8)
LYMPHOCYTES NFR BLD: 20.6 % (ref 22–41)
MCH RBC QN AUTO: 30.5 PG (ref 27–33)
MCHC RBC AUTO-ENTMCNC: 32 G/DL (ref 33.7–35.3)
MCV RBC AUTO: 95.2 FL (ref 81.4–97.8)
MONOCYTES # BLD AUTO: 0.78 K/UL (ref 0–0.85)
MONOCYTES NFR BLD AUTO: 11.6 % (ref 0–13.4)
NEUTROPHILS # BLD AUTO: 3.95 K/UL (ref 1.82–7.42)
NEUTROPHILS NFR BLD: 58.8 % (ref 44–72)
NRBC # BLD AUTO: 0 K/UL
NRBC BLD-RTO: 0 /100 WBC
PHOSPHATE SERPL-MCNC: 3.6 MG/DL (ref 2.5–4.5)
PLATELET # BLD AUTO: 353 K/UL (ref 164–446)
PMV BLD AUTO: 9.1 FL (ref 9–12.9)
POTASSIUM SERPL-SCNC: 3.6 MMOL/L (ref 3.6–5.5)
RBC # BLD AUTO: 3.15 M/UL (ref 4.7–6.1)
SODIUM SERPL-SCNC: 140 MMOL/L (ref 135–145)
WBC # BLD AUTO: 6.7 K/UL (ref 4.8–10.8)

## 2020-08-10 PROCEDURE — A9270 NON-COVERED ITEM OR SERVICE: HCPCS | Performed by: HOSPITALIST

## 2020-08-10 PROCEDURE — 99231 SBSQ HOSP IP/OBS SF/LOW 25: CPT | Performed by: HOSPITALIST

## 2020-08-10 PROCEDURE — 700102 HCHG RX REV CODE 250 W/ 637 OVERRIDE(OP): Performed by: HOSPITALIST

## 2020-08-10 PROCEDURE — 94760 N-INVAS EAR/PLS OXIMETRY 1: CPT

## 2020-08-10 PROCEDURE — 80069 RENAL FUNCTION PANEL: CPT

## 2020-08-10 PROCEDURE — 36415 COLL VENOUS BLD VENIPUNCTURE: CPT

## 2020-08-10 PROCEDURE — 770006 HCHG ROOM/CARE - MED/SURG/GYN SEMI*

## 2020-08-10 PROCEDURE — 85025 COMPLETE CBC W/AUTO DIFF WBC: CPT

## 2020-08-10 RX ADMIN — QUETIAPINE FUMARATE 25 MG: 25 TABLET ORAL at 17:56

## 2020-08-10 RX ADMIN — AMLODIPINE BESYLATE 5 MG: 5 TABLET ORAL at 04:55

## 2020-08-10 RX ADMIN — THERA TABS 1 TABLET: TAB at 04:55

## 2020-08-10 RX ADMIN — ERGOCALCIFEROL 50000 UNITS: 1.25 CAPSULE ORAL at 14:41

## 2020-08-10 RX ADMIN — FINASTERIDE 5 MG: 5 TABLET, FILM COATED ORAL at 04:55

## 2020-08-10 RX ADMIN — TAMSULOSIN HYDROCHLORIDE 0.8 MG: 0.4 CAPSULE ORAL at 08:27

## 2020-08-10 NOTE — PROGRESS NOTES
Received report and assumed care of pt. Assessment complete on 0.5L O2 via NC. Pt A&OX3, disoriented to event. Denies any pain or discomfort at this time. Pt currently resting in bed. All pt needs met at this time. Safety precautions and hourly rounding in place.

## 2020-08-10 NOTE — CARE PLAN
Problem: Safety  Goal: Will remain free from falls  Outcome: PROGRESSING AS EXPECTED     Problem: Urinary Elimination:  Goal: Ability to reestablish a normal urinary elimination pattern will improve  Outcome: PROGRESSING AS EXPECTED

## 2020-08-10 NOTE — CARE PLAN
Problem: Safety  Goal: Will remain free from injury  Outcome: PROGRESSING AS EXPECTED  Note: Bed near nurses station. Bed alarm on and in place. Call light and belongings within reach. Bed locked and in lowest position.     Problem: Urinary Elimination:  Goal: Ability to reestablish a normal urinary elimination pattern will improve  Outcome: PROGRESSING AS EXPECTED  Flowsheets (Taken 8/10/2020 1026)  Urinary Elimination: Catheter (Document on LDA)

## 2020-08-10 NOTE — PROGRESS NOTES
Received report and assumed care at shift change. Patient is A&O x 3, no complain of pain or distress. Non compliant with using call light at times. Indwelling Rosado catheter in place, draining yellow urine. Fall precautions in place, bed locked and in lowest position. Needs attended to.

## 2020-08-10 NOTE — RESPIRATORY CARE
Oxygen Rounds      Patient found on    O2 L/m:  ____1_____    Oxygen device:  ___nc_____   Spo2: ____90_____%      Respiratory device skin site inspection completed.

## 2020-08-11 PROCEDURE — 700102 HCHG RX REV CODE 250 W/ 637 OVERRIDE(OP): Performed by: HOSPITALIST

## 2020-08-11 PROCEDURE — A9270 NON-COVERED ITEM OR SERVICE: HCPCS | Performed by: HOSPITALIST

## 2020-08-11 PROCEDURE — 99231 SBSQ HOSP IP/OBS SF/LOW 25: CPT | Performed by: HOSPITALIST

## 2020-08-11 PROCEDURE — 94760 N-INVAS EAR/PLS OXIMETRY 1: CPT

## 2020-08-11 PROCEDURE — 770006 HCHG ROOM/CARE - MED/SURG/GYN SEMI*

## 2020-08-11 RX ADMIN — THERA TABS 1 TABLET: TAB at 06:26

## 2020-08-11 RX ADMIN — TAMSULOSIN HYDROCHLORIDE 0.8 MG: 0.4 CAPSULE ORAL at 07:29

## 2020-08-11 RX ADMIN — AMLODIPINE BESYLATE 5 MG: 5 TABLET ORAL at 06:27

## 2020-08-11 RX ADMIN — QUETIAPINE FUMARATE 25 MG: 25 TABLET ORAL at 17:33

## 2020-08-11 RX ADMIN — FINASTERIDE 5 MG: 5 TABLET, FILM COATED ORAL at 06:27

## 2020-08-11 ASSESSMENT — ENCOUNTER SYMPTOMS
FEVER: 0
CHILLS: 0
ABDOMINAL PAIN: 0
COUGH: 0
DIARRHEA: 0
SHORTNESS OF BREATH: 0

## 2020-08-11 NOTE — RESPIRATORY CARE
Oxygen Rounds      Patient found on    O2 L/m:  __.5_______    Oxygen device:  ___nc_____   Spo2: _____98____%      Patient titrated to   O2 L/m: ___0_____   Oxygen device: _________   Spo2: ____96_____%   Respiratory device skin site inspection completed.

## 2020-08-11 NOTE — PROGRESS NOTES
Received report and assumed care of pt. Assessment complete on RA, tolerating well. Pt A&OX3, disoriented to event. Rosado in place. Denies any pain or discomfort at this time. Pt currently sitting up in bed eating breakfast. All pt needs met at this time. Safety precautions and hourly rounding in place.

## 2020-08-11 NOTE — THERAPY
Missed Therapy     Patient Name: Ovi Bejarano  Age:  84 y.o., Sex:  male  Medical Record #: [id number]  Today's Date: 8/11/2020    Discussed missed therapy with RN       08/11/20 0945   Treatment Variance   Reason For Missed Therapy Non-Medical - Other (Please Comment)  (Discharge secondary to no likely benefit)   Total Time Spent   Total Time Spent (Mins) 3   Interdisciplinary Plan of Care Collaboration   IDT Collaboration with  Nursing   Collaboration Comments Per chart review and discussion with RN, Pt has been deemed incapacitated per psych and guardianship is pending. No further inpatient skilled SLP services appear warranted to address cognition and will sign off. Thank you.    Discharge: Patient is not being actively followed for therapy services at this time, however may be seen if requested by attending provider for 1 more visit within 30 days to address any discharge needs or if the patient has a change in status.

## 2020-08-11 NOTE — DISCHARGE PLANNING
Anticipated Discharge Disposition: Group Home    Action: Chart notes indicate patient is compliant and cooperative with staff. On 1 liter of O2, méndez started.  Patient does not remember SW, continues to tell SW that we are making a big deal out of nothing, as he can take care of himself.  Chart notes state patient is A&Ox3, disoriented to event, uses a cane for ambulation    Barriers to Discharge: guardianship/medicaid/placement    Plan: continue to monitor for discharge barriers    Guardianship packet submitted to Wandy Blandon on 8/5/2020.

## 2020-08-11 NOTE — PROGRESS NOTES
Hospital Medicine Daily Progress Note    Date of Service  8/10/2020    Chief Complaint  84 y.o. male admitted 7/9/2020 with   Chief Complaint   Patient presents with   • Cough   • Tired   • Loss of Appetite   • N/V   • Diarrhea         This 84-year-old male with a past medical history significant for diabetes, hyperlipidemia, BPH presented on 7/96/2020 with a complaint of nausea vomiting diarrhea progressive weakness for the 1 to 2 weeks along with cough.  Upon presents that he is noted to be dehydrated, hypotensive along with acute renal failure.    He is found to be covid positive.  He is initially admitted to ICU started on therapeutic dose of Lovenox for elevated d-dimer, he completed the 1 month course of with rest Lovenox.  He was on DVT prophylaxis which is hold as patient had hematuria.  Currently patient hematuria has resolved.  Will obtain bladder scan to make sure that patient is not retaining urine.    He was noted to be in acute renal failure which improved ,Hospital course was complicated with hematuria along with urinary retention.He is again noted to have urinary retention, thus Rosado was placed and will continue Rosado. Will provide flomax and finasteride.    Continue RT protocol for his COPD, hemoglobin A1c is noted to be at 6.8, continue diabetic diet.  Patient is noted to have cognitive deficit, this started on quetiapine 25mg in the evening time.  Psych has evaluated the patient and stated that patient does not have capacity to make any medical decision; thus guardianship work up pending     Interval Problem Update  No acute events overnight, laying in bed, denied any complaint.  Patient is alert and oriented, answering questions appropriately  --- Continue Rosado  --Vitals stable  --Medically stable, pending guardianship, case management working    Consultants/Specialty  psychiatry    Code Status  DNI/DNR    Disposition  tbd    Review of Systems  ROS   Limited secondary to patient  mentation    Physical Exam  Temp:  [36.3 °C (97.4 °F)-36.5 °C (97.7 °F)] 36.4 °C (97.6 °F)  Pulse:  [68-82] 82  Resp:  [14-18] 18  BP: ()/(48-55) 102/50  SpO2:  [90 %-99 %] 99 %    Physical Exam  Vitals signs and nursing note reviewed.   Constitutional:       Appearance: Normal appearance.   HENT:      Head: Atraumatic.      Comments: blind  Eyes:      Extraocular Movements: Extraocular movements intact.      Pupils: Pupils are equal, round, and reactive to light.   Cardiovascular:      Rate and Rhythm: Normal rate and regular rhythm.   Pulmonary:      Effort: Pulmonary effort is normal. No respiratory distress.   Abdominal:      General: Abdomen is flat. Bowel sounds are normal.      Palpations: Abdomen is soft.   Skin:     General: Skin is warm.   Neurological:      Mental Status: He is alert and oriented to person, place, and time.      Cranial Nerves: No cranial nerve deficit.      Comments: Alert and oriented x3-4, does follow commands         Fluids    Intake/Output Summary (Last 24 hours) at 8/10/2020 1759  Last data filed at 8/10/2020 1734  Gross per 24 hour   Intake 1200 ml   Output 1550 ml   Net -350 ml       Laboratory  Recent Labs     08/10/20  0731   WBC 6.7   RBC 3.15*   HEMOGLOBIN 9.6*   HEMATOCRIT 30.0*   MCV 95.2   MCH 30.5   MCHC 32.0*   RDW 44.6   PLATELETCT 353   MPV 9.1     Recent Labs     08/08/20  0851 08/10/20  0733   SODIUM 136 140   POTASSIUM 3.3* 3.6   CHLORIDE 95* 100   CO2 29 28   GLUCOSE 122* 107*   BUN 12 16   CREATININE 0.85 0.85   CALCIUM 8.8 8.8                   Imaging  CT-RENAL WITH & W/O   Final Result      1.  Large complex right renal mass as detailed above measuring approximately 7.8 x 7.1x  8.5 cm. Primary differential considerations include urothelial malignancy or renal cell carcinoma.   2.  No evidence of adenopathy or metastatic disease is seen.   3.  Distended urinary bladder. Right greater left hydroureteronephrosis.   4.  Severe atherosclerosis. 3.5 cm  infrarenal abdominal aortic aneurysm.            US-RUQ   Final Result         1.  Echogenic liver compatible with fatty change versus fibrosis.   2.  Masslike structure in the right kidney, should be considered neoplastic unless proven otherwise, recommend follow-up 3 phase CT of the kidneys for further characterization   3.  Mild dilatation of the common bile duct, within expected limits given patient age.      These findings were discussed with the patient's clinician, Vin Mei, on 7/23/2020 7:34 AM.      DX-CHEST-PORTABLE (1 VIEW)   Final Result      1.  The lungs are hyperinflated suggesting emphysema/COPD.   2.  There is minimal predominantly lower lobe interstitial opacity which is most likely due to chronic scarring.           Assessment/Plan  Hematuria  Assessment & Plan  Resolved  monitor    COVID-19 virus infection  Assessment & Plan  Presented to the ED on 7/9 and was covered positive his had a lengthy stay   --Patient had been on therapeutic Lovenox but this was discontinued due to hematuria.  Current infection prevention patient is considered recovered.    COPD (chronic obstructive pulmonary disease) (HCC)- (present on admission)  Assessment & Plan  Continue RT protocol   --Completed 10-day course of dexamethasone for COVID; now saturating well on 1/2 L of O2    Altered mental status  Assessment & Plan  Resolved;  Patient is alert and oriented x3.  Psychiatry was consulted, patient cannot make any medical decision.  CORINA working on [facility]    Type 2 diabetes mellitus (HCC)- (present on admission)  Assessment & Plan  Lab Results   Component Value Date/Time    HBA1C 6.8 (H) 07/10/2020 0335    HBA1C 6.9 (H) 03/08/2018 1124    HBA1C 8.1 (H) 12/15/2017 1108       Hemoglobin A1c at 6.8, continue diabetic diet, monitor  BG well controlled    Hypokalemia  Assessment & Plan    Replete and repeat    Advanced care planning/counseling discussion  Assessment & Plan  Patient isDNR/DNI  Guardianship being  initiated    Malnutrition (HCC)  Assessment & Plan  Currently on a diabetic diet which she is tolerating well  Routine monitoring, periodic labs.    Urinary retention- (present on admission)  Assessment & Plan  History of BPH, continue Flomax and Finasteride  Patient is noted to be retaining urine with multiple straight cath, now has méndez; need to follow up with urology as an op       VTE prophylaxis:  Lovenox

## 2020-08-11 NOTE — CARE PLAN
Problem: Safety  Goal: Will remain free from injury  Outcome: PROGRESSING AS EXPECTED  Note: Bed near nurses station. Call light and belongings within reach. Bed locked and in lowest position. Bed alarm on and in place.     Problem: Knowledge Deficit  Goal: Knowledge of disease process/condition, treatment plan, diagnostic tests, and medications will improve  Outcome: PROGRESSING AS EXPECTED  Note: Educate pt on medications and tests.

## 2020-08-11 NOTE — PROGRESS NOTES
Hospital Medicine Daily Progress Note    Date of Service  8/11/2020    Chief Complaint  84 y.o. male admitted 7/9/2020 with   Chief Complaint   Patient presents with   • Cough   • Tired   • Loss of Appetite   • N/V   • Diarrhea       Hospital Course  This 84-year-old male with a past medical history significant for diabetes, hyperlipidemia, BPH presented on 7/9/2020 with a complaint of nausea vomiting diarrhea and progressive weakness for the 1 to 2 weeks along with cough.  Patient was found to be COVID positive and have POLY and hypotension.      He is initially admitted to ICU started on therapeutic dose of Lovenox for elevated d-dimer, he completed the 1 month course  Lovenox.  He was on DVT prophylaxis which is hold as patient had hematuria.  Currently patient hematuria has resolved.  Will obtain bladder scan to make sure that patient is not retaining urine.    He was noted to be in acute renal failure which improved ,Hospital course was complicated with hematuria along with urinary retention.He is again noted to have urinary retention, thus Rosado was placed and will continue Rosado. Will provide flomax and finasteride.    Psych has evaluated the patient and stated that patient does not have capacity to make any medical decision; thus guardianship work up pending     Interval Problem Update  No acute events overnight  We will start bladder training  He had several bouts of diarrhea yesterday but that has resolved  He is requesting coffee and cold water    Consultants/Specialty  psychiatry  Critical care    Code Status  DNI/DNR    Disposition  Pending guardianship    Review of Systems  Review of Systems   Constitutional: Negative for chills and fever.   Respiratory: Negative for cough and shortness of breath.    Gastrointestinal: Negative for abdominal pain and diarrhea.   All other systems reviewed and are negative.     Limited secondary to patient mentation    Physical Exam  Temp:  [36.4 °C (97.6 °F)-36.9 °C  (98.5 °F)] 36.9 °C (98.5 °F)  Pulse:  [68-87] 68  Resp:  [16-18] 18  BP: (102-121)/(48-63) 120/56  SpO2:  [91 %-99 %] 96 %    Physical Exam  Vitals signs and nursing note reviewed.   Constitutional:       Comments: Cachectic   HENT:      Head: Normocephalic and atraumatic.   Pulmonary:      Effort: Pulmonary effort is normal.      Breath sounds: Normal breath sounds.   Neurological:      Mental Status: He is alert.   Psychiatric:         Mood and Affect: Mood normal.         Behavior: Behavior normal.         Fluids    Intake/Output Summary (Last 24 hours) at 8/11/2020 1339  Last data filed at 8/11/2020 1014  Gross per 24 hour   Intake 840 ml   Output 700 ml   Net 140 ml       Laboratory  Recent Labs     08/10/20  0731   WBC 6.7   RBC 3.15*   HEMOGLOBIN 9.6*   HEMATOCRIT 30.0*   MCV 95.2   MCH 30.5   MCHC 32.0*   RDW 44.6   PLATELETCT 353   MPV 9.1     Recent Labs     08/10/20  0733   SODIUM 140   POTASSIUM 3.6   CHLORIDE 100   CO2 28   GLUCOSE 107*   BUN 16   CREATININE 0.85   CALCIUM 8.8                   Imaging  CT-RENAL WITH & W/O   Final Result      1.  Large complex right renal mass as detailed above measuring approximately 7.8 x 7.1x  8.5 cm. Primary differential considerations include urothelial malignancy or renal cell carcinoma.   2.  No evidence of adenopathy or metastatic disease is seen.   3.  Distended urinary bladder. Right greater left hydroureteronephrosis.   4.  Severe atherosclerosis. 3.5 cm infrarenal abdominal aortic aneurysm.            US-RUQ   Final Result         1.  Echogenic liver compatible with fatty change versus fibrosis.   2.  Masslike structure in the right kidney, should be considered neoplastic unless proven otherwise, recommend follow-up 3 phase CT of the kidneys for further characterization   3.  Mild dilatation of the common bile duct, within expected limits given patient age.      These findings were discussed with the patient's clinician, Vin Mei, on 7/23/2020 7:34  AM.      DX-CHEST-PORTABLE (1 VIEW)   Final Result      1.  The lungs are hyperinflated suggesting emphysema/COPD.   2.  There is minimal predominantly lower lobe interstitial opacity which is most likely due to chronic scarring.           Assessment/Plan  Hematuria  Assessment & Plan  Resolved  monitor    COVID-19 virus infection  Assessment & Plan  Presented to the ED on 7/9 and was covered positive his had a lengthy stay   --Patient had been on therapeutic Lovenox but this was discontinued due to hematuria.  Current infection prevention patient is considered recovered.    COPD (chronic obstructive pulmonary disease) (HCC)- (present on admission)  Assessment & Plan  Continue RT protocol   --Completed 10-day course of dexamethasone for COVID; now saturating well on 1/2 L of O2    Altered mental status  Assessment & Plan  Resolved;  Patient is alert and oriented x3.  Psychiatry was consulted, patient cannot make any medical decision.  [facility]    Type 2 diabetes mellitus ([facility])- (present on admission)  Assessment & Plan  Lab Results   Component Value Date/Time    HBA1C 6.8 (H) 07/10/2020 0335    HBA1C 6.9 (H) 03/08/2018 1124    HBA1C 8.1 (H) 12/15/2017 1108       Hemoglobin A1c at 6.8, continue diabetic diet, monitor  BG well controlled    Hypokalemia  Assessment & Plan    Replete and repeat    Advanced care planning/counseling discussion  Assessment & Plan  Patient isDNR/DNI  Guardianship being initiated    Malnutrition ([facility])  Assessment & Plan  Currently on a diabetic diet which she is tolerating well  Routine monitoring, periodic labs.    Urinary retention- (present on admission)  Assessment & Plan  History of BPH, continue Flomax and Finasteride  Patient is noted to be retaining urine with multiple straight cath, now has méndez; need to follow up with urology as an op  I have discussed with nurse to start bladder training       VTE prophylaxis:  Lovenox

## 2020-08-12 PROCEDURE — 700102 HCHG RX REV CODE 250 W/ 637 OVERRIDE(OP): Performed by: HOSPITALIST

## 2020-08-12 PROCEDURE — A9270 NON-COVERED ITEM OR SERVICE: HCPCS | Performed by: HOSPITALIST

## 2020-08-12 PROCEDURE — 770006 HCHG ROOM/CARE - MED/SURG/GYN SEMI*

## 2020-08-12 PROCEDURE — 99231 SBSQ HOSP IP/OBS SF/LOW 25: CPT | Performed by: INTERNAL MEDICINE

## 2020-08-12 PROCEDURE — 51798 US URINE CAPACITY MEASURE: CPT

## 2020-08-12 RX ADMIN — FINASTERIDE 5 MG: 5 TABLET, FILM COATED ORAL at 04:57

## 2020-08-12 RX ADMIN — TAMSULOSIN HYDROCHLORIDE 0.8 MG: 0.4 CAPSULE ORAL at 08:10

## 2020-08-12 RX ADMIN — QUETIAPINE FUMARATE 25 MG: 25 TABLET ORAL at 18:19

## 2020-08-12 RX ADMIN — AMLODIPINE BESYLATE 5 MG: 5 TABLET ORAL at 04:57

## 2020-08-12 RX ADMIN — THERA TABS 1 TABLET: TAB at 04:57

## 2020-08-12 ASSESSMENT — ENCOUNTER SYMPTOMS
VOMITING: 0
CONSTIPATION: 0
ABDOMINAL PAIN: 0
NAUSEA: 0
SHORTNESS OF BREATH: 0

## 2020-08-12 NOTE — PROGRESS NOTES
Received report and assumed care of pt. Assessment complete on 0.5L O2 via NC. Pt A&OX3, disoriented to situation. Denies any pain or discomfort at this time. Pt currently resting in bed. All pt needs met at this time. Safety precautions and hourly rounding in place.

## 2020-08-12 NOTE — CARE PLAN
Problem: Safety  Goal: Will remain free from falls  Outcome: PROGRESSING AS EXPECTED  Note: Bed locked and in lowest position. Bed alarm on and in place. Call light and belongings within reach. Bed near nurses station.     Problem: Urinary Elimination:  Goal: Ability to reestablish a normal urinary elimination pattern will improve  Outcome: PROGRESSING AS EXPECTED  Note: Initiated bladder training.

## 2020-08-12 NOTE — PROGRESS NOTES
Pt is A&Ox3- disorientated to event. VSS. Lungs diminished on 0.5LNC while sleeping. Rosado in place. Practicing bladder training with pt. Pt aware of POC. Denies any pain or discomfort at this time. No signs of distress. Pt is blind in both eyes, x1 OOB with pts cane. Bed low and locked, call light within reach, safety precautions in place, bed alarm on, hourly rounding, WCM.

## 2020-08-12 NOTE — CARE PLAN
Problem: Safety  Goal: Will remain free from injury  Outcome: PROGRESSING AS EXPECTED     Problem: Safety  Goal: Will remain free from falls  Outcome: PROGRESSING AS EXPECTED     Problem: Respiratory:  Goal: Respiratory status will improve  Outcome: PROGRESSING AS EXPECTED     Problem: Psychosocial Needs:  Goal: Level of anxiety will decrease  Outcome: PROGRESSING AS EXPECTED

## 2020-08-12 NOTE — PROGRESS NOTES
"Hospital Medicine Daily Progress Note    Date of Service  8/12/2020    Chief Complaint  84 y.o. male admitted 7/9/2020 with   Chief Complaint   Patient presents with   • Cough   • Tired   • Loss of Appetite   • N/V   • Diarrhea       Hospital Course  As per Dr. Croft note    \"This 84-year-old male with a past medical history significant for diabetes, hyperlipidemia, BPH presented on 7/9/2020 with a complaint of nausea vomiting diarrhea and progressive weakness for the 1 to 2 weeks along with cough.  Patient was found to be COVID positive and have POLY and hypotension.      He is initially admitted to ICU started on therapeutic dose of Lovenox for elevated d-dimer, he completed the 1 month course  Lovenox.  He was on DVT prophylaxis which is hold as patient had hematuria.  Currently patient hematuria has resolved.  Will obtain bladder scan to make sure that patient is not retaining urine.    He was noted to be in acute renal failure which improved ,Hospital course was complicated with hematuria along with urinary retention.He is again noted to have urinary retention, thus Rosado was placed and will continue Rosado. Will provide flomax and finasteride.    Psych has evaluated the patient and stated that patient does not have capacity to make any medical decision; thus guardianship work up pending\"       Interval Problem Update    I evaluated and examined him at the bedside.  He denies any acute complaints  Continue bladder training.  I discussed plan of care with bedside RN.    Consultants/Specialty  psychiatry  Critical care    Code Status  DNI/DNR    Disposition  Pending guardianship    Review of Systems  Review of Systems   Respiratory: Negative for shortness of breath.    Cardiovascular: Negative for chest pain and leg swelling.   Gastrointestinal: Negative for abdominal pain, constipation, nausea and vomiting.   All other systems reviewed and are negative.     Limited secondary to patient mentation    Physical " Exam  Temp:  [36.1 °C (96.9 °F)-37.1 °C (98.8 °F)] 36.1 °C (96.9 °F)  Pulse:  [63-78] 78  Resp:  [16-18] 18  BP: ()/(40-53) 108/45  SpO2:  [91 %-93 %] 92 %    Physical Exam  Vitals signs reviewed.   Constitutional:       General: He is not in acute distress.  HENT:      Head: Normocephalic and atraumatic. No contusion.      Mouth/Throat:      Pharynx: No oropharyngeal exudate.   Eyes:      General:         Right eye: No discharge.         Left eye: No discharge.   Cardiovascular:      Rate and Rhythm: Normal rate and regular rhythm.   Pulmonary:      Breath sounds: No wheezing or rhonchi.   Abdominal:      General: Bowel sounds are normal. There is no distension.      Palpations: Abdomen is soft.      Tenderness: There is no abdominal tenderness.   Genitourinary:     Comments: Rosado's catheter in place.  Musculoskeletal: Normal range of motion.         General: No swelling.   Skin:     General: Skin is warm and dry.   Neurological:      Mental Status: He is alert.      Cranial Nerves: No cranial nerve deficit.      Sensory: No sensory deficit.         Fluids    Intake/Output Summary (Last 24 hours) at 8/12/2020 1320  Last data filed at 8/12/2020 1120  Gross per 24 hour   Intake 1340 ml   Output 2025 ml   Net -685 ml       Laboratory  Recent Labs     08/10/20  0731   WBC 6.7   RBC 3.15*   HEMOGLOBIN 9.6*   HEMATOCRIT 30.0*   MCV 95.2   MCH 30.5   MCHC 32.0*   RDW 44.6   PLATELETCT 353   MPV 9.1     Recent Labs     08/10/20  0733   SODIUM 140   POTASSIUM 3.6   CHLORIDE 100   CO2 28   GLUCOSE 107*   BUN 16   CREATININE 0.85   CALCIUM 8.8                   Imaging  CT-RENAL WITH & W/O   Final Result      1.  Large complex right renal mass as detailed above measuring approximately 7.8 x 7.1x  8.5 cm. Primary differential considerations include urothelial malignancy or renal cell carcinoma.   2.  No evidence of adenopathy or metastatic disease is seen.   3.  Distended urinary bladder. Right greater left  hydroureteronephrosis.   4.  Severe atherosclerosis. 3.5 cm infrarenal abdominal aortic aneurysm.            US-RUQ   Final Result         1.  Echogenic liver compatible with fatty change versus fibrosis.   2.  Masslike structure in the right kidney, should be considered neoplastic unless proven otherwise, recommend follow-up 3 phase CT of the kidneys for further characterization   3.  Mild dilatation of the common bile duct, within expected limits given patient age.      These findings were discussed with the patient's clinician, Vin Mei, on 7/23/2020 7:34 AM.      DX-CHEST-PORTABLE (1 VIEW)   Final Result      1.  The lungs are hyperinflated suggesting emphysema/COPD.   2.  There is minimal predominantly lower lobe interstitial opacity which is most likely due to chronic scarring.           Assessment/Plan  Hematuria  Assessment & Plan  Resolved  monitor    COVID-19 virus infection  Assessment & Plan  Presented to the ED on 7/9 and was covered positive his had a lengthy stay   --Patient had been on therapeutic Lovenox but this was discontinued due to hematuria.  Current infection prevention patient is considered recovered.    COPD (chronic obstructive pulmonary disease) (HCC)- (present on admission)  Assessment & Plan  Continue RT protocol  He completed 10-day course of dexamethasone for COVID; now saturating well on 1/2 L of O2    Altered mental status  Assessment & Plan  Resolved;  Patient is alert and oriented x3.  Psychiatry was consulted, patient cannot make any medical decision.  [facility] working on [facility]    Type 2 diabetes mellitus (HCC)- (present on admission)  Assessment & Plan  Lab Results   Component Value Date/Time    HBA1C 6.8 (H) 07/10/2020 0335    HBA1C 6.9 (H) 03/08/2018 1124    HBA1C 8.1 (H) 12/15/2017 1108       Hemoglobin A1c at 6.8, continue diabetic diet, monitor  BG well controlled    Hypokalemia  Assessment & Plan    Replete and repeat    Advanced care planning/counseling  discussion  Assessment & Plan  Patient isDNR/DNI  Guardianship being initiated    Malnutrition (HCC)  Assessment & Plan  Currently on a diabetic diet which she is tolerating well  Routine monitoring, periodic labs.    Urinary retention- (present on admission)  Assessment & Plan  History of BPH, continue Flomax and Finasteride  Patient is noted to be retaining urine with multiple straight cath, now has méndez; need to follow up with urology as an op  I discussed with nurse to start bladder training.  Continue to monitor.  I discussed plan of care during multidisciplinary rounds.    VTE prophylaxis:  Lovenox

## 2020-08-12 NOTE — PROGRESS NOTES
Less than 100ml UOP in méndez through night. Bladder scan ordered and recorded 901ml in bladder. Pt repositioned multiples times through the night, and to stand up to help promote drainage from the méndez with no success. After bladder scan, this RN deflated the balloon and re-inserted méndez another few cm, using sterile gloves and technique after completing méndez care, with good relief. Urine started to flow. Balloon re inflated, and méndez bag emptied. WCM.

## 2020-08-13 PROCEDURE — 700102 HCHG RX REV CODE 250 W/ 637 OVERRIDE(OP): Performed by: HOSPITALIST

## 2020-08-13 PROCEDURE — A9270 NON-COVERED ITEM OR SERVICE: HCPCS | Performed by: HOSPITALIST

## 2020-08-13 PROCEDURE — 770006 HCHG ROOM/CARE - MED/SURG/GYN SEMI*

## 2020-08-13 PROCEDURE — 99231 SBSQ HOSP IP/OBS SF/LOW 25: CPT | Performed by: INTERNAL MEDICINE

## 2020-08-13 RX ADMIN — FINASTERIDE 5 MG: 5 TABLET, FILM COATED ORAL at 05:26

## 2020-08-13 RX ADMIN — TAMSULOSIN HYDROCHLORIDE 0.8 MG: 0.4 CAPSULE ORAL at 09:29

## 2020-08-13 RX ADMIN — THERA TABS 1 TABLET: TAB at 05:26

## 2020-08-13 RX ADMIN — AMLODIPINE BESYLATE 5 MG: 5 TABLET ORAL at 05:26

## 2020-08-13 RX ADMIN — QUETIAPINE FUMARATE 25 MG: 25 TABLET ORAL at 17:34

## 2020-08-13 ASSESSMENT — ENCOUNTER SYMPTOMS
VOMITING: 0
ABDOMINAL PAIN: 0
SHORTNESS OF BREATH: 0
CONSTIPATION: 0
NAUSEA: 0

## 2020-08-13 NOTE — CARE PLAN
Problem: Safety  Goal: Will remain free from injury  8/12/2020 1929 by Pastor Salmon RMaliN.  Outcome: PROGRESSING AS EXPECTED  8/12/2020 1928 by IGLESIA IsraelN.  Outcome: PROGRESSING AS EXPECTED  Goal: Will remain free from falls  8/12/2020 1929 by IGLESIA IsraelN.  Outcome: PROGRESSING AS EXPECTED  8/12/2020 1928 by IGLESIA IsraelN.  Outcome: PROGRESSING AS EXPECTED     Problem: Respiratory:  Goal: Respiratory status will improve  8/12/2020 1929 by IGLESIA IsraelN.  Outcome: PROGRESSING AS EXPECTED  8/12/2020 1928 by STEPHANE Israel.N.  Outcome: PROGRESSING AS EXPECTED     Problem: Psychosocial Needs:  Goal: Level of anxiety will decrease  8/12/2020 1929 by Pastor Salmon R.N.  Outcome: PROGRESSING AS EXPECTED  8/12/2020 1928 by Pastor Salmon R.N.  Outcome: PROGRESSING AS EXPECTED     Problem: Mobility  Goal: Risk for activity intolerance will decrease  8/12/2020 1929 by Pastor Salmon R.N.  Outcome: PROGRESSING AS EXPECTED  8/12/2020 1928 by IGLESIA IsraelN.  Outcome: PROGRESSING AS EXPECTED

## 2020-08-13 NOTE — PROGRESS NOTES
"Hospital Medicine Daily Progress Note    Date of Service  8/13/2020    Chief Complaint  84 y.o. male admitted 7/9/2020 with   Chief Complaint   Patient presents with   • Cough   • Tired   • Loss of Appetite   • N/V   • Diarrhea       Hospital Course  As per Dr. Croft note    \"This 84-year-old male with a past medical history significant for diabetes, hyperlipidemia, BPH presented on 7/9/2020 with a complaint of nausea vomiting diarrhea and progressive weakness for the 1 to 2 weeks along with cough.  Patient was found to be COVID positive and have POLY and hypotension.      He is initially admitted to ICU started on therapeutic dose of Lovenox for elevated d-dimer, he completed the 1 month course  Lovenox.  He was on DVT prophylaxis which is hold as patient had hematuria.  Currently patient hematuria has resolved.  Will obtain bladder scan to make sure that patient is not retaining urine.    He was noted to be in acute renal failure which improved ,Hospital course was complicated with hematuria along with urinary retention.He is again noted to have urinary retention, thus Rosado was placed and will continue Rosado. Will provide flomax and finasteride.    Psych has evaluated the patient and stated that patient does not have capacity to make any medical decision; thus guardianship work up pending\"       Interval Problem Update    I evaluated and examined him at the bedside.  No acute complaints.  Discussed plan of care during multidisciplinary rounds regarding his discharge planning.    Consultants/Specialty  psychiatry  Critical care    Code Status  DNI/DNR    Disposition  Pending guardianship    Review of Systems  Review of Systems   Respiratory: Negative for shortness of breath.    Cardiovascular: Negative for chest pain and leg swelling.   Gastrointestinal: Negative for abdominal pain, constipation, nausea and vomiting.   All other systems reviewed and are negative.     Limited secondary to patient " mentation    Physical Exam  Temp:  [36.2 °C (97.2 °F)-37.3 °C (99.1 °F)] 36.2 °C (97.2 °F)  Pulse:  [69-81] 69  Resp:  [14-18] 18  BP: (104-139)/(46-66) 135/66  SpO2:  [89 %-95 %] 89 %    Physical Exam  Vitals signs reviewed.   Constitutional:       General: He is not in acute distress.  HENT:      Head: Normocephalic and atraumatic. No contusion.      Mouth/Throat:      Pharynx: No oropharyngeal exudate.   Eyes:      General:         Right eye: No discharge.         Left eye: No discharge.   Cardiovascular:      Rate and Rhythm: Normal rate and regular rhythm.   Pulmonary:      Breath sounds: No wheezing or rhonchi.   Abdominal:      General: Bowel sounds are normal. There is no distension.      Palpations: Abdomen is soft.      Tenderness: There is no abdominal tenderness.   Genitourinary:     Comments: Rosado's catheter in place.  Musculoskeletal: Normal range of motion.         General: No swelling.   Skin:     General: Skin is warm and dry.   Neurological:      Mental Status: He is alert.      Cranial Nerves: No cranial nerve deficit.      Sensory: No sensory deficit.       Performed physical exam today on August 13, 2020 no acute changes.  Fluids    Intake/Output Summary (Last 24 hours) at 8/13/2020 1343  Last data filed at 8/13/2020 1006  Gross per 24 hour   Intake 560 ml   Output 1450 ml   Net -890 ml       Laboratory                        Imaging  CT-RENAL WITH & W/O   Final Result      1.  Large complex right renal mass as detailed above measuring approximately 7.8 x 7.1x  8.5 cm. Primary differential considerations include urothelial malignancy or renal cell carcinoma.   2.  No evidence of adenopathy or metastatic disease is seen.   3.  Distended urinary bladder. Right greater left hydroureteronephrosis.   4.  Severe atherosclerosis. 3.5 cm infrarenal abdominal aortic aneurysm.            US-RUQ   Final Result         1.  Echogenic liver compatible with fatty change versus fibrosis.   2.  Masslike  structure in the right kidney, should be considered neoplastic unless proven otherwise, recommend follow-up 3 phase CT of the kidneys for further characterization   3.  Mild dilatation of the common bile duct, within expected limits given patient age.      These findings were discussed with the patient's clinician, Vin Mei, on 7/23/2020 7:34 AM.      DX-CHEST-PORTABLE (1 VIEW)   Final Result      1.  The lungs are hyperinflated suggesting emphysema/COPD.   2.  There is minimal predominantly lower lobe interstitial opacity which is most likely due to chronic scarring.           Assessment/Plan  Hematuria  Assessment & Plan  Resolved  monitor    COVID-19 virus infection  Assessment & Plan  Presented to the ED on 7/9 and was covered positive his had a lengthy stay   --Patient had been on therapeutic Lovenox but this was discontinued due to hematuria.  Current infection prevention patient is considered recovered.    COPD (chronic obstructive pulmonary disease) (HCC)- (present on admission)  Assessment & Plan  Continue RT protocol  He completed 10-day course of dexamethasone for COVID; now saturating well on 1/2 L of O2    Altered mental status  Assessment & Plan  Resolved;  Patient is alert and oriented x3.  Psychiatry was consulted, patient cannot make any medical decision.  [facility] working on [facility]    Type 2 diabetes mellitus (HCC)- (present on admission)  Assessment & Plan  Lab Results   Component Value Date/Time    HBA1C 6.8 (H) 07/10/2020 0335    HBA1C 6.9 (H) 03/08/2018 1124    HBA1C 8.1 (H) 12/15/2017 1108       Hemoglobin A1c at 6.8, continue diabetic diet, monitor  BG well controlled    Hypokalemia  Assessment & Plan    Replete and repeat    Advanced care planning/counseling discussion  Assessment & Plan  Patient isDNR/DNI  Guardianship being initiated    Malnutrition (HCC)  Assessment & Plan  Currently on a diabetic diet which she is tolerating well  Routine monitoring, periodic labs.    Urinary  retention- (present on admission)  Assessment & Plan  History of BPH, continue Flomax and Finasteride  Patient is noted to be retaining urine with multiple straight cath, now has méndez; need to follow up with urology as an op  I discussed with nurse to start bladder training.  Continue to monitor.  I discussed plan of care during multidisciplinary rounds carding his discharge planning.  Discussed plan of care with bedside RN.    I have seen and examined patient on 8/13/2020. I have reviewed vitals, new labs and imaging. I have discussed POC with RN. There are no changes from (8/12/2020) except for what is mentioned above.     VTE prophylaxis:  Lovenox

## 2020-08-13 NOTE — PROGRESS NOTES
Assumed care of pt at 0745. Report received and bedside rounding completed with night RN. Pt is calm no SOB, or in any acute distress noted.    Pt aaox4 - got up to restroom sitting up in chair for breakfast.     Fall precautions in place,  bed alarm. - Treaded non slip socks. Bed locked. Communication board updated with POC. Call light and pt belongings within reach - pt makes needs known - hourly rounding in place. See flowsheets for further assessment.

## 2020-08-13 NOTE — CARE PLAN
Problem: Safety  Goal: Will remain free from injury  Outcome: PROGRESSING SLOWER THAN EXPECTED  Note: Pt near nurses station. Strip bed alarm on. Call light with in reach.      Problem: Mobility  Goal: Risk for activity intolerance will decrease  Intervention: Provide rest periods  Note: Pt to get OOB for all meals.

## 2020-08-13 NOTE — PROGRESS NOTES
Pt is A&Ox3- disorientated to event. VSS. Lungs diminished on 0.5LNC while sleeping. Rosado in place. Pt aware of POC. Denies any pain or discomfort at this time. No signs of distress. Pt is blind in both eyes, x1 OOB with pts cane. Bed low and locked, call light within reach, safety precautions in place, bed alarm on, hourly rounding, WCM.

## 2020-08-14 LAB — GLUCOSE BLD-MCNC: 188 MG/DL (ref 65–99)

## 2020-08-14 PROCEDURE — 770006 HCHG ROOM/CARE - MED/SURG/GYN SEMI*

## 2020-08-14 PROCEDURE — A9270 NON-COVERED ITEM OR SERVICE: HCPCS | Performed by: HOSPITALIST

## 2020-08-14 PROCEDURE — 700102 HCHG RX REV CODE 250 W/ 637 OVERRIDE(OP): Performed by: HOSPITALIST

## 2020-08-14 PROCEDURE — 82962 GLUCOSE BLOOD TEST: CPT

## 2020-08-14 PROCEDURE — 99231 SBSQ HOSP IP/OBS SF/LOW 25: CPT | Performed by: INTERNAL MEDICINE

## 2020-08-14 RX ADMIN — TAMSULOSIN HYDROCHLORIDE 0.8 MG: 0.4 CAPSULE ORAL at 09:19

## 2020-08-14 RX ADMIN — THERA TABS 1 TABLET: TAB at 04:57

## 2020-08-14 RX ADMIN — QUETIAPINE FUMARATE 25 MG: 25 TABLET ORAL at 17:13

## 2020-08-14 RX ADMIN — AMLODIPINE BESYLATE 5 MG: 5 TABLET ORAL at 04:57

## 2020-08-14 RX ADMIN — FINASTERIDE 5 MG: 5 TABLET, FILM COATED ORAL at 04:57

## 2020-08-14 ASSESSMENT — ENCOUNTER SYMPTOMS
VOMITING: 0
SHORTNESS OF BREATH: 0
ABDOMINAL PAIN: 0
CONSTIPATION: 0
NAUSEA: 0

## 2020-08-14 NOTE — PROGRESS NOTES
Pt is A&Ox3- disorientated to event. VSS. Lungs diminished on RA. Rosado in place. Pt aware of POC. Denies any pain or discomfort at this time. No signs of distress. Pt is blind in both eyes, x1 OOB with pts cane. Bed low and locked, call light within reach, safety precautions in place, bed alarm on, hourly rounding, WCM.

## 2020-08-14 NOTE — PROGRESS NOTES
Called Security dispatch to notify about pt. Wallet for lockup storage. Will call me back when coming to  wallet per Security.

## 2020-08-14 NOTE — CARE PLAN
Problem: Safety  Goal: Will remain free from falls  Outcome: PROGRESSING AS EXPECTED  Note: Bed in lowest position, locked, upper side rails up , educated about fall risk, treaded socks in place, bed alarm on, calling appropriately     Problem: Skin Integrity  Goal: Risk for impaired skin integrity will decrease  Outcome: PROGRESSING AS EXPECTED  Note: Waffle cushion placed, educated about using moisturizer and barrier paste, pt. Is repositioning in bed

## 2020-08-14 NOTE — CARE PLAN
Problem: Safety  Goal: Will remain free from injury  Outcome: PROGRESSING AS EXPECTED     Problem: Safety  Goal: Will remain free from falls  Outcome: PROGRESSING AS EXPECTED     Problem: Discharge Barriers/Planning  Goal: Patient's continuum of care needs will be met  Outcome: PROGRESSING AS EXPECTED     Problem: Respiratory:  Goal: Respiratory status will improve  Outcome: PROGRESSING AS EXPECTED     Problem: Psychosocial Needs:  Goal: Level of anxiety will decrease  Outcome: PROGRESSING AS EXPECTED

## 2020-08-14 NOTE — PROGRESS NOTES
"Hospital Medicine Daily Progress Note    Date of Service  8/14/2020    Chief Complaint  84 y.o. male admitted 7/9/2020 with   Chief Complaint   Patient presents with   • Cough   • Tired   • Loss of Appetite   • N/V   • Diarrhea       Hospital Course  As per Dr. Croft note    \"This 84-year-old male with a past medical history significant for diabetes, hyperlipidemia, BPH presented on 7/9/2020 with a complaint of nausea vomiting diarrhea and progressive weakness for the 1 to 2 weeks along with cough.  Patient was found to be COVID positive and have POLY and hypotension.      He is initially admitted to ICU started on therapeutic dose of Lovenox for elevated d-dimer, he completed the 1 month course  Lovenox.  He was on DVT prophylaxis which is hold as patient had hematuria.  Currently patient hematuria has resolved.  Will obtain bladder scan to make sure that patient is not retaining urine.    He was noted to be in acute renal failure which improved ,Hospital course was complicated with hematuria along with urinary retention.He is again noted to have urinary retention, thus Rosado was placed and will continue Rosado. Will provide flomax and finasteride.    Psych has evaluated the patient and stated that patient does not have capacity to make any medical decision; thus guardianship work up pending\"       Interval Problem Update    I evaluated and examined him at the bedside.  He was sitting in chair and denies any acute complaints per  I discussed plan of care with bedside RN.  Pending placement.  I discussed with case management.    Consultants/Specialty  psychiatry  Critical care    Code Status  DNI/DNR    Disposition  Pending guardianship    Review of Systems  Review of Systems   Respiratory: Negative for shortness of breath.    Cardiovascular: Negative for chest pain and leg swelling.   Gastrointestinal: Negative for abdominal pain, constipation, nausea and vomiting.   All other systems reviewed and are negative.   "   Limited secondary to patient mentation    Physical Exam  Temp:  [36.6 °C (97.8 °F)-37.3 °C (99.2 °F)] 36.6 °C (97.8 °F)  Pulse:  [70-95] 90  Resp:  [18-20] 18  BP: (107-139)/(52-58) 107/58  SpO2:  [90 %-94 %] 90 %    Physical Exam  Vitals signs reviewed.   Constitutional:       General: He is not in acute distress.  HENT:      Head: Normocephalic and atraumatic. No contusion.      Mouth/Throat:      Pharynx: No oropharyngeal exudate.   Eyes:      General:         Right eye: No discharge.         Left eye: No discharge.   Cardiovascular:      Rate and Rhythm: Normal rate and regular rhythm.   Pulmonary:      Breath sounds: No wheezing or rhonchi.   Abdominal:      General: Bowel sounds are normal. There is no distension.      Palpations: Abdomen is soft.      Tenderness: There is no abdominal tenderness.   Genitourinary:     Comments: Rosado's catheter in place.  Musculoskeletal: Normal range of motion.         General: No swelling.   Skin:     General: Skin is warm and dry.   Neurological:      Mental Status: He is alert.      Cranial Nerves: No cranial nerve deficit.      Sensory: No sensory deficit.       Performed physical exam today on August 14, 2020 no acute changes.  Fluids    Intake/Output Summary (Last 24 hours) at 8/14/2020 1532  Last data filed at 8/14/2020 1440  Gross per 24 hour   Intake 480 ml   Output 800 ml   Net -320 ml       Laboratory                        Imaging  CT-RENAL WITH & W/O   Final Result      1.  Large complex right renal mass as detailed above measuring approximately 7.8 x 7.1x  8.5 cm. Primary differential considerations include urothelial malignancy or renal cell carcinoma.   2.  No evidence of adenopathy or metastatic disease is seen.   3.  Distended urinary bladder. Right greater left hydroureteronephrosis.   4.  Severe atherosclerosis. 3.5 cm infrarenal abdominal aortic aneurysm.            US-RUQ   Final Result         1.  Echogenic liver compatible with fatty change versus  fibrosis.   2.  Masslike structure in the right kidney, should be considered neoplastic unless proven otherwise, recommend follow-up 3 phase CT of the kidneys for further characterization   3.  Mild dilatation of the common bile duct, within expected limits given patient age.      These findings were discussed with the patient's clinician, Vin Mei, on 7/23/2020 7:34 AM.      DX-CHEST-PORTABLE (1 VIEW)   Final Result      1.  The lungs are hyperinflated suggesting emphysema/COPD.   2.  There is minimal predominantly lower lobe interstitial opacity which is most likely due to chronic scarring.           Assessment/Plan  Hematuria  Assessment & Plan  Resolved  monitor    COVID-19 virus infection  Assessment & Plan  Presented to the ED on 7/9 and was covered positive his had a lengthy stay   --Patient had been on therapeutic Lovenox but this was discontinued due to hematuria.  Current infection prevention patient is considered recovered.    COPD (chronic obstructive pulmonary disease) (HCC)- (present on admission)  Assessment & Plan  Continue RT protocol  He completed 10-day course of dexamethasone for COVID; now saturating well on 1/2 L of O2    Altered mental status  Assessment & Plan  Resolved;  Patient is alert and oriented x3.  Psychiatry was consulted, patient cannot make any medical decision.  [employer] working on [facility]    Type 2 diabetes mellitus (HCC)- (present on admission)  Assessment & Plan  Lab Results   Component Value Date/Time    HBA1C 6.8 (H) 07/10/2020 0335    HBA1C 6.9 (H) 03/08/2018 1124    HBA1C 8.1 (H) 12/15/2017 1108       Hemoglobin A1c at 6.8, continue diabetic diet, monitor  BG well controlled    Hypokalemia  Assessment & Plan    Replete and repeat    Advanced care planning/counseling discussion  Assessment & Plan  Patient isDNR/DNI  Guardianship being initiated    Malnutrition (HCC)  Assessment & Plan  Currently on a diabetic diet which she is tolerating well  Routine monitoring,  periodic labs.    Urinary retention- (present on admission)  Assessment & Plan  History of BPH, continue Flomax and Finasteride  Patient is noted to be retaining urine with multiple straight cath, now has méndez; need to follow up with urology as an op  I discussed with nurse to start bladder training.  Continue to monitor.    No acute changes in his clinical condition.  Pending placement   Discussed plan of care during multidisciplinary rounds and with bedside RN.    I have seen and examined patient on 8/14/2020. I have reviewed vitals, new labs and imaging. I have discussed POC with RN. There are no changes from (8/13/2020) except for what is mentioned above.     VTE prophylaxis:  Lovenox

## 2020-08-14 NOTE — PROGRESS NOTES
Direct admit safekeeping staff arrived to the pt. Room to help store his wallet in safekeeping.   Pt. Refused to give wallet to staff personnel  Pt. Currently has his wallet with him

## 2020-08-15 PROCEDURE — A9270 NON-COVERED ITEM OR SERVICE: HCPCS | Performed by: HOSPITALIST

## 2020-08-15 PROCEDURE — 700102 HCHG RX REV CODE 250 W/ 637 OVERRIDE(OP): Performed by: HOSPITALIST

## 2020-08-15 PROCEDURE — 770006 HCHG ROOM/CARE - MED/SURG/GYN SEMI*

## 2020-08-15 PROCEDURE — 99232 SBSQ HOSP IP/OBS MODERATE 35: CPT | Performed by: HOSPITALIST

## 2020-08-15 RX ADMIN — AMLODIPINE BESYLATE 5 MG: 5 TABLET ORAL at 05:52

## 2020-08-15 RX ADMIN — THERA TABS 1 TABLET: TAB at 05:52

## 2020-08-15 RX ADMIN — FINASTERIDE 5 MG: 5 TABLET, FILM COATED ORAL at 05:52

## 2020-08-15 RX ADMIN — TAMSULOSIN HYDROCHLORIDE 0.8 MG: 0.4 CAPSULE ORAL at 09:58

## 2020-08-15 RX ADMIN — QUETIAPINE FUMARATE 25 MG: 25 TABLET ORAL at 17:28

## 2020-08-15 ASSESSMENT — ENCOUNTER SYMPTOMS
ABDOMINAL PAIN: 0
SHORTNESS OF BREATH: 0
STRIDOR: 0
VOMITING: 0
EYE PAIN: 0
NAUSEA: 0
EYE DISCHARGE: 0
EYE REDNESS: 0
CONSTIPATION: 0
SEIZURES: 0

## 2020-08-15 NOTE — CARE PLAN
Problem: Safety  Goal: Will remain free from falls  Outcome: PROGRESSING AS EXPECTED  Note: Bed alarm on, bed in low/locked position, treaded slippers on, pt educated to use call light for assistance.      Problem: Knowledge Deficit  Goal: Knowledge of disease process/condition, treatment plan, diagnostic tests, and medications will improve  Outcome: PROGRESSING AS EXPECTED  Note: Pt educated on plan of care.

## 2020-08-15 NOTE — PROGRESS NOTES
Bedside report received at change of shift. Patient was resting in bed, eyes closed, even unlabored breathing. No indication of distress or discomfort.      Pt is A&Ox2 (disoriented to situation and time) on RA and tolerating well. No indication of pain/discomfort at this time. Pt denies pain at this time.      Needs attended. No additional needs at this time.  Call light and personal belongings within reach. Bed in low locked position. Hourly rounding in place.

## 2020-08-15 NOTE — PROGRESS NOTES
Hospital Medicine Daily Progress Note    Date of Service  8/15/2020    Chief Complaint  84 y.o. male admitted 7/9/2020 with   Chief Complaint   Patient presents with   • Cough   • Tired   • Loss of Appetite   • N/V   • Diarrhea       Hospital Course  84-year-old male with a past medical history significant for diabetes, hyperlipidemia, BPH admitted 7/9/2020 with COVID infection and had POLY and hypotension.      He was initially admitted to ICU started on therapeutic Lovenox for elevated d-dimer. Hospital course was complicated with hematuria along with urinary retention. He is again noted to have urinary retention, thus Rosado was placed and will continue Rosado. Will provide flomax and finasteride. He was noted to be in acute renal failure which improved      Psych has evaluated the patient and stated that patient does not have capacity to make any medical decision; thus guardianship work up pending      Interval Problem Update  Hemodynamically stable overnight   Saturating well on room air    Rosado in, no hematuria    Sugers, well controlled   Discussed with patient's nurse    Consultants/Specialty  psychiatry  Critical care    Code Status  DNI/DNR    Disposition  Pending guardianship     Review of Systems  Review of Systems   Constitutional: Negative for malaise/fatigue.   Eyes: Negative for pain, discharge and redness.   Respiratory: Negative for shortness of breath and stridor.    Cardiovascular: Negative for chest pain and leg swelling.   Gastrointestinal: Negative for abdominal pain, constipation, nausea and vomiting.   Genitourinary: Negative for hematuria.   Musculoskeletal: Negative for joint pain.   Neurological: Negative for seizures.       Physical Exam  Temp:  [36.1 °C (97 °F)-37.2 °C (99 °F)] 37.2 °C (99 °F)  Pulse:  [78-91] 78  Resp:  [16] 16  BP: (106-120)/(42-55) 120/55  SpO2:  [91 %-93 %] 93 %    Physical Exam  Vitals signs reviewed.   Constitutional:       General: He is not in acute  distress.  HENT:      Head: Normocephalic and atraumatic. No contusion.      Right Ear: External ear normal.      Left Ear: External ear normal.      Mouth/Throat:      Mouth: Mucous membranes are dry.      Pharynx: No oropharyngeal exudate.   Eyes:      General:         Right eye: No discharge.         Left eye: No discharge.   Cardiovascular:      Rate and Rhythm: Normal rate and regular rhythm.   Pulmonary:      Breath sounds: No wheezing or rhonchi.   Abdominal:      General: Bowel sounds are normal. There is no distension.      Palpations: Abdomen is soft.      Tenderness: There is no abdominal tenderness. There is no right CVA tenderness or left CVA tenderness.   Genitourinary:     Comments: Rosado's catheter in place.  Musculoskeletal: Normal range of motion.         General: No swelling.   Skin:     General: Skin is warm and dry.   Neurological:      Mental Status: He is alert.      Cranial Nerves: No cranial nerve deficit.      Sensory: No sensory deficit.        Fluids    Intake/Output Summary (Last 24 hours) at 8/15/2020 1720  Last data filed at 8/15/2020 0400  Gross per 24 hour   Intake --   Output 925 ml   Net -925 ml       Laboratory                        Imaging  CT-RENAL WITH & W/O   Final Result      1.  Large complex right renal mass as detailed above measuring approximately 7.8 x 7.1x  8.5 cm. Primary differential considerations include urothelial malignancy or renal cell carcinoma.   2.  No evidence of adenopathy or metastatic disease is seen.   3.  Distended urinary bladder. Right greater left hydroureteronephrosis.   4.  Severe atherosclerosis. 3.5 cm infrarenal abdominal aortic aneurysm.            US-RUQ   Final Result         1.  Echogenic liver compatible with fatty change versus fibrosis.   2.  Masslike structure in the right kidney, should be considered neoplastic unless proven otherwise, recommend follow-up 3 phase CT of the kidneys for further characterization   3.  Mild dilatation of  the common bile duct, within expected limits given patient age.      These findings were discussed with the patient's clinician, Vin Mei, on 7/23/2020 7:34 AM.      DX-CHEST-PORTABLE (1 VIEW)   Final Result      1.  The lungs are hyperinflated suggesting emphysema/COPD.   2.  There is minimal predominantly lower lobe interstitial opacity which is most likely due to chronic scarring.         Assessment/Plan  Hematuria  Assessment & Plan  Resolved  monitor    COVID-19 virus infection  Assessment & Plan  Presented to the ED on 7/9 and was covered positive his had a lengthy stay   --Patient had been on therapeutic Lovenox but this was discontinued due to hematuria.  Current infection prevention patient is considered recovered.    COPD (chronic obstructive pulmonary disease) (HCC)- (present on admission)  Assessment & Plan  Continue RT protocol  He completed 10-day course of dexamethasone for COVID; now saturating well on 1/2 L of O2    Altered mental status  Assessment & Plan  Improved   Patient is alert and oriented x2-3.  Psychiatry was consulted, patient cannot make any medical decision.  SW working on [facility]     Type 2 diabetes mellitus (HCC)- (present on admission)  Assessment & Plan  Hemoglobin A1c at 6.8, continue diabetic diet, monitor  BG has been well controlled    Hypokalemia  Assessment & Plan    Replete and repeat    Advanced care planning/counseling discussion  Assessment & Plan  Patient isDNR/DNI  Guardianship being initiated    Malnutrition ([facility])  Assessment & Plan  Currently on a diabetic diet which she is tolerating well  Routine monitoring, periodic labs.    Urinary retention- (present on admission)  Assessment & Plan  History of BPH, continue Flomax and Finasteride  Patient is noted to be retaining urine with multiple straight cath, now has méndez; need to follow up with urology as an op      VTE prophylaxis:  SCDs, encourage ambulation had hematuria w lovenox

## 2020-08-15 NOTE — CARE PLAN
Problem: Safety  Goal: Will remain free from injury  Outcome: PROGRESSING AS EXPECTED  Goal: Will remain free from falls  Outcome: PROGRESSING AS EXPECTED     Problem: Infection  Goal: Will remain free from infection  Outcome: PROGRESSING AS EXPECTED     Problem: Venous Thromboembolism (VTW)/Deep Vein Thrombosis (DVT) Prevention:  Goal: Patient will participate in Venous Thrombosis (VTE)/Deep Vein Thrombosis (DVT)Prevention Measures  Outcome: PROGRESSING AS EXPECTED     Problem: Bowel/Gastric:  Goal: Normal bowel function is maintained or improved  Outcome: PROGRESSING AS EXPECTED  Goal: Will not experience complications related to bowel motility  Outcome: PROGRESSING AS EXPECTED     Problem: Knowledge Deficit  Goal: Knowledge of disease process/condition, treatment plan, diagnostic tests, and medications will improve  Outcome: PROGRESSING AS EXPECTED  Goal: Knowledge of the prescribed therapeutic regimen will improve  Outcome: PROGRESSING AS EXPECTED     Problem: Discharge Barriers/Planning  Goal: Patient's continuum of care needs will be met  Outcome: PROGRESSING AS EXPECTED     Problem: Fluid Volume:  Goal: Will maintain balanced intake and output  Outcome: PROGRESSING AS EXPECTED     Problem: Respiratory:  Goal: Respiratory status will improve  Outcome: PROGRESSING AS EXPECTED     Problem: Skin Integrity  Goal: Risk for impaired skin integrity will decrease  Outcome: PROGRESSING AS EXPECTED     Problem: Psychosocial Needs:  Goal: Level of anxiety will decrease  Outcome: PROGRESSING AS EXPECTED     Problem: Urinary Elimination:  Goal: Ability to reestablish a normal urinary elimination pattern will improve  Outcome: PROGRESSING AS EXPECTED     Problem: Mobility  Goal: Risk for activity intolerance will decrease  Outcome: PROGRESSING AS EXPECTED

## 2020-08-16 LAB
ANION GAP SERPL CALC-SCNC: 13 MMOL/L (ref 7–16)
BUN SERPL-MCNC: 21 MG/DL (ref 8–22)
CALCIUM SERPL-MCNC: 9.2 MG/DL (ref 8.5–10.5)
CHLORIDE SERPL-SCNC: 96 MMOL/L (ref 96–112)
CO2 SERPL-SCNC: 25 MMOL/L (ref 20–33)
CREAT SERPL-MCNC: 0.87 MG/DL (ref 0.5–1.4)
GLUCOSE SERPL-MCNC: 98 MG/DL (ref 65–99)
POTASSIUM SERPL-SCNC: 4 MMOL/L (ref 3.6–5.5)
SODIUM SERPL-SCNC: 134 MMOL/L (ref 135–145)

## 2020-08-16 PROCEDURE — A9270 NON-COVERED ITEM OR SERVICE: HCPCS | Performed by: HOSPITALIST

## 2020-08-16 PROCEDURE — 700102 HCHG RX REV CODE 250 W/ 637 OVERRIDE(OP): Performed by: HOSPITALIST

## 2020-08-16 PROCEDURE — 770006 HCHG ROOM/CARE - MED/SURG/GYN SEMI*

## 2020-08-16 PROCEDURE — 99231 SBSQ HOSP IP/OBS SF/LOW 25: CPT | Performed by: HOSPITALIST

## 2020-08-16 PROCEDURE — 36415 COLL VENOUS BLD VENIPUNCTURE: CPT

## 2020-08-16 PROCEDURE — 80048 BASIC METABOLIC PNL TOTAL CA: CPT

## 2020-08-16 RX ADMIN — TAMSULOSIN HYDROCHLORIDE 0.8 MG: 0.4 CAPSULE ORAL at 09:57

## 2020-08-16 RX ADMIN — QUETIAPINE FUMARATE 25 MG: 25 TABLET ORAL at 18:00

## 2020-08-16 RX ADMIN — FINASTERIDE 5 MG: 5 TABLET, FILM COATED ORAL at 06:18

## 2020-08-16 RX ADMIN — THERA TABS 1 TABLET: TAB at 06:18

## 2020-08-16 RX ADMIN — AMLODIPINE BESYLATE 5 MG: 5 TABLET ORAL at 06:18

## 2020-08-16 ASSESSMENT — ENCOUNTER SYMPTOMS
CONSTIPATION: 0
EYE DISCHARGE: 0
STRIDOR: 0
NAUSEA: 0
VOMITING: 0
ABDOMINAL PAIN: 0
EYE REDNESS: 0
EYE PAIN: 0
SEIZURES: 0
SHORTNESS OF BREATH: 0

## 2020-08-16 NOTE — PROGRESS NOTES
Hospital Medicine Daily Progress Note    Date of Service  8/16/2020    Chief Complaint  84 y.o. male admitted 7/9/2020 with   Chief Complaint   Patient presents with   • Cough   • Tired   • Loss of Appetite   • N/V   • Diarrhea       Hospital Course  84-year-old male with a past medical history significant for diabetes, hyperlipidemia, BPH admitted 7/9/2020 with COVID infection and had POLY and hypotension.      He was initially admitted to ICU started on therapeutic Lovenox for elevated d-dimer. Hospital course was complicated with hematuria along with urinary retention. He is again noted to have urinary retention, thus Rosado was placed and will continue Rosado. Will provide flomax and finasteride. He was noted to be in acute renal failure which improved      Psych has evaluated the patient and stated that patient does not have capacity to make any medical decision; thus guardianship work up pending.         Interval Problem Update  Intermittently variably oriented x1-x3.  Saturating well on room air, Hemodynamically stable this morning   No hematuria    Discussed with patient's nurse     Consultants/Specialty  psychiatry  Critical care    Code Status  DNI/DNR    Disposition  Pending guardianship     Review of Systems  Review of Systems   Constitutional: Negative for malaise/fatigue.   Eyes: Negative for pain, discharge and redness.   Respiratory: Negative for shortness of breath and stridor.    Cardiovascular: Negative for chest pain and leg swelling.   Gastrointestinal: Negative for abdominal pain, constipation, nausea and vomiting.   Genitourinary: Negative for hematuria.   Musculoskeletal: Negative for joint pain.   Neurological: Negative for seizures.       Physical Exam  Temp:  [36.3 °C (97.4 °F)-37.1 °C (98.7 °F)] 37.1 °C (98.7 °F)  Pulse:  [57-83] 70  Resp:  [16-18] 18  BP: ()/(43-60) 120/53  SpO2:  [76 %-91 %] 91 %    Physical Exam  Vitals signs reviewed.   Constitutional:       General: He is not in  acute distress.  HENT:      Head: Normocephalic and atraumatic. No contusion.      Right Ear: External ear normal.      Left Ear: External ear normal.      Mouth/Throat:      Mouth: Mucous membranes are dry.      Pharynx: No oropharyngeal exudate.   Eyes:      General:         Right eye: No discharge.         Left eye: No discharge.   Cardiovascular:      Rate and Rhythm: Normal rate and regular rhythm.   Pulmonary:      Breath sounds: No wheezing or rhonchi.   Abdominal:      General: Bowel sounds are normal. There is no distension.      Palpations: Abdomen is soft.      Tenderness: There is no abdominal tenderness. There is no right CVA tenderness or left CVA tenderness.   Genitourinary:     Comments: Rosado's catheter in place.  Musculoskeletal: Normal range of motion.         General: No swelling.   Skin:     General: Skin is warm and dry.   Neurological:      Mental Status: He is alert.      Cranial Nerves: No cranial nerve deficit.      Sensory: No sensory deficit.       Fluids    Intake/Output Summary (Last 24 hours) at 8/16/2020 1433  Last data filed at 8/16/2020 1200  Gross per 24 hour   Intake 200 ml   Output 1850 ml   Net -1650 ml       Laboratory      Recent Labs     08/16/20  0036   SODIUM 134*   POTASSIUM 4.0   CHLORIDE 96   CO2 25   GLUCOSE 98   BUN 21   CREATININE 0.87   CALCIUM 9.2                   Imaging  CT-RENAL WITH & W/O   Final Result      1.  Large complex right renal mass as detailed above measuring approximately 7.8 x 7.1x  8.5 cm. Primary differential considerations include urothelial malignancy or renal cell carcinoma.   2.  No evidence of adenopathy or metastatic disease is seen.   3.  Distended urinary bladder. Right greater left hydroureteronephrosis.   4.  Severe atherosclerosis. 3.5 cm infrarenal abdominal aortic aneurysm.            US-RUQ   Final Result         1.  Echogenic liver compatible with fatty change versus fibrosis.   2.  Masslike structure in the right kidney, should be  considered neoplastic unless proven otherwise, recommend follow-up 3 phase CT of the kidneys for further characterization   3.  Mild dilatation of the common bile duct, within expected limits given patient age.      These findings were discussed with the patient's clinician, Vin Mei, on 7/23/2020 7:34 AM.      DX-CHEST-PORTABLE (1 VIEW)   Final Result      1.  The lungs are hyperinflated suggesting emphysema/COPD.   2.  There is minimal predominantly lower lobe interstitial opacity which is most likely due to chronic scarring.           Assessment/Plan  Hematuria  Assessment & Plan  Resolved  monitor    COVID-19 virus infection  Assessment & Plan  Presented to the ED on 7/9 and was covered positive his had a lengthy stay   --Patient had been on therapeutic Lovenox but this was discontinued due to hematuria.  Current infection prevention patient is considered recovered.    COPD (chronic obstructive pulmonary disease) (HCC)- (present on admission)  Assessment & Plan  Continue RT protocol  He completed 10-day course of dexamethasone for COVID; now saturating well on 1/2 L of O2    Altered mental status  Assessment & Plan  Improved   Patient is alert and oriented x2-3.  Psychiatry was consulted, patient cannot make any medical decision.  SW working on [facility]     Type 2 diabetes mellitus (HCC)- (present on admission)  Assessment & Plan  Hemoglobin A1c at 6.8, continue diabetic diet, monitor  BG has been well controlled    Hypokalemia  Assessment & Plan    Replete and repeat    Advanced care planning/counseling discussion  Assessment & Plan  Patient isDNR/DNI  Guardianship being initiated    Malnutrition (HCC)  Assessment & Plan  Currently on a diabetic diet which she is tolerating well  Routine monitoring, periodic labs.    Urinary retention- (present on admission)  Assessment & Plan  History of BPH, continue Flomax and Finasteride  Patient is noted to be retaining urine with multiple straight cath, now has  méndez; need to follow up with urology as an op      VTE prophylaxis:  SCDs, encourage ambulation had hematuria w lovenox

## 2020-08-16 NOTE — PROGRESS NOTES
"Pt is A&Ox3; disorientated to time. VSS. Pt very agitated/irritated this morning. Pt stating \" I want to see my family. I want to go home okay. Im okay\". Able to calm pt with therapeutic communication and distraction. Pt can be difficult at times to complete assessment and giving meds. Pending guardianship.POC discussed with pt; all questions answered at this time. Fall precautions in place.Mobility sign on door. Bed-alarm on. Call light within reach. Pt educated regarding fall prevention and states understanding. Hourly rounding in pace.   "

## 2020-08-16 NOTE — PROGRESS NOTES
Report received   Assumed care of patient at 1915   Pt is A&O x 2-3, disoriented to time, occasional disorientation to event  Denies N/V/D, denies chest pain, denies SOB, denies dizziness  Tolerating Diet   Rosado in place - adequate output/yellow  8/15 last BM   Passing Flatus    Up with one assist with a cane   Bed in lowest position and locked.  Bed alarm in place  Pt resting comfortably now.  Review plan of care with patient  Call light within reach  Hourly rounds in place  All needs met at this time

## 2020-08-17 PROBLEM — G93.40 ENCEPHALOPATHY: Status: ACTIVE | Noted: 2020-08-03

## 2020-08-17 PROCEDURE — 700102 HCHG RX REV CODE 250 W/ 637 OVERRIDE(OP): Performed by: HOSPITALIST

## 2020-08-17 PROCEDURE — A9270 NON-COVERED ITEM OR SERVICE: HCPCS | Performed by: HOSPITALIST

## 2020-08-17 PROCEDURE — 770006 HCHG ROOM/CARE - MED/SURG/GYN SEMI*

## 2020-08-17 PROCEDURE — 99231 SBSQ HOSP IP/OBS SF/LOW 25: CPT | Performed by: HOSPITALIST

## 2020-08-17 RX ORDER — HALOPERIDOL 5 MG/ML
2-5 INJECTION INTRAMUSCULAR EVERY 4 HOURS PRN
Status: DISCONTINUED | OUTPATIENT
Start: 2020-08-17 | End: 2020-11-09

## 2020-08-17 RX ORDER — HALOPERIDOL 5 MG/ML
2-5 INJECTION INTRAMUSCULAR EVERY 4 HOURS PRN
Status: DISCONTINUED | OUTPATIENT
Start: 2020-08-17 | End: 2020-08-17

## 2020-08-17 RX ADMIN — QUETIAPINE FUMARATE 25 MG: 25 TABLET ORAL at 17:38

## 2020-08-17 RX ADMIN — ERGOCALCIFEROL 50000 UNITS: 1.25 CAPSULE ORAL at 14:54

## 2020-08-17 RX ADMIN — FINASTERIDE 5 MG: 5 TABLET, FILM COATED ORAL at 05:47

## 2020-08-17 RX ADMIN — AMLODIPINE BESYLATE 5 MG: 5 TABLET ORAL at 05:46

## 2020-08-17 RX ADMIN — THERA TABS 1 TABLET: TAB at 05:47

## 2020-08-17 RX ADMIN — TAMSULOSIN HYDROCHLORIDE 0.8 MG: 0.4 CAPSULE ORAL at 09:14

## 2020-08-17 ASSESSMENT — ENCOUNTER SYMPTOMS
STRIDOR: 0
ABDOMINAL PAIN: 0
VOMITING: 0
SEIZURES: 0
CONSTIPATION: 0
EYE REDNESS: 0
EYE DISCHARGE: 0
SHORTNESS OF BREATH: 0
EYE PAIN: 0
NAUSEA: 0

## 2020-08-17 NOTE — PROGRESS NOTES
Assumed care of pt from day RN, pt sitting at edge of bed pleasant and A&Ox3, disoriented to time. Pt denies any pain at this time. Call light and belongings within reach, bed alarm on, will continue to monitor.

## 2020-08-17 NOTE — PROGRESS NOTES
Assumed care at 0700, report received from NOC RN.  Pt A&O x 3, states pain is 0/10. Bed locked, 2 rails up, bed in lowest position. Call light in place, belongings at bedside, no needs at this time and hourly rounding in place.

## 2020-08-17 NOTE — CARE PLAN
Problem: Safety  Goal: Will remain free from falls  Outcome: PROGRESSING AS EXPECTED  Note: Fall precautions in place: bed locked and in the lowest position, call light and belongings within reach, treaded socks on pt, pt's room close to nurse's station, bed alarm in use, fall risk ID band on.      Problem: Urinary Elimination:  Goal: Ability to reestablish a normal urinary elimination pattern will improve  Outcome: PROGRESSING SLOWER THAN EXPECTED  Note: Pt requires méndez for urinary retention, Is&Os monitored and recorded.

## 2020-08-17 NOTE — CARE PLAN
Problem: Safety  Goal: Will remain free from injury  Outcome: PROGRESSING AS EXPECTED  Goal: Will remain free from falls  Outcome: PROGRESSING AS EXPECTED  Note: Pt remains free of falls. Frequent rounding and call light in place. Bed alarm on.      Problem: Bowel/Gastric:  Goal: Normal bowel function is maintained or improved  Outcome: PROGRESSING AS EXPECTED

## 2020-08-17 NOTE — PROGRESS NOTES
Hospital Medicine Daily Progress Note    Date of Service  8/17/2020    Chief Complaint  84 y.o. male admitted 7/9/2020 with   Chief Complaint   Patient presents with   • Cough   • Tired   • Loss of Appetite   • N/V   • Diarrhea       Hospital Course  84-year-old male with a past medical history significant for diabetes, hyperlipidemia, BPH admitted 7/9/2020 with COVID infection and had POLY and hypotension.      He was initially admitted to ICU started on therapeutic Lovenox for elevated d-dimer. Hospital course was complicated with hematuria along with urinary retention. He is again noted to have urinary retention, thus Rosado was placed and will continue Rosado. Will provide flomax and finasteride. He was noted to be in acute renal failure which improved      Psych has evaluated the patient and stated that patient does not have capacity to make any medical decision; thus guardianship work up pending.         Interval Problem Update  Hemodynamically stable overnight.  Intermittently variably oriented x1-x2, has very poor insight.  Guardianship process underway  Discussed with patient's nurse and with multidisciplinary team during rounds including , pharmacist and charge nurse.      Consultants/Specialty  psychiatry  Critical care    Code Status  DNI/DNR    Disposition  Pending guardianship     Review of Systems  Review of Systems   Constitutional: Negative for malaise/fatigue.   Eyes: Negative for pain, discharge and redness.   Respiratory: Negative for shortness of breath and stridor.    Cardiovascular: Negative for chest pain and leg swelling.   Gastrointestinal: Negative for abdominal pain, constipation, nausea and vomiting.   Genitourinary: Negative for hematuria.   Musculoskeletal: Negative for joint pain.   Neurological: Negative for seizures.       Physical Exam  Temp:  [36.9 °C (98.4 °F)-37.5 °C (99.5 °F)] 37.5 °C (99.5 °F)  Pulse:  [83-96] 84  Resp:  [16-19] 19  BP: (111-123)/(57-67) 111/57  SpO2:   [92 %-94 %] 94 %    Physical Exam  Vitals signs reviewed.   Constitutional:       General: He is not in acute distress.  HENT:      Head: Normocephalic and atraumatic. No contusion.      Right Ear: External ear normal.      Left Ear: External ear normal.      Mouth/Throat:      Mouth: Mucous membranes are dry.      Pharynx: No oropharyngeal exudate.   Eyes:      General:         Right eye: No discharge.         Left eye: No discharge.   Cardiovascular:      Rate and Rhythm: Normal rate and regular rhythm.   Pulmonary:      Breath sounds: No wheezing or rhonchi.   Abdominal:      General: Bowel sounds are normal. There is no distension.      Palpations: Abdomen is soft.      Tenderness: There is no abdominal tenderness. There is no right CVA tenderness or left CVA tenderness.   Genitourinary:     Comments: Rosado's catheter in place.  Musculoskeletal: Normal range of motion.         General: No swelling.   Skin:     General: Skin is warm and dry.   Neurological:      Mental Status: He is alert.      Cranial Nerves: No cranial nerve deficit.      Sensory: No sensory deficit.       Fluids    Intake/Output Summary (Last 24 hours) at 8/17/2020 1754  Last data filed at 8/17/2020 1300  Gross per 24 hour   Intake 300 ml   Output 1150 ml   Net -850 ml       Laboratory      Recent Labs     08/16/20  0036   SODIUM 134*   POTASSIUM 4.0   CHLORIDE 96   CO2 25   GLUCOSE 98   BUN 21   CREATININE 0.87   CALCIUM 9.2                   Imaging  CT-RENAL WITH & W/O   Final Result      1.  Large complex right renal mass as detailed above measuring approximately 7.8 x 7.1x  8.5 cm. Primary differential considerations include urothelial malignancy or renal cell carcinoma.   2.  No evidence of adenopathy or metastatic disease is seen.   3.  Distended urinary bladder. Right greater left hydroureteronephrosis.   4.  Severe atherosclerosis. 3.5 cm infrarenal abdominal aortic aneurysm.            US-RUQ   Final Result         1.  Echogenic  liver compatible with fatty change versus fibrosis.   2.  Masslike structure in the right kidney, should be considered neoplastic unless proven otherwise, recommend follow-up 3 phase CT of the kidneys for further characterization   3.  Mild dilatation of the common bile duct, within expected limits given patient age.      These findings were discussed with the patient's clinician, Vin Mei, on 7/23/2020 7:34 AM.      DX-CHEST-PORTABLE (1 VIEW)   Final Result      1.  The lungs are hyperinflated suggesting emphysema/COPD.   2.  There is minimal predominantly lower lobe interstitial opacity which is most likely due to chronic scarring.           Assessment/Plan  Hematuria  Assessment & Plan  Resolved  monitor    COVID-19 virus infection  Assessment & Plan  Presented to the ED on 7/9 and was covered positive his had a lengthy stay   --Patient had been on therapeutic Lovenox but this was discontinued due to hematuria.  Current infection prevention patient is considered recovered.    COPD (chronic obstructive pulmonary disease) (HCC)- (present on admission)  Assessment & Plan  Continue RT protocol  He completed 10-day course of dexamethasone for COVID; now saturating well on 1/2 L of O2    Encephalopathy and inability to care for self  Assessment & Plan  Psychiatry was consulted, patient cannot make any medical decision.  SW working on [facility]     Type 2 diabetes mellitus (HCC)- (present on admission)  Assessment & Plan  Hemoglobin A1c at 6.8, continue diabetic diet, monitor  BG has been well controlled    Hypokalemia  Assessment & Plan    Replete and repeat    Advanced care planning/counseling discussion  Assessment & Plan  Patient isDNR/DNI  Guardianship being initiated    Malnutrition (HCC)  Assessment & Plan  Currently on a diabetic diet which she is tolerating well  Routine monitoring, periodic labs.    Urinary retention- (present on admission)  Assessment & Plan  History of BPH, continue Flomax and  Finasteride  Patient is noted to be retaining urine with multiple straight cath, now has méndez; need to follow up with urology as an op      VTE prophylaxis:  SCDs, encourage ambulation had hematuria w lovenox     I certify that the patient requires continued medically necessary hospital services for the treatment of encephalopathy and ability to care for self and will remain in the hospital for 5 days.  Discharge plan is anticipated to be group home or memory care unit

## 2020-08-18 PROCEDURE — 770006 HCHG ROOM/CARE - MED/SURG/GYN SEMI*

## 2020-08-18 PROCEDURE — A9270 NON-COVERED ITEM OR SERVICE: HCPCS | Performed by: HOSPITALIST

## 2020-08-18 PROCEDURE — 700102 HCHG RX REV CODE 250 W/ 637 OVERRIDE(OP): Performed by: HOSPITALIST

## 2020-08-18 PROCEDURE — 99231 SBSQ HOSP IP/OBS SF/LOW 25: CPT | Performed by: HOSPITALIST

## 2020-08-18 PROCEDURE — 94760 N-INVAS EAR/PLS OXIMETRY 1: CPT

## 2020-08-18 RX ADMIN — FINASTERIDE 5 MG: 5 TABLET, FILM COATED ORAL at 05:15

## 2020-08-18 RX ADMIN — THERA TABS 1 TABLET: TAB at 05:15

## 2020-08-18 RX ADMIN — AMLODIPINE BESYLATE 5 MG: 5 TABLET ORAL at 05:15

## 2020-08-18 RX ADMIN — QUETIAPINE FUMARATE 25 MG: 25 TABLET ORAL at 18:10

## 2020-08-18 ASSESSMENT — ENCOUNTER SYMPTOMS
CHILLS: 0
PALPITATIONS: 0
SPUTUM PRODUCTION: 0
NAUSEA: 0
VOMITING: 0
COUGH: 0
DIZZINESS: 0
ORTHOPNEA: 0
HEMOPTYSIS: 0

## 2020-08-18 NOTE — DISCHARGE PLANNING
Anticipated Discharge Disposition: Group Home    Action: Chart notes indicate patient attempted to leave yesterday after his shower, telling staff he needs to see his family. Nursing staff was able to calm and re-direct patient and agreed to stay.  Patient's roommate has not discharged, and is awaiting a long term bed at a skilled nursing home.     Barriers to Discharge: guardianship/medicaid    Plan: continue to monitor for discharge barriers

## 2020-08-18 NOTE — RESPIRATORY CARE
Oxygen Rounds      Patient found on    O2 L/m:  ___2______    Oxygen device:  ___nc_____   Spo2: _____98____%     Respiratory device skin site inspection completed.

## 2020-08-18 NOTE — CARE PLAN
Problem: Safety  Goal: Will remain free from injury  Outcome: PROGRESSING AS EXPECTED     Problem: Infection  Goal: Will remain free from infection  Outcome: PROGRESSING AS EXPECTED     Problem: Skin Integrity  Goal: Risk for impaired skin integrity will decrease  Outcome: PROGRESSING AS EXPECTED     Problem: Mobility  Goal: Risk for activity intolerance will decrease  Outcome: PROGRESSING AS EXPECTED

## 2020-08-18 NOTE — CARE PLAN
Problem: Infection  Goal: Will remain free from infection  Outcome: PROGRESSING AS EXPECTED  Note: Assessed pt for s/s of infection, educated pt on importance of hand hygiene.      Problem: Fluid Volume:  Goal: Will maintain balanced intake and output  Outcome: PROGRESSING AS EXPECTED  Note: Monitored and recorded pt intake of fluid and output of urine from méndez.

## 2020-08-18 NOTE — PROGRESS NOTES
"Pt currently sitting edge of bed and upset that he is still here. Pt is A/Ox3/4 and keeps stating, \"the people out there, are looting and burning down businesses but I'm the one in here locked up. I'm still here.\" This writer able to calm the pt down with talking about baseball and basketball. Pt refused to take AM dose of Flomax. Pt has no other wants or needs besides his desire to go home. Guardianship for pt currently pending. Will continue to monitor.   "

## 2020-08-18 NOTE — PROGRESS NOTES
Hospital Medicine Daily Progress Note    Date of Service  8/18/2020    Chief Complaint  84 y.o. male admitted 7/9/2020 with   Chief Complaint   Patient presents with   • Cough   • Tired   • Loss of Appetite   • N/V   • Diarrhea       Hospital Course  84-year-old male with a past medical history significant for diabetes, hyperlipidemia, BPH admitted 7/9/2020 with COVID infection and had POLY and hypotension.      He was initially admitted to ICU started on therapeutic Lovenox for elevated d-dimer. Hospital course was complicated with hematuria along with urinary retention. He is again noted to have urinary retention, thus Rosado was placed and will continue Rosado. Will provide flomax and finasteride. He was noted to be in acute renal failure which improved      Psych has evaluated the patient and stated that patient does not have capacity to make any medical decision; thus guardianship work up pending.       Interval Problem Update  Patient is awake, asks why still in the hospital, he was trying to leave yesterday after he took a shower  Not short of breath, minimal cough, on minimal supplemental oxygen  Tolerating diet, no nausea or vomiting    Patient does not have active medical issues and will be transitioned to the long-term patient service/protocol  Hospitalist physician or APRN will formally round 4 times a week and be available to the nursing staff for any questions  Continue daily discussion with case management on discharge planning    Consultants/Specialty  psychiatry  Critical care    Code Status  DNI/DNR    Disposition  Pending guardianship     Review of Systems  Review of Systems   Constitutional: Negative for chills and malaise/fatigue.   Respiratory: Negative for cough, hemoptysis and sputum production.    Cardiovascular: Negative for chest pain, palpitations and orthopnea.   Gastrointestinal: Negative for nausea and vomiting.   Skin: Negative for itching and rash.   Neurological: Negative for  dizziness.   All other systems reviewed and are negative.      Physical Exam  Temp:  [36.1 °C (97 °F)-37.5 °C (99.5 °F)] 37.4 °C (99.3 °F)  Pulse:  [75-84] 77  Resp:  [16-19] 16  BP: (102-111)/(57-59) 102/58  SpO2:  [94 %-98 %] 96 %    Physical Exam  Vitals signs reviewed.   Constitutional:       General: He is not in acute distress.  HENT:      Head: Normocephalic and atraumatic. No contusion.      Right Ear: External ear normal.      Left Ear: External ear normal.      Mouth/Throat:      Mouth: Mucous membranes are dry.      Pharynx: No oropharyngeal exudate.   Eyes:      General:         Right eye: No discharge.         Left eye: No discharge.   Cardiovascular:      Rate and Rhythm: Normal rate and regular rhythm.   Pulmonary:      Breath sounds: No wheezing or rhonchi.   Abdominal:      General: Bowel sounds are normal. There is no distension.      Palpations: Abdomen is soft.      Tenderness: There is no abdominal tenderness. There is no right CVA tenderness or left CVA tenderness.   Genitourinary:     Comments: Rosado's catheter in place.  Musculoskeletal: Normal range of motion.         General: No swelling.   Skin:     General: Skin is warm and dry.   Neurological:      Mental Status: He is alert.      Cranial Nerves: No cranial nerve deficit.      Sensory: No sensory deficit.       Fluids    Intake/Output Summary (Last 24 hours) at 8/18/2020 1353  Last data filed at 8/18/2020 1057  Gross per 24 hour   Intake 420 ml   Output 1300 ml   Net -880 ml       Laboratory      Recent Labs     08/16/20  0036   SODIUM 134*   POTASSIUM 4.0   CHLORIDE 96   CO2 25   GLUCOSE 98   BUN 21   CREATININE 0.87   CALCIUM 9.2                   Imaging  CT-RENAL WITH & W/O   Final Result      1.  Large complex right renal mass as detailed above measuring approximately 7.8 x 7.1x  8.5 cm. Primary differential considerations include urothelial malignancy or renal cell carcinoma.   2.  No evidence of adenopathy or metastatic disease is  seen.   3.  Distended urinary bladder. Right greater left hydroureteronephrosis.   4.  Severe atherosclerosis. 3.5 cm infrarenal abdominal aortic aneurysm.            US-RUQ   Final Result         1.  Echogenic liver compatible with fatty change versus fibrosis.   2.  Masslike structure in the right kidney, should be considered neoplastic unless proven otherwise, recommend follow-up 3 phase CT of the kidneys for further characterization   3.  Mild dilatation of the common bile duct, within expected limits given patient age.      These findings were discussed with the patient's clinician, Vin Mei, on 7/23/2020 7:34 AM.      DX-CHEST-PORTABLE (1 VIEW)   Final Result      1.  The lungs are hyperinflated suggesting emphysema/COPD.   2.  There is minimal predominantly lower lobe interstitial opacity which is most likely due to chronic scarring.           Assessment/Plan  Hematuria  Assessment & Plan  Hematuria has resolved    COVID-19 virus infection  Assessment & Plan  Patient has recovered from COVID-19    COPD (chronic obstructive pulmonary disease) (HCC)- (present on admission)  Assessment & Plan  Supplemental oxygen as needed  Inhaled bronchodilators as needed    Encephalopathy and inability to care for self  Assessment & Plan  Psychiatry was consulted, patient cannot make any medical decision  SW working on [employer]     Type 2 diabetes mellitus (HCC)- (present on admission)  Assessment & Plan  Hemoglobin A1c at 6.8, continue diabetic diet, monitor  BG has been well controlled    Hypokalemia  Assessment & Plan  Replaced    Advanced care planning/counseling discussion  Assessment & Plan  Patient is DNR/DNI  Guardianship being initiated    Malnutrition (HCC)- (present on admission)  Assessment & Plan  Tolerating diet at this point    Urinary retention- (present on admission)  Assessment & Plan  Continue Flomax and finasteride  Rosado catheter in place for urinary retention     VTE prophylaxis:  SCDs,  encourage ambulation had hematuria w lovenox

## 2020-08-18 NOTE — PROGRESS NOTES
Assumed care of pt from day RN, pt sitting at edge of bed, A&Ox2, disoriented to time and event. Pt became very agitated and confused at the beginning of the shift stating that we needed to call the police because he was not the bad jaiden and he thought he was going to go home after we showered him. The pt began putting his clothes on over his catheter. Security was called and other RNs came to calm pt and speak to him in his native language although the pt does speak English well. Hospitalist on-call Dr. Mauro was notified and added PRN Domoniquel to pt's MAR however the pt was able to be calmed by staff and agreed to stay.     Pt denies any pain at this time. Call light and belongings within reach, bed alarm on, will continue to monitor.

## 2020-08-19 PROCEDURE — A9270 NON-COVERED ITEM OR SERVICE: HCPCS | Performed by: HOSPITALIST

## 2020-08-19 PROCEDURE — 770006 HCHG ROOM/CARE - MED/SURG/GYN SEMI*

## 2020-08-19 PROCEDURE — 700102 HCHG RX REV CODE 250 W/ 637 OVERRIDE(OP): Performed by: HOSPITALIST

## 2020-08-19 RX ADMIN — QUETIAPINE FUMARATE 25 MG: 25 TABLET ORAL at 18:16

## 2020-08-19 RX ADMIN — AMLODIPINE BESYLATE 5 MG: 5 TABLET ORAL at 04:46

## 2020-08-19 RX ADMIN — FINASTERIDE 5 MG: 5 TABLET, FILM COATED ORAL at 04:46

## 2020-08-19 RX ADMIN — TAMSULOSIN HYDROCHLORIDE 0.8 MG: 0.4 CAPSULE ORAL at 08:39

## 2020-08-19 RX ADMIN — THERA TABS 1 TABLET: TAB at 04:46

## 2020-08-19 NOTE — PROGRESS NOTES
Received bedside report and accepted care of patient.     Pt currently A/Ox2, disoriented to time and event, legally blind. Currently on 2L via NC denies SOB, dizziness, or chest pain. Currently has a Rosado catheter for urinary retention.    Patient currently resting in bed in no visible or stated signs of distress, discomfort, or pain. Bed alarm in place, controls on and bed in locked and lowest position. Call light and personal possessions within reach. Patient educated about use of call light and verbalized understanding.

## 2020-08-19 NOTE — PROGRESS NOTES
Bedside Report received   Assumed care of patient at 1921.    Pt is A&O x2, disoriented to time and even  2L of O2 per NC denies SOB, dizziness or chest pain  Pt primary language is Lithuanian but pt does understand and able to speak English  Regular diet with poor PO intake   Rosado catheter in place for urinary retention  Last BM 8/17   + Flatus  Up x1 assist with cane   SCD's off  Bed in lowest position and locked.  Bed alarm in place  Pt resting comfortably now.  Review plan of care with patient  Call light within reach  Hourly rounds in place  All needs met at this time

## 2020-08-19 NOTE — CARE PLAN
Problem: Safety  Goal: Will remain free from injury  Outcome: PROGRESSING AS EXPECTED     Problem: Knowledge Deficit  Goal: Knowledge of disease process/condition, treatment plan, diagnostic tests, and medications will improve  Outcome: PROGRESSING AS EXPECTED     Problem: Mobility  Goal: Risk for activity intolerance will decrease  Outcome: PROGRESSING AS EXPECTED

## 2020-08-19 NOTE — CARE PLAN
Problem: Safety  Goal: Will remain free from injury  Outcome: PROGRESSING AS EXPECTED   Bed alarm In place  Problem: Respiratory:  Goal: Respiratory status will improve  Outcome: PROGRESSING AS EXPECTED   Denies sob, 2L of O2 per nasal cannula  Problem: Urinary Elimination:  Goal: Ability to reestablish a normal urinary elimination pattern will improve  Outcome: PROGRESSING AS EXPECTED   Rosado catheter for urinary retention, patent and draining

## 2020-08-20 PROCEDURE — A9270 NON-COVERED ITEM OR SERVICE: HCPCS | Performed by: HOSPITALIST

## 2020-08-20 PROCEDURE — 700102 HCHG RX REV CODE 250 W/ 637 OVERRIDE(OP): Performed by: HOSPITALIST

## 2020-08-20 PROCEDURE — 770006 HCHG ROOM/CARE - MED/SURG/GYN SEMI*

## 2020-08-20 RX ADMIN — FINASTERIDE 5 MG: 5 TABLET, FILM COATED ORAL at 05:20

## 2020-08-20 RX ADMIN — AMLODIPINE BESYLATE 5 MG: 5 TABLET ORAL at 05:20

## 2020-08-20 RX ADMIN — THERA TABS 1 TABLET: TAB at 05:20

## 2020-08-20 RX ADMIN — QUETIAPINE FUMARATE 25 MG: 25 TABLET ORAL at 17:01

## 2020-08-20 RX ADMIN — TAMSULOSIN HYDROCHLORIDE 0.8 MG: 0.4 CAPSULE ORAL at 07:48

## 2020-08-20 NOTE — PROGRESS NOTES
Patient Alert, disoriented to time and event.   Respirations normal and unlabored. VSS.  Patient turns himself. Ambulates with handheld assistance by one and cane.   Rosado catheter.   Pressure injuries assessed.  Fall risk protocol in place. Bed locked and in lowest position. Non-skid socks and bed alarm on.  Rounded on hourly. Call light with in reach.

## 2020-08-21 PROCEDURE — 700102 HCHG RX REV CODE 250 W/ 637 OVERRIDE(OP): Performed by: HOSPITALIST

## 2020-08-21 PROCEDURE — 770006 HCHG ROOM/CARE - MED/SURG/GYN SEMI*

## 2020-08-21 PROCEDURE — A9270 NON-COVERED ITEM OR SERVICE: HCPCS | Performed by: HOSPITALIST

## 2020-08-21 PROCEDURE — 94760 N-INVAS EAR/PLS OXIMETRY 1: CPT

## 2020-08-21 RX ADMIN — QUETIAPINE FUMARATE 25 MG: 25 TABLET ORAL at 17:25

## 2020-08-21 RX ADMIN — THERA TABS 1 TABLET: TAB at 04:50

## 2020-08-21 RX ADMIN — FINASTERIDE 5 MG: 5 TABLET, FILM COATED ORAL at 04:50

## 2020-08-21 RX ADMIN — TAMSULOSIN HYDROCHLORIDE 0.8 MG: 0.4 CAPSULE ORAL at 07:37

## 2020-08-21 RX ADMIN — AMLODIPINE BESYLATE 5 MG: 5 TABLET ORAL at 04:50

## 2020-08-21 ASSESSMENT — FIBROSIS 4 INDEX: FIB4 SCORE: 2.13

## 2020-08-21 NOTE — RESPIRATORY CARE
Oxygen Rounds      Patient found on    O2 L/m:  ____2_____    Oxygen device:  ___nc_____   Spo2: _____98____%      Respiratory device skin site inspection completed.

## 2020-08-21 NOTE — CARE PLAN
Problem: Psychosocial Needs:  Goal: Level of anxiety will decrease  Outcome: PROGRESSING AS EXPECTED  Patient reports no anxiety.      Problem: Mobility  Goal: Risk for activity intolerance will decrease  Outcome: PROGRESSING AS EXPECTED  Patient is able to ambulate with standby assist and a cane.

## 2020-08-21 NOTE — PROGRESS NOTES
Hospital Medicine Daily Progress Note    Date of Service  8/21/2020    Chief Complaint  84 y.o. male admitted 7/9/2020 with COVID infection    Hospital Course    Mr. Bejarano is an 83 yo male with a PMHx of DM type II, hyperlipidemia, BPH who was admitted on 7/9/2020 with COVID 19 infection with POLY and hypotension.  Patient initially in ICU and on therapeutic Lovenox due to elevated DDimer.  HIs hospital course was complicated by hematuria and urine retention of which a méndez was placed.  Flomax and finasteride was initiated.  Patient was in acute renal failure which has improved.  Psychiatry was also consulted and evaluated the patient.  Per psychiatry, patient does not have the capacity to make any medical decision on 8/3/2020.  Guardianship pending.      Interval Problem Update  -Patient seen and examined today.  Patient reports wanting to go home today to see family.  Patient can get pretty agitated when reoriented and held back as patient wanting to leave facility.  Patient redirected, and notified in plan of care.  -Plan of care: Continue to provide supportive care, monitor for safety; patient high risk for elopement; pending guardianship and Medicaid approval.  Patient will need a long-term bed at a skilled nursing home.  -Lab work: Last obtained on 8/16/2020, reviewed, unremarkable.  Will order labs as warranted.    Consultants/Specialty  None    Code Status  DNAR/DNI    Disposition  TBD -pending guardianship and placement.      Assessment/Plan  Hematuria  Assessment & Plan  -Hematuria has resolved    COVID-19 virus infection  Assessment & Plan  -Patient has recovered from COVID-19    COPD (chronic obstructive pulmonary disease) (HCC)- (present on admission)  Assessment & Plan  -Supplemental oxygen as needed  -Inhaled bronchodilators as needed    Encephalopathy and inability to care for self  Assessment & Plan  -Psychiatry was consulted, patient cannot make any medical decision  -CORINA working on [facility]      Type 2 diabetes mellitus (HCC)- (present on admission)  Assessment & Plan  -Hemoglobin A1c at 6.8, continue diabetic diet, monitor  -BG has been well controlled    Hypokalemia  Assessment & Plan  Replaced    Advanced care planning/counseling discussion  Assessment & Plan  -Patient is DNR/DNI  -Guardianship being initiated    Malnutrition (HCC)- (present on admission)  Assessment & Plan  -Tolerating diet at this point    Urinary retention- (present on admission)  Assessment & Plan  -Continue Flomax and finasteride  -Rosado catheter in place for urinary retention  ============================================================================================Please note that this dictation was created using voice recognition software. I have made every reasonable attempt to correct obvious errors, but there may be errors of grammar and possibly content that I did not discover before finalizing the note.    Electronically signed by:  ZEKE Booth, MSN, APRN, FNP-C  Hospitalist Services  [facility]  [phone]  [email or web address]  08/21/20   1043

## 2020-08-21 NOTE — PROGRESS NOTES
Assumed care of patient, received report from day shift RN. Patient resting in bed. All fall precautions in place, bed alarm on, call light within reach. Patient reports no pain or needs at this time. Hourly rounding in place.

## 2020-08-22 PROCEDURE — 700102 HCHG RX REV CODE 250 W/ 637 OVERRIDE(OP): Performed by: HOSPITALIST

## 2020-08-22 PROCEDURE — 770006 HCHG ROOM/CARE - MED/SURG/GYN SEMI*

## 2020-08-22 PROCEDURE — A9270 NON-COVERED ITEM OR SERVICE: HCPCS | Performed by: HOSPITALIST

## 2020-08-22 PROCEDURE — 306015 LOCK STAT FOLEY: Performed by: NURSE PRACTITIONER

## 2020-08-22 RX ADMIN — THERA TABS 1 TABLET: TAB at 04:21

## 2020-08-22 RX ADMIN — TAMSULOSIN HYDROCHLORIDE 0.8 MG: 0.4 CAPSULE ORAL at 08:51

## 2020-08-22 RX ADMIN — FINASTERIDE 5 MG: 5 TABLET, FILM COATED ORAL at 04:21

## 2020-08-22 RX ADMIN — AMLODIPINE BESYLATE 5 MG: 5 TABLET ORAL at 04:21

## 2020-08-22 RX ADMIN — QUETIAPINE FUMARATE 25 MG: 25 TABLET ORAL at 17:16

## 2020-08-22 ASSESSMENT — FIBROSIS 4 INDEX: FIB4 SCORE: 2.13

## 2020-08-22 NOTE — PROGRESS NOTES
Patient disoriented to time and situation,pleasant and calm,legally blind,denies pain,on 2L of oxygen per nc without distress,ambulated to the bathroom with 1 person assist,call light and personal belongings within reach,bed in low position,bed alarm on.

## 2020-08-22 NOTE — CARE PLAN
Problem: Safety  Goal: Will remain free from injury  Outcome: PROGRESSING AS EXPECTED  Goal: Will remain free from falls  Outcome: PROGRESSING AS EXPECTED     Problem: Safety  Goal: Will remain free from falls  Outcome: PROGRESSING AS EXPECTED     Problem: Respiratory:  Goal: Respiratory status will improve  Outcome: PROGRESSING AS EXPECTED     Problem: Psychosocial Needs:  Goal: Level of anxiety will decrease  Outcome: PROGRESSING AS EXPECTED

## 2020-08-23 PROCEDURE — A9270 NON-COVERED ITEM OR SERVICE: HCPCS | Performed by: HOSPITALIST

## 2020-08-23 PROCEDURE — 700102 HCHG RX REV CODE 250 W/ 637 OVERRIDE(OP): Performed by: HOSPITALIST

## 2020-08-23 PROCEDURE — 770006 HCHG ROOM/CARE - MED/SURG/GYN SEMI*

## 2020-08-23 RX ADMIN — QUETIAPINE FUMARATE 25 MG: 25 TABLET ORAL at 17:23

## 2020-08-23 RX ADMIN — AMLODIPINE BESYLATE 5 MG: 5 TABLET ORAL at 04:23

## 2020-08-23 RX ADMIN — TAMSULOSIN HYDROCHLORIDE 0.8 MG: 0.4 CAPSULE ORAL at 10:09

## 2020-08-23 RX ADMIN — FINASTERIDE 5 MG: 5 TABLET, FILM COATED ORAL at 04:23

## 2020-08-23 RX ADMIN — THERA TABS 1 TABLET: TAB at 04:23

## 2020-08-23 NOTE — CARE PLAN
Problem: Safety  Goal: Will remain free from injury  Outcome: PROGRESSING AS EXPECTED     Problem: Knowledge Deficit  Goal: Knowledge of disease process/condition, treatment plan, diagnostic tests, and medications will improve  Outcome: PROGRESSING AS EXPECTED     Problem: Fluid Volume:  Goal: Will maintain balanced intake and output  Outcome: PROGRESSING AS EXPECTED     Problem: Skin Integrity  Goal: Risk for impaired skin integrity will decrease  Outcome: PROGRESSING AS EXPECTED     Problem: Psychosocial Needs:  Goal: Level of anxiety will decrease  Outcome: PROGRESSING AS EXPECTED

## 2020-08-23 NOTE — CARE PLAN
Problem: Safety  Goal: Will remain free from injury  Outcome: PROGRESSING AS EXPECTED  Note: Fall precautions in place. Pt is legally blind but can ambulate with front wheeled walker and assistance.      Problem: Bowel/Gastric:  Goal: Normal bowel function is maintained or improved  Outcome: PROGRESSING AS EXPECTED

## 2020-08-24 PROCEDURE — 700102 HCHG RX REV CODE 250 W/ 637 OVERRIDE(OP): Performed by: HOSPITALIST

## 2020-08-24 PROCEDURE — A9270 NON-COVERED ITEM OR SERVICE: HCPCS | Performed by: HOSPITALIST

## 2020-08-24 PROCEDURE — 770006 HCHG ROOM/CARE - MED/SURG/GYN SEMI*

## 2020-08-24 RX ADMIN — FINASTERIDE 5 MG: 5 TABLET, FILM COATED ORAL at 04:50

## 2020-08-24 RX ADMIN — TAMSULOSIN HYDROCHLORIDE 0.8 MG: 0.4 CAPSULE ORAL at 08:40

## 2020-08-24 RX ADMIN — QUETIAPINE FUMARATE 25 MG: 25 TABLET ORAL at 17:51

## 2020-08-24 RX ADMIN — THERA TABS 1 TABLET: TAB at 04:50

## 2020-08-24 RX ADMIN — ERGOCALCIFEROL 50000 UNITS: 1.25 CAPSULE ORAL at 14:09

## 2020-08-24 RX ADMIN — AMLODIPINE BESYLATE 5 MG: 5 TABLET ORAL at 04:50

## 2020-08-24 NOTE — CARE PLAN
Problem: Safety  Goal: Will remain free from injury  Outcome: PROGRESSING AS EXPECTED     Problem: Infection  Goal: Will remain free from infection  Outcome: PROGRESSING AS EXPECTED     Problem: Skin Integrity  Goal: Risk for impaired skin integrity will decrease  Outcome: PROGRESSING AS EXPECTED     Problem: Psychosocial Needs:  Goal: Level of anxiety will decrease  Outcome: PROGRESSING AS EXPECTED     Problem: Urinary Elimination:  Goal: Ability to reestablish a normal urinary elimination pattern will improve  Outcome: PROGRESSING AS EXPECTED     Problem: Mobility  Goal: Risk for activity intolerance will decrease  Outcome: PROGRESSING AS EXPECTED

## 2020-08-24 NOTE — PROGRESS NOTES
Pt A/Ox3; 2L O2 NC; legally blind; standby assist to BR; pt appears in good spirits; no c/o pain or discomfort at this time; chronic méndez in place w/ adequate output; bed in lowest and locked position with bed alarm on; call light and tray table within reach, will continue to monitor.

## 2020-08-24 NOTE — PROGRESS NOTES
Report received by dayshift RN. Assumed care of pt. Assessment complete. Pt A&Ox2-3, VSS and on 2L O2. Legally blind. Pt in no apparent signs of distress, denies pain. In bed currently resting with no complaints. Stand-by assist and chronic méndez in place. Call light within reach, bed in lowest position, and hourly rounding in place.

## 2020-08-24 NOTE — DISCHARGE PLANNING
Anticipated Discharge Disposition: Group Home    Action: Patient is orientated to self and location, on 2L of O2, legally blind, stand by assist and chronic méndez in place.  Mostly pleasant and cooperative, unless patient is wanting to go home to see his friend/family.      Barriers to Discharge: guardianship/placement    Plan: follow up with Wandy Blandon for update on guardianship hearing.

## 2020-08-25 PROCEDURE — 700102 HCHG RX REV CODE 250 W/ 637 OVERRIDE(OP): Performed by: HOSPITALIST

## 2020-08-25 PROCEDURE — A9270 NON-COVERED ITEM OR SERVICE: HCPCS | Performed by: HOSPITALIST

## 2020-08-25 PROCEDURE — 770006 HCHG ROOM/CARE - MED/SURG/GYN SEMI*

## 2020-08-25 RX ADMIN — TAMSULOSIN HYDROCHLORIDE 0.8 MG: 0.4 CAPSULE ORAL at 08:21

## 2020-08-25 RX ADMIN — FINASTERIDE 5 MG: 5 TABLET, FILM COATED ORAL at 05:36

## 2020-08-25 RX ADMIN — THERA TABS 1 TABLET: TAB at 05:36

## 2020-08-25 RX ADMIN — AMLODIPINE BESYLATE 5 MG: 5 TABLET ORAL at 05:36

## 2020-08-25 RX ADMIN — QUETIAPINE FUMARATE 25 MG: 25 TABLET ORAL at 17:05

## 2020-08-25 NOTE — CARE PLAN
Problem: Safety  Goal: Will remain free from injury  Outcome: PROGRESSING AS EXPECTED  Goal: Will remain free from falls  Outcome: PROGRESSING AS EXPECTED     Problem: Respiratory:  Goal: Respiratory status will improve  Outcome: PROGRESSING AS EXPECTED     Problem: Skin Integrity  Goal: Risk for impaired skin integrity will decrease  Outcome: PROGRESSING AS EXPECTED     Problem: Psychosocial Needs:  Goal: Level of anxiety will decrease  Outcome: PROGRESSING AS EXPECTED

## 2020-08-25 NOTE — CARE PLAN
Problem: Safety  Goal: Will remain free from injury  Outcome: PROGRESSING AS EXPECTED  Goal: Will remain free from falls  Outcome: PROGRESSING AS EXPECTED     Problem: Infection  Goal: Will remain free from infection  Outcome: PROGRESSING AS EXPECTED     Problem: Venous Thromboembolism (VTW)/Deep Vein Thrombosis (DVT) Prevention:  Goal: Patient will participate in Venous Thrombosis (VTE)/Deep Vein Thrombosis (DVT)Prevention Measures  Outcome: PROGRESSING AS EXPECTED     Problem: Bowel/Gastric:  Goal: Normal bowel function is maintained or improved  Outcome: PROGRESSING AS EXPECTED  Goal: Will not experience complications related to bowel motility  Outcome: PROGRESSING AS EXPECTED     Problem: Skin Integrity  Goal: Risk for impaired skin integrity will decrease  Outcome: PROGRESSING AS EXPECTED     Problem: Mobility  Goal: Risk for activity intolerance will decrease  Outcome: PROGRESSING AS EXPECTED

## 2020-08-25 NOTE — PROGRESS NOTES
Pt is A&Ox3- disorientated to event. VSS. Lungs diminished on 2LNC. Legally blind in both eyes. Stand by assist OOB with cane. Pt pleasant and cooperative. Chronic méndez in place for retention. Denies any pain at this time. No signs of distress. Bed low and locked, call light within reach, room next to nurses station, safety precautions in place, WCM.

## 2020-08-26 PROCEDURE — A9270 NON-COVERED ITEM OR SERVICE: HCPCS | Performed by: HOSPITALIST

## 2020-08-26 PROCEDURE — 700102 HCHG RX REV CODE 250 W/ 637 OVERRIDE(OP): Performed by: HOSPITALIST

## 2020-08-26 PROCEDURE — 770006 HCHG ROOM/CARE - MED/SURG/GYN SEMI*

## 2020-08-26 RX ADMIN — QUETIAPINE FUMARATE 25 MG: 25 TABLET ORAL at 17:58

## 2020-08-26 RX ADMIN — THERA TABS 1 TABLET: TAB at 05:23

## 2020-08-26 RX ADMIN — AMLODIPINE BESYLATE 5 MG: 5 TABLET ORAL at 05:23

## 2020-08-26 RX ADMIN — FINASTERIDE 5 MG: 5 TABLET, FILM COATED ORAL at 05:23

## 2020-08-26 RX ADMIN — TAMSULOSIN HYDROCHLORIDE 0.8 MG: 0.4 CAPSULE ORAL at 09:21

## 2020-08-26 NOTE — CARE PLAN
Problem: Safety  Goal: Will remain free from injury  Outcome: PROGRESSING AS EXPECTED  Goal: Will remain free from falls  Outcome: PROGRESSING AS EXPECTED     Problem: Respiratory:  Goal: Respiratory status will improve  Outcome: PROGRESSING AS EXPECTED     Problem: Psychosocial Needs:  Goal: Level of anxiety will decrease  Outcome: PROGRESSING AS EXPECTED     Problem: Mobility  Goal: Risk for activity intolerance will decrease  Outcome: PROGRESSING AS EXPECTED

## 2020-08-26 NOTE — PROGRESS NOTES
Pt A/Ox3; 2L O2 NC; legally blind; standby assist to BR; pt appears in good spirits, sleeping later than usual; no c/o pain or discomfort at this time; chronic méndez in place w/ adequate output; bed in lowest and locked position with bed alarm on; call light and tray table within reach, will continue to monitor.

## 2020-08-27 PROCEDURE — A9270 NON-COVERED ITEM OR SERVICE: HCPCS | Performed by: HOSPITALIST

## 2020-08-27 PROCEDURE — 700102 HCHG RX REV CODE 250 W/ 637 OVERRIDE(OP): Performed by: HOSPITALIST

## 2020-08-27 PROCEDURE — 770006 HCHG ROOM/CARE - MED/SURG/GYN SEMI*

## 2020-08-27 RX ADMIN — TAMSULOSIN HYDROCHLORIDE 0.8 MG: 0.4 CAPSULE ORAL at 09:26

## 2020-08-27 RX ADMIN — AMLODIPINE BESYLATE 5 MG: 5 TABLET ORAL at 05:24

## 2020-08-27 RX ADMIN — THERA TABS 1 TABLET: TAB at 05:24

## 2020-08-27 RX ADMIN — QUETIAPINE FUMARATE 25 MG: 25 TABLET ORAL at 17:30

## 2020-08-27 RX ADMIN — FINASTERIDE 5 MG: 5 TABLET, FILM COATED ORAL at 05:24

## 2020-08-27 NOTE — PROGRESS NOTES
Hospital Medicine Daily Progress Note    Date of Service  8/27/2020    Chief Complaint  84 y.o. male admitted 7/9/2020 with COVID infection    Hospital Course    Mr. Bejarano is an 85 yo male with a PMHx of DM type II, hyperlipidemia, BPH who was admitted on 7/9/2020 with COVID 19 infection with POLY and hypotension.  Patient initially in ICU and on therapeutic Lovenox due to elevated DDimer.  His hospital course was complicated by hematuria and urine retention of which a méndez was placed.  Flomax and finasteride was initiated.  Patient was in acute renal failure which has improved.  Psychiatry was also consulted and evaluated the patient.  Per psychiatry, patient does not have the capacity to make any medical decision on 8/3/2020.  Guardianship pending.      Interval Problem Update  -Patient seen and examined today. Patient states no pain or needs for anything except ice water. Patient has been expressing to nursing staff that he is concerned about his girlfriend and friends from previous living arrangements.  -Plan of care: Continue to provide supportive care, monitor for safety; patient high risk for elopement; pending guardianship and Medicaid approval.  Patient will need a long-term bed at a skilled nursing home.  -Lab work: Last obtained on 8/16/2020, reviewed, unremarkable.  Will order labs as warranted.    Consultants/Specialty  None    Code Status  DNAR/DNI    Disposition  TBD -pending guardianship and placement.      Assessment/Plan  Hematuria  Assessment & Plan  -Hematuria has resolved    COVID-19 virus infection  Assessment & Plan  -Patient has recovered from COVID-19    COPD (chronic obstructive pulmonary disease) (HCC)- (present on admission)  Assessment & Plan  -Supplemental oxygen as needed  -Inhaled bronchodilators as needed    Encephalopathy and inability to care for self  Assessment & Plan  -Psychiatry was consulted, patient cannot make any medical decision  -[employer] working on [employer]     Type 2  diabetes mellitus (HCC)- (present on admission)  Assessment & Plan  -Hemoglobin A1c at 6.8, continue diabetic diet, monitor  -BG has been well controlled    Hypokalemia  Assessment & Plan  Replaced    Advanced care planning/counseling discussion  Assessment & Plan  -Patient is DNR/DNI  -Guardianship being initiated    Malnutrition (HCC)- (present on admission)  Assessment & Plan  -Tolerating diet at this point    Urinary retention- (present on admission)  Assessment & Plan  -Continue Flomax and finasteride  -Rosado catheter in place for urinary retention

## 2020-08-27 NOTE — PROGRESS NOTES
Spiritual Care Note    Patient Information     Patient's Name: Ovi Bejarano   MRN: [id number]    YOB: 1935   Age and Gender: 84 y.o. male   Service Area: Medical/Neprhology   Room (and Bed): [place] [address]   Ethnicity or Nationality:     Primary Language: English   Worship/Spiritual preference: Restorationism   Place of Residence: [place]   Family/Friends/Others Present: No   Clinical Team Present: No   Medical Diagnosis(-es)/Procedure(s): History of Covid-19   Code Status: DNAR/DNI    Date of Admission: 7/9/2020   Length of Stay: 49 days        Spiritual Care Provider Information:  Name of Spiritual Care Provider: Vi Zaman  Title of Spiritual Care Provider: Associate   Phone Number: [phone]  E-mail: [email or web address]  Total time : 10 minutes    Spiritual Screen Results:    Gen Nursing  Spiritual Screen  Is your spiritual health or inner well-being important to you as you cope with your medical condition?: No  Would you like to receive a visit from our Spiritual Care team or your own Sabianism or spiritual leader?: No  Was spiritual care education provided to the patient?: Declined     Palliative Care  PC Worship/Spiritual Screening  Is your spiritual health or inner well-being important to you as you cope with your medical condition?: Unable to determine  Was spiritual care education provided to the patient?: Declined      Encounter/Request Information  Encounter/Request Type   Visited With: Patient  Nature of the Visit: Initial, On shift  General Visit: Yes  Referral From/ Origin of Request: [place] management rounds, Verbal staff    Religous Needs/Values  Worship Needs Visit  Worship Needs: Prayer    Spiritual Assessment     Spiritual Care Encounters    Observations/Symptoms: Accepting    Interaction/Conversation: EZ Licea referred the  to this pt, who requested the [employer].    Assessment: Need    Need: Seeking Spiritual Assistance and  Support    Interventions: Compassionate presence, prayer.    Outcomes: Spiritual Comfort    Plan: Visit Upon Request    Notes:

## 2020-08-27 NOTE — CARE PLAN
Problem: Safety  Goal: Will remain free from injury  Outcome: PROGRESSING AS EXPECTED  Goal: Will remain free from falls  Outcome: PROGRESSING AS EXPECTED     Problem: Psychosocial Needs:  Goal: Level of anxiety will decrease  Outcome: PROGRESSING AS EXPECTED     Problem: Mobility  Goal: Risk for activity intolerance will decrease  Outcome: PROGRESSING AS EXPECTED

## 2020-08-28 PROCEDURE — A9270 NON-COVERED ITEM OR SERVICE: HCPCS | Performed by: HOSPITALIST

## 2020-08-28 PROCEDURE — 700102 HCHG RX REV CODE 250 W/ 637 OVERRIDE(OP): Performed by: HOSPITALIST

## 2020-08-28 PROCEDURE — 770006 HCHG ROOM/CARE - MED/SURG/GYN SEMI*

## 2020-08-28 RX ADMIN — FINASTERIDE 5 MG: 5 TABLET, FILM COATED ORAL at 05:26

## 2020-08-28 RX ADMIN — THERA TABS 1 TABLET: TAB at 05:26

## 2020-08-28 RX ADMIN — AMLODIPINE BESYLATE 5 MG: 5 TABLET ORAL at 05:26

## 2020-08-28 RX ADMIN — QUETIAPINE FUMARATE 25 MG: 25 TABLET ORAL at 17:07

## 2020-08-28 RX ADMIN — TAMSULOSIN HYDROCHLORIDE 0.8 MG: 0.4 CAPSULE ORAL at 08:28

## 2020-08-28 NOTE — CARE PLAN
Problem: Safety  Goal: Will remain free from falls  Outcome: PROGRESSING AS EXPECTED  Intervention: Implement fall precautions  Flowsheets (Taken 8/27/2020 2045)  Environmental Precautions:   Treaded Slipper Socks on Patient   Personal Belongings, Wastebasket, Call Bell etc. in Easy Reach   Report Given to Other Health Care Providers Regarding Fall Risk   Bed in Low Position   Communication Sign for Patients & Families   Mobility Assessed & Appropriate Sign Placed     Problem: Skin Integrity  Goal: Risk for impaired skin integrity will decrease  Outcome: PROGRESSING AS EXPECTED  Intervention: Implement precautions to protect skin integrity in collaboration with the interdisciplinary team  Flowsheets  Taken 8/28/2020 0153 by Leena Arellano  Skin Preventative Measures: Pillows in Use for Support / Positioning  Patient Turns / Repositioning: Patient Turns Self from Side to Side  Taken 8/27/2020 2045 by Loretta Garay R.N.  Bed Types: Pressure Redistribution Mattress ([employer]mosair)  Activity:   Bed   Edge of bed   Chair   Up to bathroom

## 2020-08-28 NOTE — PROGRESS NOTES
Assumed care of pt at shift change. Pt is on 2L of oxygen via NC with no signs of acute distress. A&Ox3 disoriented to event only. Denies any pain. Rosado in place with active order.  All comfort measures in place. Call light and personal belongings by bedside. Bed locked and in lowest position. Hourly rounding in place.

## 2020-08-28 NOTE — CARE PLAN
Problem: Safety  Goal: Will remain free from falls  Outcome: PROGRESSING AS EXPECTED  Intervention: Implement fall precautions  Note: Fall precautions in place. Bed in lowest position. Non-skid socks in place. Personal possessions within reach. Mobility sign on door. Pt refused bed alarm but frame alarm is on. Call light within reach. Pt educated regarding fall prevention and states understanding.       Problem: Infection  Goal: Will remain free from infection  Outcome: PROGRESSING AS EXPECTED  Intervention: Assess signs and symptoms of infection  Note: Pt has a méndez in place and regularly assessed for signs and symptoms of infection.

## 2020-08-28 NOTE — PROGRESS NOTES
Received report and assumed care. Pt AOx 3, mobile with SBA, chronic méndez in place, and on 2L O2 NC.  Pt denies pain and VSS. Pt pleasant and cooperative with staff. Legally blind. Bed alarm on, pt belongings and call light in reach, bed locked/lowest position, and pt now resting quietly.

## 2020-08-29 PROCEDURE — A9270 NON-COVERED ITEM OR SERVICE: HCPCS | Performed by: HOSPITALIST

## 2020-08-29 PROCEDURE — 700102 HCHG RX REV CODE 250 W/ 637 OVERRIDE(OP): Performed by: HOSPITALIST

## 2020-08-29 PROCEDURE — 770006 HCHG ROOM/CARE - MED/SURG/GYN SEMI*

## 2020-08-29 RX ADMIN — AMLODIPINE BESYLATE 5 MG: 5 TABLET ORAL at 05:05

## 2020-08-29 RX ADMIN — TAMSULOSIN HYDROCHLORIDE 0.8 MG: 0.4 CAPSULE ORAL at 08:02

## 2020-08-29 RX ADMIN — QUETIAPINE FUMARATE 25 MG: 25 TABLET ORAL at 16:59

## 2020-08-29 RX ADMIN — FINASTERIDE 5 MG: 5 TABLET, FILM COATED ORAL at 05:05

## 2020-08-29 RX ADMIN — THERA TABS 1 TABLET: TAB at 05:05

## 2020-08-29 NOTE — CARE PLAN
Problem: Safety  Goal: Will remain free from falls  Outcome: PROGRESSING AS EXPECTED  Intervention: Implement fall precautions  Flowsheets  Taken 8/28/2020 2059  Chair/Bed Strip Alarm: Yes - Alarm On  Taken 8/28/2020 1930  Environmental Precautions:   Treaded Slipper Socks on Patient   Personal Belongings, Wastebasket, Call Bell etc. in Easy Reach   Report Given to Other Health Care Providers Regarding Fall Risk   Bed in Low Position   Communication Sign for Patients & Families   Mobility Assessed & Appropriate Sign Placed     Problem: Skin Integrity  Goal: Risk for impaired skin integrity will decrease  Outcome: PROGRESSING AS EXPECTED  Intervention: Implement precautions to protect skin integrity in collaboration with the interdisciplinary team  Flowsheets (Taken 8/28/2020 1930)  Skin Preventative Measures: Pillows in Use for Support / Positioning  Bed Types: Pressure Redistribution Mattress (Atmosair)  Patient Turns / Repositioning: Patient Turns Self from Side to Side

## 2020-08-29 NOTE — PROGRESS NOTES
Received report and assumed care. Pt AOx 3, mobile with SBA, méndez in place, continent of bowel, and on RA. Pt denies pain and VSS. Pt legally blind. Fall precautions are in place. Pt belongings and call light in reach, bed locked/lowest position, bed alarm on, and pt now resting quielty.

## 2020-08-29 NOTE — PROGRESS NOTES
Assumed care of pt at shift change. Pt is on 2L of oxygen via NC with no signs of acute distress. A&Ox3 disoriented to event only. Denies any pain. Rosado in place with active order.  All comfort measures in place. Call light and personal belongings by bedside. Bed locked and in lowest position. Hourly rounding in place

## 2020-08-30 PROCEDURE — A9270 NON-COVERED ITEM OR SERVICE: HCPCS | Performed by: HOSPITALIST

## 2020-08-30 PROCEDURE — 700102 HCHG RX REV CODE 250 W/ 637 OVERRIDE(OP): Performed by: HOSPITALIST

## 2020-08-30 PROCEDURE — 770006 HCHG ROOM/CARE - MED/SURG/GYN SEMI*

## 2020-08-30 RX ADMIN — QUETIAPINE FUMARATE 25 MG: 25 TABLET ORAL at 17:37

## 2020-08-30 RX ADMIN — THERA TABS 1 TABLET: TAB at 05:03

## 2020-08-30 RX ADMIN — FINASTERIDE 5 MG: 5 TABLET, FILM COATED ORAL at 05:03

## 2020-08-30 RX ADMIN — TAMSULOSIN HYDROCHLORIDE 0.8 MG: 0.4 CAPSULE ORAL at 08:37

## 2020-08-30 RX ADMIN — AMLODIPINE BESYLATE 5 MG: 5 TABLET ORAL at 05:03

## 2020-08-30 NOTE — PROGRESS NOTES
1900: Received report from day shift RN and assumed care of patient. Patient is resting in bed. Patient is A&O x 3 and is on 2 L O2. Vitals are stable. Patient denies pain at this time. Bed is in lowest, locked position, call bell is within reach. No further needs at this time. Hourly rounding is in place.

## 2020-08-30 NOTE — CARE PLAN
Problem: Safety  Goal: Will remain free from injury  Outcome: PROGRESSING AS EXPECTED  Note: Fall precautions in place. Bed in lowest position. Non-skid socks in place. Personal possessions within reach. Mobility sign on door. Bed-alarm on. Call light within reach. Patient educated regarding fall prevention and states understanding.       Problem: Safety  Goal: Will remain free from falls  Outcome: PROGRESSING AS EXPECTED     Problem: Knowledge Deficit  Goal: Knowledge of disease process/condition, treatment plan, diagnostic tests, and medications will improve  Outcome: PROGRESSING AS EXPECTED  Note: Patient educated regarding plan of care and medications. All questions answered.

## 2020-08-30 NOTE — PROGRESS NOTES
Assumed care of pt at shift change. Pt is on 2L of oxygen via NC with no signs of acute distress. A&Ox4 . Denies any pain. POC discussed with patient. All comfort measures in place. Call light and personal belongings by bedside. Bed locked and in lowest position. Hourly rounding in place.

## 2020-08-30 NOTE — PROGRESS NOTES
[facility] Medicine Daily Progress Note    Date of Service  8/30/2020    Chief Complaint  84 y.o. male admitted 7/9/2020 with COVID infection    Hospital Course    Mr. Bejarano is an 83 yo male with a PMHx of DM type II, hyperlipidemia, BPH who was admitted on 7/9/2020 with COVID 19 infection with POLY and hypotension.  Patient initially in ICU and on therapeutic Lovenox due to elevated DDimer.  His hospital course was complicated by hematuria and urine retention of which a méndez was placed.  Flomax and finasteride was initiated.  Patient was in acute renal failure which has improved.  Psychiatry was also consulted and evaluated the patient.  Per psychiatry, patient does not have the capacity to make any medical decision on 8/3/2020.  Guardianship pending.      Interval Problem Update  -Patient seen and examined today. Patient sitting in bed awaiting breakfast. States no needs at this time except that he is hungry. No complaints of pain or acute events.   -Plan of care: Continue to provide supportive care, monitor for safety; patient high risk for elopement; pending guardianship and Medicaid approval.  Patient will need a long-term bed at a skilled nursing home.  -Lab work: Last obtained on 8/16/2020, reviewed, unremarkable.  Will order labs as warranted.    Consultants/Specialty  None    Code Status  DNAR/DNI    Disposition  TBD -pending guardianship and placement.      Assessment/Plan  Hematuria  Assessment & Plan  -Hematuria has resolved    COVID-19 virus infection  Assessment & Plan  -Patient has recovered from COVID-19    COPD (chronic obstructive pulmonary disease) (HCC)- (present on admission)  Assessment & Plan  -Supplemental oxygen as needed  -Inhaled bronchodilators as needed    Encephalopathy and inability to care for self  Assessment & Plan  -Psychiatry was consulted, patient cannot make any medical decision  - working on [facility]     Type 2 diabetes mellitus (HCC)- (present on admission)  Assessment &  Plan  -Hemoglobin A1c at 6.8, continue diabetic diet, monitor  -BG has been well controlled    Hypokalemia  Assessment & Plan  Replaced    Advanced care planning/counseling discussion  Assessment & Plan  -Patient is DNR/DNI  -Guardianship being initiated    Malnutrition (HCC)- (present on admission)  Assessment & Plan  -Tolerating diet at this point    Urinary retention- (present on admission)  Assessment & Plan  -Continue Flomax and finasteride  -Rosado catheter in place for urinary retention

## 2020-08-30 NOTE — CARE PLAN
Problem: Safety  Goal: Will remain free from falls  Outcome: PROGRESSING AS EXPECTED  Intervention: Implement fall precautions  Note: Fall precautions in place. Bed in lowest position. Non-skid socks in place. Personal possessions within reach.  Call light within reach. Pt educated regarding fall prevention and states understanding.       Problem: Infection  Goal: Will remain free from infection  Outcome: PROGRESSING AS EXPECTED     Problem: Knowledge Deficit  Goal: Knowledge of disease process/condition, treatment plan, diagnostic tests, and medications will improve  Outcome: PROGRESSING AS EXPECTED  Intervention: Explain information regarding disease process/condition, treatment plan, diagnostic tests, and medications and document in education  Note: POC discussed with pt. All questions answered.

## 2020-08-31 PROCEDURE — 700102 HCHG RX REV CODE 250 W/ 637 OVERRIDE(OP): Performed by: HOSPITALIST

## 2020-08-31 PROCEDURE — A9270 NON-COVERED ITEM OR SERVICE: HCPCS | Performed by: HOSPITALIST

## 2020-08-31 PROCEDURE — 770006 HCHG ROOM/CARE - MED/SURG/GYN SEMI*

## 2020-08-31 RX ORDER — FINASTERIDE 5 MG/1
5 TABLET, FILM COATED ORAL DAILY
Status: DISCONTINUED | OUTPATIENT
Start: 2020-09-01 | End: 2020-12-29 | Stop reason: HOSPADM

## 2020-08-31 RX ORDER — AMLODIPINE BESYLATE 5 MG/1
5 TABLET ORAL
Status: DISCONTINUED | OUTPATIENT
Start: 2020-09-01 | End: 2020-12-29 | Stop reason: HOSPADM

## 2020-08-31 RX ADMIN — FINASTERIDE 5 MG: 5 TABLET, FILM COATED ORAL at 04:57

## 2020-08-31 RX ADMIN — TAMSULOSIN HYDROCHLORIDE 0.8 MG: 0.4 CAPSULE ORAL at 08:34

## 2020-08-31 RX ADMIN — THERA TABS 1 TABLET: TAB at 04:57

## 2020-08-31 RX ADMIN — QUETIAPINE FUMARATE 25 MG: 25 TABLET ORAL at 17:32

## 2020-08-31 RX ADMIN — ERGOCALCIFEROL 50000 UNITS: 1.25 CAPSULE ORAL at 14:31

## 2020-08-31 RX ADMIN — AMLODIPINE BESYLATE 5 MG: 5 TABLET ORAL at 04:57

## 2020-08-31 NOTE — PROGRESS NOTES
Assumed care of pt at shift change. Pt is on 2L of oxygen via nasal cannula with no signs of acute distress. A&Ox3. Denies any pain. All comfort measures in place. Call light and personal belongings by bedside. Bed locked and in lowest position. Hourly rounding in place.

## 2020-08-31 NOTE — CARE PLAN
Problem: Safety  Goal: Will remain free from falls  Outcome: PROGRESSING AS EXPECTED  Intervention: Implement fall precautions  Note: . Call light within reach. Pt educated regarding fall prevention and states understanding.       Problem: Infection  Goal: Will remain free from infection  Outcome: PROGRESSING AS EXPECTED  Intervention: Assess signs and symptoms of infection  Note: Pt has a urinary catheter and assessed for infection.      Problem: Knowledge Deficit  Goal: Knowledge of disease process/condition, treatment plan, diagnostic tests, and medications will improve  Outcome: PROGRESSING AS EXPECTED

## 2020-08-31 NOTE — PROGRESS NOTES
Received report from day shift RN and assumed care of patient. Patient is resting in bed. Patient is A&O x 3 and is on 2 L O2. Vitals are stable. Patient denies pain at this time. Patient is sitting up on the edge of the bed eating dinner. Bed is in lowest, locked position, call bell and belongings are in reach. No further needs at this time. Hourly rounding is in place.

## 2020-09-01 PROCEDURE — 700102 HCHG RX REV CODE 250 W/ 637 OVERRIDE(OP): Performed by: HOSPITALIST

## 2020-09-01 PROCEDURE — A9270 NON-COVERED ITEM OR SERVICE: HCPCS | Performed by: NURSE PRACTITIONER

## 2020-09-01 PROCEDURE — 700102 HCHG RX REV CODE 250 W/ 637 OVERRIDE(OP): Performed by: NURSE PRACTITIONER

## 2020-09-01 PROCEDURE — A9270 NON-COVERED ITEM OR SERVICE: HCPCS | Performed by: HOSPITALIST

## 2020-09-01 PROCEDURE — 770006 HCHG ROOM/CARE - MED/SURG/GYN SEMI*

## 2020-09-01 RX ADMIN — TAMSULOSIN HYDROCHLORIDE 0.8 MG: 0.4 CAPSULE ORAL at 10:21

## 2020-09-01 RX ADMIN — THERA TABS 1 TABLET: TAB at 10:21

## 2020-09-01 RX ADMIN — QUETIAPINE FUMARATE 25 MG: 25 TABLET ORAL at 17:49

## 2020-09-01 RX ADMIN — AMLODIPINE BESYLATE 5 MG: 5 TABLET ORAL at 10:21

## 2020-09-01 RX ADMIN — FINASTERIDE 5 MG: 5 TABLET, FILM COATED ORAL at 10:21

## 2020-09-01 NOTE — PROGRESS NOTES
Received report from day shift RN and assumed care of patient. Patient is sitting on the edge of bed. Patient is A&O x 3 and is on 2 L O2. Vitals are stable. Patient denies pain at this time. Patient requested ice water. Bed is in lowest, locked position, call bell and belongings are in reach. No further needs at this time. Hourly rounding is in place.

## 2020-09-01 NOTE — PROGRESS NOTES
Méndez catheter to be changed 9/7/2020. Consider consulting urology for placement due to BPH and bleeding during previous méndez catheter placement.

## 2020-09-02 PROCEDURE — 700102 HCHG RX REV CODE 250 W/ 637 OVERRIDE(OP): Performed by: HOSPITALIST

## 2020-09-02 PROCEDURE — A9270 NON-COVERED ITEM OR SERVICE: HCPCS | Performed by: NURSE PRACTITIONER

## 2020-09-02 PROCEDURE — A9270 NON-COVERED ITEM OR SERVICE: HCPCS | Performed by: HOSPITALIST

## 2020-09-02 PROCEDURE — 770006 HCHG ROOM/CARE - MED/SURG/GYN SEMI*

## 2020-09-02 PROCEDURE — 700111 HCHG RX REV CODE 636 W/ 250 OVERRIDE (IP): Performed by: HOSPITALIST

## 2020-09-02 PROCEDURE — 700102 HCHG RX REV CODE 250 W/ 637 OVERRIDE(OP): Performed by: NURSE PRACTITIONER

## 2020-09-02 RX ADMIN — FINASTERIDE 5 MG: 5 TABLET, FILM COATED ORAL at 07:49

## 2020-09-02 RX ADMIN — QUETIAPINE FUMARATE 25 MG: 25 TABLET ORAL at 19:31

## 2020-09-02 RX ADMIN — AMLODIPINE BESYLATE 5 MG: 5 TABLET ORAL at 07:49

## 2020-09-02 RX ADMIN — TAMSULOSIN HYDROCHLORIDE 0.8 MG: 0.4 CAPSULE ORAL at 09:47

## 2020-09-02 RX ADMIN — THERA TABS 1 TABLET: TAB at 07:49

## 2020-09-02 NOTE — PROGRESS NOTES
Received report from night shift and assumed care. Assessment completed, POC discussed. Pt is A&OX3, disoriented to event. Denies pain or discomfort. Medications given per MAR. Pt has méndez catheter in place draining to gravity. Ambulates to bathroom with 1x as pt is blind in both eyes. All other needs met at this time. Safety precautions and hourly rounding in place.

## 2020-09-02 NOTE — PROGRESS NOTES
Received report and assumed care. Pt AOx 3, mobile with cane and SBA, méndez in place, and on 1.5L O2 NC. Pt denies any pain.  VSS. Fall precautions are in place.  Pt belongings and call light in reach, bed locked/lowest position, bed alarm on, and pt now resting quietly.

## 2020-09-02 NOTE — PROGRESS NOTES
Pt is disoriented to event and time. Pt was OOB to bathroom and ambulated in the hallway with standby assist and a cane. Pt is visually impaired. Pt denied any complaints of pain during the shift. Pt did become tearful this afternoon and frustrated that he hasn't been able to go home. Pt was redirected and is now very pleasant sitting on the edge of bed waiting for dinner. Fall prevention education provided. Fall precautions maintained. Call bell within reach. Hourly rounding completed. Will continue to monitor.

## 2020-09-02 NOTE — CARE PLAN
Problem: Safety  Goal: Will remain free from falls  Outcome: PROGRESSING AS EXPECTED  Intervention: Implement fall precautions  Flowsheets  Taken 9/2/2020 0247  Chair/Bed Strip Alarm: Yes - Alarm On  Taken 9/1/2020 2105  Environmental Precautions:   Treaded Slipper Socks on Patient   Personal Belongings, Wastebasket, Call Bell etc. in Easy Reach   Report Given to Other Health Care Providers Regarding Fall Risk   Bed in Low Position   Communication Sign for Patients & Families   Mobility Assessed & Appropriate Sign Placed     Problem: Skin Integrity  Goal: Risk for impaired skin integrity will decrease  Outcome: PROGRESSING AS EXPECTED  Intervention: Implement precautions to protect skin integrity in collaboration with the interdisciplinary team  Flowsheets  Taken 9/1/2020 2140 by Марина Escalante  Skin Preventative Measures: Pillows in Use for Support / Positioning  Patient Turns / Repositioning: Patient Turns Self from Side to Side  Taken 9/1/2020 2105 by Loretta Garay R.N.  Bed Types: Pressure Redistribution Mattress ([employer]mosair)  Activity:   Bed   Edge of bed   Chair   Up to bathroom   Ambulated

## 2020-09-02 NOTE — CARE PLAN
Problem: Urinary Elimination:  Goal: Ability to reestablish a normal urinary elimination pattern will improve  Outcome: PROGRESSING AS EXPECTED  Patient needs frequent reminders and education on why he is receiving medications for BPH.      Problem: Mobility  Goal: Risk for activity intolerance will decrease  Outcome: PROGRESSING AS EXPECTED

## 2020-09-03 PROCEDURE — 700102 HCHG RX REV CODE 250 W/ 637 OVERRIDE(OP): Performed by: HOSPITALIST

## 2020-09-03 PROCEDURE — A9270 NON-COVERED ITEM OR SERVICE: HCPCS | Performed by: HOSPITALIST

## 2020-09-03 PROCEDURE — 700102 HCHG RX REV CODE 250 W/ 637 OVERRIDE(OP): Performed by: NURSE PRACTITIONER

## 2020-09-03 PROCEDURE — 99231 SBSQ HOSP IP/OBS SF/LOW 25: CPT | Performed by: NURSE PRACTITIONER

## 2020-09-03 PROCEDURE — A9270 NON-COVERED ITEM OR SERVICE: HCPCS | Performed by: NURSE PRACTITIONER

## 2020-09-03 PROCEDURE — 770006 HCHG ROOM/CARE - MED/SURG/GYN SEMI*

## 2020-09-03 RX ADMIN — TAMSULOSIN HYDROCHLORIDE 0.8 MG: 0.4 CAPSULE ORAL at 09:22

## 2020-09-03 RX ADMIN — FINASTERIDE 5 MG: 5 TABLET, FILM COATED ORAL at 09:22

## 2020-09-03 RX ADMIN — AMLODIPINE BESYLATE 5 MG: 5 TABLET ORAL at 09:22

## 2020-09-03 RX ADMIN — QUETIAPINE FUMARATE 25 MG: 25 TABLET ORAL at 18:09

## 2020-09-03 RX ADMIN — THERA TABS 1 TABLET: TAB at 09:22

## 2020-09-03 NOTE — CARE PLAN
Problem: Safety  Goal: Will remain free from falls  Outcome: PROGRESSING AS EXPECTED  Intervention: Implement fall precautions  Flowsheets  Taken 9/3/2020 0107  Chair/Bed Strip Alarm: Yes - Alarm On  Taken 9/2/2020 1930  Environmental Precautions:   Treaded Slipper Socks on Patient   Personal Belongings, Wastebasket, Call Bell etc. in Easy Reach   Report Given to Other Health Care Providers Regarding Fall Risk   Bed in Low Position   Communication Sign for Patients & Families   Mobility Assessed & Appropriate Sign Placed  Note: Room close to nursing station, cane out of site.

## 2020-09-03 NOTE — PROGRESS NOTES
Received report and assumed care. Pt AOx 3, mobile with SBA and sometimes cane, dev in place, and on 1L O2 NC. Pt denies pain and VSS. Pt skin dry and itchy. Pt agreed to lotion and then changed mind and refused. Will attempt again later. Fall precautions are in place. Pt belongings and call light in reach, bed alarm on, bed locked/lowest position, and pt now resting quietly.

## 2020-09-03 NOTE — PROGRESS NOTES
Patient AOx 3-4. Pleasant and cooperative with staff. Denies pain or discomfort this AM. No SOB or cough noted. Continues on !L/min of O2 which is patient baseline. Denies NVD. Feeds self with setup. Oral intake is adequate. Rosado cath to dependant drainage. Urine is clear and output is adequate. SocialServices in this AM to discuss future plans, placement, and guardianship. Call light in reach and makes needs known.

## 2020-09-03 NOTE — DISCHARGE PLANNING
Anticipated Discharge Disposition: Group Home    Action: SW informed patient of his guardianship hearing date, 9/25/2020 at 8:40.  Patient became upset, saying he doesn't need a guardian, does not want to live anywhere but at his previous home.  Patient asked SW if he can live with his girlfriend.  SW stated she was in the hospital and is not able to take care of herself.  Patient asked SW several times when is his hearing date, and where it would be.  SW informed patient the dates, day of week, and time and location four times.  SW unsure if patient will remember the date.     SW informed patient's nurse of patient's escalation of patients behaviors    Barriers to Discharge: guardianship/placement/medicaid    Plan: continue to monitor for discharge barriers    Guardianship hearing scheduled for 9/25/2020 at 8:40

## 2020-09-04 PROCEDURE — 51798 US URINE CAPACITY MEASURE: CPT

## 2020-09-04 PROCEDURE — A9270 NON-COVERED ITEM OR SERVICE: HCPCS | Performed by: HOSPITALIST

## 2020-09-04 PROCEDURE — 700101 HCHG RX REV CODE 250: Performed by: NURSE PRACTITIONER

## 2020-09-04 PROCEDURE — A9270 NON-COVERED ITEM OR SERVICE: HCPCS | Performed by: NURSE PRACTITIONER

## 2020-09-04 PROCEDURE — 700102 HCHG RX REV CODE 250 W/ 637 OVERRIDE(OP): Performed by: HOSPITALIST

## 2020-09-04 PROCEDURE — 770006 HCHG ROOM/CARE - MED/SURG/GYN SEMI*

## 2020-09-04 PROCEDURE — 700102 HCHG RX REV CODE 250 W/ 637 OVERRIDE(OP): Performed by: NURSE PRACTITIONER

## 2020-09-04 RX ORDER — LIDOCAINE HYDROCHLORIDE 20 MG/ML
JELLY TOPICAL
Status: COMPLETED | OUTPATIENT
Start: 2020-09-04 | End: 2020-09-04

## 2020-09-04 RX ADMIN — THERA TABS 1 TABLET: TAB at 09:03

## 2020-09-04 RX ADMIN — LIDOCAINE HYDROCHLORIDE 1 APPLICATION: 20 JELLY TOPICAL at 12:26

## 2020-09-04 RX ADMIN — QUETIAPINE FUMARATE 25 MG: 25 TABLET ORAL at 17:42

## 2020-09-04 RX ADMIN — TAMSULOSIN HYDROCHLORIDE 0.8 MG: 0.4 CAPSULE ORAL at 09:01

## 2020-09-04 RX ADMIN — FINASTERIDE 5 MG: 5 TABLET, FILM COATED ORAL at 09:02

## 2020-09-04 RX ADMIN — AMLODIPINE BESYLATE 5 MG: 5 TABLET ORAL at 09:03

## 2020-09-04 NOTE — PROGRESS NOTES
Rosado cath removed at 1830 without difficulty. Patient tolerated well. States understanding to let nurse known first void. Cath removed intact.

## 2020-09-04 NOTE — PROGRESS NOTES
Report received. Assessment complete.  AAOx4. Patient calls appropriately.  Pt denies pain. No interventions needed at this time. Will continue to monitor.   Pt denies N/V, SOB, or chest pain.  Pt on 1L O2 via NC, baseline  WELLS, ambulatory x SBA.  + void, LBM 09/02/2020  Pt is tolerating diabetic diet.   No IV access, MD aware  BLE dusky, intact      Plan of care discussed with patient. Fall precautions in place. Belongings and call light within reach. Educated patient to call for assistance as needed. All needs met at this time.

## 2020-09-04 NOTE — PROGRESS NOTES
[facility] Medicine Daily Progress Note    Date of Service  9/3/2020    Chief Complaint  COVID infection    Hospital Course    Mr. Bejarano is an 83 yo male with a PMHx of DM type II, hyperlipidemia, BPH who was admitted on 7/9/2020 with COVID 19 infection with POLY and hypotension.  Patient initially in ICU and on therapeutic Lovenox due to elevated DDimer.  His hospital course was complicated by hematuria and urine retention of which a méndez was placed.  Flomax and finasteride was initiated.  Patient was in acute renal failure which has improved.  Psychiatry was also consulted and evaluated the patient.  Per psychiatry, patient does not have the capacity to make any medical decision on 8/3/2020.  Guardianship pending.       Interval Problem Update  -Patient seen and examined today. Patient sitting on the edge of bed, fully alert and oriented.  He states he is in good health and denies pain and any other problems at this time.  He is eager to discharge.  He does not have insight into any memory problems.    -Finasteride and Flomax were started approximately 4 weeks ago.    -Trial removal of Méndez catheter.  Nursing to bladder scan in 6 hours if patient has not voided.  Nursing to straight cath patient if bladder scan is more than 400 mL.  May repeat twice and replace Méndez if bladder scan over 400 mL at the time.  -Vital signs within normal limits  -All systems reviewed and exam is unchanged from prior exam.    Consultants/Specialty  None    Code Status  DNAR/DNI    Disposition  Patient high risk for elopement; pending guardianship and Medicaid approval.  Patient will need a long-term bed at a skilled nursing home.    Assessment/Plan  Encephalopathy and inability to care for self  Assessment & Plan  -Psychiatry was consulted, patient cannot make any medical decision  -SW working on guardianship. Hearing is 9/25.    COPD (chronic obstructive pulmonary disease) ([facility])- (present on admission)  Assessment &  Plan  -Supplemental oxygen as needed  -Inhaled bronchodilators as needed    Urinary retention- (present on admission)  Assessment & Plan  -Continue Flomax and finasteride  -Rosado catheter in place for urinary retention on 8/7  -Trial removal of Rosado catheter.  Nursing to bladder scan in 6 hours if patient has not voided.  Nursing to straight cath patient if bladder scan is more than 400 mL.  May repeat twice and replace Rosado if bladder scan over 400 mL at the time.    Type 2 diabetes mellitus (HCC)- (present on admission)  Assessment & Plan  -Hemoglobin A1c at 6.8, continue diabetic diet, monitor  -BG has been well controlled  -Accu-Cheks only as needed for signs and symptoms of hypoglycemia or acute illness    Hematuria  Assessment & Plan  -Hematuria has resolved    Advanced care planning/counseling discussion  Assessment & Plan  -Patient is DNR/DNI  -Guardianship being initiated    Malnutrition (HCC)- (present on admission)  Assessment & Plan  -Tolerating diet at this point    Hypokalemia  Assessment & Plan  Replaced  Resolved    COVID-19 virus infection  Assessment & Plan  -Patient has recovered from COVID-19    MASON Acuna

## 2020-09-04 NOTE — PROGRESS NOTES
Received report and assumed care of pt. Assessment complete on 1L O2 via NC. Pt A&OX3, disoriented to event. Denies any pain or discomfort at this time. Pt currently resting in bed. Rosado placed for retention. All pt needs met at this time. Safety precautions and hourly rounding in place.

## 2020-09-04 NOTE — CARE PLAN
Problem: Safety  Goal: Will remain free from injury  Outcome: PROGRESSING AS EXPECTED  Note: Bed locked and in lowest position. Bed alarm on and in place. Call light and belongings within reach.      Problem: Urinary Elimination:  Goal: Ability to reestablish a normal urinary elimination pattern will improve  Outcome: PROGRESSING AS EXPECTED  Note: Urinary méndez placed for retention. Pt established urinary output.

## 2020-09-04 NOTE — PROGRESS NOTES
"Bladder scan resulted at 737mL.     Pt very anxious about straight cath procedure. This RN spent 15 minutes explaining procedure to pt. Pt was fearful, stating \"I don't want to have surgery.\" and \"I don't want to die.\"  This RN re-educated patient.    Pt stated \"okay, I'll trust you but only if you stop when I tell you to\" and allowed RN to perform straight cath.     Straight cath'd patient, yielded 600mL when pt requested straight cath be removed before remaining urine drained.  "

## 2020-09-05 PROCEDURE — A9270 NON-COVERED ITEM OR SERVICE: HCPCS | Performed by: HOSPITALIST

## 2020-09-05 PROCEDURE — 700102 HCHG RX REV CODE 250 W/ 637 OVERRIDE(OP): Performed by: HOSPITALIST

## 2020-09-05 PROCEDURE — 700102 HCHG RX REV CODE 250 W/ 637 OVERRIDE(OP): Performed by: NURSE PRACTITIONER

## 2020-09-05 PROCEDURE — A9270 NON-COVERED ITEM OR SERVICE: HCPCS | Performed by: NURSE PRACTITIONER

## 2020-09-05 PROCEDURE — 770006 HCHG ROOM/CARE - MED/SURG/GYN SEMI*

## 2020-09-05 RX ADMIN — QUETIAPINE FUMARATE 25 MG: 25 TABLET ORAL at 17:52

## 2020-09-05 RX ADMIN — AMLODIPINE BESYLATE 5 MG: 5 TABLET ORAL at 08:31

## 2020-09-05 RX ADMIN — FINASTERIDE 5 MG: 5 TABLET, FILM COATED ORAL at 08:31

## 2020-09-05 RX ADMIN — THERA TABS 1 TABLET: TAB at 08:31

## 2020-09-05 RX ADMIN — TAMSULOSIN HYDROCHLORIDE 0.8 MG: 0.4 CAPSULE ORAL at 08:31

## 2020-09-05 ASSESSMENT — PAIN DESCRIPTION - PAIN TYPE
TYPE: ACUTE PAIN
TYPE: ACUTE PAIN

## 2020-09-05 NOTE — PROGRESS NOTES
"Assumed pt care at 1900 from day RN. Aware of fall risk status. Bed alarm on. VS /59   Pulse 72   Temp 36.3 °C (97.3 °F) (Temporal)   Resp 18   Ht 1.727 m (5' 7.99\")   Wt 52.4 kg (115 lb 8 oz)   SpO2 91%   BMI 17.57 kg/m² . Assessment complete. Discussed plan of care with patient.    "

## 2020-09-05 NOTE — CARE PLAN
Problem: Safety  Goal: Will remain free from injury  Outcome: PROGRESSING AS EXPECTED     Problem: Psychosocial Needs:  Goal: Level of anxiety will decrease  Outcome: PROGRESSING AS EXPECTED   Took patient to [facility]  Problem: Skin Integrity  Goal: Risk for impaired skin integrity will decrease  Outcome: PROGRESSING AS EXPECTED

## 2020-09-06 PROCEDURE — 700102 HCHG RX REV CODE 250 W/ 637 OVERRIDE(OP): Performed by: HOSPITALIST

## 2020-09-06 PROCEDURE — A9270 NON-COVERED ITEM OR SERVICE: HCPCS | Performed by: HOSPITALIST

## 2020-09-06 PROCEDURE — 700102 HCHG RX REV CODE 250 W/ 637 OVERRIDE(OP): Performed by: NURSE PRACTITIONER

## 2020-09-06 PROCEDURE — 770006 HCHG ROOM/CARE - MED/SURG/GYN SEMI*

## 2020-09-06 PROCEDURE — A9270 NON-COVERED ITEM OR SERVICE: HCPCS | Performed by: NURSE PRACTITIONER

## 2020-09-06 RX ADMIN — AMLODIPINE BESYLATE 5 MG: 5 TABLET ORAL at 08:53

## 2020-09-06 RX ADMIN — QUETIAPINE FUMARATE 25 MG: 25 TABLET ORAL at 16:50

## 2020-09-06 RX ADMIN — TAMSULOSIN HYDROCHLORIDE 0.8 MG: 0.4 CAPSULE ORAL at 08:53

## 2020-09-06 RX ADMIN — FINASTERIDE 5 MG: 5 TABLET, FILM COATED ORAL at 08:53

## 2020-09-06 RX ADMIN — THERA TABS 1 TABLET: TAB at 08:53

## 2020-09-06 NOTE — CARE PLAN
Problem: Safety  Goal: Will remain free from injury  Outcome: PROGRESSING AS EXPECTED  Goal: Will remain free from falls  Outcome: NOT MET  Pt had unassisted fall at shift change. Pt educated on use of call light. Bed alarm in place, bed in low locked position, call light and belongings within reach, non-slip stockings in place, hourly rounding in place.      Problem: Skin Integrity  Goal: Risk for impaired skin integrity will decrease  Outcome: PROGRESSING AS EXPECTED  2 Rn skin check completed.

## 2020-09-06 NOTE — PROGRESS NOTES
2 RN Skin Check    2 RN skin check complete with Telma RN  Devices in place: Nasal Cannula, méndez  Skin assessed under devices: yes.  Confirmed pressure ulcers found on: n/a.  New potential pressure ulcers noted on n/a. Wound consult placed No.  The following interventions in place Pillows, standby assist to bathroom.    Skin assessment:  Bilateral ears pink and blanching- foam pads in place  Moles and skin tag on face  Bilateral elbows dusky and blanching  Skin tag behind R knee  Bilateral shins dusky, flaky skin blanching  R heal boggy but blanching  L heal pink and blanching

## 2020-09-06 NOTE — PROGRESS NOTES
2 RN skin check completed with EZ Ronquillo.   Devices in place: Nasal Cannula, Rosado.  Skin assessed under devices: Yes.  Confirmed pressure ulcers found: N/A.  New potential pressure ulcers noted: N/A.  Wound consult placed:  No.      Skin assessment:   General: scattered moles and skin tags  Bilateral Ears: pink/blanching - foam pads in place  Elbows: pink/blanching  Bilateral Lower Extremities: dry/dusky/flaky  Right Knee: skin tag located behind right knee  Heels: boggy/pink/blanching      The following interventions in place: pillows in place for positioning, linen changes, Q2hr skin check.

## 2020-09-06 NOTE — CARE PLAN
Problem: Safety  Goal: Will remain free from injury  Outcome: PROGRESSING AS EXPECTED  Goal: Will remain free from falls  Outcome: PROGRESSING AS EXPECTED  Note: Fall precautions in place. Bed in lowest position. Non-skid socks in place. Personal possessions within reach. Mobility sign on door. Bed-alarm on. Call light within reach. Pt educated regarding fall prevention and states understanding.       Problem: Skin Integrity  Goal: Risk for impaired skin integrity will decrease  Outcome: PROGRESSING AS EXPECTED     Problem: Psychosocial Needs:  Goal: Level of anxiety will decrease  Outcome: PROGRESSING AS EXPECTED

## 2020-09-06 NOTE — PROGRESS NOTES
Received bedside report from night shift RN. Patient is A&O x3 - disoriented to event. Patient was reoriented. Patient denies pain at this time. Bed is in lowest/locked position. Call light and belongings are within reach. Educated patient on using call light when needing assistance. Hourly rounding in place.

## 2020-09-06 NOTE — PROGRESS NOTES
Received pt report at shift change from day shift RN. CNA reported that the pt had an unassisted fall at the bedside during shift change. The bed alarm was not functioning correctly and did not alert to the RN phone. The pt attained no injuries, vital signs are stable, Dr. Torres was alerted and opted to just monitor the pt closely for complications, Pharmacy notified and paperwork is completed. 2 RN skin check completed with EZ Hollis. Assessment completed. Call light within reach, belongings within reach, bed alarm on and fixed, non-slip stockings in place, educated to use nino light before trying to leave bed, hourly rounding in place.

## 2020-09-07 PROCEDURE — 700102 HCHG RX REV CODE 250 W/ 637 OVERRIDE(OP): Performed by: HOSPITALIST

## 2020-09-07 PROCEDURE — A9270 NON-COVERED ITEM OR SERVICE: HCPCS | Performed by: HOSPITALIST

## 2020-09-07 PROCEDURE — 770006 HCHG ROOM/CARE - MED/SURG/GYN SEMI*

## 2020-09-07 PROCEDURE — 700102 HCHG RX REV CODE 250 W/ 637 OVERRIDE(OP): Performed by: NURSE PRACTITIONER

## 2020-09-07 PROCEDURE — A9270 NON-COVERED ITEM OR SERVICE: HCPCS | Performed by: NURSE PRACTITIONER

## 2020-09-07 RX ADMIN — TAMSULOSIN HYDROCHLORIDE 0.8 MG: 0.4 CAPSULE ORAL at 09:07

## 2020-09-07 RX ADMIN — AMLODIPINE BESYLATE 5 MG: 5 TABLET ORAL at 09:07

## 2020-09-07 RX ADMIN — ERGOCALCIFEROL 50000 UNITS: 1.25 CAPSULE ORAL at 13:51

## 2020-09-07 RX ADMIN — FINASTERIDE 5 MG: 5 TABLET, FILM COATED ORAL at 09:07

## 2020-09-07 RX ADMIN — QUETIAPINE FUMARATE 25 MG: 25 TABLET ORAL at 17:41

## 2020-09-07 RX ADMIN — THERA TABS 1 TABLET: TAB at 09:07

## 2020-09-07 ASSESSMENT — PAIN DESCRIPTION - PAIN TYPE: TYPE: ACUTE PAIN

## 2020-09-07 NOTE — CARE PLAN
Problem: Safety  Goal: Will remain free from injury  Outcome: PROGRESSING AS EXPECTED  High fall risk precautions in place.     Problem: Skin Integrity  Goal: Risk for impaired skin integrity will decrease  Outcome: PROGRESSING AS EXPECTED  2 RN skin check completed

## 2020-09-07 NOTE — PROGRESS NOTES
Assumed pt care at shift change, bedside report received from day shift RN. Pt calm and resting. Reports no pain. Call light and belongings within reach, bed in low locked position, non-slip stockings in place, will continue hourly rounding.

## 2020-09-07 NOTE — PROGRESS NOTES
2 RN Skin Check    2 RN skin check complete with EZ Hollis  Devices in place: Nasal Cannula and méndez  Skin assessed under devices: yes.  Confirmed pressure ulcers found on: n/a.  New potential pressure ulcers noted on n/a. Wound consult placed No.  The following interventions in place Pillows, standby assist to bathroom    Skin Assessment:  Bilateral ears pink and blanching- foam pads in place  Moles and skin tag on face  Bilateral elbows dusky and blanching  Duskiness on back- blanching  Skin tag behind R knee  Bilateral skins dusky and flaky skin  R heal boggy but blanching  L heal pink and blanching

## 2020-09-07 NOTE — PROGRESS NOTES
Hospital Medicine Daily Progress Note    Date of Service  9/7/2020    Chief Complaint  COVID infection    Hospital Course    Mr. Bejarano is an 83 yo male with a PMHx of DM type II, hyperlipidemia, BPH who was admitted on 7/9/2020 with COVID 19 infection with POLY and hypotension.  Patient initially in ICU and on therapeutic Lovenox due to elevated DDimer.  His hospital course was complicated by hematuria and urine retention of which a méndez was placed.  Flomax and finasteride was initiated.  Patient was in acute renal failure which has improved.  Psychiatry was also consulted and evaluated the patient.  Per psychiatry, patient does not have the capacity to make any medical decision on 8/3/2020.  Guardianship pending.       Interval Problem Update  -Patient seen and examined today. Patient sitting upright in bed, fully alert.  Hedenies pain and any other problems at this time.  He is eager to discharge.  He does not have insight into any memory problems. He does not like his bed and he does not like supplemental oxygen because it make shea sneeze.   -Trial removal of Méndez catheter on 9/4 was unsuccessful. Patient unable to void and had méndez replaced.  -Vital signs within normal limits  -All systems reviewed and exam is otherwise unchanged from prior exam.    Consultants/Specialty  None    Code Status  DNAR/DNI    Disposition  Patient high risk for elopement; pending guardianship and Medicaid approval.  Patient will need a long-term bed at a skilled nursing home.    Assessment/Plan  Encephalopathy and inability to care for self  Assessment & Plan  -Psychiatry was consulted, patient cannot make any medical decision  -SW working on guardianship. Hearing is 9/25.    COPD (chronic obstructive pulmonary disease) (HCC)- (present on admission)  Assessment & Plan  -Supplemental oxygen as needed  -Inhaled bronchodilators as needed    Urinary retention- (present on admission)  Assessment & Plan  -Continue Flomax and  finasteride  -Méndez catheter in place for urinary retention on 8/7  -Trial removal of Méndez catheter 9/4 unsuccessful. Follow up outpatient. Discharge likely with méndez.    Type 2 diabetes mellitus (HCC)- (present on admission)  Assessment & Plan  -Hemoglobin A1c at 6.8, continue diabetic diet, monitor  -BG has been well controlled  -Accu-Cheks only as needed for signs and symptoms of hypoglycemia or acute illness    Hematuria  Assessment & Plan  -Hematuria has resolved    Advanced care planning/counseling discussion  Assessment & Plan  -Patient is DNR/DNI  -Guardianship being initiated    Malnutrition (HCC)- (present on admission)  Assessment & Plan  -Tolerating diet at this point    Hypokalemia  Assessment & Plan  Replaced  Resolved    COVID-19 virus infection  Assessment & Plan  -Patient has recovered from COVID-19    MICK Acuna.

## 2020-09-07 NOTE — CARE PLAN
Problem: Safety  Goal: Will remain free from injury  Outcome: PROGRESSING AS EXPECTED  Goal: Will remain free from falls  Outcome: PROGRESSING AS EXPECTED    Pt calling appropriately with call light to get up. Bed in lowest locked position.      Problem: Discharge Barriers/Planning  Goal: Patient's continuum of care needs will be met  Outcome: PROGRESSING AS EXPECTED    Still awaiting placement to SNF.     Problem: Respiratory:  Goal: Respiratory status will improve  Outcome: PROGRESSING SLOWER THAN EXPECTED    Remains on 2L Nasal Cannula which is not his baseline-attempting to wean.

## 2020-09-07 NOTE — PROGRESS NOTES
"Pt noted to not be wearing nasal cannula appropriately. Stating \"it makes me sneeze so I dont wear it\". Denies any SOB or trouble breathing whatsoever. Room air saturations are 90-91%.   "

## 2020-09-08 PROBLEM — R41.9 NEUROCOGNITIVE DISORDER: Chronic | Status: ACTIVE | Noted: 2020-08-03

## 2020-09-08 PROCEDURE — 700102 HCHG RX REV CODE 250 W/ 637 OVERRIDE(OP): Performed by: NURSE PRACTITIONER

## 2020-09-08 PROCEDURE — A9270 NON-COVERED ITEM OR SERVICE: HCPCS | Performed by: NURSE PRACTITIONER

## 2020-09-08 PROCEDURE — 770006 HCHG ROOM/CARE - MED/SURG/GYN SEMI*

## 2020-09-08 PROCEDURE — 700102 HCHG RX REV CODE 250 W/ 637 OVERRIDE(OP): Performed by: HOSPITALIST

## 2020-09-08 PROCEDURE — A9270 NON-COVERED ITEM OR SERVICE: HCPCS | Performed by: HOSPITALIST

## 2020-09-08 RX ADMIN — TAMSULOSIN HYDROCHLORIDE 0.8 MG: 0.4 CAPSULE ORAL at 09:27

## 2020-09-08 RX ADMIN — THERA TABS 1 TABLET: TAB at 09:26

## 2020-09-08 RX ADMIN — QUETIAPINE FUMARATE 25 MG: 25 TABLET ORAL at 16:49

## 2020-09-08 RX ADMIN — AMLODIPINE BESYLATE 5 MG: 5 TABLET ORAL at 09:26

## 2020-09-08 RX ADMIN — FINASTERIDE 5 MG: 5 TABLET, FILM COATED ORAL at 09:26

## 2020-09-08 NOTE — PROGRESS NOTES
Pt AxO x3 at time of assessment, disoriented to event.  PT not wearing NC per day shift nurse Marie, tolerating well with no s/s of distress.  Pt reporting no pain or discomfort.  Pt resting in bed.  All needs met at this time.  Call light within reach, treaded socks on pt, in room close to nursing station, bed alarm on.

## 2020-09-08 NOTE — PROGRESS NOTES
Assumed care of patient at 0700. Pt is a+Ox3, d/o to time. Standby assist to bathroom and walking around unit. BM today 9/8. Eating greater than 75% of all meals. Rosado in place and outputs measured. Call light in reach and belongings in reach as well. No other changes.

## 2020-09-08 NOTE — PROGRESS NOTES
Hospital Medicine Daily Progress Note    Date of Service  9/8/2020    Chief Complaint  COVID infection    Hospital Course    Mr. Bejarano is an 85 yo male with a PMHx of DM type II, hyperlipidemia, BPH who was admitted on 7/9/2020 with COVID 19 infection with POLY and hypotension.  Patient initially in ICU and on therapeutic Lovenox due to elevated DDimer.  His hospital course was complicated by hematuria and urine retention of which a méndez was placed.  Flomax and finasteride was initiated.  Patient was in acute renal failure which has improved.  Psychiatry was also consulted and evaluated the patient.  Per psychiatry, patient does not have the capacity to make any medical decision on 8/3/2020.  Guardianship pending.       Interval Problem Update  -No changes. A/O x 3. Neurocog d/o unspecified. No recent tests. Tolerating diet/ Voiding. Awaits guardian/placement.      Consultants/Specialty  Psych    Code Status  DNAR/DNI    Disposition  Patient high risk for elopement; pending guardianship and Medicaid approval.  Patient will need a long-term bed at a skilled nursing home.    Assessment/Plan  Neurocognitive disorder, unspecified  Assessment & Plan  -Psychiatry was consulted, patient cannot make any medical decision  -SW working on guardianship. Hearing is 9/25.    COPD (chronic obstructive pulmonary disease) (HCC)- (present on admission)  Assessment & Plan  -Supplemental oxygen as needed  -Inhaled bronchodilators as needed    Urinary retention- (present on admission)  Assessment & Plan  -Continue Flomax and finasteride  -Méndez catheter in place for urinary retention on 8/7  -Trial removal of Méndez catheter 9/4 unsuccessful. Follow up outpatient. Discharge likely with méndez.    Type 2 diabetes mellitus (HCC)- (present on admission)  Assessment & Plan  -Hemoglobin A1c at 6.8, continue diabetic diet, monitor  -BG has been well controlled  -Accu-Cheks only as needed for signs and symptoms of hypoglycemia or acute  illness    Hematuria  Assessment & Plan  -Hematuria has resolved    Advanced care planning/counseling discussion  Assessment & Plan  -Patient is DNR/DNI  -Guardianship being initiated    Malnutrition (HCC)- (present on admission)  Assessment & Plan  -Tolerating diet at this point    Hypokalemia  Assessment & Plan  Replaced  Resolved    COVID-19 virus infection  Assessment & Plan  -Patient has recovered from COVID-19    CECILIA HernandezNMali    I certify that the patient requires continued medically necessary hospital services for the treatment of neurocognitive disorder unspecified and lacking capacity for self care, medicine or placement. The patient will remain in the hospital for the foreseeable future.  Discharge may or may not occur in the next 20 days due to ongoing discharge delays due to no medically acceptable discharge option.

## 2020-09-08 NOTE — CARE PLAN
Problem: Safety  Goal: Will remain free from falls  Outcome: PROGRESSING AS EXPECTED   Pt has not fallen on shift, bed alarm on, treaded socks on pt, in room close to nursing station.     Problem: Psychosocial Needs:  Goal: Level of anxiety will decrease  Outcome: PROGRESSING AS EXPECTED   Pt reporting no anxiety, resting calmly in bed.

## 2020-09-08 NOTE — CARE PLAN
Problem: Safety  Goal: Will remain free from falls  Outcome: PROGRESSING AS EXPECTED   Pt calls appropriately using call light. Waits for staff to assist in mobility and does not attempt to get out of bed without assistance.     Problem: Venous Thromboembolism (VTW)/Deep Vein Thrombosis (DVT) Prevention:  Goal: Patient will participate in Venous Thrombosis (VTE)/Deep Vein Thrombosis (DVT)Prevention Measures  Outcome: PROGRESSING AS EXPECTED   Pt ambulating several times each day, able to sit on edge of bed and swing feet.   Problem: Bowel/Gastric:  Goal: Normal bowel function is maintained or improved  Outcome: PROGRESSING AS EXPECTED  Goal: Will not experience complications related to bowel motility  Outcome: PROGRESSING AS EXPECTED    today 9/8.  Problem: Urinary Elimination:  Goal: Ability to reestablish a normal urinary elimination pattern will improve  Outcome: PROGRESSING AS EXPECTED   Rosado present and patent draining yellow urine.  Problem: Mobility  Goal: Risk for activity intolerance will decrease  Outcome: PROGRESSING AS EXPECTED   Stand by assistance provided to patient while mobilizing around unit and in room. Using single point cane as well.

## 2020-09-08 NOTE — PROGRESS NOTES
2 RN skin check complete with EZ Olguin  Devices in place: Nasal Cannula and méndez  Skin assessed under devices: yes.  Confirmed pressure ulcers found on: n/a.  New potential pressure ulcers noted on n/a. Wound consult placed No.  The following interventions in place Pillows, standby assist to bathroom     Skin Assessment:  Bilateral ears pink and blanching- foam pads in place  Moles and skin tag on face  Bilateral elbows dusky and blanching  Duskiness on back- blanching  Skin tag behind R knee  Bilateral skins dusky and flaky skin  R heal boggy but blanching  L heal pink and blanching

## 2020-09-09 PROCEDURE — A9270 NON-COVERED ITEM OR SERVICE: HCPCS | Performed by: INTERNAL MEDICINE

## 2020-09-09 PROCEDURE — A9270 NON-COVERED ITEM OR SERVICE: HCPCS | Performed by: HOSPITALIST

## 2020-09-09 PROCEDURE — 700102 HCHG RX REV CODE 250 W/ 637 OVERRIDE(OP): Performed by: NURSE PRACTITIONER

## 2020-09-09 PROCEDURE — 700102 HCHG RX REV CODE 250 W/ 637 OVERRIDE(OP): Performed by: INTERNAL MEDICINE

## 2020-09-09 PROCEDURE — 700102 HCHG RX REV CODE 250 W/ 637 OVERRIDE(OP): Performed by: HOSPITALIST

## 2020-09-09 PROCEDURE — A9270 NON-COVERED ITEM OR SERVICE: HCPCS | Performed by: NURSE PRACTITIONER

## 2020-09-09 PROCEDURE — 770006 HCHG ROOM/CARE - MED/SURG/GYN SEMI*

## 2020-09-09 RX ORDER — LIDOCAINE HYDROCHLORIDE 20 MG/ML
JELLY TOPICAL
Status: COMPLETED | OUTPATIENT
Start: 2020-09-09 | End: 2020-09-10

## 2020-09-09 RX ADMIN — ACETAMINOPHEN 650 MG: 325 TABLET, FILM COATED ORAL at 20:10

## 2020-09-09 RX ADMIN — AMLODIPINE BESYLATE 5 MG: 5 TABLET ORAL at 09:37

## 2020-09-09 RX ADMIN — FINASTERIDE 5 MG: 5 TABLET, FILM COATED ORAL at 09:36

## 2020-09-09 RX ADMIN — QUETIAPINE FUMARATE 25 MG: 25 TABLET ORAL at 17:42

## 2020-09-09 RX ADMIN — THERA TABS 1 TABLET: TAB at 09:36

## 2020-09-09 RX ADMIN — TAMSULOSIN HYDROCHLORIDE 0.8 MG: 0.4 CAPSULE ORAL at 09:36

## 2020-09-09 ASSESSMENT — PAIN DESCRIPTION - PAIN TYPE: TYPE: ACUTE PAIN

## 2020-09-09 NOTE — PROGRESS NOTES
Pt AxO x4 at time of assessment, no complaints of pain.  Ambulate to bathroom and had a BM. Consumed majority of dinner meal.  Pt on RA and tolerating well.  Pt in room close to nursing station, bed alarm on, pt calls for assistance appropriately.

## 2020-09-09 NOTE — CARE PLAN
Problem: Knowledge Deficit  Goal: Knowledge of the prescribed therapeutic regimen will improve  Outcome: PROGRESSING AS EXPECTED   Pt understands why he is here, understands fall precautions.     Problem: Skin Integrity  Goal: Risk for impaired skin integrity will decrease  Outcome: PROGRESSING AS EXPECTED   No new s/s of deterioration or skin issues.

## 2020-09-09 NOTE — PROGRESS NOTES
Assumed care of pt at 0700. Pt is a+ox3, d/o to time. Ambulated around the unit with staff assistance. Rosado in tact and patent draining clear yellow urine. Call light in reach and belongings available to pt. No other changes.

## 2020-09-09 NOTE — PROGRESS NOTES
2 RN skin check complete with EZ Frank  Devices in place: Nasal Cannula and méndez  Skin assessed under devices: yes.  Confirmed pressure ulcers found on: n/a.  New potential pressure ulcers noted on n/a. Wound consult placed: N/A  The following interventions in place Pillows, standby assist to bathroom, waffle cushion overlay, pillows for support/positioning.     Skin Assessment:  Bilateral ears pink and blanching  Moles and skin tag on face  Bilateral elbows intact, dusky and blanching  Duskiness on back- blanching  Skin tag behind R knee  Bilateral shins dusky and flaky skin  R heal boggy but blanching  L heal pink and blanching

## 2020-09-09 NOTE — CARE PLAN
Problem: Safety  Goal: Will remain free from falls  Outcome: PROGRESSING AS EXPECTED  Pt calls appropriately with call light in reach. Remains safe with self and staff.     Problem: Bowel/Gastric:  Goal: Will not experience complications related to bowel motility  Outcome: PROGRESSING AS EXPECTED   Bowel movement yesterday, does not complain of any constipation.    Problem: Discharge Barriers/Planning  Goal: Patient's continuum of care needs will be met  Outcome: PROGRESSING SLOWER THAN EXPECTED   Still awaiting placement to nursing home as he has been deemed incapacitated.

## 2020-09-09 NOTE — PROGRESS NOTES
2 RN skin check complete with Laura RN  Devices in place méndez catheter, waffle cushion.  Skin assessed under devices yes.  Confirmed pressure ulcers found on none.  New potential pressure ulcers noted on none. Wound consult placed not needed.  The following interventions in place waffle cushion, q2 turning and repositioning, or frequent ambulation.     Skin assessment:  Bilateral Heels: dry but intact  Sacrum: intact  Backside: dry and dusky but in tact  Ears: in tact no redness.

## 2020-09-09 NOTE — PROGRESS NOTES
2 RN skin check complete with Remedios RN  Devices in place méndez catheter, waffle cushion.  Skin assessed under devices yes.  Confirmed pressure ulcers found on none.  New potential pressure ulcers noted on none. Wound consult placed not needed.  The following interventions in place waffle cushion, q2 turning and repositioning, or frequent ambulation.    Skin assessment:  Bilateral Heels: dry but intact  Sacrum: intact  Backside: dry and dusky but in tact  Ears: in tact no redness.

## 2020-09-10 PROBLEM — N30.00 ACUTE CYSTITIS: Status: ACTIVE | Noted: 2020-09-10

## 2020-09-10 LAB
ALBUMIN SERPL BCP-MCNC: 3.6 G/DL (ref 3.2–4.9)
ANION GAP SERPL CALC-SCNC: 11 MMOL/L (ref 7–16)
APPEARANCE UR: ABNORMAL
BACTERIA #/AREA URNS HPF: ABNORMAL /HPF
BILIRUB UR QL STRIP.AUTO: NEGATIVE
BUN SERPL-MCNC: 22 MG/DL (ref 8–22)
CALCIUM SERPL-MCNC: 9.1 MG/DL (ref 8.5–10.5)
CHLORIDE SERPL-SCNC: 99 MMOL/L (ref 96–112)
CO2 SERPL-SCNC: 30 MMOL/L (ref 20–33)
COLOR UR: YELLOW
CREAT SERPL-MCNC: 0.99 MG/DL (ref 0.5–1.4)
EPI CELLS #/AREA URNS HPF: ABNORMAL /HPF
GLUCOSE SERPL-MCNC: 121 MG/DL (ref 65–99)
GLUCOSE UR STRIP.AUTO-MCNC: NEGATIVE MG/DL
KETONES UR STRIP.AUTO-MCNC: NEGATIVE MG/DL
LEUKOCYTE ESTERASE UR QL STRIP.AUTO: ABNORMAL
MICRO URNS: ABNORMAL
NITRITE UR QL STRIP.AUTO: NEGATIVE
PH UR STRIP.AUTO: 6.5 [PH] (ref 5–8)
PHOSPHATE SERPL-MCNC: 3.8 MG/DL (ref 2.5–4.5)
POTASSIUM SERPL-SCNC: 3.9 MMOL/L (ref 3.6–5.5)
PROT UR QL STRIP: NEGATIVE MG/DL
RBC # URNS HPF: ABNORMAL /HPF
RBC UR QL AUTO: ABNORMAL
SODIUM SERPL-SCNC: 140 MMOL/L (ref 135–145)
SP GR UR STRIP.AUTO: 1.02
UROBILINOGEN UR STRIP.AUTO-MCNC: 0.2 MG/DL
WBC #/AREA URNS HPF: ABNORMAL /HPF

## 2020-09-10 PROCEDURE — A9270 NON-COVERED ITEM OR SERVICE: HCPCS | Performed by: NURSE PRACTITIONER

## 2020-09-10 PROCEDURE — 700102 HCHG RX REV CODE 250 W/ 637 OVERRIDE(OP): Performed by: HOSPITALIST

## 2020-09-10 PROCEDURE — 700102 HCHG RX REV CODE 250 W/ 637 OVERRIDE(OP): Performed by: NURSE PRACTITIONER

## 2020-09-10 PROCEDURE — 770006 HCHG ROOM/CARE - MED/SURG/GYN SEMI*

## 2020-09-10 PROCEDURE — 80069 RENAL FUNCTION PANEL: CPT

## 2020-09-10 PROCEDURE — 81001 URINALYSIS AUTO W/SCOPE: CPT

## 2020-09-10 PROCEDURE — A9270 NON-COVERED ITEM OR SERVICE: HCPCS | Performed by: HOSPITALIST

## 2020-09-10 PROCEDURE — 51798 US URINE CAPACITY MEASURE: CPT

## 2020-09-10 PROCEDURE — 36415 COLL VENOUS BLD VENIPUNCTURE: CPT

## 2020-09-10 PROCEDURE — 700101 HCHG RX REV CODE 250: Performed by: NURSE PRACTITIONER

## 2020-09-10 RX ORDER — NITROFURANTOIN 25; 75 MG/1; MG/1
100 CAPSULE ORAL 2 TIMES DAILY WITH MEALS
Status: DISPENSED | OUTPATIENT
Start: 2020-09-10 | End: 2020-09-15

## 2020-09-10 RX ADMIN — NITROFURANTOIN MONOHYDRATE/MACROCRYSTALLINE 100 MG: 25; 75 CAPSULE ORAL at 17:55

## 2020-09-10 RX ADMIN — TAMSULOSIN HYDROCHLORIDE 0.8 MG: 0.4 CAPSULE ORAL at 09:21

## 2020-09-10 RX ADMIN — LIDOCAINE HYDROCHLORIDE 10 ML: 20 JELLY TOPICAL at 00:59

## 2020-09-10 RX ADMIN — THERA TABS 1 TABLET: TAB at 09:20

## 2020-09-10 RX ADMIN — QUETIAPINE FUMARATE 25 MG: 25 TABLET ORAL at 17:55

## 2020-09-10 RX ADMIN — FINASTERIDE 5 MG: 5 TABLET, FILM COATED ORAL at 09:20

## 2020-09-10 RX ADMIN — AMLODIPINE BESYLATE 5 MG: 5 TABLET ORAL at 09:20

## 2020-09-10 RX ADMIN — NITROFURANTOIN MONOHYDRATE/MACROCRYSTALLINE 100 MG: 25; 75 CAPSULE ORAL at 09:20

## 2020-09-10 NOTE — PROGRESS NOTES
Received urinalysis results.  Sent message to ED Chow to see how she would like to proceed.     Orders placed by stef Pimentel scheduled to be given with meals, starting this morning at 0730.

## 2020-09-10 NOTE — CARE PLAN
Problem: Safety  Goal: Will remain free from injury  Outcome: PROGRESSING AS EXPECTED  Goal: Will remain free from falls  Outcome: PROGRESSING AS EXPECTED  Note: Bed in low position, call light in reach, non-slip socks on, bed alarm if warranted     Problem: Infection  Goal: Will remain free from infection  Outcome: PROGRESSING AS EXPECTED  Note: Patient vitals, I/os, mentation, and labs monitored throughout shift

## 2020-09-10 NOTE — PROGRESS NOTES
Pt AxO x4 at time of assessment, on RA with no s/s of distress.  Pt experiencing pain associated with catheter, administered PRN tylenol, alerted Loren WARD, she ordered topical lidocaine and a urinalysis. PRN tylenol provided relief and pt was able to sleep.  Rosado drainage is minimal and urine appears to be slightly cloudy. Call light within reach, treaded slipper socks on pt, bed alarm on, frequent monitoring in place.

## 2020-09-10 NOTE — CARE PLAN
Problem: Safety  Goal: Will remain free from injury  Outcome: PROGRESSING AS EXPECTED   Pt has non sustained injury during shift, fall precautions in place.     Problem: Urinary Elimination:  Goal: Ability to reestablish a normal urinary elimination pattern will improve  Outcome: PROGRESSING SLOWER THAN EXPECTED   Pt experiencing pain associated with catheter, administered PRN tylenol, alerted APRN, topical lidocaine, getting a urinalysis.

## 2020-09-10 NOTE — DISCHARGE PLANNING
Anticipated Discharge Disposition: Group Home    Action: SW provided copies of petition to patient and went over it with patient, also reminded him of pending court date and time.  Patient asked what will happen when he gets a guardian.  SW began explaining to patient what this will look like, patient stated he wants to be with his girlfriend.  SW informed patient she is at a nursing home, [facility].  Patient stated he wants to be with her, SW stated he can request this with his guardian if one is appointed.     Barriers to Discharge: placement/cost of care/medicaid application     Plan: continue to monitor for discharge barriers.

## 2020-09-10 NOTE — ASSESSMENT & PLAN NOTE
-Suspicion for urothelial carcinoma vs renal cell carcinoma  -Patient failed voiding trial despite flomax/proscar.  -Poor candidate for chemo given neurocognitive disorder, lacking capacity, and requirement for guardianship.  -Urologist, Dr. Caruso, was consulted on 11/6 regarding renal mass. Recommended outpatient follow-up and continue chronic méndez. Please see consult note for further detail.

## 2020-09-10 NOTE — PROGRESS NOTES
2 RN skin check complete with EZ Frank  Devices in place: méndez catheter, waffle cushion  Skin assessed under devices: yes.  Confirmed pressure ulcers found on: n/a.  New potential pressure ulcers noted on n/a. Wound consult placed: N/A    The following interventions in place Pillows, standby assist to bathroom, waffle cushion overlay, pillows for support/positioning.     Skin Assessment:  Bilateral ears pink and blanching  Moles and skin tag on face  Bilateral elbows intact, dusky and blanching  Duskiness on back- blanching  Skin tag behind R knee  Bilateral shins dusky and flaky skin  R heal boggy but blanching  L heal pink and blanching

## 2020-09-11 PROCEDURE — A9270 NON-COVERED ITEM OR SERVICE: HCPCS | Performed by: HOSPITALIST

## 2020-09-11 PROCEDURE — 770006 HCHG ROOM/CARE - MED/SURG/GYN SEMI*

## 2020-09-11 PROCEDURE — A9270 NON-COVERED ITEM OR SERVICE: HCPCS | Performed by: NURSE PRACTITIONER

## 2020-09-11 PROCEDURE — 700102 HCHG RX REV CODE 250 W/ 637 OVERRIDE(OP): Performed by: NURSE PRACTITIONER

## 2020-09-11 PROCEDURE — 700102 HCHG RX REV CODE 250 W/ 637 OVERRIDE(OP): Performed by: HOSPITALIST

## 2020-09-11 RX ADMIN — NITROFURANTOIN MONOHYDRATE/MACROCRYSTALLINE 100 MG: 25; 75 CAPSULE ORAL at 18:00

## 2020-09-11 RX ADMIN — FINASTERIDE 5 MG: 5 TABLET, FILM COATED ORAL at 08:55

## 2020-09-11 RX ADMIN — NITROFURANTOIN MONOHYDRATE/MACROCRYSTALLINE 100 MG: 25; 75 CAPSULE ORAL at 08:55

## 2020-09-11 RX ADMIN — THERA TABS 1 TABLET: TAB at 08:56

## 2020-09-11 RX ADMIN — AMLODIPINE BESYLATE 5 MG: 5 TABLET ORAL at 08:55

## 2020-09-11 RX ADMIN — TAMSULOSIN HYDROCHLORIDE 0.8 MG: 0.4 CAPSULE ORAL at 08:56

## 2020-09-11 RX ADMIN — QUETIAPINE FUMARATE 25 MG: 25 TABLET ORAL at 18:00

## 2020-09-11 ASSESSMENT — FIBROSIS 4 INDEX: FIB4 SCORE: 2.13

## 2020-09-11 NOTE — PROGRESS NOTES
Pt AxO x4 at time of assessment, denying pain.  Pt calm and cooperative with assessment.  Pt calls appropriately, bed alarm on.  Pt awaiting guardianship hearing on 9/25.  All needs met at this time, frequent rounding in place.

## 2020-09-11 NOTE — PROGRESS NOTES
Received report and assumed care of pt. Assessment complete on RA. Pt A&OX4. Denies any pain or discomfort at this time. Pt currently listening to tv in bed. All pt needs met at this time. Safety precautions and hourly rounding in place.

## 2020-09-11 NOTE — PROGRESS NOTES
2 RN skin check complete with EZ Hernández  Devices in place: méndez catheter, waffle cushion  Skin assessed under devices: yes.  Confirmed pressure ulcers found on: n/a.  New potential pressure ulcers noted on n/a. Wound consult placed: N/A     The following interventions in place Pillows, standby assist to bathroom, waffle cushion overlay, pillows for support/positioning.     Skin Assessment:  Bilateral ears pink and blanching  Moles and skin tag on face  Bilateral elbows intact, dusky and blanching  Duskiness on back- blanching  Skin tag behind R knee  Bilateral shins dusky and flaky skin  R heel blanching  L heel blanching

## 2020-09-11 NOTE — CARE PLAN
Problem: Safety  Goal: Will remain free from falls  Outcome: PROGRESSING AS EXPECTED  Note: Bed locked and in lowest position. Bed alarm on and in place. Call light and belongings within reach. Bed near nurses station. Educated on calling for assitance before getting up.      Problem: Skin Integrity  Goal: Risk for impaired skin integrity will decrease  Outcome: PROGRESSING AS EXPECTED  Note: Implement 2RN skin check.

## 2020-09-11 NOTE — CARE PLAN
Problem: Skin Integrity  Goal: Risk for impaired skin integrity will decrease  Outcome: PROGRESSING AS EXPECTED   Pr ambulatory, repositions in bed, 2 RN skin check, heel mepilex, waffle cushion.    Problem: Urinary Elimination:  Goal: Ability to reestablish a normal urinary elimination pattern will improve  Outcome: PROGRESSING AS EXPECTED   Pt producing more urine output now that infection is being addressed.

## 2020-09-12 PROCEDURE — A9270 NON-COVERED ITEM OR SERVICE: HCPCS | Performed by: NURSE PRACTITIONER

## 2020-09-12 PROCEDURE — 700102 HCHG RX REV CODE 250 W/ 637 OVERRIDE(OP): Performed by: HOSPITALIST

## 2020-09-12 PROCEDURE — A9270 NON-COVERED ITEM OR SERVICE: HCPCS | Performed by: HOSPITALIST

## 2020-09-12 PROCEDURE — 700102 HCHG RX REV CODE 250 W/ 637 OVERRIDE(OP): Performed by: NURSE PRACTITIONER

## 2020-09-12 PROCEDURE — 770006 HCHG ROOM/CARE - MED/SURG/GYN SEMI*

## 2020-09-12 RX ADMIN — TAMSULOSIN HYDROCHLORIDE 0.8 MG: 0.4 CAPSULE ORAL at 08:52

## 2020-09-12 RX ADMIN — AMLODIPINE BESYLATE 5 MG: 5 TABLET ORAL at 08:53

## 2020-09-12 RX ADMIN — THERA TABS 1 TABLET: TAB at 08:53

## 2020-09-12 RX ADMIN — FINASTERIDE 5 MG: 5 TABLET, FILM COATED ORAL at 08:53

## 2020-09-12 RX ADMIN — NITROFURANTOIN MONOHYDRATE/MACROCRYSTALLINE 100 MG: 25; 75 CAPSULE ORAL at 08:52

## 2020-09-12 RX ADMIN — QUETIAPINE FUMARATE 25 MG: 25 TABLET ORAL at 19:56

## 2020-09-12 RX ADMIN — NITROFURANTOIN MONOHYDRATE/MACROCRYSTALLINE 100 MG: 25; 75 CAPSULE ORAL at 17:31

## 2020-09-12 NOTE — PROGRESS NOTES
2 RN skin check complete with EZ Burgos  Devices in place: méndez catheter, waffle cushion  Skin assessed under devices: yes.  Confirmed pressure ulcers found on: n/a.  New potential pressure ulcers noted on n/a. Wound consult placed: N/A     The following interventions in place Pillows, standby assist to bathroom, waffle cushion overlay, pillows for support/positioning, mepilex     Skin Assessment:  Bilateral ears pink and blanching  Moles and skin tag on face  Bilateral elbows intact, dusky and blanching  Duskiness on back- blanching  Skin tag behind R knee  Bilateral shins dusky and flaky skin  R heel boggy/blanching; mepilex applied  L heel boggy/blanching; mepilex applied

## 2020-09-12 NOTE — PROGRESS NOTES
2 RN skin check complete with EZ Lira.   Devices in place: méndez.  Skin assessed under devices: yes.  Confirmed pressure ulcers found: n/a.  New potential pressure ulcers noted: n/a. Wound consult placed: n/a.  The following interventions in place: pillows for positioning, 2RN skin check, encouraging turns, waffle overlay.    Assessed: Bilateral ears pink and blanching.  Bilateral elbows dry, pink, blanching.  Internal growth noted behind right knee.  BLE dusky, dry, blanching.  Bilateral heels pink, boggy, blanching.

## 2020-09-12 NOTE — CARE PLAN
Problem: Safety  Goal: Will remain free from injury  Outcome: PROGRESSING AS EXPECTED   Bed alarm on. Room by nursing station. Treaded socks on. Call light and belongings in reach. Bed in lowest and locked position.    Problem: Skin Integrity  Goal: Risk for impaired skin integrity will decrease  Outcome: PROGRESSING AS EXPECTED

## 2020-09-12 NOTE — PROGRESS NOTES
Received report and assumed care of pt. Assessment complete on RA. Pt A&OX4. Denies any pain or discomfort at this time. Pt currently at EOB eating breakfast. All pt needs met at this time. Safety precautions and hourly rounding in place.

## 2020-09-12 NOTE — PROGRESS NOTES
2 RN skin check complete with EZ Ronquillo.   Devices in place: méndez.  Skin assessed under devices: yes.  Confirmed pressure ulcers found: n/a.  New potential pressure ulcers noted: n/a. Wound consult placed: n/a.  The following interventions in place: pillows for positioning, 2RN skin check, encouraging turns, waffle overlay.     Assessed:  Bilateral ears pink and blanching.  Skin tag to the right side of forehead.  Bilateral elbows dry, pink, blanching.   Growth noted behind right knee.  BLE dusky, dry, blanching.  Bilateral heels pink, boggy, blanching. Mepilex in place for protection.

## 2020-09-12 NOTE — CARE PLAN
Problem: Safety  Goal: Will remain free from falls  Outcome: PROGRESSING AS EXPECTED  Note: Bed locked and in lowest position. Bed alarm on and in place. Call light and belongings within reach. Bed near nurses station.     Problem: Skin Integrity  Goal: Risk for impaired skin integrity will decrease  Outcome: PROGRESSING AS EXPECTED  Note: Implement 2 RN skin check and encourage turns.

## 2020-09-13 PROCEDURE — 770006 HCHG ROOM/CARE - MED/SURG/GYN SEMI*

## 2020-09-13 PROCEDURE — 700102 HCHG RX REV CODE 250 W/ 637 OVERRIDE(OP): Performed by: HOSPITALIST

## 2020-09-13 PROCEDURE — A9270 NON-COVERED ITEM OR SERVICE: HCPCS | Performed by: NURSE PRACTITIONER

## 2020-09-13 PROCEDURE — 700102 HCHG RX REV CODE 250 W/ 637 OVERRIDE(OP): Performed by: NURSE PRACTITIONER

## 2020-09-13 PROCEDURE — A9270 NON-COVERED ITEM OR SERVICE: HCPCS | Performed by: HOSPITALIST

## 2020-09-13 RX ADMIN — TAMSULOSIN HYDROCHLORIDE 0.8 MG: 0.4 CAPSULE ORAL at 09:57

## 2020-09-13 RX ADMIN — FINASTERIDE 5 MG: 5 TABLET, FILM COATED ORAL at 09:57

## 2020-09-13 RX ADMIN — QUETIAPINE FUMARATE 25 MG: 25 TABLET ORAL at 17:03

## 2020-09-13 RX ADMIN — NITROFURANTOIN MONOHYDRATE/MACROCRYSTALLINE 100 MG: 25; 75 CAPSULE ORAL at 17:03

## 2020-09-13 RX ADMIN — NITROFURANTOIN MONOHYDRATE/MACROCRYSTALLINE 100 MG: 25; 75 CAPSULE ORAL at 10:00

## 2020-09-13 RX ADMIN — AMLODIPINE BESYLATE 5 MG: 5 TABLET ORAL at 09:57

## 2020-09-13 RX ADMIN — THERA TABS 1 TABLET: TAB at 09:57

## 2020-09-13 ASSESSMENT — PAIN DESCRIPTION - PAIN TYPE: TYPE: ACUTE PAIN

## 2020-09-13 NOTE — PROGRESS NOTES
2 RN skin check complete with EZ Collado  Devices in place: méndez, mepilex  Skin assessed under devices: yes  Confirmed pressure ulcers found: none  New potential pressure ulcers found: none  The following interventions in place: pillows of support/repositioning, 2Rn skin check, waffle overlay, mepilex    Notes:   Bilateral ears pink and blanching.  Skin tag to the right side of forehead.  Bilateral elbows dry, pink, blanching.   Urinary meatus intact, stat lock in place on R inner thigh  Growth noted behind right knee.  BLE dusky, dry, blanching.  Bilateral heels pink, boggy, blanching, calloused. Mepilex in place for protection.

## 2020-09-13 NOTE — CARE PLAN
Problem: Safety  Goal: Will remain free from falls  Outcome: PROGRESSING AS EXPECTED  Intervention: Implement fall precautions  Flowsheets  Taken 9/13/2020 1117  Bed Alarm: Yes - Alarm On  Chair/Bed Strip Alarm: Yes - Alarm On  Taken 9/13/2020 1000  Environmental Precautions:   Bed in Low Position   Communication Sign for Patients & Families   Mobility Assessed & Appropriate Sign Placed   Personal Belongings, Wastebasket, Call Bell etc. in Easy Reach   Treaded Slipper Socks on Patient     Problem: Infection  Goal: Will remain free from infection  Outcome: PROGRESSING AS EXPECTED   Monitoring for s/s of infection, catheter assessment, 2rn skin assessment

## 2020-09-13 NOTE — CARE PLAN
Problem: Infection  Goal: Will remain free from infection  Outcome: PROGRESSING AS EXPECTED       Problem: Knowledge Deficit  Goal: Knowledge of the prescribed therapeutic regimen will improve  Outcome: PROGRESSING AS EXPECTED

## 2020-09-13 NOTE — PROGRESS NOTES
Bedside report received at change of shift, assumed care of pt. Bed alarm on, no immediate needs.    Pt is axox 4, denies pain. Rosado catheter in place, no reddnes or irritation noted at meatus, draining light yellow urine. Pt provided warm washcloths and supervised while he gave himself a sponge bath. VSS on RA. Plan of care reviewed, patient board updated, environmental safety precautions in place, and frequent rounding in practice.

## 2020-09-13 NOTE — PROGRESS NOTES
2 RN skin check complete with EZ Franks.   Devices in place: méndez.  Skin assessed under devices: yes.  Confirmed pressure ulcers found: n/a.  New potential pressure ulcers noted: n/a. Wound consult placed: n/a.  The following interventions in place: pillows for positioning, 2RN skin check, encouraging turns, waffle overlay.     Assessed:  Bilateral ears pink and blanching.  Skin tag to the right side of forehead.  Bilateral elbows dry, pink, blanching.   Growth noted behind right knee.  BLE dusky, dry, blanching.  Bilateral heels pink, boggy, blanching. Mepilex in place for protection.

## 2020-09-14 PROBLEM — I71.40 ABDOMINAL AORTIC ANEURYSM (AAA) 3.0 CM TO 5.5 CM IN DIAMETER IN MALE (HCC): Status: ACTIVE | Noted: 2020-09-14

## 2020-09-14 PROBLEM — I71.40 ABDOMINAL AORTIC ANEURYSM (AAA) 3.0 CM TO 5.5 CM IN DIAMETER IN MALE (HCC): Chronic | Status: ACTIVE | Noted: 2020-09-14

## 2020-09-14 PROCEDURE — 99232 SBSQ HOSP IP/OBS MODERATE 35: CPT | Performed by: INTERNAL MEDICINE

## 2020-09-14 PROCEDURE — A9270 NON-COVERED ITEM OR SERVICE: HCPCS | Performed by: HOSPITALIST

## 2020-09-14 PROCEDURE — 700102 HCHG RX REV CODE 250 W/ 637 OVERRIDE(OP): Performed by: HOSPITALIST

## 2020-09-14 PROCEDURE — 770006 HCHG ROOM/CARE - MED/SURG/GYN SEMI*

## 2020-09-14 PROCEDURE — 700102 HCHG RX REV CODE 250 W/ 637 OVERRIDE(OP): Performed by: NURSE PRACTITIONER

## 2020-09-14 PROCEDURE — A9270 NON-COVERED ITEM OR SERVICE: HCPCS | Performed by: NURSE PRACTITIONER

## 2020-09-14 RX ADMIN — TAMSULOSIN HYDROCHLORIDE 0.8 MG: 0.4 CAPSULE ORAL at 09:40

## 2020-09-14 RX ADMIN — FINASTERIDE 5 MG: 5 TABLET, FILM COATED ORAL at 09:40

## 2020-09-14 RX ADMIN — THERA TABS 1 TABLET: TAB at 09:40

## 2020-09-14 RX ADMIN — AMLODIPINE BESYLATE 5 MG: 5 TABLET ORAL at 09:40

## 2020-09-14 RX ADMIN — NITROFURANTOIN MONOHYDRATE/MACROCRYSTALLINE 100 MG: 25; 75 CAPSULE ORAL at 09:40

## 2020-09-14 RX ADMIN — ERGOCALCIFEROL 50000 UNITS: 1.25 CAPSULE ORAL at 14:14

## 2020-09-14 RX ADMIN — QUETIAPINE FUMARATE 25 MG: 25 TABLET ORAL at 20:55

## 2020-09-14 NOTE — CARE PLAN
Problem: Safety  Goal: Will remain free from falls  Outcome: PROGRESSING AS EXPECTED  Note: Fall precautions in place. Bed in lowest position. Non-skid socks in place. Personal possessions within reach. Mobility sign on door. Bed-alarm on. Call light within reach. Pt educated regarding fall prevention and states understanding.       Problem: Discharge Barriers/Planning  Goal: Patient's continuum of care needs will be met  Outcome: PROGRESSING SLOWER THAN EXPECTED  Note: Patient needs placement.

## 2020-09-14 NOTE — ASSESSMENT & PLAN NOTE
-UA checked for increasing confusion and acute suprapubic pain  -Macrobid x 14 days with end date 12/18 (complicated UTI)

## 2020-09-14 NOTE — CARE PLAN
Problem: Safety  Goal: Will remain free from falls  Outcome: PROGRESSING AS EXPECTED  Note: Fall precautions in place: bed locked and in the lowest position, call light and belongings within reach, treaded socks on, bed alarm in use, pt's room close to nurse's station.       Problem: Fluid Volume:  Goal: Will maintain balanced intake and output  Outcome: PROGRESSING AS EXPECTED  Note: Pt's I's & O's monitored and recorded, intake of fluids encouraged.

## 2020-09-14 NOTE — PROGRESS NOTES
"Hospital Medicine Daily Progress Note    Date of Service  9/14/2020    Chief Complaint  84 y.o. male admitted 7/9/2020 with COVID infection    Hospital Course    Mr. Bejarano is an 85 yo male with a PMHx of DM type II, hyperlipidemia, BPH who was admitted on 7/9/2020 with COVID 19 infection with POLY and hypotension.  Patient initially in ICU and on therapeutic Lovenox due to elevated DDimer.  His hospital course was complicated by hematuria and urine retention of which a méndez was placed.  Flomax and finasteride was initiated.  Patient was in acute renal failure which has improved. RUQ U/S was completed d/t periumbilical pain. This revealed possible renal mass. CT renal w/wo was performed and showed a large renal mass (7x7x8) likely representing urothelial vs renal cell carcinoma. Psychiatry was also consulted d/t ongoing cognition difficulty.  Per psychiatry, patient does not have the capacity to make any medical decision on 8/3/2020. SLP feels he has moderate to severe deficits in insight, orientation, and memory. Guardianship pending.    Despite Proscar and Flomax voiding trial failed 9/4 and red frank catheter was placed d/t high urinary retention. His renal fx has remained stable and WNL. Unfortunately, the patient is 116 pounds at the time of this note, with BMI of 17. He is a poor candidate for potential chemotherapy or surgical decompression given his malnutrition and neurocognitive disorder.      Interval Problem Update  VSS  [facility] 9/10 shows normal renal fx  Recent U/A w mild infectious markers  Patient is unreliable ROS \"I\"m fine\" -- lacks capacity  Discussed renal mass again with patient. He did not appear to remember our conversation from 2-3 days prior. \"I'm fine doctor.\"   Would recommend continuing méndez catheter with transition to comfort care.  He became tearful. \"If it's my time, it's my time.\"    Consultants/Specialty  Palliative  Psych    Code Status  DNAR/DNI    Disposition  24/7 " supervision    Review of Systems  Review of Systems   Unable to perform ROS: Dementia   All other systems reviewed and are negative.       Physical Exam  Temp:  [36.1 °C (97 °F)-36.7 °C (98.1 °F)] 36.7 °C (98.1 °F)  Pulse:  [68-83] 73  Resp:  [16-18] 16  BP: (109-130)/(46-51) 130/49  SpO2:  [90 %-96 %] 96 %    Physical Exam  Vitals signs and nursing note reviewed.   Constitutional:       Appearance: He is underweight. He is not ill-appearing.   HENT:      Head: Normocephalic and atraumatic.      Nose: Nose normal.   Eyes:      Conjunctiva/sclera: Conjunctivae normal.      Pupils: Pupils are equal, round, and reactive to light.   Neck:      Musculoskeletal: Neck supple.   Cardiovascular:      Rate and Rhythm: Normal rate and regular rhythm.      Heart sounds: No murmur. No friction rub.   Pulmonary:      Effort: No respiratory distress.   Abdominal:      General: Bowel sounds are normal. There is no distension.      Palpations: Abdomen is soft.   Skin:     General: Skin is warm and dry.   Neurological:      Mental Status: He is alert and oriented to person, place, and time.   Psychiatric:         Attention and Perception: He is inattentive.         Mood and Affect: Mood normal.         Cognition and Memory: He exhibits impaired recent memory.         Fluids    Intake/Output Summary (Last 24 hours) at 9/14/2020 1006  Last data filed at 9/14/2020 0500  Gross per 24 hour   Intake 1080 ml   Output 1520 ml   Net -440 ml       Laboratory                        Imaging  CT-RENAL WITH & W/O   Final Result      1.  Large complex right renal mass as detailed above measuring approximately 7.8 x 7.1x  8.5 cm. Primary differential considerations include urothelial malignancy or renal cell carcinoma.   2.  No evidence of adenopathy or metastatic disease is seen.   3.  Distended urinary bladder. Right greater left hydroureteronephrosis.   4.  Severe atherosclerosis. 3.5 cm infrarenal abdominal aortic aneurysm.            US-RUQ  "  Final Result         1.  Echogenic liver compatible with fatty change versus fibrosis.   2.  Masslike structure in the right kidney, should be considered neoplastic unless proven otherwise, recommend follow-up 3 phase CT of the kidneys for further characterization   3.  Mild dilatation of the common bile duct, within expected limits given patient age.      These findings were discussed with the patient's clinician, Vin Mei, on 7/23/2020 7:34 AM.      DX-CHEST-PORTABLE (1 VIEW)   Final Result      1.  The lungs are hyperinflated suggesting emphysema/COPD.   2.  There is minimal predominantly lower lobe interstitial opacity which is most likely due to chronic scarring.           Assessment/Plan  Neurocognitive disorder, unspecified  Assessment & Plan  -Psychiatry was consulted, patient cannot make any medical decision  -SW working on guardianship. Hearing is 9/25.    Urinary retention- (present on admission)  Assessment & Plan  -Continue Flomax and finasteride  -Méndez catheter in place for urinary retention on 8/7  -Trial removal of Méndez catheter 9/4 unsuccessful. Follow up outpatient. Discharge likely with méndez.    Right kidney mass- (present on admission)  Assessment & Plan  Suspicion for urothelial carcinoma vs renal cell carcinoma. No a/p adenopathy  Patient failed voiding trial despite flomax/proscar  Poor candidate for chemo given neurocognitive d/o, lacking capacity and requirement for guardianship  9/14: Discussed comfort care measures with the patient. He was tearful, but stated \"If it's my time, it's my time.\"  Will discuss Comfort Care with care team    COPD (chronic obstructive pulmonary disease) (HCC)- (present on admission)  Assessment & Plan  -Supplemental oxygen as needed  -Inhaled bronchodilators as needed    Acute cystitis  Assessment & Plan  U/A checked on night shift - started on 7d Macrobid  Stop after 10 doses    Type 2 diabetes mellitus (HCC)- (present on admission)  Assessment & " Plan  -Hemoglobin A1c at 6.8, continue diabetic diet, monitor  -BG has been well controlled  -Accu-Cheks only as needed for signs and symptoms of hypoglycemia or acute illness    Abdominal aortic aneurysm (AAA) 3.0 cm to 5.5 cm in diameter in male (HCC)  Assessment & Plan  Infrarenal  Noted as incidental finding on CT renal w/wo  Not a candidate for repair given neurocognitive disorder, lacking capacity    Hematuria  Assessment & Plan  -Hematuria has resolved    Advanced care planning/counseling discussion  Assessment & Plan  -Patient is DNR/DNI  -Guardianship being initiated    Malnutrition (HCC)- (present on admission)  Assessment & Plan  -Tolerating diet at this point    Hypokalemia  Assessment & Plan  Replaced  Resolved    COVID-19 virus infection  Assessment & Plan  -Patient has recovered from COVID-19       VTE prophylaxis: SCDs

## 2020-09-14 NOTE — ASSESSMENT & PLAN NOTE
-Infrarenal  -Noted as incidental finding on CT renal.  -Not a candidate for repair given neurocognitive disorder & lacking capacity.  -Continue good blood pressure control.

## 2020-09-14 NOTE — PROGRESS NOTES
Received report from EZ Palomares and assumed care of patient at 1130. Patient moved from [place]. Patient reports no pain at this time. 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 Patient is pleasant and cooperative with staff. No complaints or signs of distress at this time. Bed is in lowest, locked position, call light and belongings are within reach. Patient does not call for assistance and bed alarm is on.  All other needs met.

## 2020-09-14 NOTE — DISCHARGE PLANNING
Anticipated Discharge Disposition: Group Home    Action: patient moved to [facility].  Chart notes indicate patient is pleasant and cooperative with staff, takes all medications, and is continent.     Barriers to Discharge: guardianship/placement    Plan: continue to monitor for discharge barriers    Guardianship hearing scheduled for 9/25/2020 at 8:40

## 2020-09-14 NOTE — PROGRESS NOTES
2 RN skin check complete with: EZ Tyler.   Devices in place: Rosado Catheter.  Skin assessed under devices: Yes.  Confirmed pressure ulcers found on: n/a.  New potential pressure ulcers noted on: n/a. Wound consult placed: no.  The following interventions in place: 2 RN skin checks, mepilex to heels    Ears: intact  Elbows: discoloration, intact  Chest/Back: intact, scattered moles  Sacrum: intact, discoloration  Lower Extremities: intact, dusky lower legs, dry  Heels: boggy, pink, blanching, mepilex in place for protection

## 2020-09-14 NOTE — PROGRESS NOTES
Assumed care of pt from day RN. Pt sitting at edge of bed, A&Ox4, denies any pain at this time. Rosado in place, light yellow urine noted. Call light and belongings within reach, will continue to monitor.

## 2020-09-15 PROCEDURE — 700102 HCHG RX REV CODE 250 W/ 637 OVERRIDE(OP): Performed by: HOSPITALIST

## 2020-09-15 PROCEDURE — 700102 HCHG RX REV CODE 250 W/ 637 OVERRIDE(OP): Performed by: NURSE PRACTITIONER

## 2020-09-15 PROCEDURE — 770006 HCHG ROOM/CARE - MED/SURG/GYN SEMI*

## 2020-09-15 PROCEDURE — A9270 NON-COVERED ITEM OR SERVICE: HCPCS | Performed by: HOSPITALIST

## 2020-09-15 PROCEDURE — A9270 NON-COVERED ITEM OR SERVICE: HCPCS | Performed by: NURSE PRACTITIONER

## 2020-09-15 RX ADMIN — THERA TABS 1 TABLET: TAB at 08:39

## 2020-09-15 RX ADMIN — QUETIAPINE FUMARATE 25 MG: 25 TABLET ORAL at 20:20

## 2020-09-15 RX ADMIN — TAMSULOSIN HYDROCHLORIDE 0.8 MG: 0.4 CAPSULE ORAL at 08:39

## 2020-09-15 RX ADMIN — FINASTERIDE 5 MG: 5 TABLET, FILM COATED ORAL at 08:39

## 2020-09-15 RX ADMIN — AMLODIPINE BESYLATE 5 MG: 5 TABLET ORAL at 08:39
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
## 2020-09-15 NOTE — CARE PLAN
Problem: Safety  Goal: Will remain free from injury  Outcome: PROGRESSING AS EXPECTED  Goal: Will remain free from falls  Outcome: PROGRESSING AS EXPECTED  Note: Calls for assistance when appropriate. Call light and personal belongings within reach. Bed in low and locked position. Fall education provided to patient.  Bed alarm on.     Problem: Discharge Barriers/Planning  Goal: Patient's continuum of care needs will be met  Outcome: PROGRESSING AS EXPECTED  Note: Patient is awaiting guardianship hearing and placement.

## 2020-09-15 NOTE — CARE PLAN
Problem: Safety  Goal: Will remain free from falls  Outcome: PROGRESSING AS EXPECTED  Note: Fall precautions in place. Bed in lowest position. Non-skid socks in place. Personal possessions within reach. Mobility sign on door. Bed-alarm on. Call light within reach. Pt educated regarding fall prevention and states understanding.       Problem: Discharge Barriers/Planning  Goal: Patient's continuum of care needs will be met  Outcome: PROGRESSING AS EXPECTED  Note: Patient is awaiting guardianship and placement.

## 2020-09-15 NOTE — PROGRESS NOTES
2 RN skin check complete with: EZ Baumann.   Devices in place: Rosado Catheter.  Skin assessed under devices: Yes  Confirmed pressure ulcers found on: n/a.  New potential pressure ulcers noted on: n/a. Wound consult placed: no  The following interventions in place: 2 RN skin checks, mepilex to heels     Ears: intact  Elbows: discoloration, intact  Chest/Back: intact, scattered moles  Sacrum: intact, discoloration  Lower Extremities: intact, dusky lower legs, dry  Heels: boggy, pink, blanching, mepilex in place for protection

## 2020-09-15 NOTE — PROGRESS NOTES
Received report from day shift RN and assumed care of patient at shift change.  Patient reports no pain at this time. 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 Patient is pleasant and cooperative with staff. No complaints or signs of distress at this time.  Ashlee is present for retention. Bed is in lowest, locked position, call light and belongings are within reach. Patient does not call for assistance and bed alarm is on.  All other needs met.

## 2020-09-16 PROCEDURE — 700102 HCHG RX REV CODE 250 W/ 637 OVERRIDE(OP): Performed by: NURSE PRACTITIONER

## 2020-09-16 PROCEDURE — A9270 NON-COVERED ITEM OR SERVICE: HCPCS | Performed by: HOSPITALIST

## 2020-09-16 PROCEDURE — A9270 NON-COVERED ITEM OR SERVICE: HCPCS | Performed by: NURSE PRACTITIONER

## 2020-09-16 PROCEDURE — 700102 HCHG RX REV CODE 250 W/ 637 OVERRIDE(OP): Performed by: HOSPITALIST

## 2020-09-16 PROCEDURE — 770006 HCHG ROOM/CARE - MED/SURG/GYN SEMI*

## 2020-09-16 RX ADMIN — FINASTERIDE 5 MG: 5 TABLET, FILM COATED ORAL at 09:24

## 2020-09-16 RX ADMIN — THERA TABS 1 TABLET: TAB at 09:25

## 2020-09-16 RX ADMIN — TAMSULOSIN HYDROCHLORIDE 0.8 MG: 0.4 CAPSULE ORAL at 09:25

## 2020-09-16 RX ADMIN — AMLODIPINE BESYLATE 5 MG: 5 TABLET ORAL at 09:24

## 2020-09-16 RX ADMIN — QUETIAPINE FUMARATE 25 MG: 25 TABLET ORAL at 17:19

## 2020-09-16 ASSESSMENT — PAIN DESCRIPTION - PAIN TYPE: TYPE: ACUTE PAIN

## 2020-09-16 NOTE — PROGRESS NOTES
Received report from night shift and assumed care. Assessment completed, POC discussed. Pt is A&OX 4. Denies pain, no indication of distress or discomfort. Calm and cooperative with staff, calls out appropriately although occasionally attempts to get up unassisted if call light is not answered immediately.  Falls education provided, pt verbalizes understanding. Bed alarm in use.  Medication given per MAR. All needs met. Safety precautions and hourly rounding in place.

## 2020-09-16 NOTE — PROGRESS NOTES
2 RN skin check complete with: EZ Sarabia.   Devices in place: Rosado Catheter.  Skin assessed under devices: Yes.  Confirmed pressure ulcers found on: N/A.  New potential pressure ulcers noted on: N/A. Wound consult placed: No.  The following interventions in place: 2RN skin check     Ears:intact  Elbows:intact, dry  Chest/Back:various moles scattered throughout  Sacrum: discoloration to buttocks but blanching  Lower Extremities: dusky lower legs, dry skin  Heels: dry, calloused, blanching

## 2020-09-16 NOTE — PROGRESS NOTES
2 RN skin check complete with: EZ Tyler.   Devices in place: Rosado Catheter.  Skin assessed under devices: Yes.  Confirmed pressure ulcers found on: N/A.  New potential pressure ulcers noted on: N/A. Wound consult placed: No.  The following interventions in place: 2RN skin check, mepilex to heels, pressure redistribution mattress    Ears:intact  Elbows:intact, dry  Chest/Back:various moles scattered throughout  Sacrum: discoloration to buttocks but blanching  Lower Extremities: dusky lower legs, dry skin  Heels: dry, calloused, blanching

## 2020-09-16 NOTE — CARE PLAN
Problem: Safety  Goal: Will remain free from falls  Outcome: PROGRESSING AS EXPECTED  Bed is in low, locked position.  Call light and belongings are within reach.  Bed alarm on. Education provided     Problem: Skin Integrity  Goal: Risk for impaired skin integrity will decrease  Outcome: PROGRESSING AS EXPECTED   Pt turned and positioned every two hours. Incontinence care provided and barrier paste applied as needed.

## 2020-09-16 NOTE — PROGRESS NOTES
2 RN skin check complete with: EZ Tyler.   Devices in place: Méndez Catheter  Skin assessed under devices: Yes.  Confirmed pressure ulcers found on: N/A.  New potential pressure ulcers noted on: N/A. Wound consult placed: No.  The following interventions in place: 2RN skin check    Notes:   Ears:intact  Elbows:intact, dry  Chest/Back:various moles scattered throughout  Urinary meatus: intact, méndez secured with stat lock to right upper thigh  Sacrum: discoloration to buttocks but blanching  Lower Extremities: dusky lower legs, dry skin  RLE: nodule/growth on posterior knee   Heels: dry, calloused, blanching, flaky

## 2020-09-17 PROCEDURE — 700102 HCHG RX REV CODE 250 W/ 637 OVERRIDE(OP): Performed by: HOSPITALIST

## 2020-09-17 PROCEDURE — 700102 HCHG RX REV CODE 250 W/ 637 OVERRIDE(OP): Performed by: NURSE PRACTITIONER

## 2020-09-17 PROCEDURE — 770006 HCHG ROOM/CARE - MED/SURG/GYN SEMI*

## 2020-09-17 PROCEDURE — A9270 NON-COVERED ITEM OR SERVICE: HCPCS | Performed by: HOSPITALIST

## 2020-09-17 PROCEDURE — A9270 NON-COVERED ITEM OR SERVICE: HCPCS | Performed by: NURSE PRACTITIONER

## 2020-09-17 RX ADMIN — THERA TABS 1 TABLET: TAB at 08:10

## 2020-09-17 RX ADMIN — FINASTERIDE 5 MG: 5 TABLET, FILM COATED ORAL at 08:10

## 2020-09-17 RX ADMIN — TAMSULOSIN HYDROCHLORIDE 0.8 MG: 0.4 CAPSULE ORAL at 08:10

## 2020-09-17 RX ADMIN — AMLODIPINE BESYLATE 5 MG: 5 TABLET ORAL at 08:10

## 2020-09-17 RX ADMIN — QUETIAPINE FUMARATE 25 MG: 25 TABLET ORAL at 16:18

## 2020-09-17 ASSESSMENT — PAIN DESCRIPTION - PAIN TYPE: TYPE: ACUTE PAIN

## 2020-09-17 NOTE — PROGRESS NOTES
2 RN skin check complete with: EZ Kulkarni.   Devices in place: Méndez Catheter  Skin assessed under devices: Yes.  Confirmed pressure ulcers found on: N/A.  New potential pressure ulcers noted on: N/A. Wound consult placed: No.  The following interventions in place: 2RN skin check     Notes:   Ears:intact  Elbows:intact, dry  Chest/Back:various moles scattered throughout  Urinary meatus: intact, méndez secured with stat lock to right upper thigh  Sacrum: intact  Lower Extremities: dusky lower legs, dry skin  RLE: nodule/growth on posterior knee   Heels: dry, calloused, blanching, flaky

## 2020-09-17 NOTE — CARE PLAN
Problem: Safety  Goal: Will remain free from injury  Outcome: PROGRESSING AS EXPECTED   Bed is in low, locked position.  Call light and belongings are within reach.      Problem: Skin Integrity  Goal: Risk for impaired skin integrity will decrease  Outcome: PROGRESSING AS EXPECTED   Pt turned and positioned every two hours. Incontinence care provided and barrier paste applied as needed.

## 2020-09-17 NOTE — PROGRESS NOTES
Mr. Bejarano is an 83 yo male with a PMHx of DM type II, hyperlipidemia, BPH who was admitted on 7/9/2020 with COVID 19 infection with POLY and hypotension.  Patient initially in ICU and on therapeutic Lovenox due to elevated DDimer.  His hospital course was complicated by hematuria and urine retention of which a méndez was placed.  Flomax and finasteride was initiated.  Patient was in acute renal failure which has improved. RUQ U/S was completed d/t periumbilical pain. This revealed possible renal mass. CT renal w/wo was performed and showed a large renal mass (7x7x8) likely representing urothelial vs renal cell carcinoma. Psychiatry was also consulted d/t ongoing cognition difficulty.  Per psychiatry, patient does not have the capacity to make any medical decision on 8/3/2020. SLP feels he has moderate to severe deficits in insight, orientation, and memory. Guardianship pending.     Despite Proscar and Flomax voiding trial failed 9/4 and red frank catheter was placed d/t high urinary retention. His renal fx has remained stable and WNL. Unfortunately, the patient is 116 pounds at the time of this note, with BMI of 17. He is a poor candidate for potential chemotherapy or surgical decompression given his malnutrition and neurocognitive disorder.      9/17:  Patient is upset about having to stay in the hospital.  He wishes to be able to get an apartment by himself.  He still does not believe he had COVID-19 on admission.  Explained the need for safe discharge plan.  Patient verbalized understanding, although continues to be evidently upset.  No acute needs.  Méndez catheter in place.

## 2020-09-17 NOTE — PROGRESS NOTES
2 RN skin check complete with: EZ Sanchez.   Devices in place: Méndez Catheter  Skin assessed under devices: Yes.  Confirmed pressure ulcers found on: N/A.  New potential pressure ulcers noted on: N/A. Wound consult placed: No.  The following interventions in place: 2RN skin check     Notes:   Ears:intact  Elbows:intact, dry  Chest/Back:various moles scattered throughout  Urinary meatus: intact, méndez secured with stat lock to right upper thigh  Sacrum: discoloration to buttocks but blanching  Lower Extremities: dusky lower legs, dry skin, flaky  RLE: nodule/growth on posterior knee   Heels: dry, calloused, blanching, flaky

## 2020-09-18 PROCEDURE — 700102 HCHG RX REV CODE 250 W/ 637 OVERRIDE(OP): Performed by: HOSPITALIST

## 2020-09-18 PROCEDURE — 770006 HCHG ROOM/CARE - MED/SURG/GYN SEMI*

## 2020-09-18 PROCEDURE — A9270 NON-COVERED ITEM OR SERVICE: HCPCS | Performed by: NURSE PRACTITIONER

## 2020-09-18 PROCEDURE — 700102 HCHG RX REV CODE 250 W/ 637 OVERRIDE(OP): Performed by: NURSE PRACTITIONER

## 2020-09-18 PROCEDURE — A9270 NON-COVERED ITEM OR SERVICE: HCPCS | Performed by: HOSPITALIST

## 2020-09-18 RX ADMIN — THERA TABS 1 TABLET: TAB at 08:51

## 2020-09-18 RX ADMIN — TAMSULOSIN HYDROCHLORIDE 0.8 MG: 0.4 CAPSULE ORAL at 08:51

## 2020-09-18 RX ADMIN — QUETIAPINE FUMARATE 25 MG: 25 TABLET ORAL at 17:00

## 2020-09-18 RX ADMIN — AMLODIPINE BESYLATE 5 MG: 5 TABLET ORAL at 08:51

## 2020-09-18 RX ADMIN — FINASTERIDE 5 MG: 5 TABLET, FILM COATED ORAL at 08:51

## 2020-09-18 ASSESSMENT — PAIN DESCRIPTION - PAIN TYPE: TYPE: ACUTE PAIN

## 2020-09-18 NOTE — PROGRESS NOTES
Received bedside report and accepted care of patient.    Pt is currently A/ox4, legally blind. Pt currently denies pain at this time with no visible or stated signs of pain. Pt is on room air with no visible or stated sign of distress or discomfort.    Bed alarm in place, controls on and bed in locked and lowest position. Call light and personal possessions within reach. Patient educated about use of call light and verbalized understanding.

## 2020-09-18 NOTE — PROGRESS NOTES
2 RN skin check complete with: EZ Kulkarni.   Devices in place: Méndez Catheter  Skin assessed under devices: Yes.  Confirmed pressure ulcers found on: N/A.  New potential pressure ulcers noted on: N/A.   Wound consult placed: No.  The following interventions in place: 2RN skin check, pillows in use for support/ positioning, pt encouraged to sit in chair or edge of bed for all meals, encouraged intake of food/ fluids.     Notes:   Elbows:intact, dry  Chest/Back:various moles scattered throughout  Urinary meatus: intact, méndez secured with stat lock to right upper thigh  Sacrum: discoloration to buttocks/ blanching  BLE: dusky, dry, flaky  RLE: nodule/growth on posterior knee   Heels: dry, calloused, blanching, flaky

## 2020-09-18 NOTE — PROGRESS NOTES
Assumed care of pt from day RN. Pt lying in bed, A&Ox4, denies any pain at this time. Rosado in place, light yellow urine noted. Call light and belongings within reach, will continue to monitor.

## 2020-09-18 NOTE — CARE PLAN
Problem: Safety  Goal: Will remain free from injury  Outcome: PROGRESSING AS EXPECTED  Goal: Will remain free from falls  Outcome: PROGRESSING AS EXPECTED     Problem: Knowledge Deficit  Goal: Knowledge of disease process/condition, treatment plan, diagnostic tests, and medications will improve  Outcome: PROGRESSING AS EXPECTED     Problem: Psychosocial Needs:  Goal: Level of anxiety will decrease  Outcome: PROGRESSING AS EXPECTED

## 2020-09-18 NOTE — PROGRESS NOTES
2 RN skin check completed with EZ Sanchez.   Devices in place Méndez Catheter.  Skin assessed under devices Yes.  Confirmed pressure ulcers found on N/A.  New potential pressure ulcers noted on N/A. Wound consult placed N/A.    The following interventions in place 2RN Skin checks q shift, pillows for support and positioning, pt encouraged to sit at the edge of the bed or on the chair for meals, encouraged to eat meals and maintain adequate nutrition, q shift méndez care, pt turns side to side    General: think, dry skin  Upper extremities: intact. Thin  Elbows: intact, dry, blanching  Chest, abdomen, back: various moles scattered throughout, skin intact  Groin: intact, méndez in place and is secured with the stat lock on the right upper thigh  Sacrum: discoloration noted, blanching  Lower extremities: dusky discoloration, dry skin noted  Right lower extremity: growth noted on the posterior knee  Heels: dry, calloused, intact, blanching

## 2020-09-19 PROCEDURE — 700102 HCHG RX REV CODE 250 W/ 637 OVERRIDE(OP): Performed by: NURSE PRACTITIONER

## 2020-09-19 PROCEDURE — A9270 NON-COVERED ITEM OR SERVICE: HCPCS | Performed by: HOSPITALIST

## 2020-09-19 PROCEDURE — 770006 HCHG ROOM/CARE - MED/SURG/GYN SEMI*

## 2020-09-19 PROCEDURE — 700102 HCHG RX REV CODE 250 W/ 637 OVERRIDE(OP): Performed by: HOSPITALIST

## 2020-09-19 PROCEDURE — A9270 NON-COVERED ITEM OR SERVICE: HCPCS | Performed by: NURSE PRACTITIONER

## 2020-09-19 RX ADMIN — THERA TABS 1 TABLET: TAB at 08:06

## 2020-09-19 RX ADMIN — FINASTERIDE 5 MG: 5 TABLET, FILM COATED ORAL at 08:06

## 2020-09-19 RX ADMIN — QUETIAPINE FUMARATE 25 MG: 25 TABLET ORAL at 17:16

## 2020-09-19 RX ADMIN — AMLODIPINE BESYLATE 5 MG: 5 TABLET ORAL at 08:06

## 2020-09-19 RX ADMIN — TAMSULOSIN HYDROCHLORIDE 0.8 MG: 0.4 CAPSULE ORAL at 08:06

## 2020-09-19 ASSESSMENT — FIBROSIS 4 INDEX: FIB4 SCORE: 2.13

## 2020-09-19 NOTE — PROGRESS NOTES
2 RN skin check complete with Laura HUI   Devices in place: méndez catheter ( chronic)   Skin assessed under devices: yes   Confirmed pressure ulcers found on: n/a  New potential pressure ulcers noted on: N/A   Wound consult placed: N/A  The following interventions in place: pillows for support repositioning, Q2 skin check, méndez care,    Assessment:    Elbows: blanching, dry    Groin/Sacrum: méndez in place with stat lock for securement,                      discoloration on buttocks with blanching    BLE: discolored and dusky in color,    Heel: BLE- intact, blanching

## 2020-09-19 NOTE — PROGRESS NOTES
Assumed care of patient this shift, patient alert and oriented x3. Visually impaired, high fall risk. Bed alarm on. Rosado in place ( chronic). 2RN skin check. Denies any pain or discomfort, call bell and belongings within reach.

## 2020-09-19 NOTE — PROGRESS NOTES
2 RN skin check complete with: EZ Sommer.   Devices in place: Méndez Catheter  Skin assessed under devices: Yes.  Confirmed pressure ulcers found on: N/A.  New potential pressure ulcers noted on: N/A.   Wound consult placed: No.  The following interventions in place: 2RN skin check, pillows in use for support/ positioning, pt encouraged to sit in chair or edge of bed for all meals, encouraged intake of food/ fluids.     General skin notes:  Elbows:intact, dry  Chest/Back:various moles scattered throughout  Urinary meatus: intact, méndez secured with stat lock to right upper thigh  Sacrum: blanching  BLE: dusky, dry, flaky  RLE: nodule/growth on posterior knee   Heels: dry, calloused, blanching, flaky

## 2020-09-20 PROCEDURE — 770006 HCHG ROOM/CARE - MED/SURG/GYN SEMI*

## 2020-09-20 PROCEDURE — 700102 HCHG RX REV CODE 250 W/ 637 OVERRIDE(OP): Performed by: HOSPITALIST

## 2020-09-20 PROCEDURE — 99231 SBSQ HOSP IP/OBS SF/LOW 25: CPT | Performed by: NURSE PRACTITIONER

## 2020-09-20 PROCEDURE — A9270 NON-COVERED ITEM OR SERVICE: HCPCS | Performed by: HOSPITALIST

## 2020-09-20 PROCEDURE — A9270 NON-COVERED ITEM OR SERVICE: HCPCS | Performed by: NURSE PRACTITIONER

## 2020-09-20 PROCEDURE — 700102 HCHG RX REV CODE 250 W/ 637 OVERRIDE(OP): Performed by: NURSE PRACTITIONER

## 2020-09-20 RX ADMIN — QUETIAPINE FUMARATE 25 MG: 25 TABLET ORAL at 17:59

## 2020-09-20 RX ADMIN — FINASTERIDE 5 MG: 5 TABLET, FILM COATED ORAL at 09:00

## 2020-09-20 RX ADMIN — TAMSULOSIN HYDROCHLORIDE 0.8 MG: 0.4 CAPSULE ORAL at 10:05

## 2020-09-20 RX ADMIN — THERA TABS 1 TABLET: TAB at 10:05

## 2020-09-20 RX ADMIN — AMLODIPINE BESYLATE 5 MG: 5 TABLET ORAL at 10:05

## 2020-09-20 NOTE — PROGRESS NOTES
Hospital Medicine Daily Progress Note    Date of Service  9/20/2020    Chief Complaint  84 y.o. male admitted 7/9/2020 with COVID infection    Hospital Course    Mr. Bejarano is an 83 yo male with a PMHx of DM type II, hyperlipidemia, BPH who was admitted on 7/9/2020 with COVID 19 infection with POLY and hypotension.  Patient initially in ICU and on therapeutic Lovenox due to elevated DDimer.  His hospital course was complicated by hematuria and urine retention of which a méndez was placed.  Flomax and finasteride was initiated.  Patient was in acute renal failure which has improved. RUQ U/S was completed d/t periumbilical pain. This revealed possible renal mass. CT renal w/wo was performed and showed a large renal mass (7x7x8) likely representing urothelial vs renal cell carcinoma. Psychiatry was also consulted d/t ongoing cognition difficulty.  Per psychiatry, patient does not have the capacity to make any medical decision on 8/3/2020. SLP feels he has moderate to severe deficits in insight, orientation, and memory. Guardianship pending.    Despite Proscar and Flomax voiding trial failed 9/4 and red frank catheter was placed d/t high urinary retention. His renal fx has remained stable and WNL. Unfortunately, the patient is 116 pounds at the time of this note, with BMI of 17. He is a poor candidate for potential chemotherapy or surgical decompression given his malnutrition and neurocognitive disorder.      Interval Problem Update  9/20:  Patient is resting in bed.  No acute discomfort.  No change to ROS or PE from prior.  VSS.  Pending guardianship.      Consultants/Specialty  Palliative  Psych    Code Status  DNAR/DNI    Disposition  Guardianship and placement.     Review of Systems  Review of Systems   Unable to perform ROS: Dementia   All other systems reviewed and are negative.       Physical Exam  Temp:  [36.2 °C (97.2 °F)-36.7 °C (98 °F)] 36.7 °C (98 °F)  Pulse:  [74-81] 76  Resp:  [15-17] 17  BP:  (100-114)/(52-58) 114/58  SpO2:  [93 %-94 %] 93 %    Physical Exam  Vitals signs and nursing note reviewed.   Constitutional:       Appearance: He is underweight. He is not ill-appearing.   HENT:      Head: Normocephalic and atraumatic.      Nose: Nose normal.   Eyes:      Conjunctiva/sclera: Conjunctivae normal.      Pupils: Pupils are equal, round, and reactive to light.   Neck:      Musculoskeletal: Neck supple.   Cardiovascular:      Rate and Rhythm: Normal rate and regular rhythm.      Heart sounds: No murmur. No friction rub.   Pulmonary:      Effort: No respiratory distress.   Abdominal:      General: Bowel sounds are normal. There is no distension.      Palpations: Abdomen is soft.   Skin:     General: Skin is warm and dry.   Neurological:      Mental Status: He is alert and oriented to person, place, and time.   Psychiatric:         Attention and Perception: He is inattentive.         Mood and Affect: Mood normal.         Cognition and Memory: He exhibits impaired recent memory.         Fluids    Intake/Output Summary (Last 24 hours) at 9/20/2020 0812  Last data filed at 9/19/2020 1300  Gross per 24 hour   Intake 840 ml   Output --   Net 840 ml       Laboratory                        Imaging  CT-RENAL WITH & W/O   Final Result      1.  Large complex right renal mass as detailed above measuring approximately 7.8 x 7.1x  8.5 cm. Primary differential considerations include urothelial malignancy or renal cell carcinoma.   2.  No evidence of adenopathy or metastatic disease is seen.   3.  Distended urinary bladder. Right greater left hydroureteronephrosis.   4.  Severe atherosclerosis. 3.5 cm infrarenal abdominal aortic aneurysm.            US-RUQ   Final Result         1.  Echogenic liver compatible with fatty change versus fibrosis.   2.  Masslike structure in the right kidney, should be considered neoplastic unless proven otherwise, recommend follow-up 3 phase CT of the kidneys for further characterization  "  3.  Mild dilatation of the common bile duct, within expected limits given patient age.      These findings were discussed with the patient's clinician, Vin Mei, on 7/23/2020 7:34 AM.      DX-CHEST-PORTABLE (1 VIEW)   Final Result      1.  The lungs are hyperinflated suggesting emphysema/COPD.   2.  There is minimal predominantly lower lobe interstitial opacity which is most likely due to chronic scarring.           Assessment/Plan  Neurocognitive disorder, unspecified  Assessment & Plan  -Psychiatry was consulted, patient cannot make any medical decision  - working on guardianship. Hearing is 9/25.    Urinary retention- (present on admission)  Assessment & Plan  -Continue Flomax and finasteride  -Méndez catheter in place for urinary retention on 8/7  -Trial removal of Méndez catheter 9/4 unsuccessful. Follow up outpatient. Discharge likely with méndez.    Right kidney mass- (present on admission)  Assessment & Plan  Suspicion for urothelial carcinoma vs renal cell carcinoma. No a/p adenopathy  Patient failed voiding trial despite flomax/proscar  Poor candidate for chemo given neurocognitive d/o, lacking capacity and requirement for guardianship  9/14: Discussed comfort care measures with the patient. He was tearful, but stated \"If it's my time, it's my time.\"  Will discuss Comfort Care with care team    COPD (chronic obstructive pulmonary disease) (HCC)- (present on admission)  Assessment & Plan  -Supplemental oxygen as needed  -Inhaled bronchodilators as needed    Acute cystitis  Assessment & Plan  U/A checked on night shift - started on 7d Macrobid  Stop after 10 doses    Type 2 diabetes mellitus (HCC)- (present on admission)  Assessment & Plan  -Hemoglobin A1c at 6.8, continue diabetic diet, monitor  -BG has been well controlled  -Accu-Cheks only as needed for signs and symptoms of hypoglycemia or acute illness    Abdominal aortic aneurysm (AAA) 3.0 cm to 5.5 cm in diameter in male ([facility])  Assessment & " Plan  Infrarenal  Noted as incidental finding on CT renal w/wo  Not a candidate for repair given neurocognitive disorder, lacking capacity    Hematuria  Assessment & Plan  -Hematuria has resolved    Advanced care planning/counseling discussion  Assessment & Plan  -Patient is DNR/DNI  -Guardianship being initiated    Malnutrition (HCC)- (present on admission)  Assessment & Plan  -Tolerating diet at this point    Hypokalemia  Assessment & Plan  Replaced  Resolved    COVID-19 virus infection  Assessment & Plan  -Patient has recovered from COVID-19       VTE prophylaxis: SCDs

## 2020-09-20 NOTE — PROGRESS NOTES
Pt is A&Ox4. VSS on RA. Denies any pain or discomfort. Pleasant and cooperative during assessment. Pt is legally blind, and is SB with cane. Skin is dry throughout; applied lotion. Rosado in place with active order. Bed alarm on. Fall education provided, and pt states understanding. Discussed POC with pt; all questions answered. Call light within reach. Hourly rounding in practice.

## 2020-09-20 NOTE — PROGRESS NOTES
2 RN skin check complete with: EZ Sommer.   Devices in place: Méndez Catheter  Skin assessed under devices: Yes.  Confirmed pressure ulcers found on: N/A.  New potential pressure ulcers noted on: N/A.   Wound consult placed: No.  The following interventions in place: 2RN skin check, pillows in use for support/ positioning, pt encouraged to sit in chair or edge of bed for all meals, encouraged intake of food/ fluids.     General skin notes:  Elbows: intact, dry  Chest/Back: various moles scattered throughout  Urinary meatus: intact, méndez secured with stat lock to right upper thigh  Sacrum: blanching  BLE: dusky, dry, flaky  RLE: nodule/growth on posterior knee   Heels: dry, calloused, blanching, flaky

## 2020-09-20 NOTE — PROGRESS NOTES
2 RN skin check completed with EZ Salazar.   Devices in place Méndez Catheter.  Skin assessed under devices Yes.  Confirmed pressure ulcers found on N/A.  New potential pressure ulcers noted on N/A. Wound consult placed N/A.     The following interventions in place 2RN Skin checks q shift, pillows for support and positioning, pt encouraged to sit at the edge of the bed or on the chair for meals, encouraged to eat meals and maintain adequate nutrition, méndez care, pt turns self side to side     General: think, dry skin  Upper extremities: intact.  Elbows: intact, dry, blanching  Chest, abdomen, back: various moles scattered throughout, skin intact  Groin: intact, méndez in place and is secured with the stat lock on the right upper thigh  Sacrum: intact and blanching  Lower extremities: dusky discoloration, dry skin noted  Heels: dry, calloused, intact, pink and blanching

## 2020-09-21 PROCEDURE — 700102 HCHG RX REV CODE 250 W/ 637 OVERRIDE(OP): Performed by: HOSPITALIST

## 2020-09-21 PROCEDURE — 700102 HCHG RX REV CODE 250 W/ 637 OVERRIDE(OP): Performed by: NURSE PRACTITIONER

## 2020-09-21 PROCEDURE — A9270 NON-COVERED ITEM OR SERVICE: HCPCS | Performed by: HOSPITALIST

## 2020-09-21 PROCEDURE — A9270 NON-COVERED ITEM OR SERVICE: HCPCS | Performed by: NURSE PRACTITIONER

## 2020-09-21 PROCEDURE — 770006 HCHG ROOM/CARE - MED/SURG/GYN SEMI*

## 2020-09-21 RX ADMIN — TAMSULOSIN HYDROCHLORIDE 0.8 MG: 0.4 CAPSULE ORAL at 09:28

## 2020-09-21 RX ADMIN — FINASTERIDE 5 MG: 5 TABLET, FILM COATED ORAL at 09:28

## 2020-09-21 RX ADMIN — THERA TABS 1 TABLET: TAB at 09:28

## 2020-09-21 RX ADMIN — ERGOCALCIFEROL 50000 UNITS: 1.25 CAPSULE ORAL at 13:14

## 2020-09-21 RX ADMIN — QUETIAPINE FUMARATE 25 MG: 25 TABLET ORAL at 17:03

## 2020-09-21 RX ADMIN — AMLODIPINE BESYLATE 5 MG: 5 TABLET ORAL at 09:27

## 2020-09-21 NOTE — PROGRESS NOTES
Pt is A&Ox4. VSS on RA. Denies any pain or discomfort. Assessment complete. Skin is dry throughout; applied lotion. Rosado site is CDI. Pt ambulates with 1 person assistance and cane. Fall education provided, and pt states understanding. Bed alarm on. Discussed POC with pt; all questions answered. Call light within reach. Hourly rounding in practice.

## 2020-09-21 NOTE — PROGRESS NOTES
2 RN skin check completed with EZ Kulkarni.   Devices in place: méndez  Skin assessed under devices: yes.  Confirmed pressure ulcers found on: none.  New potential pressure ulcers noted on: none.   Wound consult placed: n/a.  The following interventions in place: Pt encouraged to reposition frequently and to sit EOB/chair for all meals, méndez care, pressure redistribution mattress, moisturizer, and 2 RN skin check qshift d/t device.    Elbows: intact, dry, blanching  Chest/Back: scattered moles/skin tag   Urinary meatus: intact, méndez secured with stat-lock to R upper thigh  Sacrum: intact, blanching  BLE: dusky, dry, flaky  RLE: nodule/growth on posterior knee   Heels: dry, calloused, blanching, flaky

## 2020-09-21 NOTE — CARE PLAN
Problem: Safety  Goal: Will remain free from injury  Outcome: PROGRESSING AS EXPECTED  Note: Fall precautions in place. Bed in lowest position. Personal possessions within reach. Bed-alarm on. Call light within reach. Pt educated regarding fall prevention and states understanding.      Problem: Knowledge Deficit  Goal: Knowledge of disease process/condition, treatment plan, diagnostic tests, and medications will improve  Outcome: PROGRESSING AS EXPECTED  Note: Pt educated regarding plan of care and medications. All questions answered.

## 2020-09-21 NOTE — PROGRESS NOTES
Patient A/Ox4, up w/one person assist using cane, on RA, VSS, and no complaints of pain. Rosado in place, with active order and draining yellow urine to gravity. Pt is safe with needs met. Bed low and locked, call light within reach. Hourly rounding in place. No signs of acute distress.

## 2020-09-21 NOTE — PROGRESS NOTES
2 RN skin check completed with EZ Salazar.   Devices in place: Méndez Catheter.  Skin assessed under devices Yes.  Confirmed pressure ulcers found on N/A.  New potential pressure ulcers noted on bottom of right foot. Wound consult placed.     The following interventions in place:   2RN Skin checks q shift, pillows for support and positioning, pt encouraged to sit at the edge of the bed or on the chair for meals, encouraged to eat meals and maintain adequate nutrition, méndez care, pt turns self side to side, mepliex in place     Skin assessment:  General: think, dry skin  Upper extremities: intact.  Elbows: intact, dry, blanching  Chest, abdomen, back: various moles scattered throughout, skin intact  Groin: intact, méndez in place and is secured with the stat lock on the right upper thigh  Sacrum: intact and blanching  Lower extremities: dusky discoloration, dry skin noted  Heels: dry, calloused, intact, pink and blanching  *one small spot bottom of right foot is red and nonblanching/slow to lupe in some parts (wound consult placed, LDA placed, and mepliex placed)

## 2020-09-22 PROCEDURE — A9270 NON-COVERED ITEM OR SERVICE: HCPCS | Performed by: HOSPITALIST

## 2020-09-22 PROCEDURE — 770006 HCHG ROOM/CARE - MED/SURG/GYN SEMI*

## 2020-09-22 PROCEDURE — 700102 HCHG RX REV CODE 250 W/ 637 OVERRIDE(OP): Performed by: HOSPITALIST

## 2020-09-22 PROCEDURE — A9270 NON-COVERED ITEM OR SERVICE: HCPCS | Performed by: NURSE PRACTITIONER

## 2020-09-22 PROCEDURE — 700102 HCHG RX REV CODE 250 W/ 637 OVERRIDE(OP): Performed by: NURSE PRACTITIONER

## 2020-09-22 RX ADMIN — THERA TABS 1 TABLET: TAB at 10:18

## 2020-09-22 RX ADMIN — FINASTERIDE 5 MG: 5 TABLET, FILM COATED ORAL at 10:18

## 2020-09-22 RX ADMIN — TAMSULOSIN HYDROCHLORIDE 0.8 MG: 0.4 CAPSULE ORAL at 10:18

## 2020-09-22 RX ADMIN — QUETIAPINE FUMARATE 25 MG: 25 TABLET ORAL at 17:22

## 2020-09-22 RX ADMIN — AMLODIPINE BESYLATE 5 MG: 5 TABLET ORAL at 10:18

## 2020-09-22 ASSESSMENT — PAIN DESCRIPTION - PAIN TYPE
TYPE: ACUTE PAIN
TYPE: ACUTE PAIN

## 2020-09-22 NOTE — PROGRESS NOTES
2 RN skin check completed with EZ Kulkarni.   Devices in place: méndez catheter, .  Skin assessed under devices: yes.  Confirmed pressure ulcers found: none.  New potential pressure ulcers noted: right foot plantar.  Wound consult placed:  yes.      Skin assessment: heels pink/dry/calloused/blanching. Right plantar redness with areas of slow lupe and non-blanching. BLE dusky/dry. Sacrum pink/blanching/intact. Méndez catheter site CDI. Elbows pink/blanhing. Generalized drlyness.      The following interventions in place: mepilex on Right foot.pillows for support/positioning. 2RN skin check Qshift. Encouraged to reposition self frequently in bed.

## 2020-09-22 NOTE — PROGRESS NOTES
Pt AOx4, denies pain at this time. Pt sitting at edge of bed. Rosado to gravity productive, urine appears yellow and clear. Call light within reach, personal belongings available, bed in lowest position, treaded socks on, and bed alarm on. Hourly rounding in place.

## 2020-09-22 NOTE — WOUND TEAM
This wound RN in to assess patient right plantar foot near MTH1. Patient very pleasant and agreeable. Removed sock and half mepilex from foot. Plantar foot is pink, red, dry, and all blanching. Some callus present, no wound beneath. No pressure injuries noted. Patient BLE with dry, flaky skin. No opened wounds or pressure injuries present at this time. Applied pink sween cream to BLE and back. Applied half of a heel mepilex to right plantar foot. No need for wound team follow up at this time, orders placed, bedside RN Marielle in room and updated. Please call or reconsult if areas worsen or concerns arise.

## 2020-09-22 NOTE — PROGRESS NOTES
Received report from night shift and assumed care. Assessment completed, POC discussed. Pt is A&OX 4, pleasant and cooperative with staff. Denies pain, reports itchy back. Pt's back is dry/flaky. Medication given per MAR without issue. All needs met. Safety precautions and hourly rounding in place. Bed alarm on.     Wound team arrived to bedside for consult. Area on R plantar foot is determined not to be a PI, mepilex applied, and sween cream applied to back and BLE.

## 2020-09-22 NOTE — PROGRESS NOTES
2 RN skin check completed with EZ Salazar.   Devices in place: méndez catheter, eye glasses  Skin assessed under devices: yes.  Confirmed pressure ulcers found: none.  New potential pressure ulcers noted: none  Wound consulted 9/22 - see note    The following interventions in place: mepilex, pillows for support/positioning. 2RN skin check Qshift. Encouraged to reposition self frequently in bed, pressure redistribution mattress in use      Skin assessment:   Generalized skin dry, moles throughout, Sween moisturizer applied  heels pink/dry/calloused/blanching.   Right plantar redness with areas of slow blanching - mepliex and sween pink top moisterizer applied per Wound team instructions  BLE dusky/dry - sween pink top applied  Sacrum pink/blanching/intact.   Méndez catheter site CDI, stat lock in place  Elbows pink/blanhing.

## 2020-09-23 PROCEDURE — 700102 HCHG RX REV CODE 250 W/ 637 OVERRIDE(OP): Performed by: NURSE PRACTITIONER

## 2020-09-23 PROCEDURE — 700102 HCHG RX REV CODE 250 W/ 637 OVERRIDE(OP): Performed by: HOSPITALIST

## 2020-09-23 PROCEDURE — A9270 NON-COVERED ITEM OR SERVICE: HCPCS | Performed by: HOSPITALIST

## 2020-09-23 PROCEDURE — 770006 HCHG ROOM/CARE - MED/SURG/GYN SEMI*

## 2020-09-23 PROCEDURE — A9270 NON-COVERED ITEM OR SERVICE: HCPCS | Performed by: NURSE PRACTITIONER

## 2020-09-23 RX ADMIN — TAMSULOSIN HYDROCHLORIDE 0.8 MG: 0.4 CAPSULE ORAL at 09:23

## 2020-09-23 RX ADMIN — QUETIAPINE FUMARATE 25 MG: 25 TABLET ORAL at 17:23

## 2020-09-23 RX ADMIN — FINASTERIDE 5 MG: 5 TABLET, FILM COATED ORAL at 09:23

## 2020-09-23 RX ADMIN — THERA TABS 1 TABLET: TAB at 09:23

## 2020-09-23 RX ADMIN — AMLODIPINE BESYLATE 5 MG: 5 TABLET ORAL at 09:23

## 2020-09-23 ASSESSMENT — PAIN DESCRIPTION - PAIN TYPE: TYPE: ACUTE PAIN

## 2020-09-23 NOTE — PROGRESS NOTES
Pt AOx4, denies pain at this time. Sitting up in bed requesting an extra blanket. Rosado catheter in place, patent and draining clear yellow urine. Call light within reach, personal belongings available, bed in lowest position, treaded socks on, and bed alarm on. Hourly rounding in place.

## 2020-09-23 NOTE — PROGRESS NOTES
Pt on zoom call with  from [facility] [employer], Dmeetria Crooks, regarding guardianship meeting scheduled from 9/25 at 0840 and personal background information.    Contact for Demetria Crooks: [phone]

## 2020-09-23 NOTE — PROGRESS NOTES
Received report from night shift and assumed care.  Pt is A&OX 4, occasionally forgetful. Denies pain. Pleasant affect, cooperative, pt  Smiles and laughs throughout interaction.  Pt ambulates to bathroom with hand held assist and single point cane. Assisted with bed bath, méndez care provided. Méndez catheter in place, secured to right inner thigh with stat lock, output is yellow with some whitish sediment noted. Urinary meatus is intact.  Medication given per MAR without issue. All needs met. Safety precautions and hourly rounding in place. Bed alarm on.

## 2020-09-23 NOTE — CARE PLAN
Problem: Safety  Goal: Will remain free from falls  Outcome: PROGRESSING AS EXPECTED  Bed is in low, locked position.  Call light and belongings are within reach.       Problem: Skin Integrity  Goal: Risk for impaired skin integrity will decrease  Outcome: PROGRESSING AS EXPECTED   Pt turned and positioned every two hours. Incontinence care provided and barrier paste applied as needed.

## 2020-09-23 NOTE — CARE PLAN
Problem: Safety  Goal: Will remain free from injury  Outcome: PROGRESSING AS EXPECTED   Bed is in low, locked position.  Call light and belongings are within reach.  Bed alarm on    Problem: Skin Integrity  Goal: Risk for impaired skin integrity will decrease  Outcome: PROGRESSING AS EXPECTED   Pt turned and positioned every two hours. Incontinence care provided and barrier paste applied as needed.

## 2020-09-23 NOTE — PROGRESS NOTES
2 RN skin check completed with EZ Robison.   Devices in place: eye glasses, méndez catheter.  Skin assessed under devices: yes.  Confirmed pressure ulcers found: none.  New potential pressure ulcers noted: none.  Wound consult placed: n/a.        Skin assessment: heels pink/dry/calloused/blanching. Right plantar redness/slow lupe. BLE dusky/dry. Sacrum pink/blanching/intact. Méndez catheter site CDI. Elbows pink/blanhing. Generalized dryness.        The following interventions in place: mepilex on right foot. pillows for support/positioning. 2RN skin check Qshift. Encouraged to reposition self frequently in bed.

## 2020-09-24 PROCEDURE — 700102 HCHG RX REV CODE 250 W/ 637 OVERRIDE(OP): Performed by: NURSE PRACTITIONER

## 2020-09-24 PROCEDURE — A9270 NON-COVERED ITEM OR SERVICE: HCPCS | Performed by: HOSPITALIST

## 2020-09-24 PROCEDURE — 770006 HCHG ROOM/CARE - MED/SURG/GYN SEMI*

## 2020-09-24 PROCEDURE — 700102 HCHG RX REV CODE 250 W/ 637 OVERRIDE(OP): Performed by: HOSPITALIST

## 2020-09-24 PROCEDURE — A9270 NON-COVERED ITEM OR SERVICE: HCPCS | Performed by: NURSE PRACTITIONER

## 2020-09-24 RX ADMIN — THERA TABS 1 TABLET: TAB at 08:33

## 2020-09-24 RX ADMIN — TAMSULOSIN HYDROCHLORIDE 0.8 MG: 0.4 CAPSULE ORAL at 08:34

## 2020-09-24 RX ADMIN — AMLODIPINE BESYLATE 5 MG: 5 TABLET ORAL at 08:33

## 2020-09-24 RX ADMIN — QUETIAPINE FUMARATE 25 MG: 25 TABLET ORAL at 17:17

## 2020-09-24 RX ADMIN — FINASTERIDE 5 MG: 5 TABLET, FILM COATED ORAL at 08:34

## 2020-09-24 NOTE — PROGRESS NOTES
"Received report from night shift and assumed care.  Pt is A&OX 4, occasionally forgetful. Denies pain. Pleasant affect, cooperative.  Pt's is tearful, morose, sitting at EOB looking out towards window. He asks RN for assistance finding his belongings which he says are \"downstairs\" here at [facility]. Belongings include cash (from cashing his \"Trump check\" earlier that day), his credit card, debit card, and photo ID.  RN assured pt this would be brought to the attention of the LSW. Rosado catheter in place, secured to right inner thigh with stat lock, output is yellow with some whitish sediment noted. Urinary meatus is intact.  Medication given per MAR without issue. All needs met. Safety precautions and hourly rounding in place. Bed alarm on.  "

## 2020-09-24 NOTE — PROGRESS NOTES
Pt AOx4, pleasant, denies pain/discomfort. Pt sitting up at edge of bed eating his dinner. Rosado catheter patent and draining without difficulty. Call light within reach, personal belongings available, bed in lowest position, treaded socks on, and bed alarm on. Hourly rounding in place.

## 2020-09-24 NOTE — PROGRESS NOTES
2 RN skin check completed with EZ Lyman.   Devices in place: méndez catheter  Skin assessed under devices: yes.  Confirmed pressure ulcers found: none.  New potential pressure ulcers noted: none  Wound consulted 9/22 - see note     The following interventions in place: mepilex, pillows for support/positioning. 2RN skin check Qshift. Encouraged to reposition self frequently in bed, pressure redistribution mattress in use, moisterizer      Skin assessment:   Generalized skin dry, moles throughout, Sween moisturizer applied  heels pink/dry/calloused/blanching.   Right plantar redness with areas of slow blanching - mepliex and sween pink top moisterizer   BLE dusky/dry - sween pink top applied  RLE - soft tissue growth behind knee  Sacrum pink/blanching/intact.   Méndez catheter site CDI, stat lock in place on right upper thigh  Elbows pink/blanhing.

## 2020-09-24 NOTE — PROGRESS NOTES
2 RN skin check completed with EZ Salazar.   Devices in place: méndez catheter  Skin assessed under devices: yes.  Confirmed pressure ulcers found: none.  New potential pressure ulcers noted: none  Wound consulted 9/22 - see note     The following interventions in place: mepilex, pillows for support/positioning. 2RN skin check Qshift. Encouraged to reposition self frequently in bed, pressure redistribution mattress in use      Skin assessment:   Generalized skin dry, moles throughout, Sween moisturizer applied  heels pink/dry/calloused/blanching.   Right plantar redness with areas of slow blanching - mepliex and sween pink top moisterizer   BLE dusky/dry - sween pink top applied  Sacrum pink/blanching/intact.   Méndez catheter site CDI, stat lock in place on right upper thigh  Elbows pink/blanhing.

## 2020-09-24 NOTE — CARE PLAN
Problem: Safety  Goal: Will remain free from injury  Outcome: PROGRESSING AS EXPECTED  Bed is in low, locked position.  Call light and belongings are within reach.   Bed alarm on.     Problem: Skin Integrity  Goal: Risk for impaired skin integrity will decrease  Outcome: PROGRESSING AS EXPECTED   Pt turned and positioned every two hours. Incontinence care provided and barrier paste applied as needed.

## 2020-09-24 NOTE — PROGRESS NOTES
LSW Fartun Walker confirmed that pt's belongings (cash, ID card, Credit card, and debit card) are locked up downstairs in Security safe keeping. Pt notified.

## 2020-09-24 NOTE — PROGRESS NOTES
2 RN skin check completed with EZ Tyler.   Devices in place: méndez catheter, mepilex.  Skin assessed under devices: yes.  Confirmed pressure ulcers found: none.  New potential pressure ulcers noted: none.  Wound consult placed:  n/a.        Skin assessment: Heels pink/dry/calloused/boggy/blanching. Right plantar redness/slow lupe. BLE dusky/dry. Sacrum pink/blanching/intact. Méndez catheter site CDI. Elbows pink/red/blanching. Generalized dryness.        The following interventions in place: Mepilex on right foot. Pillows for support/positioning. 2RN skin check Qshift. Encouraged to reposition self frequently in bed. Pink top moisturizer to skin.

## 2020-09-24 NOTE — PROGRESS NOTES
Pt provided BRN with the following NOK/familial background information:    1st born child: Ana (Female)  2: Ovi Davidson (M)  3: Daphne (M)  4: Cayla (F)  5: Carel (M)  6: Janny (F)    All children born in [facility], brought to [place] by pt one at a time during the years he was working in [place] and had citizenship (children were teens, according to pt).  Mother of all 6 children was Iris Bejarano, it is unclear if she still has the same name considering they are still legally  but  for many years. Pt has not had contact with his children in over 20 years, because he did not have a way to keep in touch as they got older and moved away. Pt reports he was about 21 when his eldest was born and about 42 when his youngest was born, although he is unable to remember exact dates/ages. Pt is in agreement with plan to search for and reach out to NOK to determine if any of them are interested in guardianship responsibilities.     LSW Fartun Frenchm updated regarding above.

## 2020-09-25 PROCEDURE — A9270 NON-COVERED ITEM OR SERVICE: HCPCS | Performed by: NURSE PRACTITIONER

## 2020-09-25 PROCEDURE — 99231 SBSQ HOSP IP/OBS SF/LOW 25: CPT | Performed by: NURSE PRACTITIONER

## 2020-09-25 PROCEDURE — 700102 HCHG RX REV CODE 250 W/ 637 OVERRIDE(OP): Performed by: NURSE PRACTITIONER

## 2020-09-25 PROCEDURE — 770006 HCHG ROOM/CARE - MED/SURG/GYN SEMI*

## 2020-09-25 PROCEDURE — 700102 HCHG RX REV CODE 250 W/ 637 OVERRIDE(OP): Performed by: HOSPITALIST

## 2020-09-25 PROCEDURE — A9270 NON-COVERED ITEM OR SERVICE: HCPCS | Performed by: HOSPITALIST

## 2020-09-25 RX ADMIN — AMLODIPINE BESYLATE 5 MG: 5 TABLET ORAL at 08:07

## 2020-09-25 RX ADMIN — FINASTERIDE 5 MG: 5 TABLET, FILM COATED ORAL at 08:07

## 2020-09-25 RX ADMIN — TAMSULOSIN HYDROCHLORIDE 0.8 MG: 0.4 CAPSULE ORAL at 08:07

## 2020-09-25 RX ADMIN — QUETIAPINE FUMARATE 25 MG: 25 TABLET ORAL at 17:40

## 2020-09-25 RX ADMIN — THERA TABS 1 TABLET: TAB at 08:07

## 2020-09-25 NOTE — PROGRESS NOTES
Hospital Medicine Daily Progress Note    Date of Service  9/25/2020    Chief Complaint  84 y.o. male admitted 7/9/2020 with COVID infection    Hospital Course    Mr. Bejarano is an 83 yo male with a PMHx of DM type II, hyperlipidemia, BPH who was admitted on 7/9/2020 with COVID 19 infection with POLY and hypotension.  Patient initially in ICU and on therapeutic Lovenox due to elevated DDimer.  His hospital course was complicated by hematuria and urine retention of which a méndez was placed.  Flomax and finasteride was initiated.  Patient was in acute renal failure which has improved. RUQ U/S was completed d/t periumbilical pain. This revealed possible renal mass. CT renal w/wo was performed and showed a large renal mass (7x7x8) likely representing urothelial vs renal cell carcinoma. Psychiatry was also consulted d/t ongoing cognition difficulty.  Per psychiatry, patient does not have the capacity to make any medical decision on 8/3/2020. SLP feels he has moderate to severe deficits in insight, orientation, and memory. Guardianship pending.    Despite Proscar and Flomax voiding trial failed 9/4 and red frank catheter was placed d/t high urinary retention. His renal fx has remained stable and WNL. Unfortunately, the patient is 116 pounds at the time of this note, with BMI of 17. He is a poor candidate for potential chemotherapy or surgical decompression given his malnutrition and neurocognitive disorder.      Interval Problem Update  9/25- Patient resting in bed, in no acute distress. Patient had court this morning and tuned in via zoom. Patient was able to locate girlfriend who has been admitted to a skilled facility at [facility]. Guardianship hearing was moved back to November as it was found that patient had more children (6) that were previously unknown. This will allow for children to be notified and come forward if able to do so.     Consultants/Specialty  Palliative  Psych    Code  Status  DNAR/DNI    Disposition  Guardianship and placement- hearing extended due to discovery of other children.    Review of Systems  Review of Systems   Unable to perform ROS: Dementia   All other systems reviewed and are negative.       Physical Exam  Temp:  [36.6 °C (97.8 °F)-36.8 °C (98.2 °F)] 36.6 °C (97.8 °F)  Pulse:  [62-97] 97  Resp:  [16-18] 18  BP: (103-129)/(49-60) 106/49  SpO2:  [91 %-92 %] 92 %    Physical Exam  Vitals signs and nursing note reviewed.   Constitutional:       Appearance: He is underweight. He is not ill-appearing.   HENT:      Head: Normocephalic and atraumatic.      Nose: Nose normal.   Eyes:      Conjunctiva/sclera: Conjunctivae normal.      Pupils: Pupils are equal, round, and reactive to light.   Neck:      Musculoskeletal: Neck supple.   Cardiovascular:      Rate and Rhythm: Normal rate and regular rhythm.      Heart sounds: No murmur. No friction rub.   Pulmonary:      Effort: No respiratory distress.   Abdominal:      General: Bowel sounds are normal. There is no distension.      Palpations: Abdomen is soft.   Skin:     General: Skin is warm and dry.   Neurological:      Mental Status: He is alert and oriented to person, place, and time.   Psychiatric:         Attention and Perception: He is inattentive.         Mood and Affect: Mood normal.         Cognition and Memory: He exhibits impaired recent memory.         Fluids    Intake/Output Summary (Last 24 hours) at 9/25/2020 1143  Last data filed at 9/25/2020 0940  Gross per 24 hour   Intake 820 ml   Output 1510 ml   Net -690 ml       Laboratory                        Imaging  CT-RENAL WITH & W/O   Final Result      1.  Large complex right renal mass as detailed above measuring approximately 7.8 x 7.1x  8.5 cm. Primary differential considerations include urothelial malignancy or renal cell carcinoma.   2.  No evidence of adenopathy or metastatic disease is seen.   3.  Distended urinary bladder. Right greater left  "hydroureteronephrosis.   4.  Severe atherosclerosis. 3.5 cm infrarenal abdominal aortic aneurysm.            US-RUQ   Final Result         1.  Echogenic liver compatible with fatty change versus fibrosis.   2.  Masslike structure in the right kidney, should be considered neoplastic unless proven otherwise, recommend follow-up 3 phase CT of the kidneys for further characterization   3.  Mild dilatation of the common bile duct, within expected limits given patient age.      These findings were discussed with the patient's clinician, Vin Mei, on 7/23/2020 7:34 AM.      DX-CHEST-PORTABLE (1 VIEW)   Final Result      1.  The lungs are hyperinflated suggesting emphysema/COPD.   2.  There is minimal predominantly lower lobe interstitial opacity which is most likely due to chronic scarring.           Assessment/Plan  Neurocognitive disorder, unspecified  Assessment & Plan  -Psychiatry was consulted, patient cannot make any medical decision  -SW working on guardianship. Hearing has been extended after initial meeting on 9/25, due to additional children being located  -Hearing moved to November     Urinary retention- (present on admission)  Assessment & Plan  -Continue Flomax and finasteride  -Méndez catheter in place for urinary retention on 8/7  -Trial removal of Méndez catheter 9/4 unsuccessful. Follow up outpatient. Discharge likely with méndez.    Right kidney mass- (present on admission)  Assessment & Plan  Suspicion for urothelial carcinoma vs renal cell carcinoma. No a/p adenopathy  Patient failed voiding trial despite flomax/proscar  Poor candidate for chemo given neurocognitive d/o, lacking capacity and requirement for guardianship  9/14: Discussed comfort care measures with the patient. He was tearful, but stated \"If it's my time, it's my time.\"  Will discuss Comfort Care with care team    COPD (chronic obstructive pulmonary disease) (HCC)- (present on admission)  Assessment & Plan  -Supplemental oxygen as " needed  -Inhaled bronchodilators as needed    Acute cystitis  Assessment & Plan  U/A checked on night shift - started on 7d Macrobid  Stop after 10 doses    Type 2 diabetes mellitus (HCC)- (present on admission)  Assessment & Plan  -Hemoglobin A1c at 6.8, continue diabetic diet, monitor  -BG has been well controlled  -Accu-Cheks only as needed for signs and symptoms of hypoglycemia or acute illness    Abdominal aortic aneurysm (AAA) 3.0 cm to 5.5 cm in diameter in male (HCC)  Assessment & Plan  Infrarenal  Noted as incidental finding on CT renal w/wo  Not a candidate for repair given neurocognitive disorder, lacking capacity    Hematuria  Assessment & Plan  -Hematuria has resolved    Advanced care planning/counseling discussion  Assessment & Plan  -Patient is DNR/DNI  -Guardianship being initiated    Malnutrition (HCC)- (present on admission)  Assessment & Plan  -Tolerating diet at this point    Hypokalemia  Assessment & Plan  Replaced  Resolved    COVID-19 virus infection  Assessment & Plan  -Patient has recovered from COVID-19       VTE prophylaxis: ARIELLEs    Lucila Hopkins, A.P.R.N.    I have performed a physical exam and reviewed and updated ROS and Plan today 9/25/2020. In review of previous, there are no changes except as documented above.         I certify that the patient requires continued medically necessary hospital services for the treatment of Neurocognitive disorder. The patient will remain in the hospital for the foreseeable future.  Discharge may or may not occur in the next 20 days due to ongoing discharge delays due to no medically acceptable discharge option.

## 2020-09-25 NOTE — PROGRESS NOTES
"Assumed care of pt at shift change. Pt is on RA with no signs of acute distress. A&Ox4. Denies any pain. POC discussed with patient. All comfort measures in place. Call light and personal belongings by bedside. Bed locked and in lowest position. Hourly rounding in place.     Pt's was upset and crying in room because his guardianship hearing was moved back one month and states \" I don't think I can stay here one more month. I have been here three months already, no more medicine, no more food\".     This RN tried to call pt's girlfriend Marylin  On the phone, but the phone was not picked up.Informed pt we will try again at a later time.   "

## 2020-09-25 NOTE — PROGRESS NOTES
2 RN skin check completed with Ginny HUI.   Devices in place: méndez catheter  Skin assessed under devices: yes.  Confirmed pressure ulcers found: none.  New potential pressure ulcers noted: none  Wound consulted 9/22 - see note     The following interventions in place: 2RN skin check Qshift, pt encouraged reposition. mepilex to heels, pillows for support/positioning. pressure redistribution mattress in use, moisturizer.    Skin assessment:   Generalized skin dry, moles throughout body   Bilateral elbows: pink/blanching.  BLE dusky/dry   Right Knee: soft tissue growth behind knee  Sacrum pink/blanching/intact.   Heels boggy: pink/dry/calloused/blanching  mepilex in use  Right plantar: redness, slow to blanching, mepliex in use  Méndez catheter site CDI, stat lock in place on right upper thigh

## 2020-09-25 NOTE — DISCHARGE PLANNING
Anticipated Discharge Disposition: Group home with guardian vs. Long Term bed at [facility] where his girlfriend of 36 years now resides as a long term care patient. (Nani Lizama).     Action: This RN,  had a court hearing this morning with , , Wandy Blandon, Demetriafelipe Crooks, patient and myself at 0900.     At hearing,   Wandy Blandon asked for an extension of guardianship on patient because patient has 6 children. He was able to name all children's names, (they all started with the letter C). Patient informed Demetria Crooks on 20 of this information and also the name of his legal spouse. Patient brought all his children to [place] from the [facility] when they were teenagers.  Leadership did a NOK search with 6 children's names:    Spouse: Kalyn Bejarano (Gato is maiden name) : 1938, Last Known address: [address] [place] [address] [address] [address].       Children:       •Annemarie Bejarano ([place]):, 1957 Last Known Address: [address] [place] [address]      •Ovi FOLEYMali Bejarano Jr.: , 5/3/1958, Last Known Address: [address] [address]    Possible Phone Numbers: [phone] or [phone]      ·         Serna BERENICE Bejarano: , 1960, address: [address] [place] [address] [address] [place] [address]    Possible Phone Numbers: [phone]       ·         Cayla Bejarano ([facility]), 1962, Last Known Address: [address] [place] [id number]    Possible Phone Number: [phone]      ·         Carel Carlee-Unknown, Mr. Bejarano advised that she possibly resides in the [facility].        ·         Janny Bejarano: , 1972, Last Known Address: [address] [place], [place] [address] [address] [place] [address]      Barriers to Discharge: Safe discharge plan, guardianship/placement. Attempting to locate and speak with patient's adult children before , next court date.     Plan: Case Manger to follow up and contact patient's adult children, patient's  Dr Lorraine Pope saw the patient today and she stated that she would leave it up to Dr Shayy Bentley if he would like to still see the patient or just have the patient see Dr Lorraine Pope  wishes are to discharge to [facility] and be with his long time girlfriend Anjali Lizama.     Fartun Walker RN,

## 2020-09-25 NOTE — CARE PLAN
Problem: Infection  Goal: Will remain free from infection  Outcome: PROGRESSING AS EXPECTED  Intervention: Assess signs and symptoms of infection  Note: Pt has a méndez in place and frequently assessed for any signs of infection.      Problem: Knowledge Deficit  Goal: Knowledge of disease process/condition, treatment plan, diagnostic tests, and medications will improve  Outcome: PROGRESSING AS EXPECTED  Intervention: Assess knowledge level of disease process/condition, treatment plan, diagnostic tests, and medications  Note: POC discussed with pt and states understanding.

## 2020-09-26 PROCEDURE — 700102 HCHG RX REV CODE 250 W/ 637 OVERRIDE(OP): Performed by: HOSPITALIST

## 2020-09-26 PROCEDURE — A9270 NON-COVERED ITEM OR SERVICE: HCPCS | Performed by: HOSPITALIST

## 2020-09-26 PROCEDURE — 770006 HCHG ROOM/CARE - MED/SURG/GYN SEMI*

## 2020-09-26 PROCEDURE — 99231 SBSQ HOSP IP/OBS SF/LOW 25: CPT | Performed by: NURSE PRACTITIONER

## 2020-09-26 PROCEDURE — 700102 HCHG RX REV CODE 250 W/ 637 OVERRIDE(OP): Performed by: NURSE PRACTITIONER

## 2020-09-26 PROCEDURE — A9270 NON-COVERED ITEM OR SERVICE: HCPCS | Performed by: NURSE PRACTITIONER

## 2020-09-26 RX ADMIN — THERA TABS 1 TABLET: TAB at 07:59

## 2020-09-26 RX ADMIN — QUETIAPINE FUMARATE 25 MG: 25 TABLET ORAL at 16:41

## 2020-09-26 RX ADMIN — FINASTERIDE 5 MG: 5 TABLET, FILM COATED ORAL at 07:59

## 2020-09-26 RX ADMIN — AMLODIPINE BESYLATE 5 MG: 5 TABLET ORAL at 07:59

## 2020-09-26 RX ADMIN — TAMSULOSIN HYDROCHLORIDE 0.8 MG: 0.4 CAPSULE ORAL at 07:59

## 2020-09-26 NOTE — PROGRESS NOTES
2 RN skin check completed with EZ Kulkarni.   Devices in place: méndez catheter  Skin assessed under devices: yes.  Confirmed pressure ulcers found: none.  New potential pressure ulcers noted: none  Wound consulted 9/22 for R plantar foot, blanchable redness. Mepilex on foot.      The following interventions in place: Pt able to turn side to side in bed, méndez care, pillows for repositioning, and mepilex to heels      Skin assessment:   Generalized skin dry, moles and skin tags throughout body   Bilateral elbows: pink/blanching.  BLE dusky/dry   Sacrum pink/blanching/intact.   Heels boggy: pink/dry/calloused/blanching  mepilex in use  Right plantar: redness, slow to blanching, mepliex in use  Méndez catheter site CDI, stat lock in place on right upper thigh

## 2020-09-26 NOTE — PROGRESS NOTES
Report received by dayshift RN. Assumed care of pt. Assessment complete. Pt A&Ox4, VSS and on RA. Pt in no apparent signs of distress, denies pain. Pt is legally blind, stand-by assist with a single point cane. Rosado in place and draining clear, yellow urine. Continent of bowel. Bed alarm on for impulsiveness. Plan of care discussed. Call light within reach, bed locked and in lowest position, and hourly rounding in place.

## 2020-09-26 NOTE — PROGRESS NOTES
Assumed care of pt at shift change. Pt is on RA with no signs of acute distress. A&Ox4. Denies any pain. Rosado in place and secured to thigh. All comfort measures in place. Call light and personal belongings by bedside. Bed locked and in lowest position. Hourly rounding in place.

## 2020-09-26 NOTE — PROGRESS NOTES
Hospital Medicine Daily Progress Note    Date of Service  9/26/2020    Chief Complaint  84 y.o. male admitted 7/9/2020 with COVID infection    Hospital Course    Mr. Bejarano is an 85 yo male with a PMHx of DM type II, hyperlipidemia, BPH who was admitted on 7/9/2020 with COVID 19 infection with POLY and hypotension.  Patient initially in ICU and on therapeutic Lovenox due to elevated DDimer.  His hospital course was complicated by hematuria and urine retention of which a méndez was placed.  Flomax and finasteride was initiated.  Patient was in acute renal failure which has improved. RUQ U/S was completed d/t periumbilical pain. This revealed possible renal mass. CT renal w/wo was performed and showed a large renal mass (7x7x8) likely representing urothelial vs renal cell carcinoma. Psychiatry was also consulted d/t ongoing cognition difficulty.  Per psychiatry, patient does not have the capacity to make any medical decision on 8/3/2020. SLP feels he has moderate to severe deficits in insight, orientation, and memory. Guardianship pending.    Despite Proscar and Flomax voiding trial failed 9/4 and red frank catheter was placed d/t high urinary retention. His renal fx has remained stable and WNL. Unfortunately, the patient is 116 pounds at the time of this note, with BMI of 17. He is a poor candidate for potential chemotherapy or surgical decompression given his malnutrition and neurocognitive disorder.      Interval Problem Update  9/25- Patient resting in bed, in no acute distress. Patient had court this morning and tuned in via zoom. Patient was able to locate girlfriend who has been admitted to a skilled facility at [place]. Guardianship hearing was moved back to November as it was found that patient had more children (6) that were previously unknown. This will allow for children to be notified and come forward if able to do so.     9/26- Patient resting in bed, states he is doing well. Is very excited and hopefull  for placement at same facility as his girlfriend. No acute events.     Consultants/Specialty  Palliative  Psych    Code Status  DNAR/DNI    Disposition  Guardianship and placement- hearing extended due to discovery of other children.    Review of Systems  Review of Systems   Unable to perform ROS: Dementia   All other systems reviewed and are negative.       Physical Exam  Temp:  [36.1 °C (96.9 °F)-36.8 °C (98.2 °F)] 36.1 °C (96.9 °F)  Pulse:  [68-78] 68  Resp:  [18] 18  BP: (106-122)/(48-62) 106/48  SpO2:  [90 %-94 %] 90 %    Physical Exam  Vitals signs and nursing note reviewed.   Constitutional:       Appearance: He is underweight. He is not ill-appearing.   HENT:      Head: Normocephalic and atraumatic.      Nose: Nose normal.   Eyes:      Conjunctiva/sclera: Conjunctivae normal.      Pupils: Pupils are equal, round, and reactive to light.   Neck:      Musculoskeletal: Neck supple.   Cardiovascular:      Rate and Rhythm: Normal rate and regular rhythm.      Heart sounds: No murmur. No friction rub.   Pulmonary:      Effort: No respiratory distress.   Abdominal:      General: Bowel sounds are normal. There is no distension.      Palpations: Abdomen is soft.   Skin:     General: Skin is warm and dry.   Neurological:      Mental Status: He is alert and oriented to person, place, and time.   Psychiatric:         Attention and Perception: He is inattentive.         Mood and Affect: Mood normal.         Cognition and Memory: He exhibits impaired recent memory.         Fluids    Intake/Output Summary (Last 24 hours) at 9/26/2020 1311  Last data filed at 9/26/2020 0930  Gross per 24 hour   Intake 980 ml   Output 1301 ml   Net -321 ml       Laboratory                        Imaging  CT-RENAL WITH & W/O   Final Result      1.  Large complex right renal mass as detailed above measuring approximately 7.8 x 7.1x  8.5 cm. Primary differential considerations include urothelial malignancy or renal cell carcinoma.   2.  No  "evidence of adenopathy or metastatic disease is seen.   3.  Distended urinary bladder. Right greater left hydroureteronephrosis.   4.  Severe atherosclerosis. 3.5 cm infrarenal abdominal aortic aneurysm.            US-RUQ   Final Result         1.  Echogenic liver compatible with fatty change versus fibrosis.   2.  Masslike structure in the right kidney, should be considered neoplastic unless proven otherwise, recommend follow-up 3 phase CT of the kidneys for further characterization   3.  Mild dilatation of the common bile duct, within expected limits given patient age.      These findings were discussed with the patient's clinician, Vin Mei, on 7/23/2020 7:34 AM.      DX-CHEST-PORTABLE (1 VIEW)   Final Result      1.  The lungs are hyperinflated suggesting emphysema/COPD.   2.  There is minimal predominantly lower lobe interstitial opacity which is most likely due to chronic scarring.           Assessment/Plan  Neurocognitive disorder, unspecified  Assessment & Plan  -Psychiatry was consulted, patient cannot make any medical decision  -SW working on guardianship. Hearing has been extended after initial meeting on 9/25, due to additional children being located  -Hearing moved to November     Urinary retention- (present on admission)  Assessment & Plan  -Continue Flomax and finasteride  -Méndez catheter in place for urinary retention on 8/7  -Trial removal of Méndez catheter 9/4 unsuccessful. Follow up outpatient. Discharge likely with méndez.    Right kidney mass- (present on admission)  Assessment & Plan  Suspicion for urothelial carcinoma vs renal cell carcinoma. No a/p adenopathy  Patient failed voiding trial despite flomax/proscar  Poor candidate for chemo given neurocognitive d/o, lacking capacity and requirement for guardianship  9/14: Discussed comfort care measures with the patient. He was tearful, but stated \"If it's my time, it's my time.\"  Will discuss Comfort Care with care team    COPD (chronic " obstructive pulmonary disease) (HCC)- (present on admission)  Assessment & Plan  -Supplemental oxygen as needed  -Inhaled bronchodilators as needed    Acute cystitis  Assessment & Plan  U/A checked on night shift - started on 7d Macrobid  Stop after 10 doses    Type 2 diabetes mellitus (HCC)- (present on admission)  Assessment & Plan  -Hemoglobin A1c at 6.8, continue diabetic diet, monitor  -BG has been well controlled  -Accu-Cheks only as needed for signs and symptoms of hypoglycemia or acute illness    Abdominal aortic aneurysm (AAA) 3.0 cm to 5.5 cm in diameter in male (HCC)  Assessment & Plan  Infrarenal  Noted as incidental finding on CT renal w/wo  Not a candidate for repair given neurocognitive disorder, lacking capacity    Hematuria  Assessment & Plan  -Hematuria has resolved    Advanced care planning/counseling discussion  Assessment & Plan  -Patient is DNR/DNI  -Guardianship being initiated    Malnutrition (HCC)- (present on admission)  Assessment & Plan  -Tolerating diet at this point    Hypokalemia  Assessment & Plan  Replaced  Resolved    COVID-19 virus infection  Assessment & Plan  -Patient has recovered from COVID-19       VTE prophylaxis: Issac Hopkins, A.P.R.N.    I have performed a physical exam and reviewed and updated ROS and Plan today 9/26/2020. In review of previous, there are no changes except as documented above.         I certify that the patient requires continued medically necessary hospital services for the treatment of Neurocognitive disorder. The patient will remain in the hospital for the foreseeable future.  Discharge may or may not occur in the next 20 days due to ongoing discharge delays due to no medically acceptable discharge option.

## 2020-09-26 NOTE — PROGRESS NOTES
"Pt was able to talk to his GF at [place] over the phone. Pt was teary and happy after the phone call and states \" thank you Lord, she is ok. This makes me so happy\"     All needs met at this time  "

## 2020-09-26 NOTE — CARE PLAN
Problem: Safety  Goal: Will remain free from falls  Outcome: PROGRESSING AS EXPECTED  Intervention: Implement fall precautions  Note: Fall precautions in place. Bed in lowest position. Non-skid socks in place. Personal possessions within reach. Mobility sign on door. Bed-alarm on. Call light within reach. Pt educated regarding fall prevention and states understanding.       Problem: Knowledge Deficit  Goal: Knowledge of disease process/condition, treatment plan, diagnostic tests, and medications will improve  Outcome: PROGRESSING AS EXPECTED

## 2020-09-27 PROCEDURE — 770006 HCHG ROOM/CARE - MED/SURG/GYN SEMI*

## 2020-09-27 PROCEDURE — A9270 NON-COVERED ITEM OR SERVICE: HCPCS | Performed by: HOSPITALIST

## 2020-09-27 PROCEDURE — A9270 NON-COVERED ITEM OR SERVICE: HCPCS | Performed by: NURSE PRACTITIONER

## 2020-09-27 PROCEDURE — 700102 HCHG RX REV CODE 250 W/ 637 OVERRIDE(OP): Performed by: HOSPITALIST

## 2020-09-27 PROCEDURE — 700102 HCHG RX REV CODE 250 W/ 637 OVERRIDE(OP): Performed by: NURSE PRACTITIONER

## 2020-09-27 RX ADMIN — FINASTERIDE 5 MG: 5 TABLET, FILM COATED ORAL at 08:44

## 2020-09-27 RX ADMIN — TAMSULOSIN HYDROCHLORIDE 0.8 MG: 0.4 CAPSULE ORAL at 08:44

## 2020-09-27 RX ADMIN — AMLODIPINE BESYLATE 5 MG: 5 TABLET ORAL at 08:44

## 2020-09-27 RX ADMIN — THERA TABS 1 TABLET: TAB at 08:44

## 2020-09-27 RX ADMIN — QUETIAPINE FUMARATE 25 MG: 25 TABLET ORAL at 19:55

## 2020-09-27 NOTE — PROGRESS NOTES
2 RN skin check complete with EZ Whittaker.   Devices in place: Méndez  Skin assessed under devices: yes.  Confirmed pressure ulcers found: none.  New potential pressure ulcers noted: none.    The following interventions in place: 2RN skin check, méndez catheter care, mepilex, moisturizer, pillows for support/positioning    Current skin abnormalities:  General: dry throughout (lotion applied)  Penis: pink, stat lock in place for méndez  Sacrum: pink, blanching  Bilateral Heels: pink, boggy (mepilex in place)

## 2020-09-27 NOTE — CARE PLAN
Problem: Safety  Goal: Will remain free from injury  Outcome: PROGRESSING AS EXPECTED     Bed alarm in place and functioning  Non-skid socks in place    Problem: Skin Integrity  Goal: Risk for impaired skin integrity will decrease  Outcome: PROGRESSING AS EXPECTED     2RN skin check each shift  Moisturizers as tolerated

## 2020-09-27 NOTE — PROGRESS NOTES
Pt AOX4, able to make needs known  Complaints of pain in R great toenail- appears to be slightly overgrown. Attempted to file nail down and pt asked that nail not be filed due to pain. Toe is pale and appropriate for skin color, no redness or skin breakdown noted  Bed alarm in place and functioning  Continue to monitor

## 2020-09-27 NOTE — PROGRESS NOTES
Report received by dayshift RN. Assumed care of pt. Assessment complete. Pt A&Ox4, VSS and on RA. Pt in no apparent signs of distress, denies any pain. Rosado catheter draining clear, yellow urine. Pt is legally blind, but calls appropriately for assistance. Plan of care discussed. Call light within reach, bed in lowest position, and bed alarm on.

## 2020-09-28 PROCEDURE — 700102 HCHG RX REV CODE 250 W/ 637 OVERRIDE(OP): Performed by: HOSPITALIST

## 2020-09-28 PROCEDURE — A9270 NON-COVERED ITEM OR SERVICE: HCPCS | Performed by: HOSPITALIST

## 2020-09-28 PROCEDURE — A9270 NON-COVERED ITEM OR SERVICE: HCPCS | Performed by: NURSE PRACTITIONER

## 2020-09-28 PROCEDURE — 700102 HCHG RX REV CODE 250 W/ 637 OVERRIDE(OP): Performed by: NURSE PRACTITIONER

## 2020-09-28 PROCEDURE — 770006 HCHG ROOM/CARE - MED/SURG/GYN SEMI*

## 2020-09-28 RX ADMIN — TAMSULOSIN HYDROCHLORIDE 0.8 MG: 0.4 CAPSULE ORAL at 08:50

## 2020-09-28 RX ADMIN — AMLODIPINE BESYLATE 5 MG: 5 TABLET ORAL at 08:50

## 2020-09-28 RX ADMIN — THERA TABS 1 TABLET: TAB at 08:50

## 2020-09-28 RX ADMIN — ERGOCALCIFEROL 50000 UNITS: 1.25 CAPSULE ORAL at 14:54

## 2020-09-28 RX ADMIN — QUETIAPINE FUMARATE 25 MG: 25 TABLET ORAL at 19:54

## 2020-09-28 RX ADMIN — FINASTERIDE 5 MG: 5 TABLET, FILM COATED ORAL at 08:50

## 2020-09-28 NOTE — CARE PLAN
Problem: Safety  Goal: Will remain free from injury  Note: Bed alarm in place and functioning  Pt uses call light appropriately     Problem: Fluid Volume:  Goal: Will maintain balanced intake and output  Note: Ensure adequate hydration  Monitor output from méndez catheter

## 2020-09-28 NOTE — PROGRESS NOTES
Bedside report received, pt care assumed. Pt denies any additional needs at this time, denies pain. Rosado catheter draining clear, yellow urine. Bed in lowest position, bed alarm on, pt educated on fall risk and verbalized understanding, and call light within reach.

## 2020-09-28 NOTE — PROGRESS NOTES
2 RN skin check completed with EZ Leslie.   Devices in place: méndez catheter  Skin assessed under devices: yes.  Confirmed pressure ulcers found: none.  New potential pressure ulcers noted: none   Wound consult: N/A.  The following interventions in place: Pt able to turn side to side in bed, méndez care, pillows for repositioning, and mepilex to heels.      Skin assessment:   Generalized skin dry, moles and skin tags throughout body   Bilateral elbows: pink/blanching.  BLE dusky/dry   Sacrum pink/blanching/intact.   Heels boggy: pink/dry/calloused/blanching  mepilex in use  Right plantar: redness, slow to blanching, mepliex in use  Méndez catheter site CDI, stat lock in place on right upper thigh

## 2020-09-28 NOTE — PROGRESS NOTES
Pt AOX4, able to make needs known  Using call light appropriately  Complaint with all meds and treatments  Rosado catheter in place, small amount of sediment noted in tubing, urine is otherwise clear and yellow  Denies pain or discomfort  All needs met, continue to monitor

## 2020-09-29 PROCEDURE — A9270 NON-COVERED ITEM OR SERVICE: HCPCS | Performed by: HOSPITALIST

## 2020-09-29 PROCEDURE — 770006 HCHG ROOM/CARE - MED/SURG/GYN SEMI*

## 2020-09-29 PROCEDURE — 700102 HCHG RX REV CODE 250 W/ 637 OVERRIDE(OP): Performed by: HOSPITALIST

## 2020-09-29 PROCEDURE — 700102 HCHG RX REV CODE 250 W/ 637 OVERRIDE(OP): Performed by: NURSE PRACTITIONER

## 2020-09-29 PROCEDURE — A9270 NON-COVERED ITEM OR SERVICE: HCPCS | Performed by: NURSE PRACTITIONER

## 2020-09-29 RX ADMIN — QUETIAPINE FUMARATE 25 MG: 25 TABLET ORAL at 18:01

## 2020-09-29 RX ADMIN — TAMSULOSIN HYDROCHLORIDE 0.8 MG: 0.4 CAPSULE ORAL at 09:26

## 2020-09-29 RX ADMIN — THERA TABS 1 TABLET: TAB at 09:26

## 2020-09-29 RX ADMIN — AMLODIPINE BESYLATE 5 MG: 5 TABLET ORAL at 09:26

## 2020-09-29 RX ADMIN — FINASTERIDE 5 MG: 5 TABLET, FILM COATED ORAL at 09:26

## 2020-09-29 NOTE — PROGRESS NOTES
2 RN skin check complete with EZ Whittaker.   Devices in place: Méndez  Skin assessed under devices: yes.  Confirmed pressure ulcers found: none.  New potential pressure ulcers noted: none.     The following interventions in place: 2RN skin check, méndez catheter care, mepilex, moisturizer, pillows for support/positioning     Current skin abnormalities:  General: dry throughout (lotion applied)  Penis: pink, stat lock in place for méndez  Sacrum: pink, blanching  Bilateral Heels: pink, boggy (mepilex in place)  R knee: skin nodule behind R knee

## 2020-09-29 NOTE — PROGRESS NOTES
Received report and assumed care. Pt AOx 3, denies pain, and mobile with one assist.  VSS.  Rosado catheter in place. 2RN skin checks in place. Call light and pt belongings in reach, bed locked and in lowest position, bed alarm on and pt now resting quietly.

## 2020-09-29 NOTE — PROGRESS NOTES
Pt c/o pain at meatus of penis. Reapplied méndez statlock on left leg and told patient to leave his pants off since they were pulling on the catheter. Pt states he feels better and pain is gone.

## 2020-09-29 NOTE — PROGRESS NOTES
2 RN skin check completed with EZ Leslie.   Devices in place: méndez catheter  Skin assessed under devices: yes.  Confirmed pressure ulcers found: N/A  New potential pressure ulcers noted: N/A  Wound consult: N/A.    The following interventions in place: Pt able to turn self side to side in bed, méndez care, pillows for support/repositioning, mepilex to heels, moisterizer      Skin assessment:     Bilateral elbows pink, blanching.  Sacrum pink, blanching.  Penis pink/red, blanching. Stat lock in place for Méndez.  BLE dusky, flaky, dry.  Bilateral heels boggy, red, dry, blanching. Mepilex in use.  Right plantar red, slow to lupe. Mepliex in use.  Méndez catheter site CDI, stat lock in place on right upper thigh

## 2020-09-30 PROBLEM — E87.6 HYPOKALEMIA: Status: RESOLVED | Noted: 2020-07-10 | Resolved: 2020-09-30

## 2020-09-30 PROBLEM — N30.00 ACUTE CYSTITIS: Status: RESOLVED | Noted: 2020-09-10 | Resolved: 2020-09-30

## 2020-09-30 PROBLEM — R31.9 HEMATURIA: Status: RESOLVED | Noted: 2020-08-02 | Resolved: 2020-09-30

## 2020-09-30 PROBLEM — E86.0 DEHYDRATION: Status: RESOLVED | Noted: 2020-07-10 | Resolved: 2020-09-30

## 2020-09-30 PROBLEM — N17.9 ACUTE KIDNEY INJURY (HCC): Status: RESOLVED | Noted: 2020-07-10 | Resolved: 2020-09-30

## 2020-09-30 PROCEDURE — A9270 NON-COVERED ITEM OR SERVICE: HCPCS | Performed by: HOSPITALIST

## 2020-09-30 PROCEDURE — 700102 HCHG RX REV CODE 250 W/ 637 OVERRIDE(OP): Performed by: HOSPITALIST

## 2020-09-30 PROCEDURE — 99231 SBSQ HOSP IP/OBS SF/LOW 25: CPT | Performed by: NURSE PRACTITIONER

## 2020-09-30 PROCEDURE — A9270 NON-COVERED ITEM OR SERVICE: HCPCS | Performed by: NURSE PRACTITIONER

## 2020-09-30 PROCEDURE — 700102 HCHG RX REV CODE 250 W/ 637 OVERRIDE(OP): Performed by: NURSE PRACTITIONER

## 2020-09-30 PROCEDURE — 770006 HCHG ROOM/CARE - MED/SURG/GYN SEMI*

## 2020-09-30 RX ADMIN — FINASTERIDE 5 MG: 5 TABLET, FILM COATED ORAL at 09:47

## 2020-09-30 RX ADMIN — TAMSULOSIN HYDROCHLORIDE 0.8 MG: 0.4 CAPSULE ORAL at 09:47

## 2020-09-30 RX ADMIN — QUETIAPINE FUMARATE 25 MG: 25 TABLET ORAL at 17:36

## 2020-09-30 RX ADMIN — THERA TABS 1 TABLET: TAB at 09:47

## 2020-09-30 NOTE — PROGRESS NOTES
Pt reports that pain in penis from yesterday is gone. No needs or c/o pain at this time. Will continue to monitor.

## 2020-09-30 NOTE — PROGRESS NOTES
2 RN skin check completed with EZ Garcia.   Devices in place: méndez catheter  Skin assessed under devices: yes.  Confirmed pressure ulcers found: N/A  New potential pressure ulcers noted: N/A  Wound consult: N/A.     The following interventions in place: Pt able to turn self side to side in bed, méndez care, pillows for support/repositioning, mepilex to heels, moisterizer      Skin assessment:      Bilateral elbows pink, blanching.  Sacrum pink, blanching.  Penis red, blanching on underside. Stat lock in place for Méndez on left thigh.  BLE dusky, flaky, dry.  Bilateral heels boggy, red, dry, blanching. Mepilex in place.  Right plantar red, slow to lupe. Mepliex in place.

## 2020-09-30 NOTE — PROGRESS NOTES
Received report and assumed care. Pt AOx 3, denies any pain, RA, and mobile with one assit.  VSS.  Ashlee in place. 2RN skin checks in place. Call light and pt belongings in reach, bed locked and in lowest position, and pt now resting quietly.

## 2020-09-30 NOTE — PROGRESS NOTES
2 RN skin check completed with EZ Leslie.   Devices in place: méndez catheter  Skin assessed under devices: yes.  Confirmed pressure ulcers found: N/A  New potential pressure ulcers noted: N/A  Wound consult: N/A.     The following interventions in place: Pt able to turn self side to side in bed, méndez care, pillows for support/repositioning, mepilex to heels, moisterizer      Skin assessment:      Bilateral elbows pink, blanching.  Sacrum pink, blanching.  Penis red, blanching on underside. Stat lock in place for Méndez on left thigh.  BLE dusky, flaky, dry.  Bilateral heels boggy, red, dry, blanching. Mepilex changed.  Right plantar red, slow to lupe. Mepliex changed.

## 2020-09-30 NOTE — PROGRESS NOTES
Hospital Medicine Daily Progress Note    Date of Service  9/30/2020    Chief Complaint  SOB and low blood pressue    Hospital Course   Mr. Bejarano is an 85 yo male with a PMHx of DM type II, hyperlipidemia, BPH who was admitted on 7/9/2020 with COVID 19 infection with POLY and hypotension.  Patient initially in ICU and on therapeutic Lovenox due to elevated DDimer.  His hospital course was complicated by hematuria and urine retention of which a méndez was placed.  Flomax and finasteride was initiated.  Patient was in acute renal failure which has improved. RUQ U/S was completed d/t periumbilical pain. This revealed possible renal mass. CT renal w/wo was performed and showed a large renal mass (7x7x8) likely representing urothelial vs renal cell carcinoma. Psychiatry was also consulted d/t ongoing cognition difficulty.  Per psychiatry, patient does not have the capacity to make any medical decision on 8/3/2020. SLP feels he has moderate to severe deficits in insight, orientation, and memory. Guardianship pending.     Despite Proscar and Flomax voiding trial failed 9/4 and red frank catheter was placed d/t high urinary retention. His renal fx has remained stable and WNL. Unfortunately, the patient is 116 pounds at the time of this note, with BMI of 17. He is a poor candidate for potential chemotherapy or surgical decompression given his malnutrition and neurocognitive disorder.      Interval Problem Update  -no acute events overnight. Offers no complaints today.    Consultants/Specialty  -Psychiatry  -Palliative Care    Code Status  DNAR/DNI    Disposition  Pending guardianship and placement    Review of Systems  Review of Systems   Unable to perform ROS: Dementia        Physical Exam  Temp:  [36.2 °C (97.1 °F)-36.8 °C (98.3 °F)] 36.7 °C (98.1 °F)  Pulse:  [70-89] 85  Resp:  [16-20] 16  BP: (100-114)/(51-59) 100/51  SpO2:  [91 %-94 %] 94 %    Physical Exam  Vitals signs and nursing note reviewed.   Constitutional:        Appearance: He is underweight. He is not ill-appearing.   HENT:      Head: Normocephalic and atraumatic.      Nose: Nose normal.   Eyes:      Conjunctiva/sclera: Conjunctivae normal.      Pupils: Pupils are equal, round, and reactive to light.   Neck:      Musculoskeletal: Neck supple.   Cardiovascular:      Rate and Rhythm: Normal rate and regular rhythm.      Heart sounds: No murmur. No friction rub.   Pulmonary:      Effort: No respiratory distress.   Abdominal:      General: Bowel sounds are normal. There is no distension.      Palpations: Abdomen is soft.   Skin:     General: Skin is warm and dry.   Neurological:      Mental Status: He is alert and oriented to person, place, and time.   Psychiatric:         Attention and Perception: He is inattentive.         Mood and Affect: Mood normal.         Behavior: Behavior normal.         Cognition and Memory: Cognition normal. He exhibits impaired recent memory.         Fluids    Intake/Output Summary (Last 24 hours) at 9/30/2020 1338  Last data filed at 9/30/2020 0900  Gross per 24 hour   Intake --   Output 1400 ml   Net -1400 ml       Laboratory                        Imaging  CT-RENAL WITH & W/O   Final Result      1.  Large complex right renal mass as detailed above measuring approximately 7.8 x 7.1x  8.5 cm. Primary differential considerations include urothelial malignancy or renal cell carcinoma.   2.  No evidence of adenopathy or metastatic disease is seen.   3.  Distended urinary bladder. Right greater left hydroureteronephrosis.   4.  Severe atherosclerosis. 3.5 cm infrarenal abdominal aortic aneurysm.            US-RUQ   Final Result         1.  Echogenic liver compatible with fatty change versus fibrosis.   2.  Masslike structure in the right kidney, should be considered neoplastic unless proven otherwise, recommend follow-up 3 phase CT of the kidneys for further characterization   3.  Mild dilatation of the common bile duct, within expected limits given  patient age.      These findings were discussed with the patient's clinician, Vin Mei, on 7/23/2020 7:34 AM.      DX-CHEST-PORTABLE (1 VIEW)   Final Result      1.  The lungs are hyperinflated suggesting emphysema/COPD.   2.  There is minimal predominantly lower lobe interstitial opacity which is most likely due to chronic scarring.           Assessment/Plan  Neurocognitive disorder, unspecified  Assessment & Plan  -Psychiatry was consulted, patient cannot make any medical decision  -SW working on guardianship. Hearing has been extended after initial meeting on 9/25, due to additional children being located  -Hearing moved to November     Urinary retention- (present on admission)  Assessment & Plan  -Continue Flomax and finasteride  -Méndez catheter in place for urinary retention on 8/7  -Trial removal of Méndez catheter 9/4 unsuccessful. Follow up outpatient. Discharge likely with méndez.    Right kidney mass- (present on admission)  Assessment & Plan  Suspicion for urothelial carcinoma vs renal cell carcinoma. No a/p adenopathy  Patient failed voiding trial despite flomax/proscar  Poor candidate for chemo given neurocognitive d/o, lacking capacity and requirement for guardianship      COPD (chronic obstructive pulmonary disease) (HCC)- (present on admission)  Assessment & Plan  -Supplemental oxygen as needed  -Inhaled bronchodilators as needed    Type 2 diabetes mellitus (HCC)- (present on admission)  Assessment & Plan  -Hemoglobin A1c at 6.8, continue diabetic diet, monitor  -BG has been well controlled  -Accu-Cheks only as needed for signs and symptoms of hypoglycemia or acute illness    Abdominal aortic aneurysm (AAA) 3.0 cm to 5.5 cm in diameter in male (HCC)  Assessment & Plan  Infrarenal  Noted as incidental finding on CT renal w/wo  Not a candidate for repair given neurocognitive disorder, lacking capacity    Advanced care planning/counseling discussion  Assessment & Plan  -Patient is  DNR/DNI  -Guardianship pending    Protein-calorie malnutrition (HCC)- (present on admission)  Assessment & Plan  -Body mass index is 17.54 kg/m².  -encourage PO intake  -food preferences  -TID supplements    COVID-19 virus infection  Assessment & Plan  -Patient has recovered from COVID-19       VTE prophylaxis : Ambulation    I have performed a physical exam and reviewed and updated ROS and Assessment/Plan today (09/30/20). In review of the previous note there are no changes except as documented above    I certify the patient requires continued medically necessary hospital services for the treatment of neurocognitive disorder.  The patient will remain in the hospital for the foreseeable future.  Discharge may or may not occur in the next 20 days due to ongoing discharge delays due to having no medically acceptable discharge options.    Please note that this dictation was created using voice recognition software. I have made every reasonable attempt to correct obvious errors, but there may be errors of grammar and possibly content that I did not discover before finalizing the note.    MICK Guerrero.

## 2020-10-01 PROCEDURE — 700102 HCHG RX REV CODE 250 W/ 637 OVERRIDE(OP): Performed by: HOSPITALIST

## 2020-10-01 PROCEDURE — 700102 HCHG RX REV CODE 250 W/ 637 OVERRIDE(OP): Performed by: NURSE PRACTITIONER

## 2020-10-01 PROCEDURE — A9270 NON-COVERED ITEM OR SERVICE: HCPCS | Performed by: HOSPITALIST

## 2020-10-01 PROCEDURE — 770006 HCHG ROOM/CARE - MED/SURG/GYN SEMI*

## 2020-10-01 PROCEDURE — A9270 NON-COVERED ITEM OR SERVICE: HCPCS | Performed by: NURSE PRACTITIONER

## 2020-10-01 RX ADMIN — AMLODIPINE BESYLATE 5 MG: 5 TABLET ORAL at 09:14

## 2020-10-01 RX ADMIN — THERA TABS 1 TABLET: TAB at 09:14

## 2020-10-01 RX ADMIN — QUETIAPINE FUMARATE 25 MG: 25 TABLET ORAL at 17:39

## 2020-10-01 RX ADMIN — TAMSULOSIN HYDROCHLORIDE 0.8 MG: 0.4 CAPSULE ORAL at 09:14

## 2020-10-01 RX ADMIN — FINASTERIDE 5 MG: 5 TABLET, FILM COATED ORAL at 09:14

## 2020-10-01 NOTE — PROGRESS NOTES
Patient resting in bed, blind in his right eye. No complaints of pain. Rosado catheter in. All needs met at the moment

## 2020-10-01 NOTE — CARE PLAN
Problem: Safety  Goal: Will remain free from falls  Outcome: PROGRESSING AS EXPECTED  Note: Bed alarm on, locked and in lowest position     Problem: Skin Integrity  Goal: Risk for impaired skin integrity will decrease  Outcome: PROGRESSING AS EXPECTED

## 2020-10-01 NOTE — PROGRESS NOTES
Pt AOx 4, denies any pain, and mobile with one assist.  VSS. Pt legally blind and has méndez. 2RN skin checks in place. Pt turns self side to side. Call light and pt belongings in reach, bed locked/lowest position, bed alarm in place, and pt now resting quietly.

## 2020-10-01 NOTE — PROGRESS NOTES
2 RN skin check completed with EZ Sanchez.   Devices in place: méndez catheter  Skin assessed under devices: yes.  Confirmed pressure ulcers found: N/A  New potential pressure ulcers noted: N/A  Wound consult: N/A.     The following interventions in place: Pt able to turn self side to side in bed, méndez care, pillows for support/repositioning, mepilex to heels, moisterizer      Skin assessment:      Bilateral elbows pink, blanching.  Sacrum pink, blanching.  Penis slightly red, blanching on underside. Stat lock in place for Méndez on left thigh.  BLE dusky, flaky, dry.  Bilateral heels boggy, red, dry, blanching. Mepilex in place.  Right plantar red, slow to lupe. Mepliex in place.

## 2020-10-02 PROCEDURE — 700102 HCHG RX REV CODE 250 W/ 637 OVERRIDE(OP): Performed by: HOSPITALIST

## 2020-10-02 PROCEDURE — A9270 NON-COVERED ITEM OR SERVICE: HCPCS | Performed by: HOSPITALIST

## 2020-10-02 PROCEDURE — 700102 HCHG RX REV CODE 250 W/ 637 OVERRIDE(OP): Performed by: NURSE PRACTITIONER

## 2020-10-02 PROCEDURE — A9270 NON-COVERED ITEM OR SERVICE: HCPCS | Performed by: NURSE PRACTITIONER

## 2020-10-02 PROCEDURE — 770006 HCHG ROOM/CARE - MED/SURG/GYN SEMI*

## 2020-10-02 RX ADMIN — AMLODIPINE BESYLATE 5 MG: 5 TABLET ORAL at 07:48

## 2020-10-02 RX ADMIN — THERA TABS 1 TABLET: TAB at 07:48

## 2020-10-02 RX ADMIN — TAMSULOSIN HYDROCHLORIDE 0.8 MG: 0.4 CAPSULE ORAL at 07:48

## 2020-10-02 RX ADMIN — FINASTERIDE 5 MG: 5 TABLET, FILM COATED ORAL at 07:48

## 2020-10-02 RX ADMIN — QUETIAPINE FUMARATE 25 MG: 25 TABLET ORAL at 17:52

## 2020-10-02 ASSESSMENT — FIBROSIS 4 INDEX: FIB4 SCORE: 2.13

## 2020-10-02 NOTE — PROGRESS NOTES
Patient ambulated to bathroom and had a BM, no pain. Patient is pleasant this morning and would like to bathe later today. Bed alarm on.

## 2020-10-02 NOTE — CARE PLAN
Problem: Infection  Goal: Will remain free from infection  Outcome: PROGRESSING AS EXPECTED  Note: Catheter care done, no signs of fever      Problem: Bowel/Gastric:  Goal: Normal bowel function is maintained or improved  Outcome: PROGRESSING AS EXPECTED  Note: Last BM 10/2/2020. Encourage patient to ambulate

## 2020-10-02 NOTE — PROGRESS NOTES
Patient AOx4, no c/o pain at this tae. Resting in bed. Patient is legally blind. Provided needs. Bed locked and placed in lowest position. Bed alarms on. Call lights w/in reach. Room near nurse's station. Hourly rounds in place.

## 2020-10-02 NOTE — PROGRESS NOTES
2 RN skin check completed with EZ Sanchez.   Devices in place: méndez catheter  Skin assessed under devices: yes.  Confirmed pressure ulcers found: N/A  New potential pressure ulcers noted: N/A  Wound consult: N/A.     The following interventions in place: mepilex to bilateral heels, pillows to float heels/support and positioning,  méndez cath care,        Skin assessment:      Bilateral heels:red and slightly boggy, mepilex on   BLE: dusky, darkly discolored, non blanchng  Bilateral elbows: dry, pink and blanching  Buttocks: red and blanching  Penis intact with méndez cath

## 2020-10-03 PROCEDURE — 99231 SBSQ HOSP IP/OBS SF/LOW 25: CPT | Performed by: NURSE PRACTITIONER

## 2020-10-03 PROCEDURE — 700102 HCHG RX REV CODE 250 W/ 637 OVERRIDE(OP): Performed by: HOSPITALIST

## 2020-10-03 PROCEDURE — A9270 NON-COVERED ITEM OR SERVICE: HCPCS | Performed by: HOSPITALIST

## 2020-10-03 PROCEDURE — 770006 HCHG ROOM/CARE - MED/SURG/GYN SEMI*

## 2020-10-03 PROCEDURE — A9270 NON-COVERED ITEM OR SERVICE: HCPCS | Performed by: NURSE PRACTITIONER

## 2020-10-03 PROCEDURE — 700102 HCHG RX REV CODE 250 W/ 637 OVERRIDE(OP): Performed by: NURSE PRACTITIONER

## 2020-10-03 RX ADMIN — FINASTERIDE 5 MG: 5 TABLET, FILM COATED ORAL at 07:57

## 2020-10-03 RX ADMIN — THERA TABS 1 TABLET: TAB at 07:57

## 2020-10-03 RX ADMIN — TAMSULOSIN HYDROCHLORIDE 0.8 MG: 0.4 CAPSULE ORAL at 07:57

## 2020-10-03 RX ADMIN — AMLODIPINE BESYLATE 5 MG: 5 TABLET ORAL at 07:57

## 2020-10-03 RX ADMIN — QUETIAPINE FUMARATE 25 MG: 25 TABLET ORAL at 17:06

## 2020-10-03 ASSESSMENT — PAIN DESCRIPTION - PAIN TYPE: TYPE: ACUTE PAIN

## 2020-10-03 NOTE — PROGRESS NOTES
Assumed care of patient at 0700. A+Ox4. Reports pain 0/10 today. Rosado in tact, patent and draining clear yellow urine. Assistance given for ambulation in room. Safety precautions in place, bed in lowest and locked position with call light in reach. Needs met at this time.

## 2020-10-03 NOTE — PROGRESS NOTES
Patient A/Ox4, up with one person assist using cane, and on RA. No complaints at this time. Rosado in place and draining to gravity. Rosado order active for urinary retention. Pt is safe with needs being met. Bed low and locked. Call light in reach. Hourly rounding in place. No signs of acute distress.

## 2020-10-03 NOTE — PROGRESS NOTES
Hospital Medicine Daily Progress Note    Date of Service  10/3/2020    Chief Complaint  SOB and low blood pressue    Hospital Course   Mr. Bejarano is an 83 yo male with a PMHx of DM type II, hyperlipidemia, BPH who was admitted on 7/9/2020 with COVID 19 infection with POLY and hypotension.  Patient initially in ICU and on therapeutic Lovenox due to elevated DDimer.  His hospital course was complicated by hematuria and urine retention of which a méndez was placed.  Flomax and finasteride was initiated.  Patient was in acute renal failure which has improved. RUQ U/S was completed d/t periumbilical pain. This revealed possible renal mass. CT renal w/wo was performed and showed a large renal mass (7x7x8) likely representing urothelial vs renal cell carcinoma. Psychiatry was also consulted d/t ongoing cognition difficulty.  Per psychiatry, patient does not have the capacity to make any medical decision on 8/3/2020. SLP feels he has moderate to severe deficits in insight, orientation, and memory. Guardianship pending.     Despite Proscar and Flomax voiding trial failed 9/4 and red frank catheter was placed d/t high urinary retention. His renal fx has remained stable and WNL. Unfortunately, the patient is 116 pounds at the time of this note, with BMI of 17. He is a poor candidate for potential chemotherapy or surgical decompression given his malnutrition and neurocognitive disorder.      Interval Problem Update  10/3 - continues with méndez without issues.  -keeps to himself in his room.   9/30 - no acute events overnight. Offers no complaints today.    Consultants/Specialty  -Psychiatry  -Palliative Care    Code Status  DNAR/DNI    Disposition  Pending guardianship and placement    Review of Systems  Review of Systems   Unable to perform ROS: Dementia        Physical Exam  Temp:  [36.6 °C (97.8 °F)-37.7 °C (99.8 °F)] 37.7 °C (99.8 °F)  Pulse:  [62-85] 85  Resp:  [16-18] 16  BP: (117-122)/(55-62) 117/62  SpO2:  [94 %-98 %] 98  %    Physical Exam  Vitals signs and nursing note reviewed.   Constitutional:       Appearance: He is underweight. He is not ill-appearing.   HENT:      Head: Normocephalic and atraumatic.      Nose: Nose normal.   Eyes:      Conjunctiva/sclera: Conjunctivae normal.      Pupils: Pupils are equal, round, and reactive to light.   Neck:      Musculoskeletal: Neck supple.   Cardiovascular:      Rate and Rhythm: Normal rate and regular rhythm.      Heart sounds: No murmur. No friction rub.   Pulmonary:      Effort: No respiratory distress.   Abdominal:      General: Bowel sounds are normal. There is no distension.      Palpations: Abdomen is soft.   Skin:     General: Skin is warm and dry.   Neurological:      Mental Status: He is alert and oriented to person, place, and time.   Psychiatric:         Attention and Perception: He is inattentive.         Mood and Affect: Mood normal.         Behavior: Behavior normal.         Cognition and Memory: Cognition normal. He exhibits impaired recent memory.         Fluids    Intake/Output Summary (Last 24 hours) at 10/3/2020 1530  Last data filed at 10/3/2020 1250  Gross per 24 hour   Intake 240 ml   Output 1800 ml   Net -1560 ml       Laboratory                        Imaging  CT-RENAL WITH & W/O   Final Result      1.  Large complex right renal mass as detailed above measuring approximately 7.8 x 7.1x  8.5 cm. Primary differential considerations include urothelial malignancy or renal cell carcinoma.   2.  No evidence of adenopathy or metastatic disease is seen.   3.  Distended urinary bladder. Right greater left hydroureteronephrosis.   4.  Severe atherosclerosis. 3.5 cm infrarenal abdominal aortic aneurysm.            US-RUQ   Final Result         1.  Echogenic liver compatible with fatty change versus fibrosis.   2.  Masslike structure in the right kidney, should be considered neoplastic unless proven otherwise, recommend follow-up 3 phase CT of the kidneys for further  characterization   3.  Mild dilatation of the common bile duct, within expected limits given patient age.      These findings were discussed with the patient's clinician, Vin Mei, on 7/23/2020 7:34 AM.      DX-CHEST-PORTABLE (1 VIEW)   Final Result      1.  The lungs are hyperinflated suggesting emphysema/COPD.   2.  There is minimal predominantly lower lobe interstitial opacity which is most likely due to chronic scarring.           Assessment/Plan  Neurocognitive disorder, unspecified  Assessment & Plan  -Psychiatry was consulted, patient cannot make any medical decision  -SW working on guardianship. Hearing has been extended after initial meeting on 9/25, due to additional children being located  -Hearing moved to November     Urinary retention- (present on admission)  Assessment & Plan  -Continue Flomax and finasteride  -Méndez catheter in place for urinary retention on 8/7  -Trial removal of Méndez catheter 9/4 unsuccessful. Follow up outpatient. Discharge likely with méndez.    Right kidney mass- (present on admission)  Assessment & Plan  Suspicion for urothelial carcinoma vs renal cell carcinoma. No a/p adenopathy  Patient failed voiding trial despite flomax/proscar  Poor candidate for chemo given neurocognitive d/o, lacking capacity and requirement for guardianship      COPD (chronic obstructive pulmonary disease) (HCC)- (present on admission)  Assessment & Plan  -Supplemental oxygen as needed  -Inhaled bronchodilators as needed    Type 2 diabetes mellitus (HCC)- (present on admission)  Assessment & Plan  -Hemoglobin A1c at 6.8, continue diabetic diet, monitor  -BG has been well controlled  -Accu-Cheks only as needed for signs and symptoms of hypoglycemia or acute illness    Abdominal aortic aneurysm (AAA) 3.0 cm to 5.5 cm in diameter in male (HCC)  Assessment & Plan  Infrarenal  Noted as incidental finding on CT renal w/wo  Not a candidate for repair given neurocognitive disorder, lacking  capacity    Advanced care planning/counseling discussion  Assessment & Plan  -Patient is DNR/DNI  -Guardianship pending    Protein-calorie malnutrition (HCC)- (present on admission)  Assessment & Plan  -Body mass index is 17.54 kg/m².  -encourage PO intake  -food preferences  -TID supplements    COVID-19 virus infection  Assessment & Plan  -Patient has recovered from COVID-19       VTE prophylaxis : Ambulation    I have performed a physical exam and reviewed and updated ROS and Assessment/Plan today (10/03/20). In review of the previous note there are no changes except as documented above    I certify the patient requires continued medically necessary hospital services for the treatment of neurocognitive disorder.  The patient will remain in the hospital for the foreseeable future.  Discharge may or may not occur in the next 20 days due to ongoing discharge delays due to having no medically acceptable discharge options.    Please note that this dictation was created using voice recognition software. I have made every reasonable attempt to correct obvious errors, but there may be errors of grammar and possibly content that I did not discover before finalizing the note.    MICK Guerrero.

## 2020-10-03 NOTE — PROGRESS NOTES
2 RN skin check completed with EZ Garcia.   Devices in place: méndez catheter  Skin assessed under devices: yes.  Confirmed pressure ulcers found: N/A  New potential pressure ulcers noted: N/A  Wound consult: N/A.     The following interventions in place: Pt able to turn self side to side in bed, méndez care, pillows for support/repositioning, mepilex to bilateral  heels  Skin assessment:      Bilateral heels pink blanching, boggy with mepilex on   BLE dusky, cold, discolored, nonblanching   Penis intact with méndez, méndez care done   Buttocks pink and blanching

## 2020-10-03 NOTE — DISCHARGE PLANNING
Anticipated Discharge Disposition: Group Home    Action: patient stays in his room, pleasant/cooperative.  Has méndez due to urinary retention.  SW informed that patient will cry at times, as he is wanting to be with his long time significant other, who is in [place] [employer] rescheduled as patient has additional children.     Barriers to Discharge: guardianship/placement    Plan: continue to monitor for discharge barriers

## 2020-10-03 NOTE — PROGRESS NOTES
2 RN skin check completed with EZ Sanchez.   Devices in place: indwelling catheter.  Skin assessed under devices: yes.  Confirmed pressure ulcers found on: none.  New potential pressure ulcers noted on: none. Wound consult placed: n/a.  The following interventions in place: Pt is able to turn himself in bed, continue to encourage this, pillows used for support/positioning, méndez care, mepilex, 2 RN skin check qshift.    Heels: pink, red, boggy (R heel slow to lupe) mepilex applied to both  R foot: reddened area over bony prominence to lateral side of foot (mepilex applied)  BLE: flaky, dusky, discolored  Penis: intact  Buttocks: pink and blanching

## 2020-10-04 PROCEDURE — A9270 NON-COVERED ITEM OR SERVICE: HCPCS | Performed by: HOSPITALIST

## 2020-10-04 PROCEDURE — A9270 NON-COVERED ITEM OR SERVICE: HCPCS | Performed by: NURSE PRACTITIONER

## 2020-10-04 PROCEDURE — 700102 HCHG RX REV CODE 250 W/ 637 OVERRIDE(OP): Performed by: HOSPITALIST

## 2020-10-04 PROCEDURE — 770006 HCHG ROOM/CARE - MED/SURG/GYN SEMI*

## 2020-10-04 PROCEDURE — 700102 HCHG RX REV CODE 250 W/ 637 OVERRIDE(OP): Performed by: NURSE PRACTITIONER

## 2020-10-04 RX ADMIN — AMLODIPINE BESYLATE 5 MG: 5 TABLET ORAL at 10:17

## 2020-10-04 RX ADMIN — TAMSULOSIN HYDROCHLORIDE 0.8 MG: 0.4 CAPSULE ORAL at 10:17

## 2020-10-04 RX ADMIN — FINASTERIDE 5 MG: 5 TABLET, FILM COATED ORAL at 10:17

## 2020-10-04 RX ADMIN — THERA TABS 1 TABLET: TAB at 10:17

## 2020-10-04 RX ADMIN — QUETIAPINE FUMARATE 25 MG: 25 TABLET ORAL at 17:00

## 2020-10-04 NOTE — PROGRESS NOTES
Patient A/Ox4, up with one person assist using cane, VSS, and on RA. Pt in bed resting. No complaints at this time. Rosado in place and draining to gravity. Rosado order active for urinary retention. Pt is safe with needs being met. Bed low and locked. Call light in reach. Hourly rounding in place. No signs of acute distress.

## 2020-10-04 NOTE — PROGRESS NOTES
Patient stable vitals, adequate I/os, méndez catheter, up with 1 assist, nails trimmed upper extremities, good appetite, méndez catheter

## 2020-10-04 NOTE — PROGRESS NOTES
2 RN skin check completed with Patricia RN.   Devices in place: indwelling catheter.  Skin assessed under devices: yes.  Confirmed pressure ulcers found on NA  New potential pressure ulcers noted on NA Wound consult placed NA.  The following interventions in place: Promote weight shifting,  pillows used for support/positioning, q2 turns if needed, méndez care, mepilex, 2 RN skin check qshift.    Heels: pink, red, boggy - mepilex on  R foot: reddened area over bony prominence to lateral side of foot - mepilex on  BLE: flaky, dusky, discolored  Penis: intact  Buttocks: pink and blanching

## 2020-10-05 PROCEDURE — A9270 NON-COVERED ITEM OR SERVICE: HCPCS | Performed by: HOSPITALIST

## 2020-10-05 PROCEDURE — 700102 HCHG RX REV CODE 250 W/ 637 OVERRIDE(OP): Performed by: HOSPITALIST

## 2020-10-05 PROCEDURE — 700102 HCHG RX REV CODE 250 W/ 637 OVERRIDE(OP): Performed by: NURSE PRACTITIONER

## 2020-10-05 PROCEDURE — 770006 HCHG ROOM/CARE - MED/SURG/GYN SEMI*

## 2020-10-05 PROCEDURE — A9270 NON-COVERED ITEM OR SERVICE: HCPCS | Performed by: NURSE PRACTITIONER

## 2020-10-05 RX ADMIN — THERA TABS 1 TABLET: TAB at 08:01

## 2020-10-05 RX ADMIN — ERGOCALCIFEROL 50000 UNITS: 1.25 CAPSULE ORAL at 14:20

## 2020-10-05 RX ADMIN — QUETIAPINE FUMARATE 25 MG: 25 TABLET ORAL at 17:03

## 2020-10-05 RX ADMIN — FINASTERIDE 5 MG: 5 TABLET, FILM COATED ORAL at 08:01

## 2020-10-05 RX ADMIN — TAMSULOSIN HYDROCHLORIDE 0.8 MG: 0.4 CAPSULE ORAL at 08:01

## 2020-10-05 RX ADMIN — AMLODIPINE BESYLATE 5 MG: 5 TABLET ORAL at 08:02

## 2020-10-05 ASSESSMENT — PAIN DESCRIPTION - PAIN TYPE: TYPE: ACUTE PAIN

## 2020-10-05 NOTE — PROGRESS NOTES
2 RN skin check completed with EZ Newman.   Devices in place: indwelling catheter.  Skin assessed under devices: yes.  Confirmed pressure ulcers found on: none.  New potential pressure ulcers noted on: none. Wound consult placed: for nail care.  The following interventions in place: Pt is able to turn himself in bed, continue to encourage this, pillows used for support/positioning, méndez care, mepilex, 2 RN skin check qshift.     Heels: pink, red, boggy (R heel slow to lupe) mepilex applied to both  R foot: reddened area over bony prominence to lateral side of foot (mepilex applied)  BLE: flaky, dusky, discolored  Penis: intact  Buttocks: pink and blanching

## 2020-10-05 NOTE — CARE PLAN
Problem: Safety  Goal: Will remain free from injury  Outcome: PROGRESSING AS EXPECTED  Note:   Patient nonslip socks on, bed alarm as needed, call light in reach     Problem: Infection  Goal: Will remain free from infection  Outcome: PROGRESSING AS EXPECTED  Intervention: Assess signs and symptoms of infection  Note:   Patient vitals, mentation, labs, and I/os monitored qshift

## 2020-10-05 NOTE — PROGRESS NOTES
Patient stable vitals, adequate I/os, méndez catheter, up with 1 assist,  good appetite, méndez catheter, resting in bed most of day

## 2020-10-05 NOTE — PROGRESS NOTES
2 RN skin check completed with Anny RN.   Devices in place: indwelling catheter.  Skin assessed under devices: yes.  Confirmed pressure ulcers found on NA  New potential pressure ulcers noted on NA Wound consult placed NA.  The following interventions in place: Promote weight shifting,  pillows used for support/positioning, q2 turns if needed, méndez care, mepilex, 2 RN skin check qshift.    Heels: pink, red, boggy - mepilex on  R foot: reddened area over bony prominence to lateral side of foot - mepilex on  BLE: flaky, dusky, discolored  Penis: intact  Buttocks: pink and blanching

## 2020-10-06 PROCEDURE — A9270 NON-COVERED ITEM OR SERVICE: HCPCS | Performed by: NURSE PRACTITIONER

## 2020-10-06 PROCEDURE — 700102 HCHG RX REV CODE 250 W/ 637 OVERRIDE(OP): Performed by: NURSE PRACTITIONER

## 2020-10-06 PROCEDURE — 700102 HCHG RX REV CODE 250 W/ 637 OVERRIDE(OP): Performed by: HOSPITALIST

## 2020-10-06 PROCEDURE — A9270 NON-COVERED ITEM OR SERVICE: HCPCS | Performed by: HOSPITALIST

## 2020-10-06 PROCEDURE — 770006 HCHG ROOM/CARE - MED/SURG/GYN SEMI*

## 2020-10-06 RX ADMIN — AMLODIPINE BESYLATE 5 MG: 5 TABLET ORAL at 07:35

## 2020-10-06 RX ADMIN — TAMSULOSIN HYDROCHLORIDE 0.8 MG: 0.4 CAPSULE ORAL at 07:35

## 2020-10-06 RX ADMIN — THERA TABS 1 TABLET: TAB at 07:35

## 2020-10-06 RX ADMIN — FINASTERIDE 5 MG: 5 TABLET, FILM COATED ORAL at 07:35

## 2020-10-06 RX ADMIN — QUETIAPINE FUMARATE 25 MG: 25 TABLET ORAL at 16:51

## 2020-10-06 NOTE — PROGRESS NOTES
Pt AOx4, denied having any pain at this time, although patient stated that he had intermittent pain in his penis. Patient legally blind. Provided needs. Bed locked and placed in lowest position. Bed alarms on. Call lights w/in reach. Room near nurse's station. Hourly rounds in place. Resting at this time.

## 2020-10-06 NOTE — PROGRESS NOTES
2 RN skin check completed with Betty HUI.   Devices in place: indwelling catheter, eyeglasses.  Skin assessed under devices: yes.  Confirmed pressure ulcers found on NA  New potential pressure ulcers noted on NA Wound consult placed NA.    The following interventions in place: 2 RN skin checks, pillows used for support/positioning, q2 turns if needed, méndez care, mepilex     Heels: flaky, dry, pink, red, boggy, placed mepilex on  R foot: reddened area over bony prominence to lateral side of foot - mepilex on  BLE: flaky, dusky, discolored  Penis: intact  Buttocks: pink and blanching

## 2020-10-07 PROCEDURE — 700102 HCHG RX REV CODE 250 W/ 637 OVERRIDE(OP): Performed by: HOSPITALIST

## 2020-10-07 PROCEDURE — 90662 IIV NO PRSV INCREASED AG IM: CPT | Performed by: NURSE PRACTITIONER

## 2020-10-07 PROCEDURE — 99231 SBSQ HOSP IP/OBS SF/LOW 25: CPT | Performed by: NURSE PRACTITIONER

## 2020-10-07 PROCEDURE — 770006 HCHG ROOM/CARE - MED/SURG/GYN SEMI*

## 2020-10-07 PROCEDURE — A9270 NON-COVERED ITEM OR SERVICE: HCPCS | Performed by: HOSPITALIST

## 2020-10-07 PROCEDURE — 90471 IMMUNIZATION ADMIN: CPT

## 2020-10-07 PROCEDURE — A9270 NON-COVERED ITEM OR SERVICE: HCPCS | Performed by: NURSE PRACTITIONER

## 2020-10-07 PROCEDURE — 700111 HCHG RX REV CODE 636 W/ 250 OVERRIDE (IP): Performed by: NURSE PRACTITIONER

## 2020-10-07 PROCEDURE — 700102 HCHG RX REV CODE 250 W/ 637 OVERRIDE(OP): Performed by: NURSE PRACTITIONER

## 2020-10-07 RX ADMIN — TAMSULOSIN HYDROCHLORIDE 0.8 MG: 0.4 CAPSULE ORAL at 08:47

## 2020-10-07 RX ADMIN — THERA TABS 1 TABLET: TAB at 08:47

## 2020-10-07 RX ADMIN — FINASTERIDE 5 MG: 5 TABLET, FILM COATED ORAL at 08:47

## 2020-10-07 RX ADMIN — QUETIAPINE FUMARATE 25 MG: 25 TABLET ORAL at 17:15

## 2020-10-07 RX ADMIN — AMLODIPINE BESYLATE 5 MG: 5 TABLET ORAL at 08:47

## 2020-10-07 NOTE — PROGRESS NOTES
2 RN skin check completed with Hazel HUI.   Devices in place: indwelling catheter, eyeglasses.  Skin assessed under devices: yes.  Confirmed pressure ulcers found on NA  New potential pressure ulcers noted on N/A Wound consult placed NA.     The following interventions in place: 2 RN skin checks, pillows used for support/positioning, q2 turns if needed, méndez care, mepilex     Heels: flaky, dry, pink, red, boggy, placed mepilex on  R foot: reddened area over bony prominence to lateral side of foot - mepilex on  BLE: flaky, dusky, discolored  Penis: intact  Buttocks: pink and blanching

## 2020-10-07 NOTE — PROGRESS NOTES
Hospital Medicine Daily Progress Note    Date of Service  10/7/2020    Chief Complaint  SOB and low blood pressue    Hospital Course   Mr. Bejarano is an 83 yo male with a PMHx of DM type II, hyperlipidemia, BPH who was admitted on 7/9/2020 with COVID 19 infection with POLY and hypotension.  Patient initially in ICU and on therapeutic Lovenox due to elevated DDimer.  His hospital course was complicated by hematuria and urine retention of which a méndez was placed.  Flomax and finasteride was initiated.  Patient was in acute renal failure which has improved. RUQ U/S was completed d/t periumbilical pain. This revealed possible renal mass. CT renal w/wo was performed and showed a large renal mass (7x7x8) likely representing urothelial vs renal cell carcinoma. Psychiatry was also consulted d/t ongoing cognition difficulty.  Per psychiatry, patient does not have the capacity to make any medical decision on 8/3/2020. SLP feels he has moderate to severe deficits in insight, orientation, and memory. Guardianship pending.     Despite Proscar and Flomax voiding trial failed 9/4 and red frank catheter was placed d/t high urinary retention. His renal fx has remained stable and WNL. Unfortunately, the patient is 116 pounds at the time of this note, with BMI of 17. He is a poor candidate for potential chemotherapy or surgical decompression given his malnutrition and neurocognitive disorder.      Interval Problem Update  10/7:  Flu vaccine ordered.  He is in good spirits today.  He is laughing during conversation.  Denies any acute needs.  VSS.      Consultants/Specialty  -Psychiatry  -Palliative Care    Code Status  DNAR/DNI    Disposition  Pending guardianship and placement    Review of Systems  Review of Systems   Unable to perform ROS: Dementia        Physical Exam  Temp:  [36.4 °C (97.6 °F)-37.1 °C (98.7 °F)] 36.6 °C (97.8 °F)  Pulse:  [69-93] 93  Resp:  [16-18] 18  BP: (105-118)/(55-66) 118/66  SpO2:  [94 %-95 %] 95  %    Physical Exam  Vitals signs and nursing note reviewed.   Constitutional:       General: He is not in acute distress.     Appearance: He is underweight. He is not ill-appearing.   HENT:      Head: Normocephalic and atraumatic.      Nose: Nose normal.   Eyes:      General:         Right eye: No discharge.         Left eye: No discharge.      Conjunctiva/sclera: Conjunctivae normal.   Neck:      Musculoskeletal: Normal range of motion and neck supple.   Cardiovascular:      Rate and Rhythm: Normal rate and regular rhythm.      Heart sounds: No murmur. No friction rub.   Pulmonary:      Effort: No respiratory distress.      Breath sounds: Normal breath sounds.   Abdominal:      General: Bowel sounds are normal. There is no distension.      Palpations: Abdomen is soft.      Tenderness: There is no abdominal tenderness.   Genitourinary:     Comments: Rosado in place.   Skin:     General: Skin is warm and dry.   Neurological:      Mental Status: He is alert and oriented to person, place, and time.   Psychiatric:         Attention and Perception: He is inattentive.         Mood and Affect: Mood normal.         Behavior: Behavior normal.         Cognition and Memory: Cognition normal. He exhibits impaired recent memory.         Fluids    Intake/Output Summary (Last 24 hours) at 10/7/2020 0933  Last data filed at 10/7/2020 0800  Gross per 24 hour   Intake 480 ml   Output 2100 ml   Net -1620 ml       Laboratory                        Imaging  CT-RENAL WITH & W/O   Final Result      1.  Large complex right renal mass as detailed above measuring approximately 7.8 x 7.1x  8.5 cm. Primary differential considerations include urothelial malignancy or renal cell carcinoma.   2.  No evidence of adenopathy or metastatic disease is seen.   3.  Distended urinary bladder. Right greater left hydroureteronephrosis.   4.  Severe atherosclerosis. 3.5 cm infrarenal abdominal aortic aneurysm.            US-RUQ   Final Result         1.   Echogenic liver compatible with fatty change versus fibrosis.   2.  Masslike structure in the right kidney, should be considered neoplastic unless proven otherwise, recommend follow-up 3 phase CT of the kidneys for further characterization   3.  Mild dilatation of the common bile duct, within expected limits given patient age.      These findings were discussed with the patient's clinician, Vin Mei, on 7/23/2020 7:34 AM.      DX-CHEST-PORTABLE (1 VIEW)   Final Result      1.  The lungs are hyperinflated suggesting emphysema/COPD.   2.  There is minimal predominantly lower lobe interstitial opacity which is most likely due to chronic scarring.           Assessment/Plan  Neurocognitive disorder, unspecified  Assessment & Plan  -Psychiatry was consulted, patient cannot make any medical decision  -SW working on guardianship. Hearing has been extended after initial meeting on 9/25, due to additional children being located  -Hearing moved to November     Urinary retention- (present on admission)  Assessment & Plan  -Continue Flomax and finasteride  -Méndez catheter in place for urinary retention on 8/7  -Trial removal of Méndez catheter 9/4 unsuccessful. Follow up outpatient. Discharge likely with méndez.    Right kidney mass- (present on admission)  Assessment & Plan  Suspicion for urothelial carcinoma vs renal cell carcinoma. No a/p adenopathy  Patient failed voiding trial despite flomax/proscar  Poor candidate for chemo given neurocognitive d/o, lacking capacity and requirement for guardianship      COPD (chronic obstructive pulmonary disease) (HCC)- (present on admission)  Assessment & Plan  -Supplemental oxygen as needed  -Inhaled bronchodilators as needed    Type 2 diabetes mellitus (HCC)- (present on admission)  Assessment & Plan  -Hemoglobin A1c at 6.8, continue diabetic diet, monitor  -BG has been well controlled  -Accu-Cheks only as needed for signs and symptoms of hypoglycemia or acute illness    Abdominal  aortic aneurysm (AAA) 3.0 cm to 5.5 cm in diameter in male (HCC)  Assessment & Plan  Infrarenal  Noted as incidental finding on CT renal w/wo  Not a candidate for repair given neurocognitive disorder, lacking capacity    Advanced care planning/counseling discussion  Assessment & Plan  -Patient is DNR/DNI  -Guardianship pending    Protein-calorie malnutrition (HCC)- (present on admission)  Assessment & Plan  -Body mass index is 17.54 kg/m².  -encourage PO intake  -food preferences  -TID supplements    COVID-19 virus infection  Assessment & Plan  -Patient has recovered from COVID-19       VTE prophylaxis : Ambulation    I have performed a physical exam and reviewed and updated ROS and Assessment/Plan today (10/07/20). In review of the previous note there are no changes except as documented above    I certify the patient requires continued medically necessary hospital services for the treatment of neurocognitive disorder.  The patient will remain in the hospital for the foreseeable future.  Discharge may or may not occur in the next 20 days due to ongoing discharge delays due to having no medically acceptable discharge options.      MICK Soto.

## 2020-10-08 PROCEDURE — A9270 NON-COVERED ITEM OR SERVICE: HCPCS | Performed by: HOSPITALIST

## 2020-10-08 PROCEDURE — 700102 HCHG RX REV CODE 250 W/ 637 OVERRIDE(OP): Performed by: HOSPITALIST

## 2020-10-08 PROCEDURE — 700102 HCHG RX REV CODE 250 W/ 637 OVERRIDE(OP): Performed by: NURSE PRACTITIONER

## 2020-10-08 PROCEDURE — A9270 NON-COVERED ITEM OR SERVICE: HCPCS | Performed by: NURSE PRACTITIONER

## 2020-10-08 PROCEDURE — 770006 HCHG ROOM/CARE - MED/SURG/GYN SEMI*

## 2020-10-08 RX ADMIN — TAMSULOSIN HYDROCHLORIDE 0.8 MG: 0.4 CAPSULE ORAL at 08:15

## 2020-10-08 RX ADMIN — AMLODIPINE BESYLATE 5 MG: 5 TABLET ORAL at 08:15

## 2020-10-08 RX ADMIN — THERA TABS 1 TABLET: TAB at 08:15

## 2020-10-08 RX ADMIN — QUETIAPINE FUMARATE 25 MG: 25 TABLET ORAL at 16:54

## 2020-10-08 RX ADMIN — FINASTERIDE 5 MG: 5 TABLET, FILM COATED ORAL at 08:15

## 2020-10-08 NOTE — PROGRESS NOTES
Patient A/Ox4, up with one person assist using cane, VSS, and on RA. Pt in bed resting. No complaints at this time. Rosado in place and draining to gravity, drained of 800 cc yellow urine. Rosado order active for urinary retention. Pt is safe with needs being met. Bed low and locked. Call light in reach. Hourly rounding in place. No signs of acute distress.

## 2020-10-08 NOTE — PROGRESS NOTES
2 RN skin check complete with EZ Orozco  Devices in place: mepilex, méndez catheter  Skin assessed under devices: yes  Confirmed pressure ulcers found: n/a  New potential pressure ulcers noted: n/a  The following interventions in place: encourage pt to get OOB, mepliex, waffle overlay    Notes: bilateral elbows pink and blanching, sacrum pink and blanching, heels pink and blanching, BLE dry and flaky

## 2020-10-08 NOTE — PROGRESS NOTES
Received report from night shift and assumed care. Assessment completed, POC discussed. Pt is A&OX4, denies pain or discomfort. Currently sitting at edge of bed eating breakfast. Medications given per MAR. All other needs met. Safety precautions and hourly rounding in place.

## 2020-10-08 NOTE — PROGRESS NOTES
2 RN skin check completed with EZ Kulkarni.   Devices in place: indwelling catheter.  Skin assessed under devices: yes.  Confirmed pressure ulcers found on: none.  New potential pressure ulcers noted on: none. Wound consult placed: for nail care.  The following interventions in place: Pt is able to turn himself in bed, continue to encourage this, pillows used for support/positioning, méndez care, mepilex, 2 RN skin check qshift.     Heels: pink, red, boggy (R heel slow to lupe) mepilex applied to both  R foot: reddened area over bony prominence to lateral side of foot (mepilex applied)  BLE: flaky, dusky, discolored  Penis: intact  Buttocks: pink and blanching

## 2020-10-08 NOTE — CARE PLAN
Problem: Safety  Goal: Will remain free from injury  Outcome: PROGRESSING AS EXPECTED  Patient is at high risk for falls. Bed alarm in place. Instructed patient to call prior to getting OOB.      Problem: Infection  Goal: Will remain free from infection  Outcome: PROGRESSING AS EXPECTED  Patient has méndez catheter in place. Delegated méndez care to CNA, hand hygiene performed before and after care.

## 2020-10-09 PROCEDURE — 770006 HCHG ROOM/CARE - MED/SURG/GYN SEMI*

## 2020-10-09 PROCEDURE — 700102 HCHG RX REV CODE 250 W/ 637 OVERRIDE(OP): Performed by: NURSE PRACTITIONER

## 2020-10-09 PROCEDURE — A9270 NON-COVERED ITEM OR SERVICE: HCPCS | Performed by: HOSPITALIST

## 2020-10-09 PROCEDURE — A9270 NON-COVERED ITEM OR SERVICE: HCPCS | Performed by: NURSE PRACTITIONER

## 2020-10-09 PROCEDURE — 700102 HCHG RX REV CODE 250 W/ 637 OVERRIDE(OP): Performed by: HOSPITALIST

## 2020-10-09 RX ADMIN — THERA TABS 1 TABLET: TAB at 08:41

## 2020-10-09 RX ADMIN — AMLODIPINE BESYLATE 5 MG: 5 TABLET ORAL at 08:41

## 2020-10-09 RX ADMIN — TAMSULOSIN HYDROCHLORIDE 0.8 MG: 0.4 CAPSULE ORAL at 08:40

## 2020-10-09 RX ADMIN — FINASTERIDE 5 MG: 5 TABLET, FILM COATED ORAL at 08:40

## 2020-10-09 RX ADMIN — QUETIAPINE FUMARATE 25 MG: 25 TABLET ORAL at 20:50

## 2020-10-09 NOTE — CARE PLAN
Problem: Infection  Goal: Will remain free from infection  Outcome: PROGRESSING AS EXPECTED     Problem: Urinary Elimination:  Goal: Ability to reestablish a normal urinary elimination pattern will improve  Outcome: PROGRESSING AS EXPECTED

## 2020-10-09 NOTE — PROGRESS NOTES
Patient A/Ox4, up with one person assist using cane, VSS, and on RA. Pt in bed fatigued and sleeping between care. No complaints at this time. Rosado in place and draining to gravity, drained of 600 cc hazy yellow urine. Rosado order active for urinary retention. Pt is safe with needs being met. Bed low and locked. Call light in reach. Hourly rounding in place. No signs of acute distress.

## 2020-10-09 NOTE — PROGRESS NOTES
Received report from night shift and assumed care. Assessment completed, POC discussed. Pt is A&OX4, denies pain or discomfort. Currently resting in bed. Pt has méndez catheter in place draining to gravity. Medications given per MAR. All needs met. Safety precautions and hourly rounding in place.

## 2020-10-10 PROCEDURE — A9270 NON-COVERED ITEM OR SERVICE: HCPCS | Performed by: HOSPITALIST

## 2020-10-10 PROCEDURE — 700102 HCHG RX REV CODE 250 W/ 637 OVERRIDE(OP): Performed by: HOSPITALIST

## 2020-10-10 PROCEDURE — 700102 HCHG RX REV CODE 250 W/ 637 OVERRIDE(OP): Performed by: NURSE PRACTITIONER

## 2020-10-10 PROCEDURE — A9270 NON-COVERED ITEM OR SERVICE: HCPCS | Performed by: NURSE PRACTITIONER

## 2020-10-10 PROCEDURE — 770006 HCHG ROOM/CARE - MED/SURG/GYN SEMI*

## 2020-10-10 RX ADMIN — FINASTERIDE 5 MG: 5 TABLET, FILM COATED ORAL at 07:46

## 2020-10-10 RX ADMIN — TAMSULOSIN HYDROCHLORIDE 0.8 MG: 0.4 CAPSULE ORAL at 07:46

## 2020-10-10 RX ADMIN — QUETIAPINE FUMARATE 25 MG: 25 TABLET ORAL at 17:17

## 2020-10-10 RX ADMIN — AMLODIPINE BESYLATE 5 MG: 5 TABLET ORAL at 07:46

## 2020-10-10 RX ADMIN — THERA TABS 1 TABLET: TAB at 07:45

## 2020-10-10 ASSESSMENT — ENCOUNTER SYMPTOMS: MYALGIAS: 0

## 2020-10-10 NOTE — PROGRESS NOTES
2 RN skin check completed with Cayla HUI.   Devices in place: indwelling catheter, eyeglasses.  Skin assessed under devices: yes.  Confirmed pressure ulcers found on NA  New potential pressure ulcers noted on N/A Wound consult placed NA.     The following interventions in place: 2 RN skin checks, pillows used for support/positioning, q2 turns if needed, méndez care, mepilex     Heels: flaky, dry, pink, red, boggy, placed mepilex on  R foot: reddened area over bony prominence to lateral side of foot - mepilex on  BLE: flaky, dusky, discolored  Penis: intact  Buttocks: pink and blanching

## 2020-10-10 NOTE — PROGRESS NOTES
Hospital Medicine Daily Progress Note    Date of Service  10/10/2020    Chief Complaint  SOB and low blood pressue    Hospital Course   Mr. Bejarano is an 83 yo male with a PMHx of DM type II, hyperlipidemia, BPH who was admitted on 7/9/2020 with COVID 19 infection with POLY and hypotension.  Patient initially in ICU and on therapeutic Lovenox due to elevated DDimer.  His hospital course was complicated by hematuria and urine retention of which a méndez was placed.  Flomax and finasteride was initiated.  Patient was in acute renal failure which has improved. RUQ U/S was completed d/t periumbilical pain. This revealed possible renal mass. CT renal w/wo was performed and showed a large renal mass (7x7x8) likely representing urothelial vs renal cell carcinoma. Psychiatry was also consulted d/t ongoing cognition difficulty.  Per psychiatry, patient does not have the capacity to make any medical decision on 8/3/2020. SLP feels he has moderate to severe deficits in insight, orientation, and memory. Guardianship pending.     Despite Proscar and Flomax voiding trial failed 9/4 and red frank catheter was placed d/t high urinary retention. His renal fx has remained stable and WNL. Unfortunately, the patient is 116 pounds at the time of this note, with BMI of 17. He is a poor candidate for potential chemotherapy or surgical decompression given his malnutrition and neurocognitive disorder.      Interval Problem Update  10/7:  Flu vaccine ordered.  He is in good spirits today.  He is laughing during conversation.  Denies any acute needs.  VSS.    10/10:  Sitting up on edge of bed eating breakfast.  Pleasant mood.  Denies pain or discomfort.  Clinical evaluation unchanged.     Consultants/Specialty  -Psychiatry  -Palliative Care    Code Status  DNAR/DNI    Disposition  Pending guardianship and placement    Review of Systems  Review of Systems   Unable to perform ROS: Dementia   Musculoskeletal: Negative for joint pain and myalgias.         Physical Exam  Temp:  [36.3 °C (97.4 °F)-36.9 °C (98.5 °F)] 36.9 °C (98.5 °F)  Pulse:  [66-70] 66  Resp:  [16-18] 18  BP: (116-144)/(54-79) 116/54  SpO2:  [93 %] 93 %    Physical Exam  Vitals signs and nursing note reviewed.   Constitutional:       General: He is not in acute distress.     Appearance: Normal appearance. He is underweight. He is not toxic-appearing.   HENT:      Head: Normocephalic and atraumatic.      Nose: Nose normal.      Mouth/Throat:      Pharynx: Oropharynx is clear. No oropharyngeal exudate.   Eyes:      Conjunctiva/sclera: Conjunctivae normal.   Neck:      Musculoskeletal: Normal range of motion and neck supple. No neck rigidity.   Cardiovascular:      Rate and Rhythm: Normal rate and regular rhythm.      Heart sounds: Normal heart sounds. No murmur.   Pulmonary:      Effort: Pulmonary effort is normal. No respiratory distress.      Breath sounds: Normal breath sounds.   Abdominal:      General: Bowel sounds are normal. There is no distension.      Palpations: Abdomen is soft.      Tenderness: There is no abdominal tenderness. There is no guarding.   Genitourinary:     Comments: Rosado in place.   Musculoskeletal:         General: No swelling or tenderness.   Skin:     General: Skin is warm and dry.   Neurological:      Mental Status: He is alert and oriented to person, place, and time.   Psychiatric:         Attention and Perception: He is inattentive.         Mood and Affect: Mood normal.         Behavior: Behavior normal.         Cognition and Memory: Cognition normal. He exhibits impaired recent memory.         Fluids    Intake/Output Summary (Last 24 hours) at 10/10/2020 0820  Last data filed at 10/10/2020 0800  Gross per 24 hour   Intake 880 ml   Output 2150 ml   Net -1270 ml       Laboratory                        Imaging  CT-RENAL WITH & W/O   Final Result      1.  Large complex right renal mass as detailed above measuring approximately 7.8 x 7.1x  8.5 cm. Primary differential  considerations include urothelial malignancy or renal cell carcinoma.   2.  No evidence of adenopathy or metastatic disease is seen.   3.  Distended urinary bladder. Right greater left hydroureteronephrosis.   4.  Severe atherosclerosis. 3.5 cm infrarenal abdominal aortic aneurysm.            US-RUQ   Final Result         1.  Echogenic liver compatible with fatty change versus fibrosis.   2.  Masslike structure in the right kidney, should be considered neoplastic unless proven otherwise, recommend follow-up 3 phase CT of the kidneys for further characterization   3.  Mild dilatation of the common bile duct, within expected limits given patient age.      These findings were discussed with the patient's clinician, Vin Mei, on 7/23/2020 7:34 AM.      DX-CHEST-PORTABLE (1 VIEW)   Final Result      1.  The lungs are hyperinflated suggesting emphysema/COPD.   2.  There is minimal predominantly lower lobe interstitial opacity which is most likely due to chronic scarring.           Assessment/Plan  Neurocognitive disorder, unspecified  Assessment & Plan  -Psychiatry was consulted, patient cannot make any medical decision  -SW working on guardianship. Hearing has been extended after initial meeting on 9/25, due to additional children being located  -Hearing moved to November     Urinary retention- (present on admission)  Assessment & Plan  -Continue Flomax and finasteride  -Méndez catheter in place for urinary retention on 8/7  -Trial removal of Méndez catheter 9/4 unsuccessful. Follow up outpatient. Discharge likely with méndez.    Right kidney mass- (present on admission)  Assessment & Plan  Suspicion for urothelial carcinoma vs renal cell carcinoma. No a/p adenopathy  Patient failed voiding trial despite flomax/proscar  Poor candidate for chemo given neurocognitive d/o, lacking capacity and requirement for guardianship      COPD (chronic obstructive pulmonary disease) (HCC)- (present on admission)  Assessment &  Plan  -Supplemental oxygen as needed  -Inhaled bronchodilators as needed    Type 2 diabetes mellitus (HCC)- (present on admission)  Assessment & Plan  -Hemoglobin A1c at 6.8, continue diabetic diet, monitor  -BG has been well controlled  -Accu-Cheks only as needed for signs and symptoms of hypoglycemia or acute illness    Abdominal aortic aneurysm (AAA) 3.0 cm to 5.5 cm in diameter in male (HCC)  Assessment & Plan  Infrarenal  Noted as incidental finding on CT renal w/wo  Not a candidate for repair given neurocognitive disorder, lacking capacity    Advanced care planning/counseling discussion  Assessment & Plan  -Patient is DNR/DNI  -Guardianship pending    Protein-calorie malnutrition (HCC)- (present on admission)  Assessment & Plan  -Body mass index is 17.54 kg/m².  -encourage PO intake  -food preferences  -TID supplements    COVID-19 virus infection  Assessment & Plan  -Patient has recovered from COVID-19       VTE prophylaxis : Ambulation    I have performed a physical exam and reviewed and updated ROS and Assessment/Plan today (10/10/20). In review of the previous note there are no changes except as documented above    I certify the patient requires continued medically necessary hospital services for the treatment of neurocognitive disorder.  The patient will remain in the hospital for the foreseeable future.  Discharge may or may not occur in the next 20 days due to ongoing discharge delays due to having no medically acceptable discharge options.      MICK Soto.

## 2020-10-10 NOTE — PROGRESS NOTES
Pharmacy Pharmacotherapy Consult for LOS >30 days    Admit Date: 7/9/2020      Medications were reviewed for appropriateness and ongoing need.     Current Facility-Administered Medications   Medication Dose Route Frequency Provider Last Rate Last Dose   • amLODIPine (NORVASC) tablet 5 mg  5 mg Oral Q DAY Jose Juan M Olde, A.P.N.   5 mg at 10/10/20 0746   • finasteride (PROSCAR) tablet 5 mg  5 mg Oral DAILY [facility] Olde, A.P.N.   5 mg at 10/10/20 0746   • multivitamin (THERAGRAN) tablet 1 Tab  1 Tab Oral DAILY [facility] Oldmichael, A.P.N.   1 Tab at 10/10/20 0745   • haloperidol lactate (HALDOL) injection 2-5 mg  2-5 mg Intramuscular Q4HRS PRN [facility] Olde, A.P.N.       • QUEtiapine (SEROQUEL) tablet 25 mg  25 mg Oral Q EVENING [facility] M.DMali   25 mg at 10/09/20 2050   • vitamin D (Ergocalciferol) (DRISDOL) capsule 50,000 Units  50,000 [facility]7 [facility]   50,000 Units at 10/05/20 1420   • labetalol (NORMODYNE/TRANDATE) injection 10 mg  10 mg Intravenous Q4HRS PRN Denia Maciel M.D.       • tamsulosin (FLOMAX) capsule 0.8 mg  0.8 mg Oral AFTER BREAKFAST [facility] Raheem M.DMali   0.8 mg at 10/10/20 0746   • ondansetron (ZOFRAN) syringe/vial injection 4 mg  4 mg Intravenous Q6HRS PRN Darin Valencia M.D.       • ondansetron (ZOFRAN ODT) dispertab 4 mg  4 mg Oral Q6HRS PRN Darin Valencia M.D.       • acetaminophen (TYLENOL) tablet 650 mg  650 mg Oral Q6HRS PRN Darin Valencia M.D.   650 mg at 09/09/20 2010       Recommendations:  D/w Provider , continue current medications. No changes.    David HungD BCPS

## 2020-10-11 LAB — GLUCOSE BLD-MCNC: 144 MG/DL (ref 65–99)

## 2020-10-11 PROCEDURE — 700102 HCHG RX REV CODE 250 W/ 637 OVERRIDE(OP): Performed by: HOSPITALIST

## 2020-10-11 PROCEDURE — 700102 HCHG RX REV CODE 250 W/ 637 OVERRIDE(OP): Performed by: NURSE PRACTITIONER

## 2020-10-11 PROCEDURE — 82962 GLUCOSE BLOOD TEST: CPT

## 2020-10-11 PROCEDURE — A9270 NON-COVERED ITEM OR SERVICE: HCPCS | Performed by: HOSPITALIST

## 2020-10-11 PROCEDURE — 770006 HCHG ROOM/CARE - MED/SURG/GYN SEMI*

## 2020-10-11 PROCEDURE — A9270 NON-COVERED ITEM OR SERVICE: HCPCS | Performed by: NURSE PRACTITIONER

## 2020-10-11 RX ADMIN — FINASTERIDE 5 MG: 5 TABLET, FILM COATED ORAL at 10:25

## 2020-10-11 RX ADMIN — AMLODIPINE BESYLATE 5 MG: 5 TABLET ORAL at 10:25

## 2020-10-11 RX ADMIN — THERA TABS 1 TABLET: TAB at 10:25

## 2020-10-11 RX ADMIN — QUETIAPINE FUMARATE 25 MG: 25 TABLET ORAL at 16:46

## 2020-10-11 RX ADMIN — TAMSULOSIN HYDROCHLORIDE 0.8 MG: 0.4 CAPSULE ORAL at 10:25

## 2020-10-11 ASSESSMENT — PAIN DESCRIPTION - PAIN TYPE: TYPE: ACUTE PAIN

## 2020-10-11 NOTE — CARE PLAN
Problem: Safety  Goal: Will remain free from injury  Outcome: PROGRESSING AS EXPECTED  Note: Pt is legally blind. Fall precautions in place. Bed in lowest position. Non-skid socks in place. Personal possessions within reach. Mobility sign on door. Bed-alarm on. Call light within reach. Pt educated regarding fall prevention and states understanding. Pt calls appropriately for help and makes no attempts to get OOB unsafely.     Problem: Knowledge Deficit  Goal: Knowledge of disease process/condition, treatment plan, diagnostic tests, and medications will improve  Outcome: PROGRESSING AS EXPECTED  Note: Pt educated regarding plan of care and medications. All questions answered. Pt well aware of POC and understands the reason for each medication he takes.

## 2020-10-11 NOTE — PROGRESS NOTES
RN skin check completed with EZ Lira.   Devices in place: méndez, heel mepliex.  Skin assessed under devices: yes.  Confirmed pressure ulcers found on: none.  New potential pressure ulcers noted on: none. Wound consult placed:none.    The following interventions in place: 2 RN skin check, pt turns himself in bed frequently, pillows used for support/positioning, méndez care, heel mepilex, ambulation with assistance .      Skin assessment:  BUE: intact; dry/fragile  Abdomen: intact  Sacrum: intact; pink and blanching   Penis: intact; méndez site CDI  BLE: intact; dusky/dry/flaky  Heels: pink/red/ boggy but blanching (right heel slow to lupe) (mepliex in place)

## 2020-10-11 NOTE — PROGRESS NOTES
2 RN skin check completed with EZ Kulkarni.   Devices in place: indwelling catheter.  Skin assessed under devices: yes.  Confirmed pressure ulcers found on: none.  New potential pressure ulcers noted on: none. Wound consult placed: for nail care.  The following interventions in place: 2 RN skin check. Pt is able to turn himself in bed, continue to encourage this, pillows used for support/positioning, méndez care, mepilex.     Skin assessment:  Buttocks: pink and blanching  Heels: pink, red, boggy (R heel slow to lupe) mepilex applied to both  R foot: reddened area over bony prominence to lateral side of foot (mepilex applied)  BLE: flaky, dusky, discolored  Penis: intact

## 2020-10-11 NOTE — PROGRESS NOTES
Patient alert/oriented x4,room air,denies pain,v/s stable,méndez catheter patent with yellow color urine output,sleeping comfortably,patient is blind,bed in low position and bed alarm on.

## 2020-10-11 NOTE — PROGRESS NOTES
Received report from night shift RN and assumed pt care. Pt is A&OX3; disorientated to event. Denies pain or discomfort, and shows no signs of distress. Assessment complete. Pt is legally blind. Ambulates with standby-1xassist with cane. Fall education provided. Bed alarm on and pt calls appropriately for assistance. Rosado catheter in place with site CDI. Medications given per MAR with no issues. Pt currently resting in bed. All needs met. Hourly rounding in place.

## 2020-10-12 PROCEDURE — 770006 HCHG ROOM/CARE - MED/SURG/GYN SEMI*

## 2020-10-12 PROCEDURE — 700102 HCHG RX REV CODE 250 W/ 637 OVERRIDE(OP): Performed by: HOSPITALIST

## 2020-10-12 PROCEDURE — A9270 NON-COVERED ITEM OR SERVICE: HCPCS | Performed by: HOSPITALIST

## 2020-10-12 PROCEDURE — A9270 NON-COVERED ITEM OR SERVICE: HCPCS | Performed by: NURSE PRACTITIONER

## 2020-10-12 PROCEDURE — 700102 HCHG RX REV CODE 250 W/ 637 OVERRIDE(OP): Performed by: NURSE PRACTITIONER

## 2020-10-12 RX ADMIN — AMLODIPINE BESYLATE 5 MG: 5 TABLET ORAL at 07:30

## 2020-10-12 RX ADMIN — QUETIAPINE FUMARATE 25 MG: 25 TABLET ORAL at 16:43

## 2020-10-12 RX ADMIN — ERGOCALCIFEROL 50000 UNITS: 1.25 CAPSULE ORAL at 14:49

## 2020-10-12 RX ADMIN — THERA TABS 1 TABLET: TAB at 07:30

## 2020-10-12 RX ADMIN — TAMSULOSIN HYDROCHLORIDE 0.8 MG: 0.4 CAPSULE ORAL at 07:30

## 2020-10-12 RX ADMIN — FINASTERIDE 5 MG: 5 TABLET, FILM COATED ORAL at 07:30

## 2020-10-12 ASSESSMENT — PAIN DESCRIPTION - PAIN TYPE: TYPE: ACUTE PAIN

## 2020-10-12 NOTE — PROGRESS NOTES
RN skin check completed with EZ Carvalho.   Devices in place: méndez, heel mepliex.  Skin assessed under devices: yes.  Confirmed pressure ulcers found on: none.  New potential pressure ulcers noted on: none. Wound consult placed:none.     The following interventions in place: 2 RN skin check, pt turns himself in bed frequently, pillows used for support/positioning, méndez care, heel mepilex, ambulation with assistance .      Skin assessment:  BUE: intact; dry/fragile  Abdomen: intact  Sacrum: intact; pink and blanching   Penis: intact; méndez site CDI  BLE: intact; dusky/dry/flaky  Heels: pink/red/ boggy but blanching (right heel slow to lupe) (mepliex in place)

## 2020-10-12 NOTE — PROGRESS NOTES
Pt is A&Ox3. VSS on RA. Pt denies any pain at this time. Pt is legally blind in both eyes. Pt is OOB assist x1 with cane. Fall education provided to pt. Bed alarm on, call light within reach, safety precautions in place, hourly rounding, WCM.

## 2020-10-13 PROCEDURE — A9270 NON-COVERED ITEM OR SERVICE: HCPCS | Performed by: HOSPITALIST

## 2020-10-13 PROCEDURE — 700102 HCHG RX REV CODE 250 W/ 637 OVERRIDE(OP): Performed by: HOSPITALIST

## 2020-10-13 PROCEDURE — A9270 NON-COVERED ITEM OR SERVICE: HCPCS | Performed by: NURSE PRACTITIONER

## 2020-10-13 PROCEDURE — 770006 HCHG ROOM/CARE - MED/SURG/GYN SEMI*

## 2020-10-13 PROCEDURE — 700102 HCHG RX REV CODE 250 W/ 637 OVERRIDE(OP): Performed by: NURSE PRACTITIONER

## 2020-10-13 RX ADMIN — QUETIAPINE FUMARATE 25 MG: 25 TABLET ORAL at 21:26

## 2020-10-13 RX ADMIN — FINASTERIDE 5 MG: 5 TABLET, FILM COATED ORAL at 08:03

## 2020-10-13 RX ADMIN — TAMSULOSIN HYDROCHLORIDE 0.8 MG: 0.4 CAPSULE ORAL at 08:03

## 2020-10-13 RX ADMIN — THERA TABS 1 TABLET: TAB at 08:03

## 2020-10-13 RX ADMIN — AMLODIPINE BESYLATE 5 MG: 5 TABLET ORAL at 08:03

## 2020-10-13 NOTE — PROGRESS NOTES
Received report from night shift and assumed care. Assessment completed, POC discussed. Pt is A&OX3, disoriented to event. Denies pain or discomfort. Currently sitting at edge of bed. Rosado catheter in place draining to gravity. All other needs met. Safety precautions and hourly rounding in place.

## 2020-10-13 NOTE — PROGRESS NOTES
2 RN skin check completed with EZ Lira.   Devices in place: méndez catheter, mepilex.  Skin assessed under devices: yes.  Confirmed pressure ulcers found: none.  New potential pressure ulcers noted: n/a.  Wound consult placed:  n/a.        Skin assessment:   BUE: intact, dry, fragile  Sacrum: pink, blanching  Penis: méndez site c/d/i, moist, méndez care performed  BLE: dusky, ry, flaky  BL Heels: pink, red, boggy, blanching         The following interventions in place: 2RN skin check Qshift, q2hr turns, incontinence monitoring with bed linen changes PRN for incontinence episodes, barrier cream. Pillows in use for support/positioning. Mepilex on bl heels.

## 2020-10-13 NOTE — PROGRESS NOTES
2 RN skin check complete with EZ Palomares  Devices in place: mepilex, méndez catheter  Skin assessed under devices: yes  Confirmed pressure ulcers found: n/a  New potential pressure ulcers noted: n/a  The following interventions in place: encourage pt to get OOB, mepliex, waffle overlay     Notes: bilateral elbows pink and blanching, sacrum pink and blanching, heels pink and blanching, BLE dry and flaky

## 2020-10-13 NOTE — PROGRESS NOTES
Pt is A&Ox3 on RA, VSS.  Pt reports feeling cold, warm blankets were given.   Safety measures in place, wctm.

## 2020-10-14 PROBLEM — Z75.8 DISCHARGE PLANNING ISSUES: Status: ACTIVE | Noted: 2020-10-14

## 2020-10-14 PROBLEM — Z02.9 DISCHARGE PLANNING ISSUES: Status: ACTIVE | Noted: 2020-10-14

## 2020-10-14 PROCEDURE — A9270 NON-COVERED ITEM OR SERVICE: HCPCS | Performed by: HOSPITALIST

## 2020-10-14 PROCEDURE — 770006 HCHG ROOM/CARE - MED/SURG/GYN SEMI*

## 2020-10-14 PROCEDURE — 700102 HCHG RX REV CODE 250 W/ 637 OVERRIDE(OP): Performed by: HOSPITALIST

## 2020-10-14 PROCEDURE — 700102 HCHG RX REV CODE 250 W/ 637 OVERRIDE(OP): Performed by: NURSE PRACTITIONER

## 2020-10-14 PROCEDURE — A9270 NON-COVERED ITEM OR SERVICE: HCPCS | Performed by: NURSE PRACTITIONER

## 2020-10-14 PROCEDURE — 99231 SBSQ HOSP IP/OBS SF/LOW 25: CPT | Performed by: NURSE PRACTITIONER

## 2020-10-14 RX ADMIN — AMLODIPINE BESYLATE 5 MG: 5 TABLET ORAL at 07:59

## 2020-10-14 RX ADMIN — FINASTERIDE 5 MG: 5 TABLET, FILM COATED ORAL at 07:59

## 2020-10-14 RX ADMIN — THERA TABS 1 TABLET: TAB at 07:59

## 2020-10-14 RX ADMIN — QUETIAPINE FUMARATE 25 MG: 25 TABLET ORAL at 20:51

## 2020-10-14 RX ADMIN — TAMSULOSIN HYDROCHLORIDE 0.8 MG: 0.4 CAPSULE ORAL at 07:59

## 2020-10-14 NOTE — PROGRESS NOTES
Hospital Medicine Daily Progress Note    Date of Service  10/14/2020    Chief Complaint  SOB and low blood pressue    Hospital Course   Mr. Bejarano is an 85 yo male with a PMHx of DM type II, hyperlipidemia, BPH who was admitted on 7/9/2020 with COVID 19 infection with POLY and hypotension.  Patient initially in ICU and on therapeutic Lovenox due to elevated DDimer.  His hospital course was complicated by hematuria and urine retention of which a méndez was placed.  Flomax and finasteride was initiated.  Patient was in acute renal failure which has improved. RUQ U/S was completed d/t periumbilical pain. This revealed possible renal mass. CT renal w/wo was performed and showed a large renal mass (7x7x8) likely representing urothelial vs renal cell carcinoma. Psychiatry was also consulted d/t ongoing cognition difficulty.  Per psychiatry, patient does not have the capacity to make any medical decision on 8/3/2020. SLP feels he has moderate to severe deficits in insight, orientation, and memory. Guardianship pending.     Despite Proscar and Flomax voiding trial failed 9/4 and red frank catheter was placed d/t high urinary retention. His renal fx has remained stable and WNL. Unfortunately, the patient is 116 pounds at the time of this note, with BMI of 17. He is a poor candidate for potential chemotherapy or surgical decompression given his malnutrition and neurocognitive disorder.      Interval Problem Update  10/14 - self-isolates despite encouragement  -disoriented to situation at his baseline. Behaviors appropriate.    Consultants/Specialty  -Psychiatry  -Palliative Care    Code Status  DNAR/DNI    Disposition  Pending guardianship and placement    Review of Systems  Review of Systems   Unable to perform ROS: Dementia        Physical Exam  Temp:  [36.7 °C (98.1 °F)-36.8 °C (98.3 °F)] 36.8 °C (98.3 °F)  Pulse:  [60-68] 60  Resp:  [14-18] 14  BP: (115-133)/(51-65) 133/65  SpO2:  [92 %-96 %] 96 %    Physical Exam  Vitals  signs and nursing note reviewed.   Constitutional:       Appearance: He is underweight. He is not ill-appearing.   HENT:      Head: Normocephalic and atraumatic.      Nose: Nose normal.   Eyes:      Conjunctiva/sclera: Conjunctivae normal.      Pupils: Pupils are equal, round, and reactive to light.   Neck:      Musculoskeletal: Neck supple.   Cardiovascular:      Rate and Rhythm: Normal rate and regular rhythm.      Heart sounds: No murmur. No friction rub.   Pulmonary:      Effort: No respiratory distress.   Abdominal:      General: Bowel sounds are normal. There is no distension.      Palpations: Abdomen is soft.   Skin:     General: Skin is warm and dry.   Neurological:      Mental Status: He is alert and oriented to person, place, and time.   Psychiatric:         Attention and Perception: He is inattentive.         Mood and Affect: Mood normal.         Behavior: Behavior normal.         Cognition and Memory: Cognition normal. He exhibits impaired recent memory.         Fluids    Intake/Output Summary (Last 24 hours) at 10/14/2020 1441  Last data filed at 10/14/2020 1300  Gross per 24 hour   Intake 1050 ml   Output 1600 ml   Net -550 ml       Laboratory                        Imaging  CT-RENAL WITH & W/O   Final Result      1.  Large complex right renal mass as detailed above measuring approximately 7.8 x 7.1x  8.5 cm. Primary differential considerations include urothelial malignancy or renal cell carcinoma.   2.  No evidence of adenopathy or metastatic disease is seen.   3.  Distended urinary bladder. Right greater left hydroureteronephrosis.   4.  Severe atherosclerosis. 3.5 cm infrarenal abdominal aortic aneurysm.            US-RUQ   Final Result         1.  Echogenic liver compatible with fatty change versus fibrosis.   2.  Masslike structure in the right kidney, should be considered neoplastic unless proven otherwise, recommend follow-up 3 phase CT of the kidneys for further characterization   3.  Mild  dilatation of the common bile duct, within expected limits given patient age.      These findings were discussed with the patient's clinician, Vin Mei, on 7/23/2020 7:34 AM.      DX-CHEST-PORTABLE (1 VIEW)   Final Result      1.  The lungs are hyperinflated suggesting emphysema/COPD.   2.  There is minimal predominantly lower lobe interstitial opacity which is most likely due to chronic scarring.           Assessment/Plan  Neurocognitive disorder, unspecified  Assessment & Plan  -Psychiatry was consulted, patient cannot make any medical decision  -SW working on guardianship. Hearing has been extended after initial meeting on 9/25, due to additional children being located  -Hearing moved to November     Urinary retention- (present on admission)  Assessment & Plan  -Continue Flomax and finasteride  -Méndez catheter in place for urinary retention on 8/7  -Trial removal of Méndez catheter 9/4 unsuccessful. Méndez replaced on 9/4  -per policy, will leave in due to difficulties with placement and bleeding with previous exchanges  -Follow up outpatient. Discharge likely with méndez.    Right kidney mass- (present on admission)  Assessment & Plan  -Suspicion for urothelial carcinoma vs renal cell carcinoma. No a/p adenopathy  -Patient failed voiding trial despite flomax/proscar  -Poor candidate for chemo given neurocognitive d/o, lacking capacity and requirement for guardianship      COPD (chronic obstructive pulmonary disease) (HCC)- (present on admission)  Assessment & Plan  -Supplemental oxygen as needed  -Inhaled bronchodilators as needed    Discharge planning issues  Assessment & Plan  -CM following  -plan for Group Home at [place]  -guardianship hearing remains pending.    Type 2 diabetes mellitus (HCC)- (present on admission)  Assessment & Plan  -Hemoglobin A1c at 6.8, continue diabetic diet, monitor  -BG has been well controlled  -Accu-Cheks only as needed for signs and symptoms of hypoglycemia or acute  illness    Abdominal aortic aneurysm (AAA) 3.0 cm to 5.5 cm in diameter in male (HCC)  Assessment & Plan  Infrarenal  Noted as incidental finding on CT renal w/wo  Not a candidate for repair given neurocognitive disorder, lacking capacity    Advanced care planning/counseling discussion  Assessment & Plan  -Patient is DNR/DNI  -Guardianship pending    Protein-calorie malnutrition (HCC)- (present on admission)  Assessment & Plan  -Body mass index is 17.57 kg/m².  -encourage PO intake  -food preferences  -TID supplements    COVID-19 virus infection  Assessment & Plan  -Patient has recovered from COVID-19       VTE prophylaxis : Ambulation    I have performed a physical exam and reviewed and updated ROS and Assessment/Plan today (10/14/20). In review of the previous note there are no changes except as documented above    I certify the patient requires continued medically necessary hospital services for the treatment of neurocognitive disorder.  The patient will remain in the hospital for the foreseeable future.  Discharge may or may not occur in the next 20 days due to ongoing discharge delays due to having no medically acceptable discharge options.    Please note that this dictation was created using voice recognition software. I have made every reasonable attempt to correct obvious errors, but there may be errors of grammar and possibly content that I did not discover before finalizing the note.    MICK Guerrero.

## 2020-10-14 NOTE — PROGRESS NOTES
2 RN skin check completed with EZ Carvalho.   Devices in place: méndez catheter, heel mepilex.  Skin assessed under devices: yes.  Confirmed pressure ulcers found: none.  New potential pressure ulcers noted: none.  Wound consult placed:  n/a.      Skin assessment: heels pink/red/boggy/blanching. BLE dusky/dry/flaky. Méndez insertion site CDI. Sacrum pink/blanching. BUE dry.      The following interventions in place: pillows for support/positioning. 2 RN skin check. Méndez care. Heel mepilex. Encouraged to reposition self in bed frequently.

## 2020-10-14 NOTE — PROGRESS NOTES
Pt AOx3 (disoriented to event), denies pain. Rosado catheter patent and draining without difficulty. pt resting quietly in bed. Call light within reach, personal belongings available, bed in lowest position, treaded socks on, and bed alarm on. Hourly rounding in place.

## 2020-10-14 NOTE — PROGRESS NOTES
Received report from night shift and assumed care. Assessment completed, POC discussed. Pt is A&Ox3, disoriented to event. Denies pain or discomfort. Currently sitting at edge of bed eating breakfast. All other needs met. Safety precautions and hourly rounding in place.

## 2020-10-15 PROCEDURE — 700102 HCHG RX REV CODE 250 W/ 637 OVERRIDE(OP): Performed by: HOSPITALIST

## 2020-10-15 PROCEDURE — A9270 NON-COVERED ITEM OR SERVICE: HCPCS | Performed by: HOSPITALIST

## 2020-10-15 PROCEDURE — A9270 NON-COVERED ITEM OR SERVICE: HCPCS | Performed by: NURSE PRACTITIONER

## 2020-10-15 PROCEDURE — 700102 HCHG RX REV CODE 250 W/ 637 OVERRIDE(OP): Performed by: NURSE PRACTITIONER

## 2020-10-15 PROCEDURE — 770006 HCHG ROOM/CARE - MED/SURG/GYN SEMI*

## 2020-10-15 RX ADMIN — FINASTERIDE 5 MG: 5 TABLET, FILM COATED ORAL at 09:23

## 2020-10-15 RX ADMIN — TAMSULOSIN HYDROCHLORIDE 0.8 MG: 0.4 CAPSULE ORAL at 09:23

## 2020-10-15 RX ADMIN — QUETIAPINE FUMARATE 25 MG: 25 TABLET ORAL at 17:21

## 2020-10-15 RX ADMIN — THERA TABS 1 TABLET: TAB at 09:23

## 2020-10-15 RX ADMIN — AMLODIPINE BESYLATE 5 MG: 5 TABLET ORAL at 09:23

## 2020-10-15 NOTE — PROGRESS NOTES
RN skin check completed with EZ Carvalho.   Devices in place: méndez  Skin assessed under devices: yes.  Confirmed pressure ulcers found on: N/A  New potential pressure ulcers noted on: N/A Wound consult placed: N/A     The following interventions in place: 2 RN skin checks, pt turns self side to side, pillows used for support/positioning, méndez care, heel mepilex, ambulation with SBA     Skin assessment      BUE dry.  Penis intact with some redness on underside, otherwise méndez site CDI.  BLE dusky, dry,flaky.  Bilateral heels pink/red, boggy, blanching. Mepilex in place.

## 2020-10-15 NOTE — PROGRESS NOTES
Received report and assumed care. Pt AOx 3, denies pain, and mobile with SBA.  VSS. Rosado catheter in place with 2 RN skin checks. Pt came out to community table for about on hour this evening. Call light and pt belongings in reach, bed alarm on, bed locked and in lowest position, and pt now resting quietly.

## 2020-10-15 NOTE — PROGRESS NOTES
2 RN skin check complete with EZ Alicia  Devices in place: mepilex, méndez catheter  Skin assessed under devices: yes  Confirmed pressure ulcers found: n/a  New potential pressure ulcers noted: n/a  The following interventions in place: encourage pt to get OOB, mepliex, waffle overlay     Notes: bilateral elbows pink and blanching, sacrum pink and blanching, heels pink and blanching, BLE dry and flaky

## 2020-10-15 NOTE — CARE PLAN
Problem: Safety  Goal: Will remain free from injury  Outcome: PROGRESSING AS EXPECTED  Note: Patient remains on fall precautions. Bed alarm on, frequent checks.      Problem: Discharge Barriers/Planning  Goal: Patient's continuum of care needs will be met  Outcome: PROGRESSING AS EXPECTED  Note: CW/SW working on safe discharge plan.

## 2020-10-16 PROCEDURE — A9270 NON-COVERED ITEM OR SERVICE: HCPCS | Performed by: HOSPITALIST

## 2020-10-16 PROCEDURE — 700102 HCHG RX REV CODE 250 W/ 637 OVERRIDE(OP): Performed by: HOSPITALIST

## 2020-10-16 PROCEDURE — A9270 NON-COVERED ITEM OR SERVICE: HCPCS | Performed by: NURSE PRACTITIONER

## 2020-10-16 PROCEDURE — 700102 HCHG RX REV CODE 250 W/ 637 OVERRIDE(OP): Performed by: NURSE PRACTITIONER

## 2020-10-16 PROCEDURE — 770006 HCHG ROOM/CARE - MED/SURG/GYN SEMI*

## 2020-10-16 RX ADMIN — THERA TABS 1 TABLET: TAB at 09:51

## 2020-10-16 RX ADMIN — TAMSULOSIN HYDROCHLORIDE 0.8 MG: 0.4 CAPSULE ORAL at 09:51

## 2020-10-16 RX ADMIN — QUETIAPINE FUMARATE 25 MG: 25 TABLET ORAL at 17:11

## 2020-10-16 RX ADMIN — FINASTERIDE 5 MG: 5 TABLET, FILM COATED ORAL at 09:51

## 2020-10-16 RX ADMIN — AMLODIPINE BESYLATE 5 MG: 5 TABLET ORAL at 09:51

## 2020-10-16 ASSESSMENT — PAIN DESCRIPTION - PAIN TYPE: TYPE: ACUTE PAIN

## 2020-10-16 NOTE — PROGRESS NOTES
RN skin check completed with EZ Lawler.   Devices in place: méndez  Skin assessed under devices: yes.  Confirmed pressure ulcers found on: N/A  New potential pressure ulcers noted on: N/A Wound consult placed: N/A     The following interventions in place: 2 RN skin checks, pt turns self side to side, pillows used for support/positioning, méndez care, heel mepilex, ambulation with SBA     Skin assessment     BUE dry skin.  Penis intact with some redness, otherwise méndez site CDI. Moisturizer applied. Méndez care performed.  BLE dusky, dry,flaky.  Bilateral heels pink/red, boggy, blanching. Mepilex in place.

## 2020-10-16 NOTE — PROGRESS NOTES
Pt AOx 3 disoriented to event. Denied any pain when asked but then c/o some pain during méndez care and some during urination. Will pass on to day shift RN. Moisturizer applied to underside of penis.  VSS.  2RN skin checks in place.  Call light and pt belongings in reach, bed locked/lowest position, bed alarm on, and pt now resting quietly.

## 2020-10-16 NOTE — PROGRESS NOTES
2 RN skin check complete with EZ Alicia  Devices in place: mepilex, méndez catheter, glasses  Skin assessed under devices: yes  Confirmed pressure ulcers found: n/a  New potential pressure ulcers noted: n/a  The following interventions in place: Pillows in use for support and positioning, pt was encourage pt to get OOB, pt was ambulating around unit. mepliex, waffle overlay, méndez care completed this shift.      Notes: bilateral elbows pink and blanching, sacrum pink and blanching, heels pink and blanching, BLE dry and flaky. Pt has pink, slightly red moist area under scrotum under penis area. Katherine care completed during shift.

## 2020-10-16 NOTE — PROGRESS NOTES
Pt is ambulating around unit today with CNA staff assistance and cane assistive device. Pt  Is tolerating activity well. 2 full laps completed around unit this afternoon.

## 2020-10-16 NOTE — CARE PLAN
Problem: Safety  Goal: Will remain free from falls  Outcome: PROGRESSING AS EXPECTED  Note: Fall precautions in place. Bed in lowest position. Non-skid socks in place. Personal possessions within reach. Mobility sign on door. Bed-alarm on. Call light within reach. Pt educated regarding fall prevention and states understanding.       Problem: Knowledge Deficit  Goal: Knowledge of disease process/condition, treatment plan, diagnostic tests, and medications will improve  Outcome: PROGRESSING AS EXPECTED  Note: Pt educated regarding plan of care and medications. All questions answered.

## 2020-10-16 NOTE — PROGRESS NOTES
2 RN skin check complete with Ravin RN   Devices in place: méndez catheter, mepilex for prevention   Skin assessed under devices: yes   Confirmed pressure ulcers found on: N/A   New potential pressure ulcers noted on: N/A  Wound consult placed N/A.  The following interventions in place: Q2 RN skin check, waffle overlay, mepilex for preventative measures,     Notes:     BUE ( elbows) - pink and blanching,   Sacrum/Buttocks area: pink blanching   Heels: boggy, blanching and pink

## 2020-10-17 PROCEDURE — 700102 HCHG RX REV CODE 250 W/ 637 OVERRIDE(OP): Performed by: HOSPITALIST

## 2020-10-17 PROCEDURE — A9270 NON-COVERED ITEM OR SERVICE: HCPCS | Performed by: NURSE PRACTITIONER

## 2020-10-17 PROCEDURE — A9270 NON-COVERED ITEM OR SERVICE: HCPCS | Performed by: HOSPITALIST

## 2020-10-17 PROCEDURE — 700102 HCHG RX REV CODE 250 W/ 637 OVERRIDE(OP): Performed by: NURSE PRACTITIONER

## 2020-10-17 PROCEDURE — 99231 SBSQ HOSP IP/OBS SF/LOW 25: CPT | Performed by: NURSE PRACTITIONER

## 2020-10-17 PROCEDURE — 770006 HCHG ROOM/CARE - MED/SURG/GYN SEMI*

## 2020-10-17 RX ADMIN — THERA TABS 1 TABLET: TAB at 09:11

## 2020-10-17 RX ADMIN — QUETIAPINE FUMARATE 25 MG: 25 TABLET ORAL at 17:11

## 2020-10-17 RX ADMIN — AMLODIPINE BESYLATE 5 MG: 5 TABLET ORAL at 09:11

## 2020-10-17 RX ADMIN — FINASTERIDE 5 MG: 5 TABLET, FILM COATED ORAL at 09:11

## 2020-10-17 RX ADMIN — TAMSULOSIN HYDROCHLORIDE 0.8 MG: 0.4 CAPSULE ORAL at 09:11

## 2020-10-17 ASSESSMENT — PAIN DESCRIPTION - PAIN TYPE: TYPE: ACUTE PAIN;CHRONIC PAIN

## 2020-10-17 NOTE — PROGRESS NOTES
Hospital Medicine Daily Progress Note    Date of Service  10/17/2020    Chief Complaint  SOB and low blood pressue    Hospital Course   Mr. Bejarano is an 85 yo male with a PMHx of DM type II, hyperlipidemia, BPH who was admitted on 7/9/2020 with COVID 19 infection with POLY and hypotension.  Patient initially in ICU and on therapeutic Lovenox due to elevated DDimer.  His hospital course was complicated by hematuria and urine retention of which a méndez was placed.  Flomax and finasteride was initiated.  Patient was in acute renal failure which has improved. RUQ U/S was completed d/t periumbilical pain. This revealed possible renal mass. CT renal w/wo was performed and showed a large renal mass (7x7x8) likely representing urothelial vs renal cell carcinoma. Psychiatry was also consulted d/t ongoing cognition difficulty.  Per psychiatry, patient does not have the capacity to make any medical decision on 8/3/2020. SLP feels he has moderate to severe deficits in insight, orientation, and memory. Guardianship pending.     Despite Proscar and Flomax voiding trial failed 9/4 and red frank catheter was placed d/t high urinary retention. His renal fx has remained stable and WNL. Unfortunately, the patient is 116 pounds at the time of this note, with BMI of 17. He is a poor candidate for potential chemotherapy or surgical decompression given his malnutrition and neurocognitive disorder.      Interval Problem Update  10/14 - self-isolates despite encouragement  -disoriented to situation at his baseline. Behaviors appropriate.  10/17 - méndez remains in place without issues. Last changed 9/4 - required [place]. Per policy, ok to leave in if no s/s symptoms of infection/sepsis. Patient offers no complaints.     Consultants/Specialty  -Psychiatry  -Palliative Care    Code Status  DNAR/DNI    Disposition  Pending guardianship and placement    Review of Systems  Review of Systems   Unable to perform ROS: Dementia        Physical  Exam  Temp:  [36.2 °C (97.1 °F)-36.9 °C (98.5 °F)] 36.3 °C (97.4 °F)  Pulse:  [60-98] 98  Resp:  [16-19] 19  BP: (104-126)/(48-75) 126/75  SpO2:  [92 %-94 %] 94 %    Physical Exam  Vitals signs and nursing note reviewed.   Constitutional:       Appearance: He is underweight. He is not ill-appearing.   HENT:      Head: Normocephalic and atraumatic.      Nose: Nose normal.   Eyes:      Conjunctiva/sclera: Conjunctivae normal.      Pupils: Pupils are equal, round, and reactive to light.   Neck:      Musculoskeletal: Neck supple.   Cardiovascular:      Rate and Rhythm: Normal rate and regular rhythm.      Heart sounds: No murmur. No friction rub.   Pulmonary:      Effort: No respiratory distress.   Abdominal:      General: Bowel sounds are normal. There is no distension.      Palpations: Abdomen is soft.   Skin:     General: Skin is warm and dry.   Neurological:      Mental Status: He is alert and oriented to person, place, and time.   Psychiatric:         Attention and Perception: He is inattentive.         Mood and Affect: Mood normal.         Behavior: Behavior normal.         Cognition and Memory: Cognition normal. He exhibits impaired recent memory.         Fluids    Intake/Output Summary (Last 24 hours) at 10/17/2020 1427  Last data filed at 10/17/2020 1200  Gross per 24 hour   Intake 750 ml   Output 2850 ml   Net -2100 ml       Laboratory                        Imaging  CT-RENAL WITH & W/O   Final Result      1.  Large complex right renal mass as detailed above measuring approximately 7.8 x 7.1x  8.5 cm. Primary differential considerations include urothelial malignancy or renal cell carcinoma.   2.  No evidence of adenopathy or metastatic disease is seen.   3.  Distended urinary bladder. Right greater left hydroureteronephrosis.   4.  Severe atherosclerosis. 3.5 cm infrarenal abdominal aortic aneurysm.            US-RUQ   Final Result         1.  Echogenic liver compatible with fatty change versus fibrosis.   2.   Masslike structure in the right kidney, should be considered neoplastic unless proven otherwise, recommend follow-up 3 phase CT of the kidneys for further characterization   3.  Mild dilatation of the common bile duct, within expected limits given patient age.      These findings were discussed with the patient's clinician, Vin Mei, on 7/23/2020 7:34 AM.      DX-CHEST-PORTABLE (1 VIEW)   Final Result      1.  The lungs are hyperinflated suggesting emphysema/COPD.   2.  There is minimal predominantly lower lobe interstitial opacity which is most likely due to chronic scarring.           Assessment/Plan  Neurocognitive disorder, unspecified  Assessment & Plan  -Psychiatry was consulted, patient cannot make any medical decision  -SW working on guardianship. Hearing has been extended after initial meeting on 9/25, due to additional children being located  -Hearing moved to November     Urinary retention- (present on admission)  Assessment & Plan  -Continue Flomax and finasteride  -Méndez catheter in place for urinary retention on 8/7  -Trial removal of Méndez catheter 9/4 unsuccessful. Méndez replaced on 9/4  -per policy, will leave in due to difficulties with placement and bleeding with previous exchanges  -Follow up outpatient. Discharge likely with méndez.    Right kidney mass- (present on admission)  Assessment & Plan  -Suspicion for urothelial carcinoma vs renal cell carcinoma. No a/p adenopathy  -Patient failed voiding trial despite flomax/proscar  -Poor candidate for chemo given neurocognitive d/o, lacking capacity and requirement for guardianship      COPD (chronic obstructive pulmonary disease) (HCC)- (present on admission)  Assessment & Plan  -Supplemental oxygen as needed  -Inhaled bronchodilators as needed    Discharge planning issues  Assessment & Plan  -CM following  -plan for Group Home at [place]  -guardianship hearing remains pending.    Type 2 diabetes mellitus (HCC)- (present on admission)  Assessment  & Plan  -Hemoglobin A1c at 6.8, continue diabetic diet, monitor  -BG has been well controlled  -Accu-Cheks only as needed for signs and symptoms of hypoglycemia or acute illness    Abdominal aortic aneurysm (AAA) 3.0 cm to 5.5 cm in diameter in male (HCC)  Assessment & Plan  Infrarenal  Noted as incidental finding on CT renal w/wo  Not a candidate for repair given neurocognitive disorder, lacking capacity    Advanced care planning/counseling discussion  Assessment & Plan  -Patient is DNR/DNI  -Guardianship pending    Protein-calorie malnutrition (HCC)- (present on admission)  Assessment & Plan  -Body mass index is 17.57 kg/m².  -encourage PO intake  -food preferences  -TID supplements    COVID-19 virus infection  Assessment & Plan  -Patient has recovered from COVID-19       VTE prophylaxis : Ambulation    I have performed a physical exam and reviewed and updated ROS and Assessment/Plan today (10/17/20). In review of the previous note there are no changes except as documented above    I certify the patient requires continued medically necessary hospital services for the treatment of neurocognitive disorder.  The patient will remain in the hospital for the foreseeable future.  Discharge may or may not occur in the next 20 days due to ongoing discharge delays due to having no medically acceptable discharge options.    Please note that this dictation was created using voice recognition software. I have made every reasonable attempt to correct obvious errors, but there may be errors of grammar and possibly content that I did not discover before finalizing the note.    MICK Guerrero.

## 2020-10-17 NOTE — PROGRESS NOTES
Received report and assumed care. Pt AOx 3, denie pain, and mobile with SBA.  VSS. Rosado in place with 2RN skin checks. Pt has been sleeping whole shift. Call light and pt belongings in reach, bed locked and in lowest position, bed alarm on, and pt continues to sleep.

## 2020-10-17 NOTE — PROGRESS NOTES
RN skin check completed with EZ Lawler.   Devices in place: méndez  Skin assessed under devices: yes.  Confirmed pressure ulcers found on: N/A  New potential pressure ulcers noted on: N/A Wound consult placed: N/A     The following interventions in place: 2 RN skin checks, pt turns self side to side, pillows used for support/positioning, méndez care, barrier wipes, heel mepilex, ambulation with SBA     Skin assessment     BUE dry skin.  Penis intact red/pink on underside, otherwise méndez site CDI. Moisturizer applied. Méndez care performed. Barrier wipes in use.  BLE dusky, dry,flaky.  Bilateral heels pink/red, very boggy, blanching. Mepilex in place.

## 2020-10-18 PROCEDURE — A9270 NON-COVERED ITEM OR SERVICE: HCPCS | Performed by: NURSE PRACTITIONER

## 2020-10-18 PROCEDURE — 700102 HCHG RX REV CODE 250 W/ 637 OVERRIDE(OP): Performed by: NURSE PRACTITIONER

## 2020-10-18 PROCEDURE — A9270 NON-COVERED ITEM OR SERVICE: HCPCS | Performed by: HOSPITALIST

## 2020-10-18 PROCEDURE — 700102 HCHG RX REV CODE 250 W/ 637 OVERRIDE(OP): Performed by: HOSPITALIST

## 2020-10-18 PROCEDURE — 770006 HCHG ROOM/CARE - MED/SURG/GYN SEMI*

## 2020-10-18 RX ADMIN — AMLODIPINE BESYLATE 5 MG: 5 TABLET ORAL at 09:02

## 2020-10-18 RX ADMIN — TAMSULOSIN HYDROCHLORIDE 0.8 MG: 0.4 CAPSULE ORAL at 09:02

## 2020-10-18 RX ADMIN — FINASTERIDE 5 MG: 5 TABLET, FILM COATED ORAL at 09:02

## 2020-10-18 RX ADMIN — QUETIAPINE FUMARATE 25 MG: 25 TABLET ORAL at 21:12

## 2020-10-18 RX ADMIN — THERA TABS 1 TABLET: TAB at 09:02

## 2020-10-18 NOTE — CARE PLAN
Problem: Safety  Goal: Will remain free from injury  Note: Bed alarm in place and functioning  Bed is in low, locked position     Problem: Infection  Goal: Will remain free from infection  Note: 2RN skin checks  Rosado catheter care

## 2020-10-18 NOTE — CARE PLAN
Problem: Safety  Goal: Will remain free from injury  Outcome: PROGRESSING AS EXPECTED     Bed alarm on and patient in a room by the nursing station. Educated frequently to call for assistance.      Problem: Discharge Barriers/Planning  Goal: Patient's continuum of care needs will be met  Outcome: PROGRESSING SLOWER THAN EXPECTED     Social work and care team working on discharge plan.

## 2020-10-18 NOTE — PROGRESS NOTES
Pt AOx3 (disoriented to event), denies pain at this time. Rosado catheter patent, draining clear yellow urine. Pt resting quietly in bed, needs met. Call light within reach, personal belongings available, bed in lowest position, treaded socks on, and bed alarm on. Hourly rounding in place.

## 2020-10-18 NOTE — PROGRESS NOTES
2 RN skin check complete with EZ Hook.   Devices in place: méndez catheter.  Skin assessed under devices: yes.  Confirmed pressure ulcers found: none.  New potential pressure ulcers noted: none.    The following interventions in place: 2RN skin check, moisturizer, mepilex, méndez care, pillows in use for positioning, waffle overlay    Current skin assessment:  Elbows: pink, blanching  BLE: dry, flaky, intact  Penis: pink, intact (méndez irritation, repositioned)  Bilateral heels: Boggy (mepilex)

## 2020-10-18 NOTE — PROGRESS NOTES
2 RN skin check complete.   Devices in place méndez catheter in place.  Skin assessed under devices: yes, with Miladys HUI.  Confirmed pressure ulcers found on: N/A.  The following interventions in place skin barrier cream, méndez care Q shift, stat lock in place, waffle mattress, and mepilx to heels bilaterally.  No new areas of skin breakdown noted.

## 2020-10-18 NOTE — PROGRESS NOTES
Received bedside report and accepted care of patient.  Patient currently resting in bed in no visible or stated distress.  Bed controls on and bed in locked position.  Bed alarm on.  Call light and personal possessions within reach.  Plan of care to include pain management, assistance with ADL's and continued discharge planning efforts.  Patient verbalizes agreement with plan of care, and has no additional questions or concerns at this time.  Will continue to update notes/plan of care as needed throughout shift.

## 2020-10-18 NOTE — PROGRESS NOTES
2 RN skin check completed with EZ Lawler.   Devices in place: méndez catheter.  Skin assessed under devices: yes.  Confirmed pressure ulcers found: none.  New potential pressure ulcers noted: none.  Wound consult placed:  n/a.      Skin assessment: bilateral heels pink/red/boggy/blanching. BLE dry/flaky/dusky. Méndez catheter insertion site pink/red/intact. BUE dry. Elbows pink/blanching.      The following interventions in place: Qshift 2RN skin check. Waffle overlay mattress. Barrier cream. Stat lock in use. Méndez care. Bilateral heel mepilex. Pillows for support/positioning.

## 2020-10-18 NOTE — PROGRESS NOTES
Pt AOX2, able to make needs known  Rosado catheter in place, draining clear yellow urine  Denies pain or discomfort  Ambulates with x1 assist, pt is blind  Bed alarm in place and functioning  All needs met, continue to monitor

## 2020-10-19 PROCEDURE — A9270 NON-COVERED ITEM OR SERVICE: HCPCS | Performed by: HOSPITALIST

## 2020-10-19 PROCEDURE — 700102 HCHG RX REV CODE 250 W/ 637 OVERRIDE(OP): Performed by: HOSPITALIST

## 2020-10-19 PROCEDURE — 700102 HCHG RX REV CODE 250 W/ 637 OVERRIDE(OP): Performed by: NURSE PRACTITIONER

## 2020-10-19 PROCEDURE — 770006 HCHG ROOM/CARE - MED/SURG/GYN SEMI*

## 2020-10-19 PROCEDURE — A9270 NON-COVERED ITEM OR SERVICE: HCPCS | Performed by: NURSE PRACTITIONER

## 2020-10-19 RX ADMIN — THERA TABS 1 TABLET: TAB at 09:00

## 2020-10-19 RX ADMIN — QUETIAPINE FUMARATE 25 MG: 25 TABLET ORAL at 17:30

## 2020-10-19 RX ADMIN — TAMSULOSIN HYDROCHLORIDE 0.8 MG: 0.4 CAPSULE ORAL at 09:00

## 2020-10-19 RX ADMIN — AMLODIPINE BESYLATE 5 MG: 5 TABLET ORAL at 09:00

## 2020-10-19 RX ADMIN — ERGOCALCIFEROL 50000 UNITS: 1.25 CAPSULE ORAL at 15:55

## 2020-10-19 RX ADMIN — FINASTERIDE 5 MG: 5 TABLET, FILM COATED ORAL at 09:00

## 2020-10-19 NOTE — CARE PLAN
Problem: Knowledge Deficit  Goal: Knowledge of disease process/condition, treatment plan, diagnostic tests, and medications will improve  Note: Plan of care discussed  Explained all medications, all questions answered     Problem: Fluid Volume:  Goal: Will maintain balanced intake and output  Note: Rosado catheter in place, monitor urine output       Problem: Skin Integrity  Goal: Risk for impaired skin integrity will decrease  Note: 2RN skin check each shift

## 2020-10-19 NOTE — PROGRESS NOTES
2 RN skin check complete with EZ Collado.   Devices in place: méndez catheter.  Skin assessed under devices: yes.  Confirmed pressure ulcers found: none.  New potential pressure ulcers noted: none.     The following interventions in place: 2RN skin check, moisturizer, mepilex, méndez care, pillows in use for positioning, waffle overlay     Current skin assessment:  Elbows: pink, blanching  BLE: dry, flaky, intact  Penis: pink, intact (méndez irritation, repositioned)  Bilateral heels: Boggy (mepilex)

## 2020-10-19 NOTE — PROGRESS NOTES
Pt ambulated in hallway accompanied by CNA. Pt returned to room and resting quietly in bed. Rosado catheter patent, draining cloudy urine with sediment noted. Call light within reach, personal belongings available, bed in lowest position, treaded socks on, and bed alarm on. Hourly rounding in place.

## 2020-10-19 NOTE — PROGRESS NOTES
Pt AOX3, able to make needs known  Rosado catheter in place, cloudy with small amount of sediment- MD notified  Denies pain or discomfort at this time  Compliant with all meds and assessment, irritable at times  All needs met, continue to monitor

## 2020-10-19 NOTE — PROGRESS NOTES
2 RN skin check completed with EZ Guzmán.   Devices in place: méndez catheter.  Skin assessed under devices: yes.  Confirmed pressure ulcers found: none.  New potential pressure ulcers noted: none.  Wound consult placed:  n/a.        Skin assessment: BUE dry. Elbows pink/blanching. Bilateral heels pink/red/boggy/blanching. BLE dry/flaky/dusky. Méndez catheter insertion site pink/red/intact.         The following interventions in place: Moisturizer. Stat lock in use. Méndez care. Bilateral heel mepilex. Pillows for support/positioning. Qshift 2RN skin check. Waffle overlay mattress.

## 2020-10-20 PROCEDURE — A9270 NON-COVERED ITEM OR SERVICE: HCPCS | Performed by: HOSPITALIST

## 2020-10-20 PROCEDURE — 700102 HCHG RX REV CODE 250 W/ 637 OVERRIDE(OP): Performed by: HOSPITALIST

## 2020-10-20 PROCEDURE — 700102 HCHG RX REV CODE 250 W/ 637 OVERRIDE(OP): Performed by: NURSE PRACTITIONER

## 2020-10-20 PROCEDURE — 770006 HCHG ROOM/CARE - MED/SURG/GYN SEMI*

## 2020-10-20 PROCEDURE — A9270 NON-COVERED ITEM OR SERVICE: HCPCS | Performed by: NURSE PRACTITIONER

## 2020-10-20 RX ADMIN — THERA TABS 1 TABLET: TAB at 08:59

## 2020-10-20 RX ADMIN — AMLODIPINE BESYLATE 5 MG: 5 TABLET ORAL at 08:59

## 2020-10-20 RX ADMIN — QUETIAPINE FUMARATE 25 MG: 25 TABLET ORAL at 18:00

## 2020-10-20 RX ADMIN — TAMSULOSIN HYDROCHLORIDE 0.8 MG: 0.4 CAPSULE ORAL at 08:59

## 2020-10-20 RX ADMIN — FINASTERIDE 5 MG: 5 TABLET, FILM COATED ORAL at 08:59

## 2020-10-20 ASSESSMENT — PAIN DESCRIPTION - PAIN TYPE: TYPE: ACUTE PAIN

## 2020-10-20 NOTE — PROGRESS NOTES
Pt AOx2 (disoriented to event/time), denies pain/discomfort. Pt fatigued this evening, resting quietly in bed. Rosado patent, draining without difficulty. Pt resting quietly in bed, needs met. Call light within reach, personal belongings available, bed in lowest position, treaded socks on, and bed alarm on. Hourly rounding in place.

## 2020-10-21 PROCEDURE — A9270 NON-COVERED ITEM OR SERVICE: HCPCS | Performed by: NURSE PRACTITIONER

## 2020-10-21 PROCEDURE — A9270 NON-COVERED ITEM OR SERVICE: HCPCS | Performed by: HOSPITALIST

## 2020-10-21 PROCEDURE — 700102 HCHG RX REV CODE 250 W/ 637 OVERRIDE(OP): Performed by: NURSE PRACTITIONER

## 2020-10-21 PROCEDURE — 770006 HCHG ROOM/CARE - MED/SURG/GYN SEMI*

## 2020-10-21 PROCEDURE — 700102 HCHG RX REV CODE 250 W/ 637 OVERRIDE(OP): Performed by: HOSPITALIST

## 2020-10-21 RX ADMIN — QUETIAPINE FUMARATE 25 MG: 25 TABLET ORAL at 17:29

## 2020-10-21 RX ADMIN — FINASTERIDE 5 MG: 5 TABLET, FILM COATED ORAL at 08:52

## 2020-10-21 RX ADMIN — AMLODIPINE BESYLATE 5 MG: 5 TABLET ORAL at 08:53

## 2020-10-21 RX ADMIN — TAMSULOSIN HYDROCHLORIDE 0.8 MG: 0.4 CAPSULE ORAL at 08:52

## 2020-10-21 RX ADMIN — THERA TABS 1 TABLET: TAB at 08:52

## 2020-10-21 ASSESSMENT — PAIN DESCRIPTION - PAIN TYPE: TYPE: ACUTE PAIN

## 2020-10-21 NOTE — PROGRESS NOTES
Received bedside report and accepted care of patient.    Pt is currently A/Ox3, disoriented to time. Pt is on room air with no visible or stated sign of discomfort, distress, or SOB. Pt has a méndez in place, currently has urine output.    Bed alarm in place, controls on and bed in locked and lowest position. Call light and personal possessions within reach. Patient educated about use of call light and verbalized understanding.

## 2020-10-21 NOTE — PROGRESS NOTES
Pt AOx3 (disoriented to time), denies pain/discomfort, pleasant. Pt sitting up in bed resting quietly, needs met. Rosado to gravity productive, urine appears yellow and cloudy. Call light within reach, personal belongings available, bed in lowest position, treaded socks on, and bed alarm on. Hourly rounding in place.

## 2020-10-21 NOTE — PROGRESS NOTES
2 RN skin check complete with EZ Collado.   Devices in place: méndez catheter.  Skin assessed under devices: yes.  Confirmed pressure ulcers found: none.  New potential pressure ulcers noted: none.     The following interventions in place: 2RN skin check, moisturizer, mepilex, méndez care, pillows in use for positioning, waffle overlay in place     Current skin assessment:  Ears: blanching, intact bilaterally  Elbows: pink, blanching  Chest and abdomen: intact  BLE: dry, flaky, intact  Penis: pink, intact (méndez irritation, repositioned)  Left Lower thigh: minorly red, blanching, free offloaded the méndez  Bilateral heels: Boggy (mepilex)

## 2020-10-21 NOTE — CARE PLAN
Problem: Safety  Goal: Will remain free from injury  Outcome: PROGRESSING AS EXPECTED     Problem: Knowledge Deficit  Goal: Knowledge of disease process/condition, treatment plan, diagnostic tests, and medications will improve  Outcome: PROGRESSING AS EXPECTED     Problem: Psychosocial Needs:  Goal: Level of anxiety will decrease  Outcome: PROGRESSING AS EXPECTED

## 2020-10-21 NOTE — PROGRESS NOTES
2 RN skin check completed with EZ Rice.   Devices in place: méndez catheter.  Skin assessed under devices: yes.  Confirmed pressure ulcers found: none.  New potential pressure ulcers noted: none.  Wound consult placed:  n/a.        Skin assessment:  Bilateral heels pink/red/boggy/blanching. BLE dry/flaky/dusky. Méndez catheter insertion site pink/red/intact. BUE dry. Elbows pink/blanching.      The following interventions in place: Qshift 2RN skin check. Waffle overlay mattress. Méndez care. Moisturizer. Stat lock in use. Bilateral heel mepilex. Pillows for support/positioning.

## 2020-10-22 PROCEDURE — 700102 HCHG RX REV CODE 250 W/ 637 OVERRIDE(OP): Performed by: HOSPITALIST

## 2020-10-22 PROCEDURE — 99231 SBSQ HOSP IP/OBS SF/LOW 25: CPT | Performed by: NURSE PRACTITIONER

## 2020-10-22 PROCEDURE — A9270 NON-COVERED ITEM OR SERVICE: HCPCS | Performed by: NURSE PRACTITIONER

## 2020-10-22 PROCEDURE — 770006 HCHG ROOM/CARE - MED/SURG/GYN SEMI*

## 2020-10-22 PROCEDURE — 700102 HCHG RX REV CODE 250 W/ 637 OVERRIDE(OP): Performed by: NURSE PRACTITIONER

## 2020-10-22 PROCEDURE — A9270 NON-COVERED ITEM OR SERVICE: HCPCS | Performed by: HOSPITALIST

## 2020-10-22 RX ADMIN — QUETIAPINE FUMARATE 25 MG: 25 TABLET ORAL at 17:01

## 2020-10-22 RX ADMIN — THERA TABS 1 TABLET: TAB at 08:48

## 2020-10-22 RX ADMIN — AMLODIPINE BESYLATE 5 MG: 5 TABLET ORAL at 08:48

## 2020-10-22 RX ADMIN — TAMSULOSIN HYDROCHLORIDE 0.8 MG: 0.4 CAPSULE ORAL at 08:48

## 2020-10-22 RX ADMIN — FINASTERIDE 5 MG: 5 TABLET, FILM COATED ORAL at 08:48

## 2020-10-22 ASSESSMENT — ENCOUNTER SYMPTOMS
ABDOMINAL PAIN: 0
NECK PAIN: 0
PALPITATIONS: 0
BACK PAIN: 0
MYALGIAS: 0
SORE THROAT: 0
NAUSEA: 0
DIZZINESS: 0
HEADACHES: 0

## 2020-10-22 NOTE — PROGRESS NOTES
"Received bedside report and accepted care of patient.  Pt is currently A/Ox3, disoriented to time.   Pt is on room air with no visible or stated sign of discomfort, distress, or SOB.   Pt has a méndez in place, currently has urine output.     Bed alarm in place, controls on and bed in locked and lowest position. Call light and personal possessions within reach. Patient educated about use of call light and verbalized understanding.     /58   Pulse 64   Temp 36.3 °C (97.4 °F) (Temporal)   Resp 16   Ht 1.727 m (5' 7.99\")   Wt 52.4 kg (115 lb 8.3 oz)   SpO2 92%   "

## 2020-10-22 NOTE — PROGRESS NOTES
2 RN skin check completed with EZ Rice.   Devices in place: méndez catheter.  Skin assessed under devices: yes.  Confirmed pressure ulcers found: none.  New potential pressure ulcers noted: none.  Wound consult placed:  n/a.        Skin assessment: BUE dry. Elbows pink/blanching. Bilateral heels pink/red/boggy/blanching. BLE dry/flaky/dusky. Méndez catheter insertion site pink/red/intact.         The following interventions in place: Moisturizer. Stat lock in use. Méndez care. Bilateral heel mepilex. Pillows for support/positioning. Qshift 2RN skin check. Waffle overlay mattress.

## 2020-10-22 NOTE — PROGRESS NOTES
2 RN skin check complete with EZ Palm.   Devices in place: méndez catheter.  Skin assessed under devices: yes.  Confirmed pressure ulcers found: none.  New potential pressure ulcers noted: none.     The following interventions in place: 2RN skin check, moisturizer, mepilex, méndez care, pillows in use for positioning, waffle overlay in place     Current skin assessment:  Ears: blanching, intact bilaterally  Elbows: pink, blanching  Chest and abdomen: intact  BLE: dry, flaky, intact  Penis: pink, intact (méndez irritation, repositioned)  Left Lower thigh: minorly red, blanching, free offloaded the méndez  Bilateral heels: Boggy (mepilex)

## 2020-10-22 NOTE — CARE PLAN
Problem: Safety  Goal: Will remain free from injury  Outcome: PROGRESSING AS EXPECTED     Problem: Psychosocial Needs:  Goal: Level of anxiety will decrease  Outcome: PROGRESSING AS EXPECTED     Problem: Mobility  Goal: Risk for activity intolerance will decrease  Outcome: PROGRESSING AS EXPECTED

## 2020-10-22 NOTE — DISCHARGE PLANNING
Medical Social Work  PC to [facility], [phone]; SW verified taht patient's long time friend/signicant other Nani Colin is a resident.  CORINA left a message for  Michelle to call, requested to arrange a phone call between patients.

## 2020-10-22 NOTE — PROGRESS NOTES
"Hospital Medicine Daily Progress Note    Date of Service  10/22/2020    Chief Complaint  SOB and low blood pressue    Hospital Course   Mr. Bejarano is an 85 yo male with a PMHx of DM type II, hyperlipidemia, BPH who was admitted on 7/9/2020 with COVID 19 infection with POLY and hypotension.  Patient initially in ICU and on therapeutic Lovenox due to elevated DDimer.  His hospital course was complicated by hematuria and urine retention of which a méndez was placed.  Flomax and finasteride was initiated.  Patient was in acute renal failure which has improved. RUQ U/S was completed d/t periumbilical pain. This revealed possible renal mass. CT renal w/wo was performed and showed a large renal mass (7x7x8) likely representing urothelial vs renal cell carcinoma. Psychiatry was also consulted d/t ongoing cognition difficulty.  Per psychiatry, patient does not have the capacity to make any medical decision on 8/3/2020. SLP feels he has moderate to severe deficits in insight, orientation, and memory. Guardianship pending.     Despite Proscar and Flomax voiding trial failed 9/4 and red frank catheter was placed d/t high urinary retention. His renal fx has remained stable and WNL. Unfortunately, the patient is 116 pounds at the time of this note, with BMI of 17. He is a poor candidate for potential chemotherapy or surgical decompression given his malnutrition and neurocognitive disorder.      Interval Problem Update  10/22:  Sitting up on edge of bed in no acute distress. Méndez in place and functioning well. Oriented to self and \"[facility]\" only. Calm and pleasant mood.  SBPs 100-120s.     Consultants/Specialty  -Psychiatry  -Palliative Care    Code Status  DNAR/DNI    Disposition  Pending guardianship and placement    Review of Systems  Review of Systems   HENT: Negative for sore throat.    Cardiovascular: Negative for chest pain and palpitations.   Gastrointestinal: Negative for abdominal pain and nausea.   Musculoskeletal: " Negative for back pain, joint pain, myalgias and neck pain.   Neurological: Negative for dizziness and headaches.        Physical Exam  Temp:  [36.2 °C (97.2 °F)-36.4 °C (97.5 °F)] 36.2 °C (97.2 °F)  Pulse:  [63-69] 63  Resp:  [16-18] 18  BP: (101-132)/(48-58) 115/53  SpO2:  [92 %-96 %] 94 %    Physical Exam  Vitals signs and nursing note reviewed.   Constitutional:       Appearance: He is underweight. He is not ill-appearing.      Comments: Frail   Chronically ill appearing     HENT:      Head: Normocephalic and atraumatic.      Nose: Nose normal.   Eyes:      Pupils: Pupils are equal, round, and reactive to light.   Neck:      Musculoskeletal: Neck supple.   Cardiovascular:      Heart sounds: No murmur. No friction rub.   Pulmonary:      Effort: No respiratory distress.   Abdominal:      General: There is no distension.      Palpations: Abdomen is soft.   Genitourinary:     Comments: Rosado in place and draining yellow urine    Musculoskeletal:      Comments: WELLS     Skin:     General: Skin is warm and dry.      Coloration: Skin is not pale.   Neurological:      General: No focal deficit present.      Mental Status: He is alert. He is disoriented.   Psychiatric:         Attention and Perception: He is inattentive.         Mood and Affect: Mood normal.         Behavior: Behavior normal. Behavior is cooperative.         Cognition and Memory: Cognition is impaired. Memory is impaired.         Fluids    Intake/Output Summary (Last 24 hours) at 10/22/2020 1015  Last data filed at 10/22/2020 1010  Gross per 24 hour   Intake 1360 ml   Output 2600 ml   Net -1240 ml       Laboratory                        Imaging  CT-RENAL WITH & W/O   Final Result      1.  Large complex right renal mass as detailed above measuring approximately 7.8 x 7.1x  8.5 cm. Primary differential considerations include urothelial malignancy or renal cell carcinoma.   2.  No evidence of adenopathy or metastatic disease is seen.   3.  Distended urinary  bladder. Right greater left hydroureteronephrosis.   4.  Severe atherosclerosis. 3.5 cm infrarenal abdominal aortic aneurysm.            US-RUQ   Final Result         1.  Echogenic liver compatible with fatty change versus fibrosis.   2.  Masslike structure in the right kidney, should be considered neoplastic unless proven otherwise, recommend follow-up 3 phase CT of the kidneys for further characterization   3.  Mild dilatation of the common bile duct, within expected limits given patient age.      These findings were discussed with the patient's clinician, Vin Mei, on 7/23/2020 7:34 AM.      DX-CHEST-PORTABLE (1 VIEW)   Final Result      1.  The lungs are hyperinflated suggesting emphysema/COPD.   2.  There is minimal predominantly lower lobe interstitial opacity which is most likely due to chronic scarring.           Assessment/Plan  Neurocognitive disorder, unspecified  Assessment & Plan  -Psychiatry was consulted, patient cannot make any medical decision  -SW working on guardianship.   -Hearing moved to November     Urinary retention- (present on admission)  Assessment & Plan  -Continue Flomax and finasteride  -Méndez catheter in place for urinary retention on 8/7-Trial removal of Méndez catheter 9/4 unsuccessful, Méndez replaced  -per policy, will leave in due to difficulties with placement and bleeding with previous exchanges  -Follow up outpatient. Discharge likely with méndez.    Right kidney mass- (present on admission)  Assessment & Plan  -Suspicion for urothelial carcinoma vs renal cell carcinoma. No a/p adenopathy  -Patient failed voiding trial despite flomax/proscar  -Poor candidate for chemo given neurocognitive d/o, lacking capacity and requirement for guardianship      COPD (chronic obstructive pulmonary disease) (HCC)- (present on admission)  Assessment & Plan  -Supplemental oxygen as needed  -Inhaled bronchodilators as needed  -Not in acute exacerbation    Discharge planning issues  Assessment  & Plan  -CM following  -plan for Group Home at [place]  -guardianship hearing remains pending.    HTN (hypertension)- (present on admission)  Assessment & Plan  - Continue Norvasc  - VS per policy     Type 2 diabetes mellitus (HCC)- (present on admission)  Assessment & Plan  -Hemoglobin A1c at 6.8, continue diabetic diet, monitor  -BG has been well controlled  -Accu-Cheks only as needed for signs and symptoms of hypoglycemia or acute illness    Abdominal aortic aneurysm (AAA) 3.0 cm to 5.5 cm in diameter in male (HCC)  Assessment & Plan  -Infrarenal  -Noted as incidental finding on CT renal w/wo  -Not a candidate for repair given neurocognitive disorder, lacking capacity  -BP control     Advanced care planning/counseling discussion  Assessment & Plan  -Patient is DNR/DNI  -Guardianship pending    Protein-calorie malnutrition (HCC)- (present on admission)  Assessment & Plan  -Body mass index is 17.57 kg/m².  -encourage PO intake  -food preferences  -TID supplements    COVID-19 virus infection  Assessment & Plan  -Patient has recovered from COVID-19       VTE prophylaxis : Ambulation    I have performed a physical exam and reviewed and updated ROS and Assessment/Plan today (10/22/20). In review of the previous note there are no changes except as documented above      MICK Tejada.

## 2020-10-23 PROCEDURE — 700102 HCHG RX REV CODE 250 W/ 637 OVERRIDE(OP): Performed by: HOSPITALIST

## 2020-10-23 PROCEDURE — 770006 HCHG ROOM/CARE - MED/SURG/GYN SEMI*

## 2020-10-23 PROCEDURE — A9270 NON-COVERED ITEM OR SERVICE: HCPCS | Performed by: NURSE PRACTITIONER

## 2020-10-23 PROCEDURE — A9270 NON-COVERED ITEM OR SERVICE: HCPCS | Performed by: HOSPITALIST

## 2020-10-23 PROCEDURE — 700102 HCHG RX REV CODE 250 W/ 637 OVERRIDE(OP): Performed by: NURSE PRACTITIONER

## 2020-10-23 RX ADMIN — FINASTERIDE 5 MG: 5 TABLET, FILM COATED ORAL at 08:12

## 2020-10-23 RX ADMIN — QUETIAPINE FUMARATE 25 MG: 25 TABLET ORAL at 16:43

## 2020-10-23 RX ADMIN — AMLODIPINE BESYLATE 5 MG: 5 TABLET ORAL at 08:12

## 2020-10-23 RX ADMIN — THERA TABS 1 TABLET: TAB at 08:12

## 2020-10-23 RX ADMIN — TAMSULOSIN HYDROCHLORIDE 0.8 MG: 0.4 CAPSULE ORAL at 08:12

## 2020-10-23 ASSESSMENT — PAIN DESCRIPTION - PAIN TYPE: TYPE: ACUTE PAIN

## 2020-10-23 NOTE — CARE PLAN
Problem: Safety  Goal: Will remain free from injury  Outcome: PROGRESSING AS EXPECTED  Goal: Will remain free from falls  Outcome: PROGRESSING AS EXPECTED     Problem: Bowel/Gastric:  Goal: Will not experience complications related to bowel motility  Outcome: PROGRESSING AS EXPECTED     Problem: Respiratory:  Goal: Respiratory status will improve  Outcome: PROGRESSING AS EXPECTED     Problem: Skin Integrity  Goal: Risk for impaired skin integrity will decrease  Outcome: PROGRESSING AS EXPECTED

## 2020-10-23 NOTE — PROGRESS NOTES
2 RN skin check complete with EZ Donaldson  Devices in place Méndez catheter.  Skin assessed under devices ; yes, intact.  Confirmed pressure ulcers found on None.  New potential pressure ulcers noted on None. Wound consult placed None.    Noted sacral area pink and blanching, bilateral heels is pink and blanching    The following interventions in place; provided méndez care, encouraged to turn to sides; mepilex in place; on pressure redistribution mattress

## 2020-10-24 PROCEDURE — 700102 HCHG RX REV CODE 250 W/ 637 OVERRIDE(OP): Performed by: HOSPITALIST

## 2020-10-24 PROCEDURE — A9270 NON-COVERED ITEM OR SERVICE: HCPCS | Performed by: NURSE PRACTITIONER

## 2020-10-24 PROCEDURE — A9270 NON-COVERED ITEM OR SERVICE: HCPCS | Performed by: HOSPITALIST

## 2020-10-24 PROCEDURE — 770006 HCHG ROOM/CARE - MED/SURG/GYN SEMI*

## 2020-10-24 PROCEDURE — 700102 HCHG RX REV CODE 250 W/ 637 OVERRIDE(OP): Performed by: NURSE PRACTITIONER

## 2020-10-24 RX ADMIN — TAMSULOSIN HYDROCHLORIDE 0.8 MG: 0.4 CAPSULE ORAL at 08:47

## 2020-10-24 RX ADMIN — FINASTERIDE 5 MG: 5 TABLET, FILM COATED ORAL at 08:47

## 2020-10-24 RX ADMIN — AMLODIPINE BESYLATE 5 MG: 5 TABLET ORAL at 08:47

## 2020-10-24 RX ADMIN — THERA TABS 1 TABLET: TAB at 08:47

## 2020-10-24 RX ADMIN — QUETIAPINE FUMARATE 25 MG: 25 TABLET ORAL at 16:55

## 2020-10-24 NOTE — CARE PLAN
Problem: Safety  Goal: Will remain free from falls  Outcome: PROGRESSING AS EXPECTED  Intervention: Implement fall precautions  Note: Fall precautions in place. Bed in lowest position. Non-skid socks in place. Personal possessions and call light within reach. Mobility sign on door. Bed-alarm on. Call light within reach. Pt educated regarding fall prevention and states understanding.       Problem: Infection  Goal: Will remain free from infection  Outcome: PROGRESSING AS EXPECTED  Intervention: Assess signs and symptoms of infection  Note: Pt has méndez in place, with cloudy urine.No complaints of pain or discomfort. APRN notified and plan is to continue to monitor.

## 2020-10-24 NOTE — PROGRESS NOTES
Assumed care of pt at shift change. Pt is on RA with no signs of acute distress. A&Ox4. Denies any pain. Rosado in place, with cloudy urine, but no complaints of pain or discomfort. All comfort measures in place. Call light and personal belongings by bedside. Bed locked and in lowest position. Hourly rounding in place.     APRN notified of cloudy urine. Plan is to continue to monitor.

## 2020-10-24 NOTE — PROGRESS NOTES
2 RN skin check complete with EZ Hollis  Devices in place Méndez catheter.  Skin assessed under devices ; yes, intact.  Confirmed pressure ulcers found on None.  New potential pressure ulcers noted on None. Wound consult placed None.     Noted sacral area pink and blanching, bilateral heels is pink and blanching     The following interventions in place; provided méndez care, encouraged to turn to sides; mepilex in place; on pressure redistribution mattress

## 2020-10-24 NOTE — PROGRESS NOTES
Alert and oriented x3 disoriented to event. Offered fluids and snacks. Safety precautions in placed. Bed in lowest position. Upper side rails up. Treaded socks on. Reinforced the use of call light when needing assistance.

## 2020-10-24 NOTE — PROGRESS NOTES
2 RN skin check completed with Marielle HUI.   Devices in place: méndez catheter  Skin assessed under devices: yes.  Confirmed pressure ulcers found: none.  New potential pressure ulcers noted: none  Wound consulted placed none     The following interventions in place: 2RN skin check Qshift, pt encouraged reposition. mepilex to heels, Méndez care, pillows for support/positioning. pressure redistribution mattress in use.     Skin assessment:   Generalized skin dry, moles throughout body   Bilateral elbows: pink/blanching.  BLE dusky/dry   Sacrum pink/blanching/intact.   Heels boggy: pink/dry/blanching mepilex in use  Méndez catheter site CDI.

## 2020-10-25 PROCEDURE — 700102 HCHG RX REV CODE 250 W/ 637 OVERRIDE(OP): Performed by: HOSPITALIST

## 2020-10-25 PROCEDURE — A9270 NON-COVERED ITEM OR SERVICE: HCPCS | Performed by: HOSPITALIST

## 2020-10-25 PROCEDURE — 700102 HCHG RX REV CODE 250 W/ 637 OVERRIDE(OP): Performed by: NURSE PRACTITIONER

## 2020-10-25 PROCEDURE — A9270 NON-COVERED ITEM OR SERVICE: HCPCS | Performed by: NURSE PRACTITIONER

## 2020-10-25 PROCEDURE — 770006 HCHG ROOM/CARE - MED/SURG/GYN SEMI*

## 2020-10-25 RX ADMIN — QUETIAPINE FUMARATE 25 MG: 25 TABLET ORAL at 16:52

## 2020-10-25 RX ADMIN — THERA TABS 1 TABLET: TAB at 08:35

## 2020-10-25 RX ADMIN — FINASTERIDE 5 MG: 5 TABLET, FILM COATED ORAL at 08:35

## 2020-10-25 RX ADMIN — AMLODIPINE BESYLATE 5 MG: 5 TABLET ORAL at 08:36

## 2020-10-25 RX ADMIN — TAMSULOSIN HYDROCHLORIDE 0.8 MG: 0.4 CAPSULE ORAL at 08:35

## 2020-10-25 NOTE — PROGRESS NOTES
Alert and oriented x3, disoriented to event. Offered fluids and snacks. Safety precautions in placed. Bed in lowest position. Upper side rails up. Treaded socks on. Reinforced the use of call light when needing assistance.

## 2020-10-25 NOTE — PROGRESS NOTES
Assumed care of pt at shift change. Pt is on RA with no signs of acute distress. A&Ox4. Denies any pain. Rosado in place.  All comfort measures in place. Call light and personal belongings by bedside. Bed locked and in lowest position. Hourly rounding in place

## 2020-10-25 NOTE — PROGRESS NOTES
2 RN skin check complete with EZ Hollis  Devices in place Méndez catheter.  Skin assessed under devices ; yes, intact.  Confirmed pressure ulcers found on None.  New potential pressure ulcers noted on None. Wound consult placed None.     Noted chest area pink and blanching; sacral area pink and blanching, bilateral heels is pink and blanching     The following interventions in place; applied mepilex in bony part of the chest; provided méndez care, encouraged to turn to sides; mepilex in place; on pressure redistribution mattress

## 2020-10-25 NOTE — CARE PLAN
Problem: Safety  Goal: Will remain free from falls  Outcome: PROGRESSING AS EXPECTED  Intervention: Implement fall precautions  Note: Fall precautions in place. Bed in lowest position. Non-skid socks in place. Personal possessions within reach. Mobility sign on door. Bed-alarm on. Call light within reach. Pt educated regarding fall prevention and states understanding.       Problem: Infection  Goal: Will remain free from infection  Outcome: PROGRESSING AS EXPECTED   Note: Patient frequently assessed for any signs of infection.

## 2020-10-26 PROCEDURE — 99231 SBSQ HOSP IP/OBS SF/LOW 25: CPT | Performed by: NURSE PRACTITIONER

## 2020-10-26 PROCEDURE — A9270 NON-COVERED ITEM OR SERVICE: HCPCS | Performed by: NURSE PRACTITIONER

## 2020-10-26 PROCEDURE — 700102 HCHG RX REV CODE 250 W/ 637 OVERRIDE(OP): Performed by: NURSE PRACTITIONER

## 2020-10-26 PROCEDURE — A9270 NON-COVERED ITEM OR SERVICE: HCPCS | Performed by: HOSPITALIST

## 2020-10-26 PROCEDURE — 700102 HCHG RX REV CODE 250 W/ 637 OVERRIDE(OP): Performed by: HOSPITALIST

## 2020-10-26 PROCEDURE — 770006 HCHG ROOM/CARE - MED/SURG/GYN SEMI*

## 2020-10-26 RX ADMIN — FINASTERIDE 5 MG: 5 TABLET, FILM COATED ORAL at 08:50

## 2020-10-26 RX ADMIN — ERGOCALCIFEROL 50000 UNITS: 1.25 CAPSULE ORAL at 14:07

## 2020-10-26 RX ADMIN — AMLODIPINE BESYLATE 5 MG: 5 TABLET ORAL at 08:51

## 2020-10-26 RX ADMIN — TAMSULOSIN HYDROCHLORIDE 0.8 MG: 0.4 CAPSULE ORAL at 08:50

## 2020-10-26 RX ADMIN — QUETIAPINE FUMARATE 25 MG: 25 TABLET ORAL at 17:16

## 2020-10-26 RX ADMIN — THERA TABS 1 TABLET: TAB at 08:51

## 2020-10-26 ASSESSMENT — ENCOUNTER SYMPTOMS
DIZZINESS: 0
PALPITATIONS: 0
NECK PAIN: 0
BACK PAIN: 0
SORE THROAT: 0
HEADACHES: 0
ABDOMINAL PAIN: 0
MYALGIAS: 0
NAUSEA: 0

## 2020-10-26 NOTE — PROGRESS NOTES
"Hospital Medicine Daily Progress Note    Date of Service  10/26/2020    Chief Complaint  SOB and low blood pressue    Hospital Course   Mr. Bejarano is an 85 yo male with a PMHx of DM type II, hyperlipidemia, BPH who was admitted on 7/9/2020 with COVID 19 infection with POLY and hypotension.  Patient initially in ICU and on therapeutic Lovenox due to elevated DDimer.  His hospital course was complicated by hematuria and urine retention of which a méndez was placed.  Flomax and finasteride was initiated.  Patient was in acute renal failure which has improved. RUQ U/S was completed d/t periumbilical pain. This revealed possible renal mass. CT renal w/wo was performed and showed a large renal mass (7x7x8) likely representing urothelial vs renal cell carcinoma. Psychiatry was also consulted d/t ongoing cognition difficulty.  Per psychiatry, patient does not have the capacity to make any medical decision on 8/3/2020. SLP feels he has moderate to severe deficits in insight, orientation, and memory. Guardianship pending.     Despite Proscar and Flomax voiding trial failed 9/4 and red frank catheter was placed d/t high urinary retention. His renal fx has remained stable and WNL. Unfortunately, the patient is 116 pounds at the time of this note, with BMI of 17. He is a poor candidate for potential chemotherapy or surgical decompression given his malnutrition and neurocognitive disorder.      Interval Problem Update  10/22:  Sitting up on edge of bed in no acute distress. Méndez in place and functioning well. Oriented to self and \"[facility]\" only. Calm and pleasant mood.  SBPs 100-120s.     10/26: Sitting up at edge of bed in no acute distress. Oriented to self and place. SBPs 100-110s. Denies c/o pain.     Consultants/Specialty  -Psychiatry  -Palliative Care    Code Status  DNAR/DNI    Disposition  Pending guardianship and placement    Review of Systems  Review of Systems   HENT: Negative for sore throat.    Cardiovascular: Negative " for chest pain and palpitations.   Gastrointestinal: Negative for abdominal pain and nausea.   Musculoskeletal: Negative for back pain, joint pain, myalgias and neck pain.   Neurological: Negative for dizziness and headaches.        Physical Exam  Temp:  [36.2 °C (97.2 °F)-36.7 °C (98 °F)] 36.7 °C (98 °F)  Pulse:  [70-81] 70  Resp:  [16-17] 16  BP: (101-112)/(46-57) 112/57  SpO2:  [95 %-96 %] 96 %    Physical Exam  Vitals signs and nursing note reviewed.   Constitutional:       Appearance: He is underweight. He is not ill-appearing.      Comments: Frail   Chronically ill appearing     HENT:      Head: Normocephalic and atraumatic.      Nose: Nose normal.      Mouth/Throat:      Mouth: Mucous membranes are moist.   Eyes:      General: No scleral icterus.     Extraocular Movements: Extraocular movements intact.      Pupils: Pupils are equal, round, and reactive to light.   Neck:      Musculoskeletal: Neck supple.   Cardiovascular:      Rate and Rhythm: Normal rate.      Heart sounds: No murmur. No friction rub.   Pulmonary:      Effort: Pulmonary effort is normal. No respiratory distress.      Breath sounds: No stridor. Rhonchi present. No wheezing or rales.   Chest:      Chest wall: No tenderness.   Abdominal:      General: There is no distension.      Palpations: Abdomen is soft.   Genitourinary:     Comments: Rosado in place and draining yellow urine    Musculoskeletal:      Right lower leg: No edema.      Left lower leg: No edema.      Comments: WELLS     Skin:     General: Skin is warm and dry.      Coloration: Skin is not pale.   Neurological:      General: No focal deficit present.      Mental Status: He is alert. He is disoriented.   Psychiatric:         Attention and Perception: He is inattentive.         Mood and Affect: Mood normal.         Behavior: Behavior normal. Behavior is cooperative.         Cognition and Memory: Cognition is impaired. Memory is impaired.         Fluids    Intake/Output Summary (Last  24 hours) at 10/26/2020 1045  Last data filed at 10/26/2020 0900  Gross per 24 hour   Intake 1360 ml   Output 1950 ml   Net -590 ml       Laboratory                        Imaging  CT-RENAL WITH & W/O   Final Result      1.  Large complex right renal mass as detailed above measuring approximately 7.8 x 7.1x  8.5 cm. Primary differential considerations include urothelial malignancy or renal cell carcinoma.   2.  No evidence of adenopathy or metastatic disease is seen.   3.  Distended urinary bladder. Right greater left hydroureteronephrosis.   4.  Severe atherosclerosis. 3.5 cm infrarenal abdominal aortic aneurysm.            US-RUQ   Final Result         1.  Echogenic liver compatible with fatty change versus fibrosis.   2.  Masslike structure in the right kidney, should be considered neoplastic unless proven otherwise, recommend follow-up 3 phase CT of the kidneys for further characterization   3.  Mild dilatation of the common bile duct, within expected limits given patient age.      These findings were discussed with the patient's clinician, Vin eMi, on 7/23/2020 7:34 AM.      DX-CHEST-PORTABLE (1 VIEW)   Final Result      1.  The lungs are hyperinflated suggesting emphysema/COPD.   2.  There is minimal predominantly lower lobe interstitial opacity which is most likely due to chronic scarring.           Assessment/Plan  Neurocognitive disorder, unspecified  Assessment & Plan  -Psychiatry was consulted, patient cannot make any medical decision  -SW working on guardianship.   -Hearing moved to November     Urinary retention- (present on admission)  Assessment & Plan  -Continue Flomax and finasteride  -Méndez catheter in place for urinary retention on 8/7-Trial removal of Méndez catheter 9/4 unsuccessful, Méndez replaced  -per policy, will leave in due to difficulties with placement and bleeding with previous exchanges  -Follow up outpatient. Discharge likely with méndez.    Right kidney mass- (present on  admission)  Assessment & Plan  -Suspicion for urothelial carcinoma vs renal cell carcinoma. No a/p adenopathy  -Patient failed voiding trial despite flomax/proscar  -Poor candidate for chemo given neurocognitive d/o, lacking capacity and requirement for guardianship      COPD (chronic obstructive pulmonary disease) (HCC)- (present on admission)  Assessment & Plan  -Stable on room air   -Inhaled bronchodilators as needed  -Not in acute exacerbation    Discharge planning issues  Assessment & Plan  -CM following  -plan for Group Home at [place]  -guardianship hearing remains pending.    HTN (hypertension)- (present on admission)  Assessment & Plan  - Continue Norvasc  - VS per policy     Type 2 diabetes mellitus (HCC)- (present on admission)  Assessment & Plan  -Hemoglobin A1c at 6.8, continue diabetic diet, monitor  -BG has been well controlled  -Accu-Cheks only as needed for signs and symptoms of hypoglycemia or acute illness    Abdominal aortic aneurysm (AAA) 3.0 cm to 5.5 cm in diameter in male (HCC)  Assessment & Plan  -Infrarenal  -Noted as incidental finding on CT renal w/wo  -Not a candidate for repair given neurocognitive disorder, lacking capacity  -BP control     Advanced care planning/counseling discussion  Assessment & Plan  -Patient is DNR/DNI  -Guardianship pending    Protein-calorie malnutrition (HCC)- (present on admission)  Assessment & Plan  -Body mass index is 17.57 kg/m².  -encourage PO intake  -food preferences  -TID supplements    COVID-19 virus infection  Assessment & Plan  -Patient has recovered from COVID-19       VTE prophylaxis : Ambulation    I have performed a physical exam and reviewed and updated ROS and Assessment/Plan today (10/26/20). In review of the previous note there are no changes except as documented above      MASON Tejada

## 2020-10-26 NOTE — PROGRESS NOTES
Patient alert/oriented x3,sleeping in bed without distress,blind,call light and personal belongings within reach,bed in low position and bed alarm on.

## 2020-10-27 PROCEDURE — 700102 HCHG RX REV CODE 250 W/ 637 OVERRIDE(OP): Performed by: HOSPITALIST

## 2020-10-27 PROCEDURE — 700102 HCHG RX REV CODE 250 W/ 637 OVERRIDE(OP): Performed by: NURSE PRACTITIONER

## 2020-10-27 PROCEDURE — A9270 NON-COVERED ITEM OR SERVICE: HCPCS | Performed by: HOSPITALIST

## 2020-10-27 PROCEDURE — 770006 HCHG ROOM/CARE - MED/SURG/GYN SEMI*

## 2020-10-27 PROCEDURE — A9270 NON-COVERED ITEM OR SERVICE: HCPCS | Performed by: NURSE PRACTITIONER

## 2020-10-27 RX ADMIN — AMLODIPINE BESYLATE 5 MG: 5 TABLET ORAL at 07:38

## 2020-10-27 RX ADMIN — QUETIAPINE FUMARATE 25 MG: 25 TABLET ORAL at 20:39

## 2020-10-27 RX ADMIN — FINASTERIDE 5 MG: 5 TABLET, FILM COATED ORAL at 07:39

## 2020-10-27 RX ADMIN — THERA TABS 1 TABLET: TAB at 07:39

## 2020-10-27 RX ADMIN — TAMSULOSIN HYDROCHLORIDE 0.8 MG: 0.4 CAPSULE ORAL at 07:39

## 2020-10-27 NOTE — PROGRESS NOTES
2 RN skin check complete with EZ Collado.   Devices in place: méndez catheter.  Skin assessed under devices: yes.  Confirmed pressure ulcers found on: none.  New potential pressure ulcers noted on: none.    The following interventions in place: mepilex, encourage out of bed as tolerated, méndez care with soap and water    Current skin assessment:  Penis (urethral opening): pink, blanching  BLE: dry, flaky  Elbows: pink, blanching  Heels: boggy, pink, blanching  Sacrum: pink, blanching

## 2020-10-27 NOTE — CARE PLAN
Problem: Skin Integrity  Goal: Risk for impaired skin integrity will decrease  Note: 2RN skin checks  Moisturizers, mepilex in place     Problem: Urinary Elimination:  Goal: Ability to reestablish a normal urinary elimination pattern will improve  Note: Rosado catheter remains in place

## 2020-10-27 NOTE — PROGRESS NOTES
Pt alert, able to make needs known  Resting in bed today, denies pain  Ambulating to bathroom x1 assist  Rosado catheter in place  All needs met  Awaiting discharge plan

## 2020-10-27 NOTE — PROGRESS NOTES
2 RN skin check complete with EZ Mccormick  Devices in place Méndez catheter.  Skin assessed under devices ; yes, intact.  Confirmed pressure ulcers found on None.  New potential pressure ulcers noted on None. Wound consult placed None.     Noted chest area pink and blanching; sacral area pink and blanching, bilateral heels is pink and blanching     The following interventions in place; applied mepilex in bony part of the chest; provided méndez care, encouraged to turn to sides; mepilex in place; on pressure redistribution mattress

## 2020-10-28 PROCEDURE — 700102 HCHG RX REV CODE 250 W/ 637 OVERRIDE(OP): Performed by: NURSE PRACTITIONER

## 2020-10-28 PROCEDURE — A9270 NON-COVERED ITEM OR SERVICE: HCPCS | Performed by: NURSE PRACTITIONER

## 2020-10-28 PROCEDURE — A9270 NON-COVERED ITEM OR SERVICE: HCPCS | Performed by: HOSPITALIST

## 2020-10-28 PROCEDURE — 700102 HCHG RX REV CODE 250 W/ 637 OVERRIDE(OP): Performed by: HOSPITALIST

## 2020-10-28 PROCEDURE — 770006 HCHG ROOM/CARE - MED/SURG/GYN SEMI*

## 2020-10-28 RX ADMIN — TAMSULOSIN HYDROCHLORIDE 0.8 MG: 0.4 CAPSULE ORAL at 09:34

## 2020-10-28 RX ADMIN — AMLODIPINE BESYLATE 5 MG: 5 TABLET ORAL at 09:35

## 2020-10-28 RX ADMIN — FINASTERIDE 5 MG: 5 TABLET, FILM COATED ORAL at 09:35

## 2020-10-28 RX ADMIN — QUETIAPINE FUMARATE 25 MG: 25 TABLET ORAL at 20:30

## 2020-10-28 RX ADMIN — THERA TABS 1 TABLET: TAB at 09:36

## 2020-10-28 NOTE — PROGRESS NOTES
2 RN skin check complete with EZ Mccormick  Devices in place: Méndez catheter  Skin assessed under devices: yes, intact.  Confirmed pressure ulcers found on: None.  New potential pressure ulcers noted on None. Wound consult placed None.     Chest area pink and blanching  Elbows intact  Sacral area pink and blanching  Open scab area on anterior leg (pt stated he scratched it open)  Bilateral heels boggy, dry, pink and blanching     The following interventions in place: mepilex onbony part of left side chest, méndez care, encouraged to turn to sides; new mepilex to heels in place; on pressure redistribution mattress

## 2020-10-28 NOTE — PROGRESS NOTES
Received report and assumed care of pt. Assessment complete on RA. Pt A&OX3, disoriented to event. Denies any pain or discomfort at this time. Pt currently resting in bed. All pt needs met at this time. Safety precautions and hourly rounding in place.

## 2020-10-28 NOTE — CARE PLAN
Problem: Safety  Goal: Will remain free from falls  Outcome: PROGRESSING AS EXPECTED  Note: Bed locked and in lowest position. Bed alarm on and in place. Call light and belongings within reach. Bed near nurses station.     Problem: Skin Integrity  Goal: Risk for impaired skin integrity will decrease  Outcome: PROGRESSING AS EXPECTED  Note: Implement 2 RN skin checks

## 2020-10-28 NOTE — PROGRESS NOTES
Pt is currently resting in bed. No signs of distress or pain. Pt able to make needs known with call light. Bed alarm on. Room by nursing station. Call light and belonging in reach. Bed in lowest and locked position.

## 2020-10-29 PROCEDURE — 700102 HCHG RX REV CODE 250 W/ 637 OVERRIDE(OP): Performed by: HOSPITALIST

## 2020-10-29 PROCEDURE — 700111 HCHG RX REV CODE 636 W/ 250 OVERRIDE (IP): Performed by: NURSE PRACTITIONER

## 2020-10-29 PROCEDURE — A9270 NON-COVERED ITEM OR SERVICE: HCPCS | Performed by: HOSPITALIST

## 2020-10-29 PROCEDURE — 770006 HCHG ROOM/CARE - MED/SURG/GYN SEMI*

## 2020-10-29 PROCEDURE — A9270 NON-COVERED ITEM OR SERVICE: HCPCS | Performed by: NURSE PRACTITIONER

## 2020-10-29 PROCEDURE — 99231 SBSQ HOSP IP/OBS SF/LOW 25: CPT | Performed by: NURSE PRACTITIONER

## 2020-10-29 PROCEDURE — 700102 HCHG RX REV CODE 250 W/ 637 OVERRIDE(OP): Performed by: NURSE PRACTITIONER

## 2020-10-29 RX ADMIN — QUETIAPINE FUMARATE 25 MG: 25 TABLET ORAL at 19:58

## 2020-10-29 RX ADMIN — HALOPERIDOL LACTATE 2 MG: 5 INJECTION, SOLUTION INTRAMUSCULAR at 14:14

## 2020-10-29 ASSESSMENT — ENCOUNTER SYMPTOMS
FEVER: 0
DIZZINESS: 0
SHORTNESS OF BREATH: 0
BLURRED VISION: 0
DOUBLE VISION: 0
HEADACHES: 0
VOMITING: 0
CHILLS: 0
NAUSEA: 0
ABDOMINAL PAIN: 0
MYALGIAS: 0
PALPITATIONS: 0
SORE THROAT: 0
COUGH: 0
WEAKNESS: 1

## 2020-10-29 NOTE — PROGRESS NOTES
Alert x3, cheerful and cooperative; bed alarm in place. Cont plan of care, call light within reach

## 2020-10-29 NOTE — PROGRESS NOTES
2 RN skin check complete with EZ Collado.   Devices in place: méndez catheter.  Skin assessed under devices: yes.  Confirmed pressure ulcers found on: none.  New potential pressure ulcers noted on: none.     The following interventions in place: mepilex, encourage out of bed as tolerated, méndez care with soap and water     Assessed:  BLE: dry, flaky, dusky.  Elbows: pink, blanching.  Heels: boggy, pink, blanching.  Sacrum: pink, blanching.

## 2020-10-29 NOTE — CARE PLAN
Problem: Psychosocial Needs:  Goal: Level of anxiety will decrease  Outcome: PROGRESSING AS EXPECTED  Note: Reorient pt. Educate pt on care.     Problem: Urinary Elimination:  Goal: Ability to reestablish a normal urinary elimination pattern will improve  Outcome: NOT MET  Note: Rosado in place for retention.

## 2020-10-29 NOTE — PROGRESS NOTES
2 RN skin check complete. declan  Devices in place méndez, mepilex   Skin assessed under devices yes  Confirmed pressure ulcers found on na  New potential pressure ulcers noted on na. Wound consult placed na  The following interventions in place méndez care, mepilex for prevention, out of bed as tolerated    Skin intact and blanching  Heels with preventive mepilex in place  Penis tip red along inside from méndez tubing; rotate méndez cath, stat lock in place

## 2020-10-29 NOTE — PROGRESS NOTES
"Hospital Medicine Daily Progress Note    Date of Service  10/29/2020    Chief Complaint  SOB and low blood pressue    Hospital Course   Mr. Bejarano is an 85 yo male with a PMHx of DM type II, hyperlipidemia, BPH who was admitted on 7/9/2020 with COVID 19 infection with POLY and hypotension.  Patient initially in ICU and on therapeutic Lovenox due to elevated DDimer.  His hospital course was complicated by hematuria and urine retention of which a méndez was placed.  Flomax and finasteride was initiated.  Patient was in acute renal failure which has improved. RUQ U/S was completed d/t periumbilical pain. This revealed possible renal mass. CT renal w/wo was performed and showed a large renal mass (7x7x8) likely representing urothelial vs renal cell carcinoma. Psychiatry was also consulted d/t ongoing cognition difficulty.  Per psychiatry, patient does not have the capacity to make any medical decision on 8/3/2020. SLP feels he has moderate to severe deficits in insight, orientation, and memory. Guardianship pending.     Despite Proscar and Flomax voiding trial failed 9/4 and red frank catheter was placed d/t high urinary retention. His renal fx has remained stable and WNL. Unfortunately, the patient is 116 pounds at the time of this note, with BMI of 17. He is a poor candidate for potential chemotherapy or surgical decompression given his malnutrition and neurocognitive disorder.      Interval Problem Update  10/29:  Patient seen and examined.  He denies acute complaints.  He states \"nothing is wrong with me, I want to get out of here.\"  Explained we are working a safe discharge plan.  No acute needs. .    Consultants/Specialty  -Psychiatry  -Palliative Care    Code Status  DNAR/DNI    Disposition  Pending guardianship and placement    Review of Systems  Review of Systems   Constitutional: Negative for chills, fever and malaise/fatigue.   HENT: Negative for congestion and sore throat.    Eyes: Negative for blurred vision " and double vision.   Respiratory: Negative for cough and shortness of breath.    Cardiovascular: Negative for chest pain, palpitations and leg swelling.   Gastrointestinal: Negative for abdominal pain, nausea and vomiting.   Musculoskeletal: Negative for joint pain and myalgias.   Skin: Negative for itching and rash.   Neurological: Positive for weakness. Negative for dizziness and headaches.        Physical Exam  Temp:  [36.4 °C (97.6 °F)-36.6 °C (97.8 °F)] 36.6 °C (97.8 °F)  Pulse:  [70-81] 70  Resp:  [18-20] 18  BP: (100-146)/(49-68) 117/50  SpO2:  [95 %] 95 %    Physical Exam  Vitals signs and nursing note reviewed.   Constitutional:       General: He is not in acute distress.     Appearance: He is underweight. He is not ill-appearing.      Comments: Frail   Chronically ill appearing     HENT:      Head: Normocephalic and atraumatic.      Nose: Nose normal. No congestion.      Mouth/Throat:      Mouth: Mucous membranes are moist.      Pharynx: No oropharyngeal exudate.   Eyes:      General:         Right eye: No discharge.         Left eye: No discharge.      Conjunctiva/sclera: Conjunctivae normal.   Neck:      Musculoskeletal: Neck supple. No neck rigidity.   Cardiovascular:      Rate and Rhythm: Normal rate.      Heart sounds: Normal heart sounds. No murmur.   Pulmonary:      Effort: Pulmonary effort is normal. No respiratory distress.      Breath sounds: No wheezing or rales.   Abdominal:      General: There is no distension.      Palpations: Abdomen is soft.      Tenderness: There is no abdominal tenderness. There is no guarding.   Genitourinary:     Comments: Rosado in place and draining yellow urine    Musculoskeletal:         General: No swelling.      Right lower leg: No edema.      Left lower leg: No edema.      Comments: WELLS     Skin:     General: Skin is warm and dry.      Coloration: Skin is not jaundiced or pale.   Neurological:      General: No focal deficit present.      Mental Status: He is  alert. He is disoriented.   Psychiatric:         Attention and Perception: He is inattentive.         Mood and Affect: Mood normal.         Behavior: Behavior normal. Behavior is cooperative.         Cognition and Memory: Cognition is impaired. Memory is impaired.         Fluids    Intake/Output Summary (Last 24 hours) at 10/29/2020 0917  Last data filed at 10/29/2020 0107  Gross per 24 hour   Intake 1000 ml   Output 2400 ml   Net -1400 ml       Laboratory                        Imaging  CT-RENAL WITH & W/O   Final Result      1.  Large complex right renal mass as detailed above measuring approximately 7.8 x 7.1x  8.5 cm. Primary differential considerations include urothelial malignancy or renal cell carcinoma.   2.  No evidence of adenopathy or metastatic disease is seen.   3.  Distended urinary bladder. Right greater left hydroureteronephrosis.   4.  Severe atherosclerosis. 3.5 cm infrarenal abdominal aortic aneurysm.            US-RUQ   Final Result         1.  Echogenic liver compatible with fatty change versus fibrosis.   2.  Masslike structure in the right kidney, should be considered neoplastic unless proven otherwise, recommend follow-up 3 phase CT of the kidneys for further characterization   3.  Mild dilatation of the common bile duct, within expected limits given patient age.      These findings were discussed with the patient's clinician, Vin Mei, on 7/23/2020 7:34 AM.      DX-CHEST-PORTABLE (1 VIEW)   Final Result      1.  The lungs are hyperinflated suggesting emphysema/COPD.   2.  There is minimal predominantly lower lobe interstitial opacity which is most likely due to chronic scarring.           Assessment/Plan  Neurocognitive disorder, unspecified  Assessment & Plan  -Psychiatry was consulted, patient cannot make any medical decision  -SW working on guardianship.   -Hearing moved to November     Urinary retention- (present on admission)  Assessment & Plan  -Continue Flomax and  finasteride  -Méndez catheter in place for urinary retention on 8/7-Trial removal of Méndez catheter 9/4 unsuccessful, Méndez replaced  -per policy, will leave in due to difficulties with placement and bleeding with previous exchanges  -Follow up outpatient. Discharge likely with méndez.    Right kidney mass- (present on admission)  Assessment & Plan  -Suspicion for urothelial carcinoma vs renal cell carcinoma. No a/p adenopathy  -Patient failed voiding trial despite flomax/proscar  -Poor candidate for chemo given neurocognitive d/o, lacking capacity and requirement for guardianship      COPD (chronic obstructive pulmonary disease) (HCC)- (present on admission)  Assessment & Plan  -Stable on room air   -Inhaled bronchodilators as needed  -Not in acute exacerbation    Discharge planning issues  Assessment & Plan  -CM following  -plan for Group Home at [place]  -guardianship hearing remains pending.    HTN (hypertension)- (present on admission)  Assessment & Plan  - Continue Norvasc  - VS per policy     Type 2 diabetes mellitus (HCC)- (present on admission)  Assessment & Plan  -Hemoglobin A1c at 6.8, continue diabetic diet, monitor  -BG has been well controlled  -Accu-Cheks only as needed for signs and symptoms of hypoglycemia or acute illness  -No need for insulin at this time.     Abdominal aortic aneurysm (AAA) 3.0 cm to 5.5 cm in diameter in male ([facility])  Assessment & Plan  -Infrarenal  -Noted as incidental finding on CT renal w/wo  -Not a candidate for repair given neurocognitive disorder, lacking capacity  -BP control     Advanced care planning/counseling discussion  Assessment & Plan  -Patient is DNR/DNI  -Guardianship pending    Protein-calorie malnutrition (HCC)- (present on admission)  Assessment & Plan  -Body mass index is 17.57 kg/m².  -encourage PO intake  -food preferences  -TID supplements    COVID-19 virus infection  Assessment & Plan  -Patient has recovered from COVID-19       VTE prophylaxis : Ambulation    I  have performed a physical exam and reviewed and updated ROS and Assessment/Plan today (10/29/20). In review of the previous note there are no changes except as documented above      MICK Soto.

## 2020-10-30 PROCEDURE — 700102 HCHG RX REV CODE 250 W/ 637 OVERRIDE(OP): Performed by: HOSPITALIST

## 2020-10-30 PROCEDURE — A9270 NON-COVERED ITEM OR SERVICE: HCPCS | Performed by: HOSPITALIST

## 2020-10-30 PROCEDURE — 770006 HCHG ROOM/CARE - MED/SURG/GYN SEMI*

## 2020-10-30 PROCEDURE — A9270 NON-COVERED ITEM OR SERVICE: HCPCS | Performed by: NURSE PRACTITIONER

## 2020-10-30 PROCEDURE — 700102 HCHG RX REV CODE 250 W/ 637 OVERRIDE(OP): Performed by: NURSE PRACTITIONER

## 2020-10-30 RX ADMIN — FINASTERIDE 5 MG: 5 TABLET, FILM COATED ORAL at 09:35

## 2020-10-30 RX ADMIN — THERA TABS 1 TABLET: TAB at 09:35

## 2020-10-30 RX ADMIN — TAMSULOSIN HYDROCHLORIDE 0.8 MG: 0.4 CAPSULE ORAL at 09:35

## 2020-10-30 RX ADMIN — QUETIAPINE FUMARATE 25 MG: 25 TABLET ORAL at 16:45

## 2020-10-30 NOTE — CARE PLAN
Problem: Safety  Goal: Will remain free from injury  Outcome: PROGRESSING AS EXPECTED     Problem: Infection  Goal: Will remain free from infection  Outcome: PROGRESSING AS EXPECTED     Problem: Venous Thromboembolism (VTW)/Deep Vein Thrombosis (DVT) Prevention:  Goal: Patient will participate in Venous Thrombosis (VTE)/Deep Vein Thrombosis (DVT)Prevention Measures  Outcome: PROGRESSING AS EXPECTED     Problem: Bowel/Gastric:  Goal: Normal bowel function is maintained or improved  Outcome: PROGRESSING AS EXPECTED  Goal: Will not experience complications related to bowel motility  Outcome: PROGRESSING AS EXPECTED

## 2020-10-30 NOTE — PROGRESS NOTES
"Pt sitting in bed watching tv after going for walk with CNA in halls. Pt became emotional and crying while talking to son June but informed this writer that they were \"happy tears\" from speaking with his family. Pt in good spirits after phone call with no needs at this time. Bed in lowest and locked position, fall precautions in place; tray table and call light within reach; will continue to monitor.   "

## 2020-10-31 PROCEDURE — A9270 NON-COVERED ITEM OR SERVICE: HCPCS | Performed by: HOSPITALIST

## 2020-10-31 PROCEDURE — 700102 HCHG RX REV CODE 250 W/ 637 OVERRIDE(OP): Performed by: HOSPITALIST

## 2020-10-31 PROCEDURE — A9270 NON-COVERED ITEM OR SERVICE: HCPCS | Performed by: NURSE PRACTITIONER

## 2020-10-31 PROCEDURE — 770006 HCHG ROOM/CARE - MED/SURG/GYN SEMI*

## 2020-10-31 PROCEDURE — 700102 HCHG RX REV CODE 250 W/ 637 OVERRIDE(OP): Performed by: NURSE PRACTITIONER

## 2020-10-31 RX ADMIN — AMLODIPINE BESYLATE 5 MG: 5 TABLET ORAL at 08:29

## 2020-10-31 RX ADMIN — TAMSULOSIN HYDROCHLORIDE 0.8 MG: 0.4 CAPSULE ORAL at 08:29

## 2020-10-31 RX ADMIN — THERA TABS 1 TABLET: TAB at 08:29

## 2020-10-31 RX ADMIN — QUETIAPINE FUMARATE 25 MG: 25 TABLET ORAL at 16:41

## 2020-10-31 RX ADMIN — FINASTERIDE 5 MG: 5 TABLET, FILM COATED ORAL at 08:29

## 2020-10-31 NOTE — CARE PLAN
Problem: Safety  Goal: Will remain free from falls  Outcome: PROGRESSING AS EXPECTED  Intervention: Implement fall precautions  Note: Fall precautions in place. Bed in lowest position.  Personal possessions within reach. Mobility sign on door. Bed-alarm on. Call light within reach. Pt educated regarding fall prevention and states understanding.       Problem: Infection  Goal: Will remain free from infection  Outcome: PROGRESSING AS EXPECTED

## 2020-10-31 NOTE — PROGRESS NOTES
2 RN skin check completed with EZ Salazar.   Devices in place: méndez catheter, mepilex  Skin assessed under devices: yes.  Confirmed pressure ulcers found: none.  New potential pressure ulcers noted: none  Wound consulted placed none     The following interventions in place: 2RN skin check Qshift, pt encouraged reposition. mepilex to heels, Méndez care, pillows for support/positioning. pressure redistribution mattress in use.     Skin assessment:    Bilateral elbows: pink/blanching.  BLE dusky/dry   Sacrum pink/blanching/intact.   Heels boggy: pink/dry/blanching mepilex in use  Méndez catheter site CDI.

## 2020-10-31 NOTE — PROGRESS NOTES
2 RN skin check complete with EZ Carvalho.   Devices in place: méndez catheter.  Skin assessed under devices: yes.  Confirmed pressure ulcers found on: none.  New potential pressure ulcers noted on: none.     The following interventions in place: mepilex, patient ambulatory w/ a cane and SBA - encouraged ambulation, méndez care given, pillows to float heels. Patient can turn side to side     Assessed:  BLE: dry, flaky, dusky.  Elbows: pink, blanching.  Heels: boggy, pink, blanching; mepilex in place  Sacrum: pink, blanching

## 2020-10-31 NOTE — PROGRESS NOTES
Pt AOx3, able to make needs known. Legally blind. Ambulates w/ a cane, SBA. No c/o pain at this time. Very pleasant. Anticipated and provided needs. Safety precautions in place. Bed locked and placed in lowest position. Treaded socks on. Call lights w/in reach. Hourly rounds in place.

## 2020-11-01 PROCEDURE — 700102 HCHG RX REV CODE 250 W/ 637 OVERRIDE(OP): Performed by: HOSPITALIST

## 2020-11-01 PROCEDURE — A9270 NON-COVERED ITEM OR SERVICE: HCPCS | Performed by: NURSE PRACTITIONER

## 2020-11-01 PROCEDURE — 700102 HCHG RX REV CODE 250 W/ 637 OVERRIDE(OP): Performed by: NURSE PRACTITIONER

## 2020-11-01 PROCEDURE — A9270 NON-COVERED ITEM OR SERVICE: HCPCS | Performed by: HOSPITALIST

## 2020-11-01 PROCEDURE — 770006 HCHG ROOM/CARE - MED/SURG/GYN SEMI*

## 2020-11-01 PROCEDURE — 99231 SBSQ HOSP IP/OBS SF/LOW 25: CPT | Performed by: NURSE PRACTITIONER

## 2020-11-01 RX ADMIN — QUETIAPINE FUMARATE 25 MG: 25 TABLET ORAL at 16:19

## 2020-11-01 RX ADMIN — TAMSULOSIN HYDROCHLORIDE 0.8 MG: 0.4 CAPSULE ORAL at 08:22

## 2020-11-01 RX ADMIN — FINASTERIDE 5 MG: 5 TABLET, FILM COATED ORAL at 08:22

## 2020-11-01 RX ADMIN — AMLODIPINE BESYLATE 5 MG: 5 TABLET ORAL at 08:23

## 2020-11-01 RX ADMIN — THERA TABS 1 TABLET: TAB at 08:23

## 2020-11-01 ASSESSMENT — ENCOUNTER SYMPTOMS
WEAKNESS: 1
HEADACHES: 0
NAUSEA: 0
VOMITING: 0
DOUBLE VISION: 0
FEVER: 0
BLURRED VISION: 0
ABDOMINAL PAIN: 0
MYALGIAS: 0
CONSTIPATION: 0
DIZZINESS: 0
EYE DISCHARGE: 0
SHORTNESS OF BREATH: 0

## 2020-11-01 NOTE — CARE PLAN
Problem: Venous Thromboembolism (VTW)/Deep Vein Thrombosis (DVT) Prevention:  Goal: Patient will participate in Venous Thrombosis (VTE)/Deep Vein Thrombosis (DVT)Prevention Measures  Outcome: PROGRESSING AS EXPECTED  Intervention: Encourage ambulation/mobilization at level directed by Physical Therapy in collaboration with Interdisciplinary Team  Note: Pt ambulated around th unit.      Problem: Skin Integrity  Goal: Risk for impaired skin integrity will decrease  Outcome: PROGRESSING AS EXPECTED  Intervention: Assess and monitor skin integrity, appearance and/or temperature  Note: Pt is able to reposition independently. 2RN skin check in place q shift.

## 2020-11-01 NOTE — PROGRESS NOTES
2 RN skin check complete with EZ Carvalho.   Devices in place: méndez catheter.  Skin assessed under devices: yes.  Confirmed pressure ulcers found on: none.  New potential pressure ulcers noted on: none.     The following interventions in place: mepilex, patient ambulatory w/ a cane and SBA - encouraged ambulation, méndez care given, pillows to float heels. Patient can turn side to side     Assessed:  BLE: dry, flaky, dusky.  Elbows: pink, blanching.  Heels: boggy, pink, blanching; mepilex in place  Sacrum: pink, blanching  FC site: clean, dry and intact

## 2020-11-01 NOTE — PROGRESS NOTES
Pt AOx3, able to make needs known. No c/o pain at this time. Anticipated and provided needs. Safety precautions in place. Bed locked and placed in lowest position. Treaded socks on. Call lights w/in reach. Hourly rounds in place

## 2020-11-01 NOTE — PROGRESS NOTES
"Hospital Medicine Daily Progress Note    Date of Service  11/1/2020    Chief Complaint  SOB and low blood pressue    Hospital Course   Mr. Bejarano is an 85 yo male with a PMHx of DM type II, hyperlipidemia, BPH who was admitted on 7/9/2020 with COVID 19 infection with POLY and hypotension.  Patient initially in ICU and on therapeutic Lovenox due to elevated DDimer.  His hospital course was complicated by hematuria and urine retention of which a méndez was placed.  Flomax and finasteride was initiated.  Patient was in acute renal failure which has improved. RUQ U/S was completed d/t periumbilical pain. This revealed possible renal mass. CT renal w/wo was performed and showed a large renal mass (7x7x8) likely representing urothelial vs renal cell carcinoma. Psychiatry was also consulted d/t ongoing cognition difficulty.  Per psychiatry, patient does not have the capacity to make any medical decision on 8/3/2020. SLP feels he has moderate to severe deficits in insight, orientation, and memory. Guardianship pending.     Despite Proscar and Flomax voiding trial failed 9/4 and red frank catheter was placed d/t high urinary retention. His renal fx has remained stable and WNL. Unfortunately, the patient is 116 pounds at the time of this note, with BMI of 17. He is a poor candidate for potential chemotherapy or surgical decompression given his malnutrition and neurocognitive disorder.      Interval Problem Update  10/29:  Patient seen and examined.  He denies acute complaints.  He states \"nothing is wrong with me, I want to get out of here.\"  Explained we are working a safe discharge plan.  No acute needs.   11/1:  Mood is pleasant this morning.  He is smiling.  Denies any acute discomfort.  Thanks me for checking on him.      Consultants/Specialty  -Psychiatry  -Palliative Care    Code Status  DNAR/DNI    Disposition  Pending guardianship and placement    Review of Systems  Review of Systems   Constitutional: Negative for " fever and malaise/fatigue.   HENT: Negative for congestion.    Eyes: Negative for blurred vision, double vision and discharge.   Respiratory: Negative for shortness of breath.    Cardiovascular: Negative for chest pain and leg swelling.   Gastrointestinal: Negative for abdominal pain, constipation, nausea and vomiting.   Genitourinary: Negative for dysuria.        Retention   Musculoskeletal: Negative for joint pain and myalgias.   Skin: Negative for rash.   Neurological: Positive for weakness. Negative for dizziness and headaches.        Physical Exam  Temp:  [36.6 °C (97.8 °F)-36.7 °C (98.1 °F)] 36.7 °C (98 °F)  Pulse:  [72-82] 75  Resp:  [16-18] 16  BP: (104-120)/(39-54) 104/46  SpO2:  [90 %-97 %] 97 %    Physical Exam  Vitals signs and nursing note reviewed.   Constitutional:       General: He is not in acute distress.     Appearance: He is underweight. He is not toxic-appearing.      Comments: Frail   Chronically ill appearing     HENT:      Head: Normocephalic and atraumatic.      Nose: Nose normal.      Mouth/Throat:      Mouth: Mucous membranes are moist.      Pharynx: Oropharynx is clear.   Eyes:      General: No scleral icterus.     Conjunctiva/sclera: Conjunctivae normal.   Neck:      Musculoskeletal: Normal range of motion. No neck rigidity or muscular tenderness.   Cardiovascular:      Rate and Rhythm: Normal rate.      Heart sounds: Normal heart sounds. No murmur.   Pulmonary:      Effort: Pulmonary effort is normal. No respiratory distress.      Breath sounds: No wheezing or rales.   Abdominal:      General: There is no distension.      Palpations: Abdomen is soft.      Tenderness: There is no abdominal tenderness.   Genitourinary:     Comments: Rosado in place and draining yellow urine    Musculoskeletal:         General: No swelling.      Right lower leg: No edema.      Left lower leg: No edema.      Comments: WELLS     Skin:     General: Skin is warm and dry.      Coloration: Skin is not jaundiced or  pale.   Neurological:      General: No focal deficit present.      Mental Status: He is alert. He is disoriented.   Psychiatric:         Attention and Perception: He is inattentive.         Mood and Affect: Mood normal.         Behavior: Behavior normal. Behavior is cooperative.         Cognition and Memory: Cognition is impaired. Memory is impaired.         Fluids    Intake/Output Summary (Last 24 hours) at 11/1/2020 0854  Last data filed at 11/1/2020 0440  Gross per 24 hour   Intake 920 ml   Output 2900 ml   Net -1980 ml       Laboratory                        Imaging  CT-RENAL WITH & W/O   Final Result      1.  Large complex right renal mass as detailed above measuring approximately 7.8 x 7.1x  8.5 cm. Primary differential considerations include urothelial malignancy or renal cell carcinoma.   2.  No evidence of adenopathy or metastatic disease is seen.   3.  Distended urinary bladder. Right greater left hydroureteronephrosis.   4.  Severe atherosclerosis. 3.5 cm infrarenal abdominal aortic aneurysm.            US-RUQ   Final Result         1.  Echogenic liver compatible with fatty change versus fibrosis.   2.  Masslike structure in the right kidney, should be considered neoplastic unless proven otherwise, recommend follow-up 3 phase CT of the kidneys for further characterization   3.  Mild dilatation of the common bile duct, within expected limits given patient age.      These findings were discussed with the patient's clinician, Vin Mei, on 7/23/2020 7:34 AM.      DX-CHEST-PORTABLE (1 VIEW)   Final Result      1.  The lungs are hyperinflated suggesting emphysema/COPD.   2.  There is minimal predominantly lower lobe interstitial opacity which is most likely due to chronic scarring.           Assessment/Plan  Neurocognitive disorder, unspecified  Assessment & Plan  -Psychiatry was consulted, patient cannot make any medical decision  - working on guardianship.   -Hearing moved to November     Urinary  retention- (present on admission)  Assessment & Plan  -Continue Flomax and finasteride  -Méndez catheter in place for urinary retention on 8/7-Trial removal of Méndez catheter 9/4 unsuccessful, Méndez replaced  -per policy, will leave in due to difficulties with placement and bleeding with previous exchanges  -Follow up outpatient. Discharge likely with méndez.    Right kidney mass- (present on admission)  Assessment & Plan  -Suspicion for urothelial carcinoma vs renal cell carcinoma. No a/p adenopathy  -Patient failed voiding trial despite flomax/proscar  -Poor candidate for chemo given neurocognitive d/o, lacking capacity and requirement for guardianship      COPD (chronic obstructive pulmonary disease) (HCC)- (present on admission)  Assessment & Plan  -Stable on room air   -Inhaled bronchodilators as needed  -Not in acute exacerbation    Discharge planning issues  Assessment & Plan  -CM following  -plan for Group Home at ID  -guardianship hearing remains pending.    HTN (hypertension)- (present on admission)  Assessment & Plan  - Continue Norvasc  - VS per policy     Type 2 diabetes mellitus (HCC)- (present on admission)  Assessment & Plan  -Hemoglobin A1c at 6.8, continue diabetic diet, monitor  -BG has been well controlled  -Accu-Cheks only as needed for signs and symptoms of hypoglycemia or acute illness  -No need for insulin at this time.     Abdominal aortic aneurysm (AAA) 3.0 cm to 5.5 cm in diameter in male ([facility])  Assessment & Plan  -Infrarenal  -Noted as incidental finding on CT renal w/wo  -Not a candidate for repair given neurocognitive disorder, lacking capacity  -BP control     Advanced care planning/counseling discussion  Assessment & Plan  -Patient is DNR/DNI  -Guardianship pending    Protein-calorie malnutrition (HCC)- (present on admission)  Assessment & Plan  -Body mass index is 17.57 kg/m².  -encourage PO intake  -food preferences  -TID supplements    COVID-19 virus infection  Assessment &  Plan  -Patient has recovered from COVID-19       VTE prophylaxis : Ambulation    I have performed a physical exam and reviewed and updated ROS and Assessment/Plan today (11/01/20). In review of the previous note there are no changes except as documented above      MICK Soto.

## 2020-11-02 PROCEDURE — 700102 HCHG RX REV CODE 250 W/ 637 OVERRIDE(OP): Performed by: HOSPITALIST

## 2020-11-02 PROCEDURE — A9270 NON-COVERED ITEM OR SERVICE: HCPCS | Performed by: HOSPITALIST

## 2020-11-02 PROCEDURE — 700102 HCHG RX REV CODE 250 W/ 637 OVERRIDE(OP): Performed by: NURSE PRACTITIONER

## 2020-11-02 PROCEDURE — A9270 NON-COVERED ITEM OR SERVICE: HCPCS | Performed by: NURSE PRACTITIONER

## 2020-11-02 PROCEDURE — 770006 HCHG ROOM/CARE - MED/SURG/GYN SEMI*

## 2020-11-02 RX ORDER — NYSTATIN 100000 [USP'U]/G
POWDER TOPICAL 2 TIMES DAILY
Status: DISPENSED | OUTPATIENT
Start: 2020-11-02 | End: 2020-11-06

## 2020-11-02 RX ADMIN — QUETIAPINE FUMARATE 25 MG: 25 TABLET ORAL at 16:50

## 2020-11-02 RX ADMIN — TAMSULOSIN HYDROCHLORIDE 0.8 MG: 0.4 CAPSULE ORAL at 08:43

## 2020-11-02 RX ADMIN — THERA TABS 1 TABLET: TAB at 08:43

## 2020-11-02 RX ADMIN — ERGOCALCIFEROL 50000 UNITS: 1.25 CAPSULE ORAL at 14:50

## 2020-11-02 RX ADMIN — AMLODIPINE BESYLATE 5 MG: 5 TABLET ORAL at 08:43

## 2020-11-02 RX ADMIN — FINASTERIDE 5 MG: 5 TABLET, FILM COATED ORAL at 08:42

## 2020-11-02 NOTE — PROGRESS NOTES
Pt is A&Ox4, VSS on RA.  Pt is able to ambulate with cane and SBA. Pt is a high fall risk, bed alarm armed, bed in low locked position, cane stored away from bedside.  Pt reports 0/10 pain.   Méndez in place with active order, méndez care provided with soap and water.

## 2020-11-02 NOTE — DISCHARGE PLANNING
Medical Social Work  PC to Center, [phone]; message left to call CORINA  Email to CORINA Harris requesting date/time for meeting

## 2020-11-02 NOTE — PROGRESS NOTES
2 RN skin check completed with EZ Zheng.   Devices in place: méndez catheter, mepilex  Skin assessed under devices: yes.  Confirmed pressure ulcers found: none.  New potential pressure ulcers noted: none  Wound consulted placed none     The following interventions in place: 2RN skin check Qshift, pt encouraged reposition. mepilex to heels, Méndez care, pillows for support/positioning. pressure redistribution mattress in use.     Skin assessment:   Bilateral ears: pink/blanching   Bilateral elbows: pink/blanching.  BLE dusky/dry   Sacrum pink/blanching/intact.   Heels boggy: pink/dry/blanching mepilex in use  Méndez catheter site CDI.

## 2020-11-02 NOTE — PROGRESS NOTES
2 RN skin check complete with EZ Carvalho.   Devices in place: méndez catheter.  Skin assessed under devices: yes.  Confirmed pressure ulcers found on: none.  New potential pressure ulcers noted on: none. Wound consult placed: n/a     The following interventions in place: pressure redistribution mattress, mepilex, méndez care, pillows for support and positioning, pt encouraged to reposition frequently and to ambulate during day.     Skin-  Heels: boggy, pink, blanching; mepilex in place  BLE: dry, flaky, dusky.  Sacrum: pink, blanching  Méndez site: clean, dry and intact  Scrotum: slightly reddened and itchy  Elbows: pink, blanching.

## 2020-11-02 NOTE — PROGRESS NOTES
Patient A/Ox4, up w/ one person assist (cane held at nursing station), VSS, RA. No complaints of pain but does mention itchiness in scrotal area, requesting Nystatin powder. Rosado in place and draining to gravity w/ cloudy yellow urine. Order is active for retention. Bed low/locked. Pt is safe w/ needs met. Hourly rounding in place. No signs of acute distress.

## 2020-11-03 PROCEDURE — 700102 HCHG RX REV CODE 250 W/ 637 OVERRIDE(OP): Performed by: NURSE PRACTITIONER

## 2020-11-03 PROCEDURE — 700102 HCHG RX REV CODE 250 W/ 637 OVERRIDE(OP): Performed by: HOSPITALIST

## 2020-11-03 PROCEDURE — 770006 HCHG ROOM/CARE - MED/SURG/GYN SEMI*

## 2020-11-03 PROCEDURE — A9270 NON-COVERED ITEM OR SERVICE: HCPCS | Performed by: HOSPITALIST

## 2020-11-03 PROCEDURE — A9270 NON-COVERED ITEM OR SERVICE: HCPCS | Performed by: NURSE PRACTITIONER

## 2020-11-03 RX ADMIN — AMLODIPINE BESYLATE 5 MG: 5 TABLET ORAL at 09:16

## 2020-11-03 RX ADMIN — FINASTERIDE 5 MG: 5 TABLET, FILM COATED ORAL at 09:16

## 2020-11-03 RX ADMIN — TAMSULOSIN HYDROCHLORIDE 0.8 MG: 0.4 CAPSULE ORAL at 09:16

## 2020-11-03 RX ADMIN — THERA TABS 1 TABLET: TAB at 09:16

## 2020-11-03 RX ADMIN — QUETIAPINE FUMARATE 25 MG: 25 TABLET ORAL at 17:41

## 2020-11-03 NOTE — DISCHARGE PLANNING
Late Note from 11/2/2020    Anticipated Discharge Disposition: Group Home    Action: CORINA was able to arrange a call between patient and his long term significant other.   Call between them was short as the significant other wanted to speak to the SW,Charge translated for CORINA.  Significant other kept saying she wants to come to [facility] and see the patient, that she wants to leave where she is come get patient and go to their apartment to get their things.   Does not want patient to come to where she is living as it is hard to live there.  Significant other was asked if the patient was taking care of her, CORINA told no she was taking care of him.  Telling CORINA that after patient came into the hospital, she ended up being placed where she is now.     Barriers to Discharge: guardianship/placement    Plan: continue to monitor for discharge barriers    Guardianship hearing is set for 11/6/2020 at  8:45

## 2020-11-03 NOTE — PROGRESS NOTES
Patient A/Ox4, up w/ one person assist using single point cane, VSS, RA. No complaints of pain. Rosado in place and draining to gravity w/ cloudy yellow urine. Order is active for retention. Bed low/locked. Pt is safe w/ needs met. Hourly rounding in place. No signs of acute distress.

## 2020-11-03 NOTE — PROGRESS NOTES
2 RN skin check complete with EZ Whittaker.   Devices in place: méndez catheter.  Skin assessed under devices: yes.  Confirmed pressure ulcers found on: none.  New potential pressure ulcers noted on: none. Wound consult placed: n/a     The following interventions in place: pressure redistribution mattress, mepilex, méndez care, pillows for support and positioning, pt encouraged to reposition frequently and to ambulate during day.     Skin-  Heels: boggy, pink, blanching; mepilex in place  BLE: dry, flaky, dusky.  Sacrum: pink, blanching  Méndez site: clean, dry and intact  Scrotum: slightly reddened and itchy  Elbows: pink, blanching.

## 2020-11-04 PROCEDURE — A9270 NON-COVERED ITEM OR SERVICE: HCPCS | Performed by: HOSPITALIST

## 2020-11-04 PROCEDURE — 700102 HCHG RX REV CODE 250 W/ 637 OVERRIDE(OP): Performed by: NURSE PRACTITIONER

## 2020-11-04 PROCEDURE — A9270 NON-COVERED ITEM OR SERVICE: HCPCS | Performed by: NURSE PRACTITIONER

## 2020-11-04 PROCEDURE — 700102 HCHG RX REV CODE 250 W/ 637 OVERRIDE(OP): Performed by: HOSPITALIST

## 2020-11-04 PROCEDURE — 770006 HCHG ROOM/CARE - MED/SURG/GYN SEMI*

## 2020-11-04 RX ADMIN — TAMSULOSIN HYDROCHLORIDE 0.8 MG: 0.4 CAPSULE ORAL at 09:05

## 2020-11-04 RX ADMIN — AMLODIPINE BESYLATE 5 MG: 5 TABLET ORAL at 09:05

## 2020-11-04 RX ADMIN — QUETIAPINE FUMARATE 25 MG: 25 TABLET ORAL at 17:31

## 2020-11-04 RX ADMIN — FINASTERIDE 5 MG: 5 TABLET, FILM COATED ORAL at 09:05

## 2020-11-04 RX ADMIN — NYSTATIN: 100000 POWDER TOPICAL at 06:11

## 2020-11-04 RX ADMIN — THERA TABS 1 TABLET: TAB at 09:05

## 2020-11-04 NOTE — PROGRESS NOTES
Pt AxO x4, on RA and not displaying any s/s of distress.  Pt denied any pain.  Pt denied any further needs.  Call light within reach, bed locked and in lowest position, bed alarm on, frequent rounding in place.

## 2020-11-04 NOTE — PROGRESS NOTES
2 RN Skin Check    2 RN skin check complete with Arpita RN   Devices in place: méndez, mepilex  Skin assessed under devices: yes.  Confirmed pressure ulcers found on: na.  New potential pressure ulcers noted on na. Wound consult placed N/A.  The following interventions in place: pressure redistribution mattress, mepilex, méndez care, pillows for support and positioning, pt encouraged to reposition frequently and to ambulate during day.    Skin Assessment:  Heels: boggy, pink, blanching; mepilex in place  BLE: dry, flaky, dusky.  Sacrum: pink, blanching  Méndez site: clean, dry and intact  Scrotum: slightly reddened and itchy  Elbows: pink, blanching.

## 2020-11-04 NOTE — PROGRESS NOTES
Received bedside report and assumed care of pt at change of shift.   Pt is A&O x 4 and on RA  Pt reports no pain at this time   Bed is locked and in lowest position with water and call light in reach   Bed alarm is on and pt reminded to use call light when needing to ambulate

## 2020-11-04 NOTE — CARE PLAN
Problem: Discharge Barriers/Planning  Goal: Patient's continuum of care needs will be met  Outcome: PROGRESSING AS EXPECTED  Discussed discharge goals with pt and coordinated updates with case management and discussed case with pt. Answered all questions. Pt has guardianship hearing pending nov 6th  Problem: Skin Integrity  Goal: Risk for impaired skin integrity will decrease  Outcome: PROGRESSING AS EXPECTED

## 2020-11-04 NOTE — PROGRESS NOTES
2 RN Skin Check     2 RN skin check complete with Marie RN   Devices in place: méndez, mepilex  Skin assessed under devices: yes.  Confirmed pressure ulcers found on: na.  New potential pressure ulcers noted on na. Wound consult placed N/A.  The following interventions in place: mepilex, méndez care, pillows for support and positioning, pt encouraged to reposition frequently and to ambulate during day.     Skin Assessment:  Heels: pink, blanching; mepilex in place  BLE: dry, flaky, dusky.  Sacrum: pink, blanching  Méndez site: clean, dry and intact  Scrotum: intact and blanching   Elbows: pink, blanching.

## 2020-11-04 NOTE — PROGRESS NOTES
Received bedside report and assumed care of pt at change of shift.   Pt is A&O x 4 and on RA  Pt reports no pain at this time   Bed is locked and in lowest position with water and call light in reach  Reinforced use of call light and pt is calling approprietly. Pt is a standby assist with his wade

## 2020-11-04 NOTE — PROGRESS NOTES
2 RN skin check complete with EZ Leslie   Devices in place: méndez, mepilex  Skin assessed under devices: yes.  Confirmed pressure ulcers found on: na.  New potential pressure ulcers noted on na.   Wound consult placed N/A.       Skin Assessment:  Heels: boggy, pink, blanching; mepilex in place  BLE: dry, flaky, dusky, dark discoloration  Sacrum: pink, blanching  Méndez site: clean, dry and intact  Scrotum: slightly reddened and itchy  Elbows: pink, blanching.    The following interventions in place: mepilex, méndez care, pillows for support and positioning, pt encouraged to reposition frequently and to ambulate during day, nystatin powder for groin, waffle mattress overlay

## 2020-11-05 PROCEDURE — 700102 HCHG RX REV CODE 250 W/ 637 OVERRIDE(OP): Performed by: HOSPITALIST

## 2020-11-05 PROCEDURE — A9270 NON-COVERED ITEM OR SERVICE: HCPCS | Performed by: HOSPITALIST

## 2020-11-05 PROCEDURE — 700102 HCHG RX REV CODE 250 W/ 637 OVERRIDE(OP): Performed by: NURSE PRACTITIONER

## 2020-11-05 PROCEDURE — 770006 HCHG ROOM/CARE - MED/SURG/GYN SEMI*

## 2020-11-05 PROCEDURE — 99231 SBSQ HOSP IP/OBS SF/LOW 25: CPT | Performed by: NURSE PRACTITIONER

## 2020-11-05 PROCEDURE — A9270 NON-COVERED ITEM OR SERVICE: HCPCS | Performed by: NURSE PRACTITIONER

## 2020-11-05 RX ADMIN — THERA TABS 1 TABLET: TAB at 09:00

## 2020-11-05 RX ADMIN — AMLODIPINE BESYLATE 5 MG: 5 TABLET ORAL at 07:47

## 2020-11-05 RX ADMIN — FINASTERIDE 5 MG: 5 TABLET, FILM COATED ORAL at 07:47

## 2020-11-05 RX ADMIN — QUETIAPINE FUMARATE 25 MG: 25 TABLET ORAL at 17:20

## 2020-11-05 RX ADMIN — NYSTATIN: 100000 POWDER TOPICAL at 06:23

## 2020-11-05 RX ADMIN — TAMSULOSIN HYDROCHLORIDE 0.8 MG: 0.4 CAPSULE ORAL at 07:47

## 2020-11-05 ASSESSMENT — ENCOUNTER SYMPTOMS
MUSCULOSKELETAL NEGATIVE: 1
ABDOMINAL PAIN: 0
CONSTITUTIONAL NEGATIVE: 1
VOMITING: 0
NAUSEA: 0
HEARTBURN: 0
PALPITATIONS: 0

## 2020-11-05 NOTE — PROGRESS NOTES
2 RN skin check complete with EZ Leslie   Devices in place: méndez, mepilex  Skin assessed under devices: yes.  Confirmed pressure ulcers found on: na.  New potential pressure ulcers noted on na.   Wound consult placed N/A.        Skin Assessment:  Heels: boggy, pink, blanching; mepilex in place  L outer heel: slow to lupe  BLE: dry, flaky, dusky, dark discoloration  Sacrum: pink, blanching  Méndez site: clean, dry and intact  Scrotum: slightly reddened and itchy  Elbows: pink, blanching.     The following interventions in place: mepilex, méndez care, pillows for support and positioning, pt encouraged to reposition frequently and to ambulate during day, nystatin powder for groin, waffle mattress overlay

## 2020-11-05 NOTE — PROGRESS NOTES
Hospital Medicine Daily Progress Note    Date of Service  11/5/2020    Chief Complaint  SOB and low blood pressue    Hospital Course   Mr. Bejarano is an 85 yo male with a PMHx of DM type II, hyperlipidemia, BPH who was admitted on 7/9/2020 with COVID 19 infection with POLY and hypotension.  Patient initially in ICU and on therapeutic Lovenox due to elevated DDimer.  His hospital course was complicated by hematuria and urine retention of which a méndez was placed.  Flomax and finasteride was initiated.  Patient was in acute renal failure which has improved. RUQ U/S was completed d/t periumbilical pain. This revealed possible renal mass. CT renal w/wo was performed and showed a large renal mass (7x7x8) likely representing urothelial vs renal cell carcinoma. Psychiatry was also consulted d/t ongoing cognition difficulty.  Per psychiatry, patient does not have the capacity to make any medical decision on 8/3/2020. SLP feels he has moderate to severe deficits in insight, orientation, and memory. Guardianship pending.     Despite Proscar and Flomax voiding trial failed 9/4 and red frank catheter was placed d/t high urinary retention. His renal fx has remained stable and WNL. Unfortunately, the patient is 116 pounds at the time of this note, with BMI of 17. He is a poor candidate for potential chemotherapy or surgical decompression given his malnutrition and neurocognitive disorder.      Interval Problem Update  11/5: Sitting up in bed in no acute distress. Denies any complaints. He said he was very excited about lunch today.     Consultants/Specialty  -Psychiatry  -Palliative Care    Code Status  DNAR/DNI    Disposition  Pending guardianship and placement. Guardianship hearing scheduled for tomorrow 11/6    Review of Systems  Review of Systems   Constitutional: Negative.    Cardiovascular: Negative for chest pain and palpitations.   Gastrointestinal: Negative for abdominal pain, heartburn, nausea and vomiting.    Musculoskeletal: Negative.    Skin: Negative.         Physical Exam  Temp:  [36.1 °C (97 °F)-36.4 °C (97.6 °F)] 36.1 °C (97 °F)  Pulse:  [68-82] 82  Resp:  [16-18] 18  BP: (100-119)/(51-67) 100/51  SpO2:  [95 %-96 %] 95 %    Physical Exam  Vitals signs and nursing note reviewed.   Constitutional:       General: He is not in acute distress.     Appearance: He is underweight. He is not toxic-appearing.      Comments: Frail   Chronically ill appearing     HENT:      Head: Normocephalic and atraumatic.      Nose: Nose normal.      Mouth/Throat:      Mouth: Mucous membranes are moist.      Pharynx: Oropharynx is clear.   Eyes:      General: No scleral icterus.     Conjunctiva/sclera: Conjunctivae normal.   Neck:      Musculoskeletal: Normal range of motion.   Cardiovascular:      Pulses: Normal pulses.   Pulmonary:      Effort: Pulmonary effort is normal. No respiratory distress.   Abdominal:      General: There is no distension.      Palpations: Abdomen is soft.      Tenderness: There is no abdominal tenderness.   Genitourinary:     Comments: Rosado in place and draining yellow urine    Musculoskeletal:         General: No swelling.      Right lower leg: No edema.      Left lower leg: No edema.      Comments: WELLS     Skin:     General: Skin is warm and dry.      Coloration: Skin is not jaundiced or pale.   Neurological:      Mental Status: He is alert. He is disoriented.   Psychiatric:         Attention and Perception: He is inattentive.         Mood and Affect: Mood normal.         Behavior: Behavior normal. Behavior is cooperative.         Cognition and Memory: Cognition is impaired. Memory is impaired.         Fluids    Intake/Output Summary (Last 24 hours) at 11/5/2020 1201  Last data filed at 11/5/2020 0400  Gross per 24 hour   Intake 960 ml   Output 1600 ml   Net -640 ml       Laboratory                        Imaging  CT-RENAL WITH & W/O   Final Result      1.  Large complex right renal mass as detailed above  measuring approximately 7.8 x 7.1x  8.5 cm. Primary differential considerations include urothelial malignancy or renal cell carcinoma.   2.  No evidence of adenopathy or metastatic disease is seen.   3.  Distended urinary bladder. Right greater left hydroureteronephrosis.   4.  Severe atherosclerosis. 3.5 cm infrarenal abdominal aortic aneurysm.            US-RUQ   Final Result         1.  Echogenic liver compatible with fatty change versus fibrosis.   2.  Masslike structure in the right kidney, should be considered neoplastic unless proven otherwise, recommend follow-up 3 phase CT of the kidneys for further characterization   3.  Mild dilatation of the common bile duct, within expected limits given patient age.      These findings were discussed with the patient's clinician, Vin Mei, on 7/23/2020 7:34 AM.      DX-CHEST-PORTABLE (1 VIEW)   Final Result      1.  The lungs are hyperinflated suggesting emphysema/COPD.   2.  There is minimal predominantly lower lobe interstitial opacity which is most likely due to chronic scarring.           Assessment/Plan  Neurocognitive disorder, unspecified  Assessment & Plan  -Psychiatry was consulted, patient cannot make any medical decision  - working on guardianship.   -Guardianship hearing scheduled for 11/6    Urinary retention- (present on admission)  Assessment & Plan  -Continue Flomax and finasteride  -Méndez catheter in place for urinary retention on 8/7-Trial removal of Méndez catheter 9/4 unsuccessful, Méndez replaced  -per policy, will leave in due to difficulties with placement and bleeding with previous exchanges  -Follow up outpatient. Discharge likely with méndez.    Right kidney mass- (present on admission)  Assessment & Plan  -Suspicion for urothelial carcinoma vs renal cell carcinoma. No a/p adenopathy  -Patient failed voiding trial despite flomax/proscar  -Poor candidate for chemo given neurocognitive d/o, lacking capacity and requirement for  guardianship      COPD (chronic obstructive pulmonary disease) (HCC)- (present on admission)  Assessment & Plan  -Stable on room air   -Inhaled bronchodilators as needed  -Not in acute exacerbation    Discharge planning issues  Assessment & Plan  -CM following  -plan for Group Home at [place]  -guardianship hearing 11/6    HTN (hypertension)- (present on admission)  Assessment & Plan  - Continue Norvasc  - VS per policy     Type 2 diabetes mellitus (HCC)- (present on admission)  Assessment & Plan  -Hemoglobin A1c at 6.8, continue diabetic diet, monitor  -BG has been well controlled  -Accu-Cheks only as needed for signs and symptoms of hypoglycemia or acute illness  -No need for insulin at this time.     Abdominal aortic aneurysm (AAA) 3.0 cm to 5.5 cm in diameter in male ([facility])  Assessment & Plan  -Infrarenal  -Noted as incidental finding on CT renal w/wo  -Not a candidate for repair given neurocognitive disorder, lacking capacity  -BP control     Advanced care planning/counseling discussion  Assessment & Plan  -Patient is DNR/DNI  -Guardianship pending    Protein-calorie malnutrition (HCC)- (present on admission)  Assessment & Plan  -Body mass index is 17.57 kg/m².  -encourage PO intake  -food preferences  -TID supplements    COVID-19 virus infection  Assessment & Plan  -Patient has recovered from COVID-19       VTE prophylaxis : Ambulation    I have performed a physical exam and reviewed and updated ROS and Assessment/Plan today (11/05/20). In review of the previous note there are no changes except as documented above      MASON Tejada      I certify that the patient requires continued medically necessary hospital services for the treatment of neurocognitive deficits . The patient will remain in the hospital for the foreseeable future.  Discharge may or may not occur in the next 20 days due to ongoing discharge delays due to no medically acceptable discharge option.

## 2020-11-05 NOTE — PROGRESS NOTES
"I met with Mr. Bejarano on 11/5/2020 at the request of case management.  I was asked to give an opinion on the patient's capacity to make decisions about discharge from the hospital.  I have reviewed some of the medical record primarily focusing on provider H&P and consult notes.  I have spoken with ED Soto, Kee Bond social work, and the bedside nurse caring for Mr. Bejarano today.    Mr. Bejarano was admitted on 7/9/2020 with complaints of cough and fatigue.  Chart indicates that he had fallen in a parking lot.  Evaluation in the emergency room demonstrated that the patient was COVID positive acute renal failure.  Patient was admitted for treatment, initially to the ICU.  He improved with treatment.    Patients medical issues have stabilized, he continues on medication for non-insulin-dependent diabetes, he is on room air, he continues to require a Rosado catheter for urinary retention.  A CT scan on 7/23/2020 was concerning for right renal mass that is concerning for \"urothelial malignancy or renal cell carcinoma.\"  The chart indicates that the patient was resistant to intervention and that outpatient referral to urology was planned.    When I met with him this morning, the patient was awake, oriented, and pleasant.  He makes repeated requests to leave the hospital, \"I feel like I am in detention and here,\" \"I want to go home and live with my girlfriend.\"  He does not have other complaints, he denies shortness of breath, denies pain, does not find Rosado catheter uncomfortable.    Patient is oriented to person, place, and date.  Patient was able to have a phone conversation with his significant other on 11/2/2020, he recalls this conversation and recalls that his significant other is in a facility.  I did not perform a Folstein Mini-Mental status exam on the patient as the patient's orientation and attention are not an issue and the results section exam would be adversely affected by the patient's blindness.    I " "reviewed his past medical history, the patient is reminded that he has diabetes, he does not know of any other medical diagnoses other than difficulty with his vision.  Patient states that he stopped smoking 20 years ago, he does not drink alcohol or use drugs.  He denies history of mental illness.  His parents both  when they were quite elderly, he reports they passed away of \"old age.\"    I discussed the patient's vision with him.  He does understand that his sight is compromised, but says that he retains some vision .  When I asked him to read letter size print, he is unable, he is unable to identify small objects such as a pen or pair of glasses at any distance.  He repeatedly says that this is because it is nighttime but it is dark in the room even when reminded that it is approximately 11 o'clock in the morning and the room is very well lit.    I would base my assessment of the patient's capacity on 4 categories: Communication, understanding, appreciation, and reasoning.    #1.  Communication: The patient is able to express a choice.  He says that he wishes to be discharged home    #2 Understanding: Difficulty with understanding is not global but there is a definite issue, he repeatedly says that he wishes to go back to his prior living situation and has to be reminded that his significant other is in a facility requiring care.  He can be redirected to understand but then repeats his which again to live in his apartment with his significant other.    #3 Appreciation: For the most part I believe the patient does demonstrate appreciation, he can identify that he needs a Rosado catheter because of urinary retention for instance.  There is a significant deficit in appreciation related to his vision.  The patient indicates that he can do things such as pay his bills and obtain help but it does not seem this is possible without assistance due to his difficulties with vision.    #4 Rationalization or reasoning: " The major deficit with the patient's capacity is related to his reasoning.  He does not seem to understand that being discharged home to live alone could result in harm to his health.  My suspicion is that patient has been dependent on his admit here for years and a significant other to fill in the gaps in his functioning primarily related to his vision but also due to some early cognitive deficits.    Based upon today's assessment I would recommend the guardianship should be pursued for this patient.  The primary issue that affects his capacity is his ability to rationalize or reason.  He does not understand how his physical limitations pose an issue to his safety and would make decisions that would likely result in harm.

## 2020-11-05 NOTE — PROGRESS NOTES
PT AxO x4 at time of assessment, denied any pain.  Pt on RA and tolerating well. Pt in bed resting and denied any other needs.  Pt in room close to nursing station, with call light in reach, safety precautions in place, educated pt on risk, frequent rounding in place.

## 2020-11-06 PROCEDURE — A9270 NON-COVERED ITEM OR SERVICE: HCPCS | Performed by: HOSPITALIST

## 2020-11-06 PROCEDURE — 700102 HCHG RX REV CODE 250 W/ 637 OVERRIDE(OP): Performed by: HOSPITALIST

## 2020-11-06 PROCEDURE — A9270 NON-COVERED ITEM OR SERVICE: HCPCS | Performed by: NURSE PRACTITIONER

## 2020-11-06 PROCEDURE — 700102 HCHG RX REV CODE 250 W/ 637 OVERRIDE(OP): Performed by: NURSE PRACTITIONER

## 2020-11-06 PROCEDURE — 770006 HCHG ROOM/CARE - MED/SURG/GYN SEMI*

## 2020-11-06 RX ADMIN — FINASTERIDE 5 MG: 5 TABLET, FILM COATED ORAL at 08:28

## 2020-11-06 RX ADMIN — TAMSULOSIN HYDROCHLORIDE 0.8 MG: 0.4 CAPSULE ORAL at 08:28

## 2020-11-06 RX ADMIN — QUETIAPINE FUMARATE 25 MG: 25 TABLET ORAL at 17:07

## 2020-11-06 RX ADMIN — AMLODIPINE BESYLATE 5 MG: 5 TABLET ORAL at 08:28

## 2020-11-06 RX ADMIN — THERA TABS 1 TABLET: TAB at 08:28

## 2020-11-06 ASSESSMENT — ENCOUNTER SYMPTOMS
BACK PAIN: 0
FLANK PAIN: 0
VOMITING: 0
SHORTNESS OF BREATH: 0
FEVER: 0
CHILLS: 0
NAUSEA: 0

## 2020-11-06 NOTE — PROGRESS NOTES
Received bedside report and accepted care of patient.    Pt is A/Ox4 on room air with no visible or stated sign of distress, discomfort, or pain. Pt denies pain at this time.    Bed alarm in place, controls on and bed in locked and lowest position. Call light and personal possessions within reach. Patient educated about use of call light and verbalized understanding.

## 2020-11-06 NOTE — PROGRESS NOTES
Pt AxO x4 and on RA, tolerating well.  Pt in bed and resting. Pt denied any pain, VSS.  Bed locked and in lowest position, pt utilizes call light appropriately and denied any further needs.  Pt in room close to nursing station, will continue to monitor.

## 2020-11-06 NOTE — DISCHARGE PLANNING
Medical Social Work  Patient was appointed a guardian,  is Sherlyn May.  There will be a reviewing hearing in 30 days to discuss the Medicaid application process    12/18/2020 at 10:00    Patient very upset crying wants to be with his significant other.  Does not appear to understand why being  should prevent him from living with her.

## 2020-11-06 NOTE — CARE PLAN
Problem: Discharge Barriers/Planning  Goal: Patient's continuum of care needs will be met  Outcome: PROGRESSING AS EXPECTED   Discussed guardianship hearing with both pt and Dr. Singh. Pt may not actually need guardianship and he is agreeable to discharging to group home setting. Discussed options with pt and he seemed acceptable. Guardianship hearing set for tomorrow morning still

## 2020-11-06 NOTE — CONSULTS
[facility] Consult/H&P Note      Attending: CECILIA Tejada*  Patient's Name/MRN: Ovi Bejarano, [id number]    Admit Date:2020  Today's Date: 2020   Length of stay:  LOS: 120 days   Room #: S612/02      Reason for consult/chief complaint: 8cm right renal mass  ID/HPI: Ovi Bejarano is a 85 y.o. male patient who was first admitted for COVID 2020. He had a CT scan with contrast on  that demonstrated a 8cm right renal mass, bladder distention, bilateral hydronephrosis; his Cr normalized. He had a méndez catheter placed and was started on flomax and finasteride. He failed a VT afterwards and the méndez catheter was replaced. He has a 30 year smoking history but reports only 2 cigarettes a day and quit 25 years ago.     He reports today that he is doing well and has no abdominal or flank pain. His méndez catheter is draining clear yellow urine. He denies any previous gross hematuria. He notes significant weight loss but does not know what his initial weight was. He reports a good appetite at this time and is tolerating food without issue. He denies fever, chills, chest pain, SOB, nausea, vomiting, abdominal pain, flank pain, gross hematuria.        Past Medical History:   Past Medical History:   Diagnosis Date   • Diabetes (HCC)     type 2   • Diarrhea of presumed infectious origin 7/10/2020   • Hypercholesteremia    • Hyperglycemia    • Rash         Past Surgical History:   History reviewed. No pertinent surgical history.     Family History:   Family History   Problem Relation Age of Onset   • Stroke Sister          Social History:   Social History     Tobacco Use   • Smoking status: Former Smoker     Quit date: 10/21/2007     Years since quittin.0   • Smokeless tobacco: Never Used   Substance Use Topics   • Alcohol use: Yes     Comment: rare   • Drug use: No      Social History     Social History Narrative   • Not on file        Allergies: he Patient has no known  "allergies.    Medications:   No medications prior to admission.         Review of Systems  Review of Systems   Constitutional: Negative for chills and fever.   Respiratory: Negative for shortness of breath.    Cardiovascular: Negative for chest pain.   Gastrointestinal: Negative for nausea and vomiting.   Genitourinary: Negative for flank pain and hematuria.   Musculoskeletal: Negative for back pain.        Physical Exam  VITAL SIGNS: /56   Pulse 72   Temp 36.2 °C (97.1 °F) (Temporal)   Resp 16   Ht 1.727 m (5' 7.99\")   Wt 52.4 kg (115 lb 8.3 oz)   SpO2 98%   BMI 17.57 kg/m²   Physical Exam   Constitutional: He is oriented to person, place, and time. No distress.   HENT:   Head: Normocephalic and atraumatic.   Eyes: EOM are normal.   Abdominal: Soft. He exhibits no distension. There is no abdominal tenderness.   Genitourinary:    Genitourinary Comments: Rosado in place draining clear yellow urine     Neurological: He is alert and oriented to person, place, and time.   Skin: Skin is warm and dry. He is not diaphoretic.   Psychiatric: Mood and affect normal.         Labs:              Glucose:  No results for input(s): GLUCOSE in the last 72 hours.  Coags:  No results for input(s): INR in the last 72 hours.      Urinalysis:   No results for input(s): COLORURINE, CLARITY, SPECGRAVITY, PHURINE, GLUCOSEUR, KETONES, NITRITE, OCCULTBLOOD, RBCURINE, BACTERIA, EPITHELCELL in the last 72 hours.    Invalid input(s): BILRUBINUR, LEUESTERASE    Imaging:                                  Results for orders placed during the hospital encounter of 11/14/17   CT-CHEST (THORAX) W/O    Impression 1.  No focal pulmonary consolidation.    2.  Emphysematous changes of the chest.    3.  6.8 cm in diameter soft tissue mass in the right kidney partially visualized. This may represent renal cell carcinoma. Correlation with renal ultrasound or CT abdomen is recommended.    Findings were discussed with Gustavo ORTIZ on 11/15/2017 " 1:08 PM.                                                              Assessment/Recommendation   85 y.o. male with 8cm right renal mass. He has urinary retention with failed voiding trial on flomax and finasteride, Cr normalized. His méndez is draining clear yellow urine. He denies any flank or abdominal pain. He has weight loss but is unsure what his initial weight was, reports good appetite. Guardianship has been recommended for patient.     · Patient will likely need nephrectomy for renal mass. Will discuss treatment options with patient in clinic when discharged.  · He has no flank or abdominal pain and Cr normalized after méndez placement. Méndez without any signs of hematuria.  · No acute urologic intervention needed at this time, will wait for guardianship process to finalize so we can discuss treatment options in clinic. When patient discharges please make follow up appointment with [facility].   · Urology signing off. Please call with any questions.        Chino Chao P.A.-C.   [id number] [place] [id number]   [phone]

## 2020-11-06 NOTE — PROGRESS NOTES
2 RN skin check complete with EZ Carvalho   Devices in place: méndez, mepilex  Skin assessed under devices: yes.  Confirmed pressure ulcers found on: na.  New potential pressure ulcers noted on na.   Wound consult placed N/A.        Skin Assessment:  Heels: boggy, pink, blanching; mepilex in place  L outer heel: slow to lupe  BLE: dry, flaky, dusky, dark discoloration  Sacrum: pink, blanching  Méndez site: clean, dry and intact  Scrotum: slightly reddened and itchy  Elbows: pink, blanching.     The following interventions in place: mepilex, méndez care, pillows for support and positioning, pt encouraged to reposition frequently and to ambulate during day, [facility]

## 2020-11-07 LAB
APPEARANCE UR: ABNORMAL
BACTERIA #/AREA URNS HPF: ABNORMAL /HPF
BILIRUB UR QL STRIP.AUTO: NEGATIVE
COLOR UR: YELLOW
EPI CELLS #/AREA URNS HPF: ABNORMAL /HPF
GLUCOSE UR STRIP.AUTO-MCNC: NEGATIVE MG/DL
KETONES UR STRIP.AUTO-MCNC: NEGATIVE MG/DL
LEUKOCYTE ESTERASE UR QL STRIP.AUTO: ABNORMAL
MICRO URNS: ABNORMAL
NITRITE UR QL STRIP.AUTO: POSITIVE
PH UR STRIP.AUTO: 6.5 [PH] (ref 5–8)
PROT UR QL STRIP: NEGATIVE MG/DL
RBC # URNS HPF: ABNORMAL /HPF
RBC UR QL AUTO: ABNORMAL
SP GR UR STRIP.AUTO: 1.01
UROBILINOGEN UR STRIP.AUTO-MCNC: 0.2 MG/DL
WBC #/AREA URNS HPF: ABNORMAL /HPF

## 2020-11-07 PROCEDURE — A9270 NON-COVERED ITEM OR SERVICE: HCPCS | Performed by: HOSPITALIST

## 2020-11-07 PROCEDURE — A9270 NON-COVERED ITEM OR SERVICE: HCPCS | Performed by: NURSE PRACTITIONER

## 2020-11-07 PROCEDURE — 700102 HCHG RX REV CODE 250 W/ 637 OVERRIDE(OP): Performed by: HOSPITALIST

## 2020-11-07 PROCEDURE — 87077 CULTURE AEROBIC IDENTIFY: CPT

## 2020-11-07 PROCEDURE — 770006 HCHG ROOM/CARE - MED/SURG/GYN SEMI*

## 2020-11-07 PROCEDURE — 87086 URINE CULTURE/COLONY COUNT: CPT

## 2020-11-07 PROCEDURE — 81001 URINALYSIS AUTO W/SCOPE: CPT

## 2020-11-07 PROCEDURE — 700102 HCHG RX REV CODE 250 W/ 637 OVERRIDE(OP): Performed by: NURSE PRACTITIONER

## 2020-11-07 PROCEDURE — 87186 SC STD MICRODIL/AGAR DIL: CPT

## 2020-11-07 RX ADMIN — AMLODIPINE BESYLATE 5 MG: 5 TABLET ORAL at 08:32

## 2020-11-07 RX ADMIN — QUETIAPINE FUMARATE 25 MG: 25 TABLET ORAL at 17:04

## 2020-11-07 RX ADMIN — THERA TABS 1 TABLET: TAB at 08:32

## 2020-11-07 RX ADMIN — FINASTERIDE 5 MG: 5 TABLET, FILM COATED ORAL at 08:32

## 2020-11-07 RX ADMIN — TAMSULOSIN HYDROCHLORIDE 0.8 MG: 0.4 CAPSULE ORAL at 08:32

## 2020-11-07 ASSESSMENT — FIBROSIS 4 INDEX: FIB4 SCORE: 2.16

## 2020-11-07 NOTE — PROGRESS NOTES
Received bedside report and accepted care of patient.    Pt is A/Ox4 on room air with no visible or stated sign of distress, discomfort, or pain. Pt denies pain at this time. Rosado in place, daily care completed with assessment.    Bed alarm in place, controls on and bed in locked and lowest position. Call light and personal possessions within reach. Patient educated about use of call light and verbalized understanding.

## 2020-11-07 NOTE — PROGRESS NOTES
2 RN skin check complete with EZ Gross   Devices in place: mepilex, méndez  Skin assessed under devices: yes.  Confirmed pressure ulcers found on: na.  New potential pressure ulcers noted on na.   Wound consult placed N/A.     Skin Assessment:  Upper extremities: pink, blanching  Elbows: pink, blanching.  Méndez site: clean, dry and intact  Sacrum: pink, blanching  BLE: dry, flaky, dusky, dark discoloration  Heels: boggy, pink, blanching; mepilex in place  L outer heel: slow to lupe    Interventions in place: mepilex, méndez care, pillows for support and positioning, pt encouraged to reposition frequently and to ambulate during day, waffle mattress overlay

## 2020-11-07 NOTE — PROGRESS NOTES
Pt AxO x4 and denying pain.  Pt on RA and not experiencing any SOB.  Pt in bed for evening and denied any further needs.  Rosado secured and pt denies any discomfort associated with it. Educated on use of call light.  Frequent rounding in place.

## 2020-11-07 NOTE — PROGRESS NOTES
2 RN skin check complete with EZ Kulkarni   Devices in place: méndez, mepilex  Skin assessed under devices: yes.  Confirmed pressure ulcers found on: na.  New potential pressure ulcers noted on na.   Wound consult placed N/A.        Skin Assessment:  Heels: boggy, pink, blanching; mepilex in place  L outer heel: slow to lupe  BLE: dry, flaky, dusky, dark discoloration  Sacrum: pink, blanching  Méndez site: clean, dry and intact  Scrotum: slightly reddened and itchy  Elbows: pink, blanching.     The following interventions in place: mepilex, méndez care, pillows for support and positioning, pt encouraged to reposition frequently and to ambulate during day, waffle mattress overlay

## 2020-11-08 PROBLEM — N30.90 CYSTITIS WITHOUT HEMATURIA: Status: ACTIVE | Noted: 2020-09-10

## 2020-11-08 LAB
ANION GAP SERPL CALC-SCNC: 13 MMOL/L (ref 7–16)
BASOPHILS # BLD AUTO: 0.2 % (ref 0–1.8)
BASOPHILS # BLD: 0.02 K/UL (ref 0–0.12)
BUN SERPL-MCNC: 19 MG/DL (ref 8–22)
CALCIUM SERPL-MCNC: 9.5 MG/DL (ref 8.5–10.5)
CHLORIDE SERPL-SCNC: 97 MMOL/L (ref 96–112)
CO2 SERPL-SCNC: 27 MMOL/L (ref 20–33)
CREAT SERPL-MCNC: 1.12 MG/DL (ref 0.5–1.4)
EOSINOPHIL # BLD AUTO: 0.37 K/UL (ref 0–0.51)
EOSINOPHIL NFR BLD: 3.9 % (ref 0–6.9)
ERYTHROCYTE [DISTWIDTH] IN BLOOD BY AUTOMATED COUNT: 45 FL (ref 35.9–50)
GLUCOSE SERPL-MCNC: 114 MG/DL (ref 65–99)
HCT VFR BLD AUTO: 43.8 % (ref 42–52)
HGB BLD-MCNC: 14.1 G/DL (ref 14–18)
IMM GRANULOCYTES # BLD AUTO: 0.02 K/UL (ref 0–0.11)
IMM GRANULOCYTES NFR BLD AUTO: 0.2 % (ref 0–0.9)
LYMPHOCYTES # BLD AUTO: 2.32 K/UL (ref 1–4.8)
LYMPHOCYTES NFR BLD: 24.6 % (ref 22–41)
MCH RBC QN AUTO: 29.6 PG (ref 27–33)
MCHC RBC AUTO-ENTMCNC: 32.2 G/DL (ref 33.7–35.3)
MCV RBC AUTO: 91.8 FL (ref 81.4–97.8)
MONOCYTES # BLD AUTO: 0.71 K/UL (ref 0–0.85)
MONOCYTES NFR BLD AUTO: 7.5 % (ref 0–13.4)
NEUTROPHILS # BLD AUTO: 6.01 K/UL (ref 1.82–7.42)
NEUTROPHILS NFR BLD: 63.6 % (ref 44–72)
NRBC # BLD AUTO: 0 K/UL
NRBC BLD-RTO: 0 /100 WBC
PLATELET # BLD AUTO: 258 K/UL (ref 164–446)
PMV BLD AUTO: 9.2 FL (ref 9–12.9)
POTASSIUM SERPL-SCNC: 4 MMOL/L (ref 3.6–5.5)
RBC # BLD AUTO: 4.77 M/UL (ref 4.7–6.1)
SODIUM SERPL-SCNC: 137 MMOL/L (ref 135–145)
WBC # BLD AUTO: 9.5 K/UL (ref 4.8–10.8)

## 2020-11-08 PROCEDURE — 700102 HCHG RX REV CODE 250 W/ 637 OVERRIDE(OP): Performed by: HOSPITALIST

## 2020-11-08 PROCEDURE — 85025 COMPLETE CBC W/AUTO DIFF WBC: CPT

## 2020-11-08 PROCEDURE — A9270 NON-COVERED ITEM OR SERVICE: HCPCS | Performed by: NURSE PRACTITIONER

## 2020-11-08 PROCEDURE — 36415 COLL VENOUS BLD VENIPUNCTURE: CPT

## 2020-11-08 PROCEDURE — A9270 NON-COVERED ITEM OR SERVICE: HCPCS | Performed by: HOSPITALIST

## 2020-11-08 PROCEDURE — 99231 SBSQ HOSP IP/OBS SF/LOW 25: CPT | Performed by: NURSE PRACTITIONER

## 2020-11-08 PROCEDURE — 700102 HCHG RX REV CODE 250 W/ 637 OVERRIDE(OP): Performed by: NURSE PRACTITIONER

## 2020-11-08 PROCEDURE — 770006 HCHG ROOM/CARE - MED/SURG/GYN SEMI*

## 2020-11-08 PROCEDURE — 80048 BASIC METABOLIC PNL TOTAL CA: CPT

## 2020-11-08 RX ORDER — NITROFURANTOIN 25; 75 MG/1; MG/1
100 CAPSULE ORAL 2 TIMES DAILY WITH MEALS
Status: COMPLETED | OUTPATIENT
Start: 2020-11-08 | End: 2020-11-14

## 2020-11-08 RX ADMIN — FINASTERIDE 5 MG: 5 TABLET, FILM COATED ORAL at 08:19

## 2020-11-08 RX ADMIN — NITROFURANTOIN MONOHYDRATE/MACROCRYSTALLINE 100 MG: 25; 75 CAPSULE ORAL at 12:03

## 2020-11-08 RX ADMIN — NITROFURANTOIN MONOHYDRATE/MACROCRYSTALLINE 100 MG: 25; 75 CAPSULE ORAL at 17:40

## 2020-11-08 RX ADMIN — TAMSULOSIN HYDROCHLORIDE 0.8 MG: 0.4 CAPSULE ORAL at 08:19

## 2020-11-08 RX ADMIN — AMLODIPINE BESYLATE 5 MG: 5 TABLET ORAL at 08:19

## 2020-11-08 RX ADMIN — THERA TABS 1 TABLET: TAB at 08:19

## 2020-11-08 RX ADMIN — QUETIAPINE FUMARATE 25 MG: 25 TABLET ORAL at 17:39

## 2020-11-08 ASSESSMENT — ENCOUNTER SYMPTOMS
NAUSEA: 0
DIZZINESS: 0
FALLS: 1
FOCAL WEAKNESS: 0
HEADACHES: 0
HEARTBURN: 0
ABDOMINAL PAIN: 0
PALPITATIONS: 0
LOSS OF CONSCIOUSNESS: 0
CONSTITUTIONAL NEGATIVE: 1
VOMITING: 0

## 2020-11-08 NOTE — PROGRESS NOTES
AOX3. Denies pain. Rosado patient. Denies pain. Able to make needs known. AX1 with walker. Bed alarm on. Appear to be sleeping in between cares.

## 2020-11-08 NOTE — PROGRESS NOTES
Hospital Medicine Twice Weekly Progress Note    Date of Service  11/8/2020    Chief Complaint  SOB and low blood pressue    Hospital Course   Mr. Bejarano is an 85 yo male with a PMHx of DM type II, hyperlipidemia, BPH who was admitted on 7/9/2020 with COVID 19 infection with POLY and hypotension.  Patient initially in ICU and on therapeutic Lovenox due to elevated DDimer.  His hospital course was complicated by hematuria and urine retention of which a méndez was placed.  Flomax and finasteride was initiated.  Patient was in acute renal failure which has improved. RUQ U/S was completed d/t periumbilical pain. This revealed possible renal mass. CT renal w/wo was performed and showed a large renal mass (7x7x8) likely representing urothelial vs renal cell carcinoma. Psychiatry was also consulted d/t ongoing cognition difficulty.  Per psychiatry, patient does not have the capacity to make any medical decision on 8/3/2020. SLP feels he has moderate to severe deficits in insight, orientation, and memory. Guardianship pending.     Despite Proscar and Flomax voiding trial failed 9/4 and red frank catheter was placed d/t high urinary retention. His renal fx has remained stable and WNL. Unfortunately, the patient is 116 pounds at the time of this note, with BMI of 17. He is a poor candidate for potential chemotherapy or surgical decompression given his malnutrition and neurocognitive disorder.      Interval Problem Update  Patient with increased confusion over the past 24 hours. This morning, he had a ground level fall in attempts to go to the bathroom to urinate, even though he has a chronic méndez is in place since July. I did not appreciate any signs of injury on exam. No head injury.    A UA was collected via clean catch after méndez was changed and suggestive of UTI. I have started him on empiric Macrobid pending urine cx. He has mild suprapubic tenderness and distension. No CVA tenderness. Afebrile.      Consultants/Specialty  -Psychiatry  -Palliative Care    Code Status  DNAR/DNI    Disposition  Guardianship established . Needs placement     Review of Systems  Review of Systems   Constitutional: Negative.    Cardiovascular: Negative for chest pain and palpitations.   Gastrointestinal: Negative for abdominal pain, heartburn, nausea and vomiting.   Musculoskeletal: Positive for falls.   Skin: Negative.    Neurological: Negative for dizziness, focal weakness, loss of consciousness and headaches.        Physical Exam  Temp:  [36.3 °C (97.3 °F)-36.6 °C (97.8 °F)] 36.4 °C (97.6 °F)  Pulse:  [63-68] 68  Resp:  [19-20] 19  BP: (115-118)/(51-66) 118/66  SpO2:  [94 %-95 %] 95 %    Physical Exam  Vitals signs and nursing note reviewed.   Constitutional:       General: He is not in acute distress.     Appearance: He is underweight. He is not toxic-appearing.      Comments: Frail   Chronically ill appearing     HENT:      Head: Normocephalic and atraumatic.      Nose: Nose normal.      Mouth/Throat:      Mouth: Mucous membranes are moist.      Pharynx: Oropharynx is clear.   Eyes:      General: No scleral icterus.     Conjunctiva/sclera: Conjunctivae normal.      Comments: Legally blind      Neck:      Musculoskeletal: Normal range of motion.   Cardiovascular:      Pulses: Normal pulses.   Pulmonary:      Effort: Pulmonary effort is normal. No respiratory distress.   Abdominal:      General: There is no distension.      Tenderness: There is abdominal tenderness in the suprapubic area. There is no right CVA tenderness, left CVA tenderness, guarding or rebound.   Genitourinary:     Comments: Rosado in place and draining yellow urine with mild amount of white sediment     Musculoskeletal:         General: No swelling.      Right lower leg: No edema.      Left lower leg: No edema.      Comments: WELLS     Skin:     General: Skin is warm and dry.      Coloration: Skin is not jaundiced or pale.   Neurological:      Mental Status: He  is alert. He is disoriented.   Psychiatric:         Attention and Perception: He is inattentive.         Mood and Affect: Mood normal.         Behavior: Behavior normal. Behavior is cooperative.         Cognition and Memory: Cognition is impaired. Memory is impaired.         Fluids    Intake/Output Summary (Last 24 hours) at 11/8/2020 1126  Last data filed at 11/8/2020 0900  Gross per 24 hour   Intake 880 ml   Output 1800 ml   Net -920 ml       Laboratory                        Imaging  CT-RENAL WITH & W/O   Final Result      1.  Large complex right renal mass as detailed above measuring approximately 7.8 x 7.1x  8.5 cm. Primary differential considerations include urothelial malignancy or renal cell carcinoma.   2.  No evidence of adenopathy or metastatic disease is seen.   3.  Distended urinary bladder. Right greater left hydroureteronephrosis.   4.  Severe atherosclerosis. 3.5 cm infrarenal abdominal aortic aneurysm.            US-RUQ   Final Result         1.  Echogenic liver compatible with fatty change versus fibrosis.   2.  Masslike structure in the right kidney, should be considered neoplastic unless proven otherwise, recommend follow-up 3 phase CT of the kidneys for further characterization   3.  Mild dilatation of the common bile duct, within expected limits given patient age.      These findings were discussed with the patient's clinician, Vin Mei, on 7/23/2020 7:34 AM.      DX-CHEST-PORTABLE (1 VIEW)   Final Result      1.  The lungs are hyperinflated suggesting emphysema/COPD.   2.  There is minimal predominantly lower lobe interstitial opacity which is most likely due to chronic scarring.           Assessment/Plan  Neurocognitive disorder, unspecified  Assessment & Plan  -Psychiatry was consulted, patient cannot make any medical decision  -Guardianship established , need placement   -reevaluated for capacity by Dr. Singh on 11/5 and still deemed incapacitated     Urinary retention- (present on  admission)  Assessment & Plan  -Continue Flomax and finasteride  -Méndez catheter in place for urinary retention on 8/7-Trial removal of Méndez catheter 9/4 unsuccessful, Méndez replaced. Méndez exchanged 11/8.  -Follow up outpatient. Discharge likely with méndez.      Right kidney mass- (present on admission)  Assessment & Plan  -Suspicion for urothelial carcinoma vs renal cell carcinoma. No a/p adenopathy  -Patient failed voiding trial despite flomax/proscar  -Poor candidate for chemo given neurocognitive d/o, lacking capacity and requirement for guardianship  -Urologist, Dr. Caruso, was consulted on 11/6 regarding renal mass. Recommended outpatient follow-up and continue chronic méndez. Please see consult note for further detail     COPD (chronic obstructive pulmonary disease) ([facility])- (present on admission)  Assessment & Plan  -Stable on room air   -Inhaled bronchodilators as needed  -Not in acute exacerbation    Discharge planning issues  Assessment & Plan  -CM following  -plan for Group Home at [place]      Cystitis without hematuria  Assessment & Plan  -UA checked for increasing confusion and acute suprapubic pain  -Started on Macrobid 11/8, urine cx pending  -follow cx and adjust abx accordingly     HTN (hypertension)- (present on admission)  Assessment & Plan  - Continue Norvasc  - VS per policy     Type 2 diabetes mellitus (HCC)- (present on admission)  Assessment & Plan  -Hemoglobin A1c at 6.8, continue diabetic diet, monitor  -BG has been well controlled  -Accu-Cheks only as needed for signs and symptoms of hypoglycemia or acute illness  -No need for insulin at this time.     Abdominal aortic aneurysm (AAA) 3.0 cm to 5.5 cm in diameter in male ([facility])  Assessment & Plan  -Infrarenal  -Noted as incidental finding on CT renal w/wo  -Not a candidate for repair given neurocognitive disorder, lacking capacity  -BP control     Advanced care planning/counseling discussion  Assessment & Plan  -Patient is DNR/DNI  -Guardianship  established    Protein-calorie malnutrition (HCC)- (present on admission)  Assessment & Plan  -Body mass index is 17.57 kg/m².  -encourage PO intake  -food preferences  -TID supplements    COVID-19 virus infection  Assessment & Plan  -Patient has recovered from COVID-19       VTE prophylaxis : Ambulation    I have performed a physical exam and reviewed and updated ROS and Assessment/Plan today (11/08/20). In review of the previous note there are no changes except as documented above      MICK Tejada.

## 2020-11-08 NOTE — PROGRESS NOTES
"At 0915, Pt suffered an unassisted fall as he explained \"I was trying to find my cane\". Cane and other assisted devices were not present at bedside due to high fall risk . APRN, RN and CNA present post fall and assisted the pt to his feet and bed. APRN not concerned of any injury suspected due to position of the pt at the time of the post fall. Pt was found in a sitting position on the front of the bed. No new interventions or orders placed at this time by the APRN.    Pt presented agitated and impulsive about the situation and post fall. Pt's mentation or condition did not alter upon immediate assessment. APRN, RN, and CNA attempted to deescalate the situation at the time with no success. Pt tried walking on his own even with his blind condition but the RN assisted the pt to the bed. Pt became aggressive with the RN and tried to get on the top half of the bed saying, \"I love you guys but you guys label me as the bad jaiden\". Pt continued to get angry and show signs of impulsivity. Pt then ambulated to the chair and got tearful explaining his frustration about the whole situation. RN and CNA provided therapeutic communication and helped deescalate the situation once fall risk interventions were discussed with the patient and importance of calling for help with needed. Pt receptive to education and care at this time.    No new interventions or orders placed at this time. Assessment completed.  "

## 2020-11-09 PROCEDURE — A9270 NON-COVERED ITEM OR SERVICE: HCPCS | Performed by: HOSPITALIST

## 2020-11-09 PROCEDURE — A9270 NON-COVERED ITEM OR SERVICE: HCPCS | Performed by: NURSE PRACTITIONER

## 2020-11-09 PROCEDURE — 700102 HCHG RX REV CODE 250 W/ 637 OVERRIDE(OP): Performed by: NURSE PRACTITIONER

## 2020-11-09 PROCEDURE — 700102 HCHG RX REV CODE 250 W/ 637 OVERRIDE(OP): Performed by: HOSPITALIST

## 2020-11-09 PROCEDURE — 770006 HCHG ROOM/CARE - MED/SURG/GYN SEMI*

## 2020-11-09 RX ADMIN — NITROFURANTOIN MONOHYDRATE/MACROCRYSTALLINE 100 MG: 25; 75 CAPSULE ORAL at 17:59

## 2020-11-09 RX ADMIN — NITROFURANTOIN MONOHYDRATE/MACROCRYSTALLINE 100 MG: 25; 75 CAPSULE ORAL at 10:20

## 2020-11-09 RX ADMIN — FINASTERIDE 5 MG: 5 TABLET, FILM COATED ORAL at 10:21

## 2020-11-09 RX ADMIN — THERA TABS 1 TABLET: TAB at 10:20

## 2020-11-09 RX ADMIN — TAMSULOSIN HYDROCHLORIDE 0.8 MG: 0.4 CAPSULE ORAL at 10:20

## 2020-11-09 RX ADMIN — QUETIAPINE FUMARATE 25 MG: 25 TABLET ORAL at 17:59

## 2020-11-09 RX ADMIN — ERGOCALCIFEROL 50000 UNITS: 1.25 CAPSULE ORAL at 14:06

## 2020-11-09 ASSESSMENT — PAIN DESCRIPTION - PAIN TYPE: TYPE: ACUTE PAIN

## 2020-11-09 NOTE — PROGRESS NOTES
AOX3. Denies pain. Rosado patent. Denies pain. Able to make needs known. AX1 with walker. Bed alarm on. Patient set off the bed alarm multiple times throughout the night. Appear to be sleeping in between cares.

## 2020-11-10 LAB
BACTERIA UR CULT: ABNORMAL
BACTERIA UR CULT: ABNORMAL
SIGNIFICANT IND 70042: ABNORMAL
SITE SITE: ABNORMAL
SOURCE SOURCE: ABNORMAL

## 2020-11-10 PROCEDURE — 700102 HCHG RX REV CODE 250 W/ 637 OVERRIDE(OP): Performed by: NURSE PRACTITIONER

## 2020-11-10 PROCEDURE — 700102 HCHG RX REV CODE 250 W/ 637 OVERRIDE(OP): Performed by: HOSPITALIST

## 2020-11-10 PROCEDURE — A9270 NON-COVERED ITEM OR SERVICE: HCPCS | Performed by: NURSE PRACTITIONER

## 2020-11-10 PROCEDURE — A9270 NON-COVERED ITEM OR SERVICE: HCPCS | Performed by: HOSPITALIST

## 2020-11-10 PROCEDURE — 770006 HCHG ROOM/CARE - MED/SURG/GYN SEMI*

## 2020-11-10 RX ADMIN — NITROFURANTOIN MONOHYDRATE/MACROCRYSTALLINE 100 MG: 25; 75 CAPSULE ORAL at 09:58

## 2020-11-10 RX ADMIN — AMLODIPINE BESYLATE 5 MG: 5 TABLET ORAL at 10:01

## 2020-11-10 RX ADMIN — QUETIAPINE FUMARATE 25 MG: 25 TABLET ORAL at 17:25

## 2020-11-10 RX ADMIN — THERA TABS 1 TABLET: TAB at 09:58

## 2020-11-10 RX ADMIN — NITROFURANTOIN MONOHYDRATE/MACROCRYSTALLINE 100 MG: 25; 75 CAPSULE ORAL at 17:25

## 2020-11-10 RX ADMIN — TAMSULOSIN HYDROCHLORIDE 0.8 MG: 0.4 CAPSULE ORAL at 09:58

## 2020-11-10 RX ADMIN — FINASTERIDE 5 MG: 5 TABLET, FILM COATED ORAL at 09:58

## 2020-11-10 ASSESSMENT — PAIN DESCRIPTION - PAIN TYPE: TYPE: ACUTE PAIN

## 2020-11-10 NOTE — PROGRESS NOTES
Pt is A&Ox3 w/ no complaints of pain throughout the shift. Pt was OOB w/ one assist to the bathroom. Rosado patent and draining. Pt became very frustrated w/ staff this afternoon. Pt was not following verbal and hand held direction while walking. Pt became argumentative and aggressive. Call bell within reach. Hourly rounding completed. Fall prevention education provided. Fall precautions maintained. Will continue to monitor.

## 2020-11-11 PROCEDURE — 700102 HCHG RX REV CODE 250 W/ 637 OVERRIDE(OP): Performed by: NURSE PRACTITIONER

## 2020-11-11 PROCEDURE — A9270 NON-COVERED ITEM OR SERVICE: HCPCS | Performed by: NURSE PRACTITIONER

## 2020-11-11 PROCEDURE — A9270 NON-COVERED ITEM OR SERVICE: HCPCS | Performed by: HOSPITALIST

## 2020-11-11 PROCEDURE — 99231 SBSQ HOSP IP/OBS SF/LOW 25: CPT | Performed by: NURSE PRACTITIONER

## 2020-11-11 PROCEDURE — 700102 HCHG RX REV CODE 250 W/ 637 OVERRIDE(OP): Performed by: HOSPITALIST

## 2020-11-11 PROCEDURE — 770006 HCHG ROOM/CARE - MED/SURG/GYN SEMI*

## 2020-11-11 RX ADMIN — NITROFURANTOIN MONOHYDRATE/MACROCRYSTALLINE 100 MG: 25; 75 CAPSULE ORAL at 16:58

## 2020-11-11 RX ADMIN — QUETIAPINE FUMARATE 25 MG: 25 TABLET ORAL at 16:58

## 2020-11-11 RX ADMIN — FINASTERIDE 5 MG: 5 TABLET, FILM COATED ORAL at 07:39

## 2020-11-11 RX ADMIN — NITROFURANTOIN MONOHYDRATE/MACROCRYSTALLINE 100 MG: 25; 75 CAPSULE ORAL at 07:39

## 2020-11-11 RX ADMIN — TAMSULOSIN HYDROCHLORIDE 0.8 MG: 0.4 CAPSULE ORAL at 07:39

## 2020-11-11 RX ADMIN — AMLODIPINE BESYLATE 5 MG: 5 TABLET ORAL at 07:39

## 2020-11-11 RX ADMIN — THERA TABS 1 TABLET: TAB at 07:39

## 2020-11-11 ASSESSMENT — ENCOUNTER SYMPTOMS
DIZZINESS: 0
CHILLS: 0
LOSS OF CONSCIOUSNESS: 0
NAUSEA: 0
SORE THROAT: 0
PALPITATIONS: 0
SHORTNESS OF BREATH: 0
WEIGHT LOSS: 0
DIARRHEA: 0
FALLS: 0
HEADACHES: 0
ABDOMINAL PAIN: 0
MYALGIAS: 0
VOMITING: 0
FLANK PAIN: 0
BACK PAIN: 0
FOCAL WEAKNESS: 0
FEVER: 0
HEARTBURN: 0
WHEEZING: 0

## 2020-11-11 NOTE — PROGRESS NOTES
AOX3. Denies pain. Rosado patent. Denies pain. Able to make needs known. AX1 with walker. Bed alarm on. Appear to be sleeping in between cares.

## 2020-11-11 NOTE — PROGRESS NOTES
Pt is A&Ox3 w/ no complaints of pain throughout the shift. Pt was OOB w/ one assist to the bathroom. Rosado patent and draining. Call bell within reach. Hourly rounding completed. Fall prevention education provided. Fall precautions maintained. Will continue to monitor

## 2020-11-11 NOTE — PROGRESS NOTES
Pt resting in bed, pt up to nurses station, 1 person assist and then back to bed. Pt denies pain at this time. Safety precautions in place. Call light within reach.

## 2020-11-12 PROCEDURE — 700102 HCHG RX REV CODE 250 W/ 637 OVERRIDE(OP): Performed by: HOSPITALIST

## 2020-11-12 PROCEDURE — A9270 NON-COVERED ITEM OR SERVICE: HCPCS | Performed by: NURSE PRACTITIONER

## 2020-11-12 PROCEDURE — 770006 HCHG ROOM/CARE - MED/SURG/GYN SEMI*

## 2020-11-12 PROCEDURE — A9270 NON-COVERED ITEM OR SERVICE: HCPCS | Performed by: HOSPITALIST

## 2020-11-12 PROCEDURE — 700102 HCHG RX REV CODE 250 W/ 637 OVERRIDE(OP): Performed by: NURSE PRACTITIONER

## 2020-11-12 RX ADMIN — AMLODIPINE BESYLATE 5 MG: 5 TABLET ORAL at 07:53

## 2020-11-12 RX ADMIN — QUETIAPINE FUMARATE 25 MG: 25 TABLET ORAL at 17:14

## 2020-11-12 RX ADMIN — FINASTERIDE 5 MG: 5 TABLET, FILM COATED ORAL at 07:53

## 2020-11-12 RX ADMIN — NITROFURANTOIN MONOHYDRATE/MACROCRYSTALLINE 100 MG: 25; 75 CAPSULE ORAL at 17:14

## 2020-11-12 RX ADMIN — THERA TABS 1 TABLET: TAB at 07:53

## 2020-11-12 RX ADMIN — TAMSULOSIN HYDROCHLORIDE 0.8 MG: 0.4 CAPSULE ORAL at 07:53

## 2020-11-12 RX ADMIN — NITROFURANTOIN MONOHYDRATE/MACROCRYSTALLINE 100 MG: 25; 75 CAPSULE ORAL at 07:53

## 2020-11-12 NOTE — PROGRESS NOTES
Pt is sitting up in bed, listening to the TV, no signs of labored breathing or pain. Pt on RA. Call light & personal belongings within reach, bed in lowest position and locked. Pt declines any additional needs at this time.

## 2020-11-12 NOTE — PROGRESS NOTES
Hospital Medicine Twice Weekly Progress Note    Date of Service  11/11/2020    Chief Complaint  SOB and low blood pressue    Hospital Course   Mr. Bejarano is an 83 yo male with a PMHx of DM type II, hyperlipidemia, BPH who was admitted on 7/9/2020 with COVID 19 infection with POLY and hypotension.  Patient initially in ICU and on therapeutic Lovenox due to elevated DDimer.  His hospital course was complicated by hematuria and urine retention of which a méndez was placed.  Flomax and finasteride was initiated.  Patient was in acute renal failure which has improved. RUQ U/S was completed d/t periumbilical pain. This revealed possible renal mass. CT renal w/wo was performed and showed a large renal mass (7x7x8) likely representing urothelial vs renal cell carcinoma. Psychiatry was also consulted d/t ongoing cognition difficulty.  Per psychiatry, patient does not have the capacity to make any medical decision on 8/3/2020. SLP feels he has moderate to severe deficits in insight, orientation, and memory. Guardianship pending.     Despite Proscar and Flomax voiding trial failed 9/4 and red frank catheter was placed d/t high urinary retention. His renal fx has remained stable and WNL. Unfortunately, the patient is 116 pounds at the time of this note, with BMI of 17. He is a poor candidate for potential chemotherapy or surgical decompression given his malnutrition and neurocognitive disorder.      Interval Problem Update  Patient is sitting upright in bed, pleasant and cooperative.  He is disoriented to situation, however otherwise fully oriented.  He denies pain, urinary problems and abnormal bowel movements.  He denies any other problems.  -UA resulted Klebsiella oxytoca, Sensitive to nitrofurantoin.  -UTI appears resolved, no other signs and symptoms.  Treatment complete.  -Vital signs and lab results within normal limits  -Lung sounds are diminished in bilateral bases.  -Patient's BMI has increased from 17 to almost 21 over  the last month.    Consultants/Specialty  -Psychiatry  -Palliative Care    Code Status  DNAR/DNI    Disposition  Guardianship established . Needs placement     Review of Systems  Review of Systems   Constitutional: Negative for chills, fever, malaise/fatigue and weight loss.   HENT: Negative for congestion and sore throat.    Respiratory: Negative for shortness of breath and wheezing.    Cardiovascular: Negative for chest pain and palpitations.   Gastrointestinal: Negative for abdominal pain, diarrhea, heartburn, nausea and vomiting.   Genitourinary: Negative for dysuria, flank pain, frequency, hematuria and urgency.   Musculoskeletal: Negative for back pain, falls and myalgias.   Neurological: Negative for dizziness, focal weakness, loss of consciousness and headaches.        Physical Exam  Temp:  [36.4 °C (97.6 °F)-36.8 °C (98.3 °F)] 36.6 °C (97.9 °F)  Pulse:  [71-75] 71  Resp:  [16] 16  BP: (105-115)/(53-57) 115/53  SpO2:  [91 %-94 %] 91 %    Physical Exam  Vitals signs and nursing note reviewed.   Constitutional:       General: He is awake. He is not in acute distress.     Appearance: He is underweight. He is not toxic-appearing.      Comments: Frail   Chronically ill appearing     HENT:      Head: Normocephalic and atraumatic.      Nose: Nose normal.      Mouth/Throat:      Mouth: Mucous membranes are moist.      Pharynx: Oropharynx is clear.   Eyes:      General: No scleral icterus.     Conjunctiva/sclera: Conjunctivae normal.      Comments: Legally blind      Neck:      Musculoskeletal: Normal range of motion.   Cardiovascular:      Pulses: Normal pulses.   Pulmonary:      Effort: Pulmonary effort is normal. No respiratory distress.   Abdominal:      General: There is no distension.      Tenderness: There is no abdominal tenderness. There is no right CVA tenderness, left CVA tenderness, guarding or rebound.   Genitourinary:     Comments: Rosado in place and draining yellow urine with mild amount of white  sediment     Musculoskeletal:         General: No swelling.      Right lower leg: No edema.      Left lower leg: No edema.      Comments: WELLS     Skin:     General: Skin is warm and dry.      Coloration: Skin is not jaundiced or pale.   Neurological:      Mental Status: He is alert. He is disoriented.   Psychiatric:         Attention and Perception: He is attentive.         Mood and Affect: Mood normal.         Speech: Speech normal.         Behavior: Behavior normal. Behavior is cooperative.         Cognition and Memory: Cognition is impaired. Memory is impaired.         Fluids    Intake/Output Summary (Last 24 hours) at 11/11/2020 1647  Last data filed at 11/11/2020 1300  Gross per 24 hour   Intake 960 ml   Output 1460 ml   Net -500 ml       Laboratory                        Imaging  CT-RENAL WITH & W/O   Final Result      1.  Large complex right renal mass as detailed above measuring approximately 7.8 x 7.1x  8.5 cm. Primary differential considerations include urothelial malignancy or renal cell carcinoma.   2.  No evidence of adenopathy or metastatic disease is seen.   3.  Distended urinary bladder. Right greater left hydroureteronephrosis.   4.  Severe atherosclerosis. 3.5 cm infrarenal abdominal aortic aneurysm.            US-RUQ   Final Result         1.  Echogenic liver compatible with fatty change versus fibrosis.   2.  Masslike structure in the right kidney, should be considered neoplastic unless proven otherwise, recommend follow-up 3 phase CT of the kidneys for further characterization   3.  Mild dilatation of the common bile duct, within expected limits given patient age.      These findings were discussed with the patient's clinician, Vin Mei, on 7/23/2020 7:34 AM.      DX-CHEST-PORTABLE (1 VIEW)   Final Result      1.  The lungs are hyperinflated suggesting emphysema/COPD.   2.  There is minimal predominantly lower lobe interstitial opacity which is most likely due to chronic scarring.            Assessment/Plan  Neurocognitive disorder, unspecified  Assessment & Plan  -Psychiatry was consulted, patient cannot make any medical decision  -Guardianship established , need placement   -reevaluated for capacity by Dr. Singh on 11/5 and still deemed incapacitated     Urinary retention- (present on admission)  Assessment & Plan  -Continue Flomax and finasteride  -Méndez catheter in place for urinary retention on 8/7-Trial removal of Méndez catheter 9/4 unsuccessful, Méndez replaced. Méndez exchanged 11/8.  -Follow up outpatient. Discharge likely with méndez.    Right kidney mass- (present on admission)  Assessment & Plan  -Suspicion for urothelial carcinoma vs renal cell carcinoma. No a/p adenopathy  -Patient failed voiding trial despite flomax/proscar  -Poor candidate for chemo given neurocognitive d/o, lacking capacity and requirement for guardianship  -Urologist, Dr. Caruso, was consulted on 11/6 regarding renal mass. Recommended outpatient follow-up and continue chronic méndez. Please see consult note for further detail     COPD (chronic obstructive pulmonary disease) ([facility])- (present on admission)  Assessment & Plan  -Stable on room air   -Inhaled bronchodilators as needed  -Not in acute exacerbation    Discharge planning issues  Assessment & Plan  -CM following  -plan for Group Home at [place]      HTN (hypertension)- (present on admission)  Assessment & Plan  - Continue Norvasc  -Well-managed on current regimen    Type 2 diabetes mellitus (HCC)- (present on admission)  Assessment & Plan  -Hemoglobin A1c at 6.8, continue diabetic diet, monitor  -BG has been well controlled  -Accu-Cheks only as needed for signs and symptoms of hypoglycemia or acute illness  -No need for insulin at this time.     Abdominal aortic aneurysm (AAA) 3.0 cm to 5.5 cm in diameter in male ([facility])  Assessment & Plan  -Infrarenal  -Noted as incidental finding on CT renal w/wo  -Not a candidate for repair given neurocognitive disorder, lacking  capacity  -BP control     Cystitis without hematuria  Assessment & Plan  -UA checked for increasing confusion and acute suprapubic pain  -Started on Macrobid 11/8, urine cx resulted: Klebsiella oxytoca.  Sensitive to Macrobid.  -follow cx and adjust abx accordingly  -Resolved.     Advanced care planning/counseling discussion  Assessment & Plan  -Patient is DNR/DNI  -Guardianship established    Protein-calorie malnutrition (HCC)- (present on admission)  Assessment & Plan  -Body mass index is 20.79 kg/m²., significantly improved from 17 over the last month.  -encourage PO intake  -food preferences  -TID supplements    COVID-19 virus infection  Assessment & Plan  -Patient has recovered from COVID-19  -No residual signs and symptoms       VTE prophylaxis : Ambulation    I have performed a physical exam and reviewed and updated ROS and Assessment/Plan today (11/08/20). In review of the previous note there are no changes except as documented above      MICK Acuna.

## 2020-11-13 LAB — 25(OH)D3 SERPL-MCNC: 45 NG/ML (ref 30–100)

## 2020-11-13 PROCEDURE — 700102 HCHG RX REV CODE 250 W/ 637 OVERRIDE(OP): Performed by: NURSE PRACTITIONER

## 2020-11-13 PROCEDURE — A9270 NON-COVERED ITEM OR SERVICE: HCPCS | Performed by: NURSE PRACTITIONER

## 2020-11-13 PROCEDURE — 700102 HCHG RX REV CODE 250 W/ 637 OVERRIDE(OP): Performed by: HOSPITALIST

## 2020-11-13 PROCEDURE — 36415 COLL VENOUS BLD VENIPUNCTURE: CPT

## 2020-11-13 PROCEDURE — 770006 HCHG ROOM/CARE - MED/SURG/GYN SEMI*

## 2020-11-13 PROCEDURE — A9270 NON-COVERED ITEM OR SERVICE: HCPCS | Performed by: HOSPITALIST

## 2020-11-13 PROCEDURE — 82306 VITAMIN D 25 HYDROXY: CPT

## 2020-11-13 RX ADMIN — NITROFURANTOIN MONOHYDRATE/MACROCRYSTALLINE 100 MG: 25; 75 CAPSULE ORAL at 08:37

## 2020-11-13 RX ADMIN — AMLODIPINE BESYLATE 5 MG: 5 TABLET ORAL at 08:37

## 2020-11-13 RX ADMIN — TAMSULOSIN HYDROCHLORIDE 0.8 MG: 0.4 CAPSULE ORAL at 08:37

## 2020-11-13 RX ADMIN — QUETIAPINE FUMARATE 25 MG: 25 TABLET ORAL at 17:24

## 2020-11-13 RX ADMIN — FINASTERIDE 5 MG: 5 TABLET, FILM COATED ORAL at 08:37

## 2020-11-13 RX ADMIN — THERA TABS 1 TABLET: TAB at 08:37

## 2020-11-13 RX ADMIN — NITROFURANTOIN MONOHYDRATE/MACROCRYSTALLINE 100 MG: 25; 75 CAPSULE ORAL at 17:24

## 2020-11-13 NOTE — PROGRESS NOTES
Pt is AOx4, no signs of labored breathing or pain. Pt on RA. Call light & personal belongings within reach, bed in lowest position and locked. Pt declines any additional needs at this time.

## 2020-11-14 PROCEDURE — A9270 NON-COVERED ITEM OR SERVICE: HCPCS | Performed by: NURSE PRACTITIONER

## 2020-11-14 PROCEDURE — A9270 NON-COVERED ITEM OR SERVICE: HCPCS | Performed by: HOSPITALIST

## 2020-11-14 PROCEDURE — 700102 HCHG RX REV CODE 250 W/ 637 OVERRIDE(OP): Performed by: HOSPITALIST

## 2020-11-14 PROCEDURE — 770006 HCHG ROOM/CARE - MED/SURG/GYN SEMI*

## 2020-11-14 PROCEDURE — 99231 SBSQ HOSP IP/OBS SF/LOW 25: CPT | Performed by: NURSE PRACTITIONER

## 2020-11-14 PROCEDURE — 700102 HCHG RX REV CODE 250 W/ 637 OVERRIDE(OP): Performed by: NURSE PRACTITIONER

## 2020-11-14 RX ADMIN — THERA TABS 1 TABLET: TAB at 07:48

## 2020-11-14 RX ADMIN — AMLODIPINE BESYLATE 5 MG: 5 TABLET ORAL at 07:48

## 2020-11-14 RX ADMIN — NITROFURANTOIN MONOHYDRATE/MACROCRYSTALLINE 100 MG: 25; 75 CAPSULE ORAL at 16:28

## 2020-11-14 RX ADMIN — QUETIAPINE FUMARATE 25 MG: 25 TABLET ORAL at 16:28

## 2020-11-14 RX ADMIN — NITROFURANTOIN MONOHYDRATE/MACROCRYSTALLINE 100 MG: 25; 75 CAPSULE ORAL at 07:48

## 2020-11-14 RX ADMIN — TAMSULOSIN HYDROCHLORIDE 0.8 MG: 0.4 CAPSULE ORAL at 07:48

## 2020-11-14 RX ADMIN — FINASTERIDE 5 MG: 5 TABLET, FILM COATED ORAL at 07:48

## 2020-11-14 ASSESSMENT — ENCOUNTER SYMPTOMS
NAUSEA: 0
DIZZINESS: 0
FEVER: 0
FALLS: 0
FOCAL WEAKNESS: 0
SHORTNESS OF BREATH: 0
SORE THROAT: 0
WHEEZING: 0
LOSS OF CONSCIOUSNESS: 0
VOMITING: 0
CHILLS: 0
ABDOMINAL PAIN: 0
DIARRHEA: 0
HEADACHES: 0
HEARTBURN: 0
MYALGIAS: 0
PALPITATIONS: 0
BACK PAIN: 0
FLANK PAIN: 0
WEIGHT LOSS: 0

## 2020-11-14 NOTE — PROGRESS NOTES
Assumed care of pt at shift change. Pt is on RA with no signs of acute distress. A&Ox4. Denies any pain. Rosado in place with clear urine. All comfort measures in place. Call light and personal belongings by bedside. Bed locked and in lowest position. Hourly rounding in place.

## 2020-11-14 NOTE — PROGRESS NOTES
Hospital Medicine Twice Weekly Progress Note    Date of Service  11/14/2020    Chief Complaint  SOB and low blood pressue    Hospital Course   Mr. Bejarano is an 85 yo male with a PMHx of DM type II, hyperlipidemia, BPH who was admitted on 7/9/2020 with COVID 19 infection with POLY and hypotension.  Patient initially in ICU and on therapeutic Lovenox due to elevated DDimer.  His hospital course was complicated by hematuria and urine retention of which a méndez was placed.  Flomax and finasteride was initiated.  Patient was in acute renal failure which has improved. RUQ U/S was completed d/t periumbilical pain. This revealed possible renal mass. CT renal w/wo was performed and showed a large renal mass (7x7x8) likely representing urothelial vs renal cell carcinoma. Psychiatry was also consulted d/t ongoing cognition difficulty.  Per psychiatry, patient does not have the capacity to make any medical decision on 8/3/2020. SLP feels he has moderate to severe deficits in insight, orientation, and memory. Guardianship pending.     Despite Proscar and Flomax voiding trial failed 9/4 and red frank catheter was placed d/t high urinary retention. His renal fx has remained stable and WNL. Unfortunately, the patient is a poor candidate for potential chemotherapy or surgical decompression given his malnutrition and neurocognitive disorder.  -Patient's BMI has increased from 17 to almost 21 over the last month.      Interval Problem Update  Patient is sitting upright in bed, pleasant and cooperative.  He is disoriented to situation, however otherwise fully oriented.  He denies pain, urinary problems and abnormal bowel movements.  He denies any other problems.  -Vital signs and lab results within normal limits  -Lung sounds are diminished in bilateral bases.  -No changes to plan of care at this time.    Consultants/Specialty  -Psychiatry  -Palliative Care    Code Status  DNAR/DNI    Disposition  Guardianship established . Needs  placement     Review of Systems  Review of Systems   Constitutional: Negative for chills, fever, malaise/fatigue and weight loss.   HENT: Negative for congestion and sore throat.    Respiratory: Negative for shortness of breath and wheezing.    Cardiovascular: Negative for chest pain and palpitations.   Gastrointestinal: Negative for abdominal pain, diarrhea, heartburn, nausea and vomiting.   Genitourinary: Negative for dysuria, flank pain, frequency, hematuria and urgency.   Musculoskeletal: Negative for back pain, falls and myalgias.   Neurological: Negative for dizziness, focal weakness, loss of consciousness and headaches.        Physical Exam  Temp:  [36.6 °C (97.9 °F)-36.8 °C (98.3 °F)] 36.8 °C (98.3 °F)  Pulse:  [68-83] 83  Resp:  [16-18] 16  BP: (102-116)/(56-62) 102/56  SpO2:  [93 %-95 %] 95 %    Physical Exam  Vitals signs and nursing note reviewed.   Constitutional:       General: He is awake. He is not in acute distress.     Appearance: He is underweight. He is not toxic-appearing.      Comments: Frail   Chronically ill appearing     HENT:      Head: Normocephalic and atraumatic.      Nose: Nose normal.      Mouth/Throat:      Mouth: Mucous membranes are moist.      Pharynx: Oropharynx is clear.   Eyes:      General: No scleral icterus.     Conjunctiva/sclera: Conjunctivae normal.      Comments: Legally blind      Neck:      Musculoskeletal: Normal range of motion.   Cardiovascular:      Pulses: Normal pulses.   Pulmonary:      Effort: Pulmonary effort is normal. No respiratory distress.   Abdominal:      General: There is no distension.      Tenderness: There is no abdominal tenderness. There is no right CVA tenderness, left CVA tenderness, guarding or rebound.   Genitourinary:     Comments: Rosado in place and draining yellow urine with mild amount of white sediment     Musculoskeletal:         General: No swelling.      Right lower leg: No edema.      Left lower leg: No edema.      Comments: PRISCILLA      Skin:     General: Skin is warm and dry.      Coloration: Skin is not jaundiced or pale.   Neurological:      Mental Status: He is alert. He is disoriented.   Psychiatric:         Attention and Perception: He is attentive.         Mood and Affect: Mood normal.         Speech: Speech normal.         Behavior: Behavior normal. Behavior is cooperative.         Cognition and Memory: Cognition is impaired. Memory is impaired.     All systems reviewed and exam is unchanged from prior exam.      Fluids    Intake/Output Summary (Last 24 hours) at 11/14/2020 1523  Last data filed at 11/14/2020 0800  Gross per 24 hour   Intake 1080 ml   Output 2075 ml   Net -995 ml       Laboratory                        Imaging  CT-RENAL WITH & W/O   Final Result      1.  Large complex right renal mass as detailed above measuring approximately 7.8 x 7.1x  8.5 cm. Primary differential considerations include urothelial malignancy or renal cell carcinoma.   2.  No evidence of adenopathy or metastatic disease is seen.   3.  Distended urinary bladder. Right greater left hydroureteronephrosis.   4.  Severe atherosclerosis. 3.5 cm infrarenal abdominal aortic aneurysm.            US-RUQ   Final Result         1.  Echogenic liver compatible with fatty change versus fibrosis.   2.  Masslike structure in the right kidney, should be considered neoplastic unless proven otherwise, recommend follow-up 3 phase CT of the kidneys for further characterization   3.  Mild dilatation of the common bile duct, within expected limits given patient age.      These findings were discussed with the patient's clinician, Vni Mei, on 7/23/2020 7:34 AM.      DX-CHEST-PORTABLE (1 VIEW)   Final Result      1.  The lungs are hyperinflated suggesting emphysema/COPD.   2.  There is minimal predominantly lower lobe interstitial opacity which is most likely due to chronic scarring.           Assessment/Plan  Neurocognitive disorder, unspecified  Assessment &  Plan  -Psychiatry was consulted, patient cannot make any medical decision  -Guardianship established , need placement   -reevaluated for capacity by Dr. Singh on 11/5 and still deemed incapacitated     Urinary retention- (present on admission)  Assessment & Plan  -Continue Flomax and finasteride  -Méndez catheter in place for urinary retention on 8/7-Trial removal of Méndez catheter 9/4 unsuccessful, Méndez replaced. Méndez exchanged 11/8.  -Follow up outpatient. Discharge likely with méndez.    Right kidney mass- (present on admission)  Assessment & Plan  -Suspicion for urothelial carcinoma vs renal cell carcinoma. No a/p adenopathy  -Patient failed voiding trial despite flomax/proscar  -Poor candidate for chemo given neurocognitive d/o, lacking capacity and requirement for guardianship  -Urologist, Dr. Caruso, was consulted on 11/6 regarding renal mass. Recommended outpatient follow-up and continue chronic méndez. Please see consult note for further detail     COPD (chronic obstructive pulmonary disease) (HCC)- (present on admission)  Assessment & Plan  -Stable on room air   -Inhaled bronchodilators as needed  -Not in acute exacerbation    Discharge planning issues  Assessment & Plan  -CM following  -plan for Group Home at [place]      HTN (hypertension)- (present on admission)  Assessment & Plan  - Continue Norvasc  -Well-managed on current regimen    Type 2 diabetes mellitus (HCC)- (present on admission)  Assessment & Plan  -Hemoglobin A1c at 6.8, continue diabetic diet, monitor  -BG has been well controlled  -Accu-Cheks only as needed for signs and symptoms of hypoglycemia or acute illness  -No need for insulin at this time.     Abdominal aortic aneurysm (AAA) 3.0 cm to 5.5 cm in diameter in male ([facility])  Assessment & Plan  -Infrarenal  -Noted as incidental finding on CT renal w/wo  -Not a candidate for repair given neurocognitive disorder, lacking capacity  -BP control     Cystitis without hematuria  Assessment & Plan  -UA  checked for increasing confusion and acute suprapubic pain  -Started on Macrobid 11/8, urine cx resulted: Klebsiella oxytoca.  Sensitive to Macrobid.  -follow cx and adjust abx accordingly  -Resolved.     Advanced care planning/counseling discussion  Assessment & Plan  -Patient is DNR/DNI  -Guardianship established    Protein-calorie malnutrition (HCC)- (present on admission)  Assessment & Plan  -Body mass index is 20.79 kg/m²., significantly improved from 17 over the last month.  -encourage PO intake  -food preferences  -TID supplements    COVID-19 virus infection  Assessment & Plan  -Patient has recovered from COVID-19  -No residual signs and symptoms       VTE prophylaxis : Ambulation    I have performed a physical exam and reviewed and updated ROS and Assessment/Plan today (11/08/20). In review of the previous note there are no changes except as documented above      MICK Acuna.

## 2020-11-14 NOTE — CARE PLAN
Problem: Safety  Goal: Will remain free from falls  Outcome: PROGRESSING AS EXPECTED  Intervention: Implement fall precautions  Note: All fall precautions in place.      Problem: Knowledge Deficit  Goal: Knowledge of the prescribed therapeutic regimen will improve  Outcome: PROGRESSING AS EXPECTED  Intervention: Discuss information regarding therpeutic regimen and document in education  Note: Medication use explained to pt. Pt states understanding.

## 2020-11-15 PROCEDURE — A9270 NON-COVERED ITEM OR SERVICE: HCPCS | Performed by: HOSPITALIST

## 2020-11-15 PROCEDURE — 700102 HCHG RX REV CODE 250 W/ 637 OVERRIDE(OP): Performed by: HOSPITALIST

## 2020-11-15 PROCEDURE — A9270 NON-COVERED ITEM OR SERVICE: HCPCS | Performed by: NURSE PRACTITIONER

## 2020-11-15 PROCEDURE — 700102 HCHG RX REV CODE 250 W/ 637 OVERRIDE(OP): Performed by: NURSE PRACTITIONER

## 2020-11-15 PROCEDURE — 770006 HCHG ROOM/CARE - MED/SURG/GYN SEMI*

## 2020-11-15 RX ADMIN — QUETIAPINE FUMARATE 25 MG: 25 TABLET ORAL at 16:12

## 2020-11-15 RX ADMIN — AMLODIPINE BESYLATE 5 MG: 5 TABLET ORAL at 08:05

## 2020-11-15 RX ADMIN — TAMSULOSIN HYDROCHLORIDE 0.8 MG: 0.4 CAPSULE ORAL at 08:05

## 2020-11-15 RX ADMIN — THERA TABS 1 TABLET: TAB at 08:05

## 2020-11-15 RX ADMIN — FINASTERIDE 5 MG: 5 TABLET, FILM COATED ORAL at 08:05

## 2020-11-15 NOTE — CARE PLAN
Problem: Infection  Goal: Will remain free from infection  Outcome: PROGRESSING AS EXPECTED  Intervention: Assess signs and symptoms of infection  Note: Pt has méndez, assessed for any signs of infection.      Problem: Skin Integrity  Goal: Risk for impaired skin integrity will decrease  Outcome: PROGRESSING AS EXPECTED  Intervention: Implement precautions to protect skin integrity in collaboration with the interdisciplinary team  Note: Pt is able to reposition in bed. 2RN skin check in place.

## 2020-11-15 NOTE — PROGRESS NOTES
Assumed care of pt at shift change. Pt is on RA with no signs of acute distress. A&Ox4. Denies any pain. Rosado in place with clear urine. Pt ambulated around the unit.  All comfort measures in place. Call light and personal belongings by bedside. Bed locked and in lowest position. Hourly rounding in place.

## 2020-11-16 PROCEDURE — A9270 NON-COVERED ITEM OR SERVICE: HCPCS | Performed by: NURSE PRACTITIONER

## 2020-11-16 PROCEDURE — A9270 NON-COVERED ITEM OR SERVICE: HCPCS | Performed by: HOSPITALIST

## 2020-11-16 PROCEDURE — 700102 HCHG RX REV CODE 250 W/ 637 OVERRIDE(OP): Performed by: HOSPITALIST

## 2020-11-16 PROCEDURE — 770006 HCHG ROOM/CARE - MED/SURG/GYN SEMI*

## 2020-11-16 PROCEDURE — 700102 HCHG RX REV CODE 250 W/ 637 OVERRIDE(OP): Performed by: NURSE PRACTITIONER

## 2020-11-16 RX ADMIN — AMLODIPINE BESYLATE 5 MG: 5 TABLET ORAL at 08:18

## 2020-11-16 RX ADMIN — ERGOCALCIFEROL 50000 UNITS: 1.25 CAPSULE ORAL at 13:20

## 2020-11-16 RX ADMIN — QUETIAPINE FUMARATE 25 MG: 25 TABLET ORAL at 19:46

## 2020-11-16 RX ADMIN — TAMSULOSIN HYDROCHLORIDE 0.8 MG: 0.4 CAPSULE ORAL at 08:18

## 2020-11-16 RX ADMIN — FINASTERIDE 5 MG: 5 TABLET, FILM COATED ORAL at 08:18

## 2020-11-16 RX ADMIN — THERA TABS 1 TABLET: TAB at 08:18

## 2020-11-16 NOTE — PROGRESS NOTES
2 RN skin check complete with EZ Tyler   Devices in place: mepilex, méndez  Skin assessed under devices: yes.  Confirmed pressure ulcers found on: na.  New potential pressure ulcers noted on na.   Wound consult placed N/A.     Interventions in place: mepilex, méndez care, pillows for support and positioning, pt encouraged to reposition frequently and to ambulate during day, waffle mattress overlay.    Skin Assessment:  Ears: Pin and blanching  Upper extremities: pink, blanching  Elbows: pink, blanching.  Méndez site: clean, dry and intact  Right upper thigh: redness from scratching  Sacrum: pink, blanching  BLE: dry, flaky, dusky, dark discoloration  Heels: boggy, pink, blanching; dry, mepilex in place

## 2020-11-16 NOTE — DISCHARGE PLANNING
Anticipated Discharge Disposition: Group Home    Action: Patient's SW in to  patients funds/cards from safe keeping.  PC to [employer] Keeping [phone] will call back  PC from [employer] Keeping, it appears patient's belongings were given back to him on 9/24/2020.  SW provided copy of signed Safekeeping Record  SW and guardian spoke to patient about his funds.  Patient denied having them, that the SW said they were in a safe.  Patient reluctantly allowed SW to look through his wallet, no cards and only $126.00 in cash found.  Patient very upset crying were is my money.    SW informed Charge on this, will complete a [facility]  SW email [employer] as the guardian will request [facility] to reimburse patient funds if not found.     Barriers to Discharge: placement    Plan: continue to monitor discharge barriers

## 2020-11-17 PROBLEM — U07.1 COVID-19 VIRUS INFECTION: Status: RESOLVED | Noted: 2020-07-10 | Resolved: 2020-11-17

## 2020-11-17 PROBLEM — N30.90 CYSTITIS WITHOUT HEMATURIA: Status: RESOLVED | Noted: 2020-09-10 | Resolved: 2020-11-17

## 2020-11-17 PROBLEM — Z71.89 ADVANCED CARE PLANNING/COUNSELING DISCUSSION: Status: RESOLVED | Noted: 2020-07-23 | Resolved: 2020-11-17

## 2020-11-17 PROCEDURE — 700102 HCHG RX REV CODE 250 W/ 637 OVERRIDE(OP): Performed by: HOSPITALIST

## 2020-11-17 PROCEDURE — 700102 HCHG RX REV CODE 250 W/ 637 OVERRIDE(OP): Performed by: NURSE PRACTITIONER

## 2020-11-17 PROCEDURE — A9270 NON-COVERED ITEM OR SERVICE: HCPCS | Performed by: NURSE PRACTITIONER

## 2020-11-17 PROCEDURE — A9270 NON-COVERED ITEM OR SERVICE: HCPCS | Performed by: HOSPITALIST

## 2020-11-17 PROCEDURE — 99231 SBSQ HOSP IP/OBS SF/LOW 25: CPT | Performed by: NURSE PRACTITIONER

## 2020-11-17 PROCEDURE — 770006 HCHG ROOM/CARE - MED/SURG/GYN SEMI*

## 2020-11-17 RX ADMIN — FINASTERIDE 5 MG: 5 TABLET, FILM COATED ORAL at 07:58

## 2020-11-17 RX ADMIN — TAMSULOSIN HYDROCHLORIDE 0.8 MG: 0.4 CAPSULE ORAL at 07:58

## 2020-11-17 RX ADMIN — THERA TABS 1 TABLET: TAB at 07:58

## 2020-11-17 RX ADMIN — AMLODIPINE BESYLATE 5 MG: 5 TABLET ORAL at 07:58

## 2020-11-17 RX ADMIN — QUETIAPINE FUMARATE 25 MG: 25 TABLET ORAL at 16:41

## 2020-11-17 ASSESSMENT — ENCOUNTER SYMPTOMS
CONSTIPATION: 0
DIZZINESS: 0
HEADACHES: 0
DIARRHEA: 0
WEAKNESS: 0
NERVOUS/ANXIOUS: 0
COUGH: 0
SHORTNESS OF BREATH: 0
SPEECH CHANGE: 0
INSOMNIA: 0
SENSORY CHANGE: 0
VOMITING: 0
DEPRESSION: 0
WHEEZING: 0
NAUSEA: 0
FOCAL WEAKNESS: 0
ABDOMINAL PAIN: 0
FEVER: 0

## 2020-11-17 NOTE — PROGRESS NOTES
In a good mood this evening sitting at the edge of the bed talking with the RN and CNA. Cont plan of care, near nurses station in direct view for fall risk    Stat lock applied to méndez cath

## 2020-11-17 NOTE — PROGRESS NOTES
"Hospital Medicine Twice Weekly Progress Note    Date of Service  11/17/2020    Chief Complaint  Cough, fatigue, loss of appetite, nausea/vomiting/diarrhea    Hospital Course  Mr. Bejarano is an 85-year-old male who presented to the emergency department on 7/9/2020 with cough, fatigue/generalized weakness, loss of appetite, and nausea/vomiting/diarrhea.  COVID screening was positive and he was also found to have acute kidney injury and a lactic acid level of 6.1.  He was admitted to the intensive care unit with dehydration and lactic acidosis.  Large-volume crystalloid resuscitation was given.  Chest x-ray was negative for pneumonia but did have changes consistent with COPD.  He quickly stabilized and was transferred out to the floor on 7/10/2020.  He had issues with urinary retention and eventually agreed to have a méndez placed in early August.  Tamsulosin and finasteride were started at that time.  He was also evaluated by psychiatry on 8/3/2020 who deemed him incapacitated to make any medical decisions.  Of additional note a right upper quadrant ultrasound was completed due to periumbilical pain and revealed a possible renal mass.  A CT renal with and without contrast was performed and showed a large renal mass likely representing urothelial versus renal cell carcinoma.  Unfortunately given his low weight and BMI, malnutrition, and neurocognitive disorder, he was deemed a poor candidate for potential chemotherapy or surgical decompression. Dr. Caruso was consulted on 11/6/2020 and recommended outpatient follow-up and continuing the chronic méndez.  He is now pending guardianship and placement.     Interval Problem Update  Feeling good, has \"no problems\". Méndez catheter remains in place. Has ambulated around the unit several times today with the CNA. In good spirits. Denies pain.     Last labs done 11/8/2020 were unremarkable.     Afebrile, HR 70s-80s, SBP teens-130s, O2 sats WNL on room air. "     Consultants/Specialty  Palliative care  Psychiatry  Urology  Pulmonary/critical care    Code Status  DNAR/DNI    Disposition  Pending guardianship & placement.     Review of Systems  Review of Systems   Constitutional: Negative for fever and malaise/fatigue.   Respiratory: Negative for cough, shortness of breath and wheezing.    Cardiovascular: Negative for chest pain and leg swelling.   Gastrointestinal: Negative for abdominal pain, constipation, diarrhea, nausea and vomiting.   Genitourinary:        Rosado catheter in place   Musculoskeletal:        Denies pain   Neurological: Negative for dizziness, sensory change, speech change, focal weakness, weakness and headaches.   Psychiatric/Behavioral: Negative for depression. The patient is not nervous/anxious and does not have insomnia.    All other systems reviewed and are negative.     Physical Exam  Temp:  [36.4 °C (97.5 °F)-37.1 °C (98.7 °F)] 37.1 °C (98.7 °F)  Pulse:  [70-85] 85  Resp:  [16-17] 17  BP: (115-138)/(53-62) 115/56  SpO2:  [90 %-95 %] 90 %    Physical Exam  Vitals signs and nursing note reviewed.   Constitutional:       General: He is awake.      Appearance: He is not ill-appearing (chronically ill-appearing).   HENT:      Head: Normocephalic and atraumatic.      Mouth/Throat:      Lips: Pink.      Mouth: Mucous membranes are moist.   Eyes:      Conjunctiva/sclera: Conjunctivae normal.      Pupils: Pupils are equal, round, and reactive to light.   Neck:      Musculoskeletal: Normal range of motion and neck supple.   Cardiovascular:      Rate and Rhythm: Normal rate and regular rhythm.      Pulses: Normal pulses.      Heart sounds: Normal heart sounds.   Pulmonary:      Effort: Pulmonary effort is normal.      Breath sounds: Examination of the right-upper field reveals decreased breath sounds. Examination of the left-upper field reveals decreased breath sounds. Examination of the right-middle field reveals decreased breath sounds. Examination of the  right-lower field reveals decreased breath sounds. Examination of the left-lower field reveals decreased breath sounds. Decreased breath sounds present.   Abdominal:      General: Bowel sounds are normal. There is no distension or abdominal bruit.      Palpations: Abdomen is soft.      Tenderness: There is no abdominal tenderness.   Genitourinary:     Comments: Rosado catheter in place, draining adequate amounts of clear yellow urine  Musculoskeletal:      Right lower leg: No edema.      Left lower leg: No edema.   Skin:     General: Skin is warm and dry.   Neurological:      General: No focal deficit present.      Mental Status: He is alert.   Psychiatric:         Attention and Perception: Attention and perception normal.         Mood and Affect: Mood and affect normal.         Speech: Speech normal.         Behavior: Behavior normal. Behavior is cooperative.         Thought Content: Thought content normal.         Cognition and Memory: Cognition normal.         Judgment: Judgment normal.     Fluids    Intake/Output Summary (Last 24 hours) at 11/17/2020 1640  Last data filed at 11/17/2020 1600  Gross per 24 hour   Intake 1200 ml   Output 2900 ml   Net -1700 ml     Laboratory    Imaging  CT-RENAL WITH & W/O   Final Result      1.  Large complex right renal mass as detailed above measuring approximately 7.8 x 7.1x  8.5 cm. Primary differential considerations include urothelial malignancy or renal cell carcinoma.   2.  No evidence of adenopathy or metastatic disease is seen.   3.  Distended urinary bladder. Right greater left hydroureteronephrosis.   4.  Severe atherosclerosis. 3.5 cm infrarenal abdominal aortic aneurysm.            US-RUQ   Final Result         1.  Echogenic liver compatible with fatty change versus fibrosis.   2.  Masslike structure in the right kidney, should be considered neoplastic unless proven otherwise, recommend follow-up 3 phase CT of the kidneys for further characterization   3.  Mild  dilatation of the common bile duct, within expected limits given patient age.      These findings were discussed with the patient's clinician, Vin Mei, on 7/23/2020 7:34 AM.      DX-CHEST-PORTABLE (1 VIEW)   Final Result      1.  The lungs are hyperinflated suggesting emphysema/COPD.   2.  There is minimal predominantly lower lobe interstitial opacity which is most likely due to chronic scarring.         Assessment/Plan  Neurocognitive disorder, unspecified- (present on admission)  Assessment & Plan  -Psychiatry was consulted, patient incapacitated to make any medical decisions.  -Guardianship established? Needs placement.     Urinary retention- (present on admission)  Assessment & Plan  -Continue flomax and proscar.   -Méndez catheter in place for urinary retention.  Failed previous attempts at removal.  -Follow up outpatient.  Will likely have to discharge with méndez.    Right kidney mass- (present on admission)  Assessment & Plan  -Suspicion for urothelial carcinoma vs renal cell carcinoma.  -Patient failed voiding trial despite flomax/proscar.  -Poor candidate for chemo given neurocognitive disorder, lacking capacity, and requirement for guardianship.  -Urologist, Dr. Caruso, was consulted on 11/6 regarding renal mass. Recommended outpatient follow-up and continue chronic méndez. Please see consult note for further detail.    Type 2 diabetes mellitus (HCC)- (present on admission)  Assessment & Plan  -Hemoglobin A1c 6.8%.   -Continue diabetic diet.  -BS has been well controlled on lab work.  -Accu-Cheks only as needed for signs and symptoms of hypoglycemia or during acute illness.  -No need for insulin at this time.     COPD (chronic obstructive pulmonary disease) (HCC)- (present on admission)  Assessment & Plan  -Stable on room air.  -Not in acute exacerbation.    Discharge planning issues  Assessment & Plan  -SW following  -Pending guardianship?  Will need placement.    Abdominal aortic aneurysm (AAA) 3.0  cm to 5.5 cm in diameter in male (HCC)  Assessment & Plan  -Infrarenal.  -Noted as incidental finding on CT renal.  -Not a candidate for repair given neurocognitive disorder, lacking capacity.  -Continue good blood pressure control.    Protein-calorie malnutrition (HCC)- (present on admission)  Assessment & Plan  -Body mass index is 20.79 kg/m²., significantly improved from 17 on admit.  -Encourage PO intake.  -Food preferences.  -Continue TID supplements.    HTN (hypertension)- (present on admission)  Assessment & Plan  -Continue norvasc.  -Well-managed on current regimen.     VTE prophylaxis: Ambulation      Mary Cole, MSN, RN, APRN, ACNPC-AG, CCRN  Nurse Practitioner, [facility]  [phone]    11/17/2020    4:40 PM

## 2020-11-17 NOTE — PROGRESS NOTES
Pt is sitting up in bed, no signs of labored breathing or pain. Pt on RA. Pt walked two laps around the unit with this RN and is now back in room sitting at edge of bed. Call light & personal belongings within reach, bed in lowest position and locked. Pt declines any additional needs at this time.

## 2020-11-18 PROCEDURE — 700102 HCHG RX REV CODE 250 W/ 637 OVERRIDE(OP): Performed by: NURSE PRACTITIONER

## 2020-11-18 PROCEDURE — 700102 HCHG RX REV CODE 250 W/ 637 OVERRIDE(OP): Performed by: HOSPITALIST

## 2020-11-18 PROCEDURE — A9270 NON-COVERED ITEM OR SERVICE: HCPCS | Performed by: HOSPITALIST

## 2020-11-18 PROCEDURE — 770006 HCHG ROOM/CARE - MED/SURG/GYN SEMI*

## 2020-11-18 PROCEDURE — A9270 NON-COVERED ITEM OR SERVICE: HCPCS | Performed by: NURSE PRACTITIONER

## 2020-11-18 RX ADMIN — AMLODIPINE BESYLATE 5 MG: 5 TABLET ORAL at 07:59

## 2020-11-18 RX ADMIN — TAMSULOSIN HYDROCHLORIDE 0.8 MG: 0.4 CAPSULE ORAL at 07:59

## 2020-11-18 RX ADMIN — QUETIAPINE FUMARATE 25 MG: 25 TABLET ORAL at 17:04

## 2020-11-18 RX ADMIN — THERA TABS 1 TABLET: TAB at 07:59

## 2020-11-18 RX ADMIN — FINASTERIDE 5 MG: 5 TABLET, FILM COATED ORAL at 07:58

## 2020-11-18 ASSESSMENT — PAIN DESCRIPTION - PAIN TYPE: TYPE: ACUTE PAIN

## 2020-11-18 NOTE — HOSPITAL COURSE
Mr. Bejarano is an 85-year-old male who presented to the emergency department on 7/9/2020 with cough, fatigue/generalized weakness, loss of appetite, and nausea/vomiting/diarrhea.  COVID screening was positive and he was also found to have acute kidney injury and a lactic acid level of 6.1.  He was admitted to the intensive care unit with dehydration and lactic acidosis.  Large-volume crystalloid resuscitation was given.  Chest x-ray was negative for pneumonia but did have changes consistent with COPD.  He quickly stabilized and was transferred out to the floor on 7/10/2020.  He had issues with urinary retention and eventually agreed to have a méndez placed in early August.  Tamsulosin and finasteride were started at that time.  He was also evaluated by psychiatry on 8/3/2020 who deemed him incapacitated to make any medical decisions.  Of additional note a right upper quadrant ultrasound was completed due to periumbilical pain and revealed a possible renal mass.  A CT renal with and without contrast was performed and showed a large renal mass likely representing urothelial versus renal cell carcinoma.  Unfortunately given his low weight and BMI, malnutrition, and neurocognitive disorder, he was deemed a poor candidate for potential chemotherapy or surgical decompression. Dr. Caruso was consulted on 11/6/2020 and recommended outpatient follow-up and continuing the chronic méndez.  He is now pending guardianship and placement.

## 2020-11-18 NOTE — PROGRESS NOTES
Sitting at the edge of the bed having a snack, in good spirits tonight. Bed alarm in place, call light within reach

## 2020-11-18 NOTE — PROGRESS NOTES
Pt is is resting in bed, no signs of labored breathing or pain. Pt on RA. Call light & personal belongings within reach, bed in lowest position and locked. Pt declines any additional needs at this time.

## 2020-11-19 PROCEDURE — 700102 HCHG RX REV CODE 250 W/ 637 OVERRIDE(OP): Performed by: HOSPITALIST

## 2020-11-19 PROCEDURE — 700102 HCHG RX REV CODE 250 W/ 637 OVERRIDE(OP): Performed by: NURSE PRACTITIONER

## 2020-11-19 PROCEDURE — A9270 NON-COVERED ITEM OR SERVICE: HCPCS | Performed by: HOSPITALIST

## 2020-11-19 PROCEDURE — A9270 NON-COVERED ITEM OR SERVICE: HCPCS | Performed by: NURSE PRACTITIONER

## 2020-11-19 PROCEDURE — 770006 HCHG ROOM/CARE - MED/SURG/GYN SEMI*

## 2020-11-19 RX ADMIN — THERA TABS 1 TABLET: TAB at 08:10

## 2020-11-19 RX ADMIN — FINASTERIDE 5 MG: 5 TABLET, FILM COATED ORAL at 08:10

## 2020-11-19 RX ADMIN — QUETIAPINE FUMARATE 25 MG: 25 TABLET ORAL at 17:09

## 2020-11-19 RX ADMIN — TAMSULOSIN HYDROCHLORIDE 0.8 MG: 0.4 CAPSULE ORAL at 08:10

## 2020-11-19 RX ADMIN — AMLODIPINE BESYLATE 5 MG: 5 TABLET ORAL at 08:10

## 2020-11-19 NOTE — PROGRESS NOTES
Pt is sitting up at edge of bed, no signs of labored breathing or pain. Pt on RA. Call light & personal belongings within reach, bed in lowest position and locked. Pt declines any additional needs at this time.

## 2020-11-20 PROCEDURE — 99231 SBSQ HOSP IP/OBS SF/LOW 25: CPT | Performed by: NURSE PRACTITIONER

## 2020-11-20 PROCEDURE — A9270 NON-COVERED ITEM OR SERVICE: HCPCS | Performed by: HOSPITALIST

## 2020-11-20 PROCEDURE — 700102 HCHG RX REV CODE 250 W/ 637 OVERRIDE(OP): Performed by: HOSPITALIST

## 2020-11-20 PROCEDURE — 700102 HCHG RX REV CODE 250 W/ 637 OVERRIDE(OP): Performed by: NURSE PRACTITIONER

## 2020-11-20 PROCEDURE — 770006 HCHG ROOM/CARE - MED/SURG/GYN SEMI*

## 2020-11-20 PROCEDURE — A9270 NON-COVERED ITEM OR SERVICE: HCPCS | Performed by: NURSE PRACTITIONER

## 2020-11-20 RX ADMIN — THERA TABS 1 TABLET: TAB at 08:22

## 2020-11-20 RX ADMIN — FINASTERIDE 5 MG: 5 TABLET, FILM COATED ORAL at 08:22

## 2020-11-20 RX ADMIN — QUETIAPINE FUMARATE 25 MG: 25 TABLET ORAL at 16:48

## 2020-11-20 RX ADMIN — AMLODIPINE BESYLATE 5 MG: 5 TABLET ORAL at 08:22

## 2020-11-20 RX ADMIN — TAMSULOSIN HYDROCHLORIDE 0.8 MG: 0.4 CAPSULE ORAL at 08:22

## 2020-11-20 ASSESSMENT — ENCOUNTER SYMPTOMS
WHEEZING: 0
FOCAL WEAKNESS: 0
COUGH: 0
WEAKNESS: 0
VOMITING: 0
DEPRESSION: 0
NAUSEA: 0
SHORTNESS OF BREATH: 0
NERVOUS/ANXIOUS: 0
FEVER: 0
HEADACHES: 0
DIZZINESS: 0
ABDOMINAL PAIN: 0
INSOMNIA: 0

## 2020-11-20 NOTE — PROGRESS NOTES
Assumed care of patient at 0700. Patient is alert and oriented, able to make his needs known. VSS and on room air. Denied any complaints of pain this shift. Rosado catheter in place, draining well. Up with assist of one with cane in room and to bathroom and ambulated in hallways. Fall prevention tactics in place including bed locked and in lowest position, non-skid footwear in use and bed alarm on for safety. Call light is within reach.     Arpita Tomas R.N.

## 2020-11-20 NOTE — PROGRESS NOTES
Pt AOx4, able to make needs known. No c/o pain at this time. Not in acute distress. On RA, VSS. Provided needs. Bed locked and placed in lowest position. Treaded socks on. Room beside nurse's station. Call lights w/in reach. Hourly rounds in place.

## 2020-11-20 NOTE — PROGRESS NOTES
"Hospital Medicine Twice Weekly Progress Note    Date of Service  11/20/2020    Chief Complaint  Cough, fatigue, loss of appetite, nausea/vomiting/diarrhea    Hospital Course  Mr. Bejarano is an 85-year-old male who presented to the emergency department on 7/9/2020 with cough, fatigue/generalized weakness, loss of appetite, and nausea/vomiting/diarrhea.  COVID screening was positive and he was also found to have acute kidney injury and a lactic acid level of 6.1.  He was admitted to the intensive care unit with dehydration and lactic acidosis.  Large-volume crystalloid resuscitation was given.  Chest x-ray was negative for pneumonia but did have changes consistent with COPD.  He quickly stabilized and was transferred out to the floor on 7/10/2020.  He had issues with urinary retention and eventually agreed to have a méndez placed in early August.  Tamsulosin and finasteride were started at that time.  He was also evaluated by psychiatry on 8/3/2020 who deemed him incapacitated to make any medical decisions.  Of additional note a right upper quadrant ultrasound was completed due to periumbilical pain and revealed a possible renal mass.  A CT renal with and without contrast was performed and showed a large renal mass likely representing urothelial versus renal cell carcinoma.  Unfortunately given his low weight and BMI, malnutrition, and neurocognitive disorder, he was deemed a poor candidate for potential chemotherapy or surgical decompression. Dr. Caruso was consulted on 11/6/2020 and recommended outpatient follow-up and continuing the chronic méndez.  He is now pending guardianship and placement.     Interval Problem Update  Feeling good, has \"no problems\". Méndez catheter remains in place.  In good spirits. Denies pain. No complaints of pain.    Last labs done 11/8/2020 were unremarkable.     Afebrile, HR 0s-90s, -150s, O2 sats WNL on room air.     Consultants/Specialty  Palliative " care  Psychiatry  Urology  Pulmonary/critical care    Code Status  DNAR/DNI    Disposition  Guardianship established. Pending GH placement.     Review of Systems  Review of Systems   Constitutional: Negative for fever and malaise/fatigue.   Respiratory: Negative for cough, shortness of breath and wheezing.    Cardiovascular: Negative for chest pain and leg swelling.   Gastrointestinal: Negative for abdominal pain, nausea and vomiting.   Genitourinary:        Rosado catheter in place   Musculoskeletal:        Denies pain   Neurological: Negative for dizziness, focal weakness, weakness and headaches.   Psychiatric/Behavioral: Negative for depression. The patient is not nervous/anxious and does not have insomnia.    All other systems reviewed and are negative.     Physical Exam  Temp:  [36.4 °C (97.5 °F)-36.5 °C (97.7 °F)] 36.4 °C (97.5 °F)  Pulse:  [71-82] 75  Resp:  [16-18] 18  BP: (124-129)/(55-58) 129/58  SpO2:  [92 %-95 %] 95 %    Physical Exam  Vitals signs and nursing note reviewed.   Constitutional:       General: He is awake.      Appearance: He is not ill-appearing (chronically ill-appearing).   HENT:      Head: Normocephalic and atraumatic.      Mouth/Throat:      Lips: Pink.      Mouth: Mucous membranes are moist.   Eyes:      Conjunctiva/sclera: Conjunctivae normal.      Pupils: Pupils are equal, round, and reactive to light.   Neck:      Musculoskeletal: Normal range of motion and neck supple.   Cardiovascular:      Rate and Rhythm: Normal rate and regular rhythm.      Pulses: Normal pulses.      Heart sounds: Normal heart sounds.   Pulmonary:      Effort: Pulmonary effort is normal.   Abdominal:      General: Bowel sounds are normal. There is no abdominal bruit.      Palpations: Abdomen is soft.   Genitourinary:     Comments: Rosado catheter in place, draining adequate amounts of clear yellow urine  Musculoskeletal:      Right lower leg: No edema.      Left lower leg: No edema.   Skin:     General: Skin is  warm and dry.   Neurological:      General: No focal deficit present.      Mental Status: He is alert.   Psychiatric:         Attention and Perception: Attention and perception normal.         Mood and Affect: Mood and affect normal.         Speech: Speech normal.         Behavior: Behavior normal. Behavior is cooperative.         Thought Content: Thought content normal.         Cognition and Memory: Cognition normal.         Judgment: Judgment normal.     Fluids    Intake/Output Summary (Last 24 hours) at 11/20/2020 1435  Last data filed at 11/20/2020 1200  Gross per 24 hour   Intake 1040 ml   Output 1800 ml   Net -760 ml     Laboratory    Imaging  CT-RENAL WITH & W/O   Final Result      1.  Large complex right renal mass as detailed above measuring approximately 7.8 x 7.1x  8.5 cm. Primary differential considerations include urothelial malignancy or renal cell carcinoma.   2.  No evidence of adenopathy or metastatic disease is seen.   3.  Distended urinary bladder. Right greater left hydroureteronephrosis.   4.  Severe atherosclerosis. 3.5 cm infrarenal abdominal aortic aneurysm.            US-RUQ   Final Result         1.  Echogenic liver compatible with fatty change versus fibrosis.   2.  Masslike structure in the right kidney, should be considered neoplastic unless proven otherwise, recommend follow-up 3 phase CT of the kidneys for further characterization   3.  Mild dilatation of the common bile duct, within expected limits given patient age.      These findings were discussed with the patient's clinician, Vni Mei, on 7/23/2020 7:34 AM.      DX-CHEST-PORTABLE (1 VIEW)   Final Result      1.  The lungs are hyperinflated suggesting emphysema/COPD.   2.  There is minimal predominantly lower lobe interstitial opacity which is most likely due to chronic scarring.         Assessment/Plan  Neurocognitive disorder, unspecified- (present on admission)  Assessment & Plan  -Psychiatry was consulted, patient  incapacitated to make any medical decisions.  -Guardianship established. Needs placement to .     Urinary retention- (present on admission)  Assessment & Plan  -Continue flomax and proscar.   -Méndez catheter in place for urinary retention.  Failed previous attempts at removal.  -Follow up outpatient.  Will likely have to discharge with méndez.    Right kidney mass- (present on admission)  Assessment & Plan  -Suspicion for urothelial carcinoma vs renal cell carcinoma.  -Patient failed voiding trial despite flomax/proscar.  -Poor candidate for chemo given neurocognitive disorder, lacking capacity, and requirement for guardianship.  -Urologist, Dr. Caruso, was consulted on 11/6 regarding renal mass. Recommended outpatient follow-up and continue chronic méndez. Please see consult note for further detail.    Type 2 diabetes mellitus (HCC)- (present on admission)  Assessment & Plan  -Hemoglobin A1c 6.8%.   -Continue diabetic diet.  -BS has been well controlled on lab work.  -Accu-Cheks only as needed for signs and symptoms of hypoglycemia or during acute illness.  -No need for insulin at this time.     COPD (chronic obstructive pulmonary disease) ([facility])- (present on admission)  Assessment & Plan  -Stable on room air.  -Not in acute exacerbation.    Discharge planning issues  Assessment & Plan  -SW following  -Guardianship granted.  Will need placement.    Abdominal aortic aneurysm (AAA) 3.0 cm to 5.5 cm in diameter in male ([facility])- (present on admission)  Assessment & Plan  -Infrarenal.  -Noted as incidental finding on CT renal.  -Not a candidate for repair given neurocognitive disorder & lacking capacity.  -Continue good blood pressure control.    Protein-calorie malnutrition (HCC)- (present on admission)  Assessment & Plan  -Body mass index is 20.79 kg/m². Significantly improved from 17 on admit.  -Encourage PO intake.  -Food preferences.  -Continue TID supplements.    HTN (hypertension)- (present on admission)  Assessment  & Plan  -Continue norvasc.  -Well-managed on current regimen.     VTE prophylaxis: Ambulation        Mary Cole, MSN, RN, APRN, ACNPC-AG, CCRN  Nurse Practitioner, [facility]  [phone]    11/20/2020    2:35 PM

## 2020-11-21 PROCEDURE — 770006 HCHG ROOM/CARE - MED/SURG/GYN SEMI*

## 2020-11-21 PROCEDURE — 700102 HCHG RX REV CODE 250 W/ 637 OVERRIDE(OP): Performed by: HOSPITALIST

## 2020-11-21 PROCEDURE — 700102 HCHG RX REV CODE 250 W/ 637 OVERRIDE(OP): Performed by: NURSE PRACTITIONER

## 2020-11-21 PROCEDURE — A9270 NON-COVERED ITEM OR SERVICE: HCPCS | Performed by: HOSPITALIST

## 2020-11-21 PROCEDURE — A9270 NON-COVERED ITEM OR SERVICE: HCPCS | Performed by: NURSE PRACTITIONER

## 2020-11-21 RX ADMIN — QUETIAPINE FUMARATE 25 MG: 25 TABLET ORAL at 17:08

## 2020-11-21 RX ADMIN — FINASTERIDE 5 MG: 5 TABLET, FILM COATED ORAL at 08:36

## 2020-11-21 RX ADMIN — AMLODIPINE BESYLATE 5 MG: 5 TABLET ORAL at 08:36

## 2020-11-21 RX ADMIN — TAMSULOSIN HYDROCHLORIDE 0.8 MG: 0.4 CAPSULE ORAL at 08:36

## 2020-11-21 RX ADMIN — THERA TABS 1 TABLET: TAB at 08:36

## 2020-11-21 NOTE — PROGRESS NOTES
Received bedside report from night shift RN. Assumed care of patient at change of shift. Assessment complete and POC discussed. Patient denies pain, no apparent signs of distress or discomfort. Patient is sitting up in bed for breakfast. Patient has history of falls. All fall precautions are in place. Bed is in lowest/locked position. Call light and belongings are within reach. No further needs at this time.

## 2020-11-21 NOTE — PROGRESS NOTES
Pt AOx4, able to make needs known. No c/o pain at this time. Not in acute distress. On RA, VSS. Educated about fall risks, verbalized understanding. Provided needs. Fall and safety precautions in place. Bed locked and placed in lowest position. Treaded socks on. Room beside nurse's station. Call lights w/in reach. Hourly rounds in place

## 2020-11-21 NOTE — PROGRESS NOTES
2 RN skin check completed with EZ Hall.   Devices in place: Méndez catheter.  Skin assessed under devices: Yes.  Confirmed pressure ulcers found: None.  New potential pressure ulcers noted: None.  Wound consult placed:  N/A.      Skin assessment:   Elbows: pink and blanching  Bilateral heels: dry, flaky, pink and blanching  BLE/BUE: intact, pink, blanching  Sacrum/groin: intact  Méndez site: clean, dry, intact      The following interventions in place: 2 RN skin check, méndez care, pillows in place for support/positioning, patient uses cane to ambulate during the day with SBA, patient turns and repositions self, waffle mattress overlay in place.

## 2020-11-21 NOTE — CARE PLAN
Problem: Safety  Goal: Will remain free from injury  Outcome: PROGRESSING AS EXPECTED  Goal: Will remain free from falls  Outcome: PROGRESSING AS EXPECTED  Note: Pt has history of falls and is a high fall risk. Fall precautions in place. Bed in lowest position. Non-skid socks in place. Personal possessions within reach. Mobility sign on door. Bed-alarm on. Call light within reach. Pt educated regarding fall prevention and states understanding.       Problem: Skin Integrity  Goal: Risk for impaired skin integrity will decrease  Outcome: PROGRESSING AS EXPECTED  Note: Pt is a 2RN skin check. Pt turned and positioned every two hours. Incontinence care provided and barrier paste applied as needed.

## 2020-11-21 NOTE — PROGRESS NOTES
2 RN skin check complete with EZ Kulkarni   Devices in place: méndez  Skin assessed under devices: yes.  Confirmed pressure ulcers found on: na.  New potential pressure ulcers noted on na.   Wound consult placed N/A.     Interventions in place: 2 rn skin check, méndez care, pillows for support and positioning, pt ambulates during the day w/ cane via SBA, patient can turn and reposition independently, waffle mattress overlay.     Skin Assessment:  Bilateral heels: dry, flaky, pink and blanching  BLE, BUE, sacrum, groin and trunk area: intact, pink and blanching  Méndez site: clean, dry and intact

## 2020-11-22 PROCEDURE — 700102 HCHG RX REV CODE 250 W/ 637 OVERRIDE(OP): Performed by: HOSPITALIST

## 2020-11-22 PROCEDURE — A9270 NON-COVERED ITEM OR SERVICE: HCPCS | Performed by: HOSPITALIST

## 2020-11-22 PROCEDURE — 770006 HCHG ROOM/CARE - MED/SURG/GYN SEMI*

## 2020-11-22 PROCEDURE — 700102 HCHG RX REV CODE 250 W/ 637 OVERRIDE(OP): Performed by: NURSE PRACTITIONER

## 2020-11-22 PROCEDURE — A9270 NON-COVERED ITEM OR SERVICE: HCPCS | Performed by: NURSE PRACTITIONER

## 2020-11-22 RX ADMIN — AMLODIPINE BESYLATE 5 MG: 5 TABLET ORAL at 08:05

## 2020-11-22 RX ADMIN — TAMSULOSIN HYDROCHLORIDE 0.8 MG: 0.4 CAPSULE ORAL at 08:05

## 2020-11-22 RX ADMIN — QUETIAPINE FUMARATE 25 MG: 25 TABLET ORAL at 16:24

## 2020-11-22 RX ADMIN — THERA TABS 1 TABLET: TAB at 08:05

## 2020-11-22 RX ADMIN — FINASTERIDE 5 MG: 5 TABLET, FILM COATED ORAL at 08:05

## 2020-11-22 ASSESSMENT — PAIN DESCRIPTION - PAIN TYPE: TYPE: ACUTE PAIN

## 2020-11-22 NOTE — PROGRESS NOTES
Assumed care given at shift changed,room air withiot distress,v/s stable,blind ,ambulated to the bathroom with 1 person assist,call light and personal belongings within reach,bed in low position and bed alarm on,patient has multiple falls from the past.

## 2020-11-22 NOTE — PROGRESS NOTES
2 RN skin check complete with EZ Hollis   Devices in place: méndez  Skin assessed under devices: yes.  Confirmed pressure ulcers found on: na.  New potential pressure ulcers noted on na.   Wound consult placed N/A.     Interventions in place: 2 rn skin check, méndez care, pillows for support and positioning, pt ambulates with SBA, patient can turn and reposition independently, waffle mattress overlay.     Bilateral  Upper and lower extremities pink and intact  Sacrum,groin and trunk intact,pink and blanching  Bilateral heels dry,flaky,pink and blanching  Méndez site clean,dry and intact.

## 2020-11-22 NOTE — PROGRESS NOTES
2 RN skin check completed with EZ Zheng.   Devices in place: Méndez catheter.  Skin assessed under devices: Yes.  Confirmed pressure ulcers found: None.  New potential pressure ulcers noted: None.  Wound consult placed:  N/A.        Skin assessment:   Elbows: pink and blanching  Bilateral heels: dry, flaky, pink and blanching  BLE/BUE: intact, pink, blanching  Sacrum/groin: intact, small scar/discoloration on scrotum - pt complained of itchiness, zinc cream applied  Méndez site: clean, dry, intact        The following interventions in place: 2 RN skin check, méndez care, pillows in place for support/positioning, patient uses cane to ambulate during the day with SBA, patient turns and repositions self, waffle mattress overlay in place.

## 2020-11-22 NOTE — CARE PLAN
Problem: Safety  Goal: Will remain free from injury  Outcome: PROGRESSING AS EXPECTED  Goal: Will remain free from falls  Outcome: PROGRESSING AS EXPECTED  Note: Fall precautions in place. Bed in lowest position. Non-skid socks in place. Personal possessions within reach. Mobility sign on door. Bed-alarm on. Call light within reach. Pt educated regarding fall prevention and states understanding.       Problem: Skin Integrity  Goal: Risk for impaired skin integrity will decrease  Outcome: PROGRESSING AS EXPECTED

## 2020-11-22 NOTE — PROGRESS NOTES
Received bedside report from night shift RN. Patient is legally blind, ambulates to bathroom with 1 person assist and uses a cane. Patient is a high fall risk with history of multiple falls. Safety precautions in place. Call light and belongings are within reach. Bed is in lowest/locked position. No further needs at this time.

## 2020-11-23 PROCEDURE — 770006 HCHG ROOM/CARE - MED/SURG/GYN SEMI*

## 2020-11-23 PROCEDURE — 700102 HCHG RX REV CODE 250 W/ 637 OVERRIDE(OP): Performed by: HOSPITALIST

## 2020-11-23 PROCEDURE — A9270 NON-COVERED ITEM OR SERVICE: HCPCS | Performed by: HOSPITALIST

## 2020-11-23 PROCEDURE — A9270 NON-COVERED ITEM OR SERVICE: HCPCS | Performed by: NURSE PRACTITIONER

## 2020-11-23 PROCEDURE — 700102 HCHG RX REV CODE 250 W/ 637 OVERRIDE(OP): Performed by: NURSE PRACTITIONER

## 2020-11-23 RX ADMIN — QUETIAPINE FUMARATE 25 MG: 25 TABLET ORAL at 16:48

## 2020-11-23 RX ADMIN — FINASTERIDE 5 MG: 5 TABLET, FILM COATED ORAL at 08:15

## 2020-11-23 RX ADMIN — ERGOCALCIFEROL 50000 UNITS: 1.25 CAPSULE ORAL at 14:59

## 2020-11-23 RX ADMIN — THERA TABS 1 TABLET: TAB at 08:15

## 2020-11-23 RX ADMIN — AMLODIPINE BESYLATE 5 MG: 5 TABLET ORAL at 08:15

## 2020-11-23 RX ADMIN — TAMSULOSIN HYDROCHLORIDE 0.8 MG: 0.4 CAPSULE ORAL at 08:15

## 2020-11-23 ASSESSMENT — PAIN DESCRIPTION - PAIN TYPE
TYPE: ACUTE PAIN
TYPE: ACUTE PAIN

## 2020-11-23 NOTE — PROGRESS NOTES
2 RN skin check completed with EZ Guzmán.   Devices in place: Méndez catheter.  Skin assessed under devices: Yes.  Confirmed pressure ulcers found: None.  New potential pressure ulcers noted: None.  Wound consult placed:  N/A.        Skin assessment:   Elbows: pink and blanching  Bilateral heels: dry, flaky, pink and blanching  BLE: intact, dark discoloration to shin and ankle area  BUE: intact, pink, blanching  Sacrum/groin: intact, small scar/discoloration on scrotum   Méndez site: clean, dry, intact        The following interventions in place: 2 RN skin check, zinc paste for scrotal itchiness, méndez care, pillows in place for support/positioning, patient turns and repositions self, waffle mattress overlay in place.

## 2020-11-23 NOTE — PROGRESS NOTES
Pt sitting up in bed, denied any pain.  Pt on RA with no s/s of labored breathing. Pt cooperative and pleasant this evening.  Pt denied any further needs.  Call light within reach, bed alarm on, pt in room close to nursing station, frequent rounding in place.

## 2020-11-23 NOTE — CARE PLAN
Problem: Safety  Goal: Will remain free from injury  Outcome: PROGRESSING AS EXPECTED  Goal: Will remain free from falls  Outcome: PROGRESSING AS EXPECTED  Note: Fall precautions in place. Bed in lowest position. Non-skid socks in place. Personal possessions within reach. Mobility sign on door. Bed-alarm on. Call light within reach. Pt educated regarding fall prevention and states understanding.       Problem: Skin Integrity  Goal: Risk for impaired skin integrity will decrease  Outcome: PROGRESSING AS EXPECTED  Note: Pt turned and positioned every two hours. Incontinence care provided and barrier paste applied as needed.

## 2020-11-24 PROCEDURE — 99231 SBSQ HOSP IP/OBS SF/LOW 25: CPT | Performed by: NURSE PRACTITIONER

## 2020-11-24 PROCEDURE — 700102 HCHG RX REV CODE 250 W/ 637 OVERRIDE(OP): Performed by: HOSPITALIST

## 2020-11-24 PROCEDURE — A9270 NON-COVERED ITEM OR SERVICE: HCPCS | Performed by: HOSPITALIST

## 2020-11-24 PROCEDURE — A9270 NON-COVERED ITEM OR SERVICE: HCPCS | Performed by: NURSE PRACTITIONER

## 2020-11-24 PROCEDURE — 700102 HCHG RX REV CODE 250 W/ 637 OVERRIDE(OP): Performed by: NURSE PRACTITIONER

## 2020-11-24 PROCEDURE — 11719 TRIM NAIL(S) ANY NUMBER: CPT

## 2020-11-24 PROCEDURE — 770006 HCHG ROOM/CARE - MED/SURG/GYN SEMI*

## 2020-11-24 RX ADMIN — THERA TABS 1 TABLET: TAB at 08:17

## 2020-11-24 RX ADMIN — TAMSULOSIN HYDROCHLORIDE 0.8 MG: 0.4 CAPSULE ORAL at 08:17

## 2020-11-24 RX ADMIN — FINASTERIDE 5 MG: 5 TABLET, FILM COATED ORAL at 08:17

## 2020-11-24 RX ADMIN — QUETIAPINE FUMARATE 25 MG: 25 TABLET ORAL at 17:18

## 2020-11-24 RX ADMIN — AMLODIPINE BESYLATE 5 MG: 5 TABLET ORAL at 08:17

## 2020-11-24 ASSESSMENT — ENCOUNTER SYMPTOMS
SHORTNESS OF BREATH: 1
FEVER: 0
EYES NEGATIVE: 1
COUGH: 1
PSYCHIATRIC NEGATIVE: 1
NAUSEA: 1
MUSCULOSKELETAL NEGATIVE: 1
DIARRHEA: 1
VOMITING: 1
CARDIOVASCULAR NEGATIVE: 1
CHILLS: 0
NEUROLOGICAL NEGATIVE: 1

## 2020-11-24 NOTE — PROGRESS NOTES
[facility] Medicine Twice Weekly Progress Note    Date of Service  11/24/2020    Chief Complaint  85 y.o. male admitted 7/9/2020 with fatigue, N/V/D    Hospital Course  Mr. Bejarano is an 85-year-old male who presented to the emergency department on 7/9/2020 with cough, fatigue/generalized weakness, loss of appetite, and nausea/vomiting/diarrhea.  COVID screening was positive and he was also found to have acute kidney injury and a lactic acid level of 6.1.  He was admitted to the intensive care unit with dehydration and lactic acidosis.  Large-volume crystalloid resuscitation was given.  Chest x-ray was negative for pneumonia but did have changes consistent with COPD.  He quickly stabilized and was transferred out to the floor on 7/10/2020.  He had issues with urinary retention and eventually agreed to have a méndez placed in early August.  Tamsulosin and finasteride were started at that time.  He was also evaluated by psychiatry on 8/3/2020 who deemed him incapacitated to make any medical decisions.  Of additional note a right upper quadrant ultrasound was completed due to periumbilical pain and revealed a possible renal mass.  A CT renal with and without contrast was performed and showed a large renal mass likely representing urothelial versus renal cell carcinoma.  Unfortunately given his low weight and BMI, malnutrition, and neurocognitive disorder, he was deemed a poor candidate for potential chemotherapy or surgical decompression. Dr. Caruso was consulted on 11/6/2020 and recommended outpatient follow-up and continuing the chronic méndez.  He is now pending guardianship and placement.       Interval Problem Update  -Patient seen and examined.  Patient denies any pain or discomfort at this time.  Patient has Méndez in place.  Patient reports Méndez draining well.  Patient updated in plan of care.  -POC: Continue supportive care; monitor Méndez output; pending guardianship and placement  -Lab work: Reviewed; last obtained  on 11/8/2020; unremarkable  -VSS at this time  -We will order labs as warranted    ==================================================================================================ZEKE BOBBY, MSN, APRN, FNP-C, certify that the patient requires continued medically necessary hospital services for the treatment of COPD and needs long-term placement. The patient will remain in the hospital for the foreseeable future.  Discharge may or may not occur in the next 20 days due to ongoing discharge delays due to no medically acceptable discharge option.    ==================================================================================================    Consultants/Specialty  NONE    Code Status  DNAR/DNI    Disposition  TBD    Review of Systems  Review of Systems   Constitutional: Positive for malaise/fatigue. Negative for chills and fever.   HENT: Negative.    Eyes: Negative.    Respiratory: Positive for cough and shortness of breath.    Cardiovascular: Negative.    Gastrointestinal: Positive for diarrhea, nausea and vomiting.   Genitourinary: Negative.    Musculoskeletal: Negative.    Skin: Negative.    Neurological: Negative.    Endo/Heme/Allergies: Negative.    Psychiatric/Behavioral: Negative.         Physical Exam  Temp:  [36.1 °C (96.9 °F)-36.4 °C (97.6 °F)] 36.4 °C (97.6 °F)  Pulse:  [64-76] 64  Resp:  [17-20] 17  BP: (109-134)/(53-73) 113/57  SpO2:  [91 %-96 %] 91 %    Physical Exam  Vitals signs and nursing note reviewed.   HENT:      Nose: Nose normal.      Mouth/Throat:      Mouth: Mucous membranes are moist.   Eyes:      Pupils: Pupils are equal, round, and reactive to light.   Neck:      Musculoskeletal: Normal range of motion.   Cardiovascular:      Rate and Rhythm: Normal rate and regular rhythm.      Pulses: Normal pulses.      Heart sounds: Normal heart sounds.   Pulmonary:      Effort: Pulmonary effort is normal.      Breath sounds: Normal breath sounds.   Abdominal:      General: Bowel  sounds are normal.   Musculoskeletal: Normal range of motion.   Skin:     General: Skin is warm and dry.      Capillary Refill: Capillary refill takes 2 to 3 seconds.   Neurological:      Mental Status: He is alert. Mental status is at baseline.         Fluids    Intake/Output Summary (Last 24 hours) at 11/24/2020 1409  Last data filed at 11/24/2020 0800  Gross per 24 hour   Intake 420 ml   Output 1900 ml   Net -1480 ml       Laboratory                        Imaging  CT-RENAL WITH & W/O   Final Result      1.  Large complex right renal mass as detailed above measuring approximately 7.8 x 7.1x  8.5 cm. Primary differential considerations include urothelial malignancy or renal cell carcinoma.   2.  No evidence of adenopathy or metastatic disease is seen.   3.  Distended urinary bladder. Right greater left hydroureteronephrosis.   4.  Severe atherosclerosis. 3.5 cm infrarenal abdominal aortic aneurysm.            US-RUQ   Final Result         1.  Echogenic liver compatible with fatty change versus fibrosis.   2.  Masslike structure in the right kidney, should be considered neoplastic unless proven otherwise, recommend follow-up 3 phase CT of the kidneys for further characterization   3.  Mild dilatation of the common bile duct, within expected limits given patient age.      These findings were discussed with the patient's clinician, Vin Mei, on 7/23/2020 7:34 AM.      DX-CHEST-PORTABLE (1 VIEW)   Final Result      1.  The lungs are hyperinflated suggesting emphysema/COPD.   2.  There is minimal predominantly lower lobe interstitial opacity which is most likely due to chronic scarring.           Assessment/Plan  Neurocognitive disorder, unspecified- (present on admission)  Assessment & Plan  -Psychiatry was consulted, patient incapacitated to make any medical decisions.  -Guardianship established. Needs placement to .     Urinary retention- (present on admission)  Assessment & Plan  -Continue flomax and  proscar.   -Méndez catheter in place for urinary retention.  Failed previous attempts at removal.  -Follow up outpatient.  Will likely have to discharge with méndez.    Right kidney mass- (present on admission)  Assessment & Plan  -Suspicion for urothelial carcinoma vs renal cell carcinoma.  -Patient failed voiding trial despite flomax/proscar.  -Poor candidate for chemo given neurocognitive disorder, lacking capacity, and requirement for guardianship.  -Urologist, Dr. Caruso, was consulted on 11/6 regarding renal mass. Recommended outpatient follow-up and continue chronic méndez. Please see consult note for further detail.    Type 2 diabetes mellitus (HCC)- (present on admission)  Assessment & Plan  -Hemoglobin A1c 6.8%.   -Continue diabetic diet.  -BS has been well controlled on lab work.  -Accu-Cheks only as needed for signs and symptoms of hypoglycemia or during acute illness.  -No need for insulin at this time.     COPD (chronic obstructive pulmonary disease) (HCC)- (present on admission)  Assessment & Plan  -Stable on room air.  -Not in acute exacerbation.    Discharge planning issues  Assessment & Plan  -SW following  -Guardianship granted.  Will need placement.    Abdominal aortic aneurysm (AAA) 3.0 cm to 5.5 cm in diameter in male (HCC)- (present on admission)  Assessment & Plan  -Infrarenal.  -Noted as incidental finding on CT renal.  -Not a candidate for repair given neurocognitive disorder & lacking capacity.  -Continue good blood pressure control.    Protein-calorie malnutrition (HCC)- (present on admission)  Assessment & Plan  -Body mass index is 20.79 kg/m². Significantly improved from 17 on admit.  -Encourage PO intake.  -Food preferences.  -Continue TID supplements.    HTN (hypertension)- (present on admission)  Assessment & Plan  -Continue norvasc.  -Well-managed on current regimen.         VTE prophylaxis:  ambulatory    ==================================================================================================Please note that this dictation was created using voice recognition software. I have made every reasonable attempt to correct obvious errors, but there may be errors of grammar and possibly content that I did not discover before finalizing the note.    Electronically signed by:  ZEKE Booth, MSN, APRN, FNP-C  Hospitalist Services  [facility]  [phone]  [email or web address]  11/24/20    1419

## 2020-11-24 NOTE — PROGRESS NOTES
2 RN skin check completed with EZ Salgado.   Devices in place: Méndez catheter.  Skin assessed under devices: Yes.  Confirmed pressure ulcers found: None.  New potential pressure ulcers noted: None.  Wound consult placed:  N/A.        Skin assessment:   Elbows: pink and blanching  Bilateral heels: dry, flaky, pink and blanching  BLE/BUE: intact, pink, blanching  Sacrum/groin: intact, small scar/discoloration on scrotum - pt complained of itchiness, zinc cream applied  Méndez site: clean, dry, intact        The following interventions in place: 2 RN skin check, méndez care, pillows in place for support/positioning, patient uses cane to ambulate during the day with SBA, patient turns and repositions self, waffle mattress overlay in place.

## 2020-11-24 NOTE — CARE PLAN
Problem: Safety  Goal: Will remain free from injury  Outcome: PROGRESSING AS EXPECTED  Goal: Will remain free from falls  Outcome: PROGRESSING AS EXPECTED  Note: Patient is a high fall risk with a history of multiple falls during hospital stay. Fall precautions in place. Bed in lowest position. Non-skid socks in place. Personal possessions within reach. Mobility sign on door. Bed-alarm on. Call light within reach. Pt educated regarding fall prevention and states understanding.       Problem: Infection  Goal: Will remain free from infection  Outcome: PROGRESSING AS EXPECTED

## 2020-11-24 NOTE — PROGRESS NOTES
Received bedside report from night shift RN. Patient denies pain. No signs of discomfort or distress. Patient is sitting at edge of bed for breakfast. Patient is legally blind and is a high fall risk. Patient educated to call before getting out of bed. Bed alarm on. Bed is in locked/lowest position. Call light and belongings are within reach.

## 2020-11-24 NOTE — PROGRESS NOTES
Pt. Received in bed, awake orientedx4. Speaks fluently Tagalog/[place] which writer also speak. Listened to pt. Concerns and stated he is eager to get discharged soon. Legally blind, bed alarm in place. Educated about fall risks and precautions. Noted méndez cath in place intact and draining well. Reinforced diet per order. Assisted in the bathroom and he stated having a BM, handwashing done after use of restroom. Pt. Assisted sitting on the edge of bed afterwards to eat his nighttime sandwich snack. All other needs attended at this point.

## 2020-11-24 NOTE — PROGRESS NOTES
2 RN skin check completed with EZ Garcia.   Devices in place: Méndez catheter.  Skin assessed under devices: Yes.  Confirmed pressure ulcers found: None.  New potential pressure ulcers noted: None.  Wound consult placed:  N/A.        Skin assessment:   Elbows: pink and blanching  Bilateral heels: dry, flaky, pink and blanching  BLE/BUE: intact, pink, blanching  Sacrum/groin: intact, small scar/discoloration on scrotum - pt complained of itchiness, zinc cream applied  Méndez site: clean, dry, intact        The following interventions in place: 2 RN skin check, méndez care, pillows in place for support/positioning, patient uses cane to ambulate during the day with SBA, patient turns and repositions self, waffle mattress overlay in place.

## 2020-11-25 PROCEDURE — 770006 HCHG ROOM/CARE - MED/SURG/GYN SEMI*

## 2020-11-25 PROCEDURE — A9270 NON-COVERED ITEM OR SERVICE: HCPCS | Performed by: HOSPITALIST

## 2020-11-25 PROCEDURE — 700102 HCHG RX REV CODE 250 W/ 637 OVERRIDE(OP): Performed by: HOSPITALIST

## 2020-11-25 PROCEDURE — 700102 HCHG RX REV CODE 250 W/ 637 OVERRIDE(OP): Performed by: NURSE PRACTITIONER

## 2020-11-25 PROCEDURE — A9270 NON-COVERED ITEM OR SERVICE: HCPCS | Performed by: NURSE PRACTITIONER

## 2020-11-25 RX ADMIN — QUETIAPINE FUMARATE 25 MG: 25 TABLET ORAL at 17:04

## 2020-11-25 RX ADMIN — TAMSULOSIN HYDROCHLORIDE 0.8 MG: 0.4 CAPSULE ORAL at 08:51

## 2020-11-25 RX ADMIN — THERA TABS 1 TABLET: TAB at 08:51

## 2020-11-25 RX ADMIN — AMLODIPINE BESYLATE 5 MG: 5 TABLET ORAL at 08:51

## 2020-11-25 RX ADMIN — FINASTERIDE 5 MG: 5 TABLET, FILM COATED ORAL at 08:51

## 2020-11-25 ASSESSMENT — PAIN DESCRIPTION - PAIN TYPE: TYPE: ACUTE PAIN

## 2020-11-25 NOTE — WOUND TEAM
Pt seen for finger foot/nail care. All nails noted to be slightly overgrown. Toes wrapped with soapy, wet washcloths  prior to trimming and filing of nails. Finger nails cut first. Trimming and filing completed without difficulty, no knicks or cuts. Replaced slipper-socks.

## 2020-11-26 PROCEDURE — 700102 HCHG RX REV CODE 250 W/ 637 OVERRIDE(OP): Performed by: NURSE PRACTITIONER

## 2020-11-26 PROCEDURE — A9270 NON-COVERED ITEM OR SERVICE: HCPCS | Performed by: NURSE PRACTITIONER

## 2020-11-26 PROCEDURE — 700102 HCHG RX REV CODE 250 W/ 637 OVERRIDE(OP): Performed by: HOSPITALIST

## 2020-11-26 PROCEDURE — A9270 NON-COVERED ITEM OR SERVICE: HCPCS | Performed by: HOSPITALIST

## 2020-11-26 PROCEDURE — 770006 HCHG ROOM/CARE - MED/SURG/GYN SEMI*

## 2020-11-26 RX ADMIN — TAMSULOSIN HYDROCHLORIDE 0.8 MG: 0.4 CAPSULE ORAL at 08:33

## 2020-11-26 RX ADMIN — FINASTERIDE 5 MG: 5 TABLET, FILM COATED ORAL at 08:33

## 2020-11-26 RX ADMIN — AMLODIPINE BESYLATE 5 MG: 5 TABLET ORAL at 08:33

## 2020-11-26 RX ADMIN — QUETIAPINE FUMARATE 25 MG: 25 TABLET ORAL at 17:38

## 2020-11-26 RX ADMIN — THERA TABS 1 TABLET: TAB at 08:33

## 2020-11-26 ASSESSMENT — PAIN DESCRIPTION - PAIN TYPE
TYPE: ACUTE PAIN
TYPE: ACUTE PAIN

## 2020-11-26 NOTE — PROGRESS NOTES
2 RN skin check completed with Kemal HUI.   Devices in place Rosado catheter.  Skin assessed under devices yes.  Confirmed pressure ulcers found on none.   New potential pressure ulcers noted on None. Wound consult placed N/A.    The following interventions in place: 2 RN skin check, pillows for support, Rosado care, pressure redistribution mattress, patient repositions self.     Skin assessment:  Bilateral elbows pink and blanching  Bilateral heels pink, blanching, dry and flaky  BLE discoloration, dry and flaky  Sacrum pink and blanching, intact  Rosado site: dry, clean and intact*

## 2020-11-26 NOTE — PROGRESS NOTES
Received report and assumed care at shift change. Patient is A&O x 4, blind on both eyes. No complain of pain or distress, slept most of the night. Fall precautions in place, bed in lowest position. Needs attended to.

## 2020-11-26 NOTE — CARE PLAN
Problem: Safety  Goal: Will remain free from falls  Outcome: PROGRESSING AS EXPECTED  Intervention: Implement fall precautions  Flowsheets  Taken 11/26/2020 1131  Bed Alarm: Yes - Alarm On  Chair/Bed Strip Alarm: Yes - Alarm On  Taken 11/26/2020 0833  Environmental Precautions:   Treaded Slipper Socks on Patient   Personal Belongings, Wastebasket, Call Bell etc. in Easy Reach   Bed in Low Position   Mobility Assessed & Appropriate Sign Placed     Problem: Skin Integrity  Goal: Risk for impaired skin integrity will decrease  Outcome: PROGRESSING AS EXPECTED   Pt turned and positioned every two hours. Incontinence care provided and barrier paste applied as needed.

## 2020-11-26 NOTE — PROGRESS NOTES
Received report from night shift and assumed care. Assessment completed, POC discussed. Pt is A&OX 4. Denies pain. Rosado catheter in place, output is cloudy, yellow, with moderate sediment noted. Pt ambulates with standby assist using cane. Medication given per MAR. All needs met. Safety precautions and hourly rounding in place.

## 2020-11-27 LAB
APPEARANCE UR: ABNORMAL
BACTERIA #/AREA URNS HPF: ABNORMAL /HPF
BILIRUB UR QL STRIP.AUTO: NEGATIVE
COLOR UR: YELLOW
EPI CELLS #/AREA URNS HPF: NEGATIVE /HPF
GLUCOSE UR STRIP.AUTO-MCNC: 100 MG/DL
HYALINE CASTS #/AREA URNS LPF: ABNORMAL /LPF
KETONES UR STRIP.AUTO-MCNC: ABNORMAL MG/DL
LEUKOCYTE ESTERASE UR QL STRIP.AUTO: ABNORMAL
MICRO URNS: ABNORMAL
NITRITE UR QL STRIP.AUTO: POSITIVE
PH UR STRIP.AUTO: 5 [PH] (ref 5–8)
PROT UR QL STRIP: NEGATIVE MG/DL
RBC # URNS HPF: ABNORMAL /HPF
RBC UR QL AUTO: ABNORMAL
SP GR UR STRIP.AUTO: 1.02
UROBILINOGEN UR STRIP.AUTO-MCNC: 0.2 MG/DL
WBC #/AREA URNS HPF: ABNORMAL /HPF

## 2020-11-27 PROCEDURE — A9270 NON-COVERED ITEM OR SERVICE: HCPCS | Performed by: HOSPITALIST

## 2020-11-27 PROCEDURE — 700102 HCHG RX REV CODE 250 W/ 637 OVERRIDE(OP): Performed by: HOSPITALIST

## 2020-11-27 PROCEDURE — A9270 NON-COVERED ITEM OR SERVICE: HCPCS | Performed by: NURSE PRACTITIONER

## 2020-11-27 PROCEDURE — 81001 URINALYSIS AUTO W/SCOPE: CPT

## 2020-11-27 PROCEDURE — 700102 HCHG RX REV CODE 250 W/ 637 OVERRIDE(OP): Performed by: NURSE PRACTITIONER

## 2020-11-27 PROCEDURE — 99231 SBSQ HOSP IP/OBS SF/LOW 25: CPT | Performed by: NURSE PRACTITIONER

## 2020-11-27 PROCEDURE — 770006 HCHG ROOM/CARE - MED/SURG/GYN SEMI*

## 2020-11-27 RX ADMIN — TAMSULOSIN HYDROCHLORIDE 0.8 MG: 0.4 CAPSULE ORAL at 07:37

## 2020-11-27 RX ADMIN — QUETIAPINE FUMARATE 25 MG: 25 TABLET ORAL at 17:30

## 2020-11-27 RX ADMIN — THERA TABS 1 TABLET: TAB at 07:37

## 2020-11-27 RX ADMIN — AMLODIPINE BESYLATE 5 MG: 5 TABLET ORAL at 07:37

## 2020-11-27 RX ADMIN — FINASTERIDE 5 MG: 5 TABLET, FILM COATED ORAL at 07:37

## 2020-11-27 ASSESSMENT — PAIN DESCRIPTION - PAIN TYPE: TYPE: ACUTE PAIN

## 2020-11-27 ASSESSMENT — ENCOUNTER SYMPTOMS
MUSCULOSKELETAL NEGATIVE: 1
COUGH: 1
VOMITING: 1
SHORTNESS OF BREATH: 1
NAUSEA: 1
CARDIOVASCULAR NEGATIVE: 1
PSYCHIATRIC NEGATIVE: 1
FEVER: 0
CHILLS: 0
DIARRHEA: 1
EYES NEGATIVE: 1
NEUROLOGICAL NEGATIVE: 1

## 2020-11-27 NOTE — PROGRESS NOTES
Received report from night shift and assumed care. Assessment completed, POC discussed. Pt is A&OX 4. He is pleasant and cooperative with staff.  Denies pain. Rosado catheter in place, output is cloudy, yellow, with moderate sediment noted. Pt ambulates with standby assist using cane. Medication given per MAR. All needs met. Safety precautions and hourly rounding in place.

## 2020-11-27 NOTE — PROGRESS NOTES
[facility] Medicine Twice Weekly Progress Note    Date of Service  11/27/2020    Chief Complaint  85 y.o. male admitted 7/9/2020 with fatigue, N/V/D    Hospital Course  Mr. Bejarano is an 85-year-old male who presented to the emergency department on 7/9/2020 with cough, fatigue/generalized weakness, loss of appetite, and nausea/vomiting/diarrhea.  COVID screening was positive and he was also found to have acute kidney injury and a lactic acid level of 6.1.  He was admitted to the intensive care unit with dehydration and lactic acidosis.  Large-volume crystalloid resuscitation was given.  Chest x-ray was negative for pneumonia but did have changes consistent with COPD.  He quickly stabilized and was transferred out to the floor on 7/10/2020.  He had issues with urinary retention and eventually agreed to have a méndez placed in early August.  Tamsulosin and finasteride were started at that time.  He was also evaluated by psychiatry on 8/3/2020 who deemed him incapacitated to make any medical decisions.  Of additional note a right upper quadrant ultrasound was completed due to periumbilical pain and revealed a possible renal mass.  A CT renal with and without contrast was performed and showed a large renal mass likely representing urothelial versus renal cell carcinoma.  Unfortunately given his low weight and BMI, malnutrition, and neurocognitive disorder, he was deemed a poor candidate for potential chemotherapy or surgical decompression. Dr. Caruso was consulted on 11/6/2020 and recommended outpatient follow-up and continuing the chronic méndez.  He is now pending guardianship and placement.       Interval Problem Update  -Patient seen and examined.  Patient laying comfortably in bed. Patient denies any pain or discomfort at this time. Patient appears withdrawn but still converses with me. Patient given an update on POC.   -POC: Continue supportive care; monitor Méndez output; pending guardianship and placement  -Lab work:  Reviewed; last obtained on 11/8/2020; unremarkable  -VSS at this time  -We will order labs as warranted  -There is no significant changes from previous ROS/PE, please see my previous note for further details.    ==================================================================================================  I, ZEKE Booth, MSN, APRN, FNP-C, certify that the patient requires continued medically necessary hospital services for the treatment of COPD and needs long-term placement. The patient will remain in the hospital for the foreseeable future.  Discharge may or may not occur in the next 20 days due to ongoing discharge delays due to no medically acceptable discharge option.    ==================================================================================================    Consultants/Specialty  NONE    Code Status  DNAR/DNI    Disposition  TBD    Review of Systems  Review of Systems   Constitutional: Positive for malaise/fatigue. Negative for chills and fever.   HENT: Negative.    Eyes: Negative.    Respiratory: Positive for cough and shortness of breath.    Cardiovascular: Negative.    Gastrointestinal: Positive for diarrhea, nausea and vomiting.   Genitourinary: Negative.    Musculoskeletal: Negative.    Skin: Negative.    Neurological: Negative.    Endo/Heme/Allergies: Negative.    Psychiatric/Behavioral: Negative.         Physical Exam  Temp:  [36.3 °C (97.3 °F)-36.6 °C (97.9 °F)] 36.3 °C (97.3 °F)  Pulse:  [69-76] 70  Resp:  [17-18] 18  BP: (104-112)/(44-53) 112/44  SpO2:  [91 %-95 %] 95 %    Physical Exam  Vitals signs and nursing note reviewed.   HENT:      Nose: Nose normal.      Mouth/Throat:      Mouth: Mucous membranes are moist.   Eyes:      Pupils: Pupils are equal, round, and reactive to light.   Neck:      Musculoskeletal: Normal range of motion.   Cardiovascular:      Rate and Rhythm: Normal rate and regular rhythm.      Pulses: Normal pulses.      Heart sounds: Normal heart  sounds.   Pulmonary:      Effort: Pulmonary effort is normal.      Breath sounds: Normal breath sounds.   Abdominal:      General: Bowel sounds are normal.   Musculoskeletal: Normal range of motion.   Skin:     General: Skin is warm and dry.      Capillary Refill: Capillary refill takes 2 to 3 seconds.   Neurological:      Mental Status: He is alert. Mental status is at baseline.         Fluids    Intake/Output Summary (Last 24 hours) at 11/27/2020 1007  Last data filed at 11/27/2020 0737  Gross per 24 hour   Intake 900 ml   Output 1350 ml   Net -450 ml       Laboratory                        Imaging  CT-RENAL WITH & W/O   Final Result      1.  Large complex right renal mass as detailed above measuring approximately 7.8 x 7.1x  8.5 cm. Primary differential considerations include urothelial malignancy or renal cell carcinoma.   2.  No evidence of adenopathy or metastatic disease is seen.   3.  Distended urinary bladder. Right greater left hydroureteronephrosis.   4.  Severe atherosclerosis. 3.5 cm infrarenal abdominal aortic aneurysm.            US-RUQ   Final Result         1.  Echogenic liver compatible with fatty change versus fibrosis.   2.  Masslike structure in the right kidney, should be considered neoplastic unless proven otherwise, recommend follow-up 3 phase CT of the kidneys for further characterization   3.  Mild dilatation of the common bile duct, within expected limits given patient age.      These findings were discussed with the patient's clinician, Vin Mei, on 7/23/2020 7:34 AM.      DX-CHEST-PORTABLE (1 VIEW)   Final Result      1.  The lungs are hyperinflated suggesting emphysema/COPD.   2.  There is minimal predominantly lower lobe interstitial opacity which is most likely due to chronic scarring.           Assessment/Plan  Neurocognitive disorder, unspecified- (present on admission)  Assessment & Plan  -Psychiatry was consulted, patient incapacitated to make any medical  decisions.  -Guardianship established. Needs placement to [facility]     Urinary retention- (present on admission)  Assessment & Plan  -Continue flomax and proscar  -Méndez catheter in place for urinary retention.  Failed previous attempts at removal.  -Follow up outpatient.  Will likely have to discharge with méndez.    Right kidney mass- (present on admission)  Assessment & Plan  -Suspicion for urothelial carcinoma vs renal cell carcinoma  -Patient failed voiding trial despite flomax/proscar.  -Poor candidate for chemo given neurocognitive disorder, lacking capacity, and requirement for guardianship.  -Urologist, Dr. Caruso, was consulted on 11/6 regarding renal mass. Recommended outpatient follow-up and continue chronic méndez. Please see consult note for further detail.    Type 2 diabetes mellitus (HCC)- (present on admission)  Assessment & Plan  -Hemoglobin A1c 6.8%.   -Continue diabetic diet.  -BS has been well controlled on lab work.  -Accu-Cheks only as needed for signs and symptoms of hypoglycemia or during acute illness.  -No need for insulin at this time    COPD (chronic obstructive pulmonary disease) (HCC)- (present on admission)  Assessment & Plan  -Stable on room air.  -Not in acute exacerbation    Discharge planning issues  Assessment & Plan  -SW following  -Guardianship granted.  Will need placement    Abdominal aortic aneurysm (AAA) 3.0 cm to 5.5 cm in diameter in male (HCC)- (present on admission)  Assessment & Plan  -Infrarenal  -Noted as incidental finding on CT renal.  -Not a candidate for repair given neurocognitive disorder & lacking capacity.  -Continue good blood pressure control.    Protein-calorie malnutrition (HCC)- (present on admission)  Assessment & Plan  -Body mass index is 20.79 kg/m². Significantly improved from 17 on admit.  -Encourage PO intake.  -Food preferences.  -Continue TID supplements    HTN (hypertension)- (present on admission)  Assessment & Plan  -Continue norvasc.  -Well-managed on  current regimen       VTE prophylaxis: ambulatory    ==================================================================================================Please note that this dictation was created using voice recognition software. I have made every reasonable attempt to correct obvious errors, but there may be errors of grammar and possibly content that I did not discover before finalizing the note.    Electronically signed by:  ZEKE Booth, MSN, APRN, FNP-C  Hospitalist Services  [facility]  [phone]  [email or web address]  11/27/20    1008

## 2020-11-27 NOTE — PROGRESS NOTES
Pt. Received in bed resting, denies any complaint of pain at this time. Rosado intact and draining yellow colored urine. Call light placed within reach. Educated about fall risks and precautions. Needs attended.

## 2020-11-27 NOTE — PROGRESS NOTES
2 RN skin check completed with Naomi HUI.   Devices in place: Rosado catheter.  Skin assessed under devices yes.  Confirmed pressure ulcers found on none.   New potential pressure ulcers noted:  None.   Wound consult placed N/A.     The following interventions in place: 2 RN skin check, pillows for support, Rosado care, pressure redistribution mattress, patient repositions self.      Skin assessment:  Bilateral elbows: pink/blanching  Bilateral heels: pink/ blanching/ flaky  BLE: dusky/ dry/ flaky  Sacrum: intact  Urinary meatus: intact

## 2020-11-28 PROCEDURE — A9270 NON-COVERED ITEM OR SERVICE: HCPCS | Performed by: HOSPITALIST

## 2020-11-28 PROCEDURE — A9270 NON-COVERED ITEM OR SERVICE: HCPCS | Performed by: NURSE PRACTITIONER

## 2020-11-28 PROCEDURE — 770006 HCHG ROOM/CARE - MED/SURG/GYN SEMI*

## 2020-11-28 PROCEDURE — 700102 HCHG RX REV CODE 250 W/ 637 OVERRIDE(OP): Performed by: HOSPITALIST

## 2020-11-28 PROCEDURE — 700102 HCHG RX REV CODE 250 W/ 637 OVERRIDE(OP): Performed by: NURSE PRACTITIONER

## 2020-11-28 RX ADMIN — AMLODIPINE BESYLATE 5 MG: 5 TABLET ORAL at 05:49

## 2020-11-28 RX ADMIN — TAMSULOSIN HYDROCHLORIDE 0.8 MG: 0.4 CAPSULE ORAL at 08:11

## 2020-11-28 RX ADMIN — THERA TABS 1 TABLET: TAB at 05:50

## 2020-11-28 RX ADMIN — FINASTERIDE 5 MG: 5 TABLET, FILM COATED ORAL at 05:49

## 2020-11-28 RX ADMIN — QUETIAPINE FUMARATE 25 MG: 25 TABLET ORAL at 17:25

## 2020-11-28 NOTE — PROGRESS NOTES
Bedside report received at change of shift. Pt reports no pain. Pt is A&Ox4. Got up to edge of bed for a snack at beginning of shift. Rosado in place, urine is pale yellow with a small amount of sediment. Bed alarm on, bed in low locked position, call light within reach, hourly rounding in place.

## 2020-11-28 NOTE — PROGRESS NOTES
2 RN skin check completed with Pam HUI.   Devices in place: Rosado catheter.  Skin assessed under devices yes.  Confirmed pressure ulcers found on none.   New potential pressure ulcers noted:  None.   Wound consult placed N/A.     The following interventions in place: 2 RN skin check, pillows for support, Rosado care, pressure redistribution mattress, patient repositions self.      Skin assessment:  Bilateral elbows: pink/blanching  Bilateral heels: pink/ blanching/ flaky  BLE: dusky/ dry/ flaky  Sacrum: intact  Urinary meatus: intact

## 2020-11-28 NOTE — PROGRESS NOTES
Assumed patient care at 0700. Received report from night shift. Assessment completed. A&Ox4. Denies presence of pain at this time. On RA-no s/s of distress. Rosado in place. Fall precautions in place; alarm on, treaded socks in use, personal possessions and call light placed within reach. Communication board updated. Patient denies any additional needs at this time.

## 2020-11-28 NOTE — PROGRESS NOTES
2 RN Skin Check    2 RN skin check complete with EZ Sommer   Devices in place: méndez catheter  Skin assessed under devices: yes.  Confirmed pressure ulcers found on: n/a.  New potential pressure ulcers noted on n/a. Wound consult placed No.  The following interventions in place Pillows for support, 2 RN skin checks, méndez care    Skin Assessment:  Bony prominences- pink, blanching  Urinary meatus- intact  BLE- dusky, dry

## 2020-11-29 PROCEDURE — A9270 NON-COVERED ITEM OR SERVICE: HCPCS | Performed by: NURSE PRACTITIONER

## 2020-11-29 PROCEDURE — 700102 HCHG RX REV CODE 250 W/ 637 OVERRIDE(OP): Performed by: HOSPITALIST

## 2020-11-29 PROCEDURE — 770006 HCHG ROOM/CARE - MED/SURG/GYN SEMI*

## 2020-11-29 PROCEDURE — A9270 NON-COVERED ITEM OR SERVICE: HCPCS | Performed by: HOSPITALIST

## 2020-11-29 PROCEDURE — 700102 HCHG RX REV CODE 250 W/ 637 OVERRIDE(OP): Performed by: NURSE PRACTITIONER

## 2020-11-29 RX ADMIN — THERA TABS 1 TABLET: TAB at 08:19

## 2020-11-29 RX ADMIN — FINASTERIDE 5 MG: 5 TABLET, FILM COATED ORAL at 08:19

## 2020-11-29 RX ADMIN — AMLODIPINE BESYLATE 5 MG: 5 TABLET ORAL at 08:19

## 2020-11-29 RX ADMIN — QUETIAPINE FUMARATE 25 MG: 25 TABLET ORAL at 17:14

## 2020-11-29 RX ADMIN — TAMSULOSIN HYDROCHLORIDE 0.8 MG: 0.4 CAPSULE ORAL at 08:19

## 2020-11-29 NOTE — PROGRESS NOTES
2 RN Skin Check    2 RN skin check complete with EZ Wayne  Devices in place: méndez catheter  Skin assessed under devices: yes.  Confirmed pressure ulcers found on: n/a.  New potential pressure ulcers noted on n/a. Wound consult placed N/A.  The following interventions in place Pillows for support, 2 RN skin checks, méndez care    Skin Assessment:    Bony prominences- pink, blanching  Urinary meatus- intact  BLE- dusky, dry  Bilateral heels- boggy, blanching

## 2020-11-29 NOTE — PROGRESS NOTES
Bedside report received at change of shift. Pt reports no pain. All needs met at this time. Call light and belongings within reach, bed in low locked position, hourly rounding in place.

## 2020-11-30 PROCEDURE — A9270 NON-COVERED ITEM OR SERVICE: HCPCS | Performed by: HOSPITALIST

## 2020-11-30 PROCEDURE — 700102 HCHG RX REV CODE 250 W/ 637 OVERRIDE(OP): Performed by: HOSPITALIST

## 2020-11-30 PROCEDURE — A9270 NON-COVERED ITEM OR SERVICE: HCPCS | Performed by: NURSE PRACTITIONER

## 2020-11-30 PROCEDURE — 700102 HCHG RX REV CODE 250 W/ 637 OVERRIDE(OP): Performed by: NURSE PRACTITIONER

## 2020-11-30 PROCEDURE — 770006 HCHG ROOM/CARE - MED/SURG/GYN SEMI*

## 2020-11-30 RX ADMIN — THERA TABS 1 TABLET: TAB at 09:10

## 2020-11-30 RX ADMIN — ERGOCALCIFEROL 50000 UNITS: 1.25 CAPSULE ORAL at 15:25

## 2020-11-30 RX ADMIN — TAMSULOSIN HYDROCHLORIDE 0.8 MG: 0.4 CAPSULE ORAL at 09:13

## 2020-11-30 RX ADMIN — AMLODIPINE BESYLATE 5 MG: 5 TABLET ORAL at 09:10

## 2020-11-30 RX ADMIN — QUETIAPINE FUMARATE 25 MG: 25 TABLET ORAL at 19:43

## 2020-11-30 RX ADMIN — FINASTERIDE 5 MG: 5 TABLET, FILM COATED ORAL at 09:11

## 2020-11-30 NOTE — PROGRESS NOTES
Received report and assumed care of pt. Assessment complete on RA. Pt A&OX4. Denies any pain or discomfort at this time. Pt currently resting in bed. All pt needs met at this time. Safety precautions and hourly rounding in place.

## 2020-11-30 NOTE — DISCHARGE PLANNING
Anticipated Discharge Disposition: Group Home    Action: Patient is A&OX4, able to express his needs to staff, blind needs stand by assist when walking with a cane due to multiple falls, has méndez. Chart notes indicate patient is cooperative with staff, takes all prescribed medications.  When SW talks to patient, patient will ask SW when he will be leaving and where is his money.     Email to guardian , PRETTY May, providing documentation that patient is a group home candidate.  Also that [facility] has an opening for a shared male room    Barriers to Discharge: placement    Plan: follow up with guardian

## 2020-11-30 NOTE — PROGRESS NOTES
Bedside report received at change of shift. Pt reports no pain. Call light and belongings within reach, bed alarm on, bed in low locked position, hourly rounding in place.

## 2020-11-30 NOTE — PROGRESS NOTES
2 RN Skin Check    2 RN skin check complete with EZ Wayne  Devices in place: méndez cathether  Skin assessed under devices: yes.  Confirmed pressure ulcers found on: n/a.  New potential pressure ulcers noted on n/a. Wound consult placed N/A.  The following interventions in place Pillows for support, 2 RN skin checks, méndez care    Skin Assessment:    Bony prominences- pink, blanching  Urinary meatus- intact  BLE- dusky, dry  Bilateral heels- dry, boggy, blanching

## 2020-12-01 PROCEDURE — 700102 HCHG RX REV CODE 250 W/ 637 OVERRIDE(OP): Performed by: NURSE PRACTITIONER

## 2020-12-01 PROCEDURE — A9270 NON-COVERED ITEM OR SERVICE: HCPCS | Performed by: HOSPITALIST

## 2020-12-01 PROCEDURE — 770006 HCHG ROOM/CARE - MED/SURG/GYN SEMI*

## 2020-12-01 PROCEDURE — 700102 HCHG RX REV CODE 250 W/ 637 OVERRIDE(OP): Performed by: HOSPITALIST

## 2020-12-01 PROCEDURE — A9270 NON-COVERED ITEM OR SERVICE: HCPCS | Performed by: NURSE PRACTITIONER

## 2020-12-01 RX ADMIN — TAMSULOSIN HYDROCHLORIDE 0.8 MG: 0.4 CAPSULE ORAL at 08:55

## 2020-12-01 RX ADMIN — FINASTERIDE 5 MG: 5 TABLET, FILM COATED ORAL at 08:55

## 2020-12-01 RX ADMIN — THERA TABS 1 TABLET: TAB at 08:55

## 2020-12-01 RX ADMIN — QUETIAPINE FUMARATE 25 MG: 25 TABLET ORAL at 19:27

## 2020-12-01 ASSESSMENT — ENCOUNTER SYMPTOMS
ABDOMINAL PAIN: 0
DIARRHEA: 0
HEARTBURN: 0
DIAPHORESIS: 0
DOUBLE VISION: 0
COUGH: 0
SORE THROAT: 0
PHOTOPHOBIA: 0
BRUISES/BLEEDS EASILY: 0
PALPITATIONS: 0
HEMOPTYSIS: 0
BLOOD IN STOOL: 0
FOCAL WEAKNESS: 0
CHILLS: 0
STRIDOR: 0
SENSORY CHANGE: 0
WHEEZING: 0
ORTHOPNEA: 0
SEIZURES: 0
SHORTNESS OF BREATH: 0
PND: 0
BLURRED VISION: 0
HEADACHES: 0
MYALGIAS: 0
DIZZINESS: 0
FEVER: 0
WEIGHT LOSS: 0
VOMITING: 0
NAUSEA: 0

## 2020-12-01 NOTE — PROGRESS NOTES
[facility] Medicine Twice Weekly Progress Note    Date of Service  12/1/2020    Chief Complaint  85 y.o. male admitted 7/9/2020 with fatigue, N/V/D    Hospital Course  Mr. Bejarano is an 85-year-old male who presented to the emergency department on 7/9/2020 with cough, fatigue/generalized weakness, loss of appetite, and nausea/vomiting/diarrhea.  COVID screening was positive and he was also found to have acute kidney injury and a lactic acid level of 6.1.  He was admitted to the intensive care unit with dehydration and lactic acidosis.  Large-volume crystalloid resuscitation was given.  Chest x-ray was negative for pneumonia but did have changes consistent with COPD.  He quickly stabilized and was transferred out to the floor on 7/10/2020.  He had issues with urinary retention and eventually agreed to have a méndez placed in early August.  Tamsulosin and finasteride were started at that time.  He was also evaluated by psychiatry on 8/3/2020 who deemed him incapacitated to make any medical decisions.  Of additional note a right upper quadrant ultrasound was completed due to periumbilical pain and revealed a possible renal mass.  A CT renal with and without contrast was performed and showed a large renal mass likely representing urothelial versus renal cell carcinoma.  Unfortunately given his low weight and BMI, malnutrition, and neurocognitive disorder, he was deemed a poor candidate for potential chemotherapy or surgical decompression. Dr. Caruso was consulted on 11/6/2020 and recommended outpatient follow-up and continuing the chronic méndez.  He is now pending guardianship and placement.       Interval Problem Update  VSS and WNL on RA  Independent  Awaits placement  Voiding  Tolerating a diet    Consultants/Specialty  NONE    Code Status  DNAR/DNI    Disposition  TBD    Review of Systems  Review of Systems   Constitutional: Negative for chills, diaphoresis, fever and weight loss.   HENT: Negative for ear pain, sore  throat and tinnitus.    Eyes: Negative for blurred vision, double vision and photophobia.   Respiratory: Negative for cough, hemoptysis, shortness of breath, wheezing and stridor.    Cardiovascular: Negative for chest pain, palpitations, orthopnea and PND.   Gastrointestinal: Negative for abdominal pain, blood in stool, diarrhea, heartburn, melena, nausea and vomiting.   Genitourinary: Negative for dysuria, hematuria and urgency.   Musculoskeletal: Negative for joint pain and myalgias.   Neurological: Negative for dizziness, sensory change, focal weakness, seizures and headaches.   Endo/Heme/Allergies: Does not bruise/bleed easily.        Physical Exam  Temp:  [36.1 °C (97 °F)-36.5 °C (97.7 °F)] 36.4 °C (97.5 °F)  Pulse:  [62-73] 62  Resp:  [16-17] 17  BP: ()/(51-96) 93/51  SpO2:  [91 %-96 %] 91 %    Physical Exam  Vitals signs and nursing note reviewed.   HENT:      Nose: Nose normal.      Mouth/Throat:      Mouth: Mucous membranes are moist.   Eyes:      Pupils: Pupils are equal, round, and reactive to light.   Neck:      Musculoskeletal: Normal range of motion.   Cardiovascular:      Rate and Rhythm: Normal rate and regular rhythm.      Pulses: Normal pulses.      Heart sounds: Normal heart sounds.   Pulmonary:      Effort: Pulmonary effort is normal.      Breath sounds: Normal breath sounds.   Abdominal:      General: Bowel sounds are normal.   Musculoskeletal: Normal range of motion.   Skin:     General: Skin is warm and dry.      Capillary Refill: Capillary refill takes 2 to 3 seconds.   Neurological:      Mental Status: He is alert. Mental status is at baseline.         Fluids    Intake/Output Summary (Last 24 hours) at 12/1/2020 0944  Last data filed at 12/1/2020 0800  Gross per 24 hour   Intake 1760 ml   Output 1600 ml   Net 160 ml       Laboratory                        Imaging  CT-RENAL WITH & W/O   Final Result      1.  Large complex right renal mass as detailed above measuring approximately 7.8 x  7.1x  8.5 cm. Primary differential considerations include urothelial malignancy or renal cell carcinoma.   2.  No evidence of adenopathy or metastatic disease is seen.   3.  Distended urinary bladder. Right greater left hydroureteronephrosis.   4.  Severe atherosclerosis. 3.5 cm infrarenal abdominal aortic aneurysm.            US-RUQ   Final Result         1.  Echogenic liver compatible with fatty change versus fibrosis.   2.  Masslike structure in the right kidney, should be considered neoplastic unless proven otherwise, recommend follow-up 3 phase CT of the kidneys for further characterization   3.  Mild dilatation of the common bile duct, within expected limits given patient age.      These findings were discussed with the patient's clinician, Vin Mei, on 7/23/2020 7:34 AM.      DX-CHEST-PORTABLE (1 VIEW)   Final Result      1.  The lungs are hyperinflated suggesting emphysema/COPD.   2.  There is minimal predominantly lower lobe interstitial opacity which is most likely due to chronic scarring.           Assessment/Plan  Neurocognitive disorder, unspecified- (present on admission)  Assessment & Plan  -Psychiatry was consulted, patient incapacitated to make any medical decisions.  -Guardianship established. Needs placement to [facility]     Urinary retention- (present on admission)  Assessment & Plan  -Continue flomax and proscar  -Méndez catheter in place for urinary retention.  Failed previous attempts at removal.  -Follow up outpatient.  Will likely have to discharge with méndez.    Right kidney mass- (present on admission)  Assessment & Plan  -Suspicion for urothelial carcinoma vs renal cell carcinoma  -Patient failed voiding trial despite flomax/proscar.  -Poor candidate for chemo given neurocognitive disorder, lacking capacity, and requirement for guardianship.  -Urologist, Dr. Caruso, was consulted on 11/6 regarding renal mass. Recommended outpatient follow-up and continue chronic méndez. Please see consult  note for further detail.    Type 2 diabetes mellitus (HCC)- (present on admission)  Assessment & Plan  -Hemoglobin A1c 6.8%.   -Continue diabetic diet.  -BS has been well controlled on lab work.  -Accu-Cheks only as needed for signs and symptoms of hypoglycemia or during acute illness.  -No need for insulin at this time    COPD (chronic obstructive pulmonary disease) (HCC)- (present on admission)  Assessment & Plan  -Stable on room air.  -Not in acute exacerbation    Discharge planning issues  Assessment & Plan  -SW following  -Guardianship granted.  Will need placement    Abdominal aortic aneurysm (AAA) 3.0 cm to 5.5 cm in diameter in male (HCC)- (present on admission)  Assessment & Plan  -Infrarenal  -Noted as incidental finding on CT renal.  -Not a candidate for repair given neurocognitive disorder & lacking capacity.  -Continue good blood pressure control.    Protein-calorie malnutrition (HCC)- (present on admission)  Assessment & Plan  -Body mass index is 20.79 kg/m². Significantly improved from 17 on admit.  -Encourage PO intake.  -Food preferences.  -Continue TID supplements    HTN (hypertension)- (present on admission)  Assessment & Plan  -Continue norvasc.  -Well-managed on current regimen       VTE prophylaxis: ambulatory    I have performed a physical exam and reviewed and updated ROS and Plan today (12/1/2020). In review of yesterday's note (11/30/2020), there are no changes except as documented above.     I certify that the patient requires continued medically necessary hospital services for the treatment of neurocognitive disorder. The patient will remain in the hospital for the foreseeable future.  Discharge may or may not occur in the next 20 days due to ongoing discharge delays due to no medically acceptable discharge option.

## 2020-12-01 NOTE — PROGRESS NOTES
Received report and assumed care of pt. Assessment complete on RA. Pt A&OX4. Denies any pain or discomfort at this time. Pt currently resting in bed. Bed alarm in place. All pt needs met at this time. Safety precautions and hourly rounding in place.

## 2020-12-01 NOTE — PROGRESS NOTES
2 RN skin check completed with RN Remedios   Devices in place: méndez  Skin assessed under devices yes.  Confirmed pressure ulcers found on none.  New potential pressure ulcers noted on none. Wound consult placed n/a  .  The following interventions in place: 2 RN skin checks, méndez care, pillow in place, encouraged pt to turn frequently and maintain adequate fluid intake    Elbows intact/blanching  Méndez site cdi  Bilateral heels boggy, dry, and intact

## 2020-12-01 NOTE — PROGRESS NOTES
A&Ox4. No c/o pain. Assisted with changing batteries to flashlight. [facility] cdi. Snack provided. Bed in lowest and locked position. Room light and bed alarm on. Call light within reach. Pt able to make needs known.

## 2020-12-02 PROCEDURE — 700102 HCHG RX REV CODE 250 W/ 637 OVERRIDE(OP): Performed by: HOSPITALIST

## 2020-12-02 PROCEDURE — A9270 NON-COVERED ITEM OR SERVICE: HCPCS | Performed by: HOSPITALIST

## 2020-12-02 PROCEDURE — 700102 HCHG RX REV CODE 250 W/ 637 OVERRIDE(OP): Performed by: NURSE PRACTITIONER

## 2020-12-02 PROCEDURE — 770006 HCHG ROOM/CARE - MED/SURG/GYN SEMI*

## 2020-12-02 PROCEDURE — A9270 NON-COVERED ITEM OR SERVICE: HCPCS | Performed by: NURSE PRACTITIONER

## 2020-12-02 RX ADMIN — QUETIAPINE FUMARATE 25 MG: 25 TABLET ORAL at 19:34

## 2020-12-02 RX ADMIN — AMLODIPINE BESYLATE 5 MG: 5 TABLET ORAL at 08:09

## 2020-12-02 RX ADMIN — THERA TABS 1 TABLET: TAB at 08:09

## 2020-12-02 RX ADMIN — TAMSULOSIN HYDROCHLORIDE 0.8 MG: 0.4 CAPSULE ORAL at 08:09

## 2020-12-02 RX ADMIN — FINASTERIDE 5 MG: 5 TABLET, FILM COATED ORAL at 08:08

## 2020-12-02 NOTE — PROGRESS NOTES
A&Ox4. No c/o pain. Rosado site cdi. Denied further needs. Bed in lowest and locked position. Room light and bed alarm on. Call light within reach. Room by nursing station. Pt able to make needs known.

## 2020-12-03 PROCEDURE — 770006 HCHG ROOM/CARE - MED/SURG/GYN SEMI*

## 2020-12-03 PROCEDURE — A9270 NON-COVERED ITEM OR SERVICE: HCPCS | Performed by: HOSPITALIST

## 2020-12-03 PROCEDURE — 700102 HCHG RX REV CODE 250 W/ 637 OVERRIDE(OP): Performed by: NURSE PRACTITIONER

## 2020-12-03 PROCEDURE — A9270 NON-COVERED ITEM OR SERVICE: HCPCS | Performed by: NURSE PRACTITIONER

## 2020-12-03 PROCEDURE — 700102 HCHG RX REV CODE 250 W/ 637 OVERRIDE(OP): Performed by: HOSPITALIST

## 2020-12-03 RX ADMIN — THERA TABS 1 TABLET: TAB at 08:16

## 2020-12-03 RX ADMIN — AMLODIPINE BESYLATE 5 MG: 5 TABLET ORAL at 08:16

## 2020-12-03 RX ADMIN — FINASTERIDE 5 MG: 5 TABLET, FILM COATED ORAL at 08:16

## 2020-12-03 RX ADMIN — QUETIAPINE FUMARATE 25 MG: 25 TABLET ORAL at 17:42

## 2020-12-03 RX ADMIN — TAMSULOSIN HYDROCHLORIDE 0.8 MG: 0.4 CAPSULE ORAL at 08:16

## 2020-12-03 ASSESSMENT — PAIN DESCRIPTION - PAIN TYPE: TYPE: ACUTE PAIN

## 2020-12-03 NOTE — PROGRESS NOTES
2 RN skin check completed with Mariama RN  Devices in place: méndez  Skin assessed under devices yes.  Confirmed pressure ulcers found on none.  New potential pressure ulcers noted on none. Wound consult placed n/a  .  The following interventions in place: 2 RN skin checks, méndez care, pillow in place, patient reposition self.  maintain adequate fluid intake     Elbows intact/blanching  Méndez site cdi  Bilateral heels boggy, dry, and intact

## 2020-12-03 NOTE — PROGRESS NOTES
Assumed care of patient at 0700. A+Ox4, pleasant and cooperative with staff. Reports pain 0/10 today. Hand held assist to bathroom, calls appropriately for help. Rsoado intact and patent, draining clear yellow urine. Safety precautions in place, bed in lowest and locked position, bed alarmed with call light in reach. Needs met at this time.

## 2020-12-04 PROBLEM — R82.90 ABNORMAL URINALYSIS: Status: ACTIVE | Noted: 2020-12-04

## 2020-12-04 LAB
APPEARANCE UR: ABNORMAL
BACTERIA #/AREA URNS HPF: ABNORMAL /HPF
BILIRUB UR QL STRIP.AUTO: NEGATIVE
COLOR UR: YELLOW
EPI CELLS #/AREA URNS HPF: NEGATIVE /HPF
GLUCOSE UR STRIP.AUTO-MCNC: NEGATIVE MG/DL
HYALINE CASTS #/AREA URNS LPF: ABNORMAL /LPF
KETONES UR STRIP.AUTO-MCNC: NEGATIVE MG/DL
LEUKOCYTE ESTERASE UR QL STRIP.AUTO: ABNORMAL
MICRO URNS: ABNORMAL
NITRITE UR QL STRIP.AUTO: POSITIVE
PH UR STRIP.AUTO: 6.5 [PH] (ref 5–8)
PROT UR QL STRIP: 100 MG/DL
RBC # URNS HPF: ABNORMAL /HPF
RBC UR QL AUTO: ABNORMAL
SP GR UR STRIP.AUTO: 1.02
UROBILINOGEN UR STRIP.AUTO-MCNC: 0.2 MG/DL
WBC #/AREA URNS HPF: ABNORMAL /HPF

## 2020-12-04 PROCEDURE — A9270 NON-COVERED ITEM OR SERVICE: HCPCS | Performed by: NURSE PRACTITIONER

## 2020-12-04 PROCEDURE — 87077 CULTURE AEROBIC IDENTIFY: CPT

## 2020-12-04 PROCEDURE — 700102 HCHG RX REV CODE 250 W/ 637 OVERRIDE(OP): Performed by: NURSE PRACTITIONER

## 2020-12-04 PROCEDURE — 770006 HCHG ROOM/CARE - MED/SURG/GYN SEMI*

## 2020-12-04 PROCEDURE — 700102 HCHG RX REV CODE 250 W/ 637 OVERRIDE(OP): Performed by: HOSPITALIST

## 2020-12-04 PROCEDURE — 99232 SBSQ HOSP IP/OBS MODERATE 35: CPT | Performed by: NURSE PRACTITIONER

## 2020-12-04 PROCEDURE — A9270 NON-COVERED ITEM OR SERVICE: HCPCS | Performed by: HOSPITALIST

## 2020-12-04 PROCEDURE — 81001 URINALYSIS AUTO W/SCOPE: CPT

## 2020-12-04 PROCEDURE — 87186 SC STD MICRODIL/AGAR DIL: CPT

## 2020-12-04 PROCEDURE — 87086 URINE CULTURE/COLONY COUNT: CPT

## 2020-12-04 RX ORDER — NITROFURANTOIN 25; 75 MG/1; MG/1
100 CAPSULE ORAL 2 TIMES DAILY WITH MEALS
Status: DISPENSED | OUTPATIENT
Start: 2020-12-04 | End: 2020-12-18

## 2020-12-04 RX ADMIN — NITROFURANTOIN MONOHYDRATE AND NITROFURANTOIN MACROCRYSTALLINE 100 MG: 75; 25 CAPSULE ORAL at 17:13

## 2020-12-04 RX ADMIN — THERA TABS 1 TABLET: TAB at 08:10

## 2020-12-04 RX ADMIN — QUETIAPINE FUMARATE 25 MG: 25 TABLET ORAL at 17:13

## 2020-12-04 RX ADMIN — AMLODIPINE BESYLATE 5 MG: 5 TABLET ORAL at 08:10

## 2020-12-04 RX ADMIN — TAMSULOSIN HYDROCHLORIDE 0.8 MG: 0.4 CAPSULE ORAL at 08:10

## 2020-12-04 RX ADMIN — NITROFURANTOIN MONOHYDRATE AND NITROFURANTOIN MACROCRYSTALLINE 100 MG: 75; 25 CAPSULE ORAL at 13:44

## 2020-12-04 RX ADMIN — FINASTERIDE 5 MG: 5 TABLET, FILM COATED ORAL at 08:09

## 2020-12-04 ASSESSMENT — PAIN DESCRIPTION - PAIN TYPE: TYPE: ACUTE PAIN

## 2020-12-04 NOTE — PROGRESS NOTES
Pt. Received in bed awake, alert and orientedx4, denies any complaint of pain at the time of assessment. Denies any complaint of pain at the time of assessment. Skin around the méndez catheter intact. Méndez draining herber colored urine. Needs attended.

## 2020-12-04 NOTE — PROGRESS NOTES
Assumed care of patient at 0700. A+Ox4, agitated at times today r/t length of stay in hospital. Reports pain 0/10 today.Rosado intact with hazy yellow output, sample obtained and sent to lab, results + and antibiotics ordered/administered per MAR. Handheld assist with cane to bathroom, Bed alarmed. Safety precautions in place, bed in lowest and locked position with call light in reach. Needs met at this time.

## 2020-12-04 NOTE — PROGRESS NOTES
Hospital Medicine Twice Weekly Progress Note    Date of Service  12/4/2020    Chief Complaint  85 y.o. male admitted 7/9/2020 with fatigue, N/V/D    Hospital Course  Mr. Bejarano is an 85-year-old male who presented to the emergency department on 7/9/2020 with cough, fatigue/generalized weakness, loss of appetite, and nausea/vomiting/diarrhea.  COVID screening was positive and he was also found to have acute kidney injury and a lactic acid level of 6.1.  He was admitted to the intensive care unit with dehydration and lactic acidosis.  Large-volume crystalloid resuscitation was given.  Chest x-ray was negative for pneumonia but did have changes consistent with COPD.  He quickly stabilized and was transferred out to the floor on 7/10/2020.  He had issues with urinary retention and eventually agreed to have a méndez placed in early August.  Tamsulosin and finasteride were started at that time.  He was also evaluated by psychiatry on 8/3/2020 who deemed him incapacitated to make any medical decisions.  Of additional note a right upper quadrant ultrasound was completed due to periumbilical pain and revealed a possible renal mass.  A CT renal with and without contrast was performed and showed a large renal mass likely representing urothelial versus renal cell carcinoma.  Unfortunately given his low weight and BMI, malnutrition, and neurocognitive disorder, he was deemed a poor candidate for potential chemotherapy or surgical decompression. Dr. Caruso was consulted on 11/6/2020 and recommended outpatient follow-up and continuing the chronic méndez.  He is now pending guardianship and placement.       Interval Problem Update  VSS and WNL on RA  Independent  Awaits placement  Voiding  Tolerating a diet  Urine cx late Nov c/w infection; does not appear to have symptoms  Méndez changed and repeat U/A again c/w infection  Patient is quite upset about his prolonged stay. Denies he has any medical problems - but is really denying  pain/symptoms. Does not appear to recall he has a kidney mass or méndez.    Consultants/Specialty  NONE    Code Status  DNAR/DNI    Disposition  Awaits [facility] placement as estranged from sons and S.O. does not appear have option to take him back    Review of Systems  Review of Systems   Unable to perform ROS: Mental acuity        Physical Exam  Temp:  [36.3 °C (97.4 °F)-36.7 °C (98.1 °F)] 36.7 °C (98.1 °F)  Pulse:  [63-67] 67  Resp:  [16-18] 16  BP: (107-112)/(42-49) 112/42  SpO2:  [93 %-95 %] 95 %    Physical Exam  Vitals signs and nursing note reviewed.   HENT:      Nose: Nose normal.      Mouth/Throat:      Mouth: Mucous membranes are moist.   Eyes:      Pupils: Pupils are equal, round, and reactive to light.   Neck:      Musculoskeletal: Normal range of motion.   Cardiovascular:      Rate and Rhythm: Normal rate and regular rhythm.      Pulses: Normal pulses.      Heart sounds: Normal heart sounds.   Pulmonary:      Effort: Pulmonary effort is normal.      Breath sounds: Normal breath sounds.   Abdominal:      General: Bowel sounds are normal.   Musculoskeletal: Normal range of motion.   Skin:     General: Skin is warm and dry.      Capillary Refill: Capillary refill takes 2 to 3 seconds.   Neurological:      Mental Status: He is alert. Mental status is at baseline.         Fluids    Intake/Output Summary (Last 24 hours) at 12/4/2020 0832  Last data filed at 12/4/2020 0638  Gross per 24 hour   Intake 840 ml   Output 1730 ml   Net -890 ml       Laboratory                        Imaging       Assessment/Plan  Neurocognitive disorder, unspecified- (present on admission)  Assessment & Plan  -Psychiatry was consulted, patient incapacitated to make any medical decisions.  -Guardianship established. Needs placement to      Urinary retention- (present on admission)  Assessment & Plan  -Continue flomax and proscar  -Méndez catheter in place for urinary retention.  Failed previous attempts at removal.  -Follow up outpatient.   Will likely have to discharge with méndez.    Right kidney mass- (present on admission)  Assessment & Plan  -Suspicion for urothelial carcinoma vs renal cell carcinoma  -Patient failed voiding trial despite flomax/proscar.  -Poor candidate for chemo given neurocognitive disorder, lacking capacity, and requirement for guardianship.  -Urologist, Dr. Caruso, was consulted on 11/6 regarding renal mass. Recommended outpatient follow-up and continue chronic méndez. Please see consult note for further detail.    Type 2 diabetes mellitus (HCC)- (present on admission)  Assessment & Plan  -Hemoglobin A1c 6.8%.   -Continue diabetic diet.  -BS has been well controlled on lab work.  -Accu-Cheks only as needed for signs and symptoms of hypoglycemia or during acute illness.  -No need for insulin at this time    COPD (chronic obstructive pulmonary disease) ([facility])- (present on admission)  Assessment & Plan  -Stable on room air.  -Not in acute exacerbation    Abnormal urinalysis  Assessment & Plan  12/4 Change catheter  Repeat U/A w cx    Discharge planning issues  Assessment & Plan  -SW following  -Guardianship granted.  Will need placement    Abdominal aortic aneurysm (AAA) 3.0 cm to 5.5 cm in diameter in male ([facility])- (present on admission)  Assessment & Plan  -Infrarenal  -Noted as incidental finding on CT renal.  -Not a candidate for repair given neurocognitive disorder & lacking capacity.  -Continue good blood pressure control.    Cystitis without hematuria  Assessment & Plan  -UA checked for increasing confusion and acute suprapubic pain  -Started on Macrobid 11/8, urine cx resulted: Klebsiella oxytoca.  Sensitive to Macrobid.  -follow cx and adjust abx accordingly  -U/A again dirty 11/27, 12/4 - Restarted Macrobid x 14d for complicated UTI    Protein-calorie malnutrition (HCC)- (present on admission)  Assessment & Plan  -Body mass index is 20.79 kg/m². Significantly improved from 17 on admit.  -Encourage PO intake.  -Food  preferences.  -Continue TID supplements    HTN (hypertension)- (present on admission)  Assessment & Plan  -Continue norvasc.  -Well-managed on current regimen       VTE prophylaxis: ambulatory    I have performed a physical exam and reviewed and updated ROS and Plan today (12/4/2020). In review of previous notes, there are no changes except as documented above.     I certify that the patient requires continued medically necessary hospital services for the treatment of neurocognitive disorder. The patient will remain in the hospital for the foreseeable future.  Discharge may or may not occur in the next 20 days due to ongoing discharge delays due to no medically acceptable discharge option.

## 2020-12-05 PROCEDURE — 700102 HCHG RX REV CODE 250 W/ 637 OVERRIDE(OP): Performed by: NURSE PRACTITIONER

## 2020-12-05 PROCEDURE — 700102 HCHG RX REV CODE 250 W/ 637 OVERRIDE(OP): Performed by: HOSPITALIST

## 2020-12-05 PROCEDURE — A9270 NON-COVERED ITEM OR SERVICE: HCPCS | Performed by: HOSPITALIST

## 2020-12-05 PROCEDURE — A9270 NON-COVERED ITEM OR SERVICE: HCPCS | Performed by: NURSE PRACTITIONER

## 2020-12-05 PROCEDURE — 770006 HCHG ROOM/CARE - MED/SURG/GYN SEMI*

## 2020-12-05 RX ADMIN — QUETIAPINE FUMARATE 25 MG: 25 TABLET ORAL at 16:25

## 2020-12-05 RX ADMIN — THERA TABS 1 TABLET: TAB at 08:34

## 2020-12-05 RX ADMIN — NITROFURANTOIN MONOHYDRATE AND NITROFURANTOIN MACROCRYSTALLINE 100 MG: 75; 25 CAPSULE ORAL at 08:35

## 2020-12-05 RX ADMIN — TAMSULOSIN HYDROCHLORIDE 0.8 MG: 0.4 CAPSULE ORAL at 08:35

## 2020-12-05 RX ADMIN — AMLODIPINE BESYLATE 5 MG: 5 TABLET ORAL at 08:34

## 2020-12-05 RX ADMIN — FINASTERIDE 5 MG: 5 TABLET, FILM COATED ORAL at 08:35

## 2020-12-05 RX ADMIN — NITROFURANTOIN MONOHYDRATE AND NITROFURANTOIN MACROCRYSTALLINE 100 MG: 75; 25 CAPSULE ORAL at 16:25

## 2020-12-05 ASSESSMENT — PAIN DESCRIPTION - PAIN TYPE: TYPE: ACUTE PAIN

## 2020-12-05 NOTE — PROGRESS NOTES
Pt. Received sitting by the edge of bed awake orientedx4, denies any complaint of pain at this time. Noted Rosado to drian bag intact draining hazy yellow urine. VS noted. Educated about fall risks and precautions. Bed alarm in place. Needs attended.

## 2020-12-06 PROCEDURE — A9270 NON-COVERED ITEM OR SERVICE: HCPCS | Performed by: NURSE PRACTITIONER

## 2020-12-06 PROCEDURE — 700102 HCHG RX REV CODE 250 W/ 637 OVERRIDE(OP): Performed by: HOSPITALIST

## 2020-12-06 PROCEDURE — A9270 NON-COVERED ITEM OR SERVICE: HCPCS | Performed by: HOSPITALIST

## 2020-12-06 PROCEDURE — 700102 HCHG RX REV CODE 250 W/ 637 OVERRIDE(OP): Performed by: NURSE PRACTITIONER

## 2020-12-06 PROCEDURE — 770006 HCHG ROOM/CARE - MED/SURG/GYN SEMI*

## 2020-12-06 RX ADMIN — NITROFURANTOIN MONOHYDRATE AND NITROFURANTOIN MACROCRYSTALLINE 100 MG: 75; 25 CAPSULE ORAL at 07:30

## 2020-12-06 RX ADMIN — AMLODIPINE BESYLATE 5 MG: 5 TABLET ORAL at 08:04

## 2020-12-06 RX ADMIN — TAMSULOSIN HYDROCHLORIDE 0.8 MG: 0.4 CAPSULE ORAL at 08:04

## 2020-12-06 RX ADMIN — FINASTERIDE 5 MG: 5 TABLET, FILM COATED ORAL at 08:03

## 2020-12-06 RX ADMIN — NITROFURANTOIN MONOHYDRATE AND NITROFURANTOIN MACROCRYSTALLINE 100 MG: 75; 25 CAPSULE ORAL at 16:24

## 2020-12-06 RX ADMIN — THERA TABS 1 TABLET: TAB at 08:04

## 2020-12-06 RX ADMIN — QUETIAPINE FUMARATE 25 MG: 25 TABLET ORAL at 16:24

## 2020-12-06 ASSESSMENT — PAIN DESCRIPTION - PAIN TYPE: TYPE: ACUTE PAIN

## 2020-12-06 NOTE — PROGRESS NOTES
Received report and assumed care of patient (pt) at change of shift. Pt is A&Ox4. Pt reports no pain at this time.  Safety precautions in place and all needs met.

## 2020-12-06 NOTE — PROGRESS NOTES
Received bedside report from night shift RN. Patient is legally blind, ambulates with cane. Walked with patient around unit this afternoon. Patient tolerated well with standby assist. Patient is a high fall risk with history of multiple falls. Patient calls appropriately when needing assistance to bathroom or to sink. Safety precautions in place. Call light and belongings are within reach. Bed is in lowest/locked position. No further needs at this time.

## 2020-12-06 NOTE — PROGRESS NOTES
2 RN skin check complete with: EZ Donaldson.   Devices in place: Rosado.  Skin assessed under devices: yes.  Confirmed pressure ulcers found on: N/A.  New potential pressure ulcers noted on: N/A. Wound consult placed: No.  The following interventions in place: 2RN skin check, Rosado care, pressure redistribution mattress, and pillows for support/positioning    General: scattered moles  Ears:intact  Elbows:intact  Chest/Back:intact  Sacrum: intact  Lower Extremities:intact  Heels:boggy and intact

## 2020-12-07 PROCEDURE — 700102 HCHG RX REV CODE 250 W/ 637 OVERRIDE(OP): Performed by: NURSE PRACTITIONER

## 2020-12-07 PROCEDURE — 700102 HCHG RX REV CODE 250 W/ 637 OVERRIDE(OP): Performed by: HOSPITALIST

## 2020-12-07 PROCEDURE — 770006 HCHG ROOM/CARE - MED/SURG/GYN SEMI*

## 2020-12-07 PROCEDURE — A9270 NON-COVERED ITEM OR SERVICE: HCPCS | Performed by: HOSPITALIST

## 2020-12-07 PROCEDURE — A9270 NON-COVERED ITEM OR SERVICE: HCPCS | Performed by: NURSE PRACTITIONER

## 2020-12-07 RX ADMIN — QUETIAPINE FUMARATE 25 MG: 25 TABLET ORAL at 16:29

## 2020-12-07 RX ADMIN — TAMSULOSIN HYDROCHLORIDE 0.8 MG: 0.4 CAPSULE ORAL at 08:24

## 2020-12-07 RX ADMIN — NITROFURANTOIN MONOHYDRATE AND NITROFURANTOIN MACROCRYSTALLINE 100 MG: 75; 25 CAPSULE ORAL at 08:24

## 2020-12-07 RX ADMIN — THERA TABS 1 TABLET: TAB at 08:24

## 2020-12-07 RX ADMIN — FINASTERIDE 5 MG: 5 TABLET, FILM COATED ORAL at 08:24

## 2020-12-07 RX ADMIN — NITROFURANTOIN MONOHYDRATE AND NITROFURANTOIN MACROCRYSTALLINE 100 MG: 75; 25 CAPSULE ORAL at 16:29

## 2020-12-07 RX ADMIN — ERGOCALCIFEROL 50000 UNITS: 1.25 CAPSULE ORAL at 16:46

## 2020-12-07 ASSESSMENT — PAIN DESCRIPTION - PAIN TYPE: TYPE: ACUTE PAIN

## 2020-12-07 NOTE — PROGRESS NOTES
Received report and assumed care of patient (pt) at change of shift. Pt is A&Ox4. Pt has no complaints of pain, questions or concerns.  Safety precautions in place and all needs met at this time.

## 2020-12-07 NOTE — PROGRESS NOTES
Received bedside report from night shift RN. Patient is legally blind, ambulates with cane/standby assist/. Patient is a high fall risk with history of multiple falls. Patient calls appropriately when needing assistance to bathroom or to sink. Safety precautions in place. Call light and belongings are within reach. Bed is in lowest/locked position. No further needs at this time.

## 2020-12-08 PROCEDURE — A9270 NON-COVERED ITEM OR SERVICE: HCPCS | Performed by: HOSPITALIST

## 2020-12-08 PROCEDURE — 700102 HCHG RX REV CODE 250 W/ 637 OVERRIDE(OP): Performed by: HOSPITALIST

## 2020-12-08 PROCEDURE — A9270 NON-COVERED ITEM OR SERVICE: HCPCS | Performed by: NURSE PRACTITIONER

## 2020-12-08 PROCEDURE — 700102 HCHG RX REV CODE 250 W/ 637 OVERRIDE(OP): Performed by: NURSE PRACTITIONER

## 2020-12-08 PROCEDURE — 770006 HCHG ROOM/CARE - MED/SURG/GYN SEMI*

## 2020-12-08 PROCEDURE — 99231 SBSQ HOSP IP/OBS SF/LOW 25: CPT | Performed by: NURSE PRACTITIONER

## 2020-12-08 RX ADMIN — AMLODIPINE BESYLATE 5 MG: 5 TABLET ORAL at 08:04

## 2020-12-08 RX ADMIN — QUETIAPINE FUMARATE 25 MG: 25 TABLET ORAL at 16:50

## 2020-12-08 RX ADMIN — NITROFURANTOIN MONOHYDRATE AND NITROFURANTOIN MACROCRYSTALLINE 100 MG: 75; 25 CAPSULE ORAL at 08:04

## 2020-12-08 RX ADMIN — FINASTERIDE 5 MG: 5 TABLET, FILM COATED ORAL at 08:03

## 2020-12-08 RX ADMIN — NITROFURANTOIN MONOHYDRATE AND NITROFURANTOIN MACROCRYSTALLINE 100 MG: 75; 25 CAPSULE ORAL at 16:50

## 2020-12-08 RX ADMIN — THERA TABS 1 TABLET: TAB at 08:05

## 2020-12-08 RX ADMIN — TAMSULOSIN HYDROCHLORIDE 0.8 MG: 0.4 CAPSULE ORAL at 08:03

## 2020-12-08 ASSESSMENT — ENCOUNTER SYMPTOMS
PALPITATIONS: 0
CHILLS: 0
FEVER: 0
COUGH: 0
SHORTNESS OF BREATH: 0

## 2020-12-08 NOTE — PROGRESS NOTES
Patient alert/oriented x4 calm and pleasant,legally blind,denies pain,v/s stable,méndez is patent draining well,call light and personal belongings within reach,bed bed in low position and bed alarm on.

## 2020-12-08 NOTE — PROGRESS NOTES
Hospital Medicine Twice Weekly Progress Note    Date of Service  12/8/2020    Chief Complaint  Weakness, nausea, vomiting, diarrhea    Hospital Course  Mr. Bejarano is an 85-year-old male who presented to the emergency department on 7/9/2020 with cough, fatigue/generalized weakness, loss of appetite, and nausea/vomiting/diarrhea.  COVID screening was positive and he was also found to have acute kidney injury and a lactic acid level of 6.1.  He was admitted to the intensive care unit with dehydration and lactic acidosis.  Large-volume crystalloid resuscitation was given.  Chest x-ray was negative for pneumonia but did have changes consistent with COPD.  He quickly stabilized and was transferred out to the floor on 7/10/2020.  He had issues with urinary retention and eventually agreed to have a méndez placed in early August.  Tamsulosin and finasteride were started at that time.  He was also evaluated by psychiatry on 8/3/2020 who deemed him incapacitated to make any medical decisions.  Of additional note a right upper quadrant ultrasound was completed due to periumbilical pain and revealed a possible renal mass.  A CT renal with and without contrast was performed and showed a large renal mass likely representing urothelial versus renal cell carcinoma.  Unfortunately given his low weight and BMI, malnutrition, and neurocognitive disorder, he was deemed a poor candidate for potential chemotherapy or surgical decompression. Dr. Caruso was consulted on 11/6/2020 and recommended outpatient follow-up and continuing the chronic méndez.  He is now pending guardianship and placement.   Méndez was changed 12/4 for increased confusion and suprapubic pain. Culture grew Klebsiella oxytoca. 14-day course of Macrobid with end date 12/18.    Interval Problem Update  -offers no complaints  -VSS. Afebrile    Code Status  DNAR/DNI    Disposition  Group Home    Review of Systems  Review of Systems   Constitutional: Negative for chills and  fever.   HENT: Negative.    Respiratory: Negative for cough and shortness of breath.    Cardiovascular: Negative for chest pain and palpitations.   Genitourinary: Negative for dysuria and urgency.   All other systems reviewed and are negative.       Physical Exam  Temp:  [36.6 °C (97.9 °F)] 36.6 °C (97.9 °F)  Pulse:  [66-80] 80  Resp:  [16-17] 16  BP: (105-130)/(47-63) 105/63  SpO2:  [90 %-94 %] 90 %    Physical Exam  Vitals signs and nursing note reviewed.   Constitutional:       General: He is not in acute distress.     Appearance: He is not ill-appearing.   HENT:      Right Ear: Hearing normal.      Left Ear: Hearing normal.      Nose: Nose normal.      Mouth/Throat:      Lips: Pink.      Mouth: Mucous membranes are moist.   Neck:      Musculoskeletal: Normal range of motion.   Cardiovascular:      Rate and Rhythm: Normal rate.      Pulses: Normal pulses.      Heart sounds: Normal heart sounds.   Pulmonary:      Effort: Pulmonary effort is normal.      Breath sounds: Normal breath sounds.   Abdominal:      General: Bowel sounds are normal.      Palpations: Abdomen is soft.      Tenderness: There is no abdominal tenderness.   Genitourinary:     Comments: Rosado with clear yellow urine  Musculoskeletal: Normal range of motion.      Right lower leg: No edema.      Left lower leg: No edema.   Skin:     General: Skin is warm and dry.   Neurological:      Mental Status: He is alert.      Sensory: Sensation is intact.      Motor: Motor function is intact.   Psychiatric:         Attention and Perception: Attention normal.         Behavior: Behavior is cooperative.         Judgment: Judgment normal.         Fluids    Intake/Output Summary (Last 24 hours) at 12/8/2020 1129  Last data filed at 12/8/2020 0800  Gross per 24 hour   Intake 1560 ml   Output 2610 ml   Net -1050 ml       Laboratory                        Imaging  CT-RENAL WITH & W/O   Final Result      1.  Large complex right renal mass as detailed above measuring  approximately 7.8 x 7.1x  8.5 cm. Primary differential considerations include urothelial malignancy or renal cell carcinoma.   2.  No evidence of adenopathy or metastatic disease is seen.   3.  Distended urinary bladder. Right greater left hydroureteronephrosis.   4.  Severe atherosclerosis. 3.5 cm infrarenal abdominal aortic aneurysm.            US-RUQ   Final Result         1.  Echogenic liver compatible with fatty change versus fibrosis.   2.  Masslike structure in the right kidney, should be considered neoplastic unless proven otherwise, recommend follow-up 3 phase CT of the kidneys for further characterization   3.  Mild dilatation of the common bile duct, within expected limits given patient age.      These findings were discussed with the patient's clinician, Vin Mei, on 7/23/2020 7:34 AM.      DX-CHEST-PORTABLE (1 VIEW)   Final Result      1.  The lungs are hyperinflated suggesting emphysema/COPD.   2.  There is minimal predominantly lower lobe interstitial opacity which is most likely due to chronic scarring.           Assessment/Plan  Neurocognitive disorder, unspecified- (present on admission)  Assessment & Plan  -Psychiatry was consulted, patient incapacitated to make any medical decisions.  -Guardianship established. Needs placement to [facility]     Urinary retention- (present on admission)  Assessment & Plan  -Continue flomax and proscar  -Méndez catheter in place for urinary retention.  Failed previous attempts at removal.  -Follow up outpatient.  Will likely have to discharge with méndez.    Right kidney mass- (present on admission)  Assessment & Plan  -Suspicion for urothelial carcinoma vs renal cell carcinoma  -Patient failed voiding trial despite flomax/proscar.  -Poor candidate for chemo given neurocognitive disorder, lacking capacity, and requirement for guardianship.  -Urologist, Dr. Caruso, was consulted on 11/6 regarding renal mass. Recommended outpatient follow-up and continue chronic méndez.  Please see consult note for further detail.    Type 2 diabetes mellitus (HCC)- (present on admission)  Assessment & Plan  -Hemoglobin A1c 6.8%.   -Continue diabetic diet.  -Accu-Cheks only as needed for signs and symptoms of hypoglycemia or during acute illness.  -No need for insulin at this time    COPD (chronic obstructive pulmonary disease) (HCC)- (present on admission)  Assessment & Plan  -Stable on room air.  -Not in acute exacerbation    Abnormal urinalysis  Assessment & Plan  -méndez catheter changed 12/4  -Klebsiella oxytoca   -ABX until 12/18      Discharge planning issues  Assessment & Plan  -SW following  -Guardianship granted.  Will need placement    Abdominal aortic aneurysm (AAA) 3.0 cm to 5.5 cm in diameter in male ([facility])- (present on admission)  Assessment & Plan  -Infrarenal  -Noted as incidental finding on CT renal.  -Not a candidate for repair given neurocognitive disorder & lacking capacity.  -Continue good blood pressure control.    Cystitis without hematuria  Assessment & Plan  -UA checked for increasing confusion and acute suprapubic pain  -Macrobid x 14 days with end date 12/18 (complicated UTI)      Protein-calorie malnutrition (HCC)- (present on admission)  Assessment & Plan  -Body mass index is 20.79 kg/m². Significantly improved from 17 on admit.  -Encourage PO intake.  -Food preferences.  -Continue TID supplements    HTN (hypertension)- (present on admission)  Assessment & Plan  -Continue norvasc.  -Well-managed on current regimen       VTE prophylaxis: Ambulatory    I have performed a physical exam and reviewed and updated ROS and Assessment/Plan today (12/08/20). In review of the previous note there are no changes except as documented above    I certify the patient requires continued medically necessary hospital services for the treatment of cognitive disorder. The patient will remain in the hospital for the foreseeable future.  Discharge may or may not occur in the next 20 days due to  ongoing discharge delays due to having no medically acceptable discharge options.    Please note that this dictation was created using voice recognition software. I have made every reasonable attempt to correct obvious errors, but there may be errors of grammar and possibly content that I did not discover before finalizing the note.    MICK Guerrero.

## 2020-12-08 NOTE — PROGRESS NOTES
Received report and assumed care of pt. Assessment complete on RA. Pt A&OX4. Denies any pain or discomfort at this time. Pt currently sleeping in bed. All pt needs met at this time. Safety precautions and hourly rounding in place.

## 2020-12-09 PROCEDURE — A9270 NON-COVERED ITEM OR SERVICE: HCPCS | Performed by: HOSPITALIST

## 2020-12-09 PROCEDURE — 700102 HCHG RX REV CODE 250 W/ 637 OVERRIDE(OP): Performed by: NURSE PRACTITIONER

## 2020-12-09 PROCEDURE — A9270 NON-COVERED ITEM OR SERVICE: HCPCS | Performed by: NURSE PRACTITIONER

## 2020-12-09 PROCEDURE — 700102 HCHG RX REV CODE 250 W/ 637 OVERRIDE(OP): Performed by: HOSPITALIST

## 2020-12-09 PROCEDURE — 770006 HCHG ROOM/CARE - MED/SURG/GYN SEMI*

## 2020-12-09 RX ADMIN — AMLODIPINE BESYLATE 5 MG: 5 TABLET ORAL at 09:47

## 2020-12-09 RX ADMIN — TAMSULOSIN HYDROCHLORIDE 0.8 MG: 0.4 CAPSULE ORAL at 09:47

## 2020-12-09 RX ADMIN — THERA TABS 1 TABLET: TAB at 09:47

## 2020-12-09 RX ADMIN — FINASTERIDE 5 MG: 5 TABLET, FILM COATED ORAL at 09:47

## 2020-12-09 RX ADMIN — NITROFURANTOIN MONOHYDRATE AND NITROFURANTOIN MACROCRYSTALLINE 100 MG: 75; 25 CAPSULE ORAL at 17:43

## 2020-12-09 RX ADMIN — QUETIAPINE FUMARATE 25 MG: 25 TABLET ORAL at 17:42

## 2020-12-09 RX ADMIN — NITROFURANTOIN MONOHYDRATE AND NITROFURANTOIN MACROCRYSTALLINE 100 MG: 75; 25 CAPSULE ORAL at 11:07

## 2020-12-09 ASSESSMENT — PAIN DESCRIPTION - PAIN TYPE: TYPE: ACUTE PAIN

## 2020-12-09 NOTE — PROGRESS NOTES
Pt is calm, denies any pain or discomfort.Alert and oriented x 4.Use of call light reinforced to make needs known.Call light within reach.Will continue to monitor and assess needs and safety.

## 2020-12-09 NOTE — PROGRESS NOTES
Received report from night shift and assumed care. Assessment completed, POC discussed. Pt is A&OX 4, calm and cooperative with staff.  Appropriate use of call light. Up stand by assist, ambulates with single point cane.  Reports pain 0/10.  Rosado catheter in place. Medication given per MAR. All needs met. Safety precautions and hourly rounding in place.

## 2020-12-10 PROCEDURE — 700102 HCHG RX REV CODE 250 W/ 637 OVERRIDE(OP): Performed by: NURSE PRACTITIONER

## 2020-12-10 PROCEDURE — 770006 HCHG ROOM/CARE - MED/SURG/GYN SEMI*

## 2020-12-10 PROCEDURE — A9270 NON-COVERED ITEM OR SERVICE: HCPCS | Performed by: NURSE PRACTITIONER

## 2020-12-10 PROCEDURE — A9270 NON-COVERED ITEM OR SERVICE: HCPCS | Performed by: HOSPITALIST

## 2020-12-10 PROCEDURE — 700102 HCHG RX REV CODE 250 W/ 637 OVERRIDE(OP): Performed by: HOSPITALIST

## 2020-12-10 RX ADMIN — FINASTERIDE 5 MG: 5 TABLET, FILM COATED ORAL at 09:45

## 2020-12-10 RX ADMIN — THERA TABS 1 TABLET: TAB at 09:46

## 2020-12-10 RX ADMIN — TAMSULOSIN HYDROCHLORIDE 0.8 MG: 0.4 CAPSULE ORAL at 09:46

## 2020-12-10 RX ADMIN — NITROFURANTOIN MONOHYDRATE AND NITROFURANTOIN MACROCRYSTALLINE 100 MG: 75; 25 CAPSULE ORAL at 16:54

## 2020-12-10 RX ADMIN — AMLODIPINE BESYLATE 5 MG: 5 TABLET ORAL at 09:46

## 2020-12-10 RX ADMIN — NITROFURANTOIN MONOHYDRATE AND NITROFURANTOIN MACROCRYSTALLINE 100 MG: 75; 25 CAPSULE ORAL at 09:46

## 2020-12-10 ASSESSMENT — PAIN DESCRIPTION - PAIN TYPE
TYPE: ACUTE PAIN

## 2020-12-10 NOTE — PROGRESS NOTES
Received report and assumed care at shift change. Patient is A&O x 4, cooperative with cares. No complain of pain or distress. Fall precautions in place, bed locked and in lowest position. Needs attended to.

## 2020-12-10 NOTE — PROGRESS NOTES
Pharmacy Pharmacotherapy Consult for LOS >30 days    Admit Date: 7/9/2020      Medications were reviewed for appropriateness and ongoing need.     Current Facility-Administered Medications   Medication Dose Route Frequency Provider Last Rate Last Admin   • nitrofurantoin (MACROBID) capsule 100 mg  100 mg Oral BID WITH MEALS Jose Juan M Olde, A.P.N.   100 mg at 12/09/20 1107   • amLODIPine (NORVASC) tablet 5 mg  5 mg Oral Q DAY Jose Juan M Olde, A.P.N.   5 mg at 12/09/20 0947   • finasteride (PROSCAR) tablet 5 mg  5 mg Oral DAILY Jose Juan M Olde, A.P.N.   5 mg at 12/09/20 0947   • multivitamin (THERAGRAN) tablet 1 Tab  1 Tab Oral DAILY Jose Juan M Olde, A.P.N.   1 Tab at 12/09/20 0947   • QUEtiapine (SEROQUEL) tablet 25 mg  25 mg Oral Q EVENING Subani Raheem, M.D.   25 mg at 12/08/20 1650   • vitamin D (Ergocalciferol) (DRISDOL) capsule 50,000 Units  50,000 Units Oral Q7 DAYS Subani Raheem, M.D.   50,000 Units at 12/07/20 1646   • tamsulosin (FLOMAX) capsule 0.8 mg  0.8 mg Oral AFTER BREAKFAST Subani Raheem, M.D.   0.8 mg at 12/09/20 0947   • acetaminophen (TYLENOL) tablet 650 mg  650 mg Oral Q6HRS PRN Darin Valencia M.D.   650 mg at 09/09/20 2010       Recommendations:  Recommend d/c acetaminophen as patient has not used in three months.    Jerilyn Mendiola, PharmD

## 2020-12-10 NOTE — PROGRESS NOTES
2 RN skin check completed with No HUI.   Devices in place Rosado catheter.  Skin assessed under devices yes.  Confirmed pressure ulcers found on none.  New potential pressure ulcers noted on none. Wound consult placed N/A.    The following interventions in place 2 RN skin check, Rosado catheter care, pressure redistribution mattress, pillows for support.     Skin assessment:  Urinary meatus: intact  Bilateral elbows pink and blanching  BLE dry, flaky skin with discoloration  Sacrum intact  Bilateral heels pink and blanching  *

## 2020-12-11 PROCEDURE — A9270 NON-COVERED ITEM OR SERVICE: HCPCS | Performed by: HOSPITALIST

## 2020-12-11 PROCEDURE — 700102 HCHG RX REV CODE 250 W/ 637 OVERRIDE(OP): Performed by: NURSE PRACTITIONER

## 2020-12-11 PROCEDURE — A9270 NON-COVERED ITEM OR SERVICE: HCPCS | Performed by: NURSE PRACTITIONER

## 2020-12-11 PROCEDURE — 700102 HCHG RX REV CODE 250 W/ 637 OVERRIDE(OP): Performed by: HOSPITALIST

## 2020-12-11 PROCEDURE — 770006 HCHG ROOM/CARE - MED/SURG/GYN SEMI*

## 2020-12-11 PROCEDURE — 99231 SBSQ HOSP IP/OBS SF/LOW 25: CPT | Performed by: NURSE PRACTITIONER

## 2020-12-11 RX ADMIN — THERA TABS 1 TABLET: TAB at 07:42

## 2020-12-11 RX ADMIN — QUETIAPINE FUMARATE 25 MG: 25 TABLET ORAL at 16:36

## 2020-12-11 RX ADMIN — NITROFURANTOIN MONOHYDRATE AND NITROFURANTOIN MACROCRYSTALLINE 100 MG: 75; 25 CAPSULE ORAL at 16:36

## 2020-12-11 RX ADMIN — AMLODIPINE BESYLATE 5 MG: 5 TABLET ORAL at 07:43

## 2020-12-11 RX ADMIN — TAMSULOSIN HYDROCHLORIDE 0.8 MG: 0.4 CAPSULE ORAL at 07:42

## 2020-12-11 RX ADMIN — FINASTERIDE 5 MG: 5 TABLET, FILM COATED ORAL at 07:42

## 2020-12-11 RX ADMIN — NITROFURANTOIN MONOHYDRATE AND NITROFURANTOIN MACROCRYSTALLINE 100 MG: 75; 25 CAPSULE ORAL at 07:42

## 2020-12-11 ASSESSMENT — ENCOUNTER SYMPTOMS
CHILLS: 0
COUGH: 0
FEVER: 0
PALPITATIONS: 0
SHORTNESS OF BREATH: 0

## 2020-12-11 ASSESSMENT — PAIN DESCRIPTION - PAIN TYPE: TYPE: ACUTE PAIN

## 2020-12-11 ASSESSMENT — FIBROSIS 4 INDEX: FIB4 SCORE: 2.95

## 2020-12-11 NOTE — PROGRESS NOTES
[facility] Medicine Twice Weekly Progress Note    Date of Service  12/11/2020    Chief Complaint  Weakness, nausea, vomiting, diarrhea    Hospital Course  Mr. Bejarano is an 85-year-old male who presented to the emergency department on 7/9/2020 with cough, fatigue/generalized weakness, loss of appetite, and nausea/vomiting/diarrhea.  COVID screening was positive and he was also found to have acute kidney injury and a lactic acid level of 6.1.  He was admitted to the intensive care unit with dehydration and lactic acidosis.  Large-volume crystalloid resuscitation was given.  Chest x-ray was negative for pneumonia but did have changes consistent with COPD.  He quickly stabilized and was transferred out to the floor on 7/10/2020.  He had issues with urinary retention and eventually agreed to have a méndez placed in early August.  Tamsulosin and finasteride were started at that time.  He was also evaluated by psychiatry on 8/3/2020 who deemed him incapacitated to make any medical decisions.  Of additional note a right upper quadrant ultrasound was completed due to periumbilical pain and revealed a possible renal mass.  A CT renal with and without contrast was performed and showed a large renal mass likely representing urothelial versus renal cell carcinoma.  Unfortunately given his low weight and BMI, malnutrition, and neurocognitive disorder, he was deemed a poor candidate for potential chemotherapy or surgical decompression. Dr. Caruso was consulted on 11/6/2020 and recommended outpatient follow-up and continuing the chronic méndez.  He is now pending guardianship and placement.   Méndez was changed 12/4 for increased confusion and suprapubic pain. Culture grew Klebsiella oxytoca. 14-day course of Macrobid with end date 12/18.    Interval Problem Update  12/11 - impulsive at times. Requires bed alarm. Gait mildly unsteady when he first gets up.  -denies further UTI symptoms.    12/8 - offers no complaints  -VSS.  Afebrile    Code Status  DNAR/DNI    Disposition  Group Home    Review of Systems  Review of Systems   Constitutional: Negative for chills and fever.   HENT: Negative.    Respiratory: Negative for cough and shortness of breath.    Cardiovascular: Negative for chest pain and palpitations.   Genitourinary: Negative for dysuria and urgency.   All other systems reviewed and are negative.       Physical Exam  Temp:  [36.1 °C (97 °F)-37 °C (98.6 °F)] 37 °C (98.6 °F)  Pulse:  [68-74] 68  Resp:  [16-18] 18  BP: (111-129)/(45-78) 124/54  SpO2:  [93 %-98 %] 93 %    Physical Exam  Vitals signs and nursing note reviewed.   Constitutional:       General: He is not in acute distress.     Appearance: He is not ill-appearing.   HENT:      Right Ear: Hearing normal.      Left Ear: Hearing normal.      Nose: Nose normal.      Mouth/Throat:      Lips: Pink.      Mouth: Mucous membranes are moist.   Neck:      Musculoskeletal: Normal range of motion.   Cardiovascular:      Rate and Rhythm: Normal rate.      Pulses: Normal pulses.      Heart sounds: Normal heart sounds.   Pulmonary:      Effort: Pulmonary effort is normal.      Breath sounds: Normal breath sounds.   Abdominal:      General: Bowel sounds are normal.      Palpations: Abdomen is soft.      Tenderness: There is no abdominal tenderness.   Genitourinary:     Comments: Rosado with clear yellow urine  Musculoskeletal:      Right lower leg: No edema.      Left lower leg: No edema.      Comments: Generalized weakness   Skin:     General: Skin is warm and dry.   Neurological:      Mental Status: He is alert.      Sensory: Sensation is intact.      Motor: Motor function is intact.   Psychiatric:         Attention and Perception: Attention normal.         Behavior: Behavior is cooperative.         Cognition and Memory: Cognition is impaired.         Judgment: Judgment normal.         Fluids    Intake/Output Summary (Last 24 hours) at 12/11/2020 1331  Last data filed at 12/11/2020  1223  Gross per 24 hour   Intake 1287 ml   Output 1225 ml   Net 62 ml       Laboratory                        Imaging  CT-RENAL WITH & W/O   Final Result      1.  Large complex right renal mass as detailed above measuring approximately 7.8 x 7.1x  8.5 cm. Primary differential considerations include urothelial malignancy or renal cell carcinoma.   2.  No evidence of adenopathy or metastatic disease is seen.   3.  Distended urinary bladder. Right greater left hydroureteronephrosis.   4.  Severe atherosclerosis. 3.5 cm infrarenal abdominal aortic aneurysm.            US-RUQ   Final Result         1.  Echogenic liver compatible with fatty change versus fibrosis.   2.  Masslike structure in the right kidney, should be considered neoplastic unless proven otherwise, recommend follow-up 3 phase CT of the kidneys for further characterization   3.  Mild dilatation of the common bile duct, within expected limits given patient age.      These findings were discussed with the patient's clinician, Vin Mei, on 7/23/2020 7:34 AM.      DX-CHEST-PORTABLE (1 VIEW)   Final Result      1.  The lungs are hyperinflated suggesting emphysema/COPD.   2.  There is minimal predominantly lower lobe interstitial opacity which is most likely due to chronic scarring.           Assessment/Plan  Neurocognitive disorder, unspecified- (present on admission)  Assessment & Plan  -Psychiatry was consulted, patient incapacitated to make any medical decisions.  -Guardianship established. Needs placement to [facility]     Urinary retention- (present on admission)  Assessment & Plan  -Continue flomax and proscar  -Méndez catheter in place for urinary retention.  Failed previous attempts at removal.  -Follow up outpatient.  Will likely have to discharge with méndez.    Right kidney mass- (present on admission)  Assessment & Plan  -Suspicion for urothelial carcinoma vs renal cell carcinoma  -Patient failed voiding trial despite flomax/proscar.  -Poor candidate  for chemo given neurocognitive disorder, lacking capacity, and requirement for guardianship.  -Urologist, Dr. Caruso, was consulted on 11/6 regarding renal mass. Recommended outpatient follow-up and continue chronic méndez. Please see consult note for further detail.    Type 2 diabetes mellitus (HCC)- (present on admission)  Assessment & Plan  -Hemoglobin A1c 6.8%.   -Continue diabetic diet.  -Accu-Cheks only as needed for signs and symptoms of hypoglycemia or during acute illness.  -No need for insulin at this time    COPD (chronic obstructive pulmonary disease) ([facility])- (present on admission)  Assessment & Plan  -Stable on room air.  -Not in acute exacerbation    Abnormal urinalysis  Assessment & Plan  -méndez catheter changed 12/4  -Klebsiella oxytoca   -ABX until 12/18      Discharge planning issues  Assessment & Plan  -SW following  -Guardianship granted.  Will need placement    Abdominal aortic aneurysm (AAA) 3.0 cm to 5.5 cm in diameter in male ([facility])- (present on admission)  Assessment & Plan  -Infrarenal  -Noted as incidental finding on CT renal.  -Not a candidate for repair given neurocognitive disorder & lacking capacity.  -Continue good blood pressure control.    Cystitis without hematuria  Assessment & Plan  -UA checked for increasing confusion and acute suprapubic pain  -Macrobid x 14 days with end date 12/18 (complicated UTI)      Protein-calorie malnutrition (HCC)- (present on admission)  Assessment & Plan  -Body mass index is 20.79 kg/m². Significantly improved from 17 on admit.  -Encourage PO intake.  -Food preferences.  -Continue TID supplements    HTN (hypertension)- (present on admission)  Assessment & Plan  -Continue norvasc.  -Well-managed on current regimen       VTE prophylaxis: Ambulatory    I have performed a physical exam and reviewed and updated ROS and Assessment/Plan today (12/11/20). In review of the previous note there are no changes except as documented above    I certify the patient  requires continued medically necessary hospital services for the treatment of cognitive disorder. The patient will remain in the hospital for the foreseeable future.  Discharge may or may not occur in the next 20 days due to ongoing discharge delays due to having no medically acceptable discharge options.    Please note that this dictation was created using voice recognition software. I have made every reasonable attempt to correct obvious errors, but there may be errors of grammar and possibly content that I did not discover before finalizing the note.    MICK Guerrero.

## 2020-12-11 NOTE — PROGRESS NOTES
Pt. Received sitting on the edge of bed awake, alert orientedx4. Denies any complaint of pain at the time of assessment, noted indwelling méndez intact draining to a bag with yellowish to clear output. Educated about fall risks and precautions. Needs attended.

## 2020-12-11 NOTE — PROGRESS NOTES
2 RN skin check completed with [place] HUI.   Devices in place: Rosado catheter.  Skin assessed under devices yes.  Confirmed pressure ulcers found on none.   New potential pressure ulcers noted:  None.   Wound consult placed N/A.     The following interventions in place: 2 RN skin check, pillows for support, Rosado care, pressure redistribution mattress, patient repositions self.      Skin assessment:  Bilateral elbows: pink/blanching  Bilateral heels: pink/ blanching/ flaky  BLE: dusky/ dry/ flaky  Sacrum: intact  Urinary meatus: intact, scant dry blood noted on the tubing. Pt denies any pain

## 2020-12-11 NOTE — PROGRESS NOTES
2 RN skin check completed with Cayla HUI.   Devices in place Méndez catheter.  Skin assessed under devices yes.  Confirmed pressure ulcers found on none.  New potential pressure ulcers noted on none. Wound consult placed N/A.  Skin assessment:  -bilateral heels pink and blanching  -sacral area is intact  -dry flaky skin and some discoloration on BLE  -bilateral elbows pink and blanching  -area around the méndez intact no wound    The following interventions in place:  -2 RN skin check, méndez care done, pillows in use for support. moisturizer

## 2020-12-12 PROCEDURE — 770006 HCHG ROOM/CARE - MED/SURG/GYN SEMI*

## 2020-12-12 PROCEDURE — A9270 NON-COVERED ITEM OR SERVICE: HCPCS | Performed by: HOSPITALIST

## 2020-12-12 PROCEDURE — 700102 HCHG RX REV CODE 250 W/ 637 OVERRIDE(OP): Performed by: HOSPITALIST

## 2020-12-12 PROCEDURE — A9270 NON-COVERED ITEM OR SERVICE: HCPCS | Performed by: NURSE PRACTITIONER

## 2020-12-12 PROCEDURE — 700102 HCHG RX REV CODE 250 W/ 637 OVERRIDE(OP): Performed by: NURSE PRACTITIONER

## 2020-12-12 RX ADMIN — TAMSULOSIN HYDROCHLORIDE 0.8 MG: 0.4 CAPSULE ORAL at 07:49

## 2020-12-12 RX ADMIN — THERA TABS 1 TABLET: TAB at 07:49

## 2020-12-12 RX ADMIN — AMLODIPINE BESYLATE 5 MG: 5 TABLET ORAL at 07:49

## 2020-12-12 RX ADMIN — NITROFURANTOIN MONOHYDRATE AND NITROFURANTOIN MACROCRYSTALLINE 100 MG: 75; 25 CAPSULE ORAL at 07:49

## 2020-12-12 RX ADMIN — NITROFURANTOIN MONOHYDRATE AND NITROFURANTOIN MACROCRYSTALLINE 100 MG: 75; 25 CAPSULE ORAL at 16:13

## 2020-12-12 RX ADMIN — FINASTERIDE 5 MG: 5 TABLET, FILM COATED ORAL at 07:49

## 2020-12-12 ASSESSMENT — PAIN DESCRIPTION - PAIN TYPE
TYPE: ACUTE PAIN;CHRONIC PAIN
TYPE: ACUTE PAIN

## 2020-12-12 NOTE — PROGRESS NOTES
Pt alert orientedx4. Pt legally blind.  Denies any complaint of pain at the time of assessment, noted indwelling méndez intact draining to a bag with yellowish to clear output. Pt did not leave his room this shift. Educated about fall risks and precautions. Needs attended.

## 2020-12-13 PROCEDURE — 700102 HCHG RX REV CODE 250 W/ 637 OVERRIDE(OP): Performed by: HOSPITALIST

## 2020-12-13 PROCEDURE — A9270 NON-COVERED ITEM OR SERVICE: HCPCS | Performed by: NURSE PRACTITIONER

## 2020-12-13 PROCEDURE — A9270 NON-COVERED ITEM OR SERVICE: HCPCS | Performed by: HOSPITALIST

## 2020-12-13 PROCEDURE — 770006 HCHG ROOM/CARE - MED/SURG/GYN SEMI*

## 2020-12-13 PROCEDURE — 700102 HCHG RX REV CODE 250 W/ 637 OVERRIDE(OP): Performed by: NURSE PRACTITIONER

## 2020-12-13 RX ADMIN — FINASTERIDE 5 MG: 5 TABLET, FILM COATED ORAL at 07:42

## 2020-12-13 RX ADMIN — NITROFURANTOIN MONOHYDRATE AND NITROFURANTOIN MACROCRYSTALLINE 100 MG: 75; 25 CAPSULE ORAL at 07:41

## 2020-12-13 RX ADMIN — THERA TABS 1 TABLET: TAB at 07:41

## 2020-12-13 RX ADMIN — NITROFURANTOIN MONOHYDRATE AND NITROFURANTOIN MACROCRYSTALLINE 100 MG: 75; 25 CAPSULE ORAL at 17:44

## 2020-12-13 RX ADMIN — TAMSULOSIN HYDROCHLORIDE 0.8 MG: 0.4 CAPSULE ORAL at 07:42

## 2020-12-13 RX ADMIN — AMLODIPINE BESYLATE 5 MG: 5 TABLET ORAL at 07:41

## 2020-12-13 ASSESSMENT — PAIN DESCRIPTION - PAIN TYPE
TYPE: ACUTE PAIN
TYPE: ACUTE PAIN

## 2020-12-13 NOTE — PROGRESS NOTES
Assumed care of patient (pt) at change of shift. Pt A&O x 4. Pt denies pain and has no questions, concerns, or signs of distress. Pt up most of the night with light on setting the bed alarm off frequently. Pt informed that roommate was getting annoyed with this and asked if he could turn his light off. Pt declined. Pt finally lying in bed with eyes closed after 0300.

## 2020-12-14 PROCEDURE — A9270 NON-COVERED ITEM OR SERVICE: HCPCS | Performed by: NURSE PRACTITIONER

## 2020-12-14 PROCEDURE — 700102 HCHG RX REV CODE 250 W/ 637 OVERRIDE(OP): Performed by: NURSE PRACTITIONER

## 2020-12-14 PROCEDURE — 700102 HCHG RX REV CODE 250 W/ 637 OVERRIDE(OP): Performed by: HOSPITALIST

## 2020-12-14 PROCEDURE — A9270 NON-COVERED ITEM OR SERVICE: HCPCS | Performed by: HOSPITALIST

## 2020-12-14 PROCEDURE — 770006 HCHG ROOM/CARE - MED/SURG/GYN SEMI*

## 2020-12-14 RX ADMIN — ERGOCALCIFEROL 50000 UNITS: 1.25 CAPSULE ORAL at 14:41

## 2020-12-14 RX ADMIN — FINASTERIDE 5 MG: 5 TABLET, FILM COATED ORAL at 07:58

## 2020-12-14 RX ADMIN — NITROFURANTOIN MONOHYDRATE AND NITROFURANTOIN MACROCRYSTALLINE 100 MG: 75; 25 CAPSULE ORAL at 07:58

## 2020-12-14 RX ADMIN — NITROFURANTOIN MONOHYDRATE AND NITROFURANTOIN MACROCRYSTALLINE 100 MG: 75; 25 CAPSULE ORAL at 17:29

## 2020-12-14 RX ADMIN — TAMSULOSIN HYDROCHLORIDE 0.8 MG: 0.4 CAPSULE ORAL at 07:58

## 2020-12-14 RX ADMIN — THERA TABS 1 TABLET: TAB at 07:58

## 2020-12-14 RX ADMIN — AMLODIPINE BESYLATE 5 MG: 5 TABLET ORAL at 07:58

## 2020-12-14 ASSESSMENT — PAIN DESCRIPTION - PAIN TYPE: TYPE: ACUTE PAIN

## 2020-12-14 NOTE — PROGRESS NOTES
2 RN skin check complete with: EZ Leslie.   Devices in place: méndez catheter.  Skin assessed under devices: yes.  Confirmed pressure ulcers found on: n/a.  New potential pressure ulcers noted on: n/a. Wound consult placed: n/a.  The following interventions in place: 2RN skin check, pressure distribution mattress, pillows in use for support and positioning, méndez care, frequent toileting.    Generalized: intact, small nodule behind right knee, scattered moles  Ears: intact  Elbows: intact and blanching  Trunk: intact  Pannus: n/a  Sacrum/groin/laure area: intact  Lower Extremities: intact with discoloration to bilateral calves  Heels: pink and blanching

## 2020-12-14 NOTE — PROGRESS NOTES
Received report and assumed care of patient (pt) at change of shift. Pt is A&Ox4. Pt refusing seroquel because it makes him sleep too much. Pt very upset early in the evening due to bed alarm constantly going off and staff not letting him be without it. Pt cried and became agitated. Pt states he just wants to stand, he is tired of sitting, and at one point stated he wished he had a bullet. RN consoled pt and agreed to turn alarm off if patient agreed to call for assistance. RN or CNA stationed outside pt's room until pt feel asleep. Bed alarm put back on, but pt woke shortly after, needing to go to the bathroom. Pt remains emotional. Safety precautions in place and all needs met at this time.

## 2020-12-15 PROCEDURE — 700102 HCHG RX REV CODE 250 W/ 637 OVERRIDE(OP): Performed by: NURSE PRACTITIONER

## 2020-12-15 PROCEDURE — A9270 NON-COVERED ITEM OR SERVICE: HCPCS | Performed by: HOSPITALIST

## 2020-12-15 PROCEDURE — A9270 NON-COVERED ITEM OR SERVICE: HCPCS | Performed by: NURSE PRACTITIONER

## 2020-12-15 PROCEDURE — 700102 HCHG RX REV CODE 250 W/ 637 OVERRIDE(OP): Performed by: HOSPITALIST

## 2020-12-15 PROCEDURE — 99231 SBSQ HOSP IP/OBS SF/LOW 25: CPT | Performed by: NURSE PRACTITIONER

## 2020-12-15 PROCEDURE — 770006 HCHG ROOM/CARE - MED/SURG/GYN SEMI*

## 2020-12-15 RX ORDER — DIPHENHYDRAMINE HCL 25 MG
12.5 TABLET ORAL EVERY 6 HOURS PRN
Status: DISCONTINUED | OUTPATIENT
Start: 2020-12-15 | End: 2020-12-29 | Stop reason: HOSPADM

## 2020-12-15 RX ADMIN — AMLODIPINE BESYLATE 5 MG: 5 TABLET ORAL at 08:39

## 2020-12-15 RX ADMIN — THERA TABS 1 TABLET: TAB at 08:39

## 2020-12-15 RX ADMIN — TAMSULOSIN HYDROCHLORIDE 0.8 MG: 0.4 CAPSULE ORAL at 08:39

## 2020-12-15 RX ADMIN — NITROFURANTOIN MONOHYDRATE AND NITROFURANTOIN MACROCRYSTALLINE 100 MG: 75; 25 CAPSULE ORAL at 08:39

## 2020-12-15 RX ADMIN — FINASTERIDE 5 MG: 5 TABLET, FILM COATED ORAL at 08:39

## 2020-12-15 ASSESSMENT — ENCOUNTER SYMPTOMS
PALPITATIONS: 0
SHORTNESS OF BREATH: 0
CHILLS: 0
COUGH: 0
FEVER: 0

## 2020-12-15 NOTE — DISCHARGE PLANNING
Anticipated Discharge Disposition: Group Home    Action: Email from guardian PRETTY. Stated that patient has enough to pay for two months of care, including the $1800 [facility] will reimburse.  Also requested to know if any group homes that are accepting patients by [place].  Email to Alicia Zaragoza, Bartolo Clark is not taking patients at this time at [facility].  Unable to find other group homes around [place],  requested permission to start looking for other group homes.    Barriers to Discharge: placement    Plan: follow up with guardian

## 2020-12-15 NOTE — PROGRESS NOTES
Hospital Medicine Twice Weekly Progress Note    Date of Service  12/15/2020    Chief Complaint  Weakness, nausea, vomiting, diarrhea    Hospital Course  Mr. Bejarano is an 85-year-old male who presented to the emergency department on 7/9/2020 with cough, fatigue/generalized weakness, loss of appetite, and nausea/vomiting/diarrhea.  COVID screening was positive and he was also found to have acute kidney injury and a lactic acid level of 6.1.  He was admitted to the intensive care unit with dehydration and lactic acidosis.  Large-volume crystalloid resuscitation was given.  Chest x-ray was negative for pneumonia but did have changes consistent with COPD.  He quickly stabilized and was transferred out to the floor on 7/10/2020.  He had issues with urinary retention and eventually agreed to have a méndez placed in early August.  Tamsulosin and finasteride were started at that time.  He was also evaluated by psychiatry on 8/3/2020 who deemed him incapacitated to make any medical decisions.  Of additional note a right upper quadrant ultrasound was completed due to periumbilical pain and revealed a possible renal mass.  A CT renal with and without contrast was performed and showed a large renal mass likely representing urothelial versus renal cell carcinoma.  Unfortunately given his low weight and BMI, malnutrition, and neurocognitive disorder, he was deemed a poor candidate for potential chemotherapy or surgical decompression. Dr. Caruso was consulted on 11/6/2020 and recommended outpatient follow-up and continuing the chronic méndez.  He is now pending guardianship and placement.   Méndez was changed 12/4 for increased confusion and suprapubic pain. Culture grew Klebsiella oxytoca. 14-day course of Macrobid with end date 12/18.    Interval Problem Update  12/15- Patient sitting in bed, states he feels itchy, no noted rash. Will provide PRN medications to help. Continuing to work on placement at group home. Appreciate CORINA  assistance.     Code Status  DNAR/DNI    Disposition  Group Home    Review of Systems  Review of Systems   Constitutional: Negative for chills and fever.   HENT: Negative.    Respiratory: Negative for cough and shortness of breath.    Cardiovascular: Negative for chest pain and palpitations.   Genitourinary: Negative for dysuria and urgency.   All other systems reviewed and are negative.       Physical Exam  Temp:  [36.3 °C (97.3 °F)-36.6 °C (97.8 °F)] 36.3 °C (97.4 °F)  Pulse:  [64-75] 64  Resp:  [15-17] 16  BP: ()/(47-58) 114/47  SpO2:  [93 %-95 %] 93 %    Physical Exam  Vitals signs and nursing note reviewed.   Constitutional:       General: He is not in acute distress.     Appearance: He is not ill-appearing.   HENT:      Right Ear: Hearing normal.      Left Ear: Hearing normal.      Nose: Nose normal.      Mouth/Throat:      Lips: Pink.      Mouth: Mucous membranes are moist.   Neck:      Musculoskeletal: Normal range of motion.   Cardiovascular:      Rate and Rhythm: Normal rate.      Pulses: Normal pulses.      Heart sounds: Normal heart sounds.   Pulmonary:      Effort: Pulmonary effort is normal.      Breath sounds: Normal breath sounds.   Abdominal:      General: Bowel sounds are normal.      Palpations: Abdomen is soft.      Tenderness: There is no abdominal tenderness.   Genitourinary:     Comments: Rosado with clear yellow urine  Musculoskeletal:      Right lower leg: No edema.      Left lower leg: No edema.      Comments: Generalized weakness   Skin:     General: Skin is warm and dry.   Neurological:      Mental Status: He is alert.      Sensory: Sensation is intact.      Motor: Motor function is intact.   Psychiatric:         Attention and Perception: Attention normal.         Behavior: Behavior is cooperative.         Cognition and Memory: Cognition is impaired.         Judgment: Judgment normal.         Fluids    Intake/Output Summary (Last 24 hours) at 12/15/2020 4220  Last data filed at  12/15/2020 1200  Gross per 24 hour   Intake 1200 ml   Output 1800 ml   Net -600 ml       Laboratory                        Imaging  CT-RENAL WITH & W/O   Final Result      1.  Large complex right renal mass as detailed above measuring approximately 7.8 x 7.1x  8.5 cm. Primary differential considerations include urothelial malignancy or renal cell carcinoma.   2.  No evidence of adenopathy or metastatic disease is seen.   3.  Distended urinary bladder. Right greater left hydroureteronephrosis.   4.  Severe atherosclerosis. 3.5 cm infrarenal abdominal aortic aneurysm.            US-RUQ   Final Result         1.  Echogenic liver compatible with fatty change versus fibrosis.   2.  Masslike structure in the right kidney, should be considered neoplastic unless proven otherwise, recommend follow-up 3 phase CT of the kidneys for further characterization   3.  Mild dilatation of the common bile duct, within expected limits given patient age.      These findings were discussed with the patient's clinician, Vin Mei, on 7/23/2020 7:34 AM.      DX-CHEST-PORTABLE (1 VIEW)   Final Result      1.  The lungs are hyperinflated suggesting emphysema/COPD.   2.  There is minimal predominantly lower lobe interstitial opacity which is most likely due to chronic scarring.           Assessment/Plan  Neurocognitive disorder, unspecified- (present on admission)  Assessment & Plan  -Psychiatry was consulted, patient incapacitated to make any medical decisions.  -Guardianship established. Needs placement to [facility]     Urinary retention- (present on admission)  Assessment & Plan  -Continue flomax and proscar  -Méndez catheter in place for urinary retention.  Failed previous attempts at removal.  -Follow up outpatient.  Will likely have to discharge with méndez.    Right kidney mass- (present on admission)  Assessment & Plan  -Suspicion for urothelial carcinoma vs renal cell carcinoma  -Patient failed voiding trial despite  flomax/proscar.  -Poor candidate for chemo given neurocognitive disorder, lacking capacity, and requirement for guardianship.  -Urologist, Dr. Caruso, was consulted on 11/6 regarding renal mass. Recommended outpatient follow-up and continue chronic méndez. Please see consult note for further detail.    Type 2 diabetes mellitus (HCC)- (present on admission)  Assessment & Plan  -Hemoglobin A1c 6.8%.   -Continue diabetic diet.  -Accu-Cheks only as needed for signs and symptoms of hypoglycemia or during acute illness.  -No need for insulin at this time    COPD (chronic obstructive pulmonary disease) ([facility])- (present on admission)  Assessment & Plan  -Stable on room air.  -Not in acute exacerbation    Abnormal urinalysis  Assessment & Plan  -méndez catheter changed 12/4  -Klebsiella oxytoca   -ABX until 12/18      Discharge planning issues  Assessment & Plan  -SW following  -Guardianship granted.  Will need placement    Abdominal aortic aneurysm (AAA) 3.0 cm to 5.5 cm in diameter in male ([facility])- (present on admission)  Assessment & Plan  -Infrarenal  -Noted as incidental finding on CT renal.  -Not a candidate for repair given neurocognitive disorder & lacking capacity.  -Continue good blood pressure control.    Cystitis without hematuria  Assessment & Plan  -UA checked for increasing confusion and acute suprapubic pain  -Macrobid x 14 days with end date 12/18 (complicated UTI)      Protein-calorie malnutrition (HCC)- (present on admission)  Assessment & Plan  -Body mass index is 20.79 kg/m². Significantly improved from 17 on admit.  -Encourage PO intake.  -Food preferences.  -Continue TID supplements    HTN (hypertension)- (present on admission)  Assessment & Plan  -Continue norvasc.  -Well-managed on current regimen       VTE prophylaxis: Ambulatory    I have performed a physical exam and reviewed and updated ROS and Assessment/Plan today 12/15/2020. In review of the previous note there are no changes except as documented  above    I certify the patient requires continued medically necessary hospital services for the treatment of cognitive disorder. The patient will remain in the hospital for the foreseeable future.  Discharge may or may not occur in the next 20 days due to ongoing discharge delays due to having no medically acceptable discharge options.      MICK Herrera.

## 2020-12-15 NOTE — PROGRESS NOTES
Received report and assumed care of patient (pt) at change of shift. Pt is A&Ox4. Pt ambulated in hallway at beginning of shift. Pt has no complaints of pain, but is very irritated at bed alarm being on. Pt wants bed alarm off, but has had previous falls during this admission. Pt had alarm off while awake but this writer put alarm back on when pt fell asleep. Pt woke up and set alarm off and pt's roommate got upset. The pt started yelling at roommate that it wasn't his fault we put the alarm on. Pt was cussing at CNA and roommate. Turned alarm back off and CNA sat outside pt's room.  Bed is locked in lowest position, but alarm is currently off. No other questions or concerns expressed at this time. All needs met.

## 2020-12-15 NOTE — DISCHARGE PLANNING
Medical Social Work  Email from PRETTY Zaragoza, given permission to locate a group home    PC to [place], [phone]; message left to call GABBIE   PC to [facility], [phone]; Gabbie spoke to [facility]  have an opening for a shared male room.  GABBIE went over patient's needs, can take patient with a méndez will need Home Health.    Email to PRETTY Zaragoza with group home information.

## 2020-12-16 PROCEDURE — A9270 NON-COVERED ITEM OR SERVICE: HCPCS | Performed by: NURSE PRACTITIONER

## 2020-12-16 PROCEDURE — A9270 NON-COVERED ITEM OR SERVICE: HCPCS | Performed by: HOSPITALIST

## 2020-12-16 PROCEDURE — 700102 HCHG RX REV CODE 250 W/ 637 OVERRIDE(OP): Performed by: HOSPITALIST

## 2020-12-16 PROCEDURE — C9803 HOPD COVID-19 SPEC COLLECT: HCPCS | Performed by: NURSE PRACTITIONER

## 2020-12-16 PROCEDURE — 700102 HCHG RX REV CODE 250 W/ 637 OVERRIDE(OP): Performed by: NURSE PRACTITIONER

## 2020-12-16 PROCEDURE — 770006 HCHG ROOM/CARE - MED/SURG/GYN SEMI*

## 2020-12-16 PROCEDURE — U0003 INFECTIOUS AGENT DETECTION BY NUCLEIC ACID (DNA OR RNA); SEVERE ACUTE RESPIRATORY SYNDROME CORONAVIRUS 2 (SARS-COV-2) (CORONAVIRUS DISEASE [COVID-19]), AMPLIFIED PROBE TECHNIQUE, MAKING USE OF HIGH THROUGHPUT TECHNOLOGIES AS DESCRIBED BY CMS-2020-01-R: HCPCS

## 2020-12-16 RX ADMIN — NITROFURANTOIN MONOHYDRATE AND NITROFURANTOIN MACROCRYSTALLINE 100 MG: 75; 25 CAPSULE ORAL at 17:39

## 2020-12-16 RX ADMIN — TAMSULOSIN HYDROCHLORIDE 0.8 MG: 0.4 CAPSULE ORAL at 08:31

## 2020-12-16 RX ADMIN — FINASTERIDE 5 MG: 5 TABLET, FILM COATED ORAL at 08:31

## 2020-12-16 RX ADMIN — NITROFURANTOIN MONOHYDRATE AND NITROFURANTOIN MACROCRYSTALLINE 100 MG: 75; 25 CAPSULE ORAL at 08:31

## 2020-12-16 RX ADMIN — THERA TABS 1 TABLET: TAB at 08:31

## 2020-12-16 RX ADMIN — AMLODIPINE BESYLATE 5 MG: 5 TABLET ORAL at 08:31

## 2020-12-16 NOTE — CARE PLAN
Problem: Safety  Goal: Will remain free from injury  Outcome: PROGRESSING AS EXPECTED  Note: No fall. Bed alarm on. Ax1 with the cane. Call light appropriate.      Problem: Urinary Elimination:  Goal: Ability to reestablish a normal urinary elimination pattern will improve  Outcome: PROGRESSING AS EXPECTED  Note: Rosado patent and drain adequate UOP.

## 2020-12-16 NOTE — PROGRESS NOTES
AOx4. Denies pain. Rosado patent. Bed alarm on. Able to make needs known. Patient argued with his roommate and security was called. Patient was up all night and did not want to sleep.

## 2020-12-16 NOTE — DISCHARGE PLANNING
Medical Social Work  Patient has been accepted by [facility].  Patient will need a negative COVID, TB, medications filled and Home Health through [place].  Can accept patient on Monday after 12:00.    ED Hopkins was notified of the above.

## 2020-12-17 PROCEDURE — A9270 NON-COVERED ITEM OR SERVICE: HCPCS | Performed by: HOSPITALIST

## 2020-12-17 PROCEDURE — 700102 HCHG RX REV CODE 250 W/ 637 OVERRIDE(OP): Performed by: NURSE PRACTITIONER

## 2020-12-17 PROCEDURE — 770006 HCHG ROOM/CARE - MED/SURG/GYN SEMI*

## 2020-12-17 PROCEDURE — 700102 HCHG RX REV CODE 250 W/ 637 OVERRIDE(OP): Performed by: HOSPITALIST

## 2020-12-17 PROCEDURE — A9270 NON-COVERED ITEM OR SERVICE: HCPCS | Performed by: NURSE PRACTITIONER

## 2020-12-17 RX ADMIN — AMLODIPINE BESYLATE 5 MG: 5 TABLET ORAL at 08:47

## 2020-12-17 RX ADMIN — NITROFURANTOIN MONOHYDRATE AND NITROFURANTOIN MACROCRYSTALLINE 100 MG: 75; 25 CAPSULE ORAL at 17:30

## 2020-12-17 RX ADMIN — TAMSULOSIN HYDROCHLORIDE 0.8 MG: 0.4 CAPSULE ORAL at 08:47

## 2020-12-17 RX ADMIN — NITROFURANTOIN MONOHYDRATE AND NITROFURANTOIN MACROCRYSTALLINE 100 MG: 75; 25 CAPSULE ORAL at 08:51

## 2020-12-17 RX ADMIN — THERA TABS 1 TABLET: TAB at 08:47

## 2020-12-17 RX ADMIN — QUETIAPINE FUMARATE 25 MG: 25 TABLET ORAL at 17:30

## 2020-12-17 RX ADMIN — FINASTERIDE 5 MG: 5 TABLET, FILM COATED ORAL at 08:47

## 2020-12-17 ASSESSMENT — PAIN DESCRIPTION - PAIN TYPE: TYPE: ACUTE PAIN

## 2020-12-17 NOTE — CARE PLAN
Problem: Safety  Goal: Will remain free from falls  Outcome: PROGRESSING SLOWER THAN EXPECTED  Note: History of falls. Desk bed. Bed alarm on, call light within reach & hourly rounding in place. Treaded slipper socks on. Encouraged to ask for assistance prior to getting out of bed.     Problem: Urinary Elimination:  Goal: Ability to reestablish a normal urinary elimination pattern will improve  Outcome: PROGRESSING SLOWER THAN EXPECTED  Note: Rosado catheter in place. Assessing for ability to remove.

## 2020-12-17 NOTE — DISCHARGE PLANNING
Medical Social Work  PC to CORINA Mcgrath was able to verify that she will be submitting the group home waiver once patient has done a spend down.  Will also submit application for Geriatric Specialties, Dr. García.  CORINA Vargas we are looking at d/c on Monday at 1:00    Faxed Choice to [facility] for Home Health, with address of [facility] [place] and who would be the primary doctor

## 2020-12-17 NOTE — PROGRESS NOTES
AAOx4. Currently sitting at edge of bed. C/o 0/10 pain, declining intervention at this time. Medications taken w/o difficulty. -N/V. -N/T. Denies new onset of chest pain/SOB. +BS in all 4 quadrants, last BM yesterday. 1x person assist with FWW. POC discussed, denies further needs at this time. Fall precautions in place. Bed alarm on, call light within reach & hourly rounding in place.

## 2020-12-17 NOTE — FACE TO FACE
Face to Face Supporting Documentation - Home Health    The encounter with this patient was in whole or in part the primary reason for home health admission.    Date of encounter:   Patient:                    MRN:                       YOB: 2020  Ovi Bejarano  [id number]  1935     Home health to see patient for:  Skilled Nursing care for assessment, interventions & education    Skilled need for:  New Onset Medical Diagnosis urinary retnention     Skilled nursing interventions to include:  Line/Drain/Airway education and care    Homebound status evidenced by:  Require the use of special transportation or Needs the assistance of another person in order to leave the home. Leaving home requires a considerable and taxing effort. There is a normal inability to leave the home.    Community Physician to provide follow up care: Shonda Mcclure M.D.     Optional Interventions? No      I certify the face to face encounter for this home health care referral meets the CMS requirements and the encounter/clinical assessment with the patient was, in whole, or in part, for the medical condition(s) listed above, which is the primary reason for home health care. Based on my clinical findings: the service(s) are medically necessary, support the need for home health care, and the homebound criteria are met.  I certify that this patient has had a face to face encounter by myself.  MICK Herrera. - NPI: [id number]

## 2020-12-17 NOTE — PROGRESS NOTES
AOx4. Denies pain. Rosado patent. Bed alarm on. Able to make needs known. Patient appear to be sleeping in between cares.
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
## 2020-12-18 PROCEDURE — A9270 NON-COVERED ITEM OR SERVICE: HCPCS | Performed by: HOSPITALIST

## 2020-12-18 PROCEDURE — A9270 NON-COVERED ITEM OR SERVICE: HCPCS | Performed by: NURSE PRACTITIONER

## 2020-12-18 PROCEDURE — 700102 HCHG RX REV CODE 250 W/ 637 OVERRIDE(OP): Performed by: NURSE PRACTITIONER

## 2020-12-18 PROCEDURE — 770006 HCHG ROOM/CARE - MED/SURG/GYN SEMI*

## 2020-12-18 PROCEDURE — 700102 HCHG RX REV CODE 250 W/ 637 OVERRIDE(OP): Performed by: HOSPITALIST

## 2020-12-18 RX ADMIN — THERA TABS 1 TABLET: TAB at 10:24

## 2020-12-18 RX ADMIN — FINASTERIDE 5 MG: 5 TABLET, FILM COATED ORAL at 10:24

## 2020-12-18 RX ADMIN — AMLODIPINE BESYLATE 5 MG: 5 TABLET ORAL at 10:24

## 2020-12-18 RX ADMIN — NITROFURANTOIN MONOHYDRATE AND NITROFURANTOIN MACROCRYSTALLINE 100 MG: 75; 25 CAPSULE ORAL at 10:23

## 2020-12-18 RX ADMIN — TAMSULOSIN HYDROCHLORIDE 0.8 MG: 0.4 CAPSULE ORAL at 10:23

## 2020-12-18 ASSESSMENT — PAIN DESCRIPTION - PAIN TYPE: TYPE: ACUTE PAIN
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
## 2020-12-18 NOTE — DISCHARGE PLANNING
@[id number]  Agency/Facility Name: [facility]  Spoke To: Admission  Outcome: Declined due to Dr prefers other  agency and has a problem signing orders.    Received Choice form at [phone]  Agency/Facility Name: [facility]  Referral sent per Choice form @ [phone]

## 2020-12-19 PROCEDURE — 700102 HCHG RX REV CODE 250 W/ 637 OVERRIDE(OP): Performed by: HOSPITALIST

## 2020-12-19 PROCEDURE — A9270 NON-COVERED ITEM OR SERVICE: HCPCS | Performed by: HOSPITALIST

## 2020-12-19 PROCEDURE — A9270 NON-COVERED ITEM OR SERVICE: HCPCS | Performed by: NURSE PRACTITIONER

## 2020-12-19 PROCEDURE — 770006 HCHG ROOM/CARE - MED/SURG/GYN SEMI*

## 2020-12-19 PROCEDURE — 700102 HCHG RX REV CODE 250 W/ 637 OVERRIDE(OP): Performed by: NURSE PRACTITIONER

## 2020-12-19 RX ADMIN — AMLODIPINE BESYLATE 5 MG: 5 TABLET ORAL at 08:32

## 2020-12-19 RX ADMIN — TAMSULOSIN HYDROCHLORIDE 0.8 MG: 0.4 CAPSULE ORAL at 08:32

## 2020-12-19 RX ADMIN — THERA TABS 1 TABLET: TAB at 08:32

## 2020-12-19 RX ADMIN — FINASTERIDE 5 MG: 5 TABLET, FILM COATED ORAL at 08:32

## 2020-12-19 NOTE — PROGRESS NOTES
Report received by dayshift RN. Assumed care of pt. Assessment complete. Pt A&Ox4, VSS and on RA. Pt in no apparent signs of distress, denies pain. Rosado catheter in place draining clear, yellow urine. Plan of care discussed. Call light within reach, bed in lowest position, and pt has no further questions at this time.

## 2020-12-19 NOTE — PROGRESS NOTES
AAOx4. Currently sitting at edge of bed. C/o 0/10 pain, declining intervention at this time. -N/V. -N/T. Denies new onset of chest pain/SOB. +BS in all 4 quadrants, last BM yesterday. 1x person assist with single point cane. POC discussed, denies further needs at this time. Fall precautions in place. Bed alarm on, call light within reach & hourly rounding in place.

## 2020-12-20 PROCEDURE — A9270 NON-COVERED ITEM OR SERVICE: HCPCS | Performed by: NURSE PRACTITIONER

## 2020-12-20 PROCEDURE — 700102 HCHG RX REV CODE 250 W/ 637 OVERRIDE(OP): Performed by: HOSPITALIST

## 2020-12-20 PROCEDURE — 770006 HCHG ROOM/CARE - MED/SURG/GYN SEMI*

## 2020-12-20 PROCEDURE — 700102 HCHG RX REV CODE 250 W/ 637 OVERRIDE(OP): Performed by: NURSE PRACTITIONER

## 2020-12-20 PROCEDURE — A9270 NON-COVERED ITEM OR SERVICE: HCPCS | Performed by: HOSPITALIST

## 2020-12-20 PROCEDURE — 99231 SBSQ HOSP IP/OBS SF/LOW 25: CPT | Performed by: NURSE PRACTITIONER

## 2020-12-20 RX ADMIN — TAMSULOSIN HYDROCHLORIDE 0.8 MG: 0.4 CAPSULE ORAL at 07:40

## 2020-12-20 RX ADMIN — FINASTERIDE 5 MG: 5 TABLET, FILM COATED ORAL at 07:39

## 2020-12-20 RX ADMIN — AMLODIPINE BESYLATE 5 MG: 5 TABLET ORAL at 07:39

## 2020-12-20 RX ADMIN — THERA TABS 1 TABLET: TAB at 07:39

## 2020-12-20 NOTE — PROGRESS NOTES
Assumed care of patient this shift. Patient is alert and oriented x 4 and able to make his needs known. Denied any complaints of pain this shift. Up with stand by assist in room, to bathroom and ambulated in hallways. Chronic Rosado cathter in place. Fall prevention tactics in place, bed locked and in lowest position and bed alarm on for safety.

## 2020-12-20 NOTE — PROGRESS NOTES
Pt alert orientedx4. Pt legally blind.  Denies any complaint of pain at the time of assessment. Noted indwelling méndez intact draining to a bag with yellowish to clear output. Pt did not leave his room this shift. Educated about fall risks and precautions. Needs attended.

## 2020-12-21 PROCEDURE — A9270 NON-COVERED ITEM OR SERVICE: HCPCS | Performed by: NURSE PRACTITIONER

## 2020-12-21 PROCEDURE — A9270 NON-COVERED ITEM OR SERVICE: HCPCS | Performed by: HOSPITALIST

## 2020-12-21 PROCEDURE — 700102 HCHG RX REV CODE 250 W/ 637 OVERRIDE(OP): Performed by: HOSPITALIST

## 2020-12-21 PROCEDURE — 700102 HCHG RX REV CODE 250 W/ 637 OVERRIDE(OP): Performed by: NURSE PRACTITIONER

## 2020-12-21 PROCEDURE — 770006 HCHG ROOM/CARE - MED/SURG/GYN SEMI*

## 2020-12-21 RX ADMIN — QUETIAPINE FUMARATE 25 MG: 25 TABLET ORAL at 17:08

## 2020-12-21 RX ADMIN — TAMSULOSIN HYDROCHLORIDE 0.8 MG: 0.4 CAPSULE ORAL at 08:02

## 2020-12-21 RX ADMIN — AMLODIPINE BESYLATE 5 MG: 5 TABLET ORAL at 08:06

## 2020-12-21 RX ADMIN — THERA TABS 1 TABLET: TAB at 08:06

## 2020-12-21 RX ADMIN — ERGOCALCIFEROL 50000 UNITS: 1.25 CAPSULE ORAL at 12:22

## 2020-12-21 RX ADMIN — FINASTERIDE 5 MG: 5 TABLET, FILM COATED ORAL at 08:06

## 2020-12-21 ASSESSMENT — ENCOUNTER SYMPTOMS
CHILLS: 0
FEVER: 0
SHORTNESS OF BREATH: 0
COUGH: 0
PALPITATIONS: 0

## 2020-12-21 ASSESSMENT — PAIN DESCRIPTION - PAIN TYPE
TYPE: ACUTE PAIN
TYPE: ACUTE PAIN

## 2020-12-21 NOTE — DISCHARGE PLANNING
Medical Social Work  APRN Kellie informed patients' room mate tested positive for COVID, requested to cancel transport to group [place]. Not sure at this time when patient will be tested for COVID as he was tested 3 days ago, negative    Email to group home and guardian  with an update.

## 2020-12-21 NOTE — PROGRESS NOTES
Bedside report received. Pt is A&Ox4. Pt is resting in bed and asleep. Rosado catheter in place and draining. Pt has many questions regarding discharge planning. Pt was educated regarding placement. Social work was notified to come speak with pt. POC discussed with pt; all questions answered at this time.

## 2020-12-21 NOTE — PROGRESS NOTES
Report received by day RN. Assumed care of pt. Assessment complete. RN at bedside. Pt A&Ox4. Pt in bed, and asleep. Rosado cath intact and draining. Pt in no apparent signs of distress. Plan of care discussed. Call light within reach, side rails x 2, mason locked bed in lowest position, and pt has no further questions at this time.

## 2020-12-21 NOTE — PROGRESS NOTES
Hospital Medicine Twice Weekly Progress Note    Date of Service  12/20/2020    Chief Complaint  Weakness, nausea, vomiting, diarrhea    Hospital Course  Mr. Bejarano is an 85-year-old male who presented to the emergency department on 7/9/2020 with cough, fatigue/generalized weakness, loss of appetite, and nausea/vomiting/diarrhea.  COVID screening was positive and he was also found to have acute kidney injury and a lactic acid level of 6.1.  He was admitted to the intensive care unit with dehydration and lactic acidosis.  Large-volume crystalloid resuscitation was given.  Chest x-ray was negative for pneumonia but did have changes consistent with COPD.  He quickly stabilized and was transferred out to the floor on 7/10/2020.  He had issues with urinary retention and eventually agreed to have a méndez placed in early August.  Tamsulosin and finasteride were started at that time.  He was also evaluated by psychiatry on 8/3/2020 who deemed him incapacitated to make any medical decisions.  Of additional note a right upper quadrant ultrasound was completed due to periumbilical pain and revealed a possible renal mass.  A CT renal with and without contrast was performed and showed a large renal mass likely representing urothelial versus renal cell carcinoma.  Unfortunately given his low weight and BMI, malnutrition, and neurocognitive disorder, he was deemed a poor candidate for potential chemotherapy or surgical decompression. Dr. Caruso was consulted on 11/6/2020 and recommended outpatient follow-up and continuing the chronic méndez.  He is now pending guardianship and placement.   Méndez was changed 12/4 for increased confusion and suprapubic pain. Culture grew Klebsiella oxytoca. 14-day course of Macrobid with end date 12/18.    Interval Problem Update  12/15- Patient sitting in bed, states he feels itchy, no noted rash. Will provide PRN medications to help. Continuing to work on placement at group home. Appreciate [facility]  assistance.   12/20- Patient up ambulating in pang with staff. Was expected to transfer to group home on 12/21, however roommate was diagnosed with covid and will now need to wait several days and retest prior to transferring.     Code Status  DNAR/DNI    Disposition  Group Home    Review of Systems  Review of Systems   Constitutional: Negative for chills and fever.   HENT: Negative.    Respiratory: Negative for cough and shortness of breath.    Cardiovascular: Negative for chest pain and palpitations.   Genitourinary: Negative for dysuria and urgency.   All other systems reviewed and are negative.       Physical Exam  Temp:  [36 °C (96.8 °F)-36.7 °C (98.1 °F)] 36 °C (96.8 °F)  Pulse:  [67-81] 67  Resp:  [16-17] 16  BP: (125-144)/(47-87) 144/47  SpO2:  [95 %-99 %] 95 %    Physical Exam  Vitals signs and nursing note reviewed.   Constitutional:       General: He is not in acute distress.     Appearance: He is not ill-appearing.   HENT:      Right Ear: Hearing normal.      Left Ear: Hearing normal.      Nose: Nose normal.      Mouth/Throat:      Lips: Pink.      Mouth: Mucous membranes are moist.   Neck:      Musculoskeletal: Normal range of motion.   Cardiovascular:      Rate and Rhythm: Normal rate.      Pulses: Normal pulses.      Heart sounds: Normal heart sounds.   Pulmonary:      Effort: Pulmonary effort is normal.      Breath sounds: Normal breath sounds.   Abdominal:      General: Bowel sounds are normal.      Palpations: Abdomen is soft.      Tenderness: There is no abdominal tenderness.   Genitourinary:     Comments: Rosado with clear yellow urine  Musculoskeletal:      Right lower leg: No edema.      Left lower leg: No edema.      Comments: Generalized weakness   Skin:     General: Skin is warm and dry.   Neurological:      Mental Status: He is alert.      Sensory: Sensation is intact.      Motor: Motor function is intact.   Psychiatric:         Attention and Perception: Attention normal.         Behavior:  Behavior is cooperative.         Cognition and Memory: Cognition is impaired.         Judgment: Judgment normal.         Fluids    Intake/Output Summary (Last 24 hours) at 12/21/2020 0959  Last data filed at 12/21/2020 0800  Gross per 24 hour   Intake 1200 ml   Output 1550 ml   Net -350 ml       Laboratory                        Imaging  CT-RENAL WITH & W/O   Final Result      1.  Large complex right renal mass as detailed above measuring approximately 7.8 x 7.1x  8.5 cm. Primary differential considerations include urothelial malignancy or renal cell carcinoma.   2.  No evidence of adenopathy or metastatic disease is seen.   3.  Distended urinary bladder. Right greater left hydroureteronephrosis.   4.  Severe atherosclerosis. 3.5 cm infrarenal abdominal aortic aneurysm.            US-RUQ   Final Result         1.  Echogenic liver compatible with fatty change versus fibrosis.   2.  Masslike structure in the right kidney, should be considered neoplastic unless proven otherwise, recommend follow-up 3 phase CT of the kidneys for further characterization   3.  Mild dilatation of the common bile duct, within expected limits given patient age.      These findings were discussed with the patient's clinician, Vin Mei, on 7/23/2020 7:34 AM.      DX-CHEST-PORTABLE (1 VIEW)   Final Result      1.  The lungs are hyperinflated suggesting emphysema/COPD.   2.  There is minimal predominantly lower lobe interstitial opacity which is most likely due to chronic scarring.           Assessment/Plan  Neurocognitive disorder, unspecified- (present on admission)  Assessment & Plan  -Psychiatry was consulted, patient incapacitated to make any medical decisions.  -Guardianship established. Needs placement to [facility]     Urinary retention- (present on admission)  Assessment & Plan  -Continue flomax and proscar  -Rosado catheter in place for urinary retention.  Failed previous attempts at removal.  -Follow up outpatient.  Will likely have to  discharge with méndez.    Right kidney mass- (present on admission)  Assessment & Plan  -Suspicion for urothelial carcinoma vs renal cell carcinoma  -Patient failed voiding trial despite flomax/proscar.  -Poor candidate for chemo given neurocognitive disorder, lacking capacity, and requirement for guardianship.  -Urologist, Dr. Caruso, was consulted on 11/6 regarding renal mass. Recommended outpatient follow-up and continue chronic méndez. Please see consult note for further detail.    Type 2 diabetes mellitus (HCC)- (present on admission)  Assessment & Plan  -Hemoglobin A1c 6.8%.   -Continue diabetic diet.  -Accu-Cheks only as needed for signs and symptoms of hypoglycemia or during acute illness.  -No need for insulin at this time    COPD (chronic obstructive pulmonary disease) (HCC)- (present on admission)  Assessment & Plan  -Stable on room air.  -Not in acute exacerbation    Abnormal urinalysis  Assessment & Plan  -méndez catheter changed 12/4  -Klebsiella oxytoca   -ABX until 12/18      Discharge planning issues  Assessment & Plan  -SW following  -Guardianship granted.  Will need placement    Abdominal aortic aneurysm (AAA) 3.0 cm to 5.5 cm in diameter in male ([facility])- (present on admission)  Assessment & Plan  -Infrarenal  -Noted as incidental finding on CT renal.  -Not a candidate for repair given neurocognitive disorder & lacking capacity.  -Continue good blood pressure control.    Cystitis without hematuria  Assessment & Plan  -UA checked for increasing confusion and acute suprapubic pain  -Macrobid x 14 days with end date 12/18 (complicated UTI)      Protein-calorie malnutrition (HCC)- (present on admission)  Assessment & Plan  -Body mass index is 20.79 kg/m². Significantly improved from 17 on admit.  -Encourage PO intake.  -Food preferences.  -Continue TID supplements    HTN (hypertension)- (present on admission)  Assessment & Plan  -Continue norvasc.  -Well-managed on current regimen       VTE prophylaxis:  Ambulatory    I have performed a physical exam and reviewed and updated ROS and Assessment/Plan today 12/20/2020. In review of the previous note there are no changes except as documented above    I certify the patient requires continued medically necessary hospital services for the treatment of cognitive disorder. The patient will remain in the hospital for the foreseeable future.  Discharge may or may not occur in the next 20 days due to ongoing discharge delays due to having no medically acceptable discharge options.      BERENICE HerreraPROMIE.

## 2020-12-22 PROCEDURE — 770006 HCHG ROOM/CARE - MED/SURG/GYN SEMI*

## 2020-12-22 PROCEDURE — 700102 HCHG RX REV CODE 250 W/ 637 OVERRIDE(OP): Performed by: HOSPITALIST

## 2020-12-22 PROCEDURE — A9270 NON-COVERED ITEM OR SERVICE: HCPCS | Performed by: NURSE PRACTITIONER

## 2020-12-22 PROCEDURE — 700102 HCHG RX REV CODE 250 W/ 637 OVERRIDE(OP): Performed by: NURSE PRACTITIONER

## 2020-12-22 PROCEDURE — A9270 NON-COVERED ITEM OR SERVICE: HCPCS | Performed by: HOSPITALIST

## 2020-12-22 PROCEDURE — C9803 HOPD COVID-19 SPEC COLLECT: HCPCS | Performed by: NURSE PRACTITIONER

## 2020-12-22 RX ADMIN — FINASTERIDE 5 MG: 5 TABLET, FILM COATED ORAL at 08:55

## 2020-12-22 RX ADMIN — TAMSULOSIN HYDROCHLORIDE 0.8 MG: 0.4 CAPSULE ORAL at 08:54

## 2020-12-22 RX ADMIN — THERA TABS 1 TABLET: TAB at 08:54

## 2020-12-22 RX ADMIN — AMLODIPINE BESYLATE 5 MG: 5 TABLET ORAL at 08:54

## 2020-12-22 ASSESSMENT — PAIN DESCRIPTION - PAIN TYPE
TYPE: ACUTE PAIN
TYPE: ACUTE PAIN

## 2020-12-22 NOTE — PROGRESS NOTES
Bedside report received. Pt is A&Ox4. Pt is resting in bed and asleep. Rosado catheter in place and draining. Pt would like to go outside today to see the sunlight.  POC discussed with pt; all questions answered at this time.

## 2020-12-22 NOTE — PROGRESS NOTES
A&Ox4. No c/o pain. Rosado in place. Denied pain. Denied further needs. Bed alarm on. Fall precautions in place. Room by nursing station.

## 2020-12-23 PROCEDURE — 700102 HCHG RX REV CODE 250 W/ 637 OVERRIDE(OP): Performed by: HOSPITALIST

## 2020-12-23 PROCEDURE — U0003 INFECTIOUS AGENT DETECTION BY NUCLEIC ACID (DNA OR RNA); SEVERE ACUTE RESPIRATORY SYNDROME CORONAVIRUS 2 (SARS-COV-2) (CORONAVIRUS DISEASE [COVID-19]), AMPLIFIED PROBE TECHNIQUE, MAKING USE OF HIGH THROUGHPUT TECHNOLOGIES AS DESCRIBED BY CMS-2020-01-R: HCPCS

## 2020-12-23 PROCEDURE — 99231 SBSQ HOSP IP/OBS SF/LOW 25: CPT | Performed by: NURSE PRACTITIONER

## 2020-12-23 PROCEDURE — A9270 NON-COVERED ITEM OR SERVICE: HCPCS | Performed by: HOSPITALIST

## 2020-12-23 PROCEDURE — A9270 NON-COVERED ITEM OR SERVICE: HCPCS | Performed by: NURSE PRACTITIONER

## 2020-12-23 PROCEDURE — 700102 HCHG RX REV CODE 250 W/ 637 OVERRIDE(OP): Performed by: NURSE PRACTITIONER

## 2020-12-23 PROCEDURE — 770006 HCHG ROOM/CARE - MED/SURG/GYN SEMI*

## 2020-12-23 PROCEDURE — C9803 HOPD COVID-19 SPEC COLLECT: HCPCS | Performed by: NURSE PRACTITIONER

## 2020-12-23 RX ADMIN — AMLODIPINE BESYLATE 5 MG: 5 TABLET ORAL at 07:30

## 2020-12-23 RX ADMIN — THERA TABS 1 TABLET: TAB at 07:30

## 2020-12-23 RX ADMIN — TAMSULOSIN HYDROCHLORIDE 0.8 MG: 0.4 CAPSULE ORAL at 07:30

## 2020-12-23 RX ADMIN — FINASTERIDE 5 MG: 5 TABLET, FILM COATED ORAL at 07:30

## 2020-12-23 ASSESSMENT — ENCOUNTER SYMPTOMS
COUGH: 0
PALPITATIONS: 0
SHORTNESS OF BREATH: 0
FEVER: 0
CHILLS: 0

## 2020-12-23 ASSESSMENT — PAIN DESCRIPTION - PAIN TYPE: TYPE: ACUTE PAIN

## 2020-12-23 NOTE — PROGRESS NOTES
Bedside report received. Pt is A&Ox4. Pt is resting in bed most of the day. POC discussed with pt; all questions answered at this time.

## 2020-12-23 NOTE — PROGRESS NOTES
A&Ox4. No c/o pain. Room air. Rosado in place. Denied pain. Pt sitting on edge of bed. Bed alarm on. Fall precautions in place. Room by nursing station.

## 2020-12-23 NOTE — PROGRESS NOTES
[place] Medicine Twice Weekly Progress Note    Date of Service  12/20/2020    Chief Complaint  Weakness, nausea, vomiting, diarrhea    Hospital Course  Mr. Bejarano is an 85-year-old male who presented to the emergency department on 7/9/2020 with cough, fatigue/generalized weakness, loss of appetite, and nausea/vomiting/diarrhea.  COVID screening was positive and he was also found to have acute kidney injury and a lactic acid level of 6.1.  He was admitted to the intensive care unit with dehydration and lactic acidosis.  Large-volume crystalloid resuscitation was given.  Chest x-ray was negative for pneumonia but did have changes consistent with COPD.  He quickly stabilized and was transferred out to the floor on 7/10/2020.  He had issues with urinary retention and eventually agreed to have a méndez placed in early August.  Tamsulosin and finasteride were started at that time.  He was also evaluated by psychiatry on 8/3/2020 who deemed him incapacitated to make any medical decisions.  Of additional note a right upper quadrant ultrasound was completed due to periumbilical pain and revealed a possible renal mass.  A CT renal with and without contrast was performed and showed a large renal mass likely representing urothelial versus renal cell carcinoma.  Unfortunately given his low weight and BMI, malnutrition, and neurocognitive disorder, he was deemed a poor candidate for potential chemotherapy or surgical decompression. Dr. Caruso was consulted on 11/6/2020 and recommended outpatient follow-up and continuing the chronic méndez.  He is now pending guardianship and placement.   Méndez was changed 12/4 for increased confusion and suprapubic pain. Culture grew Klebsiella oxytoca. 14-day course of Macrobid with end date 12/18.    Interval Problem Update  12/23: Patient in calm and cooperative mood sitting on the edge of bed.  He is pending a negative Covid test prior to transitioning to group home.  He is very excited about  the possibility of leaving.  Asking for all his belongings prior to leaving.  Denies any acute needs at this time.    Code Status  DNAR/DNI    Disposition  Group Home.  Pending negative Covid test.    Review of Systems  Review of Systems   Constitutional: Negative for chills and fever.   HENT: Negative.    Respiratory: Negative for cough and shortness of breath.    Cardiovascular: Negative for chest pain and palpitations.   Genitourinary: Negative for dysuria and urgency.   All other systems reviewed and are negative.       Physical Exam  Temp:  [36.1 °C (96.9 °F)-36.5 °C (97.7 °F)] 36.1 °C (96.9 °F)  Pulse:  [61-91] 91  Resp:  [17-18] 17  BP: ()/(48-65) 100/57  SpO2:  [92 %-97 %] 92 %    Physical Exam  Vitals signs and nursing note reviewed.   Constitutional:       General: He is not in acute distress.     Appearance: He is not ill-appearing.   HENT:      Right Ear: Hearing normal.      Left Ear: Hearing normal.      Nose: Nose normal.      Mouth/Throat:      Lips: Pink.      Mouth: Mucous membranes are moist.   Neck:      Musculoskeletal: Normal range of motion.   Cardiovascular:      Rate and Rhythm: Normal rate.      Pulses: Normal pulses.      Heart sounds: Normal heart sounds.   Pulmonary:      Effort: Pulmonary effort is normal.      Breath sounds: Normal breath sounds.   Abdominal:      General: Bowel sounds are normal.      Palpations: Abdomen is soft.      Tenderness: There is no abdominal tenderness.   Genitourinary:     Comments: Rosado with clear yellow urine  Musculoskeletal:      Right lower leg: No edema.      Left lower leg: No edema.      Comments: Generalized weakness   Skin:     General: Skin is warm and dry.   Neurological:      Mental Status: He is alert and oriented to person, place, and time.      Sensory: Sensation is intact.      Motor: Motor function is intact.      Comments: Forgetful.   Psychiatric:         Attention and Perception: Attention normal.         Behavior: Behavior is  cooperative.         Cognition and Memory: Cognition is impaired.         Judgment: Judgment normal.         Fluids    Intake/Output Summary (Last 24 hours) at 12/23/2020 1451  Last data filed at 12/23/2020 0610  Gross per 24 hour   Intake 480 ml   Output 1650 ml   Net -1170 ml       Laboratory                        Imaging  CT-RENAL WITH & W/O   Final Result      1.  Large complex right renal mass as detailed above measuring approximately 7.8 x 7.1x  8.5 cm. Primary differential considerations include urothelial malignancy or renal cell carcinoma.   2.  No evidence of adenopathy or metastatic disease is seen.   3.  Distended urinary bladder. Right greater left hydroureteronephrosis.   4.  Severe atherosclerosis. 3.5 cm infrarenal abdominal aortic aneurysm.            US-RUQ   Final Result         1.  Echogenic liver compatible with fatty change versus fibrosis.   2.  Masslike structure in the right kidney, should be considered neoplastic unless proven otherwise, recommend follow-up 3 phase CT of the kidneys for further characterization   3.  Mild dilatation of the common bile duct, within expected limits given patient age.      These findings were discussed with the patient's clinician, Vin Mei, on 7/23/2020 7:34 AM.      DX-CHEST-PORTABLE (1 VIEW)   Final Result      1.  The lungs are hyperinflated suggesting emphysema/COPD.   2.  There is minimal predominantly lower lobe interstitial opacity which is most likely due to chronic scarring.           Assessment/Plan  Neurocognitive disorder, unspecified- (present on admission)  Assessment & Plan  -Psychiatry was consulted, patient incapacitated to make any medical decisions.  -Guardianship established. Needs placement to [facility]     Urinary retention- (present on admission)  Assessment & Plan  -Continue flomax and proscar  -Rosado catheter in place for urinary retention.  Failed previous attempts at removal.  -Follow up outpatient.  Will likely have to discharge  with méndez.    Right kidney mass- (present on admission)  Assessment & Plan  -Suspicion for urothelial carcinoma vs renal cell carcinoma  -Patient failed voiding trial despite flomax/proscar.  -Poor candidate for chemo given neurocognitive disorder, lacking capacity, and requirement for guardianship.  -Urologist, Dr. Caruso, was consulted on 11/6 regarding renal mass. Recommended outpatient follow-up and continue chronic méndez. Please see consult note for further detail.    Type 2 diabetes mellitus (HCC)- (present on admission)  Assessment & Plan  -Hemoglobin A1c 6.8%.   -Continue diabetic diet.  -Accu-Cheks only as needed for signs and symptoms of hypoglycemia or during acute illness.  -No need for insulin at this time    COPD (chronic obstructive pulmonary disease) (HCC)- (present on admission)  Assessment & Plan  -Stable on room air.  -Not in acute exacerbation    Abnormal urinalysis  Assessment & Plan  -méndez catheter changed 12/4  -Klebsiella oxytoca   -ABX until 12/18      Discharge planning issues  Assessment & Plan  -SW following  -Guardianship granted.  Will need placement    Abdominal aortic aneurysm (AAA) 3.0 cm to 5.5 cm in diameter in male ([facility])- (present on admission)  Assessment & Plan  -Infrarenal  -Noted as incidental finding on CT renal.  -Not a candidate for repair given neurocognitive disorder & lacking capacity.  -Continue good blood pressure control.    Cystitis without hematuria  Assessment & Plan  -UA checked for increasing confusion and acute suprapubic pain  -Macrobid x 14 days with end date 12/18 (complicated UTI)      Protein-calorie malnutrition (HCC)- (present on admission)  Assessment & Plan  -Body mass index is 20.79 kg/m². Significantly improved from 17 on admit.  -Encourage PO intake.  -Food preferences.  -Continue TID supplements    HTN (hypertension)- (present on admission)  Assessment & Plan  -Continue norvasc.  -Well-managed on current regimen       VTE prophylaxis:  Ambulatory    I have performed a physical exam and reviewed and updated ROS and Assessment/Plan today 12/20/2020. In review of the previous note there are no changes except as documented above    MICK Soto.

## 2020-12-24 PROCEDURE — 700102 HCHG RX REV CODE 250 W/ 637 OVERRIDE(OP): Performed by: HOSPITALIST

## 2020-12-24 PROCEDURE — 770006 HCHG ROOM/CARE - MED/SURG/GYN SEMI*

## 2020-12-24 PROCEDURE — A9270 NON-COVERED ITEM OR SERVICE: HCPCS | Performed by: NURSE PRACTITIONER

## 2020-12-24 PROCEDURE — 700102 HCHG RX REV CODE 250 W/ 637 OVERRIDE(OP): Performed by: NURSE PRACTITIONER

## 2020-12-24 PROCEDURE — A9270 NON-COVERED ITEM OR SERVICE: HCPCS | Performed by: HOSPITALIST

## 2020-12-24 RX ADMIN — DIPHENHYDRAMINE HYDROCHLORIDE 12.5 MG: 25 TABLET ORAL at 06:54

## 2020-12-24 RX ADMIN — AMLODIPINE BESYLATE 5 MG: 5 TABLET ORAL at 08:35

## 2020-12-24 RX ADMIN — THERA TABS 1 TABLET: TAB at 08:35

## 2020-12-24 RX ADMIN — TAMSULOSIN HYDROCHLORIDE 0.8 MG: 0.4 CAPSULE ORAL at 08:35

## 2020-12-24 RX ADMIN — FINASTERIDE 5 MG: 5 TABLET, FILM COATED ORAL at 08:35

## 2020-12-24 NOTE — PROGRESS NOTES
Assumed care of pt at shift change. Pt is on RA with no signs of acute distress. A&Ox4. Denies any pain. Call light and personal belongings by bedside. Bed locked and in lowest position. Hourly rounding in place.

## 2020-12-25 PROCEDURE — 770006 HCHG ROOM/CARE - MED/SURG/GYN SEMI*

## 2020-12-25 PROCEDURE — A9270 NON-COVERED ITEM OR SERVICE: HCPCS | Performed by: NURSE PRACTITIONER

## 2020-12-25 PROCEDURE — A9270 NON-COVERED ITEM OR SERVICE: HCPCS | Performed by: HOSPITALIST

## 2020-12-25 PROCEDURE — 700102 HCHG RX REV CODE 250 W/ 637 OVERRIDE(OP): Performed by: NURSE PRACTITIONER

## 2020-12-25 PROCEDURE — 700102 HCHG RX REV CODE 250 W/ 637 OVERRIDE(OP): Performed by: HOSPITALIST

## 2020-12-25 RX ADMIN — THERA TABS 1 TABLET: TAB at 08:02

## 2020-12-25 RX ADMIN — TAMSULOSIN HYDROCHLORIDE 0.8 MG: 0.4 CAPSULE ORAL at 08:02

## 2020-12-25 RX ADMIN — AMLODIPINE BESYLATE 5 MG: 5 TABLET ORAL at 08:02

## 2020-12-25 RX ADMIN — DIPHENHYDRAMINE HYDROCHLORIDE 12.5 MG: 25 TABLET ORAL at 00:26

## 2020-12-25 RX ADMIN — FINASTERIDE 5 MG: 5 TABLET, FILM COATED ORAL at 08:02

## 2020-12-25 NOTE — PROGRESS NOTES
"Pt keeps moving around in the bed. Pt offered help but pt refused and said \"are you dumb? Can't you see I can do this by myself. You keep on talking and I got irritated\". Pt was told that this RN is just giving safety education and doesn't want him to fall. Pt wants me to get out of the room. This RN stayed outside while monitoring patient. Pt complains of generalized itching. Given PRN Benadryl. Alert and oriented x3, disoriented to event. Offered fluids and snacks. Safety precautions in placed. Bed in lowest position. Upper side rails up. Treaded socks on. Reinforced the use of call light when needing assistance.  "

## 2020-12-25 NOTE — PROGRESS NOTES
Pt is refusing safety education. Bed alarm is on. Encouraged pt to ask for help if needing assistance.

## 2020-12-25 NOTE — PROGRESS NOTES
Assumed care of patient this shift. Patient is alert and oriented x 4 and able to make his needs known. Denied any complaints of pain this shift. Up with stand by assist and cane in room, to bathroom. Chronic Rosado cathter in place. Fall prevention tactics in place, bed locked and in lowest position and bed alarm on for safety.

## 2020-12-26 PROCEDURE — A9270 NON-COVERED ITEM OR SERVICE: HCPCS | Performed by: HOSPITALIST

## 2020-12-26 PROCEDURE — 700102 HCHG RX REV CODE 250 W/ 637 OVERRIDE(OP): Performed by: HOSPITALIST

## 2020-12-26 PROCEDURE — A9270 NON-COVERED ITEM OR SERVICE: HCPCS | Performed by: NURSE PRACTITIONER

## 2020-12-26 PROCEDURE — 700102 HCHG RX REV CODE 250 W/ 637 OVERRIDE(OP): Performed by: NURSE PRACTITIONER

## 2020-12-26 PROCEDURE — 770006 HCHG ROOM/CARE - MED/SURG/GYN SEMI*

## 2020-12-26 RX ADMIN — THERA TABS 1 TABLET: TAB at 07:59

## 2020-12-26 RX ADMIN — TAMSULOSIN HYDROCHLORIDE 0.8 MG: 0.4 CAPSULE ORAL at 07:59

## 2020-12-26 RX ADMIN — AMLODIPINE BESYLATE 5 MG: 5 TABLET ORAL at 07:59

## 2020-12-26 RX ADMIN — FINASTERIDE 5 MG: 5 TABLET, FILM COATED ORAL at 07:59

## 2020-12-27 PROCEDURE — C9803 HOPD COVID-19 SPEC COLLECT: HCPCS | Performed by: STUDENT IN AN ORGANIZED HEALTH CARE EDUCATION/TRAINING PROGRAM

## 2020-12-27 PROCEDURE — 700102 HCHG RX REV CODE 250 W/ 637 OVERRIDE(OP): Performed by: NURSE PRACTITIONER

## 2020-12-27 PROCEDURE — A9270 NON-COVERED ITEM OR SERVICE: HCPCS | Performed by: NURSE PRACTITIONER

## 2020-12-27 PROCEDURE — A9270 NON-COVERED ITEM OR SERVICE: HCPCS | Performed by: HOSPITALIST

## 2020-12-27 PROCEDURE — 770006 HCHG ROOM/CARE - MED/SURG/GYN SEMI*

## 2020-12-27 PROCEDURE — 700102 HCHG RX REV CODE 250 W/ 637 OVERRIDE(OP): Performed by: HOSPITALIST

## 2020-12-27 RX ADMIN — AMLODIPINE BESYLATE 5 MG: 5 TABLET ORAL at 07:41

## 2020-12-27 RX ADMIN — TAMSULOSIN HYDROCHLORIDE 0.8 MG: 0.4 CAPSULE ORAL at 07:41

## 2020-12-27 RX ADMIN — FINASTERIDE 5 MG: 5 TABLET, FILM COATED ORAL at 07:41

## 2020-12-27 RX ADMIN — THERA TABS 1 TABLET: TAB at 07:41

## 2020-12-28 PROCEDURE — 99231 SBSQ HOSP IP/OBS SF/LOW 25: CPT | Performed by: NURSE PRACTITIONER

## 2020-12-28 PROCEDURE — A9270 NON-COVERED ITEM OR SERVICE: HCPCS | Performed by: HOSPITALIST

## 2020-12-28 PROCEDURE — 700102 HCHG RX REV CODE 250 W/ 637 OVERRIDE(OP): Performed by: NURSE PRACTITIONER

## 2020-12-28 PROCEDURE — 700102 HCHG RX REV CODE 250 W/ 637 OVERRIDE(OP): Performed by: HOSPITALIST

## 2020-12-28 PROCEDURE — A9270 NON-COVERED ITEM OR SERVICE: HCPCS | Performed by: NURSE PRACTITIONER

## 2020-12-28 PROCEDURE — U0003 INFECTIOUS AGENT DETECTION BY NUCLEIC ACID (DNA OR RNA); SEVERE ACUTE RESPIRATORY SYNDROME CORONAVIRUS 2 (SARS-COV-2) (CORONAVIRUS DISEASE [COVID-19]), AMPLIFIED PROBE TECHNIQUE, MAKING USE OF HIGH THROUGHPUT TECHNOLOGIES AS DESCRIBED BY CMS-2020-01-R: HCPCS

## 2020-12-28 PROCEDURE — 99239 HOSP IP/OBS DSCHRG MGMT >30: CPT | Performed by: HOSPITALIST

## 2020-12-28 PROCEDURE — 770006 HCHG ROOM/CARE - MED/SURG/GYN SEMI*

## 2020-12-28 RX ORDER — TAMSULOSIN HYDROCHLORIDE 0.4 MG/1
0.8 CAPSULE ORAL
Qty: 30 CAP | Refills: 1 | Status: SHIPPED | OUTPATIENT
Start: 2020-12-29 | End: 2020-12-29

## 2020-12-28 RX ORDER — QUETIAPINE FUMARATE 25 MG/1
25 TABLET, FILM COATED ORAL EVERY EVENING
Qty: 60 TAB | Refills: 3 | Status: SHIPPED | OUTPATIENT
Start: 2020-12-28 | End: 2020-12-29

## 2020-12-28 RX ORDER — ERGOCALCIFEROL 1.25 MG/1
50000 CAPSULE ORAL
Qty: 5 CAP | Refills: 1 | Status: SHIPPED | OUTPATIENT
Start: 2020-12-28 | End: 2020-12-29

## 2020-12-28 RX ORDER — AMLODIPINE BESYLATE 5 MG/1
5 TABLET ORAL DAILY
Qty: 30 TAB | Refills: 1 | Status: SHIPPED | OUTPATIENT
Start: 2020-12-29 | End: 2020-12-29

## 2020-12-28 RX ORDER — FINASTERIDE 5 MG/1
5 TABLET, FILM COATED ORAL DAILY
Qty: 30 TAB | Refills: 1 | Status: SHIPPED | OUTPATIENT
Start: 2020-12-29 | End: 2020-12-29

## 2020-12-28 RX ADMIN — TAMSULOSIN HYDROCHLORIDE 0.8 MG: 0.4 CAPSULE ORAL at 09:10

## 2020-12-28 RX ADMIN — THERA TABS 1 TABLET: TAB at 09:10

## 2020-12-28 RX ADMIN — FINASTERIDE 5 MG: 5 TABLET, FILM COATED ORAL at 09:11

## 2020-12-28 RX ADMIN — ERGOCALCIFEROL 50000 UNITS: 1.25 CAPSULE ORAL at 14:43

## 2020-12-28 RX ADMIN — AMLODIPINE BESYLATE 5 MG: 5 TABLET ORAL at 09:11

## 2020-12-28 ASSESSMENT — ENCOUNTER SYMPTOMS
FEVER: 0
COUGH: 0
SHORTNESS OF BREATH: 0
PALPITATIONS: 0
CHILLS: 0

## 2020-12-28 NOTE — DISCHARGE PLANNING
Agency/Facility Name: [facility]  Spoke To: Erin  Outcome: Referral accepted.  Please call Erin when patient is discharging.    JAYDEN Sweet notified.

## 2020-12-28 NOTE — PROGRESS NOTES
Assumed care of patient this shift. Patient is alert and oriented x 4 and able to make his needs known. Denied any complaints of pain this shift. Complained of itching but refused PRN Benadryl when offered.  Up with stand by assist and cane in room, to bathroom and ambulated in hallways. Chronic Rosado cathter in place. Fall prevention tactics in place, bed locked and in lowest position and bed alarm on for safety.

## 2020-12-28 NOTE — DISCHARGE PLANNING
Received Choice form at [phone]  Agency/Facility Name: [facility]  Referral sent per Choice form @ [id number]

## 2020-12-28 NOTE — PROGRESS NOTES
[facility] Medicine Twice Weekly Progress Note    Date of Service  12/20/2020    Chief Complaint  Weakness, nausea, vomiting, diarrhea    Hospital Course  Mr. Bejarano is an 85-year-old male who presented to the emergency department on 7/9/2020 with cough, fatigue/generalized weakness, loss of appetite, and nausea/vomiting/diarrhea.  COVID screening was positive and he was also found to have acute kidney injury and a lactic acid level of 6.1.  He was admitted to the intensive care unit with dehydration and lactic acidosis.  Large-volume crystalloid resuscitation was given.  Chest x-ray was negative for pneumonia but did have changes consistent with COPD.  He quickly stabilized and was transferred out to the floor on 7/10/2020.  He had issues with urinary retention and eventually agreed to have a méndez placed in early August.  Tamsulosin and finasteride were started at that time.  He was also evaluated by psychiatry on 8/3/2020 who deemed him incapacitated to make any medical decisions.  Of additional note a right upper quadrant ultrasound was completed due to periumbilical pain and revealed a possible renal mass.  A CT renal with and without contrast was performed and showed a large renal mass likely representing urothelial versus renal cell carcinoma.  Unfortunately given his low weight and BMI, malnutrition, and neurocognitive disorder, he was deemed a poor candidate for potential chemotherapy or surgical decompression. Dr. Caruso was consulted on 11/6/2020 and recommended outpatient follow-up and continuing the chronic méndez.  He is now pending guardianship and placement.   Méndez was changed 12/4 for increased confusion and suprapubic pain. Culture grew Klebsiella oxytoca. 14-day course of Macrobid with end date 12/18.    Interval Problem Update  12/23: Patient in calm and cooperative mood sitting on the edge of bed.  He is pending a negative Covid test prior to transitioning to group home.  He is very excited about  the possibility of leaving.  Asking for all his belongings prior to leaving.  Denies any acute needs at this time.  12/28:  No acute changes.  Anxious to discharge.  Pending home health set up.  No acute needs.     Code Status  DNAR/DNI    Disposition  Group Home.  Pending home health.     Review of Systems  Review of Systems   Constitutional: Negative for chills and fever.   HENT: Negative.    Respiratory: Negative for cough and shortness of breath.    Cardiovascular: Negative for chest pain and palpitations.   Genitourinary: Negative for dysuria and urgency.   All other systems reviewed and are negative.       Physical Exam  Temp:  [36.4 °C (97.6 °F)-36.9 °C (98.5 °F)] 36.9 °C (98.5 °F)  Pulse:  [65-88] 81  Resp:  [17-20] 17  BP: (109-123)/(55-72) 123/72  SpO2:  [91 %-95 %] 91 %    Physical Exam  Vitals signs and nursing note reviewed.   Constitutional:       General: He is not in acute distress.     Appearance: He is not ill-appearing.   HENT:      Right Ear: Hearing normal.      Left Ear: Hearing normal.      Nose: Nose normal.      Mouth/Throat:      Lips: Pink.      Mouth: Mucous membranes are moist.   Neck:      Musculoskeletal: Normal range of motion.   Cardiovascular:      Rate and Rhythm: Normal rate.      Pulses: Normal pulses.      Heart sounds: Normal heart sounds.   Pulmonary:      Effort: Pulmonary effort is normal.      Breath sounds: Normal breath sounds.   Abdominal:      General: Bowel sounds are normal.      Palpations: Abdomen is soft.      Tenderness: There is no abdominal tenderness.   Genitourinary:     Comments: Rosado with clear yellow urine  Musculoskeletal:      Right lower leg: No edema.      Left lower leg: No edema.      Comments: Generalized weakness   Skin:     General: Skin is warm and dry.   Neurological:      Mental Status: He is alert and oriented to person, place, and time.      Sensory: Sensation is intact.      Motor: Motor function is intact.      Comments: Forgetful.    Psychiatric:         Attention and Perception: Attention normal.         Behavior: Behavior is cooperative.         Cognition and Memory: Cognition is impaired.         Judgment: Judgment normal.         Fluids    Intake/Output Summary (Last 24 hours) at 12/28/2020 1054  Last data filed at 12/28/2020 0800  Gross per 24 hour   Intake 960 ml   Output --   Net 960 ml       Laboratory                        Imaging  CT-RENAL WITH & W/O   Final Result      1.  Large complex right renal mass as detailed above measuring approximately 7.8 x 7.1x  8.5 cm. Primary differential considerations include urothelial malignancy or renal cell carcinoma.   2.  No evidence of adenopathy or metastatic disease is seen.   3.  Distended urinary bladder. Right greater left hydroureteronephrosis.   4.  Severe atherosclerosis. 3.5 cm infrarenal abdominal aortic aneurysm.            US-RUQ   Final Result         1.  Echogenic liver compatible with fatty change versus fibrosis.   2.  Masslike structure in the right kidney, should be considered neoplastic unless proven otherwise, recommend follow-up 3 phase CT of the kidneys for further characterization   3.  Mild dilatation of the common bile duct, within expected limits given patient age.      These findings were discussed with the patient's clinician, Vin Mei, on 7/23/2020 7:34 AM.      DX-CHEST-PORTABLE (1 VIEW)   Final Result      1.  The lungs are hyperinflated suggesting emphysema/COPD.   2.  There is minimal predominantly lower lobe interstitial opacity which is most likely due to chronic scarring.           Assessment/Plan  Neurocognitive disorder, unspecified- (present on admission)  Assessment & Plan  -Psychiatry was consulted, patient incapacitated to make any medical decisions.  -Guardianship established. Needs placement to [facility]     Urinary retention- (present on admission)  Assessment & Plan  -Continue flomax and proscar  -Rosado catheter in place for urinary retention.   Failed previous attempts at removal.  -Follow up outpatient.  Will likely have to discharge with méndez.    Right kidney mass- (present on admission)  Assessment & Plan  -Suspicion for urothelial carcinoma vs renal cell carcinoma  -Patient failed voiding trial despite flomax/proscar.  -Poor candidate for chemo given neurocognitive disorder, lacking capacity, and requirement for guardianship.  -Urologist, Dr. Caruso, was consulted on 11/6 regarding renal mass. Recommended outpatient follow-up and continue chronic méndez. Please see consult note for further detail.    Type 2 diabetes mellitus (HCC)- (present on admission)  Assessment & Plan  -Hemoglobin A1c 6.8%.   -Continue diabetic diet.  -Accu-Cheks only as needed for signs and symptoms of hypoglycemia or during acute illness.  -No need for insulin at this time    COPD (chronic obstructive pulmonary disease) ([facility])- (present on admission)  Assessment & Plan  -Stable on room air.  -Not in acute exacerbation    Abnormal urinalysis  Assessment & Plan  -méndez catheter changed 12/4  -Klebsiella oxytoca   -ABX until 12/18      Discharge planning issues  Assessment & Plan  -SW following  -Guardianship granted.  Will need placement    Abdominal aortic aneurysm (AAA) 3.0 cm to 5.5 cm in diameter in male ([facility])- (present on admission)  Assessment & Plan  -Infrarenal  -Noted as incidental finding on CT renal.  -Not a candidate for repair given neurocognitive disorder & lacking capacity.  -Continue good blood pressure control.    Cystitis without hematuria  Assessment & Plan  -UA checked for increasing confusion and acute suprapubic pain  -Macrobid x 14 days with end date 12/18 (complicated UTI)      Protein-calorie malnutrition (HCC)- (present on admission)  Assessment & Plan  -Body mass index is 20.79 kg/m². Significantly improved from 17 on admit.  -Encourage PO intake.  -Food preferences.  -Continue TID supplements    HTN (hypertension)- (present on admission)  Assessment &  Plan  -Continue norvasc.  -Well-managed on current regimen       VTE prophylaxis: Ambulatory    I have performed a physical exam and reviewed and updated ROS and Assessment/Plan today 12/28/2020. In review of the previous note there are no changes except as documented above    MICK Soto.

## 2020-12-28 NOTE — PROGRESS NOTES
"At 1930, pt informed that he will have swab test. Pt agreed. At 2050, pt is resting and was asked if he can have the test, pt refused and said \"I dont to have another swab test, I had it twice already this month and it hurts\" Explained to the pt that he needs to have the test for discharge tomorrow. Still pt, refused and said \"I've been here 8 months already and nothing happened. I want to talk to the doctor tomorrow morning before I will have that test. I will sleep now.\" Pt encouraged to rest and sleep.  "

## 2020-12-28 NOTE — PROGRESS NOTES
Received report and assumed care of pt. Assessment complete on RA. Pt A&OX4. Denies any pain or discomfort at this time. All pt needs met at this time. Safety precautions and hourly rounding in place.

## 2020-12-28 NOTE — DISCHARGE SUMMARY
Discharge Summary    CHIEF COMPLAINT ON ADMISSION  Chief Complaint   Patient presents with   • Cough   • Tired   • Loss of Appetite   • N/V   • Diarrhea       Reason for Admission  COVID-19 virus infection     Admission Date  7/9/2020    CODE STATUS  DNAR/DNI    HPI & HOSPITAL COURSE  Mr. Bejarano is an 85-year-old male who presented to the emergency department on 7/9/2020 with cough, fatigue/generalized weakness, loss of appetite, and nausea/vomiting/diarrhea.  COVID screening was positive and he was also found to have acute kidney injury and a lactic acid level of 6.1.  He was admitted to the intensive care unit with dehydration and lactic acidosis.  Large-volume crystalloid resuscitation was given.  Chest x-ray was negative for pneumonia but did have changes consistent with COPD.  He quickly stabilized and was transferred out to the floor on 7/10/2020.  He had issues with urinary retention and eventually agreed to have a méndez placed in early August.  Tamsulosin and finasteride were started at that time.   Dr. Caruso was consulted on 11/6/2020 and recommended outpatient follow-up and continuing the chronic méndez.  He can follow in the outpatient setting for evaluation of possible nephrectomy.    Of additional note a right upper quadrant ultrasound was completed due to periumbilical pain and revealed a possible renal mass.  A CT renal with and without contrast was performed and showed a large renal mass likely representing urothelial versus renal cell carcinoma.  Unfortunately given his low weight and BMI, malnutrition, and neurocognitive disorder, he was deemed a poor candidate for potential chemotherapy or surgical decompression.      There was concern for cognitive impairment.  He was evaluated by psychiatry on 8/3/2020 who deemed him incapacitated to make any medical decisions.  Public guardianship has been established.    The patient is medically stable.  He is appropriate for discharge to group home setting  with home health.  He is safe for discharge at this time.      Therefore, he is discharged in good and stable condition to home with close outpatient follow-up.    The patient met 2-midnight criteria for an inpatient stay at the time of discharge.    Discharge Date  12/29/2020    FOLLOW UP ITEMS POST DISCHARGE  -Continue chronic Rosado and follow with urology in the outpatient setting.    DISCHARGE DIAGNOSES  Active Problems:    Right kidney mass POA: Yes    Urinary retention POA: Yes    Neurocognitive disorder, unspecified (Chronic) POA: Yes    COPD (chronic obstructive pulmonary disease) (HCC) POA: Yes    Type 2 diabetes mellitus (HCC) POA: Yes    Abnormal urinalysis POA: Unknown    HTN (hypertension) POA: Yes    Protein-calorie malnutrition (HCC) POA: Yes    Cystitis without hematuria POA: Unknown    Abdominal aortic aneurysm (AAA) 3.0 cm to 5.5 cm in diameter in male (HCC) (Chronic) POA: Yes    Discharge planning issues POA: No  Resolved Problems:    Diarrhea of presumed infectious origin POA: Unknown    Dehydration POA: Unknown    Acute kidney injury (HCC) POA: Unknown    COVID-19 virus infection POA: Unknown    Hypokalemia POA: Unknown    Advanced care planning/counseling discussion POA: No    Hematuria POA: No      FOLLOW UP  Follow-up with PCP in 1 to 2 weeks.    MEDICATIONS ON DISCHARGE     Medication List      START taking these medications      Instructions   amLODIPine 5 MG Tabs  Start taking on: December 29, 2020  Commonly known as: NORVASC   Take 1 Tab by mouth every day.  Dose: 5 mg     finasteride 5 MG Tabs  Start taking on: December 29, 2020  Commonly known as: PROSCAR   Take 1 Tab by mouth every day.  Dose: 5 mg     multivitamin Tabs  Start taking on: December 29, 2020   Take 1 Tab by mouth every day.  Dose: 1 Tab     QUEtiapine 25 MG Tabs  Commonly known as: Seroquel   Take 1 Tab by mouth every evening.  Dose: 25 mg     tamsulosin 0.4 MG capsule  Start taking on: December 29, 2020  Commonly known  as: FLOMAX   Take 2 Caps by mouth 1/2 hour after breakfast.  Dose: 0.8 mg     vitamin D (Ergocalciferol) 1.25 MG (03290 UT) Caps capsule  Commonly known as: DRISDOL   Take 1 Cap by mouth every 7 days.  Dose: 50,000 Units            Allergies  No Known Allergies    DIET  Orders Placed This Encounter   Procedures   • Diet Order Diabetic     Standing Status:   Standing     Number of Occurrences:   1     Order Specific Question:   Diet:     Answer:   Diabetic [3]       ACTIVITY  As tolerated.  Weight bearing as tolerated    CONSULTATIONS  Neurology  Psychiatry    LABORATORY  Lab Results   Component Value Date    SODIUM 137 11/08/2020    POTASSIUM 4.0 11/08/2020    CHLORIDE 97 11/08/2020    CO2 27 11/08/2020    GLUCOSE 114 (H) 11/08/2020    BUN 19 11/08/2020    CREATININE 1.12 11/08/2020        Lab Results   Component Value Date    WBC 9.5 11/08/2020    HEMOGLOBIN 14.1 11/08/2020    HEMATOCRIT 43.8 11/08/2020    PLATELETCT 258 11/08/2020        Total time of the discharge process was 34 minutes.

## 2020-12-28 NOTE — DISCHARGE PLANNING
Agency/Facility Name: [facility]  Outcome: [facility] received a message from Erin.  ESTEPHANIA called back and had to leave a message for Erin to return call.

## 2020-12-28 NOTE — PROGRESS NOTES
At 2240, García from [facility] asked for report. Per social workers note, verified that pt will be going in that group home. Report given to García and informed that Pt refused to have swab test tonight instead wants to talk to the doctor in the morning first before doing the test.

## 2020-12-29 ENCOUNTER — PHARMACY VISIT (OUTPATIENT)
Dept: PHARMACY | Facility: MEDICAL CENTER | Age: 85
End: 2020-12-29
Payer: COMMERCIAL

## 2020-12-29 VITALS
OXYGEN SATURATION: 94 % | TEMPERATURE: 98.5 F | RESPIRATION RATE: 16 BRPM | HEIGHT: 68 IN | BODY MASS INDEX: 17.98 KG/M2 | WEIGHT: 118.61 LBS | DIASTOLIC BLOOD PRESSURE: 57 MMHG | SYSTOLIC BLOOD PRESSURE: 129 MMHG | HEART RATE: 78 BPM

## 2020-12-29 PROCEDURE — 700102 HCHG RX REV CODE 250 W/ 637 OVERRIDE(OP): Performed by: NURSE PRACTITIONER

## 2020-12-29 PROCEDURE — 700102 HCHG RX REV CODE 250 W/ 637 OVERRIDE(OP): Performed by: HOSPITALIST

## 2020-12-29 PROCEDURE — RXMED WILLOW AMBULATORY MEDICATION CHARGE: Performed by: NURSE PRACTITIONER

## 2020-12-29 PROCEDURE — A9270 NON-COVERED ITEM OR SERVICE: HCPCS | Performed by: NURSE PRACTITIONER

## 2020-12-29 PROCEDURE — A9270 NON-COVERED ITEM OR SERVICE: HCPCS | Performed by: HOSPITALIST

## 2020-12-29 RX ORDER — AMLODIPINE BESYLATE 5 MG/1
5 TABLET ORAL DAILY
Qty: 30 TAB | Refills: 1 | Status: SHIPPED | OUTPATIENT
Start: 2020-12-29 | End: 2021-12-14

## 2020-12-29 RX ORDER — ERGOCALCIFEROL 1.25 MG/1
50000 CAPSULE ORAL
Qty: 5 CAP | Refills: 1 | Status: SHIPPED
Start: 2020-12-29 | End: 2020-12-29

## 2020-12-29 RX ORDER — TAMSULOSIN HYDROCHLORIDE 0.4 MG/1
0.8 CAPSULE ORAL
Qty: 30 CAP | Refills: 1 | Status: SHIPPED
Start: 2020-12-29 | End: 2020-12-29

## 2020-12-29 RX ORDER — ERGOCALCIFEROL 1.25 MG/1
50000 CAPSULE ORAL
Qty: 5 CAP | Refills: 1 | Status: SHIPPED | OUTPATIENT
Start: 2020-12-29 | End: 2021-07-15 | Stop reason: SDUPTHER

## 2020-12-29 RX ORDER — TAMSULOSIN HYDROCHLORIDE 0.4 MG/1
0.8 CAPSULE ORAL
Qty: 30 CAP | Refills: 1 | Status: SHIPPED | OUTPATIENT
Start: 2020-12-29 | End: 2021-07-19

## 2020-12-29 RX ORDER — AMLODIPINE BESYLATE 5 MG/1
5 TABLET ORAL DAILY
Qty: 30 TAB | Refills: 1 | Status: SHIPPED
Start: 2020-12-29 | End: 2020-12-29

## 2020-12-29 RX ORDER — QUETIAPINE FUMARATE 25 MG/1
25 TABLET, FILM COATED ORAL EVERY EVENING
Qty: 60 TAB | Refills: 3 | Status: SHIPPED | OUTPATIENT
Start: 2020-12-29 | End: 2021-11-09

## 2020-12-29 RX ORDER — FINASTERIDE 5 MG/1
5 TABLET, FILM COATED ORAL DAILY
Qty: 30 TAB | Refills: 1 | Status: SHIPPED
Start: 2020-12-29 | End: 2020-12-29

## 2020-12-29 RX ORDER — FINASTERIDE 5 MG/1
5 TABLET, FILM COATED ORAL DAILY
Qty: 30 TAB | Refills: 1 | Status: SHIPPED | OUTPATIENT
Start: 2020-12-29 | End: 2021-07-26 | Stop reason: SDUPTHER

## 2020-12-29 RX ORDER — QUETIAPINE FUMARATE 25 MG/1
25 TABLET, FILM COATED ORAL EVERY EVENING
Qty: 60 TAB | Refills: 3 | Status: SHIPPED
Start: 2020-12-29 | End: 2020-12-29

## 2020-12-29 RX ADMIN — THERA TABS 1 TABLET: TAB at 07:53

## 2020-12-29 RX ADMIN — FINASTERIDE 5 MG: 5 TABLET, FILM COATED ORAL at 07:59

## 2020-12-29 RX ADMIN — TAMSULOSIN HYDROCHLORIDE 0.8 MG: 0.4 CAPSULE ORAL at 07:53

## 2020-12-29 RX ADMIN — AMLODIPINE BESYLATE 5 MG: 5 TABLET ORAL at 07:53

## 2020-12-29 NOTE — PROGRESS NOTES
Received report and assumed care of pt. Assessment complete on RA. Pt A&OX4. Denies any pain or discomfort at this time. Pt currently resting in room. All pt needs met at this time. Safety precautions and hourly rounding in place.

## 2020-12-29 NOTE — DISCHARGE PLANNING
Agency/Facility Name: [facility]   Outcome: [facility] called and left a message for Erin stating the patient is discharging to the group home today at 1500.
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
## 2020-12-29 NOTE — PROGRESS NOTES
Received bedside report from day nurse, Assumed care of patient. Following plan of care. Patient is pleasant and cooperative.

## 2020-12-29 NOTE — DISCHARGE PLANNING
Received Transport Form @ 0948  Spoke to Raysa @ [facility]    Transport is scheduled for 12/29/20 @1300 going to Group Home:  [address], [place]  [address].    JAYDEN Sweet notified via Teams

## 2020-12-29 NOTE — DISCHARGE PLANNING
Meds-to-Beds: Discharge prescription orders listed below delivered to patient's bedside ZE Garcia. Patient counseled.       Ovi Bejaranoo   Home Medication Instructions ANA:[id number]    Printed on:12/29/20 1237   Medication Information                      amLODIPine (NORVASC) 5 MG Tab  Take 1 Tab by mouth every day.             finasteride (PROSCAR) 5 MG Tab  Take 1 Tab by mouth every day.             multivitamin (THERAGRAN) Tab  Take 1 Tab by mouth every day.             QUEtiapine (SEROQUEL) 25 MG Tab  Take 1 Tab by mouth every evening.             tamsulosin (FLOMAX) 0.4 MG capsule  Take 2 Caps by mouth 1/2 hour after breakfast.             vitamin D, Ergocalciferol, (DRISDOL) 1.25 MG (42942 UT) Cap capsule  Take 1 Cap by mouth every 7 days.               Yolanda Estrella, PharmD

## 2020-12-29 NOTE — DISCHARGE PLANNING
Medical Social Work  PC to [facility], [phone], can take patient today, set up 1:00 transport.   Will need an order that patient patient needs a méndez.  PC to guardian case manger/email to guardian  with an update  Given permission to D/C  SW faxed approved services to [facility]

## 2020-12-29 NOTE — DISCHARGE INSTRUCTIONS
Discharge Instructions    Discharged to group home by medical transportation with escort. Discharged via wheelchair, hospital escort: Yes.  Special equipment needed: Cane    Be sure to schedule a follow-up appointment with your primary care doctor or any specialists as instructed.     Discharge Plan:   Diet Plan: Discussed  Activity Level: Discussed  Confirmed Follow up Appointment: Patient to Call and Schedule Appointment  Confirmed Symptoms Management: Discussed  Medication Reconciliation Updated: Yes  Influenza Vaccine Given - only chart on this line when given: Influenza Vaccine Given (See MAR)    I understand that a diet low in cholesterol, fat, and sodium is recommended for good health. Unless I have been given specific instructions below for another diet, I accept this instruction as my diet prescription.   Other diet: Diabetic    Special Instructions: None    · Is patient discharged on Warfarin / Coumadin?   No     COVID-19: How to Protect Yourself and Others  Know how it spreads  · There is currently no vaccine to prevent coronavirus disease 2019 (COVID-19).  · The best way to prevent illness is to avoid being exposed to this virus.  · The virus is thought to spread mainly from person-to-person.  ? Between people who are in close contact with one another (within about 6 feet).  ? Through respiratory droplets produced when an infected person coughs, sneezes or talks.  ? These droplets can land in the mouths or noses of people who are nearby or possibly be inhaled into the lungs.  ? Some recent studies have suggested that COVID-19 may be spread by people who are not showing symptoms.  Everyone should  Clean your hands often  · Wash your hands often with soap and water for at least 20 seconds especially after you have been in a public place, or after blowing your nose, coughing, or sneezing.  · If soap and water are not readily available, use a hand  that contains at least 60% alcohol. Cover all  surfaces of your hands and rub them together until they feel dry.  · Avoid touching your eyes, nose, and mouth with unwashed hands.  Avoid close contact  · Stay home if you are sick.  · Avoid close contact with people who are sick.  · Put distance between yourself and other people.  ? Remember that some people without symptoms may be able to spread virus.  ? This is especially important for people who are at higher risk of getting very sick.www.cdc.gov/coronavirus/2019-ncov/need-extra-precautions/people-at-higher-risk.html  Cover your mouth and nose with a cloth face cover when around others  · You could spread COVID-19 to others even if you do not feel sick.  · Everyone should wear a cloth face cover when they have to go out in public, for example to the grocery store or to  other necessities.  ? Cloth face coverings should not be placed on young children under age 2, anyone who has trouble breathing, or is unconscious, incapacitated or otherwise unable to remove the mask without assistance.  · The cloth face cover is meant to protect other people in case you are infected.  · Do NOT use a facemask meant for a healthcare worker.  · Continue to keep about 6 feet between yourself and others. The cloth face cover is not a substitute for social distancing.  Cover coughs and sneezes  · If you are in a private setting and do not have on your cloth face covering, remember to always cover your mouth and nose with a tissue when you cough or sneeze or use the inside of your elbow.  · Throw used tissues in the trash.  · Immediately wash your hands with soap and water for at least 20 seconds. If soap and water are not readily available, clean your hands with a hand  that contains at least 60% alcohol.  Clean and disinfect  · Clean AND disinfect frequently touched surfaces daily. This includes tables, doorknobs, light switches, countertops, handles, desks, phones, keyboards, toilets, faucets, and sinks.  www.cdc.gov/coronavirus/2019-ncov/prevent-getting-sick/disinfecting-your-home.html  · If surfaces are dirty, clean them: Use detergent or soap and water prior to disinfection.  · Then, use a household disinfectant. You can see a list of EPA-registered household disinfectants here.  cdc.gov/coronavirus  05/05/2020  This information is not intended to replace advice given to you by your health care provider. Make sure you discuss any questions you have with your health care provider.  Document Released: 04/14/2020 Document Revised: 05/13/2020 Document Reviewed: 04/14/2020  Elsevier Patient Education © 2020 [employer]        Indwelling Urinary Catheter Care, Adult  An indwelling urinary catheter is a thin, flexible, germ-free (sterile) tube that is placed into the bladder to help drain urine out of the body. The catheter is inserted into the part of the body that drains urine from the bladder (urethra). Urine drains from the catheter into a drainage bag outside of the body.  Taking good care of your catheter will keep it working properly and help to prevent problems from developing.  What are the risks?  · Bacteria may get into your bladder and cause a urinary tract infection.  · Urine flow can become blocked. This can happen if the catheter is not working correctly, or if you have sediment or a blood clot in your bladder or the catheter.  · Tissue near the catheter may become irritated and bleed.  How to wear your catheter and your drainage bag  Supplies needed  · Adhesive tape or a leg strap.  · Alcohol wipe or soap and water (if you use tape).  · A clean towel (if you use tape).  · Overnight drainage bag.  · Smaller drainage bag (leg bag).  Wearing your catheter and bag  Use adhesive tape or a leg strap to attach your catheter to your leg.  · Make sure the catheter is not pulled tight.  · If a leg strap gets wet, replace it with a dry one.  · If you use adhesive tape:  1. Use an alcohol wipe or soap and water to  wash off any stickiness on your skin where you had tape before.  2. Use a clean towel to pat-dry the area.  3. Apply the new tape.  You should have received a large overnight drainage bag and a smaller leg bag that fits underneath clothing.  · You may wear the overnight bag at any time, but you should not wear the leg bag at night.  · Always wear the leg bag below your knee.  · Make sure the overnight drainage bag is always lower than the level of your bladder, but do not let it touch the floor. Before you go to sleep, hang the bag inside a wastebasket that is covered by a clean plastic bag.  How to care for your skin around the catheter         Supplies needed  · A clean washcloth.  · Water and mild soap.  · A clean towel.  Caring for your skin and catheter  · Every day, use a clean washcloth and soapy water to clean the skin around your catheter.  ? Wash your hands with soap and water.  ? Wet a washcloth in warm water and mild soap.  ? Clean the skin around your urethra.  ? If you are female:  ? Use one hand to gently spread the folds of skin around your vagina (labia).  ? With the washcloth in your other hand, wipe the inner side of your labia on each side. Do this in a front-to-back direction.  ? If you are male:  ? Use one hand to pull back any skin that covers the end of your penis (foreskin).  ? With the washcloth in your other hand, wipe your penis in small circles. Start wiping at the tip of your penis, then move outward from the catheter.  ? Move the foreskin back in place, if this applies.  ? With your free hand, hold the catheter close to where it enters your body. Keep holding the catheter during cleaning so it does not get pulled out.  ? Use your other hand to clean the catheter with the washcloth.  ? Only wipe downward on the catheter.  ? Do not wipe upward toward your body, because that may push bacteria into your urethra and cause infection.  ? Use a clean towel to pat-dry the catheter and the skin  around it. Make sure to wipe off all soap.  ? Wash your hands with soap and water.  · Shower every day. Do not take baths.  · Do not use cream, ointment, or lotion on the area where the catheter enters your body, unless your health care provider tells you to do that.  · Do not use powders, sprays, or lotions on your genital area.  · Check your skin around the catheter every day for signs of infection. Check for:  ? Redness, swelling, or pain.  ? Fluid or blood.  ? Warmth.  ? Pus or a bad smell.  How to empty the drainage bag  Supplies needed  · Rubbing alcohol.  · Gauze pad or cotton ball.  · Adhesive tape or a leg strap.  Emptying the bag  Empty your drainage bag (your overnight drainage bag or your leg bag) when it is ?-½ full, or at least 2-3 times a day. Clean the drainage bag according to the 's instructions or as told by your health care provider.  1. Wash your hands with soap and water.  2. Detach the drainage bag from your leg.  3. Hold the drainage bag over the toilet or a clean container. Make sure the drainage bag is lower than your hips and bladder. This stops urine from going back into the tubing and into your bladder.  4. Open the pour spout at the bottom of the bag.  5. Empty the urine into the toilet or container. Do not let the pour spout touch any surface. This precaution is important to prevent bacteria from getting in the bag and causing infection.  6. Apply rubbing alcohol to a gauze pad or cotton ball.  7. Use the gauze pad or cotton ball to clean the pour spout.  8. Close the pour spout.  9. Attach the bag to your leg with adhesive tape or a leg strap.  10. Wash your hands with soap and water.  How to change the drainage bag  Supplies needed:  · Alcohol wipes.  · A clean drainage bag.  · Adhesive tape or a leg strap.  Changing the bag  Replace your drainage bag with a clean bag if it leaks, starts to smell bad, or looks dirty.  1. Wash your hands with soap and water.  2. Detach the  dirty drainage bag from your leg.  3. Pinch the catheter with your fingers so that urine does not spill out.  4. Disconnect the catheter tube from the drainage tube at the connection valve. Do not let the tubes touch any surface.  5. Clean the end of the catheter tube with an alcohol wipe. Use a different alcohol wipe to clean the end of the drainage tube.  6. Connect the catheter tube to the drainage tube of the clean bag.  7. Attach the clean bag to your leg with adhesive tape or a leg strap. Avoid attaching the new bag too tightly.  8. Wash your hands with soap and water.  General instructions    · Never pull on your catheter or try to remove it. Pulling can damage your internal tissues.  · Always wash your hands before and after you handle your catheter or drainage bag. Use a mild, fragrance-free soap. If soap and water are not available, use hand .  · Always make sure there are no twists or bends (kinks) in the catheter tube.  · Always make sure there are no leaks in the catheter or drainage bag.  · Drink enough fluid to keep your urine pale yellow.  · Do not take baths, swim, or use a hot tub.  · If you are female, wipe from front to back after having a bowel movement.  Contact a health care provider if:  · Your urine is cloudy.  · Your urine smells unusually bad.  · Your catheter gets clogged.  · Your catheter starts to leak.  · Your bladder feels full.  Get help right away if:  · You have redness, swelling, or pain where the catheter enters your body.  · You have fluid, blood, pus, or a bad smell coming from the area where the catheter enters your body.  · The area where the catheter enters your body feels warm to the touch.  · You have a fever.  · You have pain in your abdomen, legs, lower back, or bladder.  · You see blood in the catheter.  · Your urine is pink or red.  · You have nausea, vomiting, or chills.  · Your urine is not draining into the bag.  · Your catheter gets pulled  out.  Summary  · An indwelling urinary catheter is a thin, flexible, germ-free (sterile) tube that is placed into the bladder to help drain urine out of the body.  · The catheter is inserted into the part of the body that drains urine from the bladder (urethra).  · Take good care of your catheter to keep it working properly and help prevent problems from developing.  · Always wash your hands before and after you handle your catheter or drainage bag.  · Never pull on your catheter or try to remove it.  This information is not intended to replace advice given to you by your health care provider. Make sure you discuss any questions you have with your health care provider.  Document Released: 12/18/2006 Document Revised: 04/10/2020 Document Reviewed: 08/03/2018  [employer] Patient Education © 2020 [employer]      Depression / Suicide Risk    As you are discharged from this [facility] facility, it is important to learn how to keep safe from harming yourself.    Recognize the warning signs:  · Abrupt changes in personality, positive or negative- including increase in energy   · Giving away possessions  · Change in eating patterns- significant weight changes-  positive or negative  · Change in sleeping patterns- unable to sleep or sleeping all the time   · Unwillingness or inability to communicate  · Depression  · Unusual sadness, discouragement and loneliness  · Talk of wanting to die  · Neglect of personal appearance   · Rebelliousness- reckless behavior  · Withdrawal from people/activities they love  · Confusion- inability to concentrate     If you or a loved one observes any of these behaviors or has concerns about self-harm, here's what you can do:  · Talk about it- your feelings and reasons for harming yourself  · Remove any means that you might use to hurt yourself (examples: pills, rope, extension cords, firearm)  · Get professional help from the community (Mental Health, Substance Abuse, psychological  counseling)  · Do not be alone:Call your Safe Contact- someone whom you trust who will be there for you.  · Call your local CRISIS HOTLINE 306-2597 or 430-978-7266  · Call your local Children's Mobile Crisis Response Team [place] (707) 534-2511 or www.[employer]  · Call the toll free National Suicide Prevention Hotlines   · National Suicide Prevention Lifeline 675-314-JQCJ (4310)  · National Hope Line Network 800-SUICIDE (592-3069)

## 2020-12-29 NOTE — DISCHARGE PLANNING
Per LSW Kee, patient is waiting for meds to beds and they will not be delivered until [id number].  Request to reschedule transport from 1300 to 1500.    Agency/Facility Name: [facility]  Spoke To: Raysa  Outcome: [facility] rescheduled transport with [facility] to 1500.

## 2020-12-29 NOTE — PROGRESS NOTES
Patient discharged with [facility]. Patient 2 bags of belongings with patient. patient discharge instructions given to MTM. Rosado instructions provided for GH.

## 2021-01-12 DIAGNOSIS — Z23 NEED FOR VACCINATION: ICD-10-CM

## 2021-01-25 ENCOUNTER — HOSPITAL ENCOUNTER (OUTPATIENT)
Facility: MEDICAL CENTER | Age: 86
End: 2021-01-25
Attending: FAMILY MEDICINE
Payer: MEDICARE

## 2021-01-25 LAB
AMORPH CRY #/AREA URNS HPF: PRESENT /HPF
APPEARANCE UR: ABNORMAL
BACTERIA #/AREA URNS HPF: ABNORMAL /HPF
BILIRUB UR QL STRIP.AUTO: NEGATIVE
COLOR UR: YELLOW
EPI CELLS #/AREA URNS HPF: ABNORMAL /HPF
GLUCOSE UR STRIP.AUTO-MCNC: NEGATIVE MG/DL
HYALINE CASTS #/AREA URNS LPF: ABNORMAL /LPF
KETONES UR STRIP.AUTO-MCNC: NEGATIVE MG/DL
LEUKOCYTE ESTERASE UR QL STRIP.AUTO: ABNORMAL
MICRO URNS: ABNORMAL
NITRITE UR QL STRIP.AUTO: POSITIVE
PH UR STRIP.AUTO: 7 [PH] (ref 5–8)
PROT UR QL STRIP: 100 MG/DL
RBC # URNS HPF: ABNORMAL /HPF
RBC UR QL AUTO: NEGATIVE
SP GR UR STRIP.AUTO: 1.02
UROBILINOGEN UR STRIP.AUTO-MCNC: 0.2 MG/DL
WBC #/AREA URNS HPF: ABNORMAL /HPF

## 2021-01-25 PROCEDURE — 87086 URINE CULTURE/COLONY COUNT: CPT

## 2021-01-25 PROCEDURE — 87077 CULTURE AEROBIC IDENTIFY: CPT

## 2021-01-25 PROCEDURE — 87186 SC STD MICRODIL/AGAR DIL: CPT

## 2021-01-25 PROCEDURE — 81001 URINALYSIS AUTO W/SCOPE: CPT

## 2021-03-16 ENCOUNTER — HOSPITAL ENCOUNTER (OUTPATIENT)
Facility: MEDICAL CENTER | Age: 86
End: 2021-03-16
Attending: UROLOGY | Admitting: UROLOGY
Payer: MEDICARE

## 2021-04-08 ENCOUNTER — PRE-ADMISSION TESTING (OUTPATIENT)
Dept: ADMISSIONS | Facility: MEDICAL CENTER | Age: 86
End: 2021-04-08
Attending: UROLOGY
Payer: MEDICARE

## 2021-04-08 RX ORDER — HYDROXYZINE HYDROCHLORIDE 25 MG/1
25 TABLET, FILM COATED ORAL EVERY 6 HOURS PRN
COMMUNITY
Start: 2021-03-09

## 2021-04-26 NOTE — OR NURSING
Spoke with Nickie, owner of [employer], at [phone]. Per Nickie, pt had eggs and cereal at 0730.     OR notified.

## 2021-05-19 ENCOUNTER — APPOINTMENT (RX ONLY)
Dept: URBAN - METROPOLITAN AREA CLINIC 4 | Facility: CLINIC | Age: 86
Setting detail: DERMATOLOGY
End: 2021-05-19

## 2021-05-19 DIAGNOSIS — L20.89 OTHER ATOPIC DERMATITIS: ICD-10-CM | Status: WORSENING

## 2021-05-19 DIAGNOSIS — I87.2 VENOUS INSUFFICIENCY (CHRONIC) (PERIPHERAL): ICD-10-CM

## 2021-05-19 PROBLEM — L20.84 INTRINSIC (ALLERGIC) ECZEMA: Status: ACTIVE | Noted: 2021-05-19

## 2021-05-19 PROCEDURE — ? PHOTO-DOCUMENTATION

## 2021-05-19 PROCEDURE — ? REFERRAL CORRESPONDENCE

## 2021-05-19 PROCEDURE — ? PRESCRIPTION

## 2021-05-19 PROCEDURE — 99204 OFFICE O/P NEW MOD 45 MIN: CPT

## 2021-05-19 PROCEDURE — ? ADDITIONAL NOTES

## 2021-05-19 PROCEDURE — ? COUNSELING

## 2021-05-19 RX ORDER — TRIAMCINOLONE ACETONIDE 1 MG/G
OINTMENT TOPICAL BID
Qty: 1 | Refills: 2 | Status: ERX | COMMUNITY
Start: 2021-05-19

## 2021-05-19 RX ADMIN — TRIAMCINOLONE ACETONIDE: 1 OINTMENT TOPICAL at 00:00

## 2021-05-19 ASSESSMENT — LOCATION SIMPLE DESCRIPTION DERM
LOCATION SIMPLE: LEFT PRETIBIAL REGION
LOCATION SIMPLE: LEFT UPPER BACK
LOCATION SIMPLE: RIGHT PRETIBIAL REGION

## 2021-05-19 ASSESSMENT — LOCATION DETAILED DESCRIPTION DERM
LOCATION DETAILED: LEFT DISTAL PRETIBIAL REGION
LOCATION DETAILED: RIGHT DISTAL PRETIBIAL REGION
LOCATION DETAILED: LEFT MEDIAL UPPER BACK

## 2021-05-19 ASSESSMENT — LOCATION ZONE DERM
LOCATION ZONE: TRUNK
LOCATION ZONE: LEG

## 2021-05-19 NOTE — PROCEDURE: ADDITIONAL NOTES
Detail Level: Zone
Render Risk Assessment In Note?: no
Additional Notes: Recommend use of Aquaphor daily

## 2021-05-19 NOTE — HPI: RASH
What Type Of Note Output Would You Prefer (Optional)?: Standard Output
How Severe Is Your Rash?: moderate
Is This A New Presentation, Or A Follow-Up?: Referral for Rash
Additional History: Patient was hospitalized with Covid back in July of 2020.

## 2021-06-01 ENCOUNTER — HOSPITAL ENCOUNTER (OUTPATIENT)
Dept: LAB | Facility: MEDICAL CENTER | Age: 86
End: 2021-06-01
Attending: UROLOGY
Payer: MEDICARE

## 2021-06-01 LAB
ANION GAP SERPL CALC-SCNC: 11 MMOL/L (ref 7–16)
BUN SERPL-MCNC: 17 MG/DL (ref 8–22)
CALCIUM SERPL-MCNC: 9.3 MG/DL (ref 8.5–10.5)
CHLORIDE SERPL-SCNC: 102 MMOL/L (ref 96–112)
CO2 SERPL-SCNC: 27 MMOL/L (ref 20–33)
CREAT SERPL-MCNC: 1.06 MG/DL (ref 0.5–1.4)
GLUCOSE SERPL-MCNC: 128 MG/DL (ref 65–99)
POTASSIUM SERPL-SCNC: 4.3 MMOL/L (ref 3.6–5.5)
SODIUM SERPL-SCNC: 140 MMOL/L (ref 135–145)

## 2021-06-01 PROCEDURE — 80048 BASIC METABOLIC PNL TOTAL CA: CPT

## 2021-06-01 PROCEDURE — 36415 COLL VENOUS BLD VENIPUNCTURE: CPT

## 2021-06-08 ENCOUNTER — HOSPITAL ENCOUNTER (OUTPATIENT)
Dept: RADIOLOGY | Facility: MEDICAL CENTER | Age: 86
End: 2021-06-08
Attending: UROLOGY
Payer: MEDICARE

## 2021-06-08 DIAGNOSIS — N28.89 URETERAL FISTULA: ICD-10-CM

## 2021-06-08 PROCEDURE — 700117 HCHG RX CONTRAST REV CODE 255: Performed by: UROLOGY

## 2021-06-08 PROCEDURE — 74178 CT ABD&PLV WO CNTR FLWD CNTR: CPT | Mod: MH

## 2021-06-08 RX ADMIN — IOHEXOL 100 ML: 350 INJECTION, SOLUTION INTRAVENOUS at 08:31

## 2021-06-10 PROBLEM — F01.518 VASCULAR DEMENTIA OF ACUTE ONSET WITH BEHAVIORAL DISTURBANCE (HCC): Status: ACTIVE | Noted: 2021-02-09

## 2021-08-01 PROBLEM — H25.9 AGE-RELATED CATARACT OF BOTH EYES: Status: ACTIVE | Noted: 2021-08-01

## 2021-08-01 PROBLEM — L50.9 URTICARIA: Status: ACTIVE | Noted: 2021-08-01

## 2021-09-03 ENCOUNTER — HOSPITAL ENCOUNTER (OUTPATIENT)
Dept: LAB | Facility: MEDICAL CENTER | Age: 86
End: 2021-09-03
Attending: UROLOGY
Payer: MEDICARE

## 2021-09-03 LAB
ANION GAP SERPL CALC-SCNC: 11 MMOL/L (ref 7–16)
BUN SERPL-MCNC: 19 MG/DL (ref 8–22)
CALCIUM SERPL-MCNC: 9.2 MG/DL (ref 8.4–10.2)
CHLORIDE SERPL-SCNC: 100 MMOL/L (ref 96–112)
CO2 SERPL-SCNC: 26 MMOL/L (ref 20–33)
CREAT SERPL-MCNC: 1.1 MG/DL (ref 0.5–1.4)
GLUCOSE SERPL-MCNC: 122 MG/DL (ref 65–99)
POTASSIUM SERPL-SCNC: 4.4 MMOL/L (ref 3.6–5.5)
SODIUM SERPL-SCNC: 137 MMOL/L (ref 135–145)

## 2021-09-03 PROCEDURE — 80048 BASIC METABOLIC PNL TOTAL CA: CPT

## 2021-09-03 PROCEDURE — 36415 COLL VENOUS BLD VENIPUNCTURE: CPT

## 2021-10-14 PROBLEM — N40.1 BENIGN PROSTATIC HYPERPLASIA WITH URINARY RETENTION: Status: ACTIVE | Noted: 2021-10-14

## 2021-10-14 PROBLEM — N30.90 CYSTITIS WITHOUT HEMATURIA: Status: RESOLVED | Noted: 2020-09-10 | Resolved: 2021-10-14

## 2021-10-14 PROBLEM — Z75.8 DISCHARGE PLANNING ISSUES: Status: RESOLVED | Noted: 2020-10-14 | Resolved: 2021-10-14

## 2021-10-14 PROBLEM — B02.8 HERPES ZOSTER WITH COMPLICATION: Status: RESOLVED | Noted: 2018-01-30 | Resolved: 2021-10-14

## 2021-10-14 PROBLEM — R33.8 BENIGN PROSTATIC HYPERPLASIA WITH URINARY RETENTION: Status: ACTIVE | Noted: 2021-10-14

## 2021-10-14 PROBLEM — H25.9 AGE-RELATED CATARACT OF BOTH EYES: Status: RESOLVED | Noted: 2021-08-01 | Resolved: 2021-10-14

## 2021-10-14 PROBLEM — R82.90 ABNORMAL URINALYSIS: Status: RESOLVED | Noted: 2020-12-04 | Resolved: 2021-10-14

## 2021-10-14 PROBLEM — Z02.9 DISCHARGE PLANNING ISSUES: Status: RESOLVED | Noted: 2020-10-14 | Resolved: 2021-10-14

## 2021-11-04 PROBLEM — K59.1 FUNCTIONAL DIARRHEA: Status: ACTIVE | Noted: 2021-11-04

## 2022-02-23 ENCOUNTER — HOSPITAL ENCOUNTER (OUTPATIENT)
Facility: MEDICAL CENTER | Age: 87
End: 2022-02-23
Attending: NURSE PRACTITIONER
Payer: MEDICARE

## 2022-02-23 LAB
APPEARANCE UR: ABNORMAL
BACTERIA #/AREA URNS HPF: ABNORMAL /HPF
BILIRUB UR QL STRIP.AUTO: NEGATIVE
COLOR UR: YELLOW
EPI CELLS #/AREA URNS HPF: ABNORMAL /HPF
GLUCOSE UR STRIP.AUTO-MCNC: NEGATIVE MG/DL
KETONES UR STRIP.AUTO-MCNC: NEGATIVE MG/DL
LEUKOCYTE ESTERASE UR QL STRIP.AUTO: ABNORMAL
MICRO URNS: ABNORMAL
MUCOUS THREADS #/AREA URNS HPF: ABNORMAL /HPF
NITRITE UR QL STRIP.AUTO: POSITIVE
PH UR STRIP.AUTO: 7 [PH] (ref 5–8)
PROT UR QL STRIP: NEGATIVE MG/DL
RBC # URNS HPF: ABNORMAL /HPF
RBC UR QL AUTO: ABNORMAL
SP GR UR STRIP.AUTO: 1.01
UNIDENT CRYS URNS QL MICRO: ABNORMAL /HPF
WBC #/AREA URNS HPF: ABNORMAL /HPF

## 2022-02-23 PROCEDURE — 87077 CULTURE AEROBIC IDENTIFY: CPT | Mod: 91

## 2022-02-23 PROCEDURE — 87186 SC STD MICRODIL/AGAR DIL: CPT | Mod: 91

## 2022-02-23 PROCEDURE — 81001 URINALYSIS AUTO W/SCOPE: CPT

## 2022-02-23 PROCEDURE — 87086 URINE CULTURE/COLONY COUNT: CPT

## 2022-02-26 LAB
BACTERIA UR CULT: ABNORMAL
SIGNIFICANT IND 70042: ABNORMAL
SITE SITE: ABNORMAL
SOURCE SOURCE: ABNORMAL

## 2022-05-28 NOTE — PROGRESS NOTES
2 RN skin check completed with EZ Garcia.   Devices in place: méndez catheter  Skin assessed under devices: yes.  Confirmed pressure ulcers found: N/A  New potential pressure ulcers noted: N/A  Wound consult: N/A.     The following interventions in place: Pt able to turn self side to side in bed, méndez care, pillows for support/repositioning, mepilex to bilateral  heels  Skin assessment:      Bilateral heels pink blanching with mepilex on   BLE dusky, cold, discolored, nonblanching   Penis intact with méndez               Alert and oriented to person, place and time. Normal mood and affect, no apparent risk to self or others

## 2022-11-08 ENCOUNTER — PATIENT MESSAGE (OUTPATIENT)
Dept: HEALTH INFORMATION MANAGEMENT | Facility: OTHER | Age: 87
End: 2022-11-08

## 2022-12-14 ENCOUNTER — APPOINTMENT (OUTPATIENT)
Dept: RADIOLOGY | Facility: MEDICAL CENTER | Age: 87
End: 2022-12-14
Attending: UROLOGY
Payer: MEDICARE

## 2023-06-16 PROBLEM — S90.822A BLISTER OF LEFT FOOT: Status: ACTIVE | Noted: 2023-06-16

## 2023-06-26 NOTE — PROGRESS NOTES
Pt became upset about no one waking him up to eat lunch. When staff attempted to help, the pt began to swing his cane at near staff. Security called. When security arrived they obtained the cane from the pt. This made the pt increasingly agitated and aggressive. Pt was placed in a vest restraint, bilateral soft wrist restraints, and medicated per MAR. Pt continued to attempt to hit, kick and spit at staff. Approximately 15 minutes passed and the pt was calm. No signs of injury. Pt was released and ate his lunch. Pt is now resting in bed. MD notified, no new orders at this time.   Winlevi Pregnancy And Lactation Text: This medication is considered safe during pregnancy and breastfeeding.

## 2023-07-29 PROBLEM — S90.822A BLISTER OF LEFT FOOT: Status: RESOLVED | Noted: 2023-06-16 | Resolved: 2023-07-29

## 2023-08-15 ENCOUNTER — HOSPITAL ENCOUNTER (OUTPATIENT)
Facility: MEDICAL CENTER | Age: 88
End: 2023-08-15
Attending: NURSE PRACTITIONER
Payer: MEDICARE

## 2023-08-15 LAB
APPEARANCE UR: ABNORMAL
BACTERIA #/AREA URNS HPF: ABNORMAL /HPF
BILIRUB UR QL STRIP.AUTO: NEGATIVE
COLOR UR: YELLOW
EPI CELLS #/AREA URNS HPF: ABNORMAL /HPF
GLUCOSE UR STRIP.AUTO-MCNC: 100 MG/DL
HYALINE CASTS #/AREA URNS LPF: ABNORMAL /LPF
KETONES UR STRIP.AUTO-MCNC: NEGATIVE MG/DL
LEUKOCYTE ESTERASE UR QL STRIP.AUTO: ABNORMAL
MICRO URNS: ABNORMAL
NITRITE UR QL STRIP.AUTO: NEGATIVE
PH UR STRIP.AUTO: 6 [PH] (ref 5–8)
PROT UR QL STRIP: NEGATIVE MG/DL
RBC # URNS HPF: ABNORMAL /HPF
RBC UR QL AUTO: ABNORMAL
SP GR UR STRIP.AUTO: 1.02
UROBILINOGEN UR STRIP.AUTO-MCNC: 0.2 MG/DL
WBC #/AREA URNS HPF: ABNORMAL /HPF

## 2023-08-15 PROCEDURE — 81001 URINALYSIS AUTO W/SCOPE: CPT

## 2023-08-15 PROCEDURE — 87086 URINE CULTURE/COLONY COUNT: CPT

## 2023-08-15 PROCEDURE — 87077 CULTURE AEROBIC IDENTIFY: CPT | Mod: 91

## 2023-08-15 PROCEDURE — 87186 SC STD MICRODIL/AGAR DIL: CPT

## 2023-08-27 PROBLEM — R44.1 HALLUCINATIONS, VISUAL: Status: ACTIVE | Noted: 2023-08-27

## 2023-09-11 ENCOUNTER — HOSPITAL ENCOUNTER (OUTPATIENT)
Facility: MEDICAL CENTER | Age: 88
End: 2023-09-11
Attending: NURSE PRACTITIONER
Payer: MEDICARE

## 2023-09-11 LAB
APPEARANCE UR: CLEAR
BACTERIA #/AREA URNS HPF: ABNORMAL /HPF
BILIRUB UR QL STRIP.AUTO: NEGATIVE
COLOR UR: YELLOW
EPI CELLS #/AREA URNS HPF: NEGATIVE /HPF
GLUCOSE UR STRIP.AUTO-MCNC: NEGATIVE MG/DL
HYALINE CASTS #/AREA URNS LPF: ABNORMAL /LPF
KETONES UR STRIP.AUTO-MCNC: NEGATIVE MG/DL
LEUKOCYTE ESTERASE UR QL STRIP.AUTO: ABNORMAL
MICRO URNS: ABNORMAL
NITRITE UR QL STRIP.AUTO: POSITIVE
PH UR STRIP.AUTO: 5.5 [PH] (ref 5–8)
PROT UR QL STRIP: NEGATIVE MG/DL
RBC # URNS HPF: ABNORMAL /HPF
RBC UR QL AUTO: NEGATIVE
SP GR UR STRIP.AUTO: 1.02
UROBILINOGEN UR STRIP.AUTO-MCNC: 0.2 MG/DL
WBC #/AREA URNS HPF: ABNORMAL /HPF

## 2023-09-11 PROCEDURE — 81001 URINALYSIS AUTO W/SCOPE: CPT

## 2023-09-11 PROCEDURE — 87077 CULTURE AEROBIC IDENTIFY: CPT

## 2023-09-11 PROCEDURE — 87186 SC STD MICRODIL/AGAR DIL: CPT

## 2023-09-11 PROCEDURE — 87086 URINE CULTURE/COLONY COUNT: CPT

## 2023-09-12 NOTE — CARE PLAN
Problem: Safety  Goal: Will remain free from injury  Outcome: PROGRESSING AS EXPECTED  Goal: Will remain free from falls  Outcome: PROGRESSING AS EXPECTED     Problem: Psychosocial Needs:  Goal: Level of anxiety will decrease  Outcome: PROGRESSING AS EXPECTED     Problem: Mobility  Goal: Risk for activity intolerance will decrease  Outcome: PROGRESSING AS EXPECTED      Gastroenterology

## 2023-11-29 ENCOUNTER — PATIENT MESSAGE (OUTPATIENT)
Dept: HEALTH INFORMATION MANAGEMENT | Facility: OTHER | Age: 88
End: 2023-11-29

## 2023-12-19 PROBLEM — I70.0 ATHEROSCLEROSIS OF AORTA (HCC): Status: ACTIVE | Noted: 2023-12-19

## 2023-12-27 PROBLEM — E11.51 TYPE II DIABETES MELLITUS WITH PERIPHERAL CIRCULATORY DISORDER (HCC): Status: ACTIVE | Noted: 2023-12-27

## 2024-01-30 ENCOUNTER — HOSPITAL ENCOUNTER (OUTPATIENT)
Facility: MEDICAL CENTER | Age: 89
End: 2024-01-30
Attending: NURSE PRACTITIONER
Payer: MEDICARE

## 2024-01-30 DIAGNOSIS — N39.0 URINARY TRACT INFECTION WITHOUT HEMATURIA, SITE UNSPECIFIED: ICD-10-CM

## 2024-01-30 LAB
APPEARANCE UR: ABNORMAL
BACTERIA #/AREA URNS HPF: ABNORMAL /HPF
BILIRUB UR QL STRIP.AUTO: NEGATIVE
COLOR UR: YELLOW
EPI CELLS #/AREA URNS HPF: ABNORMAL /HPF
GLUCOSE UR STRIP.AUTO-MCNC: NEGATIVE MG/DL
HYALINE CASTS #/AREA URNS LPF: ABNORMAL /LPF
KETONES UR STRIP.AUTO-MCNC: NEGATIVE MG/DL
LEUKOCYTE ESTERASE UR QL STRIP.AUTO: ABNORMAL
MICRO URNS: ABNORMAL
NITRITE UR QL STRIP.AUTO: NEGATIVE
PH UR STRIP.AUTO: 6 [PH] (ref 5–8)
PROT UR QL STRIP: NEGATIVE MG/DL
RBC # URNS HPF: ABNORMAL /HPF
RBC UR QL AUTO: ABNORMAL
SP GR UR STRIP.AUTO: 1.01
UROBILINOGEN UR STRIP.AUTO-MCNC: 0.2 MG/DL
WBC #/AREA URNS HPF: ABNORMAL /HPF

## 2024-01-30 PROCEDURE — 87077 CULTURE AEROBIC IDENTIFY: CPT | Mod: 91

## 2024-01-30 PROCEDURE — 87086 URINE CULTURE/COLONY COUNT: CPT

## 2024-01-30 PROCEDURE — 87186 SC STD MICRODIL/AGAR DIL: CPT | Mod: 91

## 2024-01-30 PROCEDURE — 81001 URINALYSIS AUTO W/SCOPE: CPT

## 2024-04-28 PROBLEM — R54 FRAILTY SYNDROME IN GERIATRIC PATIENT: Status: ACTIVE | Noted: 2024-04-28

## 2024-04-29 PROBLEM — E55.9 VITAMIN D DEFICIENCY: Status: ACTIVE | Noted: 2024-04-29

## 2024-04-29 PROBLEM — N13.8 BPH WITH URINARY OBSTRUCTION: Status: ACTIVE | Noted: 2021-10-14

## 2024-05-17 ENCOUNTER — APPOINTMENT (OUTPATIENT)
Dept: RADIOLOGY | Facility: MEDICAL CENTER | Age: 89
DRG: 698 | End: 2024-05-17
Attending: EMERGENCY MEDICINE
Payer: MEDICARE

## 2024-05-17 ENCOUNTER — HOSPITAL ENCOUNTER (INPATIENT)
Facility: MEDICAL CENTER | Age: 89
LOS: 3 days | DRG: 698 | End: 2024-05-21
Attending: EMERGENCY MEDICINE | Admitting: STUDENT IN AN ORGANIZED HEALTH CARE EDUCATION/TRAINING PROGRAM
Payer: MEDICARE

## 2024-05-17 DIAGNOSIS — N39.0 ACUTE UTI: ICD-10-CM

## 2024-05-17 DIAGNOSIS — N39.0 COMPLICATED UTI (URINARY TRACT INFECTION): ICD-10-CM

## 2024-05-17 DIAGNOSIS — R53.1 GENERALIZED WEAKNESS: ICD-10-CM

## 2024-05-17 DIAGNOSIS — R26.9 UNABLE TO AMBULATE SAFELY ON UNEVEN TERRAIN: ICD-10-CM

## 2024-05-17 LAB
ALBUMIN SERPL BCP-MCNC: 4.2 G/DL (ref 3.2–4.9)
ALBUMIN/GLOB SERPL: 1.3 G/DL
ALP SERPL-CCNC: 93 U/L (ref 30–99)
ALT SERPL-CCNC: 8 U/L (ref 2–50)
ANION GAP SERPL CALC-SCNC: 12 MMOL/L (ref 7–16)
APPEARANCE UR: ABNORMAL
AST SERPL-CCNC: 24 U/L (ref 12–45)
BACTERIA #/AREA URNS HPF: ABNORMAL /HPF
BASOPHILS # BLD AUTO: 0.6 % (ref 0–1.8)
BASOPHILS # BLD: 0.04 K/UL (ref 0–0.12)
BILIRUB SERPL-MCNC: 0.2 MG/DL (ref 0.1–1.5)
BILIRUB UR QL STRIP.AUTO: NEGATIVE
BUN SERPL-MCNC: 17 MG/DL (ref 8–22)
CALCIUM ALBUM COR SERPL-MCNC: 8.9 MG/DL (ref 8.5–10.5)
CALCIUM SERPL-MCNC: 9.1 MG/DL (ref 8.5–10.5)
CHLORIDE SERPL-SCNC: 97 MMOL/L (ref 96–112)
CO2 SERPL-SCNC: 28 MMOL/L (ref 20–33)
COLOR UR: YELLOW
CORTIS SERPL-MCNC: 15.5 UG/DL (ref 0–23)
CREAT SERPL-MCNC: 1.08 MG/DL (ref 0.5–1.4)
EOSINOPHIL # BLD AUTO: 0.17 K/UL (ref 0–0.51)
EOSINOPHIL NFR BLD: 2.7 % (ref 0–6.9)
EPI CELLS #/AREA URNS HPF: NEGATIVE /HPF
ERYTHROCYTE [DISTWIDTH] IN BLOOD BY AUTOMATED COUNT: 43.2 FL (ref 35.9–50)
FLUAV RNA SPEC QL NAA+PROBE: NEGATIVE
FLUBV RNA SPEC QL NAA+PROBE: NEGATIVE
GFR SERPLBLD CREATININE-BSD FMLA CKD-EPI: 66 ML/MIN/1.73 M 2
GLOBULIN SER CALC-MCNC: 3.2 G/DL (ref 1.9–3.5)
GLUCOSE SERPL-MCNC: 125 MG/DL (ref 65–99)
GLUCOSE UR STRIP.AUTO-MCNC: NEGATIVE MG/DL
HCT VFR BLD AUTO: 41.2 % (ref 42–52)
HGB BLD-MCNC: 13.9 G/DL (ref 14–18)
HYALINE CASTS #/AREA URNS LPF: ABNORMAL /LPF
IMM GRANULOCYTES # BLD AUTO: 0.01 K/UL (ref 0–0.11)
IMM GRANULOCYTES NFR BLD AUTO: 0.2 % (ref 0–0.9)
KETONES UR STRIP.AUTO-MCNC: NEGATIVE MG/DL
LACTATE SERPL-SCNC: 1.4 MMOL/L (ref 0.5–2)
LEUKOCYTE ESTERASE UR QL STRIP.AUTO: ABNORMAL
LYMPHOCYTES # BLD AUTO: 0.83 K/UL (ref 1–4.8)
LYMPHOCYTES NFR BLD: 13.2 % (ref 22–41)
MAGNESIUM SERPL-MCNC: 2 MG/DL (ref 1.5–2.5)
MCH RBC QN AUTO: 29.9 PG (ref 27–33)
MCHC RBC AUTO-ENTMCNC: 33.7 G/DL (ref 32.3–36.5)
MCV RBC AUTO: 88.6 FL (ref 81.4–97.8)
MICRO URNS: ABNORMAL
MONOCYTES # BLD AUTO: 0.96 K/UL (ref 0–0.85)
MONOCYTES NFR BLD AUTO: 15.2 % (ref 0–13.4)
NEUTROPHILS # BLD AUTO: 4.29 K/UL (ref 1.82–7.42)
NEUTROPHILS NFR BLD: 68.1 % (ref 44–72)
NITRITE UR QL STRIP.AUTO: NEGATIVE
NRBC # BLD AUTO: 0 K/UL
NRBC BLD-RTO: 0 /100 WBC (ref 0–0.2)
PH UR STRIP.AUTO: 7 [PH] (ref 5–8)
PLATELET # BLD AUTO: 189 K/UL (ref 164–446)
PMV BLD AUTO: 8.8 FL (ref 9–12.9)
POTASSIUM SERPL-SCNC: 4.1 MMOL/L (ref 3.6–5.5)
PROCALCITONIN SERPL-MCNC: 0.05 NG/ML
PROT SERPL-MCNC: 7.4 G/DL (ref 6–8.2)
PROT UR QL STRIP: 30 MG/DL
RBC # BLD AUTO: 4.65 M/UL (ref 4.7–6.1)
RBC # URNS HPF: >150 /HPF
RBC UR QL AUTO: ABNORMAL
RSV RNA SPEC QL NAA+PROBE: NEGATIVE
SARS-COV-2 RNA RESP QL NAA+PROBE: NOTDETECTED
SODIUM SERPL-SCNC: 137 MMOL/L (ref 135–145)
SP GR UR STRIP.AUTO: 1.01
SPECIMEN SOURCE: NORMAL
UROBILINOGEN UR STRIP.AUTO-MCNC: 0.2 MG/DL
WBC # BLD AUTO: 6.3 K/UL (ref 4.8–10.8)
WBC #/AREA URNS HPF: ABNORMAL /HPF

## 2024-05-17 PROCEDURE — 99223 1ST HOSP IP/OBS HIGH 75: CPT | Mod: AI | Performed by: STUDENT IN AN ORGANIZED HEALTH CARE EDUCATION/TRAINING PROGRAM

## 2024-05-17 RX ORDER — CEFTRIAXONE 2 G/1
2000 INJECTION, POWDER, FOR SOLUTION INTRAMUSCULAR; INTRAVENOUS ONCE
Status: COMPLETED | OUTPATIENT
Start: 2024-05-17 | End: 2024-05-17

## 2024-05-17 RX ORDER — SODIUM CHLORIDE, SODIUM LACTATE, POTASSIUM CHLORIDE, AND CALCIUM CHLORIDE .6; .31; .03; .02 G/100ML; G/100ML; G/100ML; G/100ML
30 INJECTION, SOLUTION INTRAVENOUS
Status: DISCONTINUED | OUTPATIENT
Start: 2024-05-17 | End: 2024-05-21 | Stop reason: HOSPADM

## 2024-05-17 RX ADMIN — CEFTRIAXONE SODIUM 2000 MG: 2 INJECTION, POWDER, FOR SOLUTION INTRAMUSCULAR; INTRAVENOUS at 23:21

## 2024-05-18 PROBLEM — T83.511A URINARY TRACT INFECTION ASSOCIATED WITH INDWELLING URETHRAL CATHETER (HCC): Status: ACTIVE | Noted: 2024-05-17

## 2024-05-18 PROBLEM — R53.1 WEAKNESS: Status: ACTIVE | Noted: 2024-05-18

## 2024-05-18 PROBLEM — F01.52: Status: ACTIVE | Noted: 2021-02-09

## 2024-05-18 PROBLEM — N39.0 COMPLICATED UTI (URINARY TRACT INFECTION): Status: ACTIVE | Noted: 2024-05-18

## 2024-05-18 PROBLEM — J18.9 PNEUMONIA OF LEFT LOWER LOBE DUE TO INFECTIOUS ORGANISM: Status: ACTIVE | Noted: 2024-05-18

## 2024-05-18 PROCEDURE — 99233 SBSQ HOSP IP/OBS HIGH 50: CPT | Performed by: HOSPITALIST

## 2024-05-18 RX ORDER — ENOXAPARIN SODIUM 100 MG/ML
30 INJECTION SUBCUTANEOUS DAILY
Status: DISCONTINUED | OUTPATIENT
Start: 2024-05-18 | End: 2024-05-21 | Stop reason: HOSPADM

## 2024-05-18 RX ORDER — ACETAMINOPHEN 325 MG/1
650 TABLET ORAL EVERY 6 HOURS PRN
Status: DISCONTINUED | OUTPATIENT
Start: 2024-05-18 | End: 2024-05-21 | Stop reason: HOSPADM

## 2024-05-18 RX ORDER — LABETALOL HYDROCHLORIDE 5 MG/ML
10 INJECTION, SOLUTION INTRAVENOUS EVERY 4 HOURS PRN
Status: DISCONTINUED | OUTPATIENT
Start: 2024-05-18 | End: 2024-05-21 | Stop reason: HOSPADM

## 2024-05-18 RX ORDER — AZITHROMYCIN 250 MG/1
500 TABLET, FILM COATED ORAL DAILY
Status: COMPLETED | OUTPATIENT
Start: 2024-05-18 | End: 2024-05-20

## 2024-05-18 RX ORDER — QUETIAPINE FUMARATE 25 MG/1
50 TABLET, FILM COATED ORAL 3 TIMES DAILY
Status: DISCONTINUED | OUTPATIENT
Start: 2024-05-18 | End: 2024-05-21 | Stop reason: HOSPADM

## 2024-05-18 RX ORDER — BUSPIRONE HYDROCHLORIDE 10 MG/1
10 TABLET ORAL 2 TIMES DAILY
Status: DISCONTINUED | OUTPATIENT
Start: 2024-05-18 | End: 2024-05-21 | Stop reason: HOSPADM

## 2024-05-18 RX ORDER — FINASTERIDE 5 MG/1
5 TABLET, FILM COATED ORAL DAILY
Status: DISCONTINUED | OUTPATIENT
Start: 2024-05-18 | End: 2024-05-21 | Stop reason: HOSPADM

## 2024-05-18 RX ORDER — HALOPERIDOL 5 MG/ML
5 INJECTION INTRAMUSCULAR EVERY 6 HOURS PRN
Status: DISCONTINUED | OUTPATIENT
Start: 2024-05-18 | End: 2024-05-21 | Stop reason: HOSPADM

## 2024-05-18 RX ORDER — TAMSULOSIN HYDROCHLORIDE 0.4 MG/1
0.8 CAPSULE ORAL DAILY
Status: DISCONTINUED | OUTPATIENT
Start: 2024-05-18 | End: 2024-05-21 | Stop reason: HOSPADM

## 2024-05-18 RX ORDER — QUETIAPINE FUMARATE 100 MG/1
100 TABLET, FILM COATED ORAL
Status: DISCONTINUED | OUTPATIENT
Start: 2024-05-18 | End: 2024-05-21 | Stop reason: HOSPADM

## 2024-05-18 RX ADMIN — ENOXAPARIN SODIUM 30 MG: 100 INJECTION SUBCUTANEOUS at 19:47

## 2024-05-18 RX ADMIN — AZITHROMYCIN DIHYDRATE 500 MG: 250 TABLET, FILM COATED ORAL at 01:56

## 2024-05-18 RX ADMIN — CEFTRIAXONE SODIUM 2000 MG: 10 INJECTION, POWDER, FOR SOLUTION INTRAVENOUS at 20:13

## 2024-05-18 RX ADMIN — HALOPERIDOL LACTATE 5 MG: 5 INJECTION, SOLUTION INTRAMUSCULAR at 21:16

## 2024-05-18 RX ADMIN — FINASTERIDE 5 MG: 5 TABLET, FILM COATED ORAL at 05:09

## 2024-05-18 RX ADMIN — BUSPIRONE HYDROCHLORIDE 10 MG: 10 TABLET ORAL at 19:44

## 2024-05-18 RX ADMIN — QUETIAPINE FUMARATE 100 MG: 100 TABLET ORAL at 21:16

## 2024-05-18 RX ADMIN — QUETIAPINE FUMARATE 50 MG: 25 TABLET ORAL at 12:56

## 2024-05-18 RX ADMIN — BUSPIRONE HYDROCHLORIDE 10 MG: 10 TABLET ORAL at 05:09

## 2024-05-18 RX ADMIN — QUETIAPINE FUMARATE 50 MG: 25 TABLET ORAL at 19:44

## 2024-05-18 RX ADMIN — QUETIAPINE FUMARATE 50 MG: 25 TABLET ORAL at 05:20

## 2024-05-18 RX ADMIN — TAMSULOSIN HYDROCHLORIDE 0.8 MG: 0.4 CAPSULE ORAL at 05:09

## 2024-05-18 RX ADMIN — LABETALOL HYDROCHLORIDE 10 MG: 5 INJECTION, SOLUTION INTRAVENOUS at 03:27

## 2024-05-18 SDOH — ECONOMIC STABILITY: TRANSPORTATION INSECURITY
IN THE PAST 12 MONTHS, HAS THE LACK OF TRANSPORTATION KEPT YOU FROM MEDICAL APPOINTMENTS OR FROM GETTING MEDICATIONS?: NO

## 2024-05-18 SDOH — ECONOMIC STABILITY: TRANSPORTATION INSECURITY
IN THE PAST 12 MONTHS, HAS LACK OF RELIABLE TRANSPORTATION KEPT YOU FROM MEDICAL APPOINTMENTS, MEETINGS, WORK OR FROM GETTING THINGS NEEDED FOR DAILY LIVING?: NO

## 2024-05-18 ASSESSMENT — ENCOUNTER SYMPTOMS
BLURRED VISION: 0
WEIGHT LOSS: 0
SHORTNESS OF BREATH: 0
DIARRHEA: 1
DIZZINESS: 0
VOMITING: 0
MYALGIAS: 0
COUGH: 0
HEARTBURN: 0
SENSORY CHANGE: 0
DOUBLE VISION: 0
TINGLING: 0
SPUTUM PRODUCTION: 0
CHILLS: 0
NECK PAIN: 0
ABDOMINAL PAIN: 1
FEVER: 0
HEADACHES: 0
NAUSEA: 0
FOCAL WEAKNESS: 1
PSYCHIATRIC NEGATIVE: 1
PHOTOPHOBIA: 0
WEAKNESS: 1

## 2024-05-18 ASSESSMENT — SOCIAL DETERMINANTS OF HEALTH (SDOH)
WITHIN THE LAST YEAR, HAVE TO BEEN RAPED OR FORCED TO HAVE ANY KIND OF SEXUAL ACTIVITY BY YOUR PARTNER OR EX-PARTNER?: NO
IN THE PAST 12 MONTHS, HAS THE ELECTRIC, GAS, OIL, OR WATER COMPANY THREATENED TO SHUT OFF SERVICE IN YOUR HOME?: NO
WITHIN THE LAST YEAR, HAVE YOU BEEN KICKED, HIT, SLAPPED, OR OTHERWISE PHYSICALLY HURT BY YOUR PARTNER OR EX-PARTNER?: NO
WITHIN THE PAST 12 MONTHS, THE FOOD YOU BOUGHT JUST DIDN'T LAST AND YOU DIDN'T HAVE MONEY TO GET MORE: NEVER TRUE
WITHIN THE LAST YEAR, HAVE YOU BEEN HUMILIATED OR EMOTIONALLY ABUSED IN OTHER WAYS BY YOUR PARTNER OR EX-PARTNER?: NO
WITHIN THE LAST YEAR, HAVE YOU BEEN AFRAID OF YOUR PARTNER OR EX-PARTNER?: NO
WITHIN THE PAST 12 MONTHS, YOU WORRIED THAT YOUR FOOD WOULD RUN OUT BEFORE YOU GOT THE MONEY TO BUY MORE: NEVER TRUE

## 2024-05-18 ASSESSMENT — COGNITIVE AND FUNCTIONAL STATUS - GENERAL
MOVING TO AND FROM BED TO CHAIR: A LOT
MOBILITY SCORE: 15
WALKING IN HOSPITAL ROOM: A LOT
MOVING FROM LYING ON BACK TO SITTING ON SIDE OF FLAT BED: A LITTLE
CLIMB 3 TO 5 STEPS WITH RAILING: A LOT
SUGGESTED CMS G CODE MODIFIER MOBILITY: CK
TURNING FROM BACK TO SIDE WHILE IN FLAT BAD: A LITTLE
STANDING UP FROM CHAIR USING ARMS: A LITTLE

## 2024-05-18 ASSESSMENT — LIFESTYLE VARIABLES
HAVE YOU EVER FELT YOU SHOULD CUT DOWN ON YOUR DRINKING: NO
EVER HAD A DRINK FIRST THING IN THE MORNING TO STEADY YOUR NERVES TO GET RID OF A HANGOVER: NO
HOW MANY TIMES IN THE PAST YEAR HAVE YOU HAD 5 OR MORE DRINKS IN A DAY: 0
TOTAL SCORE: 0
CONSUMPTION TOTAL: NEGATIVE
TOTAL SCORE: 0
EVER FELT BAD OR GUILTY ABOUT YOUR DRINKING: NO
ALCOHOL_USE: NO
HAVE PEOPLE ANNOYED YOU BY CRITICIZING YOUR DRINKING: NO
ON A TYPICAL DAY WHEN YOU DRINK ALCOHOL HOW MANY DRINKS DO YOU HAVE: 0
TOTAL SCORE: 0
AVERAGE NUMBER OF DAYS PER WEEK YOU HAVE A DRINK CONTAINING ALCOHOL: 0
DOES PATIENT WANT TO STOP DRINKING: NO

## 2024-05-18 ASSESSMENT — PAIN DESCRIPTION - PAIN TYPE: TYPE: ACUTE PAIN

## 2024-05-18 ASSESSMENT — FIBROSIS 4 INDEX: FIB4 SCORE: 3.95

## 2024-05-18 ASSESSMENT — GAIT ASSESSMENTS: GAIT LEVEL OF ASSIST: UNABLE TO PARTICIPATE

## 2024-05-18 NOTE — ED TRIAGE NOTES
Chief Complaint   Patient presents with    Weakness    Painful Urination     BIB EMS from [facility]. Increased weakness today with diminished appetite. Pt reports pain at méndez catheter site.    Aox4, GCS 15    Pt reports feeling weak today, reports being unable to stand independently. Also reports pain at catheter site, pt reports catheter was last changed in April.     Hx: COPD, DM, renal disorder, htn    No interventions by EMS pta.

## 2024-05-18 NOTE — THERAPY
"Physical Therapy   Initial Evaluation     Patient Name: Ovi Bejarano  Age:  88 y.o., Sex:  male  Medical Record #: [id number]  Today's Date: 5/18/2024     Precautions  Precautions: Fall Risk    Assessment  Patient is 88 y.o. male with a diagnosis of UTI and pneumonia. PMH includes vascular dementia, BPH, and indwelling catheter. Pt reports he lives in a group home and has assistance with dressing (some days independent), bathing, meals. He reports use of SPC with mobility at home, but it is unclear if he is walking independently. Per report from RN, pt is below baseline mobility. Today, pt was able to participate with transfers, but was unable to initiate gait. Barrier to ambulation appeared to be confusion, as pt was reaching for \"something to hold onto\" despite holding cane. He refused hand held assist and FWW. He required CGA with sit to stand due to unsteadiness. LE strength scores were 4/5 to 4+/5 grossly bilat LEs, though with some limitations in communication and testing. He will continue to benefit from skilled PT to address deficits. Recommend further PT in the post-acute setting prior to D/C home.    Plan    Physical Therapy Initial Treatment Plan   Treatment Plan : (P) Bed Mobility, Gait Training, Family / Caregiver Training, Neuro Re-Education / Balance, Stair Training, Therapeutic Exercise, Therapeutic Activities  Treatment Frequency: (P) 3 Times per Week  Duration: (P) Until Therapy Goals Met    DC Equipment Recommendations: (P) Unable to determine at this time  Discharge Recommendations: (P) Recommend post-acute placement for additional physical therapy services prior to discharge home       Subjective    Pt agreeable to work with PT. \"I am shaky.\"     Objective       05/18/24 1305   Precautions   Precautions Fall Risk   Pain 0 - 10 Group   Therapist Pain Assessment 0;Post Activity Pain Same as Prior to Activity   Prior Living Situation   Prior Services Intermittent Physical Support for ADL Per " "Service   Housing / Facility Group Home   Bathroom Set up Walk In Shower;Shower Chair   Equipment Owned Single Point Cane;Hospital Bed   Lives with - Patient's Self Care Capacity Attendant / Paid Care Giver   Comments pt reports he has assistance for dressing (sometimes independent), showering, meals, cleaning   Prior Level of Functional Mobility   Bed Mobility Independent   Transfer Status Independent   Ambulation Independent   Ambulation Distance household   Assistive Devices Used Single Point Cane   Stairs Unable To Determine At This Time   History of Falls   History of Falls   (unknown)   Cognition    Cognition / Consciousness X   Speech/ Communication Hard of Hearing   Level of Consciousness Alert   Ability To Follow Commands 1 Step   Initiation Impaired   Strength Lower Body   Comments WFL for mobility, grossly 4 to 4+/5 bilat LEs   Sensation Lower Body   Comments no N/T reported   Vision   Vision Comments 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\" but can see general shapes, lights, colors   Balance Assessment   Sitting Balance (Static) Fair +   Sitting Balance (Dynamic) Fair +   Standing Balance (Static) Fair -   Standing Balance (Dynamic) Poor +   Weight Shift Sitting Fair   Weight Shift Standing Poor   Comments with SPC   Bed Mobility    Comments pt found sitting up EOB   Gait Analysis   Gait Level Of Assist Unable to Participate   Weight Bearing Status no restriction   Comments attempted cues for gait, but pt unable or unwilling to take a step due to some confusion - he was reaching his arms around \"for something to hold\" in addition to SPC, but rejected HHA and walker   Functional Mobility   Sit to Stand Minimal Assist   Transfer Method Stand Pivot   Mobility sit EOB<->stand   Comments with SPC   6 Clicks Assessment - How much HELP from from another person do you currently need... (If the patient hasn't done an activity recently, how much help from another person do you think he/she would need if he/she tried?)   Turning " from your back to your side while in a flat bed without using bedrails? 3   Moving from lying on your back to sitting on the side of a flat bed without using bedrails? 3   Moving to and from a bed to a chair (including a wheelchair)? 2   Standing up from a chair using your arms (e.g., wheelchair, or bedside chair)? 3   Walking in hospital room? 2   Climbing 3-5 steps with a railing? 2   6 clicks Mobility Score 15   Activity Tolerance   Sitting in Chair NT   Sitting Edge of Bed left up EOB   Standing 2 min approx   Comments limited by confusion, communication   Patient / Family Goals    Patient / Family Goal #1 not stated   Short Term Goals    Short Term Goal # 1 Pt will perform supine<->sit with supv within 6 visits in order to return home   Short Term Goal # 2 Pt will transfer bed<->chair with SPC or FWW with supv within 6 visits in order to return home   Short Term Goal # 3 Pt will amb 100' with SPC or FWW with supv within 6 visits in order to return home   Education Group   Education Provided Role of Physical Therapist   Role of Physical Therapist Patient Response Patient;Acceptance;Explanation;No Learning Evidence   Physical Therapy Initial Treatment Plan    Treatment Plan  Bed Mobility;Gait Training;Family / Caregiver Training;Neuro Re-Education / Balance;Stair Training;Therapeutic Exercise;Therapeutic Activities   Treatment Frequency 3 Times per Week   Duration Until Therapy Goals Met   Problem List    Problems Impaired Bed Mobility;Impaired Transfers;Impaired Ambulation;Functional Strength Deficit;Impaired Balance;Impaired Vision;Decreased Activity Tolerance;Safety Awareness Deficits / Cognition;Motor Planning / Sequencing   Anticipated Discharge Equipment and Recommendations   DC Equipment Recommendations Unable to determine at this time   Discharge Recommendations Recommend post-acute placement for additional physical therapy services prior to discharge home

## 2024-05-18 NOTE — PROGRESS NOTES
"Upon transfer to new room pt stated he did not want to leave. Explained reasoning on transfer. Pt requested to speak to MD. Dr. Ruelas arrived at bedside and spoke to pt. Pt stating \"you are tricking me\" several times over. Pt educated nursing staff is not \"tricking' him. Updated receiving RN  "

## 2024-05-18 NOTE — PROGRESS NOTES
4 Eyes Skin Assessment Completed by EZ Hernandez and EZ Miguel.    Head WDL  Ears WDL  Nose WDL  Mouth WDL  Neck WDL  Breast/Chest WDL  Shoulder Blades WDL  Spine WDL  (R) Arm/Elbow/Hand WDL  (L) Arm/Elbow/Hand WDL  Abdomen WDL  Groin WDL  Scrotum/Coccyx/Buttocks WDL  (R) Leg WDL  (L) Leg WDL  (R) Heel/Foot/Toe WDL  (L) Heel/Foot/Toe WDL          Devices In Places Blood Pressure Cuff and Pulse Ox      Interventions In Place Pillows and Low Air Loss Mattress    Possible Skin Injury No    Pictures Uploaded Into Epic N/A  Wound Consult Placed N/A  RN Wound Prevention Protocol Ordered No

## 2024-05-18 NOTE — ED NOTES
Bedside report received from off going RN Minal, assumed care of patient.  POC discussed with patient. Call light within reach, all needs addressed at this time. Bed alarm ON, both side rails raised, curtain open.     Fall risk interventions in place: Move the patient closer to the nurse's station, Patient's personal possessions are with in their safe reach, Place socks on patient, Place fall risk sign on patient's door, Give patient urinal if applicable, Keep floor surfaces clean and dry, Accompanied to restroom, and Bed Alarm in use (all applicable per [place] Fall risk assessment)   Continuous monitoring: Cardiac Leads, Pulse Ox, or Blood Pressure  IVF/IV medications: Not Applicable   Oxygen: 2L NC  Bedside sitter: Not Applicable   Isolation: Not Applicable

## 2024-05-18 NOTE — ED NOTES
Pt failed ambulation trial with x2 assist and front wheel walker. Pt reports he only uses a cane at home. ERP saw pt attempting trial, agrees with assessment. Pt to be placed up for admission.

## 2024-05-18 NOTE — ASSESSMENT & PLAN NOTE
Saturating in the mid 80s in the ED. requires a 2 L NC, not on home oxygen  CXR reviewed and shows opacity on the left, possible pneumonia    Continue Rocephin and azithromycin  Wean down oxygen

## 2024-05-18 NOTE — PROGRESS NOTES
Hospital Medicine Daily Progress Note    Date of Service  5/18/2024    Chief Complaint  Ovi Bejarano is a 88 y.o. male admitted 5/17/2024 with weakness    Hospital Course  Ovi Bejarano is a 88 y.o. male who was brought in from [facility] 5/17/2024 with increased weakness and abdominal pain.  PMH of vascular dementia, BPH with urinary retention and chronic indwelling Rosado catheter, diabetes, right kidney mass.  Patient was having difficulty urinating this morning and has some suprapubic abdominal pain.  Associated weakness is worse than his baseline.  No fever/chills or other acute complaints, has chronic diarrhea. Patient was unable to stand on his own in the ED.     In the ED afebrile, hemodynamically stable, hypoxic in the 80s and is requiring 2 L O2 on my evaluation.  Labs show UA that is concerning for infection.    Interval Problem Update  Patient definitely feels better today    New suprapubic catheter in place    And IV antibiotics ( rocephin) on board patient is receiving the appropriate treatment.    Urine culture still pending      BP is elevated    Patient is currently on 2 L nasal cannula satting at 93%      I have discussed this patient's plan of care and discharge plan at IDT rounds today with Case Management, Nursing, Nursing leadership, and other members of the IDT team.    Consultants/Specialty      Code Status  DNAR/DNI    Disposition  The patient is not medically cleared for discharge to home or a post-acute facility.  Anticipate discharge to: home with close outpatient follow-up    I have placed the appropriate orders for post-discharge needs.    Review of Systems  Review of Systems   Constitutional:  Positive for malaise/fatigue. Negative for chills, fever and weight loss.   Eyes:  Negative for blurred vision, double vision and photophobia.   Respiratory:  Negative for cough and sputum production.    Gastrointestinal:  Negative for heartburn, nausea and vomiting.    Genitourinary:  Negative for dysuria, frequency and urgency.   Musculoskeletal:  Negative for myalgias and neck pain.   Neurological:  Positive for focal weakness and weakness. Negative for dizziness, tingling, sensory change and headaches.   Psychiatric/Behavioral: Negative.          Physical Exam  Temp:  [36.2 °C (97.2 °F)-37.4 °C (99.4 °F)] 37.4 °C (99.3 °F)  Pulse:  [60-87] 60  Resp:  [12-20] 15  BP: (136-206)/(58-92) 165/73  SpO2:  [82 %-99 %] 97 %    Physical Exam  Constitutional:       General: He is not in acute distress.     Appearance: He is not ill-appearing, toxic-appearing or diaphoretic.   HENT:      Mouth/Throat:      Mouth: Mucous membranes are moist.   Eyes:      Extraocular Movements: Extraocular movements intact.      Pupils: Pupils are equal, round, and reactive to light.   Cardiovascular:      Rate and Rhythm: Normal rate and regular rhythm.      Heart sounds: No murmur heard.     No friction rub. No gallop.   Pulmonary:      Effort: No respiratory distress.      Breath sounds: No stridor. No wheezing, rhonchi or rales.   Abdominal:      Palpations: There is no mass.      Hernia: No hernia is present.      Comments: Suprapubic cath   Musculoskeletal:         General: No swelling, tenderness or signs of injury.      Cervical back: Normal range of motion.   Skin:     Capillary Refill: Capillary refill takes 2 to 3 seconds.      Coloration: Skin is not jaundiced or pale.      Findings: No bruising, erythema or lesion.   Neurological:      General: No focal deficit present.      Mental Status: He is oriented to person, place, and time.      Motor: Weakness present.   Psychiatric:         Mood and Affect: Mood normal.         Behavior: Behavior normal.         Fluids  No intake or output data in the 24 hours ending 05/18/24 1018    Laboratory  Recent Labs     05/17/24 2058   WBC 6.3   RBC 4.65*   HEMOGLOBIN 13.9*   HEMATOCRIT 41.2*   MCV 88.6   MCH 29.9   MCHC 33.7   RDW 43.2   PLATELETCT 189    MPV 8.8*     Recent Labs     05/17/24 2058   SODIUM 137   POTASSIUM 4.1   CHLORIDE 97   CO2 28   GLUCOSE 125*   BUN 17   CREATININE 1.08   CALCIUM 9.1                   Imaging  DX-CHEST-PORTABLE (1 VIEW)   Final Result         Patchy airspace opacity in the left lower lobe, likely pneumonia.           Assessment/Plan  * Urinary tract infection associated with indwelling urethral catheter (HCC)- (present on admission)  Assessment & Plan  Symptomatic with suprapubic abdominal pain  Patient is a chronic indwelling Rosado catheter due to BPH with urinary retention  Catheter change in the ED and labs sent, UA concerning for infection      Continue IV Rocephin until results of urine culture    Weakness- (present on admission)  Assessment & Plan  Generalized weakness due to old age and acute infections as well as respiratory failure  PT, OT    Pneumonia of left lower lobe due to infectious organism- (present on admission)  Assessment & Plan  Patient having dyspnea on exertion and was hypoxic down to the low/mid 80s requiring oxygen  COVID, influenza, RSV negative  CXR reviewed he does have an opacity on the left.  Procalcitonin negative   Will be on antibiotics for UTI in any case, azithromycin x 3 days    Type II diabetes mellitus with peripheral circulatory disorder (HCC)- (present on admission)  Assessment & Plan  Sliding-scale insulin    Functional diarrhea- (present on admission)  Assessment & Plan  Patient has chronic diarrhea, at baseline    BPH with urinary obstruction- (present on admission)  Assessment & Plan  Chronic indwelling catheter, per chart review has refused TURP in the past  Continue tamsulosin and finasteride    Vascular dementia with psychotic disturbance (HCC)- (present on admission)  Assessment & Plan  History of agitation and hallucinations per chart review  Continue buspirone and quetiapine    Acute respiratory failure with hypoxia (HCC)- (present on admission)  Assessment & Plan  Saturating in  the mid 80s in the ED.  He does have dyspnea and worsening weakness  CXR reviewed and shows opacity on the left, possible pneumonia  Wean off as tolerated         VTE prophylaxis: VTE Selection    I have performed a physical exam and reviewed and updated ROS and Plan today (5/18/2024). In review of yesterday's note (5/17/2024), there are no changes except as documented above.      Greater than 51 minutes spent prepping to see patient (e.g. review of tests) obtaining and/or reviewing separately obtained history. Performing a medically appropriate examination and/ evaluation.  Counseling and educating the patient/family/caregiver.  Ordering medications, tests, or procedures.  Referring and communicating with other health care professionals.  Documenting clinical information in EPIC.  Independently interpreting results and communicating results to patient/family/caregiver.  Care coordination.

## 2024-05-18 NOTE — ED NOTES
Rosado catheter replaced per ERP. COVID swab collected and sent to lab.     Phlebotomy at bedside for second set of blood cultures.

## 2024-05-18 NOTE — ASSESSMENT & PLAN NOTE
Chronic indwelling catheter, per chart review has refused TURP in the past  Continue tamsulosin and finasteride

## 2024-05-18 NOTE — RESPIRATORY CARE
COPD EDUCATION by COPD CLINICAL EDUCATOR  5/18/2024 at 3:40 PM by Sherlyn Sahu, RRT     Patient interviewed by COPD education team. Patient does not have a history or diagnosis of COPD and is a non-smoker.  Therefore, patient does not qualify for the COPD program.

## 2024-05-18 NOTE — PROGRESS NOTES
Assumed pt care. Updated pt on POC. Pt's /92. Notified Roma WARD. Awaiting medications to be approved by [employer] for PRN BP maintenance. Pt resting in bed with call light and belongings within reach.

## 2024-05-18 NOTE — ED PROVIDER NOTES
ER Provider Note    Scribed for Dr. Demetria Mcmullen D.O. by Sunshine Wood. 5/17/2024  8:50 PM    Primary Care Provider: MASON Dill    CHIEF COMPLAINT  Chief Complaint   Patient presents with    Weakness    Painful Urination     BIB EMS from [facility]. Increased weakness today with diminished appetite. Pt reports pain at méndez catheter site.      EXTERNAL RECORDS REVIEWED  Outpatient Notes Patient was last seen by geriatric medicine on 4/29/24. History of dementia, AAA, hypertension, right kidney mass, psoriasis, malnutrition, glaucoma, BPH with urinary retention, functional diarrhea.    HPI/ROS  LIMITATION TO HISTORY   Select: : None    OUTSIDE HISTORIAN(S):  None    Ovi Bejarnao is a 88 y.o. male with a history of diabetes, hypertension and renal disorder who presents to the ED via EMS for painful urination onset this morning. Patient states he woke up this morning and had difficulty urinating. He reports associated weakness, diarrhea and decreased appetite. Patient states he is unable to stand up on his own. He also reports pain at his catheter site, stating the last time it was changed was in April. Denies vomiting, hematuria, chills or fever. No alleviating or exacerbating factors noted.    PAST MEDICAL HISTORY  Past Medical History:   Diagnosis Date    Abnormal urinalysis 12/4/2020    Age-related cataract of both eyes 8/1/2021    Blister of left foot 6/16/2023    COPD (chronic obstructive pulmonary disease) (HCC)     Cystitis without hematuria 9/10/2020    Diabetes (HCC)     type 2    Diarrhea of presumed infectious origin 7/10/2020    Discharge planning issues 10/14/2020    Herpes zoster with complication 1/30/2018    Hypercholesteremia     Hyperglycemia     Hypertension     Neurocognitive disorder 2020    Psychiatric problem     Rash     Renal disorder     R renal mass    Vascular dementia of acute onset with behavioral disturbance (HCC) 02/09/2021     SURGICAL  HISTORY  History reviewed. No pertinent surgical history.    FAMILY HISTORY  Family History   Problem Relation Age of Onset    Stroke Sister      SOCIAL HISTORY   reports that he quit smoking about 16 years ago. His smoking use included cigarettes. He has never used smokeless tobacco. He reports current alcohol use. He reports that he does not use drugs.    CURRENT MEDICATIONS  Current Discharge Medication List        CONTINUE these medications which have NOT CHANGED    Details   hydrOXYzine HCl (ATARAX) 25 MG Tab TAKE 1 TABLET BY MOUTH THREE TIMES A DAY  FOR ITCHING, HOLD FOR SLEEPINESS  Qty: 81 Tablet, Refills: 10      !! QUEtiapine (SEROQUEL) 100 MG Tab TAKE 1 TABLET BY MOUTH AT BEDTIME  Qty: 30 Tablet, Refills: 10    Associated Diagnoses: Insomnia, unspecified type; Psychosis, unspecified psychosis type (HCC)      traZODone (DESYREL) 50 MG Tab TAKE 1 TABLET BY MOUTH EVERY EVENING  Qty: 30 Tablet, Refills: 10    Associated Diagnoses: Insomnia, unspecified type      Nutritional Supplements (ENSURE) Liquid Take 237 mL by mouth every day.  Qty: 7110 mL, Refills: 5    Associated Diagnoses: Right kidney mass; Primary hypertension; Type II diabetes mellitus with peripheral circulatory disorder (HCC); Moderate protein-calorie malnutrition (HCC); Vitamin D deficiency; Kidney mass      !! QUEtiapine (SEROQUEL) 50 MG tablet TAKE 1 TABLET BY MOUTH THREE TIMES A DAY  Qty: 90 Tablet, Refills: 0    Associated Diagnoses: Neurocognitive disorder      Vitamin D, Ergocalciferol, 34041 units Cap TAKE 1 CAPSULE BY MOUTH EVERY 7 DAYS  Qty: 5 Capsule, Refills: 10    Associated Diagnoses: Moderate protein-calorie malnutrition (HCC)      busPIRone (BUSPAR) 10 MG Tab tablet TAKE 1 TABLET BY MOUTH TWICE DAILY  Qty: 60 Tablet, Refills: 10      tamsulosin (FLOMAX) 0.4 MG capsule TAKE 2 CAPSULES BY MOUTH EVERY MORNING 30 MINUTES AFTER BREAKFAST  Qty: 60 Capsule, Refills: 10      betamethasone dipropionate 0.05 % Cream APPLY TOPICALLY TO  "AREAS OF ITCHING LEGS AND ARMS ONCE DAILY  Qty: 45 g, Refills: 10      multi-vitamin/beta carotene (TAB-A-MAGGIE/BETA CAROTENE) Tab TAKE 1 TABLET BY MOUTH ONCE DAILY  Qty: 7 Tablet, Refills: 11      finasteride (PROSCAR) 5 MG Tab Take 1 Tablet by mouth every day.  Qty: 30 Tablet, Refills: 10    Associated Diagnoses: Benign prostatic hyperplasia with urinary retention      Acetaminophen Extra Strength 500 MG Tab TAKE 1 TABLET BY MOUTH EVERY 4 HOURS AS NEEDED FOR PAIN ( NOT TO EXCEED 3 GRAMS IN 24 HOURS)  Qty: 30 Tablet, Refills: 11      loperamide (ANTI-DIARRHEAL) 2 MG tablet Take 1 Tablet by mouth 4 times a day as needed (loose stools).  Qty: 30 Tablet, Refills: 11       !! - Potential duplicate medications found. Please discuss with provider.        ALLERGIES  Patient has no known allergies.    PHYSICAL EXAM  BP (!) 177/72   Pulse 79   Temp 37.4 °C (99.4 °F) (Temporal)   Resp 18   Ht 1.727 m (5' 8\")   Wt 53.5 kg (118 lb)   SpO2 88%   BMI 17.94 kg/m²   Constitutional: Patient is a thin cachectic elderly male in mild distress.  HENT: Normocephalic, atraumatic.  Nares are patent and clear, oral mucosa is moist.  Cardiovascular: Irregularly regular heart rate and Regular rhythm. No murmur  Thorax & Lungs: Clear and equal breath sounds with good excursion. No respiratory distress, no rhonchi, wheezing or rales.   Abdomen: Bowel sounds normal in all four quadrants. Soft,nontender, no rebound , guarding, palpable masses.   Skin: Warm, Dry, No contusions, abrasions or rashes.  Extremities: Peripheral pulses 4/4 No edema, No tenderness, extremely weak  Neurologic: Alert & oriented x 3, Normal motor function, Normal sensory function, no lateralizing deficits, deep tendon reflexes are +2/4.  Patient is unable to ambulate without two-person assistance with a walker.  Psychiatric: Affect normal, Mood normal. Confused but pleasant.     DIAGNOSTIC STUDIES & PROCEDURES  Labs:   Results for orders placed or performed during " the hospital encounter of 05/17/24   Lactic Acid   Result Value Ref Range    Lactic Acid 1.4 0.5 - 2.0 mmol/L   CBC with Differential   Result Value Ref Range    WBC 6.3 4.8 - 10.8 K/uL    RBC 4.65 (L) 4.70 - 6.10 M/uL    Hemoglobin 13.9 (L) 14.0 - 18.0 g/dL    Hematocrit 41.2 (L) 42.0 - 52.0 %    MCV 88.6 81.4 - 97.8 fL    MCH 29.9 27.0 - 33.0 pg    MCHC 33.7 32.3 - 36.5 g/dL    RDW 43.2 35.9 - 50.0 fL    Platelet Count 189 164 - 446 K/uL    MPV 8.8 (L) 9.0 - 12.9 fL    Neutrophils-Polys 68.10 44.00 - 72.00 %    Lymphocytes 13.20 (L) 22.00 - 41.00 %    Monocytes 15.20 (H) 0.00 - 13.40 %    Eosinophils 2.70 0.00 - 6.90 %    Basophils 0.60 0.00 - 1.80 %    Immature Granulocytes 0.20 0.00 - 0.90 %    Nucleated RBC 0.00 0.00 - 0.20 /100 WBC    Neutrophils (Absolute) 4.29 1.82 - 7.42 K/uL    Lymphs (Absolute) 0.83 (L) 1.00 - 4.80 K/uL    Monos (Absolute) 0.96 (H) 0.00 - 0.85 K/uL    Eos (Absolute) 0.17 0.00 - 0.51 K/uL    Baso (Absolute) 0.04 0.00 - 0.12 K/uL    Immature Granulocytes (abs) 0.01 0.00 - 0.11 K/uL    NRBC (Absolute) 0.00 K/uL   Complete Metabolic Panel   Result Value Ref Range    Sodium 137 135 - 145 mmol/L    Potassium 4.1 3.6 - 5.5 mmol/L    Chloride 97 96 - 112 mmol/L    Co2 28 20 - 33 mmol/L    Anion Gap 12.0 7.0 - 16.0    Glucose 125 (H) 65 - 99 mg/dL    Bun 17 8 - 22 mg/dL    Creatinine 1.08 0.50 - 1.40 mg/dL    Calcium 9.1 8.5 - 10.5 mg/dL    Correct Calcium 8.9 8.5 - 10.5 mg/dL    AST(SGOT) 24 12 - 45 U/L    ALT(SGPT) 8 2 - 50 U/L    Alkaline Phosphatase 93 30 - 99 U/L    Total Bilirubin 0.2 0.1 - 1.5 mg/dL    Albumin 4.2 3.2 - 4.9 g/dL    Total Protein 7.4 6.0 - 8.2 g/dL    Globulin 3.2 1.9 - 3.5 g/dL    A-G Ratio 1.3 g/dL   Urinalysis    Specimen: Urine   Result Value Ref Range    Color Yellow     Character Cloudy (A)     Specific Gravity 1.013 <1.035    Ph 7.0 5.0 - 8.0    Glucose Negative Negative mg/dL    Ketones Negative Negative mg/dL    Protein 30 (A) Negative mg/dL    Bilirubin Negative  Negative    Urobilinogen, Urine 0.2 Negative    Nitrite Negative Negative    Leukocyte Esterase Large (A) Negative    Occult Blood Large (A) Negative    Micro Urine Req Microscopic    Cortisol   Result Value Ref Range    Cortisol 15.5 0.0 - 23.0 ug/dL   Magnesium   Result Value Ref Range    Magnesium 2.0 1.5 - 2.5 mg/dL   Procalcitonin   Result Value Ref Range    Procalcitonin 0.05 <0.25 ng/mL   CoV-2, Flu A/B, And RSV by PCR ([employer])    Specimen: Respirate   Result Value Ref Range    Influenza virus A RNA Negative Negative    Influenza virus B, PCR Negative Negative    RSV, PCR Negative Negative    SARS-CoV-2 by PCR NotDetected     SARS-CoV-2 Source NP Swab    ESTIMATED GFR   Result Value Ref Range    GFR (CKD-EPI) 66 >60 mL/min/1.73 m 2   URINE MICROSCOPIC (W/UA)   Result Value Ref Range    -150 (A) /hpf    RBC >150 (A) /hpf    Bacteria Many (A) None /hpf    Epithelial Cells Negative /hpf    Hyaline Cast 0-2 /lpf   All labs reviewed by me.    Radiology:   The attending Emergency Physician has independently interpreted the diagnostic imaging associated with this visit and is awaiting the final reading from the radiologist, which will be displayed below.  Preliminary interpretation is a follows: Possible pneumonia  Radiologist interpretation:  DX-CHEST-PORTABLE (1 VIEW)   Final Result         Patchy airspace opacity in the left lower lobe, likely pneumonia.         COURSE & MEDICAL DECISION MAKING    Sepsis: Infection was suspected 9:04 PM (Time). Sepsis pathway was initiated. 30cc/kg bolus given. Antibiotics were given per protocol.    INITIAL ASSESSMENT AND PLAN  Care Narrative:       9:01 PM - Patient was seen and evaluated at bedside. After my exam, I discussed with the patient the plan of care, which includes treating the patient with medication for their symptoms, as well as obtaining lab work and imaging for further evaluation. Patient understands and verbalizes agreement to plan of care. Patient will  be treated with LR bolus. Ordered DX chest, cortisol, magnesium, procalcitonin, CoV-2 Flu A/B and RSV PCR, lactic acid, CBC w diff, CMP, UA, urine culture and blood culture x2 to evaluate.  Rosado catheter was changed out to a fresh Rosado and we obtained a clean urine specimen.    Differential diagnoses include but not limited to: urinary tract infection, pneumonia, sepsis     Patient's laboratories revealed a white count of 6.3 with a stable H&H.  Lactic acid is 1.4.  Electrolytes were within normal limits, glucose elevated at 125, urinalysis is cloudy in appearance with large amount of leukocytes and large amount of blood.  He has greater than 150 red cells many bacteria and 100-1 50 white cells.  Chest x-ray also reveals patchy opacity in the left lower lobe consistent with possible pneumonia.  Patient was treated with Rocephin 2 g IV piggyback and he was given IV fluids.  He is extremely weak and will require hospital admission.    11:35 PM - Patient failed ambulation test with assist and walker. Discussed with patient plan for hospitalization. Patient verbalizes understanding and agreement to this plan of care.     11:56 PM - Paged Hospitalist.     Hospitalist responded. I discussed the patient's case and the above findings with Dr. Miranda (Hospitalist) who agrees to evaluate the patient for hospitalization.                    DISPOSITION AND DISCUSSIONS  I have discussed management of the patient with the following physicians and VÍCTOR's: Dr. Miranda (Hospitalist)    Discussion of management with other [facility] or appropriate source(s): None     Barriers to care at this time, including but not limited to:  None .     Decision tools and prescription drugs considered including, but not limited to: Admission for IV antibiotics, fluids..    DISPOSITION:  Patient will be hospitalized by Dr. Miranda in guarded condition.    FINAL IMPRESSION   1. Acute UTI    2. Generalized weakness    3. Unable to ambulate safely on uneven terrain        I, Sunshine Wood (Scribe), am scribing for, and in the presence of, Demetria Mcmullen D.O..    Electronically signed by: Sunshine Wood ([place]ally[place]michael), 5/17/2024    IDemetria D.O. personally performed the services described in this documentation, as scribed by Sunshine Wood in my presence, and it is both accurate and complete.    The note accurately reflects work and decisions made by me.  Demetria Mcmullen D.O.  5/18/2024  12:45 AM

## 2024-05-18 NOTE — CARE PLAN
The patient is Stable - Low risk of patient condition declining or worsening    Shift Goals  Clinical Goals: Antibiotics  Patient Goals: Rest  Family Goals: JIMBO    Progress made toward(s) clinical / shift goals:      Problem: Knowledge Deficit - Standard  Goal: Patient and family/care givers will demonstrate understanding of plan of care, disease process/condition, diagnostic tests and medications  Description: Target End Date:  1-3 days or as soon as patient condition allows    Document in Patient Education    1.  Patient and family/caregiver oriented to unit, equipment, visitation policy and means for communicating concern  2.  Complete/review Learning Assessment  3.  Assess knowledge level of disease process/condition, treatment plan, diagnostic tests and medications  4.  Explain disease process/condition, treatment plan, diagnostic tests and medications  5/18/2024 0338 by Joselyn Kay R.N.  Outcome: Progressing  5/18/2024 0338 by Joselyn Kay R.N.  Outcome: Progressing     Problem: Fall Risk  Goal: Patient will remain free from falls  Description: Target End Date:  Prior to discharge or change in level of care    Document interventions on the [facility] Fall Risk Assessment    1.  Assess for fall risk factors  2.  Implement fall precautions  5/18/2024 0338 by Joselyn Kay R.N.  Outcome: Progressing  5/18/2024 0338 by Joselyn Kay R.N.  Outcome: Progressing     Problem: Skin Integrity  Goal: Skin integrity is maintained or improved  Description: Target End Date:  Prior to discharge or change in level of care    Document interventions on Skin Risk/Yaw flowsheet groups and corresponding LDA    1.  Assess and monitor skin integrity, appearance and/or temperature  2.  Assess risk factors for impaired skin integrity and/or pressures ulcers  3.  Implement precautions to protect skin integrity in collaboration with interdisciplinary team  4.  Implement pressure ulcer prevention protocol  if at risk for skin breakdown  5.  Confirm wound care consult if at risk for skin breakdown  6.  Ensure patient use of pressure relieving devices  (Low air loss bed, waffle overlay, heel protectors, ROHO cushion, etc)  5/18/2024 0338 by Joselyn Kay, R.N.  Outcome: Progressing  5/18/2024 0338 by Joselyn Kay R.N.  Outcome: Progressing

## 2024-05-18 NOTE — ED NOTES
Transport at bedside to transport pt to T206. All belongings in place and pt has méndez cath in place. All paperwork with pt at time of transport. Pt on 2L NC. Care relinquished.

## 2024-05-18 NOTE — ED NOTES
Medication history reviewed with MAR provided by [facility] ([phone]).   Med rec is complete  Allergies reviewed.     Patient has not had any outpatient antibiotics in the last 30 days.   Anticoagulants: No      Jerrica Chaves

## 2024-05-18 NOTE — HOSPITAL COURSE
Ovi Bejarano is a 88 y.o. male who was brought in from custodial 5/17/2024 with increased weakness and abdominal pain.  PMH of vascular dementia, BPH with urinary retention and chronic indwelling Rosado catheter, diabetes, right kidney mass.  Patient was having difficulty urinating this morning and has some suprapubic abdominal pain.  Associated weakness is worse than his baseline.  No fever/chills or other acute complaints, has chronic diarrhea. Patient was unable to stand on his own in the ED.     In the ED afebrile, hemodynamically stable, hypoxic in the 80s and is requiring 2 L O2 on my evaluation.  Labs show UA that is concerning for infection.

## 2024-05-18 NOTE — ASSESSMENT & PLAN NOTE
chronic indwelling Méndez catheter  Symptomatic with suprapubic abdominal pain  Urine culture positive for E coli, Enterococcus faecalis   méndez catheter exchanged    Continue Rocephin, I started Zyvox  Follow-up sensitivity

## 2024-05-18 NOTE — ASSESSMENT & PLAN NOTE
Patient having dyspnea on exertion and was hypoxic down to the low/mid 80s requiring oxygen  COVID, influenza, RSV negative  CXR reviewed he does have an opacity on the left.  Procalcitonin negative     Continue Rocephin and azithromycin

## 2024-05-18 NOTE — H&P
Hospital Medicine History & Physical Note    Date of Service  5/17/2024    Primary Care Physician  MICK Dill.    Code Status  DNAR/DNI    Chief Complaint  Chief Complaint   Patient presents with    Weakness    Painful Urination     BIB EMS from [facility]. Increased weakness today with diminished appetite. Pt reports pain at méndez catheter site.        History of Presenting Illness  Ovi Bejarano is a 88 y.o. male who was brought in from nursing home 5/17/2024 with increased weakness and abdominal pain.  PMH of vascular dementia, BPH with urinary retention and chronic indwelling Méndez catheter, diabetes, right kidney mass.  Patient was having difficulty urinating this morning and has some suprapubic abdominal pain.  Associated weakness is worse than his baseline.  No fever/chills or other acute complaints, has chronic diarrhea. Patient was unable to stand on his own in the ED.    In the ED afebrile, hemodynamically stable, hypoxic in the 80s and is requiring 2 L O2 on my evaluation.  Labs show UA that is concerning for infection.    I discussed the plan of care with patient, bedside RN, and edp .    Review of Systems  Review of Systems   Constitutional:  Negative for chills and fever.   Respiratory:  Negative for cough and shortness of breath.    Cardiovascular:  Negative for chest pain.   Gastrointestinal:  Positive for abdominal pain and diarrhea. Negative for nausea and vomiting.   Genitourinary:  Negative for dysuria and urgency.       Past Medical History   has a past medical history of Abnormal urinalysis (12/4/2020), Age-related cataract of both eyes (8/1/2021), Blister of left foot (6/16/2023), COPD (chronic obstructive pulmonary disease) ([facility]), Cystitis without hematuria (9/10/2020), Diabetes ([facility]), Diarrhea of presumed infectious origin (7/10/2020), Discharge planning issues (10/14/2020), Herpes zoster with complication (1/30/2018), Hypercholesteremia, Hyperglycemia,  Hypertension, Neurocognitive disorder (2020), Psychiatric problem, Rash, Renal disorder, and Vascular dementia of acute onset with behavioral disturbance (HCC) (02/09/2021).    Surgical History   has no past surgical history on file.     Family History  family history includes Stroke in his sister.   Family history reviewed with patient. There is no family history that is pertinent to the chief complaint.     Social History   reports that he quit smoking about 16 years ago. His smoking use included cigarettes. He has never used smokeless tobacco. He reports current alcohol use. He reports that he does not use drugs.    Allergies  No Known Allergies    Medications  Prior to Admission Medications   Prescriptions Last Dose Informant Patient Reported? Taking?   Acetaminophen Extra Strength 500 MG Tab   No No   Sig: TAKE 1 TABLET BY MOUTH EVERY 4 HOURS AS NEEDED FOR PAIN ( NOT TO EXCEED 3 GRAMS IN 24 HOURS)   Nutritional Supplements (ENSURE) Liquid   No No   Sig: Take 237 mL by mouth every day.   QUEtiapine (SEROQUEL) 100 MG Tab   No No   Sig: TAKE 1 TABLET BY MOUTH AT BEDTIME   QUEtiapine (SEROQUEL) 25 MG Tab   No No   Sig: Take one tablet by mouth every 6 hours as needed for anxiety   Patient taking differently: Take 25 mg by mouth every 6 hours as needed.   QUEtiapine (SEROQUEL) 50 MG tablet   No No   Sig: TAKE 1 TABLET BY MOUTH THREE TIMES A DAY   Vitamin D, Ergocalciferol, 76174 units Cap   No No   Sig: TAKE 1 CAPSULE BY MOUTH EVERY 7 DAYS   betamethasone dipropionate 0.05 % Cream   No No   Sig: APPLY TOPICALLY TO AREAS OF ITCHING LEGS AND ARMS ONCE DAILY   busPIRone (BUSPAR) 10 MG Tab tablet   No No   Sig: TAKE 1 TABLET BY MOUTH TWICE DAILY   finasteride (PROSCAR) 5 MG Tab   No No   Sig: Take 1 Tablet by mouth every day.   hydrOXYzine HCl (ATARAX) 25 MG Tab  Caregiver Yes No   Sig: Take 25 mg by mouth 3 times a day.   hydrOXYzine HCl (ATARAX) 25 MG Tab   No No   Sig: Take 1 Tablet by mouth 3 times a day as needed  for Itching.   Patient taking differently: Take 25 mg by mouth in the morning, at noon, and at bedtime.   hydrOXYzine HCl (ATARAX) 25 MG Tab   No No   Sig: TAKE 1 TABLET BY MOUTH THREE TIMES A DAY  FOR ITCHING, HOLD FOR SLEEPINESS   loperamide (ANTI-DIARRHEAL) 2 MG tablet   No No   Sig: Take 1 Tablet by mouth 4 times a day as needed (loose stools).   Patient taking differently: Take 2 mg by mouth 1 time a day as needed (loose stools). Indications: Diarrhea   multi-vitamin/beta carotene (TAB-A-MAGGIE/BETA CAROTENE) Tab   No No   Sig: TAKE 1 TABLET BY MOUTH ONCE DAILY   tamsulosin (FLOMAX) 0.4 MG capsule   No No   Sig: TAKE 2 CAPSULES BY MOUTH EVERY MORNING 30 MINUTES AFTER BREAKFAST   traZODone (DESYREL) 50 MG Tab   No No   Sig: TAKE 1 TABLET BY MOUTH EVERY EVENING      Facility-Administered Medications: None       Physical Exam  Temp:  [37.4 °C (99.4 °F)] 37.4 °C (99.4 °F)  Pulse:  [67-87] 72  Resp:  [18-20] 18  BP: (168-180)/(71-76) 174/76  SpO2:  [82 %-99 %] 99 %  Blood Pressure : (!) 180/74   Temperature: 37.4 °C (99.4 °F)   Pulse: 87   Respiration: 20   Pulse Oximetry: 92 %       Physical Exam  Constitutional:       Appearance: Normal appearance.   HENT:      Head: Normocephalic and atraumatic.      Mouth/Throat:      Mouth: Mucous membranes are moist.      Pharynx: Oropharynx is clear. No oropharyngeal exudate or posterior oropharyngeal erythema.   Eyes:      General: No scleral icterus.  Cardiovascular:      Rate and Rhythm: Normal rate and regular rhythm.      Pulses: Normal pulses.      Heart sounds: Normal heart sounds. No murmur heard.  Pulmonary:      Effort: Pulmonary effort is normal. No respiratory distress.      Breath sounds: Normal breath sounds. No wheezing.   Abdominal:      Palpations: Abdomen is soft.      Tenderness: There is no abdominal tenderness.   Musculoskeletal:         General: No swelling or tenderness. Normal range of motion.      Cervical back: Normal range of motion.   Skin:      "General: Skin is warm and dry.   Neurological:      General: No focal deficit present.      Mental Status: He is alert and oriented to person, place, and time. Mental status is at baseline.   Psychiatric:         Mood and Affect: Mood normal.         Laboratory:  Recent Labs     05/17/24 2058   WBC 6.3   RBC 4.65*   HEMOGLOBIN 13.9*   HEMATOCRIT 41.2*   MCV 88.6   MCH 29.9   MCHC 33.7   RDW 43.2   PLATELETCT 189   MPV 8.8*     Recent Labs     05/17/24 2058   SODIUM 137   POTASSIUM 4.1   CHLORIDE 97   CO2 28   GLUCOSE 125*   BUN 17   CREATININE 1.08   CALCIUM 9.1     Recent Labs     05/17/24 2058   ALTSGPT 8   ASTSGOT 24   ALKPHOSPHAT 93   TBILIRUBIN 0.2   GLUCOSE 125*         No results for input(s): \"NTPROBNP\" in the last 72 hours.      No results for input(s): \"TROPONINT\" in the last 72 hours.    Imaging:  DX-CHEST-PORTABLE (1 VIEW)   Final Result         Patchy airspace opacity in the left lower lobe, likely pneumonia.          X-Ray:  I have personally reviewed the images and compared with prior images. and My impression is: Left-sided opacity    Assessment/Plan:  Justification for Admission Status  I anticipate this patient will require at least two midnights for appropriate medical management, necessitating inpatient admission because respiratory failure with hypoxemia    * Urinary tract infection associated with indwelling urethral catheter (HCC)- (present on admission)  Assessment & Plan  Symptomatic with suprapubic abdominal pain  Patient is a chronic indwelling Rosado catheter due to BPH with urinary retention  Catheter change in the ED and labs sent, UA concerning for infection  Many previous urinary cultures reviewed, most recently he had an E. coli infection.  He has had multiple previous [place] infections resistant to cefazolin  Started on ceftriaxone    Pneumonia of left lower lobe due to infectious organism- (present on admission)  Assessment & Plan  Patient having dyspnea on exertion and was " hypoxic down to the low/mid 80s requiring oxygen  COVID, influenza, RSV negative  CXR reviewed he does have an opacity on the left.  Procalcitonin negative however, suspect false negative  Will be on antibiotics for UTI in any case, will add azithromycin    Acute respiratory failure with hypoxia (HCC)- (present on admission)  Assessment & Plan  Saturating in the mid 80s in the ED.  He does have dyspnea and worsening weakness  CXR reviewed and shows opacity on the left, possible pneumonia  Wean off as tolerated    Vascular dementia with psychotic disturbance (HCC)- (present on admission)  Assessment & Plan  History of agitation and hallucinations per chart review  Continue buspirone and quetiapine    Weakness- (present on admission)  Assessment & Plan  Generalized weakness due to old age and acute infections as well as respiratory failure  PT, OT    Type II diabetes mellitus with peripheral circulatory disorder (HCC)- (present on admission)  Assessment & Plan  Add sliding scale if needed    Functional diarrhea- (present on admission)  Assessment & Plan  Patient has chronic diarrhea, at baseline    BPH with urinary obstruction- (present on admission)  Assessment & Plan  Chronic indwelling catheter, per chart review has refused TURP in the past  Continue tamsulosin and finasteride        VTE prophylaxis: enoxaparin ppx

## 2024-05-19 LAB
ANION GAP SERPL CALC-SCNC: 13 MMOL/L (ref 7–16)
BASOPHILS # BLD AUTO: 0.8 % (ref 0–1.8)
BASOPHILS # BLD: 0.04 K/UL (ref 0–0.12)
BUN SERPL-MCNC: 25 MG/DL (ref 8–22)
CALCIUM SERPL-MCNC: 8.8 MG/DL (ref 8.5–10.5)
CHLORIDE SERPL-SCNC: 99 MMOL/L (ref 96–112)
CO2 SERPL-SCNC: 26 MMOL/L (ref 20–33)
CREAT SERPL-MCNC: 1.28 MG/DL (ref 0.5–1.4)
EOSINOPHIL # BLD AUTO: 0.22 K/UL (ref 0–0.51)
EOSINOPHIL NFR BLD: 4.4 % (ref 0–6.9)
ERYTHROCYTE [DISTWIDTH] IN BLOOD BY AUTOMATED COUNT: 44.4 FL (ref 35.9–50)
GFR SERPLBLD CREATININE-BSD FMLA CKD-EPI: 54 ML/MIN/1.73 M 2
GLUCOSE SERPL-MCNC: 72 MG/DL (ref 65–99)
HCT VFR BLD AUTO: 39.4 % (ref 42–52)
HGB BLD-MCNC: 12.6 G/DL (ref 14–18)
IMM GRANULOCYTES # BLD AUTO: 0.01 K/UL (ref 0–0.11)
IMM GRANULOCYTES NFR BLD AUTO: 0.2 % (ref 0–0.9)
LYMPHOCYTES # BLD AUTO: 1.41 K/UL (ref 1–4.8)
LYMPHOCYTES NFR BLD: 28 % (ref 22–41)
MCH RBC QN AUTO: 29.2 PG (ref 27–33)
MCHC RBC AUTO-ENTMCNC: 32 G/DL (ref 32.3–36.5)
MCV RBC AUTO: 91.4 FL (ref 81.4–97.8)
MONOCYTES # BLD AUTO: 0.9 K/UL (ref 0–0.85)
MONOCYTES NFR BLD AUTO: 17.9 % (ref 0–13.4)
NEUTROPHILS # BLD AUTO: 2.45 K/UL (ref 1.82–7.42)
NEUTROPHILS NFR BLD: 48.7 % (ref 44–72)
NRBC # BLD AUTO: 0 K/UL
NRBC BLD-RTO: 0 /100 WBC (ref 0–0.2)
PLATELET # BLD AUTO: 186 K/UL (ref 164–446)
PMV BLD AUTO: 9 FL (ref 9–12.9)
POTASSIUM SERPL-SCNC: 4 MMOL/L (ref 3.6–5.5)
PROCALCITONIN SERPL-MCNC: 0.21 NG/ML
RBC # BLD AUTO: 4.31 M/UL (ref 4.7–6.1)
SODIUM SERPL-SCNC: 138 MMOL/L (ref 135–145)
WBC # BLD AUTO: 5 K/UL (ref 4.8–10.8)

## 2024-05-19 PROCEDURE — 99233 SBSQ HOSP IP/OBS HIGH 50: CPT | Performed by: STUDENT IN AN ORGANIZED HEALTH CARE EDUCATION/TRAINING PROGRAM

## 2024-05-19 RX ORDER — LINEZOLID 2 MG/ML
600 INJECTION, SOLUTION INTRAVENOUS EVERY 12 HOURS
Status: DISCONTINUED | OUTPATIENT
Start: 2024-05-19 | End: 2024-05-21

## 2024-05-19 RX ADMIN — TAMSULOSIN HYDROCHLORIDE 0.8 MG: 0.4 CAPSULE ORAL at 04:20

## 2024-05-19 RX ADMIN — QUETIAPINE FUMARATE 50 MG: 25 TABLET ORAL at 12:21

## 2024-05-19 RX ADMIN — BUSPIRONE HYDROCHLORIDE 10 MG: 10 TABLET ORAL at 04:20

## 2024-05-19 RX ADMIN — ACETAMINOPHEN 650 MG: 325 TABLET, FILM COATED ORAL at 12:21

## 2024-05-19 RX ADMIN — QUETIAPINE FUMARATE 50 MG: 25 TABLET ORAL at 04:20

## 2024-05-19 RX ADMIN — AZITHROMYCIN DIHYDRATE 500 MG: 250 TABLET, FILM COATED ORAL at 04:20

## 2024-05-19 RX ADMIN — QUETIAPINE FUMARATE 50 MG: 25 TABLET ORAL at 16:57

## 2024-05-19 RX ADMIN — ENOXAPARIN SODIUM 30 MG: 100 INJECTION SUBCUTANEOUS at 17:00

## 2024-05-19 RX ADMIN — CEFTRIAXONE SODIUM 2000 MG: 10 INJECTION, POWDER, FOR SOLUTION INTRAVENOUS at 17:10

## 2024-05-19 RX ADMIN — ACETAMINOPHEN 650 MG: 325 TABLET, FILM COATED ORAL at 04:21

## 2024-05-19 RX ADMIN — QUETIAPINE FUMARATE 100 MG: 100 TABLET ORAL at 20:12

## 2024-05-19 RX ADMIN — BUSPIRONE HYDROCHLORIDE 10 MG: 10 TABLET ORAL at 16:57

## 2024-05-19 RX ADMIN — FINASTERIDE 5 MG: 5 TABLET, FILM COATED ORAL at 04:21

## 2024-05-19 ASSESSMENT — ENCOUNTER SYMPTOMS
HEARTBURN: 0
VOMITING: 0
SENSORY CHANGE: 0
FOCAL WEAKNESS: 1
COUGH: 0
DIZZINESS: 0
WEAKNESS: 1
SPUTUM PRODUCTION: 0
BLURRED VISION: 0
FEVER: 0
DOUBLE VISION: 0
CHILLS: 0
PHOTOPHOBIA: 0
TINGLING: 0
NAUSEA: 0
PSYCHIATRIC NEGATIVE: 1
WEIGHT LOSS: 0
NECK PAIN: 0
HEADACHES: 0
MYALGIAS: 0

## 2024-05-19 NOTE — PROGRESS NOTES
Hospital Medicine Daily Progress Note    Date of Service  5/19/2024    Chief Complaint  Ovi Bejarano is a 88 y.o. male admitted 5/17/2024 with weakness    Hospital Course  Ovi Bejarano is a 88 y.o. male who was brought in from [facility] 5/17/2024 with increased weakness and abdominal pain.  PMH of vascular dementia, BPH with urinary retention and chronic indwelling Rosado catheter, diabetes, right kidney mass.  Patient was having difficulty urinating this morning and has some suprapubic abdominal pain.  Associated weakness is worse than his baseline.  has chronic diarrhea. Patient was unable to stand on his own in the ED.     In the ED afebrile,  hypoxic in the 80s and is requiring 2 L O2. Labs show UA that is concerning for infection.    Interval Problem Update  I saw and examined the patient at bedside  AO x1-2, knowing the year 2024, not able to answer the place, month and why she is here.     On 2 L NC, not on home oxygen    Urine culture positive for E coli, Enterococcus faecalis   Suprapubic catheter replaced  I started Zyvox  Also has pneumonia    Continue Rocephin and azithromycin    PT OT recommended SNF    I have discussed this patient's plan of care and discharge plan at IDT rounds today with Case Management, Nursing, Nursing leadership, and other members of the IDT team.    Consultants/Specialty      Code Status  DNAR/DNI    Disposition  The patient is not medically cleared for discharge to home or a post-acute facility.      I have placed the appropriate orders for post-discharge needs.    Review of Systems  Review of Systems   Constitutional:  Positive for malaise/fatigue. Negative for chills, fever and weight loss.   Eyes:  Negative for blurred vision, double vision and photophobia.   Respiratory:  Negative for cough and sputum production.    Gastrointestinal:  Negative for heartburn, nausea and vomiting.   Genitourinary:  Negative for dysuria, frequency and urgency.    Musculoskeletal:  Negative for myalgias and neck pain.   Neurological:  Positive for focal weakness and weakness. Negative for dizziness, tingling, sensory change and headaches.   Psychiatric/Behavioral: Negative.          Physical Exam  Temp:  [36.1 °C (96.9 °F)-37.2 °C (99 °F)] 36.1 °C (96.9 °F)  Pulse:  [61-92] 61  Resp:  [16-18] 16  BP: ()/(54-60) 94/60  SpO2:  [92 %-98 %] 92 %    Physical Exam  Constitutional:       General: He is not in acute distress.     Appearance: He is ill-appearing. He is not toxic-appearing or diaphoretic.   HENT:      Mouth/Throat:      Mouth: Mucous membranes are moist.   Eyes:      Extraocular Movements: Extraocular movements intact.      Pupils: Pupils are equal, round, and reactive to light.   Cardiovascular:      Rate and Rhythm: Normal rate and regular rhythm.      Heart sounds: No murmur heard.     No friction rub. No gallop.   Pulmonary:      Effort: No respiratory distress.      Breath sounds: No stridor. No wheezing, rhonchi or rales.   Abdominal:      Palpations: There is no mass.      Hernia: No hernia is present.      Comments: Suprapubic cath   Musculoskeletal:         General: No swelling, tenderness or signs of injury.      Cervical back: Normal range of motion.   Skin:     Capillary Refill: Capillary refill takes 2 to 3 seconds.      Coloration: Skin is not jaundiced or pale.      Findings: No bruising, erythema or lesion.   Neurological:      General: No focal deficit present.      Mental Status: He is alert. He is disoriented.      Motor: Weakness present.   Psychiatric:         Mood and Affect: Mood normal.         Behavior: Behavior normal.         Fluids    Intake/Output Summary (Last 24 hours) at 5/19/2024 1406  Last data filed at 5/19/2024 0628  Gross per 24 hour   Intake --   Output 1600 ml   Net -1600 ml       Laboratory  Recent Labs     05/17/24  2058 05/19/24  0705   WBC 6.3 5.0   RBC 4.65* 4.31*   HEMOGLOBIN 13.9* 12.6*   HEMATOCRIT 41.2* 39.4*    MCV 88.6 91.4   MCH 29.9 29.2   MCHC 33.7 32.0*   RDW 43.2 44.4   PLATELETCT 189 186   MPV 8.8* 9.0     Recent Labs     05/17/24 2058 05/19/24  0705   SODIUM 137 138   POTASSIUM 4.1 4.0   CHLORIDE 97 99   CO2 28 26   GLUCOSE 125* 72   BUN 17 25*   CREATININE 1.08 1.28   CALCIUM 9.1 8.8                   Imaging  DX-CHEST-PORTABLE (1 VIEW)   Final Result         Patchy airspace opacity in the left lower lobe, likely pneumonia.           Assessment/Plan  * Urinary tract infection associated with indwelling urethral catheter (HCC)- (present on admission)  Assessment & Plan  Symptomatic with suprapubic abdominal pain  Urine culture positive for E coli, Enterococcus faecalis   Suprapubic catheter exchanged    Continue Rocephin, I started Zyvox  Follow-up sensitivity    Weakness- (present on admission)  Assessment & Plan  Generalized weakness due to old age and acute infections as well as respiratory failure  PT, OT    Pneumonia of left lower lobe due to infectious organism- (present on admission)  Assessment & Plan  Patient having dyspnea on exertion and was hypoxic down to the low/mid 80s requiring oxygen  COVID, influenza, RSV negative  CXR reviewed he does have an opacity on the left.  Procalcitonin negative     Continue Rocephin and azithromycin    Type II diabetes mellitus with peripheral circulatory disorder (HCC)- (present on admission)  Assessment & Plan  Sliding-scale insulin    Functional diarrhea- (present on admission)  Assessment & Plan  Patient has chronic diarrhea, at baseline    BPH with urinary obstruction- (present on admission)  Assessment & Plan  Chronic indwelling catheter, per chart review has refused TURP in the past  Continue tamsulosin and finasteride    Vascular dementia with psychotic disturbance (HCC)- (present on admission)  Assessment & Plan  History of agitation and hallucinations per chart review  Continue buspirone and quetiapine    Acute respiratory failure with hypoxia (HCC)-  (present on admission)  Assessment & Plan  Saturating in the mid 80s in the ED. requires a 2 L NC, not on home oxygen  CXR reviewed and shows opacity on the left, possible pneumonia    Continue Rocephin and azithromycin  Wean down oxygen         VTE prophylaxis: LOVENOX    I have performed a physical exam and reviewed and updated ROS and Plan today (5/19/2024). In review of yesterday's note (5/18/2024), there are no changes except as documented above.      Greater than 51 minutes spent prepping to see patient (e.g. review of tests) obtaining and/or reviewing separately obtained history. Performing a medically appropriate examination and/ evaluation.  Counseling and educating the patient/family/caregiver.  Ordering medications, tests, or procedures.  Referring and communicating with other health care professionals.  Documenting clinical information in EPIC.  Independently interpreting results and communicating results to patient/family/caregiver.  Care coordination.

## 2024-05-19 NOTE — CARE PLAN
The patient is Stable - Low risk of patient condition declining or worsening    Shift Goals  Clinical Goals: ABX, skin integrity, no falls  Patient Goals: Rest, eat dinner  Family Goals: Not present    Progress made toward(s) clinical / shift goals:    Problem: Fall Risk  Goal: Patient will remain free from falls  Description: Target End Date:  Prior to discharge or change in level of care    Document interventions on the [facility] Fall Risk Assessment    1.  Assess for fall risk factors  2.  Implement fall precautions  Outcome: Progressing     Problem: Communication  Goal: The ability to communicate needs accurately and effectively will improve  Description: Target End Date:  End of day 1    1.  Assess ability to communicate and understand  2.  Provide augmentative or alternative methods of communication devices  3.  Use /language line as appropriate  4.  Collaborate with Speech Therapy as needed  Outcome: Progressing       Patient is not progressing towards the following goals:      Problem: Knowledge Deficit - Standard  Goal: Patient and family/care givers will demonstrate understanding of plan of care, disease process/condition, diagnostic tests and medications  Description: Target End Date:  1-3 days or as soon as patient condition allows    Document in Patient Education    1.  Patient and family/caregiver oriented to unit, equipment, visitation policy and means for communicating concern  2.  Complete/review Learning Assessment  3.  Assess knowledge level of disease process/condition, treatment plan, diagnostic tests and medications  4.  Explain disease process/condition, treatment plan, diagnostic tests and medications  Outcome: Not Progressing

## 2024-05-19 NOTE — PROGRESS NOTES
Pt transferred to floor via bed with security. Pt kept trying to get out of bed and became very agitated. He stated that he has been lied to and does not want to be here. Pt was able to be calmed down after 30 minutes and agreeable to take scheduled meds. Bed alarm on, bed locked in lowest position. Pt educated on use of call light. Telesitter obtained for pt safety and high fall risk.

## 2024-05-19 NOTE — DISCHARGE PLANNING
Case Management Discharge Planning    Admission Date: 5/17/2024  GMLOS: 4.9  ALOS: 1    6-Clicks ADL Score:    6-Clicks Mobility Score: 15  PT and/or OT Eval ordered: Yes  Post-acute Referrals Ordered: Yes  Post-acute Choice Obtained: Yes  Has referral(s) been sent to post-acute provider:  Yes      Anticipated Discharge Dispo:      DME Needed: No    Action(s) Taken: Referral(s) sent  This RN CM completed chart review and was updated that patient is anticipated to be medically cleared for discharge in next 2 days.    6 clicks score is 15 sent SNF referrals per protocol.     Patient is noted to have a public guardian Emilio thru [place] will follow up with guardian on choice for SNF    Escalations Completed: Pending Discharge Destination    Medically Clear: No    Next Steps: Continue to assist with needs as indicated.    Barriers to Discharge: Medical clearance and Pending Placement    Is the patient up for discharge tomorrow: No

## 2024-05-19 NOTE — PROGRESS NOTES
Assumed care of patient. Assessment performed. Patient refusing 2 RN skin check at this time. Sacrum and coccyx noted to be red and slow to lupe. Ashlee is CDI. Patient did not allow for further skin assessment. He does not report pain at this time. He does complain of numbness and tingling in the bilateral lower extremities. He does appear to have significantly impaired vision and feels around for items. Orientation to meal tray and bed provided.   Bed alarm on. Call light and belongings within reach. Telesitter in place for safety. All needs met at this time.     2120 Medicated as per MAR for agitation.

## 2024-05-20 PROBLEM — E43 SEVERE PROTEIN-CALORIE MALNUTRITION (HCC): Status: ACTIVE | Noted: 2020-07-16

## 2024-05-20 LAB
ANION GAP SERPL CALC-SCNC: 12 MMOL/L (ref 7–16)
BACTERIA UR CULT: ABNORMAL
BUN SERPL-MCNC: 22 MG/DL (ref 8–22)
CALCIUM SERPL-MCNC: 8.6 MG/DL (ref 8.5–10.5)
CHLORIDE SERPL-SCNC: 96 MMOL/L (ref 96–112)
CO2 SERPL-SCNC: 27 MMOL/L (ref 20–33)
CREAT SERPL-MCNC: 1.22 MG/DL (ref 0.5–1.4)
ERYTHROCYTE [DISTWIDTH] IN BLOOD BY AUTOMATED COUNT: 42.6 FL (ref 35.9–50)
GFR SERPLBLD CREATININE-BSD FMLA CKD-EPI: 57 ML/MIN/1.73 M 2
GLUCOSE SERPL-MCNC: 142 MG/DL (ref 65–99)
HCT VFR BLD AUTO: 39.8 % (ref 42–52)
HGB BLD-MCNC: 13.3 G/DL (ref 14–18)
MAGNESIUM SERPL-MCNC: 2.1 MG/DL (ref 1.5–2.5)
MCH RBC QN AUTO: 29.6 PG (ref 27–33)
MCHC RBC AUTO-ENTMCNC: 33.4 G/DL (ref 32.3–36.5)
MCV RBC AUTO: 88.6 FL (ref 81.4–97.8)
PHOSPHATE SERPL-MCNC: 3.2 MG/DL (ref 2.5–4.5)
PLATELET # BLD AUTO: 193 K/UL (ref 164–446)
PMV BLD AUTO: 8.8 FL (ref 9–12.9)
POTASSIUM SERPL-SCNC: 4.1 MMOL/L (ref 3.6–5.5)
RBC # BLD AUTO: 4.49 M/UL (ref 4.7–6.1)
SIGNIFICANT IND 70042: ABNORMAL
SITE SITE: ABNORMAL
SODIUM SERPL-SCNC: 135 MMOL/L (ref 135–145)
SOURCE SOURCE: ABNORMAL
WBC # BLD AUTO: 6.9 K/UL (ref 4.8–10.8)

## 2024-05-20 PROCEDURE — 99232 SBSQ HOSP IP/OBS MODERATE 35: CPT | Performed by: STUDENT IN AN ORGANIZED HEALTH CARE EDUCATION/TRAINING PROGRAM

## 2024-05-20 RX ADMIN — TAMSULOSIN HYDROCHLORIDE 0.8 MG: 0.4 CAPSULE ORAL at 04:32

## 2024-05-20 RX ADMIN — BUSPIRONE HYDROCHLORIDE 10 MG: 10 TABLET ORAL at 17:29

## 2024-05-20 RX ADMIN — BUSPIRONE HYDROCHLORIDE 10 MG: 10 TABLET ORAL at 04:33

## 2024-05-20 RX ADMIN — CEFTRIAXONE SODIUM 2000 MG: 10 INJECTION, POWDER, FOR SOLUTION INTRAVENOUS at 17:35

## 2024-05-20 RX ADMIN — ENOXAPARIN SODIUM 30 MG: 100 INJECTION SUBCUTANEOUS at 17:28

## 2024-05-20 RX ADMIN — QUETIAPINE FUMARATE 50 MG: 25 TABLET ORAL at 04:33

## 2024-05-20 RX ADMIN — FINASTERIDE 5 MG: 5 TABLET, FILM COATED ORAL at 04:33

## 2024-05-20 RX ADMIN — QUETIAPINE FUMARATE 50 MG: 25 TABLET ORAL at 17:28

## 2024-05-20 RX ADMIN — LINEZOLID 600 MG: 600 INJECTION, SOLUTION INTRAVENOUS at 17:46

## 2024-05-20 RX ADMIN — ACETAMINOPHEN 650 MG: 325 TABLET, FILM COATED ORAL at 11:20

## 2024-05-20 RX ADMIN — QUETIAPINE FUMARATE 50 MG: 25 TABLET ORAL at 11:20

## 2024-05-20 RX ADMIN — LINEZOLID 600 MG: 600 INJECTION, SOLUTION INTRAVENOUS at 05:27

## 2024-05-20 RX ADMIN — AZITHROMYCIN DIHYDRATE 500 MG: 250 TABLET, FILM COATED ORAL at 04:33

## 2024-05-20 RX ADMIN — QUETIAPINE FUMARATE 100 MG: 100 TABLET ORAL at 21:00

## 2024-05-20 ASSESSMENT — ENCOUNTER SYMPTOMS
NECK PAIN: 0
WEIGHT LOSS: 0
FOCAL WEAKNESS: 1
BLURRED VISION: 0
DIZZINESS: 0
SENSORY CHANGE: 0
WEAKNESS: 1
PHOTOPHOBIA: 0
PSYCHIATRIC NEGATIVE: 1
SPUTUM PRODUCTION: 0
HEADACHES: 0
TINGLING: 0
VOMITING: 0
FEVER: 0
NAUSEA: 0
CHILLS: 0
HEARTBURN: 0
MYALGIAS: 0
DOUBLE VISION: 0
COUGH: 0

## 2024-05-20 ASSESSMENT — PAIN DESCRIPTION - PAIN TYPE
TYPE: ACUTE PAIN
TYPE: ACUTE PAIN

## 2024-05-20 ASSESSMENT — COGNITIVE AND FUNCTIONAL STATUS - GENERAL
SUGGESTED CMS G CODE MODIFIER DAILY ACTIVITY: CJ
HELP NEEDED FOR BATHING: A LITTLE
DAILY ACTIVITIY SCORE: 20
DRESSING REGULAR LOWER BODY CLOTHING: A LITTLE
TOILETING: A LITTLE
PERSONAL GROOMING: A LITTLE

## 2024-05-20 ASSESSMENT — ACTIVITIES OF DAILY LIVING (ADL): TOILETING: REQUIRES ASSIST

## 2024-05-20 NOTE — DISCHARGE PLANNING
Case Management Discharge Planning    Admission Date: 5/17/2024  GMLOS: 4.9  ALOS: 2    6-Clicks ADL Score:    6-Clicks Mobility Score: 15      Anticipated Discharge Dispo: Discharge Disposition: D/T to SNF with Medicare cert in anticipation of skilled care (03)    DME Needed: No    Action(s) Taken: PASRR Complete      [id number]    Escalations Completed: None    Medically Clear: No    Barriers to Discharge: Pending Placement Tomorrow at [facility]    Is the patient up for discharge tomorrow: Yes    Is transport arranged for discharge disposition: Yes      LMSW attempted to complete LOC, PASRR Portal states that information is not accurate. LMSW looked in Media, appears that the last of our Media Records shows Medicaid Eligibility in 12/2022.

## 2024-05-20 NOTE — CARE PLAN
Problem: Fall Risk  Goal: Patient will remain free from falls  Outcome: Progressing     Problem: Skin Integrity  Goal: Skin integrity is maintained or improved  Outcome: Progressing     Problem: Pain - Standard  Goal: Alleviation of pain or a reduction in pain to the patient’s comfort goal  Outcome: Progressing   The patient is Watcher - Medium risk of patient condition declining or worsening    Shift Goals  Clinical Goals: maintain VSS, safety,  Patient Goals: reggie  Family Goals: na    Progress made toward(s) clinical / shift goals:  fall/safety in progress    Patient is not progressing towards the following goals:

## 2024-05-20 NOTE — THERAPY
Occupational Therapy   Initial Evaluation     Patient Name: Ovi Bejarano  Age:  88 y.o., Sex:  male  Medical Record #: [id number]  Today's Date: 5/20/2024     Precautions  Precautions: (P) Fall Risk    Assessment  Patient is 88 y.o. male who presents to acute due to painful urination and weakness at group home. Pt found to have UTI and PNA. Pt demo'd BADLs at SBA level. He is likely close to functional baseline. Recommend DC back to group home.     Plan    Occupational Therapy Initial Treatment Plan   Treatment Interventions: (P) Self Care / Activities of Daily Living  Duration: (P) Discharge Needs Only    DC Equipment Recommendations: (P) None  Discharge Recommendations: (P)  (Anticipate pt is functionally capable to DC back to group home)          Objective      Initial Contact Note    Initial Contact Note Order Received and Verified, Occupational Therapy Evaluation in Progress with Full Report to Follow.   Prior Living Situation   Housing / Facility Group Home   Bathroom Set up Walk In Shower;Shower Chair   Equipment Owned Single Point Cane   Lives with - Patient's Self Care Capacity Attendant / Paid Care Giver   Comments Pt reports he lives at a group home, they help with dressing, shwoering, meals and cleaning   Prior Level of ADL Function   Self Feeding Independent   Grooming / Hygiene Independent   Bathing Requires Assist   Dressing Requires Assist   Toileting Requires Assist   Precautions   Precautions Fall Risk   Vitals   O2 (LPM) 2   O2 Delivery Device Silicone Nasal Cannula   Cognition    Level of Consciousness Alert   Safety Awareness Impaired   New Learning Impaired   Active ROM Upper Body   Active ROM Upper Body  WDL   Strength Upper Body   Comments WFL   Coordination Upper Body   Coordination WDL   Balance Assessment   Sitting Balance (Static) Fair +   Sitting Balance (Dynamic) Fair +   Standing Balance (Static) Fair -   Standing Balance (Dynamic) Poor +   Weight Shift Sitting Fair   Weight  Shift Standing Fair   Comments w/ HHA   Bed Mobility    Supine to Sit Supervised   Sit to Supine   (in chair post)   Scooting Supervised   ADL Assessment   Grooming Standby Assist;Standing  (washed hands)   Lower Body Dressing Standby Assist   Toileting Standby Assist  (BM in toilet, pt performed personal hygiene)   How much help from another person does the patient currently need...   Putting on and taking off regular lower body clothing? 3   Bathing (including washing, rinsing, and drying)? 3   Toileting, which includes using a toilet, bedpan, or urinal? 3   Putting on and taking off regular upper body clothing? 4   Taking care of personal grooming such as brushing teeth? 3   Eating meals? 4   6 Clicks Daily Activity Score 20   Functional Mobility   Sit to Stand Standby Assist   Bed, Chair, Wheelchair Transfer Standby Assist   Toilet Transfers Standby Assist   Mobility within room and bathroom w/ HHA   Activity Tolerance   Sitting in Chair 10+ min (up post)   Sitting Edge of Bed 5 min   Standing 5 min total   Short Term Goals   Short Term Goal # 1 Pt will have no further acute OT needs at time of DC   Education Group   Role of Occupational Therapist Patient Response Patient;Acceptance;Explanation;Demonstration;Verbal Demonstration;Action Demonstration   Occupational Therapy Initial Treatment Plan    Treatment Interventions Self Care / Activities of Daily Living   Duration Discharge Needs Only   Problem List   Problem List Decreased Active Daily Living Skills   Anticipated Discharge Equipment and Recommendations   DC Equipment Recommendations None   Discharge Recommendations   (Anticipate pt is functionally capable to DC back to group home)   Interdisciplinary Plan of Care Collaboration   IDT Collaboration with  Nursing   Patient Position at End of Therapy Seated;Chair Alarm On;Call Light within Reach;Tray Table within Reach;Phone within Reach   Collaboration Comments report given   Session Information   Date /  Session Number  5/20, DC needs only

## 2024-05-20 NOTE — DISCHARGE PLANNING
Case Management Discharge Planning    Admission Date: 5/17/2024  GMLOS: 4.9  ALOS: 2    6-Clicks ADL Score:    6-Clicks Mobility Score: 15      Anticipated Discharge Dispo: Discharge Disposition: D/T to SNF with Medicare cert in anticipation of skilled care (03)    DME Needed: Pt [facility] a [facility] point cane [facility]    Action(s) Taken: DC Assessment Complete (See below) LMSW completed DC Assessment via chart review to pt being Aox1-2. Pt has an appointed guardian named Emilio Catherine.     PT recommending post acute placement upon discharge on eval done on 5/18/24.     SNFs have been sent out per protocol. [facility] and [facility] currently accepting pt.     LMSW left voicemail for pts omi Becerra requesting call back for SNF form choice agreement.     1033 Update: JOSEPHINE received callback from Hernan. Hernan states agreement with pt to go to [facility]F. Choice form signed for Guardian    Escalations Completed: None    Medically Clear: No    Barriers to Discharge: Medical clearance    Is the patient up for discharge tomorrow: No      Care Transition Team Assessment    Information Source  Orientation Level: Oriented to person, Oriented to time, Disoriented to situation, Disoriented to place  Information Given By: Other (Comments)  Informant's Name: Chart  Who is responsible for making decisions for patient? : Guardian  Name(s) of Primary Decision Maker: Emilio Catherine    Readmission Evaluation  Is this a readmission?: No    Elopement Risk  Legal Hold: No  Ambulatory or Self Mobile in Wheelchair: No-Not an Elopement Risk  Disoriented: Time-At Risk for Elopement, Situation-At Risk for Elopement  Psychiatric Symptoms: Impulsivity-at Risk for Elopement  History of Wandering: No  Elopement this Admit: No  Vocalizing Wanting to Leave: No  Displays Behaviors, Body Language Wanting to Leave: No-Not at Risk for Elopement  Elopement Risk: Not at Risk for Elopement    Interdisciplinary Discharge Planning  Lives with -  Patient's Self Care Capacity: Attendant / Paid Care Giver  Patient or legal guardian wants to designate a caregiver: No  Support Systems: Family Member(s)  Housing / Facility: longterm  Name of Care Facility: [facility]  Do You Take your Prescribed Medications Regularly: Yes  Able to Return to Previous ADL's: Future Time w/Therapy  Mobility Issues: Yes  Prior Services: Intermittent Physical Support for ADL Per Service  Patient Prefers to be Discharged to:: [facility]    Discharge Preparedness  What is your plan after discharge?: Skilled nursing facility  What are your discharge supports?: Guardian  Prior Functional Level: Needs Assist with Activities of Daily Living, Uses Cane  Difficulity with ADLs: Walking  Difficulity with IADLs: Cooking, Driving, Keeping track of finances, Laundry, Managing medication, Shopping, Using the telephone or computer  Difficulity with IADL Comments: Lives with paid caregivers    Functional Assesment  Prior Functional Level: Needs Assist with Activities of Daily Living, Uses Cane    Finances  Financial Barriers to Discharge: No  Prescription Coverage: Yes    Vision / Hearing Impairment  Vision Impairment : Yes  Right Eye Vision: Impaired  Left Eye Vision: Impaired  Hearing Impairment : No    Advance Directive  Advance Directive?: DPOA for Health Care  Durable Power of  Name and Contact : Emilio Catherine    Domestic Abuse  Have you ever been the victim of abuse or violence?: No  Possible Abuse/Neglect Reported to:: Not Applicable    Psychological Assessment  History of Substance Abuse: None  History of Psychiatric Problems: No  Non-compliant with Treatment: No  Newly Diagnosed Illness: No    Discharge Risks or Barriers  Patient risk factors: Vulnerable adult, Lack of outside supports    Anticipated Discharge Information  Discharge Disposition: D/T to SNF with Medicare cert in anticipation of skilled care (03)

## 2024-05-20 NOTE — DISCHARGE PLANNING
DC Transport Scheduled    Transport Company Scheduled:  [employer]  Spoke with Ericka at [facility] to schedule transport.    Scheduled Date: 5/21/2024  Scheduled Time: 1300    Destination: [facility]   Destination address: [address] [place] [address] [place]     Notified care team of scheduled transport via Vo[employer]te.     If there are any changes needed to the DC transportation scheduled, please contact [facility] at ext. [phone] between the hours of 4405-6206 Mon-Fri. If outside those hours, contact the ED Case Manager at ext. [phone].

## 2024-05-20 NOTE — CARE PLAN
The patient is Stable - Low risk of patient condition declining or worsening    Shift Goals  Clinical Goals: PT will have no falls, iv abx, and skin integrity will be maintained  Patient Goals: JIMBO  Family Goals: JIMBO    Patient a/o x's 2, confused on time and situation. Pt has tele-sitter. Pt able to state he needs to use the bathroom for bowel movement. I've taken the patient to the bathroom twice and patient ambulates with fww/sb assist. Pt currently resting comfortably. Respirations even and unlabored. Call light within reach and bed alarm on.     Progress made toward(s) clinical / shift goals:    Problem: Knowledge Deficit - Standard  Goal: Patient and family/care givers will demonstrate understanding of plan of care, disease process/condition, diagnostic tests and medications  Outcome: Not Met       Patient is not progressing towards the following goals:      Problem: Knowledge Deficit - Standard  Goal: Patient and family/care givers will demonstrate understanding of plan of care, disease process/condition, diagnostic tests and medications  Outcome: Not Met     Problem: Fall Risk  Goal: Patient will remain free from falls  Outcome: Progressing     Problem: Skin Integrity  Goal: Skin integrity is maintained or improved  Outcome: Progressing     Problem: Communication  Goal: The ability to communicate needs accurately and effectively will improve  Outcome: Progressing     Problem: Pain - Standard  Goal: Alleviation of pain or a reduction in pain to the patient’s comfort goal  Outcome: Progressing

## 2024-05-20 NOTE — DISCHARGE PLANNING
Case Management Discharge Planning    Admission Date: 5/17/2024  GMLOS: 4.9  ALOS: 2    6-Clicks ADL Score:    6-Clicks Mobility Score: 15  PT and/or OT Eval ordered: Yes  Post-acute Referrals Ordered: Yes  Post-acute Choice Obtained: Yes  Has referral(s) been sent to post-acute provider:  Yes      Anticipated Discharge Dispo: Discharge Disposition: D/T to SNF with Medicare cert in anticipation of skilled care (03)    DME Needed: No    Action(s) Taken: Con sent to DPA. LSMW requested DPA tos end out to [facility] and ask about bed availability for tomorrow.   LMSW to set up transport via [facility] for transport to [facility] tomorrow.     Escalations Completed: None    Medically Clear: Yes    Next Steps: Pending bed availability at [facility]    Barriers to Discharge: Pending Placement    Is the patient up for discharge tomorrow: Yes    Is transport arranged for discharge disposition: LMSW will set up transport

## 2024-05-20 NOTE — DIETARY
"Nutrition services: Day 2 of admit.  Ovi Bejarano is a 88 y.o. male with admitting DX of complicated UTI.     Consult received for BMI<19.     Pt seen at bedside. Per chart, pt is A/O x 1-2. Pt is difficult to understand at times, when asked did not get clear answer on appetite. Pt reports previously weighed 130-135 lbs, unknown how long ago pt weighed this amount. Nutrition focused physical exam done showing severe muscle and severe fat wasting of the dorsal hand, calves, clavicals, and buccal facial pads. Discussed some food preferences with pt.    Assessment:  Height: 172.7 cm (5' 8\")  Weight: 45.1 kg (99 lb 6.8 oz) - bed scale  Body mass index is 15.12 kg/m²., BMI classification: underweight  Diet/Intake: diabetic, <25% of one meals and % of one meal    Evaluation:   PMH of vascular dementia, BPH with urinary retention, DM, and R kidney mass.   Per weight hx, pt weighed 118 lbs on 12/27/23, which indicates weight loss of 19 lb weight loss in 16.1% severe weight loss in 5 months.   [facility] transport setup for 5/21.  Skin: No wounds or edema noted.   Labs: Glucose 142, GFR 57  Meds: seroquel,   Last BM: 5/20    Malnutrition Risk: Severe malnutrition in the context of chronic illness r/t dementia as evidenced by severe muscle and severe fat wasting, and severe 16.1% weight loss in 5 months. (Discussed with MD)    Recommendations/Plan:  Diabetic diet, will order Glucerna shakes with meals to promote adequate kcal and protein intake.   Encourage intake of meals/supplements.   Document intake of all meals/supplements as % taken in ADL's to provide interdisciplinary communication across all shifts.   Monitor weight.    RD following.   "

## 2024-05-21 ENCOUNTER — PATIENT OUTREACH (OUTPATIENT)
Dept: SCHEDULING | Facility: IMAGING CENTER | Age: 89
End: 2024-05-21
Payer: MEDICARE

## 2024-05-21 VITALS
BODY MASS INDEX: 15.07 KG/M2 | DIASTOLIC BLOOD PRESSURE: 66 MMHG | TEMPERATURE: 96.8 F | WEIGHT: 99.43 LBS | HEART RATE: 75 BPM | OXYGEN SATURATION: 92 % | SYSTOLIC BLOOD PRESSURE: 148 MMHG | RESPIRATION RATE: 17 BRPM | HEIGHT: 68 IN

## 2024-05-21 LAB
ANION GAP SERPL CALC-SCNC: 10 MMOL/L (ref 7–16)
BUN SERPL-MCNC: 18 MG/DL (ref 8–22)
CALCIUM SERPL-MCNC: 8.3 MG/DL (ref 8.5–10.5)
CHLORIDE SERPL-SCNC: 99 MMOL/L (ref 96–112)
CO2 SERPL-SCNC: 29 MMOL/L (ref 20–33)
CREAT SERPL-MCNC: 1.01 MG/DL (ref 0.5–1.4)
ERYTHROCYTE [DISTWIDTH] IN BLOOD BY AUTOMATED COUNT: 42.3 FL (ref 35.9–50)
GFR SERPLBLD CREATININE-BSD FMLA CKD-EPI: 71 ML/MIN/1.73 M 2
GLUCOSE SERPL-MCNC: 75 MG/DL (ref 65–99)
HCT VFR BLD AUTO: 39.2 % (ref 42–52)
HGB BLD-MCNC: 12.8 G/DL (ref 14–18)
MCH RBC QN AUTO: 29.4 PG (ref 27–33)
MCHC RBC AUTO-ENTMCNC: 32.7 G/DL (ref 32.3–36.5)
MCV RBC AUTO: 89.9 FL (ref 81.4–97.8)
PLATELET # BLD AUTO: 188 K/UL (ref 164–446)
PMV BLD AUTO: 8.9 FL (ref 9–12.9)
POTASSIUM SERPL-SCNC: 4.3 MMOL/L (ref 3.6–5.5)
RBC # BLD AUTO: 4.36 M/UL (ref 4.7–6.1)
SODIUM SERPL-SCNC: 138 MMOL/L (ref 135–145)
WBC # BLD AUTO: 5.7 K/UL (ref 4.8–10.8)

## 2024-05-21 PROCEDURE — 99239 HOSP IP/OBS DSCHRG MGMT >30: CPT | Performed by: STUDENT IN AN ORGANIZED HEALTH CARE EDUCATION/TRAINING PROGRAM

## 2024-05-21 RX ORDER — AMOXICILLIN AND CLAVULANATE POTASSIUM 875; 125 MG/1; MG/1
1 TABLET, FILM COATED ORAL EVERY 12 HOURS
Status: ACTIVE | DISCHARGE
Start: 2024-05-21 | End: 2024-05-23

## 2024-05-21 RX ORDER — AMOXICILLIN AND CLAVULANATE POTASSIUM 875; 125 MG/1; MG/1
1 TABLET, FILM COATED ORAL EVERY 12 HOURS
Status: DISCONTINUED | OUTPATIENT
Start: 2024-05-21 | End: 2024-05-21 | Stop reason: HOSPADM

## 2024-05-21 RX ADMIN — TAMSULOSIN HYDROCHLORIDE 0.8 MG: 0.4 CAPSULE ORAL at 06:01

## 2024-05-21 RX ADMIN — BUSPIRONE HYDROCHLORIDE 10 MG: 10 TABLET ORAL at 06:01

## 2024-05-21 RX ADMIN — FINASTERIDE 5 MG: 5 TABLET, FILM COATED ORAL at 06:01

## 2024-05-21 RX ADMIN — QUETIAPINE FUMARATE 50 MG: 25 TABLET ORAL at 06:01

## 2024-05-21 RX ADMIN — ACETAMINOPHEN 650 MG: 325 TABLET, FILM COATED ORAL at 06:32

## 2024-05-21 RX ADMIN — AMOXICILLIN AND CLAVULANATE POTASSIUM 1 TABLET: 875; 125 TABLET, FILM COATED ORAL at 10:49

## 2024-05-21 NOTE — DISCHARGE PLANNING
Case Management Discharge Planning    Admission Date: 5/17/2024  GMLOS: 4.9  ALOS: 3    6-Clicks ADL Score: 20  6-Clicks Mobility Score: 15    Anticipated Discharge Dispo: Discharge Disposition: D/T to SNF with Medicare cert in anticipation of skilled care (03)    JOSEPHINE spoke with Susan from [facility] regarding pts LOC.   Both SHANTANUSW and Susan were not able to submit LOC.   LMSW escalated to leadership, when leader tried to submit, note came back that another screen for the same applicant is in progress, that screen was submitted by [facility]. - ORC:[id number]     LMSW spoke with guardian regarding update on the transfer to [place] today. IMM given to guardian, signed verbal, form faxed to DPA and charted.   COBRA packet initiated, awaiting DC summary.

## 2024-05-21 NOTE — CARE PLAN
The patient is Stable - Low risk of patient condition declining or worsening    Shift Goals  Clinical Goals: Pt will be free from falls and maintain skin integrity  Patient Goals: Rest  Family Goals: JIMBO    Patient a/o x2 w/exception to time and place. Patient ate 50% of dinner. Patient skin is CDI. Patient denies pain. Patient currently resting. Patient on 1 liter of O2 nc. Patient respirations are even and unlabored. Patient does have a productive cough. All needs currently met at this time.     Progress made toward(s) clinical / shift goals:    Problem: Knowledge Deficit - Standard  Goal: Patient and family/care givers will demonstrate understanding of plan of care, disease process/condition, diagnostic tests and medications  Outcome: Not Progressing     Problem: Fall Risk  Goal: Patient will remain free from falls  Outcome: Progressing     Problem: Skin Integrity  Goal: Skin integrity is maintained or improved  Outcome: Progressing     Problem: Communication  Goal: The ability to communicate needs accurately and effectively will improve  Outcome: Progressing     Problem: Pain - Standard  Goal: Alleviation of pain or a reduction in pain to the patient’s comfort goal  Outcome: Progressing       Patient is not progressing towards the following goals:      Problem: Knowledge Deficit - Standard  Goal: Patient and family/care givers will demonstrate understanding of plan of care, disease process/condition, diagnostic tests and medications  Outcome: Not Progressing

## 2024-05-21 NOTE — DISCHARGE SUMMARY
Discharge Summary    CHIEF COMPLAINT ON ADMISSION  Chief Complaint   Patient presents with    Weakness    Painful Urination     BIB EMS from [facility]. Increased weakness today with diminished appetite. Pt reports pain at méndez catheter site.        Reason for Admission  EMS     Admission Date  5/17/2024    CODE STATUS  DNAR/DNI    HPI & HOSPITAL COURSE  This is a 88 y.o. male who was brought in from [facility] 5/17/2024 with increased weakness and abdominal pain.  PMH of vascular dementia, BPH with urinary retention and chronic indwelling Méndez catheter, diabetes, right kidney mass.  Patient was having difficulty urinating this morning and has some suprapubic abdominal pain.  Associated weakness. Patient was unable to stand on his own in the ED.     In the ED afebrile,  hypoxic in the 80s and is requiring 2 L O2. Labs show UA that is concerning for infection. Urine culture positive for Ecoli and enterococcus. He was started on iv antibiotics which was transitioned to Augmentin to complete the course. Méndez was exchanged at this admission. Blood culture negative.     His CXR noted patchy airspace opacity in the left lower lobe. He was started on antibiotics for pneumonia treatment.     Patient was evaluated by PT/OT who recommended post acute placement. Therefore, he is discharged in fair and stable condition to skilled nursing facility.    The patient met 2-midnight criteria for an inpatient stay at the time of discharge.    Discharge Date  5/21/24    FOLLOW UP ITEMS POST DISCHARGE  PCP in one week    DISCHARGE DIAGNOSES  Principal Problem:    Urinary tract infection associated with indwelling urethral catheter (HCC) (POA: Yes)  Active Problems:    Acute respiratory failure with hypoxia (HCC) (POA: Yes)    Severe protein-calorie malnutrition (HCC) (POA: Unknown)    Vascular dementia with psychotic disturbance (HCC) (POA: Yes)    BPH with urinary obstruction (POA: Yes)    Functional diarrhea (POA:  Yes)    Type II diabetes mellitus with peripheral circulatory disorder (HCC) (POA: Yes)    Pneumonia of left lower lobe due to infectious organism (POA: Yes)    Weakness (POA: Yes)    Complicated UTI (urinary tract infection) (POA: Yes)  Resolved Problems:    * No resolved hospital problems. *      FOLLOW UP  No future appointments.  No follow-up provider specified.    MEDICATIONS ON DISCHARGE     Medication List        START taking these medications        Instructions   amoxicillin-clavulanate 875-125 MG Tabs  Commonly known as: Augmentin   Take 1 Tablet by mouth every 12 hours for 7 days.  Dose: 1 Tablet            CHANGE how you take these medications        Instructions   betamethasone dipropionate 0.05 % Crea  What changed:   how much to take  how to take this  when to take this   APPLY TOPICALLY TO AREAS OF ITCHING LEGS AND ARMS ONCE DAILY     busPIRone 10 MG Tabs tablet  What changed: when to take this  Commonly known as: Buspar   TAKE 1 TABLET BY MOUTH TWICE DAILY     hydrOXYzine HCl 25 MG Tabs  What changed: See the new instructions.  Commonly known as: Atarax   TAKE 1 TABLET BY MOUTH THREE TIMES A DAY  FOR ITCHING, HOLD FOR SLEEPINESS     loperamide 2 MG tablet  What changed: when to take this  Commonly known as: Anti-Diarrheal   Take 1 Tablet by mouth 4 times a day as needed (loose stools).  Dose: 2 mg     * QUEtiapine 50 MG tablet  What changed:   how much to take  how to take this  when to take this  Commonly known as: SEROquel   TAKE 1 TABLET BY MOUTH THREE TIMES A DAY     * QUEtiapine 100 MG Tabs  What changed: additional instructions  Commonly known as: SEROquel   TAKE 1 TABLET BY MOUTH AT BEDTIME     tamsulosin 0.4 MG capsule  What changed: See the new instructions.  Commonly known as: Flomax   TAKE 2 CAPSULES BY MOUTH EVERY MORNING 30 MINUTES AFTER BREAKFAST     traZODone 50 MG Tabs  What changed: when to take this  Commonly known as: Desyrel   TAKE 1 TABLET BY MOUTH EVERY EVENING  Dose: 50 mg            * This list has 2 medication(s) that are the same as other medications prescribed for you. Read the directions carefully, and ask your doctor or other care provider to review them with you.                CONTINUE taking these medications        Instructions   Acetaminophen Extra Strength 500 MG Tabs   TAKE 1 TABLET BY MOUTH EVERY 4 HOURS AS NEEDED FOR PAIN ( NOT TO EXCEED 3 GRAMS IN 24 HOURS)     Ensure Liqd   Take 237 mL by mouth every day.  Dose: 237 mL     finasteride 5 MG Tabs  Commonly known as: Proscar   Take 1 Tablet by mouth every day.  Dose: 5 mg     multi-vitamin/beta carotene Tabs   TAKE 1 TABLET BY MOUTH ONCE DAILY  Dose: 1 Tablet     Vitamin D (Ergocalciferol) 62256 units Caps   TAKE 1 CAPSULE BY MOUTH EVERY 7 DAYS  Dose: 1 Capsule              Allergies  No Known Allergies    DIET  Orders Placed This Encounter   Procedures    Diet Order Diet: Consistent CHO (Diabetic)     Standing Status:   Standing     Number of Occurrences:   1     Order Specific Question:   Diet:     Answer:   Consistent CHO (Diabetic) [4]       ACTIVITY  As tolerated.  Weight bearing as tolerated    CONSULTATIONS  na    PROCEDURES  na    LABORATORY  Lab Results   Component Value Date    SODIUM 138 05/21/2024    POTASSIUM 4.3 05/21/2024    CHLORIDE 99 05/21/2024    CO2 29 05/21/2024    GLUCOSE 75 05/21/2024    BUN 18 05/21/2024    CREATININE 1.01 05/21/2024        Lab Results   Component Value Date    WBC 5.7 05/21/2024    HEMOGLOBIN 12.8 (L) 05/21/2024    HEMATOCRIT 39.2 (L) 05/21/2024    PLATELETCT 188 05/21/2024      DX-CHEST-PORTABLE (1 VIEW)   Final Result         Patchy airspace opacity in the left lower lobe, likely pneumonia.          Total time of the discharge process 33 minutes.

## 2024-05-21 NOTE — PROGRESS NOTES
Hospital Medicine Daily Progress Note    Date of Service  5/20/2024    Chief Complaint  Ovi Bejarano is a 88 y.o. male admitted 5/17/2024 with weakness    Hospital Course  Ovi Bejarano is a 88 y.o. male who was brought in from retirement 5/17/2024 with increased weakness and abdominal pain.  PMH of vascular dementia, BPH with urinary retention and chronic indwelling Méndez catheter, diabetes, right kidney mass.  Patient was having difficulty urinating this morning and has some suprapubic abdominal pain.  Associated weakness is worse than his baseline.  has chronic diarrhea. Patient was unable to stand on his own in the ED.     In the ED afebrile,  hypoxic in the 80s and is requiring 2 L O2. Labs show UA that is concerning for infection.    Interval Problem Update  I saw and examined the patient at bedside  AO x1-2, knowing the year 2024, not able to answer the place, month and why she is here.     On 2 L NC, not on home oxygen    Urine culture positive for E coli, Enterococcus faecalis   méndez catheter exchanged  I started Zyvox  Also has pneumonia    Continue Rocephin and Zyvox    PT OT recommended SNF    I have discussed this patient's plan of care and discharge plan at IDT rounds today with Case Management, Nursing, Nursing leadership, and other members of the IDT team.    Consultants/Specialty      Code Status  DNAR/DNI    Disposition  The patient is medically cleared for discharge to home or a post-acute facility.      I have placed the appropriate orders for post-discharge needs.    Review of Systems  Review of Systems   Constitutional:  Positive for malaise/fatigue. Negative for chills, fever and weight loss.   Eyes:  Negative for blurred vision, double vision and photophobia.   Respiratory:  Negative for cough and sputum production.    Gastrointestinal:  Negative for heartburn, nausea and vomiting.   Genitourinary:  Negative for dysuria, frequency and urgency.   Musculoskeletal:  Negative  for myalgias and neck pain.   Neurological:  Positive for focal weakness and weakness. Negative for dizziness, tingling, sensory change and headaches.   Psychiatric/Behavioral: Negative.          Physical Exam  Temp:  [36.5 °C (97.7 °F)-37.1 °C (98.8 °F)] 37.1 °C (98.8 °F)  Pulse:  [70-95] 86  Resp:  [16-19] 18  BP: (113-151)/(61-68) 114/61  SpO2:  [93 %-98 %] 93 %    Physical Exam  Constitutional:       General: He is not in acute distress.     Appearance: He is ill-appearing. He is not toxic-appearing or diaphoretic.   HENT:      Mouth/Throat:      Mouth: Mucous membranes are moist.   Eyes:      Extraocular Movements: Extraocular movements intact.      Pupils: Pupils are equal, round, and reactive to light.   Cardiovascular:      Rate and Rhythm: Normal rate and regular rhythm.      Heart sounds: No murmur heard.     No friction rub. No gallop.   Pulmonary:      Effort: No respiratory distress.      Breath sounds: No stridor. No wheezing, rhonchi or rales.   Abdominal:      Palpations: There is no mass.      Hernia: No hernia is present.      Comments: Suprapubic cath   Musculoskeletal:         General: No swelling, tenderness or signs of injury.      Cervical back: Normal range of motion.   Skin:     Capillary Refill: Capillary refill takes 2 to 3 seconds.      Coloration: Skin is not jaundiced or pale.      Findings: No bruising, erythema or lesion.   Neurological:      General: No focal deficit present.      Mental Status: He is alert. He is disoriented.      Motor: Weakness present.   Psychiatric:         Mood and Affect: Mood normal.         Behavior: Behavior normal.         Fluids    Intake/Output Summary (Last 24 hours) at 5/20/2024 1744  Last data filed at 5/20/2024 1330  Gross per 24 hour   Intake 360 ml   Output 950 ml   Net -590 ml       Laboratory  Recent Labs     05/17/24 2058 05/19/24  0705 05/20/24  0619   WBC 6.3 5.0 6.9   RBC 4.65* 4.31* 4.49*   HEMOGLOBIN 13.9* 12.6* 13.3*   HEMATOCRIT 41.2*  39.4* 39.8*   MCV 88.6 91.4 88.6   MCH 29.9 29.2 29.6   MCHC 33.7 32.0* 33.4   RDW 43.2 44.4 42.6   PLATELETCT 189 186 193   MPV 8.8* 9.0 8.8*     Recent Labs     05/17/24 2058 05/19/24  0705 05/20/24  0619   SODIUM 137 138 135   POTASSIUM 4.1 4.0 4.1   CHLORIDE 97 99 96   CO2 28 26 27   GLUCOSE 125* 72 142*   BUN 17 25* 22   CREATININE 1.08 1.28 1.22   CALCIUM 9.1 8.8 8.6                   Imaging  DX-CHEST-PORTABLE (1 VIEW)   Final Result         Patchy airspace opacity in the left lower lobe, likely pneumonia.           Assessment/Plan  * Urinary tract infection associated with indwelling urethral catheter (HCC)- (present on admission)  Assessment & Plan  chronic indwelling Méndez catheter  Symptomatic with suprapubic abdominal pain  Urine culture positive for E coli, Enterococcus faecalis   méndez catheter exchanged    Continue Rocephin, I started Zyvox  Follow-up sensitivity    Weakness- (present on admission)  Assessment & Plan  Generalized weakness due to old age and acute infections as well as respiratory failure  PT, OT    Pneumonia of left lower lobe due to infectious organism- (present on admission)  Assessment & Plan  Patient having dyspnea on exertion and was hypoxic down to the low/mid 80s requiring oxygen  COVID, influenza, RSV negative  CXR reviewed he does have an opacity on the left.  Procalcitonin negative     Continue Rocephin and azithromycin    Type II diabetes mellitus with peripheral circulatory disorder (HCC)- (present on admission)  Assessment & Plan  Sliding-scale insulin    Functional diarrhea- (present on admission)  Assessment & Plan  Patient has chronic diarrhea, at baseline    BPH with urinary obstruction- (present on admission)  Assessment & Plan  Chronic indwelling catheter, per chart review has refused TURP in the past  Continue tamsulosin and finasteride    Vascular dementia with psychotic disturbance (HCC)- (present on admission)  Assessment & Plan  History of agitation and  hallucinations per chart review  Continue buspirone and quetiapine    Acute respiratory failure with hypoxia (HCC)- (present on admission)  Assessment & Plan  Saturating in the mid 80s in the ED. requires a 2 L NC, not on home oxygen  CXR reviewed and shows opacity on the left, possible pneumonia    Continue Rocephin and azithromycin  Wean down oxygen         VTE prophylaxis: LOVENOX    I have performed a physical exam and reviewed and updated ROS and Plan today (5/20/2024). In review of yesterday's note (5/19/2024), there are no changes except as documented above.      I spent 36 minutes for chart review, meeting and examining the patient, documenting, ordering medications and tests, interpreting the results independently, and communicating with the other health professionals.      VTE Selection

## 2024-05-21 NOTE — DISCHARGE INSTRUCTIONS
Discharge Instructions per Marie Corbin M.D.    Please follow-up with PCP as outpatient.      Return to ER in the event of new or worsening symptoms. Please note importance of compliance and the patient has agreed to proceed with all medical recommendations and follow up plan indicated above. All medications come with benefits and risks. Risks may include permanent injury or death and these risks can be minimized with close reassessment and monitoring. Please make it to your scheduled follow ups with PCP

## 2024-05-23 ENCOUNTER — HOSPITAL ENCOUNTER (EMERGENCY)
Facility: MEDICAL CENTER | Age: 89
End: 2024-05-24
Attending: STUDENT IN AN ORGANIZED HEALTH CARE EDUCATION/TRAINING PROGRAM
Payer: MEDICARE

## 2024-05-23 ENCOUNTER — APPOINTMENT (OUTPATIENT)
Dept: RADIOLOGY | Facility: MEDICAL CENTER | Age: 89
End: 2024-05-23
Attending: STUDENT IN AN ORGANIZED HEALTH CARE EDUCATION/TRAINING PROGRAM
Payer: MEDICARE

## 2024-05-23 VITALS
TEMPERATURE: 98.4 F | OXYGEN SATURATION: 100 % | RESPIRATION RATE: 20 BRPM | DIASTOLIC BLOOD PRESSURE: 89 MMHG | HEIGHT: 68 IN | WEIGHT: 99 LBS | SYSTOLIC BLOOD PRESSURE: 158 MMHG | BODY MASS INDEX: 15.01 KG/M2 | HEART RATE: 94 BPM

## 2024-05-23 DIAGNOSIS — R33.9 URINARY RETENTION: ICD-10-CM

## 2024-05-23 DIAGNOSIS — N39.0 URINARY TRACT INFECTION ASSOCIATED WITH CATHETERIZATION OF URINARY TRACT, UNSPECIFIED INDWELLING URINARY CATHETER TYPE, INITIAL ENCOUNTER (HCC): ICD-10-CM

## 2024-05-23 DIAGNOSIS — R31.0 GROSS HEMATURIA: ICD-10-CM

## 2024-05-23 DIAGNOSIS — I71.40 ABDOMINAL AORTIC ANEURYSM (AAA) WITHOUT RUPTURE, UNSPECIFIED PART (HCC): ICD-10-CM

## 2024-05-23 DIAGNOSIS — N39.0 COMPLICATED UTI (URINARY TRACT INFECTION): ICD-10-CM

## 2024-05-23 DIAGNOSIS — T83.511A URINARY TRACT INFECTION ASSOCIATED WITH CATHETERIZATION OF URINARY TRACT, UNSPECIFIED INDWELLING URINARY CATHETER TYPE, INITIAL ENCOUNTER (HCC): ICD-10-CM

## 2024-05-23 DIAGNOSIS — T83.9XXA COMPLICATION OF FOLEY CATHETER, INITIAL ENCOUNTER (HCC): ICD-10-CM

## 2024-05-23 DIAGNOSIS — N13.9 OBSTRUCTIVE UROPATHY: ICD-10-CM

## 2024-05-23 LAB
ALBUMIN SERPL BCP-MCNC: 3.6 G/DL (ref 3.2–4.9)
ALBUMIN/GLOB SERPL: 1 G/DL
ALP SERPL-CCNC: 75 U/L (ref 30–99)
ALT SERPL-CCNC: 11 U/L (ref 2–50)
ANION GAP SERPL CALC-SCNC: 11 MMOL/L (ref 7–16)
ANISOCYTOSIS BLD QL SMEAR: ABNORMAL
APPEARANCE UR: ABNORMAL
AST SERPL-CCNC: 26 U/L (ref 12–45)
BACTERIA #/AREA URNS HPF: ABNORMAL /HPF
BASOPHILS # BLD AUTO: 0.9 % (ref 0–1.8)
BASOPHILS # BLD: 0.07 K/UL (ref 0–0.12)
BILIRUB SERPL-MCNC: 0.3 MG/DL (ref 0.1–1.5)
BUN SERPL-MCNC: 28 MG/DL (ref 8–22)
CALCIUM ALBUM COR SERPL-MCNC: 9.2 MG/DL (ref 8.5–10.5)
CALCIUM SERPL-MCNC: 8.9 MG/DL (ref 8.5–10.5)
CHLORIDE SERPL-SCNC: 98 MMOL/L (ref 96–112)
CO2 SERPL-SCNC: 28 MMOL/L (ref 20–33)
COLOR UR: ABNORMAL
CREAT SERPL-MCNC: 1.27 MG/DL (ref 0.5–1.4)
EOSINOPHIL # BLD AUTO: 0 K/UL (ref 0–0.51)
EOSINOPHIL NFR BLD: 0 % (ref 0–6.9)
EPI CELLS #/AREA URNS HPF: ABNORMAL /HPF
ERYTHROCYTE [DISTWIDTH] IN BLOOD BY AUTOMATED COUNT: 42.3 FL (ref 35.9–50)
GFR SERPLBLD CREATININE-BSD FMLA CKD-EPI: 54 ML/MIN/1.73 M 2
GLOBULIN SER CALC-MCNC: 3.6 G/DL (ref 1.9–3.5)
GLUCOSE SERPL-MCNC: 154 MG/DL (ref 65–99)
HCT VFR BLD AUTO: 36.3 % (ref 42–52)
HGB BLD-MCNC: 11.9 G/DL (ref 14–18)
HYALINE CASTS #/AREA URNS LPF: ABNORMAL /LPF
LYMPHOCYTES # BLD AUTO: 1.43 K/UL (ref 1–4.8)
LYMPHOCYTES NFR BLD: 18.3 % (ref 22–41)
MANUAL DIFF BLD: NORMAL
MCH RBC QN AUTO: 29.4 PG (ref 27–33)
MCHC RBC AUTO-ENTMCNC: 32.8 G/DL (ref 32.3–36.5)
MCV RBC AUTO: 89.6 FL (ref 81.4–97.8)
MICRO URNS: ABNORMAL
MICROCYTES BLD QL SMEAR: ABNORMAL
MONOCYTES # BLD AUTO: 1.22 K/UL (ref 0–0.85)
MONOCYTES NFR BLD AUTO: 15.6 % (ref 0–13.4)
MORPHOLOGY BLD-IMP: NORMAL
NEUTROPHILS # BLD AUTO: 5.09 K/UL (ref 1.82–7.42)
NEUTROPHILS NFR BLD: 65.2 % (ref 44–72)
NRBC # BLD AUTO: 0 K/UL
NRBC BLD-RTO: 0 /100 WBC (ref 0–0.2)
PLATELET # BLD AUTO: 205 K/UL (ref 164–446)
PLATELET BLD QL SMEAR: NORMAL
PMV BLD AUTO: 9 FL (ref 9–12.9)
POTASSIUM SERPL-SCNC: 4.6 MMOL/L (ref 3.6–5.5)
PROT SERPL-MCNC: 7.2 G/DL (ref 6–8.2)
RBC # BLD AUTO: 4.05 M/UL (ref 4.7–6.1)
RBC # URNS HPF: >150 /HPF
RBC BLD AUTO: PRESENT
SODIUM SERPL-SCNC: 137 MMOL/L (ref 135–145)
VARIANT LYMPHS BLD QL SMEAR: NORMAL
WBC # BLD AUTO: 7.8 K/UL (ref 4.8–10.8)
WBC #/AREA URNS HPF: ABNORMAL /HPF

## 2024-05-23 RX ORDER — DROPERIDOL 2.5 MG/ML
1.25 INJECTION, SOLUTION INTRAMUSCULAR; INTRAVENOUS ONCE
Status: COMPLETED | OUTPATIENT
Start: 2024-05-23 | End: 2024-05-23

## 2024-05-23 RX ORDER — MIDAZOLAM HYDROCHLORIDE 1 MG/ML
2 INJECTION INTRAMUSCULAR; INTRAVENOUS ONCE
Status: DISCONTINUED | OUTPATIENT
Start: 2024-05-23 | End: 2024-05-23

## 2024-05-23 RX ORDER — HYDROMORPHONE HYDROCHLORIDE 1 MG/ML
0.5 INJECTION, SOLUTION INTRAMUSCULAR; INTRAVENOUS; SUBCUTANEOUS ONCE
Status: DISCONTINUED | OUTPATIENT
Start: 2024-05-23 | End: 2024-05-24 | Stop reason: HOSPADM

## 2024-05-23 RX ORDER — HALOPERIDOL 5 MG/ML
2.5 INJECTION INTRAMUSCULAR ONCE
Status: COMPLETED | OUTPATIENT
Start: 2024-05-23 | End: 2024-05-23

## 2024-05-23 RX ORDER — LIDOCAINE HYDROCHLORIDE 20 MG/ML
JELLY TOPICAL
Status: COMPLETED | OUTPATIENT
Start: 2024-05-23 | End: 2024-05-23

## 2024-05-23 RX ORDER — AMOXICILLIN AND CLAVULANATE POTASSIUM 875; 125 MG/1; MG/1
1 TABLET, FILM COATED ORAL ONCE
Status: COMPLETED | OUTPATIENT
Start: 2024-05-23 | End: 2024-05-23

## 2024-05-23 RX ORDER — AMOXICILLIN AND CLAVULANATE POTASSIUM 875; 125 MG/1; MG/1
1 TABLET, FILM COATED ORAL 2 TIMES DAILY
Qty: 14 TABLET | Refills: 0 | Status: ACTIVE | OUTPATIENT
Start: 2024-05-23 | End: 2024-05-30

## 2024-05-23 RX ADMIN — DROPERIDOL 1.25 MG: 2.5 INJECTION, SOLUTION INTRAMUSCULAR; INTRAVENOUS at 18:09

## 2024-05-23 RX ADMIN — IOHEXOL 100 ML: 350 INJECTION, SOLUTION INTRAVENOUS at 22:05

## 2024-05-23 RX ADMIN — AMOXICILLIN AND CLAVULANATE POTASSIUM 1 TABLET: 875; 125 TABLET, FILM COATED ORAL at 23:05

## 2024-05-23 RX ADMIN — HALOPERIDOL LACTATE 2.5 MG: 5 INJECTION, SOLUTION INTRAMUSCULAR at 20:20

## 2024-05-23 RX ADMIN — LIDOCAINE HYDROCHLORIDE 1 APPLICATION: 20 JELLY TOPICAL at 18:11

## 2024-05-23 RX ADMIN — HALOPERIDOL LACTATE 2.5 MG: 5 INJECTION, SOLUTION INTRAMUSCULAR at 18:42

## 2024-05-23 ASSESSMENT — FIBROSIS 4 INDEX: FIB4 SCORE: 3.97

## 2024-05-23 NOTE — ED TRIAGE NOTES
"Chief Complaint   Patient presents with    Aggressive Behavior     Pt BIBA from [facility]. Pt with hx of dementia and behavioral disturbances, currently being treated for a UTI. Pt self dc'd his méndez catheter this am at 0930. Staff attempted to replace it and he became aggressive, assaulting staff. Pt arrives in 4x point restraints       Pt BIBA from [place] with the above complaint. EMS gave 1.5 versed in route. Pt arrives calm and cooperative at this time. [facility] staff would like for us to replace the catheter.     BP (!) 179/74   Pulse 80   Temp 36.8 °C (98.3 °F) (Temporal)   Resp 16   Ht 1.727 m (5' 8\")   Wt 44.9 kg (99 lb)   SpO2 91%   BMI 15.05 kg/m²     "

## 2024-05-24 NOTE — ED NOTES
ERP awared that méndez catheter insertion earlier by previous shift was failed, there is resistance and is bloody

## 2024-05-24 NOTE — ED NOTES
Discharge teaching and paperwork provided including information regarding Rx. Questions answered. VSS, assessment stable. Patient belonging with patient. Patient D/C to the care of [facility] and wheeled on gurney out of the ED to [facility].

## 2024-05-24 NOTE — ED PROVIDER NOTES
ER Provider Note    Scribed for Renny Washburn M.D. by Erica Bowman. 5/23/2024   5:50 PM    Primary Care Provider: MASON Dill    CHIEF COMPLAINT  Chief Complaint   Patient presents with    Aggressive Behavior     Pt BIBA from [facility]. Pt with hx of dementia and behavioral disturbances, currently being treated for a UTI. Pt self dc'd his méndez catheter this am at 0930. Staff attempted to replace it and he became aggressive, assaulting staff. Pt arrives in 4x point restraints     EXTERNAL RECORDS REVIEWED  Outpatient Notes Skilled nursing records at bedside report patient with a history of Type II Diabetes, COPD, and Vascular dementia. He was last sen here on 5/17/24 for weakness and abdominal pain. At this time he was hypoxic and had a UA concerning for E coli and strep. He was started on antibiotics and discharged on Augmentin.    HPI/ROS  LIMITATION TO HISTORY   Select: Altered mental status / Confusion  OUTSIDE HISTORIAN(S):  Nursing staff as below    Ovi Bejarano is a 88 y.o. male, with a history of dementia, who presents to the ED, via EMS from [facility] skilled nursing, for evaluation of aggressive behavior and AMS. Per nursing note, the patient self dislodged his méndez catheter at 930 AM this morning. When staff attempted to place another one he became aggressive. Per EMS note the patient arrived in 4 point restraint. Nursing staff informs me that he is on 4 L normally and placed on 6 L following 1.5 Versed. Nursing staff also informs that he has a history of aggression and is currently being treated for a UTI.   HPI limited due to AMS.     I attempted to contact the patient's legal guardian, Hernan Catherine, unsuccessfully.    PAST MEDICAL HISTORY  Past Medical History:   Diagnosis Date    Abnormal urinalysis 12/4/2020    Age-related cataract of both eyes 8/1/2021    Blister of left foot 6/16/2023    COPD (chronic obstructive pulmonary disease) (HCC)     Cystitis without  hematuria 9/10/2020    Diabetes (HCC)     type 2    Diarrhea of presumed infectious origin 7/10/2020    Discharge planning issues 10/14/2020    Herpes zoster with complication 1/30/2018    Hypercholesteremia     Hyperglycemia     Hypertension     Neurocognitive disorder 2020    Psychiatric problem     Rash     Renal disorder     R renal mass    Vascular dementia of acute onset with behavioral disturbance (HCC) 02/09/2021       SURGICAL HISTORY  History reviewed. No pertinent surgical history.    FAMILY HISTORY  Family History   Problem Relation Age of Onset    Stroke Sister        SOCIAL HISTORY   reports that he quit smoking about 16 years ago. His smoking use included cigarettes. He has never used smokeless tobacco. He reports current alcohol use. He reports that he does not use drugs.    CURRENT MEDICATIONS  Previous Medications    ACETAMINOPHEN EXTRA STRENGTH 500 MG TAB    TAKE 1 TABLET BY MOUTH EVERY 4 HOURS AS NEEDED FOR PAIN ( NOT TO EXCEED 3 GRAMS IN 24 HOURS)    AMOXICILLIN-CLAVULANATE (AUGMENTIN) 875-125 MG TAB    Take 1 Tablet by mouth every 12 hours for 7 days.    BETAMETHASONE DIPROPIONATE 0.05 % CREAM    APPLY TOPICALLY TO AREAS OF ITCHING LEGS AND ARMS ONCE DAILY    BUSPIRONE (BUSPAR) 10 MG TAB TABLET    TAKE 1 TABLET BY MOUTH TWICE DAILY    FINASTERIDE (PROSCAR) 5 MG TAB    Take 1 Tablet by mouth every day.    HYDROXYZINE HCL (ATARAX) 25 MG TAB    TAKE 1 TABLET BY MOUTH THREE TIMES A DAY  FOR ITCHING, HOLD FOR SLEEPINESS    LOPERAMIDE (ANTI-DIARRHEAL) 2 MG TABLET    Take 1 Tablet by mouth 4 times a day as needed (loose stools).    MULTI-VITAMIN/BETA CAROTENE (TAB-A-MAGGIE/BETA CAROTENE) TAB    TAKE 1 TABLET BY MOUTH ONCE DAILY    NUTRITIONAL SUPPLEMENTS (ENSURE) LIQUID    Take 237 mL by mouth every day.    QUETIAPINE (SEROQUEL) 100 MG TAB    TAKE 1 TABLET BY MOUTH AT BEDTIME    QUETIAPINE (SEROQUEL) 50 MG TABLET    TAKE 1 TABLET BY MOUTH THREE TIMES A DAY    TAMSULOSIN (FLOMAX) 0.4 MG CAPSULE    TAKE 2  "CAPSULES BY MOUTH EVERY MORNING 30 MINUTES AFTER BREAKFAST    TRAZODONE (DESYREL) 50 MG TAB    TAKE 1 TABLET BY MOUTH EVERY EVENING    VITAMIN D, ERGOCALCIFEROL, 60547 UNITS CAP    TAKE 1 CAPSULE BY MOUTH EVERY 7 DAYS       ALLERGIES  No Known Allergies     PHYSICAL EXAM  BP (!) 160/68   Pulse 78   Temp 36.8 °C (98.3 °F) (Temporal)   Resp 16   Ht 1.727 m (5' 8\")   Wt 44.9 kg (99 lb)   SpO2 93%   BMI 15.05 kg/m²    General: Cachectic, chronically ill appearing, waxing waning mental status, occasionally speaking English, occasionally speaking Tagalog  Head: Normocephalic atraumatic  Eyes: Extraocular motion intact, midrange and reactive  Neck: Supple, no rigidity  Cardiovascular: Regular rate and rhythm no murmurs rubs or gallops  Respiratory: Hypoxic on 6L.  After intravenous Versed en  route. crackles in bilateral lower lobes with increased work of breathing. equal chest rise and fall  Abdomen: Diffuse mild abdominal tenderness Soft no guarding or rebound.  Lower abdominal distention.  Musculoskeletal: Warm and well perfused, no peripheral edema  Neuro: Alert, no focal deficits  Integumentary: No wounds or rashes  : Bleeding out of urethral meatus with no apparent lacerations or other gross injuries    DIAGNOSTIC STUDIES  Méndez catheter placement procedure note    Indication: Spontaneous dislodgement, multiple failed attempts by nursing    Procedure: The patient was placed in the supine position and the patient's méndez was replaced using an 18 in Coude tube and the bulb was inflated using 10 cc of sterile water.  The placement was verified by Urine output.    The patient tolerated the procedure well.    Complications: None     Labs:   Results for orders placed or performed during the hospital encounter of 05/23/24   CBC WITH DIFFERENTIAL   Result Value Ref Range    WBC 7.8 4.8 - 10.8 K/uL    RBC 4.05 (L) 4.70 - 6.10 M/uL    Hemoglobin 11.9 (L) 14.0 - 18.0 g/dL    Hematocrit 36.3 (L) 42.0 - 52.0 %    MCV " 89.6 81.4 - 97.8 fL    MCH 29.4 27.0 - 33.0 pg    MCHC 32.8 32.3 - 36.5 g/dL    RDW 42.3 35.9 - 50.0 fL    Platelet Count 205 164 - 446 K/uL    MPV 9.0 9.0 - 12.9 fL    Neutrophils-Polys 65.20 44.00 - 72.00 %    Lymphocytes 18.30 (L) 22.00 - 41.00 %    Monocytes 15.60 (H) 0.00 - 13.40 %    Eosinophils 0.00 0.00 - 6.90 %    Basophils 0.90 0.00 - 1.80 %    Nucleated RBC 0.00 0.00 - 0.20 /100 WBC    Neutrophils (Absolute) 5.09 1.82 - 7.42 K/uL    Lymphs (Absolute) 1.43 1.00 - 4.80 K/uL    Monos (Absolute) 1.22 (H) 0.00 - 0.85 K/uL    Eos (Absolute) 0.00 0.00 - 0.51 K/uL    Baso (Absolute) 0.07 0.00 - 0.12 K/uL    NRBC (Absolute) 0.00 K/uL    Anisocytosis 1+     Microcytosis 1+    COMP METABOLIC PANEL   Result Value Ref Range    Sodium 137 135 - 145 mmol/L    Potassium 4.6 3.6 - 5.5 mmol/L    Chloride 98 96 - 112 mmol/L    Co2 28 20 - 33 mmol/L    Anion Gap 11.0 7.0 - 16.0    Glucose 154 (H) 65 - 99 mg/dL    Bun 28 (H) 8 - 22 mg/dL    Creatinine 1.27 0.50 - 1.40 mg/dL    Calcium 8.9 8.5 - 10.5 mg/dL    Correct Calcium 9.2 8.5 - 10.5 mg/dL    AST(SGOT) 26 12 - 45 U/L    ALT(SGPT) 11 2 - 50 U/L    Alkaline Phosphatase 75 30 - 99 U/L    Total Bilirubin 0.3 0.1 - 1.5 mg/dL    Albumin 3.6 3.2 - 4.9 g/dL    Total Protein 7.2 6.0 - 8.2 g/dL    Globulin 3.6 (H) 1.9 - 3.5 g/dL    A-G Ratio 1.0 g/dL   ESTIMATED GFR   Result Value Ref Range    GFR (CKD-EPI) 54 (A) >60 mL/min/1.73 m 2   DIFFERENTIAL MANUAL   Result Value Ref Range    Manual Diff Status PERFORMED    PERIPHERAL SMEAR REVIEW   Result Value Ref Range    Peripheral Smear Review see below    PLATELET ESTIMATE   Result Value Ref Range    Plt Estimation Normal    MORPHOLOGY   Result Value Ref Range    RBC Morphology Present     Reactive Lymphocytes Few    URINALYSIS    Specimen: Urine   Result Value Ref Range    Color Brown (A)     Character Turbid (A)     Micro Urine Req Microscopic    URINE MICROSCOPIC (W/UA)   Result Value Ref Range    WBC  (A) /hpf    RBC >150  (A) /hpf    Bacteria Moderate (A) None /hpf    Epithelial Cells Few /hpf    Hyaline Cast 0-2 /lpf     Urinalysis consistent with infection, moderate bacteria, WBCs , will send for culture again, based on recent sensitivities, patient will be continued on Augmentin.    Radiology:       Radiologist interpretation:   CT-ABDOMEN-PELVIS WITH   Final Result         1.  Large heterogeneous right renal mass, increased in size since prior study, appearance favoring neoplasia, particularly renal cell carcinoma. Correlate with history.   2.  Fusiform infrarenal abdominal aortic aneurysm stable to slightly increased in size since prior study. Radiographic follow-up recommended as clinically appropriate.   3.  Hazy and interstitial bilateral lower lobe infiltrate   4.  Changes of emphysema   5.  Diverticulosis   6.  Atherosclerosis and atherosclerotic coronary artery disease           INITIAL ASSESSMENT COURSE AND PLAN  Care Narrative     5:50 PM - Patient was evaluated at bedside. Ordered for CBC with diff and CMP to evaluate. The patient will be medicated with Droperidol injection for his symptoms. Patient verbalizes understanding and support with my plan of care.  Differential diagnoses include but not limited to: Urinary retention, gross hematuria, UTI, POLY, dehydration, electrolyte abnormality    6:29 PM- Nursing staff informs me that the patient is becoming even more aggressive with staff at this time.Ordered for non violent restraints.     6:37 PM- Patient still combative. Ordered for Haldol 2.5 mg injection.    8:19 PM- Nursing staff informs me that the patient is growing more combative and still not allowing for Rosado catheter placement. Ordered for Haldol 2.5 mg injection.    8:25 PM I discussed the patient's case and the above findings with Dr. Roland (Urology) who says attempt with 18 inch Coude and he can assist if unsuccessful.      8:58 PM- I attempted to call the patient's care giver at this time with no  answer.    9:06 PM- Rosado catheter placed with nursing staff at this time.  Discussed again with urology who agrees given no large clots, no significant hematuria and translucent effluent, likely bleeding is anterior, and if controlled likely can be followed up outpatient.    10:47 PM- Spoke with pharmacy regarding proper antibiotics. He will look at the culture and get back to me.    10:53 PM- Pharmacy recommends Augmentin based on recent sensitivities      Reviewed patient's laboratory, imaging results at the bedside, patient is agreeable to discharge, we discussed strict return precautions, and outpatient follow-up, patient was discharged in no acute distress.  All questions were answered prior to discharge.        DISPOSITION AND DISCUSSIONS    I have discussed management of the patient with the following physicians and VÍCTOR's:  None    Discussion of management with other QHP or appropriate source(s): Pharmacy See above      Escalation of care considered, and ultimately not performed: acute inpatient care management, however at this time, the patient is most appropriate for outpatient management.    The patient will return for new or worsening symptoms and is stable at the time of discharge.    The patient is referred to a primary physician for blood pressure management, diabetic screening, and for all other preventative health concerns.    DISPOSITION:  Patient will be discharged home in stable condition  Back to skilled nursing facility    FOLLOW UP:  PCP    OUTPATIENT MEDICATIONS:  New Prescriptions    AMOXICILLIN-CLAVULANATE (AUGMENTIN) 875-125 MG TAB    Take 1 Tablet by mouth 2 times a day for 7 days.       FINAL DIAGNOSIS  1. Urinary retention    2. Obstructive uropathy    3. Complication of Rosado catheter, initial encounter ([facility])    4. Urinary tract infection associated with catheterization of urinary tract, unspecified indwelling urinary catheter type, initial encounter ([facility])    5. Gross hematuria    6.  Complicated UTI (urinary tract infection)    7. Abdominal aortic aneurysm (AAA) without rupture, unspecified part (HCC)      Rosado catheter placement procedure     I, Erica Bowman (Scralexandra), am scribing for, and in the presence of, Juan Luis Washburn M.D..    Electronically signed by: Erica Bowman (Mahendra), 5/23/2024    IJuan Luis M.D. personally performed the services described in this documentation, as scribed by Erica Bowman in my presence, and it is both accurate and complete.      The note accurately reflects work and decisions made by me.  Juan Luis Washburn M.D.  5/24/2024  1:29 AM

## 2024-05-24 NOTE — ED NOTES
Bedside report received from off going RN/tech: Jaison, assumed care of patient.  POC discussed with patient. Call light within reach, all needs addressed at this time.       Fall risk interventions in place: Move the patient closer to the nurse's station, Place socks on patient, Bed Alarm in use, and Human-sitter if tele-sitter fails (all applicable per [facility] Fall risk assessment)   Continuous monitoring: Cardiac Leads  IVF/IV medications: Not Applicable   Oxygen: 6L nasal cannula, line traced to wall, no tank at bedside  Bedside sitter: Not Applicable   Isolation: Not Applicable         <<-----Click here for Discharge Medication Review

## 2024-05-24 NOTE — ED NOTES
Bedside report received from previous shift.   Assumed patient care. Verified patient identification.  Checked on bed, connected to monitor,  with unlabored respirations. Discussed plan of care.   Vital signs is stable. Denied any new complaints.   Gurney in low position, side rail up for pt safety. Call light within reach.   No needs identified at the moment. Instructed to use call light when needed.    Contraptions: IV gauge 20 Lt UA  Alert and Oriented: confused and agitated  Ambulatory: Not at the moment  Oxygen: None  Pending: Removed soft restraints/ placed 1:1 sitter

## 2024-05-24 NOTE — DISCHARGE PLANNING
Medical Social Work     SW faxed a PCS form to [facility] and set up transport for 0045. Transfer packet complete and given to the RN.

## 2024-05-27 ENCOUNTER — HOSPITAL ENCOUNTER (EMERGENCY)
Facility: MEDICAL CENTER | Age: 89
End: 2024-05-27
Attending: STUDENT IN AN ORGANIZED HEALTH CARE EDUCATION/TRAINING PROGRAM
Payer: MEDICARE

## 2024-05-27 VITALS
RESPIRATION RATE: 20 BRPM | BODY MASS INDEX: 16.49 KG/M2 | WEIGHT: 98.99 LBS | HEART RATE: 63 BPM | SYSTOLIC BLOOD PRESSURE: 113 MMHG | TEMPERATURE: 98 F | DIASTOLIC BLOOD PRESSURE: 55 MMHG | HEIGHT: 65 IN | OXYGEN SATURATION: 100 %

## 2024-05-27 DIAGNOSIS — R33.9 URINARY RETENTION: ICD-10-CM

## 2024-05-27 DIAGNOSIS — F03.C0 SEVERE DEMENTIA, UNSPECIFIED DEMENTIA TYPE, UNSPECIFIED WHETHER BEHAVIORAL, PSYCHOTIC, OR MOOD DISTURBANCE OR ANXIETY (HCC): ICD-10-CM

## 2024-05-27 DIAGNOSIS — T83.9XXA PROBLEM WITH FOLEY CATHETER, INITIAL ENCOUNTER (HCC): ICD-10-CM

## 2024-05-27 DIAGNOSIS — J96.11 CHRONIC HYPOXEMIC RESPIRATORY FAILURE (HCC): ICD-10-CM

## 2024-05-27 PROCEDURE — 302140 GU CART

## 2024-05-27 PROCEDURE — 700101 HCHG RX REV CODE 250: Mod: UD | Performed by: STUDENT IN AN ORGANIZED HEALTH CARE EDUCATION/TRAINING PROGRAM

## 2024-05-27 PROCEDURE — 99285 EMERGENCY DEPT VISIT HI MDM: CPT

## 2024-05-27 PROCEDURE — 700111 HCHG RX REV CODE 636 W/ 250 OVERRIDE (IP): Mod: UD

## 2024-05-27 PROCEDURE — 303105 HCHG CATHETER EXTRA

## 2024-05-27 PROCEDURE — 96374 THER/PROPH/DIAG INJ IV PUSH: CPT | Mod: XU

## 2024-05-27 PROCEDURE — 51702 INSERT TEMP BLADDER CATH: CPT

## 2024-05-27 PROCEDURE — 700111 HCHG RX REV CODE 636 W/ 250 OVERRIDE (IP): Mod: UD | Performed by: STUDENT IN AN ORGANIZED HEALTH CARE EDUCATION/TRAINING PROGRAM

## 2024-05-27 PROCEDURE — 96376 TX/PRO/DX INJ SAME DRUG ADON: CPT | Mod: XU

## 2024-05-27 RX ORDER — MIDAZOLAM HYDROCHLORIDE 1 MG/ML
1 INJECTION INTRAMUSCULAR; INTRAVENOUS ONCE
Status: COMPLETED | OUTPATIENT
Start: 2024-05-27 | End: 2024-05-27

## 2024-05-27 RX ORDER — MIDAZOLAM HYDROCHLORIDE 1 MG/ML
1 INJECTION INTRAMUSCULAR; INTRAVENOUS
Status: COMPLETED | OUTPATIENT
Start: 2024-05-27 | End: 2024-05-27

## 2024-05-27 RX ORDER — LIDOCAINE HYDROCHLORIDE 20 MG/ML
JELLY TOPICAL
Status: COMPLETED | OUTPATIENT
Start: 2024-05-27 | End: 2024-05-27

## 2024-05-27 RX ADMIN — MIDAZOLAM HYDROCHLORIDE 1 MG: 2 INJECTION, SOLUTION INTRAMUSCULAR; INTRAVENOUS at 02:02

## 2024-05-27 RX ADMIN — MIDAZOLAM HYDROCHLORIDE 1 MG: 2 INJECTION, SOLUTION INTRAMUSCULAR; INTRAVENOUS at 03:35

## 2024-05-27 RX ADMIN — LIDOCAINE HYDROCHLORIDE 6 ML: 20 JELLY TOPICAL at 02:02

## 2024-05-27 RX ADMIN — MIDAZOLAM HYDROCHLORIDE 1 MG: 1 INJECTION, SOLUTION INTRAMUSCULAR; INTRAVENOUS at 04:30

## 2024-05-27 ASSESSMENT — FIBROSIS 4 INDEX: FIB4 SCORE: 3.37

## 2024-05-27 NOTE — DISCHARGE PLANNING
NENA was asked to arrange [facility] transportation back to [place].  NENA called [place] and spoke with Radha who provided the patients room number 604-1 and a brief ER summary was provided to her.  Radha stated she will go the the [address] [place] [address] and update the RN of his return.  NENA completed the PCS and faxed to [facility].  Per Luis at [facility] ETA will be approx. 6:45 am. NENA will update the ER bedside RN.

## 2024-05-27 NOTE — ED PROVIDER NOTES
"CHIEF COMPLAINT  Chief Complaint   Patient presents with    Urinary Catheter Problem     Pt BIBA , per [facility] staff pt pulled out méndez catheter with balloon intact staff has concerns for \"penile hemorrhaging\" , pt has hx of dementia and has been acting at his baseline . Per EMS pt was hypotensive on scene 83/54, pt received 400mL IVF and is now 123/55. FSBG 157 GCS 13         LIMITATION TO HISTORY   Select:     HPI    Ovi Bejarano is a 88 y.o. male who presents to the Emergency Department for evaluation of traumatic Méndez dislodgment patient has a history of severe dementia and was noted to have taken out his Méndez catheter just prior to arrival patient has a history of chronic urinary retention and chronic indwelling Méndez catheter EMS notes patient was slightly hypotensive this resolved with fluids patient at his baseline per nursing facility    OUTSIDE HISTORIAN(S):  Select:    EXTERNAL RECORDS REVIEWED  Select:       PAST MEDICAL HISTORY  Past Medical History:   Diagnosis Date    Abnormal urinalysis 12/4/2020    Age-related cataract of both eyes 8/1/2021    Blister of left foot 6/16/2023    COPD (chronic obstructive pulmonary disease) ([facility])     Cystitis without hematuria 9/10/2020    Diabetes ([facility])     type 2    Diarrhea of presumed infectious origin 7/10/2020    Discharge planning issues 10/14/2020    Herpes zoster with complication 1/30/2018    Hypercholesteremia     Hyperglycemia     Hypertension     Neurocognitive disorder 2020    Psychiatric problem     Rash     Renal disorder     R renal mass    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    SURGICAL HISTORY  No past surgical history on file.      FAMILY HISTORY  Family History   Problem Relation Age of Onset    Stroke Sister           SOCIAL HISTORY  Social History     Socioeconomic History    Marital status:      Spouse name: Not on file    Number of children: Not on file    Years of education: Not " on file    Highest education level: Not on file   Occupational History    Not on file   Tobacco Use    Smoking status: Former     Current packs/day: 0.00     Types: Cigarettes     Quit date: 10/21/2007     Years since quittin.6    Smokeless tobacco: Never   Substance and Sexual Activity    Alcohol use: Yes     Comment: rare    Drug use: No    Sexual activity: Not on file   Other Topics Concern    Not on file   Social History Narrative    Not on file     Social Determinants of Health     Financial Resource Strain: Not on file   Food Insecurity: No Food Insecurity (2024)    Hunger Vital Sign     Worried About Running Out of Food in the Last Year: Never true     Ran Out of Food in the Last Year: Never true   Transportation Needs: No Transportation Needs (2024)    PRAPARE - Transportation     Lack of Transportation (Medical): No     Lack of Transportation (Non-Medical): No   Physical Activity: Not on file   Stress: Not on file   Social Connections: Not on file   Intimate Partner Violence: Not At Risk (2024)    Humiliation, Afraid, Rape, and Kick questionnaire     Fear of Current or Ex-Partner: No     Emotionally Abused: No     Physically Abused: No     Sexually Abused: No   Housing Stability: Unknown (2024)    Housing Stability Vital Sign     Unable to Pay for Housing in the Last Year: No     Number of Places Lived in the Last Year: Not on file     Unstable Housing in the Last Year: No         CURRENT MEDICATIONS  No current facility-administered medications on file prior to encounter.     Current Outpatient Medications on File Prior to Encounter   Medication Sig Dispense Refill    amoxicillin-clavulanate (AUGMENTIN) 875-125 MG Tab Take 1 Tablet by mouth 2 times a day for 7 days. 14 Tablet 0    hydrOXYzine HCl (ATARAX) 25 MG Tab TAKE 1 TABLET BY MOUTH THREE TIMES A DAY  FOR ITCHING, HOLD FOR SLEEPINESS (Patient taking differently: Take 25 mg by mouth 3 times a day.) 81 Tablet 10    QUEtiapine  (SEROQUEL) 100 MG Tab TAKE 1 TABLET BY MOUTH AT BEDTIME (Patient taking differently: Take 100 mg by mouth at bedtime. TAKE 1 TABLET BY MOUTH AT BEDTIME) 30 Tablet 10    traZODone (DESYREL) 50 MG Tab TAKE 1 TABLET BY MOUTH EVERY EVENING (Patient taking differently: Take 50 mg by mouth at bedtime.) 30 Tablet 10    Nutritional Supplements (ENSURE) Liquid Take 237 mL by mouth every day. 7110 mL 5    QUEtiapine (SEROQUEL) 50 MG tablet TAKE 1 TABLET BY MOUTH THREE TIMES A DAY (Patient taking differently: Take 50 mg by mouth 3 times a day. TAKE 1 TABLET BY MOUTH THREE TIMES A DAY) 90 Tablet 0    Vitamin D, Ergocalciferol, 59602 units Cap TAKE 1 CAPSULE BY MOUTH EVERY 7 DAYS 5 Capsule 10    busPIRone (BUSPAR) 10 MG Tab tablet TAKE 1 TABLET BY MOUTH TWICE DAILY (Patient taking differently: Take 10 mg by mouth 2 times a day.) 60 Tablet 10    tamsulosin (FLOMAX) 0.4 MG capsule TAKE 2 CAPSULES BY MOUTH EVERY MORNING 30 MINUTES AFTER BREAKFAST (Patient taking differently: Take 0.8 mg by mouth every day. 2 capsules= 0.8mg) 60 Capsule 10    Acetaminophen Extra Strength 500 MG Tab TAKE 1 TABLET BY MOUTH EVERY 4 HOURS AS NEEDED FOR PAIN ( NOT TO EXCEED 3 GRAMS IN 24 HOURS) 30 Tablet 11    betamethasone dipropionate 0.05 % Cream APPLY TOPICALLY TO AREAS OF ITCHING LEGS AND ARMS ONCE DAILY (Patient taking differently: Apply 1 Application topically every day. APPLY TOPICALLY TO AREAS OF ITCHING LEGS AND ARMS ONCE DAILY) 45 g 10    multi-vitamin/beta carotene (TAB-A-MAGGIE/BETA CAROTENE) Tab TAKE 1 TABLET BY MOUTH ONCE DAILY 7 Tablet 11    finasteride (PROSCAR) 5 MG Tab Take 1 Tablet by mouth every day. 30 Tablet 10    loperamide (ANTI-DIARRHEAL) 2 MG tablet Take 1 Tablet by mouth 4 times a day as needed (loose stools). (Patient taking differently: Take 2 mg by mouth 1 time a day as needed (loose stools). Indications: Diarrhea) 30 Tablet 11           ALLERGIES  No Known Allergies    PHYSICAL EXAM  VITAL SIGNS:BP (!) 154/68   Pulse 82    "Temp 36.7 °C (98 °F) (Temporal)   Resp 20   Ht 1.651 m (5' 5\")   Wt 44.9 kg (98 lb 15.8 oz)   SpO2 92%   BMI 16.47 kg/m²       GENERAL: Awake and alert, nasal cannula in place  HEAD: Normocephalic and atraumatic  NECK: Normal range of motion, without meningismus  EYES: Pupils Equal, Round, Reactive to Light, extraocular movements intact, conjunctiva white  ENT: Mucous membranes moist, oropharynx clear  PULMONARY: Normal effort, clear to auscultation  CARDIOVASCULAR: No murmurs, clicks or rubs, peripheral pulses 2+  ABDOMINAL: Soft, non-tender, no guarding or rigidity present, no pulsatile masses  BACK: no midline tenderness, no costovertebral tenderness  MALE GENITOURINARY: There is some blood at the meatus  NEUROLOGICAL: Alert not oriented to person place or time otherwise grossly non-focal neurological examination, speech normal, gait normal  EXTREMITIES: No edema, normal to inspection  SKIN: Warm and dry.  PSYCHIATRIC: Affect is appropriate    DIAGNOSTIC STUDIES / PROCEDURES  EKG  Used 20 Citizen of [place] Rosado catheter and attempt to place Rosado catheter however was unable to advance    LABS  Labs Reviewed - No data to display      COURSE & MEDICAL DECISION MAKING    ED COURSE:        INTERVENTIONS BY ME:  Medications   midazolam (Versed) injection 1 mg (1 mg Intravenous Given 5/27/24 0202)   lidocaine jelly (Uro-Jet) 2 % (6 mL Topical Given 5/27/24 0202)   midazolam (Versed) injection 1 mg (1 mg Intravenous Given 5/27/24 0335)   midazolam (Versed) injection 1 mg (1 mg Intravenous Given 5/27/24 0430)       Response on recheck:  1:50 AM informed by nurse that patient is somewhat combative will provide Versed and Uro-Jet prior to placing Rosado catheter he does have a history of aggressive behavior and required Versed prior to placement of Rosado catheter during previous ER visit on May 23  3:15 AM informed by nurse she is unable to advance Rosado catheter after 2 attempts second attempt was with an 18 Citizen of [place] coudé will " order 20 French coudé and repeat attempts  3:45 AM patient combative while trying to place Méndez catheter given his he had 2 failed attempts I feel it is becoming critically important to place a Méndez catheter will provide additional anxiolysis to assist in the procedure.  Feel relaxation as I feel appropriate anxiolysis is to pass a Méndez catheter patient placed on cardiac monitor and continuous pulse oximeter monitoring of the utmost importance.    4:20 AM méndez placed by Dr Caruso      INITIAL ASSESSMENT, COURSE AND PLAN  Care Narrative:   Patient presenting after traumatic removal of Méndez catheter in the setting of dementia patient does have a history of traumatic removal of Méndez catheter nurse had 2 failed attempts at placing Méndez catheter and fortunately additionally was able to place a Méndez catheter patient does have a history of somewhat aggressive behavior and required midazolam as anxiolysis to facilitate procedure do not see any utility of any blood work or imaging at this time consider CBC and CMP in addition to ultrasound of the kidneys, however however do not feel this to be of additional diagnostic utility         ADDITIONAL PROBLEM LIST    DISPOSITION AND DISCUSSIONS  Discussion of management with other QHP or appropriate source(s): None     I have discussed management of the patient with the following physicians and VÍCTOR's: 4:05 AM spoke to urologist on-call Dr Caruso failed Méndez catheter attempts in the ER will come the ER to place Méndez catheter    Escalation of care considered, and ultimately not performed:Laboratory analysis and diagnostic imaging      Decision tools and prescription drugs considered including, but not limited to: Patient to continue Flomax    FINAL DIAGNOSIS  1. Problem with Méndez catheter, initial encounter (HCC) Active   2. Urinary retention Chronic            Electronically signed by: Eric Johnson DO ,4:16 AM 05/27/24

## 2024-05-27 NOTE — CONSULTS
DATE OF SERVICE:  05/27/2024     UROLOGY CONSULTATION     REQUESTING PHYSICIAN:  Dr. Eric Johnson.     CONSULTING PHYSICIAN:  Dimas Caruso MD with Urology.     REASON FOR CONSULTATION:  Difficult Rosado catheter placement and Rosado trauma.     HISTORY OF PRESENT ILLNESS:  The patient is an 88-year-old gentleman from a   nursing home who has severe dementia and urinary retention with chronic   urinary catheter who a week ago, pulled out his catheter traumatically and had   it replaced in the ER, has done the same thing again tonight and attempts by   the ER staff to replace a Rosado catheter had been unsuccessful prompting   urology consultation.     PAST MEDICAL HISTORY:  Significant for cataracts, COPD, diabetes type 2,   diarrhea, hypercholesterolemia, hypertension, dementia, right renal mass.     PAST SURGICAL HISTORY:  None in the EMR.  The patient is unable to give a   history.     ALLERGIES:  No known drug allergies.     MEDICATIONS ON ADMISSION:  Augmentin, hydroxyzine, quetiapine, trazodone,   buspirone, tamsulosin, acetaminophen, betamethasone cream, finasteride,   loperamide.     REVIEW OF SYSTEMS:  Unable to obtain due to the patient's mental status.     PHYSICAL EXAMINATION:    GENERAL:  The patient is demented, unable to answer questions. The patient is   restrained in bilateral upper extremities, there is some blood at the meatus.  LUNGS:  Breathing is nonlabored.  CARDIOVASCULAR:  Pulse is regular.  ABDOMEN:  Soft, nontender, nondistended.  Again, there is some blood at the   urethral meatus.  EXTREMITIES:  The patient moves all extremities normally.     PROCEDURE NOTE:  Under sterile conditions, I instilled some lidocaine gel in   the urethra and then placed an 18-Yi coude tip Rosado catheter, which went   in without resistance, filled the balloon with 10 mL of sterile water and   hooked up to down drain.  The catheter was secured to his thigh with a   StatLock.  The patient was restrained  in bilateral upper extremities to   prevent him from pulling the Rosado catheter back out.     ASSESSMENT AND PLAN:  This is an 88-year-old male with dementia, urinary   retention and recurrent Rosado trauma, now with Rosado catheter replaced.    Recommend restraints or other measures to prevent him from removing the Rosado   catheter traumatically again and further workup as an outpatient with urology.        ______________________________  MD HARDY Andre/MILA    DD:  05/27/2024 04:39  DT:  05/27/2024 04:52    Job#:  [id number]

## 2024-05-27 NOTE — ED TRIAGE NOTES
"Chief Complaint   Patient presents with    Urinary Catheter Problem     Pt BIBA , per [facility] staff pt pulled out méndez catheter with balloon intact staff has concerns for \"penile hemorrhaging\" , pt has hx of dementia and has been acting at his baseline . Per EMS pt was hypotensive on scene 83/54, pt received 400mL IVF and is now 123/55. FSBG 157 GCS 13         "

## 2024-05-27 NOTE — ED NOTES
Pt resting on gurney, pt attached to monitor, respirations are equal and unlabored, call light in reach , bed locked and in lowest position, no needs at this time   - pt awaiting transport to [facility]